# Patient Record
Sex: FEMALE | Race: WHITE | Employment: OTHER | URBAN - NONMETROPOLITAN AREA
[De-identification: names, ages, dates, MRNs, and addresses within clinical notes are randomized per-mention and may not be internally consistent; named-entity substitution may affect disease eponyms.]

---

## 2014-01-19 LAB
CHOLESTEROL, TOTAL: 244 MG/DL
CHOLESTEROL/HDL RATIO: 4.4
HDLC SERPL-MCNC: 55 MG/DL (ref 35–70)
LDL CHOLESTEROL CALCULATED: 202 MG/DL (ref 0–160)
TRIGL SERPL-MCNC: 134 MG/DL
VLDLC SERPL CALC-MCNC: 26 MG/DL

## 2015-01-04 LAB
CHOLESTEROL, TOTAL: 211 MG/DL
CHOLESTEROL/HDL RATIO: NORMAL
HDLC SERPL-MCNC: 66 MG/DL (ref 35–70)
LDL CHOLESTEROL CALCULATED: 146 MG/DL (ref 0–160)
TRIGL SERPL-MCNC: 176 MG/DL
VLDLC SERPL CALC-MCNC: 35 MG/DL

## 2018-03-21 ENCOUNTER — HOSPITAL ENCOUNTER (OUTPATIENT)
Dept: GENERAL RADIOLOGY | Age: 63
Discharge: HOME | DRG: 287 | End: 2018-03-21
Payer: MEDICARE

## 2018-03-21 ENCOUNTER — HOSPITAL ENCOUNTER (INPATIENT)
Age: 63
LOS: 3 days | Discharge: HOME | DRG: 287 | End: 2018-03-24
Attending: FAMILY MEDICINE | Admitting: FAMILY MEDICINE
Payer: MEDICARE

## 2018-03-21 ENCOUNTER — HOSPITAL ENCOUNTER (OUTPATIENT)
Age: 63
Discharge: HOME | DRG: 287 | End: 2018-03-21
Payer: MEDICARE

## 2018-03-21 ENCOUNTER — APPOINTMENT (OUTPATIENT)
Dept: CT IMAGING | Age: 63
DRG: 287 | End: 2018-03-21
Payer: MEDICARE

## 2018-03-21 DIAGNOSIS — R07.89 ATYPICAL CHEST PAIN: Primary | ICD-10-CM

## 2018-03-21 DIAGNOSIS — R06.02 SOB (SHORTNESS OF BREATH): ICD-10-CM

## 2018-03-21 PROBLEM — E78.5 HYPERLIPEMIA: Status: ACTIVE | Noted: 2018-03-21

## 2018-03-21 PROBLEM — I10 ESSENTIAL HYPERTENSION: Status: ACTIVE | Noted: 2018-03-21

## 2018-03-21 PROBLEM — R00.1 BRADYCARDIA: Status: ACTIVE | Noted: 2018-03-21

## 2018-03-21 PROBLEM — D64.9 ANEMIA, NORMOCYTIC NORMOCHROMIC: Status: ACTIVE | Noted: 2018-03-21

## 2018-03-21 PROBLEM — D72.829 LEUKOCYTOSIS: Status: ACTIVE | Noted: 2018-03-21

## 2018-03-21 PROBLEM — R07.9 CHEST PAIN: Status: ACTIVE | Noted: 2018-03-21

## 2018-03-21 PROBLEM — R53.83 FATIGUE: Status: ACTIVE | Noted: 2018-03-21

## 2018-03-21 LAB
ALBUMIN SERPL-MCNC: 3.5 G/DL (ref 3.5–5.1)
ALP BLD-CCNC: 35 U/L (ref 38–126)
ALT SERPL-CCNC: 14 U/L (ref 11–66)
ALT SERPL-CCNC: 21 U/L (ref 11–66)
ANION GAP SERPL CALCULATED.3IONS-SCNC: 12 MEQ/L (ref 8–16)
ANION GAP SERPL CALCULATED.3IONS-SCNC: 13 MEQ/L (ref 8–16)
APTT: 29 SECONDS (ref 22–38)
AST SERPL-CCNC: 16 U/L (ref 5–40)
AST SERPL-CCNC: 20 U/L (ref 5–40)
BACTERIA: ABNORMAL
BASOPHILS # BLD: 0.2 %
BASOPHILS # BLD: 0.3 %
BASOPHILS ABSOLUTE: 0 THOU/MM3 (ref 0–0.1)
BASOPHILS ABSOLUTE: 0 THOU/MM3 (ref 0–0.1)
BILIRUB SERPL-MCNC: 0.4 MG/DL (ref 0.3–1.2)
BILIRUBIN DIRECT: < 0.2 MG/DL (ref 0–0.3)
BILIRUBIN URINE: NEGATIVE
BLOOD, URINE: NEGATIVE
BUN BLDV-MCNC: 15 MG/DL (ref 7–22)
BUN BLDV-MCNC: 17 MG/DL (ref 7–22)
CALCIUM SERPL-MCNC: 9.1 MG/DL (ref 8.5–10.5)
CALCIUM SERPL-MCNC: 9.9 MG/DL (ref 8.5–10.5)
CASTS: ABNORMAL /LPF
CASTS: ABNORMAL /LPF
CHARACTER, URINE: CLEAR
CHLORIDE BLD-SCNC: 103 MEQ/L (ref 98–111)
CHLORIDE BLD-SCNC: 104 MEQ/L (ref 98–111)
CHOLESTEROL, TOTAL: 160 MG/DL (ref 100–199)
CO2: 24 MEQ/L (ref 23–33)
CO2: 25 MEQ/L (ref 23–33)
COLOR: YELLOW
CREAT SERPL-MCNC: 0.7 MG/DL (ref 0.4–1.2)
CREAT SERPL-MCNC: 0.7 MG/DL (ref 0.4–1.2)
CRYSTALS: ABNORMAL
EKG ATRIAL RATE: 43 BPM
EKG ATRIAL RATE: 46 BPM
EKG P AXIS: 58 DEGREES
EKG P AXIS: 69 DEGREES
EKG P-R INTERVAL: 182 MS
EKG P-R INTERVAL: 182 MS
EKG Q-T INTERVAL: 536 MS
EKG Q-T INTERVAL: 546 MS
EKG QRS DURATION: 78 MS
EKG QRS DURATION: 84 MS
EKG QTC CALCULATION (BAZETT): 452 MS
EKG QTC CALCULATION (BAZETT): 477 MS
EKG R AXIS: 34 DEGREES
EKG R AXIS: 52 DEGREES
EKG T AXIS: 88 DEGREES
EKG T AXIS: 91 DEGREES
EKG VENTRICULAR RATE: 43 BPM
EKG VENTRICULAR RATE: 46 BPM
EOSINOPHIL # BLD: 0 %
EOSINOPHIL # BLD: 0.1 %
EOSINOPHILS ABSOLUTE: 0 THOU/MM3 (ref 0–0.4)
EOSINOPHILS ABSOLUTE: 0 THOU/MM3 (ref 0–0.4)
EPITHELIAL CELLS, UA: ABNORMAL /HPF
GFR SERPL CREATININE-BSD FRML MDRD: 84 ML/MIN/1.73M2
GFR SERPL CREATININE-BSD FRML MDRD: 84 ML/MIN/1.73M2
GLUCOSE BLD-MCNC: 105 MG/DL (ref 70–108)
GLUCOSE BLD-MCNC: 110 MG/DL (ref 70–108)
GLUCOSE, URINE: NEGATIVE MG/DL
HCT VFR BLD CALC: 31.3 % (ref 37–47)
HCT VFR BLD CALC: 34.5 % (ref 37–47)
HDLC SERPL-MCNC: 50 MG/DL
HEMOGLOBIN: 10.4 GM/DL (ref 12–16)
HEMOGLOBIN: 11.6 GM/DL (ref 12–16)
KETONES, URINE: NEGATIVE
LACTIC ACID: 1.7 MMOL/L (ref 0.5–2.2)
LDL CHOLESTEROL CALCULATED: 71 MG/DL
LEUKOCYTE EST, POC: ABNORMAL
LYMPHOCYTES # BLD: 15.3 %
LYMPHOCYTES # BLD: 18.5 %
LYMPHOCYTES ABSOLUTE: 2.3 THOU/MM3 (ref 1–4.8)
LYMPHOCYTES ABSOLUTE: 3.2 THOU/MM3 (ref 1–4.8)
MCH RBC QN AUTO: 28.4 PG (ref 27–31)
MCH RBC QN AUTO: 28.7 PG (ref 27–31)
MCHC RBC AUTO-ENTMCNC: 33.3 GM/DL (ref 33–37)
MCHC RBC AUTO-ENTMCNC: 33.6 GM/DL (ref 33–37)
MCV RBC AUTO: 85.4 FL (ref 81–99)
MCV RBC AUTO: 85.5 FL (ref 81–99)
MISCELLANEOUS LAB TEST RESULT: ABNORMAL
MONOCYTES # BLD: 5.7 %
MONOCYTES # BLD: 5.8 %
MONOCYTES ABSOLUTE: 0.9 THOU/MM3 (ref 0.4–1.3)
MONOCYTES ABSOLUTE: 1 THOU/MM3 (ref 0.4–1.3)
NITRITE, URINE: NEGATIVE
NUCLEATED RED BLOOD CELLS: 0 /100 WBC
NUCLEATED RED BLOOD CELLS: 0 /100 WBC
OSMOLALITY CALCULATION: 281.6 MOSMOL/KG (ref 275–300)
PDW BLD-RTO: 14.2 % (ref 11.5–14.5)
PDW BLD-RTO: 14.3 % (ref 11.5–14.5)
PH UA: 6
PLATELET # BLD: 207 THOU/MM3 (ref 130–400)
PLATELET # BLD: 244 THOU/MM3 (ref 130–400)
PMV BLD AUTO: 10.3 FL (ref 7.4–10.4)
PMV BLD AUTO: 10.5 FL (ref 7.4–10.4)
POTASSIUM REFLEX MAGNESIUM: 4 MEQ/L (ref 3.5–5.2)
POTASSIUM SERPL-SCNC: 4.1 MEQ/L (ref 3.5–5.2)
PRO-BNP: 2126 PG/ML (ref 0–900)
PROTEIN UA: NEGATIVE MG/DL
RBC # BLD: 3.67 MILL/MM3 (ref 4.2–5.4)
RBC # BLD: 4.04 MILL/MM3 (ref 4.2–5.4)
RBC URINE: ABNORMAL /HPF
RENAL EPITHELIAL, UA: ABNORMAL
SEG NEUTROPHILS: 75.4 %
SEG NEUTROPHILS: 78.7 %
SEGMENTED NEUTROPHILS ABSOLUTE COUNT: 11.8 THOU/MM3 (ref 1.8–7.7)
SEGMENTED NEUTROPHILS ABSOLUTE COUNT: 12.9 THOU/MM3 (ref 1.8–7.7)
SODIUM BLD-SCNC: 140 MEQ/L (ref 135–145)
SODIUM BLD-SCNC: 141 MEQ/L (ref 135–145)
SPECIFIC GRAVITY UA: 1.01 (ref 1–1.03)
TOTAL PROTEIN: 5.9 G/DL (ref 6.1–8)
TRIGL SERPL-MCNC: 194 MG/DL (ref 0–199)
TROPONIN T: < 0.01 NG/ML
TROPONIN T: < 0.01 NG/ML
TSH SERPL DL<=0.05 MIU/L-ACNC: 1.39 UIU/ML (ref 0.4–4.2)
UROBILINOGEN, URINE: 0.2 EU/DL
WBC # BLD: 15 THOU/MM3 (ref 4.8–10.8)
WBC # BLD: 17.1 THOU/MM3 (ref 4.8–10.8)
WBC UA: ABNORMAL /HPF
YEAST: ABNORMAL

## 2018-03-21 PROCEDURE — 2140000000 HC CCU INTERMEDIATE R&B

## 2018-03-21 PROCEDURE — 82164 ANGIOTENSIN I ENZYME TEST: CPT

## 2018-03-21 PROCEDURE — 80061 LIPID PANEL: CPT

## 2018-03-21 PROCEDURE — 74176 CT ABD & PELVIS W/O CONTRAST: CPT

## 2018-03-21 PROCEDURE — B32T1ZZ COMPUTERIZED TOMOGRAPHY (CT SCAN) OF LEFT PULMONARY ARTERY USING LOW OSMOLAR CONTRAST: ICD-10-PCS | Performed by: RADIOLOGY

## 2018-03-21 PROCEDURE — 83880 ASSAY OF NATRIURETIC PEPTIDE: CPT

## 2018-03-21 PROCEDURE — B32S1ZZ COMPUTERIZED TOMOGRAPHY (CT SCAN) OF RIGHT PULMONARY ARTERY USING LOW OSMOLAR CONTRAST: ICD-10-PCS | Performed by: RADIOLOGY

## 2018-03-21 PROCEDURE — 2580000003 HC RX 258: Performed by: FAMILY MEDICINE

## 2018-03-21 PROCEDURE — 84443 ASSAY THYROID STIM HORMONE: CPT

## 2018-03-21 PROCEDURE — 84145 PROCALCITONIN (PCT): CPT

## 2018-03-21 PROCEDURE — 94640 AIRWAY INHALATION TREATMENT: CPT

## 2018-03-21 PROCEDURE — 85025 COMPLETE CBC W/AUTO DIFF WBC: CPT

## 2018-03-21 PROCEDURE — 96374 THER/PROPH/DIAG INJ IV PUSH: CPT

## 2018-03-21 PROCEDURE — 6360000004 HC RX CONTRAST MEDICATION: Performed by: FAMILY MEDICINE

## 2018-03-21 PROCEDURE — 6360000002 HC RX W HCPCS: Performed by: FAMILY MEDICINE

## 2018-03-21 PROCEDURE — 93010 ELECTROCARDIOGRAM REPORT: CPT | Performed by: NUCLEAR MEDICINE

## 2018-03-21 PROCEDURE — 80076 HEPATIC FUNCTION PANEL: CPT

## 2018-03-21 PROCEDURE — 87040 BLOOD CULTURE FOR BACTERIA: CPT

## 2018-03-21 PROCEDURE — 81001 URINALYSIS AUTO W/SCOPE: CPT

## 2018-03-21 PROCEDURE — 82232 ASSAY OF BETA-2 PROTEIN: CPT

## 2018-03-21 PROCEDURE — 80048 BASIC METABOLIC PNL TOTAL CA: CPT

## 2018-03-21 PROCEDURE — 99223 1ST HOSP IP/OBS HIGH 75: CPT | Performed by: FAMILY MEDICINE

## 2018-03-21 PROCEDURE — 71046 X-RAY EXAM CHEST 2 VIEWS: CPT

## 2018-03-21 PROCEDURE — 83605 ASSAY OF LACTIC ACID: CPT

## 2018-03-21 PROCEDURE — 82550 ASSAY OF CK (CPK): CPT

## 2018-03-21 PROCEDURE — 99285 EMERGENCY DEPT VISIT HI MDM: CPT

## 2018-03-21 PROCEDURE — 85730 THROMBOPLASTIN TIME PARTIAL: CPT

## 2018-03-21 PROCEDURE — 84484 ASSAY OF TROPONIN QUANT: CPT

## 2018-03-21 PROCEDURE — 84450 TRANSFERASE (AST) (SGOT): CPT

## 2018-03-21 PROCEDURE — 93005 ELECTROCARDIOGRAM TRACING: CPT | Performed by: FAMILY MEDICINE

## 2018-03-21 PROCEDURE — 36415 COLL VENOUS BLD VENIPUNCTURE: CPT

## 2018-03-21 PROCEDURE — B3201ZZ COMPUTERIZED TOMOGRAPHY (CT SCAN) OF THORACIC AORTA USING LOW OSMOLAR CONTRAST: ICD-10-PCS | Performed by: RADIOLOGY

## 2018-03-21 PROCEDURE — 84460 ALANINE AMINO (ALT) (SGPT): CPT

## 2018-03-21 PROCEDURE — 6370000000 HC RX 637 (ALT 250 FOR IP): Performed by: FAMILY MEDICINE

## 2018-03-21 PROCEDURE — 71275 CT ANGIOGRAPHY CHEST: CPT

## 2018-03-21 PROCEDURE — 83615 LACTATE (LD) (LDH) ENZYME: CPT

## 2018-03-21 RX ORDER — 0.9 % SODIUM CHLORIDE 0.9 %
1000 INTRAVENOUS SOLUTION INTRAVENOUS ONCE
Status: COMPLETED | OUTPATIENT
Start: 2018-03-21 | End: 2018-03-21

## 2018-03-21 RX ORDER — NITROGLYCERIN 0.4 MG/1
0.4 TABLET SUBLINGUAL EVERY 5 MIN PRN
Status: DISCONTINUED | OUTPATIENT
Start: 2018-03-21 | End: 2018-03-24 | Stop reason: HOSPADM

## 2018-03-21 RX ORDER — M-VIT,TX,IRON,MINS/CALC/FOLIC 27MG-0.4MG
1 TABLET ORAL DAILY
Status: DISCONTINUED | OUTPATIENT
Start: 2018-03-22 | End: 2018-03-24 | Stop reason: HOSPADM

## 2018-03-21 RX ORDER — ATORVASTATIN CALCIUM 10 MG/1
10 TABLET, FILM COATED ORAL NIGHTLY
Status: DISCONTINUED | OUTPATIENT
Start: 2018-03-21 | End: 2018-03-24 | Stop reason: HOSPADM

## 2018-03-21 RX ORDER — FAMOTIDINE 20 MG/1
20 TABLET, FILM COATED ORAL 2 TIMES DAILY
Status: DISCONTINUED | OUTPATIENT
Start: 2018-03-21 | End: 2018-03-24 | Stop reason: HOSPADM

## 2018-03-21 RX ORDER — ALBUTEROL SULFATE 2.5 MG/3ML
2.5 SOLUTION RESPIRATORY (INHALATION) ONCE
Status: COMPLETED | OUTPATIENT
Start: 2018-03-21 | End: 2018-03-21

## 2018-03-21 RX ORDER — CITALOPRAM 20 MG/1
10 TABLET ORAL DAILY
Status: DISCONTINUED | OUTPATIENT
Start: 2018-03-22 | End: 2018-03-21

## 2018-03-21 RX ORDER — FUROSEMIDE 20 MG/1
20 TABLET ORAL DAILY
Status: DISCONTINUED | OUTPATIENT
Start: 2018-03-22 | End: 2018-03-24 | Stop reason: HOSPADM

## 2018-03-21 RX ORDER — SODIUM CHLORIDE 0.9 % (FLUSH) 0.9 %
10 SYRINGE (ML) INJECTION PRN
Status: DISCONTINUED | OUTPATIENT
Start: 2018-03-21 | End: 2018-03-24 | Stop reason: SDUPTHER

## 2018-03-21 RX ORDER — POTASSIUM CHLORIDE 20 MEQ/1
20 TABLET, EXTENDED RELEASE ORAL DAILY
Status: DISCONTINUED | OUTPATIENT
Start: 2018-03-22 | End: 2018-03-24 | Stop reason: HOSPADM

## 2018-03-21 RX ORDER — HYDROCODONE BITARTRATE AND ACETAMINOPHEN 5; 325 MG/1; MG/1
1 TABLET ORAL EVERY 6 HOURS PRN
Status: DISCONTINUED | OUTPATIENT
Start: 2018-03-21 | End: 2018-03-24 | Stop reason: HOSPADM

## 2018-03-21 RX ORDER — ACETAMINOPHEN 325 MG/1
650 TABLET ORAL EVERY 4 HOURS PRN
Status: DISCONTINUED | OUTPATIENT
Start: 2018-03-21 | End: 2018-03-24 | Stop reason: SDUPTHER

## 2018-03-21 RX ORDER — LISINOPRIL 20 MG/1
20 TABLET ORAL DAILY
Status: DISCONTINUED | OUTPATIENT
Start: 2018-03-22 | End: 2018-03-24 | Stop reason: HOSPADM

## 2018-03-21 RX ORDER — KETOROLAC TROMETHAMINE 30 MG/ML
30 INJECTION, SOLUTION INTRAMUSCULAR; INTRAVENOUS ONCE
Status: COMPLETED | OUTPATIENT
Start: 2018-03-21 | End: 2018-03-21

## 2018-03-21 RX ORDER — VENLAFAXINE HYDROCHLORIDE 150 MG/1
150 CAPSULE, EXTENDED RELEASE ORAL DAILY
Status: DISCONTINUED | OUTPATIENT
Start: 2018-03-22 | End: 2018-03-24 | Stop reason: HOSPADM

## 2018-03-21 RX ORDER — HYDRALAZINE HYDROCHLORIDE 20 MG/ML
10 INJECTION INTRAMUSCULAR; INTRAVENOUS EVERY 6 HOURS PRN
Status: DISCONTINUED | OUTPATIENT
Start: 2018-03-21 | End: 2018-03-24 | Stop reason: HOSPADM

## 2018-03-21 RX ORDER — METOPROLOL SUCCINATE 25 MG/1
25 TABLET, EXTENDED RELEASE ORAL DAILY
Status: DISCONTINUED | OUTPATIENT
Start: 2018-03-22 | End: 2018-03-24 | Stop reason: HOSPADM

## 2018-03-21 RX ORDER — CITALOPRAM 10 MG/1
10 TABLET ORAL NIGHTLY
Status: ON HOLD | COMMUNITY
End: 2018-07-03 | Stop reason: DRUGHIGH

## 2018-03-21 RX ORDER — ONDANSETRON 2 MG/ML
4 INJECTION INTRAMUSCULAR; INTRAVENOUS EVERY 6 HOURS PRN
Status: DISCONTINUED | OUTPATIENT
Start: 2018-03-21 | End: 2018-03-21 | Stop reason: RX

## 2018-03-21 RX ORDER — ONDANSETRON 4 MG/1
4 TABLET, ORALLY DISINTEGRATING ORAL EVERY 6 HOURS PRN
Status: DISCONTINUED | OUTPATIENT
Start: 2018-03-21 | End: 2018-03-24 | Stop reason: HOSPADM

## 2018-03-21 RX ORDER — LISINOPRIL 20 MG/1
20 TABLET ORAL DAILY
Status: ON HOLD | COMMUNITY
End: 2018-07-06 | Stop reason: HOSPADM

## 2018-03-21 RX ORDER — CITALOPRAM 20 MG/1
10 TABLET ORAL NIGHTLY
Status: DISCONTINUED | OUTPATIENT
Start: 2018-03-22 | End: 2018-03-24 | Stop reason: HOSPADM

## 2018-03-21 RX ORDER — FERROUS SULFATE 325(65) MG
325 TABLET ORAL
Status: DISCONTINUED | OUTPATIENT
Start: 2018-03-22 | End: 2018-03-24 | Stop reason: HOSPADM

## 2018-03-21 RX ORDER — SODIUM CHLORIDE 0.9 % (FLUSH) 0.9 %
10 SYRINGE (ML) INJECTION EVERY 12 HOURS SCHEDULED
Status: DISCONTINUED | OUTPATIENT
Start: 2018-03-21 | End: 2018-03-24 | Stop reason: ALTCHOICE

## 2018-03-21 RX ORDER — ASPIRIN 81 MG/1
81 TABLET, CHEWABLE ORAL DAILY
Status: DISCONTINUED | OUTPATIENT
Start: 2018-03-22 | End: 2018-03-24 | Stop reason: HOSPADM

## 2018-03-21 RX ADMIN — KETOROLAC TROMETHAMINE 30 MG: 30 INJECTION, SOLUTION INTRAMUSCULAR at 17:54

## 2018-03-21 RX ADMIN — Medication 10 ML: at 23:28

## 2018-03-21 RX ADMIN — SODIUM CHLORIDE 1000 ML: 9 INJECTION, SOLUTION INTRAVENOUS at 17:53

## 2018-03-21 RX ADMIN — IOPAMIDOL 80 ML: 755 INJECTION, SOLUTION INTRAVENOUS at 19:21

## 2018-03-21 RX ADMIN — ATORVASTATIN CALCIUM 10 MG: 10 TABLET, FILM COATED ORAL at 23:27

## 2018-03-21 RX ADMIN — HYDRALAZINE HYDROCHLORIDE 10 MG: 20 INJECTION INTRAMUSCULAR; INTRAVENOUS at 23:29

## 2018-03-21 RX ADMIN — FAMOTIDINE 20 MG: 20 TABLET, FILM COATED ORAL at 23:27

## 2018-03-21 RX ADMIN — ALBUTEROL SULFATE 2.5 MG: 2.5 SOLUTION RESPIRATORY (INHALATION) at 20:54

## 2018-03-21 ASSESSMENT — ENCOUNTER SYMPTOMS
ABDOMINAL PAIN: 0
BACK PAIN: 0
DIARRHEA: 0
SHORTNESS OF BREATH: 1
COUGH: 0
WHEEZING: 0
SORE THROAT: 0
VOMITING: 0
EYE PAIN: 0
NAUSEA: 0
EYE DISCHARGE: 0
RHINORRHEA: 0

## 2018-03-21 ASSESSMENT — PAIN DESCRIPTION - PAIN TYPE: TYPE: ACUTE PAIN

## 2018-03-21 ASSESSMENT — PAIN SCALES - GENERAL
PAINLEVEL_OUTOF10: 0
PAINLEVEL_OUTOF10: 0
PAINLEVEL_OUTOF10: 2

## 2018-03-21 ASSESSMENT — PAIN DESCRIPTION - LOCATION: LOCATION: CHEST

## 2018-03-21 ASSESSMENT — PAIN DESCRIPTION - PROGRESSION: CLINICAL_PROGRESSION: NOT CHANGED

## 2018-03-21 ASSESSMENT — PAIN DESCRIPTION - ONSET: ONSET: ON-GOING

## 2018-03-21 ASSESSMENT — PAIN DESCRIPTION - FREQUENCY: FREQUENCY: CONTINUOUS

## 2018-03-21 NOTE — ED NOTES
Patient resting in bed. Educated patient and family as to why patient got pain medications due to comments made to the physician. Patient still denies any pain at this time except when questioned directly. Respirations easy and unlabored.       Raffaele Shay RN  03/21/18 [id number]

## 2018-03-21 NOTE — ED PROVIDER NOTES
[facility]  eMERGENCY dEPARTMENT eNCOUnter          CHIEF COMPLAINT       Chief Complaint   Patient presents with    Other     Abnormal labs       Nurses Notes reviewed and I agree except as noted in the HPI. HISTORY OF PRESENT ILLNESS    Jeane Melvin is a 60 y.o. female who presents to the Emergency Department for the evaluation of abnormal labs. The patient states she has been having shortness of breath,  Atypical chest pain she describes as a \"ball in her chest,\" that is mild, mild bilateral shoulder pain, and mild neck pain for the past 3 weeks. She states this morning she went to her PCP for her symptoms where she received laboratory work, a chest x-ray, and EKG. Her PCP called her later today who recommended her to come to the ED today due to her labs stating 17 WBC, 11.6 hemoglobin, and an abnormal EKG and chest xray. She states her PCP stated she was potentially having moderate enlarging of her heart based on the laboratory work she received in the morning. Patient states she takes metoprolol daily . She has history of hyperlipidemia and cerebral infarction. She states she also has fatigue. She denies nasuea, emesis, diarrhea, blood in stool, leg swelling, fever, chills, headache, cough, or chest congestion. She has no /GI/TRISTAN/CNS  Or other symptomatology . She has no fevers or other constitutional symptoms. Clinically she looks well in no acute symptoms. The HPI was provided by the patient. REVIEW OF SYSTEMS     Review of Systems   Constitutional: Positive for fatigue. Negative for appetite change, chills and fever. HENT: Negative for congestion, ear pain, rhinorrhea and sore throat. Eyes: Negative for pain, discharge and visual disturbance. Respiratory: Positive for shortness of breath. Negative for cough and wheezing. Cardiovascular: Negative for chest pain, palpitations and leg swelling.    Gastrointestinal: Negative for abdominal pain, diarrhea, nausea and history includes Diabetes in her father and mother; Heart Attack in her mother; Heart Disease in her mother; Heart Surgery in her mother; High Blood Pressure in her mother; Hypertension in her mother. SOCIAL HISTORY      reports that she quit smoking about 2 years ago. She has a 22.00 pack-year smoking history. She does not have any smokeless tobacco history on file. She reports that she does not drink alcohol or use drugs. PHYSICAL EXAM     INITIAL VITALS:  blood pressure is 142/68 (abnormal) and her pulse is 48 (abnormal). Her respiration is 20 and oxygen saturation is 98%. Physical Exam   Constitutional: She is oriented to person, place, and time. She appears well-developed and well-nourished. HENT:   Head: Normocephalic and atraumatic. Right Ear: External ear normal. Tympanic membrane is not erythematous and not bulging. Left Ear: External ear normal. Tympanic membrane is not erythematous and not bulging. Mouth/Throat: Oropharynx is clear and moist. Mucous membranes are not pale and not dry. No oropharyngeal exudate, posterior oropharyngeal edema, posterior oropharyngeal erythema or tonsillar abscesses. Eyes: Conjunctivae are normal. Right eye exhibits no discharge. Left eye exhibits no discharge. No scleral icterus. Neck: Normal range of motion. Neck supple. No JVD present. Cardiovascular: Normal rate, regular rhythm and normal heart sounds. Exam reveals no gallop and no friction rub. No murmur heard. Pulmonary/Chest: Effort normal and breath sounds normal. No respiratory distress. She has no decreased breath sounds. She has no wheezes. She has no rhonchi. She has no rales. She exhibits no tenderness, no laceration, no crepitus, no edema and no swelling. Abdominal: Soft. She exhibits no distension. There is no tenderness. There is no rebound and no guarding. Musculoskeletal: Normal range of motion. She exhibits no edema. Right shoulder: Normal. She exhibits no tenderness. actions.     Katerine Jimenes MD 3/21/18 8:00 PM                          Katerine Jimenes MD  03/21/18 2000

## 2018-03-21 NOTE — ED NOTES
Patient to CT scan via cart with tech. Vitals stable. Educated patient and family on need for CT. Patient and family understood the teachings. Patient still denies signs or symptoms. Denies pain. Respirations easy. Skin pink, warm and dry.       Mihir Wiley RN  03/21/18 [facility] 18 DIYA Saucedo  03/21/18 2000

## 2018-03-22 PROBLEM — R07.89 ATYPICAL CHEST PAIN: Status: ACTIVE | Noted: 2018-03-21

## 2018-03-22 PROBLEM — I71.9 AORTIC ANEURYSM (HCC): Status: ACTIVE | Noted: 2018-03-22

## 2018-03-22 PROBLEM — E87.6 HYPOKALEMIA: Status: ACTIVE | Noted: 2018-03-22

## 2018-03-22 LAB
ANION GAP SERPL CALCULATED.3IONS-SCNC: 12 MEQ/L (ref 8–16)
BASOPHILS # BLD: 0.2 %
BASOPHILS ABSOLUTE: 0 THOU/MM3 (ref 0–0.1)
BILIRUBIN URINE: NEGATIVE
BLOOD, URINE: NEGATIVE
BUN BLDV-MCNC: 17 MG/DL (ref 7–22)
CALCIUM SERPL-MCNC: 8.5 MG/DL (ref 8.5–10.5)
CHARACTER, URINE: CLEAR
CHLORIDE BLD-SCNC: 107 MEQ/L (ref 98–111)
CHOLESTEROL, TOTAL: 128 MG/DL (ref 100–199)
CO2: 21 MEQ/L (ref 23–33)
COLOR: YELLOW
CREAT SERPL-MCNC: 0.7 MG/DL (ref 0.4–1.2)
EKG ATRIAL RATE: 54 BPM
EKG P AXIS: 61 DEGREES
EKG P-R INTERVAL: 172 MS
EKG Q-T INTERVAL: 456 MS
EKG QRS DURATION: 88 MS
EKG QTC CALCULATION (BAZETT): 432 MS
EKG R AXIS: 22 DEGREES
EKG T AXIS: 122 DEGREES
EKG VENTRICULAR RATE: 54 BPM
EOSINOPHIL # BLD: 0.5 %
EOSINOPHILS ABSOLUTE: 0.1 THOU/MM3 (ref 0–0.4)
GFR SERPL CREATININE-BSD FRML MDRD: 84 ML/MIN/1.73M2
GLUCOSE BLD-MCNC: 105 MG/DL (ref 70–108)
GLUCOSE BLD-MCNC: 122 MG/DL (ref 70–108)
GLUCOSE, URINE: NEGATIVE MG/DL
HCT VFR BLD CALC: 31.2 % (ref 37–47)
HDLC SERPL-MCNC: 39 MG/DL
HEMOCCULT STL QL: POSITIVE
HEMOGLOBIN: 10.4 GM/DL (ref 12–16)
INR BLD: 1 (ref 0.85–1.13)
KETONES, URINE: NEGATIVE
LD: 225 U/L (ref 100–190)
LDL CHOLESTEROL CALCULATED: 46 MG/DL
LEUKOCYTE EST, POC: NEGATIVE
LV EF: 58 %
LVEF MODALITY: NORMAL
LYMPHOCYTES # BLD: 31.2 %
LYMPHOCYTES ABSOLUTE: 4.6 THOU/MM3 (ref 1–4.8)
MAGNESIUM: 1.8 MG/DL (ref 1.6–2.4)
MCH RBC QN AUTO: 28.4 PG (ref 27–31)
MCHC RBC AUTO-ENTMCNC: 33.2 GM/DL (ref 33–37)
MCV RBC AUTO: 85.5 FL (ref 81–99)
MONOCYTES # BLD: 8.6 %
MONOCYTES ABSOLUTE: 1.3 THOU/MM3 (ref 0.4–1.3)
NITRITE, URINE: NEGATIVE
NUCLEATED RED BLOOD CELLS: 0 /100 WBC
PDW BLD-RTO: 14.1 % (ref 11.5–14.5)
PH UA: 6
PLATELET # BLD: 200 THOU/MM3 (ref 130–400)
PMV BLD AUTO: 10.1 FL (ref 7.4–10.4)
POTASSIUM REFLEX MAGNESIUM: 3.3 MEQ/L (ref 3.5–5.2)
PROCALCITONIN: 0.04 NG/ML (ref 0.01–0.09)
PROTEIN UA: NEGATIVE MG/DL
RBC # BLD: 3.65 MILL/MM3 (ref 4.2–5.4)
SEG NEUTROPHILS: 59.5 %
SEGMENTED NEUTROPHILS ABSOLUTE COUNT: 8.9 THOU/MM3 (ref 1.8–7.7)
SODIUM BLD-SCNC: 140 MEQ/L (ref 135–145)
SPECIFIC GRAVITY UA: > 1.03 (ref 1–1.03)
TOTAL CK: 56 U/L (ref 30–135)
TRIGL SERPL-MCNC: 214 MG/DL (ref 0–199)
TROPONIN T: < 0.01 NG/ML
TROPONIN T: < 0.01 NG/ML
UROBILINOGEN, URINE: 0.2 EU/DL
WBC # BLD: 14.9 THOU/MM3 (ref 4.8–10.8)

## 2018-03-22 PROCEDURE — 99223 1ST HOSP IP/OBS HIGH 75: CPT | Performed by: INTERNAL MEDICINE

## 2018-03-22 PROCEDURE — 85610 PROTHROMBIN TIME: CPT

## 2018-03-22 PROCEDURE — 93010 ELECTROCARDIOGRAM REPORT: CPT | Performed by: NUCLEAR MEDICINE

## 2018-03-22 PROCEDURE — 93306 TTE W/DOPPLER COMPLETE: CPT

## 2018-03-22 PROCEDURE — 6370000000 HC RX 637 (ALT 250 FOR IP): Performed by: INTERNAL MEDICINE

## 2018-03-22 PROCEDURE — 2580000003 HC RX 258: Performed by: FAMILY MEDICINE

## 2018-03-22 PROCEDURE — 83735 ASSAY OF MAGNESIUM: CPT

## 2018-03-22 PROCEDURE — 6370000000 HC RX 637 (ALT 250 FOR IP): Performed by: FAMILY MEDICINE

## 2018-03-22 PROCEDURE — 82272 OCCULT BLD FECES 1-3 TESTS: CPT

## 2018-03-22 PROCEDURE — 80061 LIPID PANEL: CPT

## 2018-03-22 PROCEDURE — 6360000002 HC RX W HCPCS: Performed by: FAMILY MEDICINE

## 2018-03-22 PROCEDURE — 84484 ASSAY OF TROPONIN QUANT: CPT

## 2018-03-22 PROCEDURE — 93005 ELECTROCARDIOGRAM TRACING: CPT

## 2018-03-22 PROCEDURE — 99232 SBSQ HOSP IP/OBS MODERATE 35: CPT | Performed by: INTERNAL MEDICINE

## 2018-03-22 PROCEDURE — 2700000000 HC OXYGEN THERAPY PER DAY

## 2018-03-22 PROCEDURE — 85025 COMPLETE CBC W/AUTO DIFF WBC: CPT

## 2018-03-22 PROCEDURE — 80048 BASIC METABOLIC PNL TOTAL CA: CPT

## 2018-03-22 PROCEDURE — 36415 COLL VENOUS BLD VENIPUNCTURE: CPT

## 2018-03-22 PROCEDURE — 81003 URINALYSIS AUTO W/O SCOPE: CPT

## 2018-03-22 PROCEDURE — 82948 REAGENT STRIP/BLOOD GLUCOSE: CPT

## 2018-03-22 PROCEDURE — 2140000000 HC CCU INTERMEDIATE R&B

## 2018-03-22 RX ORDER — POTASSIUM CHLORIDE 750 MG/1
40 TABLET, FILM COATED, EXTENDED RELEASE ORAL ONCE
Status: COMPLETED | OUTPATIENT
Start: 2018-03-22 | End: 2018-03-22

## 2018-03-22 RX ORDER — HYDROCHLOROTHIAZIDE 25 MG/1
25 TABLET ORAL DAILY
Status: DISCONTINUED | OUTPATIENT
Start: 2018-03-22 | End: 2018-03-24 | Stop reason: HOSPADM

## 2018-03-22 RX ORDER — MUSCLE RUB CREAM 100; 150 MG/G; MG/G
CREAM TOPICAL PRN
Status: DISCONTINUED | OUTPATIENT
Start: 2018-03-22 | End: 2018-03-24 | Stop reason: HOSPADM

## 2018-03-22 RX ADMIN — MENTHOL, METHYL SALICYLATE: 10; 15 CREAM TOPICAL at 10:54

## 2018-03-22 RX ADMIN — MULTIPLE VITAMINS W/ MINERALS TAB 1 TABLET: TAB at 09:22

## 2018-03-22 RX ADMIN — FERROUS SULFATE TAB 325 MG (65 MG ELEMENTAL FE) 325 MG: 325 (65 FE) TAB at 20:35

## 2018-03-22 RX ADMIN — LISINOPRIL 20 MG: 20 TABLET ORAL at 09:20

## 2018-03-22 RX ADMIN — FERROUS SULFATE TAB 325 MG (65 MG ELEMENTAL FE) 325 MG: 325 (65 FE) TAB at 09:22

## 2018-03-22 RX ADMIN — VENLAFAXINE HYDROCHLORIDE 150 MG: 150 CAPSULE, EXTENDED RELEASE ORAL at 09:21

## 2018-03-22 RX ADMIN — MENTHOL, METHYL SALICYLATE: 10; 15 CREAM TOPICAL at 19:58

## 2018-03-22 RX ADMIN — POTASSIUM CHLORIDE 40 MEQ: 750 TABLET, FILM COATED, EXTENDED RELEASE ORAL at 09:37

## 2018-03-22 RX ADMIN — METOPROLOL SUCCINATE 25 MG: 25 TABLET, FILM COATED, EXTENDED RELEASE ORAL at 09:21

## 2018-03-22 RX ADMIN — CITALOPRAM 10 MG: 20 TABLET, FILM COATED ORAL at 01:26

## 2018-03-22 RX ADMIN — Medication 10 ML: at 05:42

## 2018-03-22 RX ADMIN — Medication 10 ML: at 09:56

## 2018-03-22 RX ADMIN — HYDRALAZINE HYDROCHLORIDE 10 MG: 20 INJECTION INTRAMUSCULAR; INTRAVENOUS at 06:05

## 2018-03-22 RX ADMIN — ONDANSETRON 4 MG: 4 TABLET, ORALLY DISINTEGRATING ORAL at 07:04

## 2018-03-22 RX ADMIN — CITALOPRAM 10 MG: 20 TABLET, FILM COATED ORAL at 19:58

## 2018-03-22 RX ADMIN — ASPIRIN 81 MG: 81 TABLET, CHEWABLE ORAL at 10:53

## 2018-03-22 RX ADMIN — FAMOTIDINE 20 MG: 20 TABLET, FILM COATED ORAL at 19:59

## 2018-03-22 RX ADMIN — ACETAMINOPHEN 650 MG: 325 TABLET ORAL at 05:01

## 2018-03-22 RX ADMIN — POTASSIUM CHLORIDE 20 MEQ: 1500 TABLET, EXTENDED RELEASE ORAL at 09:37

## 2018-03-22 RX ADMIN — FERROUS SULFATE TAB 325 MG (65 MG ELEMENTAL FE) 325 MG: 325 (65 FE) TAB at 13:04

## 2018-03-22 RX ADMIN — HYDRALAZINE HYDROCHLORIDE 10 MG: 20 INJECTION INTRAMUSCULAR; INTRAVENOUS at 23:51

## 2018-03-22 RX ADMIN — Medication 10 ML: at 19:58

## 2018-03-22 RX ADMIN — HYDROCHLOROTHIAZIDE 25 MG: 25 TABLET ORAL at 19:58

## 2018-03-22 RX ADMIN — ACETAMINOPHEN 650 MG: 325 TABLET ORAL at 23:51

## 2018-03-22 RX ADMIN — HYDRALAZINE HYDROCHLORIDE 10 MG: 20 INJECTION INTRAMUSCULAR; INTRAVENOUS at 17:30

## 2018-03-22 RX ADMIN — FAMOTIDINE 20 MG: 20 TABLET, FILM COATED ORAL at 09:22

## 2018-03-22 RX ADMIN — FUROSEMIDE 20 MG: 20 TABLET ORAL at 09:20

## 2018-03-22 RX ADMIN — ATORVASTATIN CALCIUM 10 MG: 10 TABLET, FILM COATED ORAL at 19:58

## 2018-03-22 ASSESSMENT — PAIN SCALES - GENERAL
PAINLEVEL_OUTOF10: 0
PAINLEVEL_OUTOF10: 5
PAINLEVEL_OUTOF10: 0
PAINLEVEL_OUTOF10: 5
PAINLEVEL_OUTOF10: 3

## 2018-03-22 ASSESSMENT — PAIN DESCRIPTION - LOCATION: LOCATION: HEAD

## 2018-03-22 ASSESSMENT — PAIN DESCRIPTION - DESCRIPTORS
DESCRIPTORS: HEADACHE
DESCRIPTORS: HEADACHE

## 2018-03-22 ASSESSMENT — PAIN DESCRIPTION - PAIN TYPE: TYPE: ACUTE PAIN

## 2018-03-22 NOTE — ED NOTES
Patient stated that she was starting to get short of breath. Lungs listened to. Wheezes noted. O2 sats still 95-96%. Skin is pink, warm and dry. Respirations do not appear to be that labored at this moment. Patient physician contacted and order for Albuterol treatment as noted.       Adia Duran RN  03/21/18 [id number]

## 2018-03-22 NOTE — PROGRESS NOTES
Pt is alert and oriented x3. Speech is appropriate and clear. PERRL. Upper extremities are pink, warm, and dry. Full sensation present,  with no complaints of numbness/tingling, or pain. Cap refill is less than 3 seconds. Hand grasp is strong bilaterally. Apical pulse is 61 with regular rhythm. Lung sounds are clear throughout. Abdomen is rounded, and non distended. Bowel sounds heard in all 4 quads. No complaints of tenderness or pain when palpated. Lower extremities are pink, warm, and dry. Full sensation present with out any pain, numbness, or tingling. Pedal push and pull is strong bilaterally and pedal pulses are present. Bony prominences are intact. Pt is now back in bed. Complaint of not sleeping at all through the night. Pt has jamel light in reach and no needs at this time.    Electronically signed by Ramón Tate on 3/22/2018 at 8:18 AM

## 2018-03-22 NOTE — PROGRESS NOTES
S1/S2 ++ murmurs. Abdomen: Soft, non-tender, non-distended with normal bowel sounds. Musculoskeletal: passive and active ROM x 4 extremities. Skin: Skin color, texture, turgor normal.  No rashes or lesions. Neurologic:  Grossly non-focal.  Psychiatric: Alert and oriented, thought content appropriate, normal insight  Capillary Refill: Brisk,< 3 seconds   Peripheral Pulses: +2 palpable, equal bilaterally       Labs:   Recent Labs      03/21/18   1025  03/21/18   1817  03/22/18   0434   WBC  17.1*  15.0*  14.9*   HGB  11.6*  10.4*  10.4*   HCT  34.5*  31.3*  31.2*   PLT  244  207  200     Recent Labs      03/21/18   1027  03/21/18   1817  03/22/18   0434   NA  141  140  140   K  4.1  4.0  3.3*   CL  103  104  107   CO2  25  24  21*   BUN  15  17  17   CREATININE  0.7  0.7  0.7   CALCIUM  9.9  9.1  8.5     Recent Labs      03/21/18   1027  03/21/18   2306   AST  16  20   ALT  14  21   BILIDIR   --   <0.2   BILITOT   --   0.4   ALKPHOS   --   35*     Recent Labs      03/22/18   0434   INR  1.00     Recent Labs      03/21/18   2306   CKTOTAL  56       Urinalysis:    Lab Results   Component Value Date    NITRU NEGATIVE 03/22/2018    WBCUA 5-10 03/21/2018    WBCUA 2-4 05/04/2012    BACTERIA NONE 03/21/2018    RBCUA 0-2 03/21/2018    BLOODU NEGATIVE 03/22/2018    SPECGRAV >1.030 03/22/2018    GLUCOSEU NEGATIVE 05/04/2012       Radiology:  CT ABDOMEN PELVIS WO CONTRAST Additional Contrast? None   Final Result   Moderate stool scattered in the colon. No acute findings. **This report has been created using voice recognition software. It may contain minor errors which are inherent in voice recognition technology. **      Final report electronically signed by Dr. Vickie Crooks on 3/21/2018 8:27 PM      CTA Chest W WO Contrast   Final Result      1. No acute pulmonary embolism. 2. Moderate cardiomegaly. 3. Thoracic ascending aorta measures 4 cm. Follow-up advised.    4. There is minimal prominence of the interstitium

## 2018-03-22 NOTE — H&P
History & Physical    Patient:  Nely Kraus  YOB: 1955  Date of Service: 3/21/2018  MRN: [id number]   Acct:  [de-identified]   Primary Care Physician: Germán Colon MD    Chief Complaint: Chest pain, Shortness of breath    History of Present Illness:   History obtained from chart review and the patient. The patient is a 60 y.o. female with HLD, CVA, s/p AVR (6/2015) who presents to the Emergency Department for the evaluation of chest pain and shortness of breath. The patient describes her chest pain as a \"ball in her chest,\" and throat, making it difficult to catch her breath. She relates that this has been recurring daily for the past 2 weeks, lasting 1 hour at a time. She denies cough, diaphoresis, night sweats, heart palpitations, fever, chills, nausea, vomiting, diarrhea, abdominal pain or dysuria. Patient states that she has also had a mild bilateral shoulder pain, mild neck pain since the onset of these episodes. Patient was seen by FP 3 days ago and she was started on medrol dose benoit. Lab testing today showed elevated wbc and CXR showed enlarged heart. Patient had CTA chest in the ED showing no PE, no pneumonia, moderate cardiomegaly and prominent interstitium. Patient did have an episode of shortness of breath and wheezing in the ED that was relieved with nebulized albuterol treatment. Patient denies smoking, alcohol or illicit drug use. She denies MI or recent cardiac testing. Patient is being admitted for further care.       Past Medical History:        Diagnosis Date    Arthritis     Hyperlipidemia     Unspecified cerebral artery occlusion with cerebral infarction ([facility] [address] 3/23/15    \"bleeding on brain\"       Past Surgical History:        Procedure Laterality Date    CARDIAC CAT[facility]TERIZATION  2004 or 2005    [facility]    CORONARY ARTERY BYPASS GRAFT  6-2-15    DIAGNOSTIC CARDIAC CATH LAB PROCEDURE      HYSTERECTOMY         Home Medications:   No current facility-administered pain, swelling , stiffness  Endocrine: no polyuria, polydypsia, no cold or heat intolerence  Hematology: no anemia, no easy brusing or bleeding, no hx of clotting disorder  Dermatology: no skin rash, no eczema, no prurities,  Psychiatry: no depression, no anxiety,no panic attacks, no suicide ideation  Neurology: no syncope, no seizures, no numbness or tingling of hands, no numbness or tingling of feet, no paresis    10 point review of systems completed, all other than noted above are negative. Vitals:   Vitals:    03/21/18 2000   BP: (!) 162/44   Pulse: (!) 46   Resp: 16   SpO2: 97%      BMI: There is no height or weight on file to calculate BMI.                 Exam:  Physical Examination: General appearance - alert, well appearing, and in no distress  Mental status - alert, oriented to person, place, and time  Neck - supple, no significant adenopathy, no JVD, or carotid bruits  Chest - clear to auscultation, no wheezes, rales or rhonchi, symmetric air entry  Heart - normal rate, regular rhythm, normal S1, S2, ++ murmurs, rubs, clicks or gallops  Abdomen - soft, nontender, nondistended, no masses or organomegaly  Neurological - alert, oriented, normal speech, no focal findings or movement disorder noted  Musculoskeletal - no joint tenderness, deformity or swelling  Extremities - peripheral pulses normal, no pedal edema, no clubbing or cyanosis  Skin - normal coloration and turgor, no rashes, no suspicious skin lesions noted      Review of Labs and Diagnostic Testing:    Recent Results (from the past 24 hour(s))   Microscopic Urinalysis    Collection Time: 03/21/18 10:00 AM   Result Value Ref Range    Glucose, Urine NEGATIVE NEGATIVE mg/dl    Bilirubin Urine NEGATIVE NEGATIVE    Ketones, Urine NEGATIVE NEGATIVE    Specific Gravity, UA 1.013 1.002 - 1.03    Blood, Urine NEGATIVE NEGATIVE    pH, UA 6.0 5.0 - 9.0    Protein, UA NEGATIVE NEGATIVE mg/dl    Urobilinogen, Urine 0.2 0.0 - 1.0 eu/dl    Nitrite, Urine

## 2018-03-23 ENCOUNTER — APPOINTMENT (OUTPATIENT)
Dept: NON INVASIVE DIAGNOSTICS | Age: 63
DRG: 287 | End: 2018-03-23
Payer: MEDICARE

## 2018-03-23 LAB
ANGIOTENSIN CONVERTING ENZYME: < 5 U/L (ref 9–67)
BETA-2 MICROGLOBULIN: 2.2 MG/L (ref 1.1–2.4)
INR BLD: 1.05 (ref 0.85–1.13)
POTASSIUM SERPL-SCNC: 4.3 MEQ/L (ref 3.5–5.2)

## 2018-03-23 PROCEDURE — 6370000000 HC RX 637 (ALT 250 FOR IP): Performed by: FAMILY MEDICINE

## 2018-03-23 PROCEDURE — 2580000003 HC RX 258: Performed by: FAMILY MEDICINE

## 2018-03-23 PROCEDURE — 93017 CV STRESS TEST TRACING ONLY: CPT

## 2018-03-23 PROCEDURE — A9500 TC99M SESTAMIBI: HCPCS | Performed by: INTERNAL MEDICINE

## 2018-03-23 PROCEDURE — 99232 SBSQ HOSP IP/OBS MODERATE 35: CPT | Performed by: INTERNAL MEDICINE

## 2018-03-23 PROCEDURE — 6360000002 HC RX W HCPCS

## 2018-03-23 PROCEDURE — 6370000000 HC RX 637 (ALT 250 FOR IP): Performed by: INTERNAL MEDICINE

## 2018-03-23 PROCEDURE — 36415 COLL VENOUS BLD VENIPUNCTURE: CPT

## 2018-03-23 PROCEDURE — 84132 ASSAY OF SERUM POTASSIUM: CPT

## 2018-03-23 PROCEDURE — 2140000000 HC CCU INTERMEDIATE R&B

## 2018-03-23 PROCEDURE — 3430000000 HC RX DIAGNOSTIC RADIOPHARMACEUTICAL: Performed by: INTERNAL MEDICINE

## 2018-03-23 PROCEDURE — 85610 PROTHROMBIN TIME: CPT

## 2018-03-23 PROCEDURE — 78452 HT MUSCLE IMAGE SPECT MULT: CPT

## 2018-03-23 RX ORDER — AMLODIPINE BESYLATE 5 MG/1
5 TABLET ORAL ONCE
Status: COMPLETED | OUTPATIENT
Start: 2018-03-23 | End: 2018-03-23

## 2018-03-23 RX ADMIN — FERROUS SULFATE TAB 325 MG (65 MG ELEMENTAL FE) 325 MG: 325 (65 FE) TAB at 12:52

## 2018-03-23 RX ADMIN — Medication 35 MILLICURIE: at 08:09

## 2018-03-23 RX ADMIN — MULTIPLE VITAMINS W/ MINERALS TAB 1 TABLET: TAB at 10:29

## 2018-03-23 RX ADMIN — FERROUS SULFATE TAB 325 MG (65 MG ELEMENTAL FE) 325 MG: 325 (65 FE) TAB at 10:36

## 2018-03-23 RX ADMIN — VENLAFAXINE HYDROCHLORIDE 150 MG: 150 CAPSULE, EXTENDED RELEASE ORAL at 10:29

## 2018-03-23 RX ADMIN — ASPIRIN 81 MG: 81 TABLET, CHEWABLE ORAL at 12:52

## 2018-03-23 RX ADMIN — FERROUS SULFATE TAB 325 MG (65 MG ELEMENTAL FE) 325 MG: 325 (65 FE) TAB at 16:48

## 2018-03-23 RX ADMIN — LISINOPRIL 20 MG: 20 TABLET ORAL at 07:06

## 2018-03-23 RX ADMIN — Medication 10 ML: at 10:30

## 2018-03-23 RX ADMIN — ATORVASTATIN CALCIUM 10 MG: 10 TABLET, FILM COATED ORAL at 21:39

## 2018-03-23 RX ADMIN — HYDROCHLOROTHIAZIDE 25 MG: 25 TABLET ORAL at 10:29

## 2018-03-23 RX ADMIN — ACETAMINOPHEN 650 MG: 325 TABLET ORAL at 04:49

## 2018-03-23 RX ADMIN — Medication 9.6 MILLICURIE: at 07:25

## 2018-03-23 RX ADMIN — AMLODIPINE BESYLATE 5 MG: 5 TABLET ORAL at 04:49

## 2018-03-23 RX ADMIN — CITALOPRAM 10 MG: 20 TABLET, FILM COATED ORAL at 21:39

## 2018-03-23 RX ADMIN — POTASSIUM CHLORIDE 20 MEQ: 1500 TABLET, EXTENDED RELEASE ORAL at 10:29

## 2018-03-23 RX ADMIN — Medication 10 ML: at 21:00

## 2018-03-23 RX ADMIN — FAMOTIDINE 20 MG: 20 TABLET, FILM COATED ORAL at 21:39

## 2018-03-23 RX ADMIN — FAMOTIDINE 20 MG: 20 TABLET, FILM COATED ORAL at 10:29

## 2018-03-23 RX ADMIN — FUROSEMIDE 20 MG: 20 TABLET ORAL at 10:28

## 2018-03-23 RX ADMIN — METOPROLOL SUCCINATE 25 MG: 25 TABLET, FILM COATED, EXTENDED RELEASE ORAL at 10:29

## 2018-03-23 ASSESSMENT — PAIN SCALES - GENERAL
PAINLEVEL_OUTOF10: 0
PAINLEVEL_OUTOF10: 3
PAINLEVEL_OUTOF10: 0

## 2018-03-23 NOTE — CONSULTS
Examination:   General appearance - alert, well appearing, and in no distress  Mental status - alert, oriented to person, place, and time  Neck - supple, no significant adenopathy, no JVD, or carotid bruits  Chest - clear to auscultation, no wheezes, rales or rhonchi, symmetric air entry  Heart - normal rate, regular rhythm, normal S1, S2, no murmurs, rubs, clicks or gallops  Abdomen - soft, nontender, nondistended, no masses or organomegaly  Neurological - alert, oriented, normal speech, no focal findings or movement disorder noted  Musculoskeletal - no joint tenderness, deformity or swelling  Extremities - peripheral pulses normal, no pedal edema, no clubbing or cyanosis  Skin - normal coloration and turgor, no rashes, no suspicious skin lesions noted      LABS:    Recent Labs      03/21/18   2306  03/22/18   0434  03/22/18   0945   CKTOTAL  56   --    --    TROPONINT  < 0.010  < 0.010  < 0.010     CBC:   Lab Results   Component Value Date    WBC 14.9 03/22/2018    RBC 3.65 03/22/2018    RBC 3.87 05/04/2012    HGB 10.4 03/22/2018    HCT 31.2 03/22/2018    MCV 85.5 03/22/2018    MCH 28.4 03/22/2018    MCHC 33.2 03/22/2018    RDW 14.1 03/22/2018     03/22/2018    MPV 10.1 03/22/2018     BMP:    Lab Results   Component Value Date     03/22/2018    K 3.3 03/22/2018     03/22/2018    CO2 21 03/22/2018    BUN 17 03/22/2018    LABALBU 3.5 03/21/2018    LABALBU 4.2 05/04/2012    CREATININE 0.7 03/22/2018    CALCIUM 8.5 03/22/2018    LABGLOM 84 03/22/2018    GLUCOSE 122 03/22/2018     Hepatic Function Panel:    Lab Results   Component Value Date    ALKPHOS 35 03/21/2018    ALT 21 03/21/2018    AST 20 03/21/2018    PROT 5.9 03/21/2018    BILITOT 0.4 03/21/2018    BILIDIR <0.2 03/21/2018    LABALBU 3.5 03/21/2018    LABALBU 4.2 05/04/2012     Magnesium:    Lab Results   Component Value Date    MG 1.8 03/22/2018     Warfarin PT/INR:  No components found for: PTPATWAR, PTINRWAR  HgBA1c:  No results found for: LABA1C  FLP:    Lab Results   Component Value Date    TRIG 214 03/22/2018    HDL 39 03/22/2018    LDLCALC 46 03/22/2018     TSH:    Lab Results   Component Value Date    TSH 1.390 03/21/2018     BNP: No results found for: BNP    EKG:SB, TWI laterally    Echo:  3/22/18:   Left ventricle size is normal.   Ejection fraction is visually estimated at 55-60%.   Moderately dilated left atrium.   Normal right ventricular size and function.   The aortic valve leaflets were not well visualized.   Bioprosthetic valve in aortic position seen.   Mild aortic regurgitation is noted.   Mild to Moderate mitral regurgitation is present.   Mild-to-moderate tricuspid regurgitation.   Pulmonary artery systolic pressure calculated from tricuspid regurgitation   jet is 45-60 mmHg.   Consider WILBERTO to evaluate aortic valve further if clinically indicated. Stress test:2015:    IMPRESSIONS:   -  The study was negative for ischemia. -  Myocardial perfusion imaging was normal at rest and with stress. -  Left ventricular systolic function was normal.        Assessment/Plan:  Chest pain, MI ruled out  Hx of AVR  HTN  HLD  Smoker    EKG and prior cardiac work up reviewed  Telemetry showed no arrhythmias  Serial cardiac enzymes are negative  Please get stress test to evaluate for ischemia  Continue Aspirin/Statin/Lisinopril/metoprolol  If there is no ischemia, please follow up as outpatient with primary cardiologist within 2 to 4 weeks. Please do note hesitate to contact me for any further questions. Thank you for the opportunity to be involved in this patient's care.     Code Status: Full Code    Electronically signed by Rc Mace MD on 3/22/2018 at 11:13 PM

## 2018-03-23 NOTE — PROGRESS NOTES
Hospitalist Progress Note    Patient:  Nathalie Kennedy      Unit/Bed:3B-36/036-A    YOB: 1955    MRN: [id number]       Acct: [de-identified]     PCP: Faisal Jefferson MD    Date of Admission: 3/21/2018    Chief Complaint: chest pain    Hospital Course: Pt is a 61 y/o admitted with chest pain. CTA showed ascending AA. Pain is intermittent. Does not have exertional chest pain. Trop is negative. Cardiology following. Stress test in am.    3/23/18: Pt doing ok, had stress test today. Report pending. Pain resolved. D/c once cleared by cardiology    Subjective: Pt doing ok, pain resolved. Medications:  Reviewed    Infusion Medications   Scheduled Medications    potassium replacement protocol   Other RX Placeholder    hydrochlorothiazide  25 mg Oral Daily    aspirin  81 mg Oral Daily    atorvastatin  10 mg Oral Nightly    ferrous sulfate  325 mg Oral TID WC    furosemide  20 mg Oral Daily    lisinopril  20 mg Oral Daily    metoprolol succinate  25 mg Oral Daily    therapeutic multivitamin-minerals  1 tablet Oral Daily    potassium chloride  20 mEq Oral Daily    venlafaxine  150 mg Oral Daily    sodium chloride flush  10 mL Intravenous 2 times per day    famotidine  20 mg Oral BID    citalopram  10 mg Oral Nightly     PRN Meds: muscle rub, HYDROcodone-acetaminophen, nitroGLYCERIN, sodium chloride flush, acetaminophen, magnesium hydroxide, ondansetron, hydrALAZINE      Intake/Output Summary (Last 24 hours) at 03/23/18 1740  Last data filed at 03/23/18 1506   Gross per 24 hour   Intake             1330 ml   Output             2700 ml   Net            -1370 ml       Diet:  DIET GENERAL;    Exam:  BP (!) 152/66   Pulse 60   Temp 98.3 °F (36.8 °C) (Oral)   Resp 16   Ht 5' 2.5\" (1.588 m)   Wt 189 lb 12.8 oz (86.1 kg)   SpO2 97%   BMI 34.16 kg/m²     General appearance: No apparent distress, appears stated age and cooperative. HEENT: NC/AT. Conjunctivae/corneas clear.   Neck: Supple, with full range of motion. Respiratory:  Normal respiratory effort. Clear to auscultation  Cardiovascular: Regular rate and rhythm with normal S1/S2 ++ murmurs. Abdomen: Soft, non-tender, non-distended with normal bowel sounds. Musculoskeletal: passive and active ROM x 4 extremities. Skin: Skin color, texture, turgor normal.  No rashes or lesions. Neurologic:  Grossly non-focal.  Psychiatric: Alert and oriented, thought content appropriate, normal insight  Capillary Refill: Brisk,< 3 seconds   Peripheral Pulses: +2 palpable, equal bilaterally       Labs:   Recent Labs      03/21/18   1025  03/21/18   1817  03/22/18   0434   WBC  17.1*  15.0*  14.9*   HGB  11.6*  10.4*  10.4*   HCT  34.5*  31.3*  31.2*   PLT  244  207  200     Recent Labs      03/21/18   1027  03/21/18   1817  03/22/18   0434  03/23/18   0925   NA  141  140  140   --    K  4.1  4.0  3.3*  4.3   CL  103  104  107   --    CO2  25  24  21*   --    BUN  15  17  17   --    CREATININE  0.7  0.7  0.7   --    CALCIUM  9.9  9.1  8.5   --      Recent Labs      03/21/18   1027  03/21/18   2306   AST  16  20   ALT  14  21   BILIDIR   --   <0.2   BILITOT   --   0.4   ALKPHOS   --   35*     Recent Labs      03/22/18   0434  03/23/18   0432   INR  1.00  1.05     Recent Labs      03/21/18   2306   CKTOTAL  56       Urinalysis:      Lab Results   Component Value Date    NITRU NEGATIVE 03/22/2018    WBCUA 5-10 03/21/2018    WBCUA 2-4 05/04/2012    BACTERIA NONE 03/21/2018    RBCUA 0-2 03/21/2018    BLOODU NEGATIVE 03/22/2018    SPECGRAV >1.030 03/22/2018    GLUCOSEU NEGATIVE 05/04/2012       Radiology:  CT ABDOMEN PELVIS WO CONTRAST Additional Contrast? None   Final Result   Moderate stool scattered in the colon. No acute findings. **This report has been created using voice recognition software. It may contain minor errors which are inherent in voice recognition technology. **      Final report electronically signed by Dr. Nidhi Lewis on 3/21/2018 8:27 PM

## 2018-03-24 ENCOUNTER — APPOINTMENT (OUTPATIENT)
Dept: CARDIAC CATH/INVASIVE PROCEDURES | Age: 63
DRG: 287 | End: 2018-03-24
Payer: MEDICARE

## 2018-03-24 VITALS
RESPIRATION RATE: 18 BRPM | HEART RATE: 58 BPM | DIASTOLIC BLOOD PRESSURE: 44 MMHG | TEMPERATURE: 97.7 F | HEIGHT: 63 IN | BODY MASS INDEX: 32.67 KG/M2 | OXYGEN SATURATION: 96 % | SYSTOLIC BLOOD PRESSURE: 124 MMHG | WEIGHT: 184.4 LBS

## 2018-03-24 LAB
ABO: NORMAL
ANION GAP SERPL CALCULATED.3IONS-SCNC: 15 MEQ/L (ref 8–16)
ANTIBODY SCREEN: NORMAL
APTT: 31.7 SECONDS (ref 22–38)
BUN BLDV-MCNC: 16 MG/DL (ref 7–22)
CALCIUM SERPL-MCNC: 9.7 MG/DL (ref 8.5–10.5)
CHLORIDE BLD-SCNC: 97 MEQ/L (ref 98–111)
CO2: 25 MEQ/L (ref 23–33)
CREAT SERPL-MCNC: 0.9 MG/DL (ref 0.4–1.2)
GFR SERPL CREATININE-BSD FRML MDRD: 63 ML/MIN/1.73M2
GLUCOSE BLD-MCNC: 114 MG/DL (ref 70–108)
HCT VFR BLD CALC: 35.8 % (ref 37–47)
HEMOGLOBIN: 12.2 GM/DL (ref 12–16)
INR BLD: 1.04 (ref 0.85–1.13)
MCH RBC QN AUTO: 29 PG (ref 27–31)
MCHC RBC AUTO-ENTMCNC: 34.2 GM/DL (ref 33–37)
MCV RBC AUTO: 84.8 FL (ref 81–99)
PDW BLD-RTO: 14.2 % (ref 11.5–14.5)
PLATELET # BLD: 209 THOU/MM3 (ref 130–400)
PMV BLD AUTO: 9.9 FL (ref 7.4–10.4)
POTASSIUM SERPL-SCNC: 4.3 MEQ/L (ref 3.5–5.2)
RBC # BLD: 4.22 MILL/MM3 (ref 4.2–5.4)
RH FACTOR: NORMAL
SODIUM BLD-SCNC: 137 MEQ/L (ref 135–145)
WBC # BLD: 10.6 THOU/MM3 (ref 4.8–10.8)

## 2018-03-24 PROCEDURE — 2500000003 HC RX 250 WO HCPCS

## 2018-03-24 PROCEDURE — 85730 THROMBOPLASTIN TIME PARTIAL: CPT

## 2018-03-24 PROCEDURE — 6370000000 HC RX 637 (ALT 250 FOR IP): Performed by: INTERNAL MEDICINE

## 2018-03-24 PROCEDURE — B2111ZZ FLUOROSCOPY OF MULTIPLE CORONARY ARTERIES USING LOW OSMOLAR CONTRAST: ICD-10-PCS | Performed by: INTERNAL MEDICINE

## 2018-03-24 PROCEDURE — 85610 PROTHROMBIN TIME: CPT

## 2018-03-24 PROCEDURE — C1769 GUIDE WIRE: HCPCS

## 2018-03-24 PROCEDURE — 86901 BLOOD TYPING SEROLOGIC RH(D): CPT

## 2018-03-24 PROCEDURE — 86850 RBC ANTIBODY SCREEN: CPT

## 2018-03-24 PROCEDURE — 2580000003 HC RX 258: Performed by: FAMILY MEDICINE

## 2018-03-24 PROCEDURE — 86900 BLOOD TYPING SEROLOGIC ABO: CPT

## 2018-03-24 PROCEDURE — 80048 BASIC METABOLIC PNL TOTAL CA: CPT

## 2018-03-24 PROCEDURE — C1725 CATH, TRANSLUMIN NON-LASER: HCPCS

## 2018-03-24 PROCEDURE — 2780000010 HC IMPLANT OTHER

## 2018-03-24 PROCEDURE — 85027 COMPLETE CBC AUTOMATED: CPT

## 2018-03-24 PROCEDURE — 6360000002 HC RX W HCPCS

## 2018-03-24 PROCEDURE — 6370000000 HC RX 637 (ALT 250 FOR IP): Performed by: FAMILY MEDICINE

## 2018-03-24 PROCEDURE — 99239 HOSP IP/OBS DSCHRG MGMT >30: CPT | Performed by: INTERNAL MEDICINE

## 2018-03-24 PROCEDURE — 2580000003 HC RX 258: Performed by: INTERNAL MEDICINE

## 2018-03-24 PROCEDURE — 93454 CORONARY ARTERY ANGIO S&I: CPT | Performed by: INTERNAL MEDICINE

## 2018-03-24 PROCEDURE — C1894 INTRO/SHEATH, NON-LASER: HCPCS

## 2018-03-24 PROCEDURE — 36415 COLL VENOUS BLD VENIPUNCTURE: CPT

## 2018-03-24 RX ORDER — ONDANSETRON 2 MG/ML
4 INJECTION INTRAMUSCULAR; INTRAVENOUS EVERY 6 HOURS PRN
Status: DISCONTINUED | OUTPATIENT
Start: 2018-03-24 | End: 2018-03-24 | Stop reason: CLARIF

## 2018-03-24 RX ORDER — POTASSIUM CHLORIDE 20 MEQ/1
20 TABLET, EXTENDED RELEASE ORAL DAILY
Qty: 60 TABLET | Refills: 3 | Status: ON HOLD | OUTPATIENT
Start: 2018-03-25 | End: 2018-07-03

## 2018-03-24 RX ORDER — SODIUM CHLORIDE 0.9 % (FLUSH) 0.9 %
10 SYRINGE (ML) INJECTION EVERY 12 HOURS SCHEDULED
Status: DISCONTINUED | OUTPATIENT
Start: 2018-03-24 | End: 2018-03-24 | Stop reason: HOSPADM

## 2018-03-24 RX ORDER — FERROUS SULFATE 325(65) MG
325 TABLET ORAL
Qty: 30 TABLET | Refills: 3 | Status: SHIPPED | OUTPATIENT
Start: 2018-03-24 | End: 2019-01-16

## 2018-03-24 RX ORDER — SODIUM CHLORIDE 0.9 % (FLUSH) 0.9 %
10 SYRINGE (ML) INJECTION PRN
Status: DISCONTINUED | OUTPATIENT
Start: 2018-03-24 | End: 2018-03-24 | Stop reason: HOSPADM

## 2018-03-24 RX ORDER — ACETAMINOPHEN 325 MG/1
650 TABLET ORAL EVERY 4 HOURS PRN
Status: DISCONTINUED | OUTPATIENT
Start: 2018-03-24 | End: 2018-03-24 | Stop reason: HOSPADM

## 2018-03-24 RX ORDER — FUROSEMIDE 20 MG/1
20 TABLET ORAL DAILY
Qty: 60 TABLET | Refills: 3 | Status: ON HOLD | OUTPATIENT
Start: 2018-03-24 | End: 2018-07-06

## 2018-03-24 RX ORDER — VENLAFAXINE HYDROCHLORIDE 150 MG/1
150 CAPSULE, EXTENDED RELEASE ORAL DAILY
Qty: 30 CAPSULE | Refills: 3 | Status: SHIPPED | OUTPATIENT
Start: 2018-03-25 | End: 2018-04-26

## 2018-03-24 RX ORDER — HYDROCHLOROTHIAZIDE 25 MG/1
25 TABLET ORAL DAILY
Qty: 30 TABLET | Refills: 3 | Status: ON HOLD | OUTPATIENT
Start: 2018-03-24 | End: 2018-07-06 | Stop reason: HOSPADM

## 2018-03-24 RX ADMIN — FERROUS SULFATE TAB 325 MG (65 MG ELEMENTAL FE) 325 MG: 325 (65 FE) TAB at 11:58

## 2018-03-24 RX ADMIN — ASPIRIN 81 MG: 81 TABLET, CHEWABLE ORAL at 08:23

## 2018-03-24 RX ADMIN — FERROUS SULFATE TAB 325 MG (65 MG ELEMENTAL FE) 325 MG: 325 (65 FE) TAB at 08:23

## 2018-03-24 RX ADMIN — LISINOPRIL 20 MG: 20 TABLET ORAL at 08:23

## 2018-03-24 RX ADMIN — Medication 10 ML: at 08:24

## 2018-03-24 RX ADMIN — VENLAFAXINE HYDROCHLORIDE 150 MG: 150 CAPSULE, EXTENDED RELEASE ORAL at 08:24

## 2018-03-24 RX ADMIN — POTASSIUM CHLORIDE 20 MEQ: 1500 TABLET, EXTENDED RELEASE ORAL at 08:23

## 2018-03-24 RX ADMIN — MULTIPLE VITAMINS W/ MINERALS TAB 1 TABLET: TAB at 08:23

## 2018-03-24 RX ADMIN — FAMOTIDINE 20 MG: 20 TABLET, FILM COATED ORAL at 08:23

## 2018-03-24 RX ADMIN — HYDROCHLOROTHIAZIDE 25 MG: 25 TABLET ORAL at 11:58

## 2018-03-24 RX ADMIN — METOPROLOL SUCCINATE 25 MG: 25 TABLET, FILM COATED, EXTENDED RELEASE ORAL at 08:23

## 2018-03-24 RX ADMIN — Medication 10 ML: at 11:58

## 2018-03-24 RX ADMIN — FUROSEMIDE 20 MG: 20 TABLET ORAL at 11:58

## 2018-03-24 ASSESSMENT — PAIN SCALES - GENERAL: PAINLEVEL_OUTOF10: 0

## 2018-03-24 NOTE — PROGRESS NOTES
Patient given discharge instructions via teach back method. Patient verbalizes understanding of new medications and side effects. Patient verbalizes care of her cath site and follow-up appointments. Verbalizes understanding of following with her family  In regards to her aneurysm and monitoring it. Patient taken to the discharge lobby in stable condition.

## 2018-03-24 NOTE — PROGRESS NOTES
Ambulated patient several times around unit. Patient denies chest pain, shortness of breath, dizziness or lightheadedness.

## 2018-03-24 NOTE — PROCEDURES
[address] [place] [address]                              CARDIAC CATHETERIZATION    PATIENT NAME: Cat Baxter                      :        1955  MED REC NO:   [id number]                           ROOM:       [id number]  ACCOUNT NO:   [de-identified]                           ADMIT DATE: 2018  PROVIDER:     Silvio Reardon MD    DATE OF PROCEDURE:  2018    CARDIAC CATHETERIZATION REPORT    PROCEDURE PERFORMED:  Selective left and right coronary angiography. No LV gram was done because the patient has normal echo and a prosthetic  aortic valve. FINDINGS:  Normal left and right coronary arteries. COMPLICATIONS:  None. EQUIPMENTS USED:  A 6-Moldovan Glidesheath, 5-Moldovan JR4, and 5-Moldovan AL1  diagnostic catheters. PROCEDURE DETAILS:  Informed consent was obtained from the patient and her  right radial site was prepped and draped in the usual fashion. She received lidocaine for local anesthesia and Versed/fentanyl for mild  systemic sedation. Right radial arterial access was readily obtained and a  6-Moldovan Glidesheath placed. Selective left and right coronary  angiographies were performed using 5-Moldovan JR4 and AL1 diagnostic  catheters. The patient has a prosthetic valve and a normal echo, so no LV gram was  done. The patient tolerated the procedure well. At the end of the procedure, the  radial sheath was removed and a radial band placed. The patient was transferred back to the floor for further monitoring and  management.         Jacqui Dickens MD    D: 2018 13:44:41       T: 2018 14:10:56     [email or web address]  Job#: [id number]     Doc#: [id number]    CC:

## 2018-03-24 NOTE — DISCHARGE SUMMARY
Hospital Medicine Discharge Summary      Patient Identification:   Eyal Peterson   : 1955  MRN: [id number]   Account: [de-identified]      Patient's PCP: Dayne Szymanski MD    Admit Date: 3/21/2018     Discharge Date: 3/24/2018      Admitting Physician: Christiana Muhammad DO     Discharge Physician: Benjamin Calabrese MD     Discharge Diagnoses: Active Hospital Problems    Diagnosis Date Noted    Aortic aneurysm ([address] [I71.9] 2018    Hypokalemia [E87.6] 2018    Atypical chest pain [R07.89] 2018    Bradycardia [R00.1] 2018    Shortness of breath [R06.02] 2018    Fatigue [R53.83] 2018    Essential hypertension [I10] 2018    Hyperlipemia [E78.5] 2018    Anemia, normocytic normochromic [D64.9] 2018    Leukocytosis [D72.829] 2018    Aortic stenosis [I35.0]        The patient was seen and examined on day of discharge and this discharge summary is in conjunction with any daily progress note from day of discharge. Hospital Course:   Eyal Peterson is a 60 y.o. female admitted to [address] [place] [address] on 3/21/2018 for chest pain. Pt was stabilized and pain control. Stress test was done yesterday followed by cath today. No PCI. Pt's chest pain is gone May need GI w/u if it recurs. Will need f/u for Aortic aneurysm per recommendations    Exam:     Vitals:  Vitals:    18 1200 18 1300 18 1400 18 1500   BP: (!) 146/65 (!) 112/58 (!) 118/48 (!) 124/44   Pulse: 55 62 58 58   Resp:       Temp:       TempSrc:       SpO2: 95% 94% 94% 96%   Weight:       Height:         Weight: Weight: 184 lb 6.4 oz (83.6 kg)     24 hour intake/output:  Intake/Output Summary (Last 24 hours) at 18 1816  Last data filed at 18 1400   Gross per 24 hour   Intake          1240.26 ml   Output             3600 ml   Net         -2359.74 ml             Labs:  For convenience and continuity at follow-up the following most recent labs are

## 2018-03-24 NOTE — PROGRESS NOTES
Called [phone] to schedule appointments. They stated they would call the patient on Monday with appointments.

## 2018-03-24 NOTE — PROGRESS NOTES
Conjunctivae/corneas clear. Neck: Supple, with full range of motion. Respiratory:  Normal respiratory effort. Clear to auscultation  Cardiovascular: Regular rate and rhythm with normal S1/S2 ++ murmurs. Abdomen: Soft, non-tender, non-distended with normal bowel sounds. Musculoskeletal: passive and active ROM x 4 extremities. Skin: Skin color, texture, turgor normal.  No rashes or lesions. Neurologic:  Grossly non-focal.  Psychiatric: Alert and oriented, thought content appropriate, normal insight  Capillary Refill: Brisk,< 3 seconds   Peripheral Pulses: +2 palpable, equal bilaterally       Labs:   Recent Labs      03/21/18   1817  03/22/18   0434  03/24/18   0237   WBC  15.0*  14.9*  10.6   HGB  10.4*  10.4*  12.2   HCT  31.3*  31.2*  35.8*   PLT  207  200  209     Recent Labs      03/21/18   1817  03/22/18   0434  03/23/18   0925  03/24/18   0237   NA  140  140   --   137   K  4.0  3.3*  4.3  4.3   CL  104  107   --   97*   CO2  24  21*   --   25   BUN  17  17   --   16   CREATININE  0.7  0.7   --   0.9   CALCIUM  9.1  8.5   --   9.7     Recent Labs      03/21/18   2306   AST  20   ALT  21   BILIDIR  <0.2   BILITOT  0.4   ALKPHOS  35*     Recent Labs      03/22/18   0434  03/23/18   0432  03/24/18   0237   INR  1.00  1.05  1.04     Recent Labs      03/21/18   2306   CKTOTAL  56       Urinalysis:      Lab Results   Component Value Date    NITRU NEGATIVE 03/22/2018    WBCUA 5-10 03/21/2018    WBCUA 2-4 05/04/2012    BACTERIA NONE 03/21/2018    RBCUA 0-2 03/21/2018    BLOODU NEGATIVE 03/22/2018    SPECGRAV >1.030 03/22/2018    GLUCOSEU NEGATIVE 05/04/2012       Radiology:  CT ABDOMEN PELVIS WO CONTRAST Additional Contrast? None   Final Result   Moderate stool scattered in the colon. No acute findings. **This report has been created using voice recognition software. It may contain minor errors which are inherent in voice recognition technology. **      Final report electronically signed by Dr. Sohan Lowe

## 2018-03-27 LAB
BLOOD CULTURE, ROUTINE: NORMAL
BLOOD CULTURE, ROUTINE: NORMAL

## 2018-04-26 ENCOUNTER — OFFICE VISIT (OUTPATIENT)
Dept: CARDIOLOGY CLINIC | Age: 63
End: 2018-04-26
Payer: MEDICARE

## 2018-04-26 VITALS
HEIGHT: 63 IN | DIASTOLIC BLOOD PRESSURE: 68 MMHG | HEART RATE: 60 BPM | BODY MASS INDEX: 33.13 KG/M2 | WEIGHT: 187 LBS | SYSTOLIC BLOOD PRESSURE: 126 MMHG

## 2018-04-26 DIAGNOSIS — Z95.2 S/P AVR: Primary | ICD-10-CM

## 2018-04-26 DIAGNOSIS — I10 ESSENTIAL HYPERTENSION: ICD-10-CM

## 2018-04-26 DIAGNOSIS — E78.00 PURE HYPERCHOLESTEROLEMIA: ICD-10-CM

## 2018-04-26 PROCEDURE — G8427 DOCREV CUR MEDS BY ELIG CLIN: HCPCS | Performed by: NUCLEAR MEDICINE

## 2018-04-26 PROCEDURE — G8417 CALC BMI ABV UP PARAM F/U: HCPCS | Performed by: NUCLEAR MEDICINE

## 2018-04-26 PROCEDURE — 4004F PT TOBACCO SCREEN RCVD TLK: CPT | Performed by: NUCLEAR MEDICINE

## 2018-04-26 PROCEDURE — 99213 OFFICE O/P EST LOW 20 MIN: CPT | Performed by: NUCLEAR MEDICINE

## 2018-04-26 PROCEDURE — 3017F COLORECTAL CA SCREEN DOC REV: CPT | Performed by: NUCLEAR MEDICINE

## 2018-04-26 RX ORDER — OMEPRAZOLE 20 MG/1
20 CAPSULE, DELAYED RELEASE ORAL DAILY
COMMUNITY
End: 2019-01-16

## 2018-06-24 ENCOUNTER — APPOINTMENT (OUTPATIENT)
Dept: CT IMAGING | Age: 63
End: 2018-06-24
Payer: MEDICARE

## 2018-06-24 ENCOUNTER — HOSPITAL ENCOUNTER (EMERGENCY)
Age: 63
Discharge: HOME | End: 2018-06-25
Attending: EMERGENCY MEDICINE
Payer: MEDICARE

## 2018-06-24 DIAGNOSIS — J18.9 PNEUMONIA DUE TO ORGANISM: Primary | ICD-10-CM

## 2018-06-24 LAB
ALBUMIN SERPL-MCNC: 4.1 G/DL (ref 3.5–5.1)
ALP BLD-CCNC: 50 U/L (ref 38–126)
ALT SERPL-CCNC: 13 U/L (ref 11–66)
AMPHETAMINE+METHAMPHETAMINE URINE SCREEN: NEGATIVE
ANION GAP SERPL CALCULATED.3IONS-SCNC: 17 MEQ/L (ref 8–16)
ANISOCYTOSIS: ABNORMAL
AST SERPL-CCNC: 19 U/L (ref 5–40)
BACTERIA: ABNORMAL /HPF
BARBITURATE QUANTITATIVE URINE: NEGATIVE
BASOPHILS # BLD: 0.4 %
BASOPHILS ABSOLUTE: 0 THOU/MM3 (ref 0–0.1)
BENZODIAZEPINE QUANTITATIVE URINE: NEGATIVE
BILIRUB SERPL-MCNC: 0.3 MG/DL (ref 0.3–1.2)
BILIRUBIN DIRECT: < 0.2 MG/DL (ref 0–0.3)
BILIRUBIN URINE: NEGATIVE
BLOOD, URINE: ABNORMAL
BUN BLDV-MCNC: 27 MG/DL (ref 7–22)
CALCIUM SERPL-MCNC: 10 MG/DL (ref 8.5–10.5)
CANNABINOID QUANTITATIVE URINE: NEGATIVE
CASTS 2: ABNORMAL /LPF
CASTS UA: ABNORMAL /LPF
CHARACTER, URINE: CLEAR
CHLORIDE BLD-SCNC: 106 MEQ/L (ref 98–111)
CO2: 19 MEQ/L (ref 23–33)
COCAINE METABOLITE QUANTITATIVE URINE: NEGATIVE
COLOR: YELLOW
CREAT SERPL-MCNC: 1 MG/DL (ref 0.4–1.2)
CRYSTALS, UA: ABNORMAL
EKG ATRIAL RATE: 76 BPM
EKG P AXIS: 65 DEGREES
EKG P-R INTERVAL: 170 MS
EKG Q-T INTERVAL: 420 MS
EKG QRS DURATION: 86 MS
EKG QTC CALCULATION (BAZETT): 472 MS
EKG R AXIS: 4 DEGREES
EKG T AXIS: 87 DEGREES
EKG VENTRICULAR RATE: 76 BPM
EOSINOPHIL # BLD: 2.4 %
EOSINOPHILS ABSOLUTE: 0.2 THOU/MM3 (ref 0–0.4)
EPITHELIAL CELLS, UA: ABNORMAL /HPF
ETHYL ALCOHOL, SERUM: < 0.01 %
GFR SERPL CREATININE-BSD FRML MDRD: 56 ML/MIN/1.73M2
GLUCOSE BLD-MCNC: 118 MG/DL (ref 70–108)
GLUCOSE URINE: NEGATIVE MG/DL
HCT VFR BLD CALC: 33.4 % (ref 37–47)
HEMOGLOBIN: 11.2 GM/DL (ref 12–16)
KETONES, URINE: NEGATIVE
LEUKOCYTE ESTERASE, URINE: ABNORMAL
LIPASE: 62.9 U/L (ref 5.6–51.3)
LYMPHOCYTES # BLD: 44.8 %
LYMPHOCYTES ABSOLUTE: 4.4 THOU/MM3 (ref 1–4.8)
MAGNESIUM: 1.8 MG/DL (ref 1.6–2.4)
MCH RBC QN AUTO: 28.6 PG (ref 27–31)
MCHC RBC AUTO-ENTMCNC: 33.5 GM/DL (ref 33–37)
MCV RBC AUTO: 85.3 FL (ref 81–99)
MISCELLANEOUS 2: ABNORMAL
MONOCYTES # BLD: 7.3 %
MONOCYTES ABSOLUTE: 0.7 THOU/MM3 (ref 0.4–1.3)
NITRITE, URINE: NEGATIVE
NUCLEATED RED BLOOD CELLS: 0 /100 WBC
OPIATES, URINE: NEGATIVE
OSMOLALITY CALCULATION: 289.3 MOSMOL/KG (ref 275–300)
OXYCODONE: NEGATIVE
PDW BLD-RTO: 14.9 % (ref 11.5–14.5)
PH UA: 5.5
PHENCYCLIDINE QUANTITATIVE URINE: NEGATIVE
PLATELET # BLD: 209 THOU/MM3 (ref 130–400)
PMV BLD AUTO: 10.5 FL (ref 7.4–10.4)
POTASSIUM SERPL-SCNC: 4 MEQ/L (ref 3.5–5.2)
PRO-BNP: 2970 PG/ML (ref 0–900)
PROTEIN UA: NEGATIVE
RBC # BLD: 3.92 MILL/MM3 (ref 4.2–5.4)
RBC URINE: ABNORMAL /HPF
RENAL EPITHELIAL, UA: ABNORMAL
SEG NEUTROPHILS: 45.1 %
SEGMENTED NEUTROPHILS ABSOLUTE COUNT: 4.4 THOU/MM3 (ref 1.8–7.7)
SODIUM BLD-SCNC: 142 MEQ/L (ref 135–145)
SPECIFIC GRAVITY, URINE: 1.02 (ref 1–1.03)
TOTAL PROTEIN: 7.2 G/DL (ref 6.1–8)
TROPONIN T: < 0.01 NG/ML
UROBILINOGEN, URINE: 0.2 EU/DL
WBC # BLD: 9.8 THOU/MM3 (ref 4.8–10.8)
WBC UA: ABNORMAL /HPF
YEAST: ABNORMAL

## 2018-06-24 PROCEDURE — 99285 EMERGENCY DEPT VISIT HI MDM: CPT

## 2018-06-24 PROCEDURE — 81001 URINALYSIS AUTO W/SCOPE: CPT

## 2018-06-24 PROCEDURE — 87086 URINE CULTURE/COLONY COUNT: CPT

## 2018-06-24 PROCEDURE — 6360000002 HC RX W HCPCS: Performed by: EMERGENCY MEDICINE

## 2018-06-24 PROCEDURE — 93005 ELECTROCARDIOGRAM TRACING: CPT | Performed by: EMERGENCY MEDICINE

## 2018-06-24 PROCEDURE — 6360000004 HC RX CONTRAST MEDICATION: Performed by: EMERGENCY MEDICINE

## 2018-06-24 PROCEDURE — 83690 ASSAY OF LIPASE: CPT

## 2018-06-24 PROCEDURE — 84484 ASSAY OF TROPONIN QUANT: CPT

## 2018-06-24 PROCEDURE — 82248 BILIRUBIN DIRECT: CPT

## 2018-06-24 PROCEDURE — 80307 DRUG TEST PRSMV CHEM ANLYZR: CPT

## 2018-06-24 PROCEDURE — G0480 DRUG TEST DEF 1-7 CLASSES: HCPCS

## 2018-06-24 PROCEDURE — 85025 COMPLETE CBC W/AUTO DIFF WBC: CPT

## 2018-06-24 PROCEDURE — 80053 COMPREHEN METABOLIC PANEL: CPT

## 2018-06-24 PROCEDURE — 83735 ASSAY OF MAGNESIUM: CPT

## 2018-06-24 PROCEDURE — 83880 ASSAY OF NATRIURETIC PEPTIDE: CPT

## 2018-06-24 PROCEDURE — 36415 COLL VENOUS BLD VENIPUNCTURE: CPT

## 2018-06-24 PROCEDURE — 71275 CT ANGIOGRAPHY CHEST: CPT

## 2018-06-24 PROCEDURE — 6370000000 HC RX 637 (ALT 250 FOR IP): Performed by: EMERGENCY MEDICINE

## 2018-06-24 PROCEDURE — 74174 CTA ABD&PLVS W/CONTRAST: CPT

## 2018-06-24 RX ORDER — PANTOPRAZOLE SODIUM 40 MG/1
40 TABLET, DELAYED RELEASE ORAL ONCE
Status: COMPLETED | OUTPATIENT
Start: 2018-06-24 | End: 2018-06-24

## 2018-06-24 RX ORDER — SUCRALFATE 1 G/1
1 TABLET ORAL ONCE
Status: COMPLETED | OUTPATIENT
Start: 2018-06-24 | End: 2018-06-24

## 2018-06-24 RX ORDER — ONDANSETRON 4 MG/1
4 TABLET, ORALLY DISINTEGRATING ORAL ONCE
Status: COMPLETED | OUTPATIENT
Start: 2018-06-24 | End: 2018-06-24

## 2018-06-24 RX ADMIN — PANTOPRAZOLE SODIUM 40 MG: 40 TABLET, DELAYED RELEASE ORAL at 22:42

## 2018-06-24 RX ADMIN — IOPAMIDOL 80 ML: 755 INJECTION, SOLUTION INTRAVENOUS at 23:53

## 2018-06-24 RX ADMIN — SUCRALFATE 1 G: 1 TABLET ORAL at 23:07

## 2018-06-24 RX ADMIN — ONDANSETRON 4 MG: 4 TABLET, ORALLY DISINTEGRATING ORAL at 22:42

## 2018-06-24 RX ADMIN — LIDOCAINE HYDROCHLORIDE: 20 SOLUTION ORAL; TOPICAL at 22:42

## 2018-06-24 ASSESSMENT — ENCOUNTER SYMPTOMS
SORE THROAT: 0
SHORTNESS OF BREATH: 1
TROUBLE SWALLOWING: 0
NAUSEA: 0
CHOKING: 0
EYE REDNESS: 0
DIARRHEA: 0
ABDOMINAL PAIN: 0
BACK PAIN: 0
CONSTIPATION: 0
COUGH: 0
VOICE CHANGE: 0
EYE DISCHARGE: 0
EYE ITCHING: 0
SINUS PRESSURE: 0
WHEEZING: 0
RHINORRHEA: 0
VOMITING: 0
CHEST TIGHTNESS: 0
PHOTOPHOBIA: 0
BLOOD IN STOOL: 0
ABDOMINAL DISTENTION: 0
EYE PAIN: 0

## 2018-06-24 ASSESSMENT — PAIN DESCRIPTION - PAIN TYPE: TYPE: ACUTE PAIN

## 2018-06-24 ASSESSMENT — PAIN DESCRIPTION - FREQUENCY: FREQUENCY: CONTINUOUS

## 2018-06-24 ASSESSMENT — PAIN SCALES - GENERAL
PAINLEVEL_OUTOF10: 5
PAINLEVEL_OUTOF10: 9

## 2018-06-24 ASSESSMENT — PAIN DESCRIPTION - ORIENTATION: ORIENTATION: MID

## 2018-06-24 ASSESSMENT — PAIN DESCRIPTION - LOCATION: LOCATION: CHEST

## 2018-06-24 ASSESSMENT — PAIN DESCRIPTION - DESCRIPTORS: DESCRIPTORS: PRESSURE

## 2018-06-25 VITALS
RESPIRATION RATE: 18 BRPM | OXYGEN SATURATION: 98 % | BODY MASS INDEX: 34.23 KG/M2 | SYSTOLIC BLOOD PRESSURE: 137 MMHG | TEMPERATURE: 98.2 F | WEIGHT: 186 LBS | HEIGHT: 62 IN | HEART RATE: 69 BPM | DIASTOLIC BLOOD PRESSURE: 39 MMHG

## 2018-06-25 PROCEDURE — 94640 AIRWAY INHALATION TREATMENT: CPT

## 2018-06-25 PROCEDURE — 6370000000 HC RX 637 (ALT 250 FOR IP): Performed by: EMERGENCY MEDICINE

## 2018-06-25 PROCEDURE — 93010 ELECTROCARDIOGRAM REPORT: CPT | Performed by: NUCLEAR MEDICINE

## 2018-06-25 RX ORDER — LEVOFLOXACIN 500 MG/1
500 TABLET, FILM COATED ORAL DAILY
Qty: 10 TABLET | Refills: 0 | Status: SHIPPED | OUTPATIENT
Start: 2018-06-25 | End: 2018-07-02

## 2018-06-25 RX ORDER — ALBUTEROL SULFATE 90 UG/1
2 AEROSOL, METERED RESPIRATORY (INHALATION) EVERY 6 HOURS PRN
Status: DISCONTINUED | OUTPATIENT
Start: 2018-06-25 | End: 2018-06-25 | Stop reason: HOSPADM

## 2018-06-25 RX ORDER — IPRATROPIUM BROMIDE AND ALBUTEROL SULFATE 2.5; .5 MG/3ML; MG/3ML
1 SOLUTION RESPIRATORY (INHALATION) ONCE
Status: COMPLETED | OUTPATIENT
Start: 2018-06-25 | End: 2018-06-25

## 2018-06-25 RX ADMIN — IPRATROPIUM BROMIDE AND ALBUTEROL SULFATE 1 AMPULE: .5; 3 SOLUTION RESPIRATORY (INHALATION) at 01:07

## 2018-06-25 RX ADMIN — IPRATROPIUM BROMIDE AND ALBUTEROL SULFATE 1 AMPULE: .5; 3 SOLUTION RESPIRATORY (INHALATION) at 01:05

## 2018-06-25 RX ADMIN — ALBUTEROL SULFATE 2 PUFF: 90 AEROSOL, METERED RESPIRATORY (INHALATION) at 01:00

## 2018-06-25 ASSESSMENT — PAIN SCALES - GENERAL
PAINLEVEL_OUTOF10: 3
PAINLEVEL_OUTOF10: 3

## 2018-06-26 LAB
ORGANISM: ABNORMAL
URINE CULTURE REFLEX: ABNORMAL

## 2018-07-02 ENCOUNTER — APPOINTMENT (OUTPATIENT)
Dept: GENERAL RADIOLOGY | Age: 63
DRG: 293 | End: 2018-07-02
Payer: MEDICARE

## 2018-07-02 ENCOUNTER — HOSPITAL ENCOUNTER (INPATIENT)
Age: 63
LOS: 4 days | Discharge: HOME | DRG: 293 | End: 2018-07-06
Attending: INTERNAL MEDICINE | Admitting: INTERNAL MEDICINE
Payer: MEDICARE

## 2018-07-02 DIAGNOSIS — I50.9 CONGESTIVE HEART FAILURE, UNSPECIFIED CONGESTIVE HEART FAILURE CHRONICITY, UNSPECIFIED CONGESTIVE HEART FAILURE TYPE: Primary | ICD-10-CM

## 2018-07-02 LAB
ALBUMIN SERPL-MCNC: 3.2 G/DL (ref 3.5–5.1)
ALP BLD-CCNC: 32 U/L (ref 38–126)
ALT SERPL-CCNC: 16 U/L (ref 11–66)
ANION GAP SERPL CALCULATED.3IONS-SCNC: 14 MEQ/L (ref 8–16)
AST SERPL-CCNC: 19 U/L (ref 5–40)
BASOPHILS # BLD: 0.3 %
BASOPHILS ABSOLUTE: 0 THOU/MM3 (ref 0–0.1)
BILIRUB SERPL-MCNC: 0.6 MG/DL (ref 0.3–1.2)
BILIRUBIN DIRECT: < 0.2 MG/DL (ref 0–0.3)
BUN BLDV-MCNC: 29 MG/DL (ref 7–22)
CALCIUM SERPL-MCNC: 9.2 MG/DL (ref 8.5–10.5)
CHLORIDE BLD-SCNC: 107 MEQ/L (ref 98–111)
CO2: 20 MEQ/L (ref 23–33)
CREAT SERPL-MCNC: 1.2 MG/DL (ref 0.4–1.2)
EKG ATRIAL RATE: 68 BPM
EKG P AXIS: 68 DEGREES
EKG P-R INTERVAL: 188 MS
EKG Q-T INTERVAL: 468 MS
EKG QRS DURATION: 86 MS
EKG QTC CALCULATION (BAZETT): 497 MS
EKG R AXIS: 22 DEGREES
EKG T AXIS: 106 DEGREES
EKG VENTRICULAR RATE: 68 BPM
EOSINOPHIL # BLD: 2.1 %
EOSINOPHILS ABSOLUTE: 0.2 THOU/MM3 (ref 0–0.4)
ERYTHROCYTE [DISTWIDTH] IN BLOOD BY AUTOMATED COUNT: 14.5 % (ref 11.5–14.5)
ERYTHROCYTE [DISTWIDTH] IN BLOOD BY AUTOMATED COUNT: 46.5 FL (ref 35–45)
GFR SERPL CREATININE-BSD FRML MDRD: 45 ML/MIN/1.73M2
GLUCOSE BLD-MCNC: 134 MG/DL (ref 70–108)
HCT VFR BLD CALC: 27.3 % (ref 37–47)
HEMOGLOBIN: 8.9 GM/DL (ref 12–16)
IMMATURE GRANS (ABS): 0.05 THOU/MM3 (ref 0–0.07)
IMMATURE GRANULOCYTES: 0.5 %
LACTIC ACID, SEPSIS: 1.9 MMOL/L (ref 0.5–1.9)
LYMPHOCYTES # BLD: 25.8 %
LYMPHOCYTES ABSOLUTE: 2.5 THOU/MM3 (ref 1–4.8)
MCH RBC QN AUTO: 28.9 PG (ref 26–33)
MCHC RBC AUTO-ENTMCNC: 32.6 GM/DL (ref 32.2–35.5)
MCV RBC AUTO: 88.6 FL (ref 81–99)
MONOCYTES # BLD: 8.1 %
MONOCYTES ABSOLUTE: 0.8 THOU/MM3 (ref 0.4–1.3)
NUCLEATED RED BLOOD CELLS: 0 /100 WBC
OSMOLALITY CALCULATION: 289.1 MOSMOL/KG (ref 275–300)
PLATELET # BLD: 213 THOU/MM3 (ref 130–400)
PMV BLD AUTO: 11.4 FL (ref 9.4–12.4)
POTASSIUM SERPL-SCNC: 4.8 MEQ/L (ref 3.5–5.2)
PRO-BNP: 9521 PG/ML (ref 0–900)
PROCALCITONIN: 0.07 NG/ML (ref 0.01–0.09)
RBC # BLD: 3.08 MILL/MM3 (ref 4.2–5.4)
SEG NEUTROPHILS: 63.2 %
SEGMENTED NEUTROPHILS ABSOLUTE COUNT: 6.2 THOU/MM3 (ref 1.8–7.7)
SODIUM BLD-SCNC: 141 MEQ/L (ref 135–145)
T4 FREE: 1.19 NG/DL (ref 0.93–1.76)
TOTAL PROTEIN: 6.3 G/DL (ref 6.1–8)
TROPONIN T: 0.17 NG/ML
TROPONIN T: 0.17 NG/ML
WBC # BLD: 9.8 THOU/MM3 (ref 4.8–10.8)

## 2018-07-02 PROCEDURE — 82248 BILIRUBIN DIRECT: CPT

## 2018-07-02 PROCEDURE — 84439 ASSAY OF FREE THYROXINE: CPT

## 2018-07-02 PROCEDURE — 6370000000 HC RX 637 (ALT 250 FOR IP): Performed by: INTERNAL MEDICINE

## 2018-07-02 PROCEDURE — 93005 ELECTROCARDIOGRAM TRACING: CPT | Performed by: FAMILY MEDICINE

## 2018-07-02 PROCEDURE — 83605 ASSAY OF LACTIC ACID: CPT

## 2018-07-02 PROCEDURE — 80053 COMPREHEN METABOLIC PANEL: CPT

## 2018-07-02 PROCEDURE — 83880 ASSAY OF NATRIURETIC PEPTIDE: CPT

## 2018-07-02 PROCEDURE — 36415 COLL VENOUS BLD VENIPUNCTURE: CPT

## 2018-07-02 PROCEDURE — 6360000002 HC RX W HCPCS: Performed by: PHYSICIAN ASSISTANT

## 2018-07-02 PROCEDURE — 6360000002 HC RX W HCPCS: Performed by: INTERNAL MEDICINE

## 2018-07-02 PROCEDURE — 71046 X-RAY EXAM CHEST 2 VIEWS: CPT

## 2018-07-02 PROCEDURE — 85025 COMPLETE CBC W/AUTO DIFF WBC: CPT

## 2018-07-02 PROCEDURE — 1200000003 HC TELEMETRY R&B

## 2018-07-02 PROCEDURE — 84484 ASSAY OF TROPONIN QUANT: CPT

## 2018-07-02 PROCEDURE — 2580000003 HC RX 258: Performed by: INTERNAL MEDICINE

## 2018-07-02 PROCEDURE — 94640 AIRWAY INHALATION TREATMENT: CPT

## 2018-07-02 PROCEDURE — 87040 BLOOD CULTURE FOR BACTERIA: CPT

## 2018-07-02 PROCEDURE — 2580000003 HC RX 258: Performed by: PHYSICIAN ASSISTANT

## 2018-07-02 PROCEDURE — 84145 PROCALCITONIN (PCT): CPT

## 2018-07-02 PROCEDURE — 6370000000 HC RX 637 (ALT 250 FOR IP): Performed by: PHYSICIAN ASSISTANT

## 2018-07-02 PROCEDURE — 99284 EMERGENCY DEPT VISIT MOD MDM: CPT

## 2018-07-02 PROCEDURE — 96374 THER/PROPH/DIAG INJ IV PUSH: CPT

## 2018-07-02 RX ORDER — IPRATROPIUM BROMIDE AND ALBUTEROL SULFATE 2.5; .5 MG/3ML; MG/3ML
1 SOLUTION RESPIRATORY (INHALATION) ONCE
Status: COMPLETED | OUTPATIENT
Start: 2018-07-02 | End: 2018-07-02

## 2018-07-02 RX ORDER — SODIUM CHLORIDE 9 MG/ML
INJECTION, SOLUTION INTRAVENOUS CONTINUOUS
Status: DISCONTINUED | OUTPATIENT
Start: 2018-07-02 | End: 2018-07-02

## 2018-07-02 RX ORDER — FUROSEMIDE 10 MG/ML
60 INJECTION INTRAMUSCULAR; INTRAVENOUS ONCE
Status: COMPLETED | OUTPATIENT
Start: 2018-07-02 | End: 2018-07-02

## 2018-07-02 RX ORDER — SODIUM CHLORIDE 0.9 % (FLUSH) 0.9 %
10 SYRINGE (ML) INJECTION PRN
Status: DISCONTINUED | OUTPATIENT
Start: 2018-07-02 | End: 2018-07-06 | Stop reason: HOSPADM

## 2018-07-02 RX ORDER — SODIUM CHLORIDE 0.9 % (FLUSH) 0.9 %
10 SYRINGE (ML) INJECTION EVERY 12 HOURS SCHEDULED
Status: DISCONTINUED | OUTPATIENT
Start: 2018-07-02 | End: 2018-07-06 | Stop reason: HOSPADM

## 2018-07-02 RX ORDER — DOCUSATE SODIUM 100 MG/1
100 CAPSULE, LIQUID FILLED ORAL 2 TIMES DAILY
Status: DISCONTINUED | OUTPATIENT
Start: 2018-07-02 | End: 2018-07-06 | Stop reason: HOSPADM

## 2018-07-02 RX ORDER — POTASSIUM CHLORIDE 20 MEQ/1
20 TABLET, EXTENDED RELEASE ORAL DAILY
Status: DISCONTINUED | OUTPATIENT
Start: 2018-07-03 | End: 2018-07-06 | Stop reason: HOSPADM

## 2018-07-02 RX ORDER — ONDANSETRON 2 MG/ML
4 INJECTION INTRAMUSCULAR; INTRAVENOUS EVERY 6 HOURS PRN
Status: DISCONTINUED | OUTPATIENT
Start: 2018-07-02 | End: 2018-07-06 | Stop reason: HOSPADM

## 2018-07-02 RX ORDER — CITALOPRAM 20 MG/1
10 TABLET ORAL NIGHTLY
Status: DISCONTINUED | OUTPATIENT
Start: 2018-07-02 | End: 2018-07-03

## 2018-07-02 RX ORDER — ATORVASTATIN CALCIUM 10 MG/1
10 TABLET, FILM COATED ORAL NIGHTLY
Status: DISCONTINUED | OUTPATIENT
Start: 2018-07-02 | End: 2018-07-06 | Stop reason: HOSPADM

## 2018-07-02 RX ORDER — 0.9 % SODIUM CHLORIDE 0.9 %
1000 INTRAVENOUS SOLUTION INTRAVENOUS ONCE
Status: DISCONTINUED | OUTPATIENT
Start: 2018-07-02 | End: 2018-07-02

## 2018-07-02 RX ORDER — METOPROLOL SUCCINATE 25 MG/1
12.5 TABLET, EXTENDED RELEASE ORAL 2 TIMES DAILY
Status: DISCONTINUED | OUTPATIENT
Start: 2018-07-02 | End: 2018-07-03

## 2018-07-02 RX ORDER — FERROUS SULFATE 325(65) MG
325 TABLET ORAL
Status: DISCONTINUED | OUTPATIENT
Start: 2018-07-02 | End: 2018-07-06 | Stop reason: HOSPADM

## 2018-07-02 RX ORDER — ASPIRIN 81 MG/1
81 TABLET, CHEWABLE ORAL DAILY
Status: DISCONTINUED | OUTPATIENT
Start: 2018-07-03 | End: 2018-07-06 | Stop reason: HOSPADM

## 2018-07-02 RX ORDER — FUROSEMIDE 10 MG/ML
20 INJECTION INTRAMUSCULAR; INTRAVENOUS ONCE
Status: COMPLETED | OUTPATIENT
Start: 2018-07-02 | End: 2018-07-02

## 2018-07-02 RX ADMIN — FUROSEMIDE 20 MG: 10 INJECTION, SOLUTION INTRAMUSCULAR; INTRAVENOUS at 15:11

## 2018-07-02 RX ADMIN — SODIUM CHLORIDE: 9 INJECTION, SOLUTION INTRAVENOUS at 15:10

## 2018-07-02 RX ADMIN — IPRATROPIUM BROMIDE AND ALBUTEROL SULFATE 1 AMPULE: .5; 3 SOLUTION RESPIRATORY (INHALATION) at 14:21

## 2018-07-02 RX ADMIN — FERROUS SULFATE TAB 325 MG (65 MG ELEMENTAL FE) 325 MG: 325 (65 FE) TAB at 18:35

## 2018-07-02 RX ADMIN — FUROSEMIDE 60 MG: 10 INJECTION, SOLUTION INTRAMUSCULAR; INTRAVENOUS at 18:35

## 2018-07-02 RX ADMIN — FUROSEMIDE 5 MG/HR: 10 INJECTION, SOLUTION INTRAMUSCULAR; INTRAVENOUS at 18:37

## 2018-07-02 RX ADMIN — METOPROLOL SUCCINATE 12.5 MG: 25 TABLET, FILM COATED, EXTENDED RELEASE ORAL at 20:33

## 2018-07-02 RX ADMIN — CITALOPRAM 10 MG: 20 TABLET, FILM COATED ORAL at 20:32

## 2018-07-02 RX ADMIN — ATORVASTATIN CALCIUM 10 MG: 10 TABLET, FILM COATED ORAL at 20:32

## 2018-07-02 ASSESSMENT — ENCOUNTER SYMPTOMS
COUGH: 1
RHINORRHEA: 0
COLOR CHANGE: 0
ABDOMINAL PAIN: 0
BACK PAIN: 0
VOMITING: 0
WHEEZING: 0
CONSTIPATION: 0
SHORTNESS OF BREATH: 1
DIARRHEA: 0
EYE DISCHARGE: 0
SORE THROAT: 0
EYE REDNESS: 0
NAUSEA: 0

## 2018-07-02 ASSESSMENT — PAIN SCALES - GENERAL: PAINLEVEL_OUTOF10: 0

## 2018-07-02 NOTE — ED PROVIDER NOTES
has no cervical adenopathy. Neurological: She is alert and oriented to person, place, and time. She exhibits normal muscle tone. Coordination normal. GCS eye subscore is 4. GCS verbal subscore is 5. GCS motor subscore is 6. Skin: Skin is warm and dry. No rash noted. She is not diaphoretic. No erythema. No pallor. Psychiatric: She has a normal mood and affect. Her behavior is normal. Thought content normal.   Nursing note and vitals reviewed. DIFFERENTIAL DIAGNOSIS:   Includes but not limited to: ACS, bronchitis, pneumonia, pleuritis, COPD, CHF, pulmonary edema    DIAGNOSTIC RESULTS     EKG: All EKG's are interpreted by the Emergency Department Physician who either signs or Co-signs this chart in the absence of a cardiologist.  Rica Nails. Rate: 68 bpm  MT interval: 188 ms  QRS duration: 86 ms  QTc: 468/497 ms  P-R-T axes: 68, 22, 106  Normal sinus rhythm. ST and T wave abnormality, consider lateral ischemia, noted on previous EKG. Abnormal EKG. No STEMI. RADIOLOGY: non-plain film images(s) such as CT, Ultrasound and MRI are read by the radiologist.  Plain radiographic images are visualized and preliminarily interpreted by the emergency physician unless otherwise stated below. XR CHEST STANDARD (2 VW)   Final Result   Mild fullness prominence of the of the interstitial markings suggesting mild interstitial edema in the correct clinical setting. Infection cannot be entirely excluded. . Correlation with symptoms advised. **This report has been created using voice recognition software. It may contain minor errors which are inherent in voice recognition technology. **      Final report electronically signed by Dr. Christina Severe on 7/2/2018 1:47 PM          LABS:   Labs Reviewed   CBC WITH AUTO DIFFERENTIAL - Abnormal; Notable for the following:        Result Value    RBC 3.08 (*)     Hemoglobin 8.9 (*)     Hematocrit 27.3 (*)     RDW-SD 46.5 (*)     All other components within normal limits   BRA[facility] ([facility]          DISPOSITION/PLAN     1.  Congestive heart failure, unspecified congestive heart failure chronicity, unspecified congestive heart failure type [facility])       Admit under Hospitalist services    PATIENT REFERRED TO:  DIYA Barreto 97  [phone]            DISCHARGE MEDICATIONS:  Current Discharge Medication List          (Please note that portions of this note were completed with a voice recognition program.  Efforts were made to edit the dictations but occasionally words are mis-transcribed.)    LALO Soriano PA-C  07/03/18 4201

## 2018-07-02 NOTE — ED NOTES
Report from [facility]. Pt to radiology at this time.      Allegra Robledo RN  07/02/18 1841

## 2018-07-02 NOTE — ED NOTES
Patient transported to [facility] via cart in stable condition. Patient monitored on cardiac telemetry.      Contact made with floor prior to transport        OLIVA Schroeder-P  07/02/18 [id number]

## 2018-07-02 NOTE — ED NOTES
Pt updated on room assignment upstairs. Vitals assessed. No concerns voiced at this time.      Emil Pavon RN  07/02/18 [id number]

## 2018-07-02 NOTE — ED NOTES
Pt presents to ED with c/o excessive weight gain over the past week. Pt states she was diagnosed with pneumonia and prescribed Levaquin, which upset her stomach. Pt was then switched to a different ATB for this. Pt was seen in PCP office today for follow up when PCP noted the increased weight gain, 1+ pitting edema to BLE, and more prominent heart murmur. Pt denies pain. Pt states she is not sleeping due to SOB. EKG completed.       [facility]  07/02/18 1310

## 2018-07-02 NOTE — ED TRIAGE NOTES
Patient presents to ER with complaints of shortness of breath and weight gain that patient states began on June 23, 2018.

## 2018-07-03 PROBLEM — I08.0 MITRAL AND AORTIC VALVE REGURGITATION: Status: ACTIVE | Noted: 2018-07-03

## 2018-07-03 PROBLEM — T82.897A AORTIC PROSTHETIC VALVE REGURGITATION: Status: ACTIVE | Noted: 2018-07-03

## 2018-07-03 PROBLEM — Z95.2 H/O PROSTHETIC AORTIC VALVE REPLACEMENT: Status: ACTIVE | Noted: 2018-07-03

## 2018-07-03 LAB
ANION GAP SERPL CALCULATED.3IONS-SCNC: 16 MEQ/L (ref 8–16)
BASOPHILS # BLD: 0.5 %
BASOPHILS ABSOLUTE: 0.1 THOU/MM3 (ref 0–0.1)
BUN BLDV-MCNC: 29 MG/DL (ref 7–22)
CALCIUM SERPL-MCNC: 9.6 MG/DL (ref 8.5–10.5)
CHLORIDE BLD-SCNC: 102 MEQ/L (ref 98–111)
CO2: 23 MEQ/L (ref 23–33)
CREAT SERPL-MCNC: 1.3 MG/DL (ref 0.4–1.2)
EOSINOPHIL # BLD: 2.5 %
EOSINOPHILS ABSOLUTE: 0.3 THOU/MM3 (ref 0–0.4)
ERYTHROCYTE [DISTWIDTH] IN BLOOD BY AUTOMATED COUNT: 14.6 % (ref 11.5–14.5)
ERYTHROCYTE [DISTWIDTH] IN BLOOD BY AUTOMATED COUNT: 46.9 FL (ref 35–45)
GFR SERPL CREATININE-BSD FRML MDRD: 41 ML/MIN/1.73M2
GLUCOSE BLD-MCNC: 121 MG/DL (ref 70–108)
HCT VFR BLD CALC: 30.4 % (ref 37–47)
HEMOGLOBIN: 9.9 GM/DL (ref 12–16)
IMMATURE GRANS (ABS): 0.05 THOU/MM3 (ref 0–0.07)
IMMATURE GRANULOCYTES: 0.5 %
LV EF: 50 %
LVEF MODALITY: NORMAL
LYMPHOCYTES # BLD: 24.3 %
LYMPHOCYTES ABSOLUTE: 2.7 THOU/MM3 (ref 1–4.8)
MAGNESIUM: 1.7 MG/DL (ref 1.6–2.4)
MCH RBC QN AUTO: 29.2 PG (ref 26–33)
MCHC RBC AUTO-ENTMCNC: 32.6 GM/DL (ref 32.2–35.5)
MCV RBC AUTO: 89.7 FL (ref 81–99)
MONOCYTES # BLD: 8 %
MONOCYTES ABSOLUTE: 0.9 THOU/MM3 (ref 0.4–1.3)
NUCLEATED RED BLOOD CELLS: 0 /100 WBC
PLATELET # BLD: 233 THOU/MM3 (ref 130–400)
PMV BLD AUTO: 11.5 FL (ref 9.4–12.4)
POTASSIUM SERPL-SCNC: 4.6 MEQ/L (ref 3.5–5.2)
PRO-BNP: ABNORMAL PG/ML (ref 0–900)
RBC # BLD: 3.39 MILL/MM3 (ref 4.2–5.4)
SEG NEUTROPHILS: 64.2 %
SEGMENTED NEUTROPHILS ABSOLUTE COUNT: 7.1 THOU/MM3 (ref 1.8–7.7)
SODIUM BLD-SCNC: 141 MEQ/L (ref 135–145)
WBC # BLD: 11 THOU/MM3 (ref 4.8–10.8)

## 2018-07-03 PROCEDURE — 83735 ASSAY OF MAGNESIUM: CPT

## 2018-07-03 PROCEDURE — 93010 ELECTROCARDIOGRAM REPORT: CPT | Performed by: INTERNAL MEDICINE

## 2018-07-03 PROCEDURE — 80048 BASIC METABOLIC PNL TOTAL CA: CPT

## 2018-07-03 PROCEDURE — 93312 ECHO TRANSESOPHAGEAL: CPT

## 2018-07-03 PROCEDURE — 99223 1ST HOSP IP/OBS HIGH 75: CPT | Performed by: NUCLEAR MEDICINE

## 2018-07-03 PROCEDURE — 36415 COLL VENOUS BLD VENIPUNCTURE: CPT

## 2018-07-03 PROCEDURE — 1200000003 HC TELEMETRY R&B

## 2018-07-03 PROCEDURE — 6370000000 HC RX 637 (ALT 250 FOR IP): Performed by: INTERNAL MEDICINE

## 2018-07-03 PROCEDURE — 93320 DOPPLER ECHO COMPLETE: CPT

## 2018-07-03 PROCEDURE — 6360000002 HC RX W HCPCS: Performed by: INTERNAL MEDICINE

## 2018-07-03 PROCEDURE — 83880 ASSAY OF NATRIURETIC PEPTIDE: CPT

## 2018-07-03 PROCEDURE — 6360000002 HC RX W HCPCS: Performed by: NUCLEAR MEDICINE

## 2018-07-03 PROCEDURE — 93325 DOPPLER ECHO COLOR FLOW MAPG: CPT

## 2018-07-03 PROCEDURE — 85025 COMPLETE CBC W/AUTO DIFF WBC: CPT

## 2018-07-03 PROCEDURE — 99222 1ST HOSP IP/OBS MODERATE 55: CPT | Performed by: THORACIC SURGERY (CARDIOTHORACIC VASCULAR SURGERY)

## 2018-07-03 PROCEDURE — 99152 MOD SED SAME PHYS/QHP 5/>YRS: CPT | Performed by: NUCLEAR MEDICINE

## 2018-07-03 RX ORDER — FENTANYL CITRATE 50 UG/ML
INJECTION, SOLUTION INTRAMUSCULAR; INTRAVENOUS PRN
Status: DISCONTINUED | OUTPATIENT
Start: 2018-07-03 | End: 2018-07-03 | Stop reason: HOSPADM

## 2018-07-03 RX ORDER — MIDAZOLAM HYDROCHLORIDE 1 MG/ML
INJECTION INTRAMUSCULAR; INTRAVENOUS PRN
Status: DISCONTINUED | OUTPATIENT
Start: 2018-07-03 | End: 2018-07-03 | Stop reason: HOSPADM

## 2018-07-03 RX ORDER — POTASSIUM CHLORIDE 750 MG/1
20 TABLET, FILM COATED, EXTENDED RELEASE ORAL DAILY
COMMUNITY
Start: 2018-06-15 | End: 2019-04-01

## 2018-07-03 RX ORDER — CITALOPRAM 20 MG/1
20 TABLET ORAL NIGHTLY
Status: DISCONTINUED | OUTPATIENT
Start: 2018-07-03 | End: 2018-07-06 | Stop reason: HOSPADM

## 2018-07-03 RX ORDER — CITALOPRAM 20 MG/1
20 TABLET ORAL DAILY
COMMUNITY
End: 2019-02-13 | Stop reason: SDUPTHER

## 2018-07-03 RX ORDER — TRAZODONE HYDROCHLORIDE 50 MG/1
50 TABLET ORAL NIGHTLY PRN
COMMUNITY
Start: 2018-06-12 | End: 2019-01-16

## 2018-07-03 RX ADMIN — ENOXAPARIN SODIUM 40 MG: 40 INJECTION, SOLUTION INTRAVENOUS; SUBCUTANEOUS at 09:10

## 2018-07-03 RX ADMIN — ATORVASTATIN CALCIUM 10 MG: 10 TABLET, FILM COATED ORAL at 20:44

## 2018-07-03 RX ADMIN — ASPIRIN 81 MG 81 MG: 81 TABLET ORAL at 09:11

## 2018-07-03 RX ADMIN — POTASSIUM CHLORIDE 20 MEQ: 20 TABLET, EXTENDED RELEASE ORAL at 09:11

## 2018-07-03 RX ADMIN — METOPROLOL TARTRATE 12.5 MG: 25 TABLET ORAL at 20:44

## 2018-07-03 RX ADMIN — FERROUS SULFATE TAB 325 MG (65 MG ELEMENTAL FE) 325 MG: 325 (65 FE) TAB at 09:10

## 2018-07-03 RX ADMIN — METOPROLOL SUCCINATE 12.5 MG: 25 TABLET, FILM COATED, EXTENDED RELEASE ORAL at 09:10

## 2018-07-03 RX ADMIN — FERROUS SULFATE TAB 325 MG (65 MG ELEMENTAL FE) 325 MG: 325 (65 FE) TAB at 18:18

## 2018-07-03 RX ADMIN — CITALOPRAM 20 MG: 20 TABLET, FILM COATED ORAL at 20:44

## 2018-07-03 ASSESSMENT — PAIN SCALES - GENERAL
PAINLEVEL_OUTOF10: 0

## 2018-07-03 ASSESSMENT — PAIN - FUNCTIONAL ASSESSMENT: PAIN_FUNCTIONAL_ASSESSMENT: 0-10

## 2018-07-03 NOTE — H&P
INTERNAL MEDICINE SPECIALTIES    History & Physical    Patient:  Mary Bonilla  YOB: 1955  Date of Service: 7/3/2018  MRN: [id number]   Acct:  [de-identified]   Primary Care Physician: Felicia Romero RN    Chief Complaint: SOB    History of Present Illness:   History obtained from the patient. The patient is a 60 y.o. female who presents from home with complaints of progressive shortness of breath and leg swelling with weight gain. Context: Pt was apparently in her usual state of health until about 10 days ago when she developed dyspnea with chest pain and present ed to the ED. After evaluation, she was diagnosed with pneumonia and given oral antibiotics. She reports intolerance to the first medication and it was changed to Moxifloxacin, which she took without any improvement. Her dyspnea worsened, and she also noted bilateral ankle swelling with a 7 pound weight gain in the past week. She denies chest pain, nausea, palpitations, diaphoresis, fever or cough, but admits to decreased exercise tolerance and orthopnea. Significant findings on admission include: BP 98/45, Hb 8.9, BUN 29, Tn 0.169, BNP 9521. CXR showed interstitial edema     Past Medical History:        Diagnosis Date    Acute decompensated heart failure ([address] 7/2/2018    Aortic valve replaced 2015    Arthritis     Hyperlipidemia     Hypertension     THAYER (nonalcoholic steatohepatitis)     Smoker     Unspecified cerebral artery occlusion with cerebral infarction 3/23/15    \"bleeding on brain\"       Past Surgical History:        Procedure Laterality Date    AORTIC VALVE REPLACEMENT      cow valve    BRAIN SURGERY      to drain blood    CARDIAC CATHETERIZATION  2004 or 2005    [address] [place] [address]    COLONOSCOPY      DIAGNOSTIC CARDIAC CATH LAB PROCEDURE      HYSTERECTOMY      VASCULAR SURGERY         Home Medications:   No current facility-administered medications on file prior to encounter.       Current Outpatient Prescriptions on File Prior to Encounter   Medication Sig Dispense Refill    omeprazole (PRILOSEC) 20 MG delayed release capsule Take 20 mg by mouth daily      ferrous sulfate 325 (65 Fe) MG tablet Take 1 tablet by mouth 3 times daily (with meals) 30 tablet 3    furosemide (LASIX) 20 MG tablet Take 1 tablet by mouth daily 60 tablet 3    hydrochlorothiazide (HYDRODIURIL) 25 MG tablet Take 1 tablet by mouth daily 30 tablet 3    potassium chloride (KLOR-CON M) 20 MEQ extended release tablet Take 1 tablet by mouth daily 60 tablet 3    lisinopril (PRINIVIL;ZESTRIL) 20 MG tablet Take 10 mg by mouth daily       citalopram (CELEXA) 10 MG tablet Take 10 mg by mouth nightly       aspirin 81 MG tablet Take 81 mg by mouth daily      metoprolol (TOPROL XL) 25 MG XL tablet Take 1 tablet by mouth daily (Patient taking differently: Take 12.5 mg by mouth 2 times daily ) 30 tablet 3    atorvastatin (LIPITOR) 10 MG tablet Take 10 mg by mouth daily. Allergies:  Latex and Demerol hcl [meperidine]    Social History:    reports that she has been smoking Cigarettes. She has a 22.00 pack-year smoking history. She has quit using smokeless tobacco. She reports that she does not drink alcohol or use drugs. Family History:   Family History   Problem Relation Age of Onset    Heart Attack Mother     Heart Disease Mother     Heart Surgery Mother     Hypertension Mother     High Blood Pressure Mother     Diabetes Mother     Diabetes Father        Review of systems:  Pertinent positives and negatives as contained in HPI. All other systems reviewed with no clinically significant findings. 10 point review of systems completed, all other than noted above are negative. Vitals:   Vitals:    07/03/18 0304   BP: (!) 131/55   Pulse: 85   Resp: 20   Temp: 98.2 °F (36.8 °C)   SpO2: 92%      BMI: Body mass index is 34.64 kg/m².                 Exam:  Physical Examination:   General appearance - Age appropriate, acutely-ill looking Grans (Abs) 0.05 0.00 - 0.07 thou/mm3    nRBC 0 /100 wbc   Troponin    Collection Time: 07/02/18  3:24 PM   Result Value Ref Range    Troponin T 0.168 (A) ng/ml   T4, free    Collection Time: 07/02/18  3:24 PM   Result Value Ref Range    T4 Free 1.19 0.93 - 1.76 ng/dL   Basic Metabolic Panel    Collection Time: 07/03/18  6:03 AM   Result Value Ref Range    Sodium 141 135 - 145 meq/L    Potassium 4.6 3.5 - 5.2 meq/L    Chloride 102 98 - 111 meq/L    CO2 23 23 - 33 meq/L    Glucose 121 (H) 70 - 108 mg/dL    BUN 29 (H) 7 - 22 mg/dL    CREATININE 1.3 (H) 0.4 - 1.2 mg/dL    Calcium 9.6 8.5 - 10.5 mg/dL   Magnesium    Collection Time: 07/03/18  6:03 AM   Result Value Ref Range    Magnesium 1.7 1.6 - 2.4 mg/dL   Brain Natriuretic Peptide    Collection Time: 07/03/18  6:03 AM   Result Value Ref Range    Pro-BNP 38177.0 (H) 0.0 - 900.0 pg/mL   CBC auto differential    Collection Time: 07/03/18  6:03 AM   Result Value Ref Range    WBC 11.0 (H) 4.8 - 10.8 thou/mm3    RBC 3.39 (L) 4.20 - 5.40 mill/mm3    Hemoglobin 9.9 (L) 12.0 - 16.0 gm/dl    Hematocrit 30.4 (L) 37.0 - 47.0 %    MCV 89.7 81.0 - 99.0 fL    MCH 29.2 26.0 - 33.0 pg    MCHC 32.6 32.2 - 35.5 gm/dl    RDW-CV 14.6 (H) 11.5 - 14.5 %    RDW-SD 46.9 (H) 35.0 - 45.0 fL    Platelets 423 050 - 190 thou/mm3    MPV 11.5 9.4 - 12.4 fL    Seg Neutrophils 64.2 %    Lymphocytes 24.3 %    Monocytes 8.0 %    Eosinophils 2.5 %    Basophils 0.5 %    Immature Granulocytes 0.5 %    Segs Absolute 7.1 1.8 - 7.7 thou/mm3    Lymphocytes # 2.7 1.0 - 4.8 thou/mm3    Monocytes # 0.9 0.4 - 1.3 thou/mm3    Eosinophils # 0.3 0.0 - 0.4 thou/mm3    Basophils # 0.1 0.0 - 0.1 thou/mm3    Immature Grans (Abs) 0.05 0.00 - 0.07 thou/mm3    nRBC 0 /100 wbc   Anion Gap    Collection Time: 07/03/18  6:03 AM   Result Value Ref Range    Anion Gap 16.0 8.0 - 16.0 meq/L   Glomerular Filtration Rate, Estimated    Collection Time: 07/03/18  6:03 AM   Result Value Ref Range    Est, Glom Filt Rate 41 (A) ml/min/1.73m2       Radiology:     Xr Chest Standard (2 Vw)    Result Date: 7/2/2018  PROCEDURE: XR CHEST (2 VW) CLINICAL INFORMATION: shortness of breath, recent pneumonia, . COMPARISON: March 21, 2018 TECHNIQUE: PA and lateral views the chest. FINDINGS: Median sternotomy wires. Mild fullness of the interstitial markings suggesting mild interstitial edema in the correct clinical setting. Cardiac mediastinal silhouette is within normal limits. Possible small right pleural effusion. No acute osseous findings. Mild fullness prominence of the of the interstitial markings suggesting mild interstitial edema in the correct clinical setting. Infection cannot be entirely excluded. . Correlation with symptoms advised. **This report has been created using voice recognition software. It may contain minor errors which are inherent in voice recognition technology. ** Final report electronically signed by Dr. Madai Bolanos on 7/2/2018 1:47 PM        EKG: normal sinus rhythm, no blocks or conduction defects, no ischemic changes      Principal Problem:    Acute decompensated heart failure (HCC)  Active Problems:    Anemia, normocytic normochromic  Resolved Problems:    * No resolved hospital problems. *      Impression:  ADHF in setting of loud murmur from Aortic stenosis    Assessment & Plan:    1. Admit for further evaluation and management  2. Gentle diuresis as tolerated; monitor closely  3. Cardiology consult  4. Resume home medications as clinically indicated  5.  Further treatment will be guided by evolution of clinical course        DVT prophylaxis: [x] Lovenox                                 [] SCDs                                 [] SQ Heparin                                 [] Encourage ambulation, low risk for DVT, no chemical or mechanical prophylaxis necessary              [] Already on Anticoagulation                Anticipated Disposition upon discharge: [x] Home

## 2018-07-03 NOTE — CARE COORDINATION
7/3/18, 7:26 AM      [facility]       Admitted from: ER 7/2/2018/ [address] day: 1   Location: [facility]02/002-A Reason for admit: Acute decompensated heart failure ([facility] [I50.9] Status: IP   Admit order signed?: yes  PMH:  has a past medical history of Acute decompensated heart failure ([facility] Aortic valve replaced; Arthritis; Hyperlipidemia; Hypertension; THAYER (nonalcoholic steatohepatitis); Smoker; and Unspecified cerebral artery occlusion with cerebral infarction. Medications:  Scheduled Meds:   atorvastatin  10 mg Oral Nightly    metoprolol succinate  12.5 mg Oral BID    aspirin  81 mg Oral Daily    citalopram  10 mg Oral Nightly    ferrous sulfate  325 mg Oral TID WC    potassium chloride  20 mEq Oral Daily    sodium chloride flush  10 mL Intravenous 2 times per day    docusate sodium  100 mg Oral BID    enoxaparin  40 mg Subcutaneous Daily     Continuous Infusions:   furosemide (LASIX) 5mg/ml infusion 5 mg/hr (07/02/18 1837)      Pertinent Info/Orders/Treatment Plan:  Creat 1.3, BNP 68265, trop elev, WBC 11.0, Hgb 9.9. Lasix gtt. Telemetry monitoring. Cardio consult pending. Diet: DIET CARDIAC;   DVT Prophylaxis: Lovenox  Smoking status:  reports that she has been smoking Cigarettes. She has a 22.00 pack-year smoking history.  She has quit using smokeless tobacco.   Influenza Vaccination Screening Completed: yes  Pneumonia Vaccination Screening Completed: yes  PCP: Kamlesh Paniagua RN  Readmission: no  Readmission Risk Score: 17%    Discharge Planning  Current Residence:  Private Residence  Living Arrangements:  Family Members, Children, Spouse/Significant Other   Support Systems:  Children, Family Members, Spouse/Significant Other  Current Services PTA:     Potential Assistance Needed:  N/A  Potential Assistance Purchasing Medications:  No  Does patient want to participate in local refill/ meds to beds program?  No  Type of Home Care Services:  None  Patient expects to be discharged to:

## 2018-07-03 NOTE — PLAN OF CARE
Problem: Discharge Planning:  Goal: Participates in care planning  Participates in care planning   Outcome: Ongoing  Pt and family active in care planning     Problem: Cardiac Output - Decreased:  Goal: Hemodynamic stability will improve  Hemodynamic stability will improve   Outcome: Ongoing     07/03/18 0304 07/03/18 0900 07/03/18 1124   Vital Signs   Pulse 85 95 57   Heart Rate Source Monitor Monitor Monitor   Resp 20 18 16   BP (!) 131/55 (!) 116/54 (!) 112/49       07/03/18 1238   Vital Signs   Pulse 58   Heart Rate Source Monitor   Resp 16   BP (!) 120/53       Problem: Fluid Volume - Imbalance:  Goal: Absence of imbalanced fluid volume signs and symptoms  Absence of imbalanced fluid volume signs and symptoms   Outcome: Ongoing  Pt receiving IV lasix    Problem: Pain:  Goal: Pain level will decrease  Pain level will decrease   Outcome: Ongoing  Pt denies any pain at this time    Comments: Care plan reviewed with patient and family. Patient and family verbalize understanding of the plan of care and contribute to goal setting.

## 2018-07-03 NOTE — CONSULTS
drain blood    CARDIAC CATHETERIZATION  2004 or 2005    [facility]    COLONOSCOPY      DIAGNOSTIC CARDIAC CATH LAB PROCEDURE      HYSTERECTOMY      VASCULAR SURGERY       Current Medications:   Current Facility-Administered Medications: metoprolol tartrate (LOPRESSOR) tablet 12.5 mg, 12.5 mg, Oral, BID  citalopram (CELEXA) tablet 20 mg, 20 mg, Oral, Nightly  atorvastatin (LIPITOR) tablet 10 mg, 10 mg, Oral, Nightly  aspirin chewable tablet 81 mg, 81 mg, Oral, Daily  ferrous sulfate tablet 325 mg, 325 mg, Oral, TID WC  potassium chloride (KLOR-CON M) extended release tablet 20 mEq, 20 mEq, Oral, Daily  sodium chloride flush 0.9 % injection 10 mL, 10 mL, Intravenous, 2 times per day  sodium chloride flush 0.9 % injection 10 mL, 10 mL, Intravenous, PRN  docusate sodium (COLACE) capsule 100 mg, 100 mg, Oral, BID  ondansetron (ZOFRAN) injection 4 mg, 4 mg, Intravenous, Q6H PRN  enoxaparin (LOVENOX) injection 40 mg, 40 mg, Subcutaneous, Daily  furosemide (LASIX) 500 mg in dextrose 5 % 100 mL infusion, 5 mg/hr, Intravenous, Continuous  Allergies:  Latex and Demerol hcl [meperidine]    Social History:   TOBACCO:  Never used tobacco  Family History:    Family History   Problem Relation Age of Onset    Heart Attack Mother     Heart Disease Mother     Heart Surgery Mother     Hypertension Mother     High Blood Pressure Mother     Diabetes Mother     Diabetes Father      REVIEW OF SYSTEMS:  CONSTITUTIONAL:  negative  EYES:  negative  HEENT:  negative  RESPIRATORY:  negative  CARDIOVASCULAR:  positive for  dyspnea, orthopnea, SOB  GASTROINTESTINAL:  negative  GENITOURINARY:  negative  INTEGUMENT/BREAST:  negative  HEMATOLOGIC/LYMPHATIC:  negative  ALLERGIC/IMMUNOLOGIC:  negative  ENDOCRINE:  negative    PHYSICAL EXAM:  VITALS:  BP (!) 119/50   Pulse 73   Temp 98.4 °F (36.9 °C) (Oral)   Resp 16   Ht 5' 2\" (1.575 m)   Wt 189 lb 6.4 oz (85.9 kg)   SpO2 92%   BMI 34.64 kg/m²   CONSTITUTIONAL:  awake, alert, cooperative, 07/03/2018    RBC 3.87 05/04/2012    HGB 9.9 07/03/2018    HCT 30.4 07/03/2018     07/03/2018    MCV 89.7 07/03/2018    MCH 29.2 07/03/2018    MCHC 32.6 07/03/2018    RDW 14.9 06/24/2018    NRBC 0 07/03/2018    NRBC 0 05/04/2012    SEGSPCT 64.2 07/03/2018    MONOPCT 8.0 07/03/2018    MONOSABS 0.9 07/03/2018    LYMPHSABS 2.7 07/03/2018    EOSABS 0.3 07/03/2018    BASOSABS 0.1 07/03/2018     CMP:    Lab Results   Component Value Date     07/03/2018    K 4.6 07/03/2018    K 3.3 03/22/2018     07/03/2018    CO2 23 07/03/2018    BUN 29 07/03/2018    CREATININE 1.3 07/03/2018    LABGLOM 41 07/03/2018    GLUCOSE 121 07/03/2018    PROT 6.3 07/02/2018    LABALBU 3.2 07/02/2018    LABALBU 4.2 05/04/2012    CALCIUM 9.6 07/03/2018    BILITOT 0.6 07/02/2018    ALKPHOS 32 07/02/2018    AST 19 07/02/2018    ALT 16 07/02/2018     PT/INR:    Lab Results   Component Value Date    INR 1.04 03/24/2018     PTT:    Lab Results   Component Value Date    APTT 31.7 03/24/2018   [APTT}  U/A:    Lab Results   Component Value Date    COLORU YELLOW 06/24/2018    PROTEINU NEGATIVE 06/24/2018    PHUR 5.5 06/24/2018    LABCAST NONE SEEN 03/21/2018    LABCAST NONE SEEN 03/21/2018    WBCUA 0-2 06/24/2018    WBCUA 2-4 05/04/2012    RBCUA 0-2 06/24/2018    YEAST NONE SEEN 06/24/2018    BACTERIA NONE 06/24/2018    SPECGRAV >1.030 03/22/2018    LEUKOCYTESUR SMALL 06/24/2018    UROBILINOGEN 0.2 06/24/2018    BILIRUBINUR NEGATIVE 06/24/2018    BILIRUBINUR NEGATIVE 05/04/2012    BLOODU TRACE 06/24/2018    GLUCOSEU NEGATIVE 06/24/2018     ABG:  No results found for: PH, PCO2, PO2, HCO3, BE, THGB, TCO2, O2SAT  HgBA1c:  No results found for: LABA1C  TSH:    Lab Results   Component Value Date    TSH 1.390 03/21/2018       IMPRESSION/RECOMMENDATIONS:    Severe symptomatic prosthetic aortic valve regurgitation, moderate to severe mitral valve regurgitation, moderate tricuspid valve regurgitation with flash pulmonary edema episode.       Plan:  Redo sternotomy, redo aortic valve replacement, mitral valve repair/replacement, possible tricuspid valve repair next Monday

## 2018-07-04 LAB
ANION GAP SERPL CALCULATED.3IONS-SCNC: 17 MEQ/L (ref 8–16)
BASOPHILS # BLD: 0.6 %
BASOPHILS ABSOLUTE: 0.1 THOU/MM3 (ref 0–0.1)
BUN BLDV-MCNC: 32 MG/DL (ref 7–22)
CALCIUM SERPL-MCNC: 9.5 MG/DL (ref 8.5–10.5)
CHLORIDE BLD-SCNC: 99 MEQ/L (ref 98–111)
CO2: 22 MEQ/L (ref 23–33)
CREAT SERPL-MCNC: 1.2 MG/DL (ref 0.4–1.2)
EOSINOPHIL # BLD: 3.4 %
EOSINOPHILS ABSOLUTE: 0.3 THOU/MM3 (ref 0–0.4)
ERYTHROCYTE [DISTWIDTH] IN BLOOD BY AUTOMATED COUNT: 14.5 % (ref 11.5–14.5)
ERYTHROCYTE [DISTWIDTH] IN BLOOD BY AUTOMATED COUNT: 45.2 FL (ref 35–45)
GFR SERPL CREATININE-BSD FRML MDRD: 45 ML/MIN/1.73M2
GLUCOSE BLD-MCNC: 119 MG/DL (ref 70–108)
HCT VFR BLD CALC: 30.6 % (ref 37–47)
HEMOGLOBIN: 10 GM/DL (ref 12–16)
IMMATURE GRANS (ABS): 0.06 THOU/MM3 (ref 0–0.07)
IMMATURE GRANULOCYTES: 0.6 %
LYMPHOCYTES # BLD: 27.7 %
LYMPHOCYTES ABSOLUTE: 2.7 THOU/MM3 (ref 1–4.8)
MAGNESIUM: 1.9 MG/DL (ref 1.6–2.4)
MCH RBC QN AUTO: 28.9 PG (ref 26–33)
MCHC RBC AUTO-ENTMCNC: 32.7 GM/DL (ref 32.2–35.5)
MCV RBC AUTO: 88.4 FL (ref 81–99)
MONOCYTES # BLD: 9.1 %
MONOCYTES ABSOLUTE: 0.9 THOU/MM3 (ref 0.4–1.3)
NUCLEATED RED BLOOD CELLS: 0 /100 WBC
PLATELET # BLD: 242 THOU/MM3 (ref 130–400)
PMV BLD AUTO: 11.6 FL (ref 9.4–12.4)
POTASSIUM SERPL-SCNC: 4.3 MEQ/L (ref 3.5–5.2)
RBC # BLD: 3.46 MILL/MM3 (ref 4.2–5.4)
SEG NEUTROPHILS: 58.6 %
SEGMENTED NEUTROPHILS ABSOLUTE COUNT: 5.7 THOU/MM3 (ref 1.8–7.7)
SODIUM BLD-SCNC: 138 MEQ/L (ref 135–145)
WBC # BLD: 9.8 THOU/MM3 (ref 4.8–10.8)

## 2018-07-04 PROCEDURE — 6360000002 HC RX W HCPCS: Performed by: INTERNAL MEDICINE

## 2018-07-04 PROCEDURE — 83735 ASSAY OF MAGNESIUM: CPT

## 2018-07-04 PROCEDURE — 80048 BASIC METABOLIC PNL TOTAL CA: CPT

## 2018-07-04 PROCEDURE — 1200000003 HC TELEMETRY R&B

## 2018-07-04 PROCEDURE — 6370000000 HC RX 637 (ALT 250 FOR IP): Performed by: INTERNAL MEDICINE

## 2018-07-04 PROCEDURE — 99231 SBSQ HOSP IP/OBS SF/LOW 25: CPT | Performed by: NUCLEAR MEDICINE

## 2018-07-04 PROCEDURE — 85025 COMPLETE CBC W/AUTO DIFF WBC: CPT

## 2018-07-04 PROCEDURE — 36415 COLL VENOUS BLD VENIPUNCTURE: CPT

## 2018-07-04 RX ORDER — FUROSEMIDE 10 MG/ML
40 INJECTION INTRAMUSCULAR; INTRAVENOUS 2 TIMES DAILY
Status: DISCONTINUED | OUTPATIENT
Start: 2018-07-05 | End: 2018-07-06 | Stop reason: HOSPADM

## 2018-07-04 RX ORDER — ACETAMINOPHEN 325 MG/1
650 TABLET ORAL EVERY 6 HOURS PRN
Status: DISCONTINUED | OUTPATIENT
Start: 2018-07-04 | End: 2018-07-06 | Stop reason: HOSPADM

## 2018-07-04 RX ORDER — LISINOPRIL 5 MG/1
5 TABLET ORAL 2 TIMES DAILY
Status: DISCONTINUED | OUTPATIENT
Start: 2018-07-04 | End: 2018-07-06 | Stop reason: HOSPADM

## 2018-07-04 RX ADMIN — METOPROLOL TARTRATE 12.5 MG: 25 TABLET ORAL at 08:05

## 2018-07-04 RX ADMIN — ENOXAPARIN SODIUM 40 MG: 40 INJECTION, SOLUTION INTRAVENOUS; SUBCUTANEOUS at 08:09

## 2018-07-04 RX ADMIN — ACETAMINOPHEN 650 MG: 325 TABLET ORAL at 06:02

## 2018-07-04 RX ADMIN — FERROUS SULFATE TAB 325 MG (65 MG ELEMENTAL FE) 325 MG: 325 (65 FE) TAB at 08:05

## 2018-07-04 RX ADMIN — FERROUS SULFATE TAB 325 MG (65 MG ELEMENTAL FE) 325 MG: 325 (65 FE) TAB at 12:05

## 2018-07-04 RX ADMIN — CITALOPRAM 20 MG: 20 TABLET, FILM COATED ORAL at 20:42

## 2018-07-04 RX ADMIN — LISINOPRIL 5 MG: 5 TABLET ORAL at 12:05

## 2018-07-04 RX ADMIN — ASPIRIN 81 MG 81 MG: 81 TABLET ORAL at 08:09

## 2018-07-04 RX ADMIN — METOPROLOL TARTRATE 12.5 MG: 25 TABLET ORAL at 20:42

## 2018-07-04 RX ADMIN — FERROUS SULFATE TAB 325 MG (65 MG ELEMENTAL FE) 325 MG: 325 (65 FE) TAB at 17:10

## 2018-07-04 RX ADMIN — ATORVASTATIN CALCIUM 10 MG: 10 TABLET, FILM COATED ORAL at 20:42

## 2018-07-04 RX ADMIN — LISINOPRIL 5 MG: 5 TABLET ORAL at 20:42

## 2018-07-04 RX ADMIN — POTASSIUM CHLORIDE 20 MEQ: 20 TABLET, EXTENDED RELEASE ORAL at 08:09

## 2018-07-04 ASSESSMENT — PAIN SCALES - GENERAL
PAINLEVEL_OUTOF10: 0
PAINLEVEL_OUTOF10: 0
PAINLEVEL_OUTOF10: 8
PAINLEVEL_OUTOF10: 8
PAINLEVEL_OUTOF10: 0
PAINLEVEL_OUTOF10: 8

## 2018-07-04 NOTE — PROGRESS NOTES
RN to room, complaints of shortness of breath. Lungs diminished, oxygen 95% RA, respirations 19. Patient given incentive spirometry with instructions on use. Able to get to 1000 line with a goal of 1500.

## 2018-07-04 NOTE — PLAN OF CARE
Problem: Discharge Planning:  Goal: Participates in care planning  Participates in care planning   Outcome: Ongoing  Plans home with . Able to voice discharge concerns. Continue to assess needs as they arise. Goal: Discharged to appropriate level of care  Discharged to appropriate level of care   Outcome: Ongoing  See above    Problem: Cardiac Output - Decreased:  Goal: Hemodynamic stability will improve  Hemodynamic stability will improve   Outcome: Ongoing  VS every 4 hours prn with telemetry. Problem: Fluid Volume - Imbalance:  Goal: Absence of imbalanced fluid volume signs and symptoms  Absence of imbalanced fluid volume signs and symptoms   Outcome: Ongoing  Lasix drip continues. decreased swelling in legs and shortness of breath. Problem: Pain:  Goal: Pain level will decrease  Pain level will decrease   Outcome: Ongoing  0-10 pain scale explained and utilized. Denies pain this shift. Able to voice pain concerns. Goal: Recognizes and communicates pain  Recognizes and communicates pain   Outcome: Ongoing      Problem: HEMODYNAMIC STATUS  Goal: Patient has stable vital signs and fluid balance  Outcome: Ongoing  Vital signs every 4 hours prn. Positive peripheral pulses and capillary refill. Problem: Falls - Risk of:  Goal: Will remain free from falls  Will remain free from falls  Outcome: Ongoing  Discussed safety handout including use of alarms and hourly rounding. Educated on need for alarm ambulates well. Comments: Care plan reviewed with patient and . Patient and  verbalize understanding of the plan of care and contribute to goal setting.

## 2018-07-04 NOTE — PROGRESS NOTES
MCHC 32.7 07/04/2018    RDW 14.9 06/24/2018     07/04/2018    MPV 11.6 07/04/2018     CMP:  Lab Results   Component Value Date     07/04/2018    K 4.3 07/04/2018    K 3.3 03/22/2018    CL 99 07/04/2018    CO2 22 07/04/2018    BUN 32 07/04/2018    CREATININE 1.2 07/04/2018    LABGLOM 45 07/04/2018    GLUCOSE 119 07/04/2018    PROT 6.3 07/02/2018    LABALBU 3.2 07/02/2018    LABALBU 4.2 05/04/2012    CALCIUM 9.5 07/04/2018    BILITOT 0.6 07/02/2018    ALKPHOS 32 07/02/2018    AST 19 07/02/2018    ALT 16 07/02/2018     Hepatic Function Panel:  Lab Results   Component Value Date    ALKPHOS 32 07/02/2018    ALT 16 07/02/2018    AST 19 07/02/2018    PROT 6.3 07/02/2018    BILITOT 0.6 07/02/2018    BILIDIR <0.2 07/02/2018    LABALBU 3.2 07/02/2018    LABALBU 4.2 05/04/2012     Magnesium:    Lab Results   Component Value Date    MG 1.9 07/04/2018     PT/INR:    Lab Results   Component Value Date    INR 1.04 03/24/2018     HgBA1c:  No results found for: LABA1C  FLP:  Lab Results   Component Value Date    TRIG 214 03/22/2018    HDL 39 03/22/2018    LDLCALC 46 03/22/2018     TSH:    Lab Results   Component Value Date    TSH 1.390 03/21/2018       No results for input(s): CKTOTAL, CKMB, CKMBINDEX, TROPONINI in the last 72 hours.           Assessment:  Patient Active Problem List    Diagnosis Date Noted    S/P AVR      Priority: High    H/O prosthetic aortic valve replacement 07/03/2018     Priority: Low    Mitral and aortic valve regurgitation 07/03/2018     Priority: Low    Aortic prosthetic valve regurgitation 07/03/2018     Priority: Low    Congestive heart failure ([address] 07/02/2018     Priority: Low    Aortic aneurysm ([address] 03/22/2018     Priority: Low    Hypokalemia 03/22/2018     Priority: Low    Atypical chest pain 03/21/2018     Priority: Low    Bradycardia 03/21/2018     Priority: Low    Shortness of breath 03/21/2018     Priority: Low    Fatigue 03/21/2018     Priority: Low    Essential

## 2018-07-05 LAB
ANION GAP SERPL CALCULATED.3IONS-SCNC: 16 MEQ/L (ref 8–16)
BASOPHILS # BLD: 0.3 %
BASOPHILS ABSOLUTE: 0 THOU/MM3 (ref 0–0.1)
BUN BLDV-MCNC: 38 MG/DL (ref 7–22)
CALCIUM SERPL-MCNC: 9.7 MG/DL (ref 8.5–10.5)
CHLORIDE BLD-SCNC: 98 MEQ/L (ref 98–111)
CO2: 25 MEQ/L (ref 23–33)
CREAT SERPL-MCNC: 1.3 MG/DL (ref 0.4–1.2)
EOSINOPHIL # BLD: 2.6 %
EOSINOPHILS ABSOLUTE: 0.2 THOU/MM3 (ref 0–0.4)
ERYTHROCYTE [DISTWIDTH] IN BLOOD BY AUTOMATED COUNT: 14.4 % (ref 11.5–14.5)
ERYTHROCYTE [DISTWIDTH] IN BLOOD BY AUTOMATED COUNT: 44.6 FL (ref 35–45)
GFR SERPL CREATININE-BSD FRML MDRD: 41 ML/MIN/1.73M2
GLUCOSE BLD-MCNC: 111 MG/DL (ref 70–108)
HCT VFR BLD CALC: 29.3 % (ref 37–47)
HEMOGLOBIN: 9.9 GM/DL (ref 12–16)
IMMATURE GRANS (ABS): 0.08 THOU/MM3 (ref 0–0.07)
IMMATURE GRANULOCYTES: 0.8 %
LYMPHOCYTES # BLD: 27.2 %
LYMPHOCYTES ABSOLUTE: 2.6 THOU/MM3 (ref 1–4.8)
MCH RBC QN AUTO: 29.4 PG (ref 26–33)
MCHC RBC AUTO-ENTMCNC: 33.8 GM/DL (ref 32.2–35.5)
MCV RBC AUTO: 86.9 FL (ref 81–99)
MONOCYTES # BLD: 10.2 %
MONOCYTES ABSOLUTE: 1 THOU/MM3 (ref 0.4–1.3)
NUCLEATED RED BLOOD CELLS: 0 /100 WBC
PLATELET # BLD: 253 THOU/MM3 (ref 130–400)
PMV BLD AUTO: 11.7 FL (ref 9.4–12.4)
POTASSIUM SERPL-SCNC: 4.2 MEQ/L (ref 3.5–5.2)
PRO-BNP: 5511 PG/ML (ref 0–900)
RBC # BLD: 3.37 MILL/MM3 (ref 4.2–5.4)
SEG NEUTROPHILS: 58.9 %
SEGMENTED NEUTROPHILS ABSOLUTE COUNT: 5.7 THOU/MM3 (ref 1.8–7.7)
SODIUM BLD-SCNC: 139 MEQ/L (ref 135–145)
WBC # BLD: 9.6 THOU/MM3 (ref 4.8–10.8)

## 2018-07-05 PROCEDURE — 6360000002 HC RX W HCPCS: Performed by: INTERNAL MEDICINE

## 2018-07-05 PROCEDURE — 36415 COLL VENOUS BLD VENIPUNCTURE: CPT

## 2018-07-05 PROCEDURE — 6370000000 HC RX 637 (ALT 250 FOR IP): Performed by: INTERNAL MEDICINE

## 2018-07-05 PROCEDURE — 2580000003 HC RX 258: Performed by: INTERNAL MEDICINE

## 2018-07-05 PROCEDURE — 83880 ASSAY OF NATRIURETIC PEPTIDE: CPT

## 2018-07-05 PROCEDURE — 1200000003 HC TELEMETRY R&B

## 2018-07-05 PROCEDURE — 85025 COMPLETE CBC W/AUTO DIFF WBC: CPT

## 2018-07-05 PROCEDURE — 80048 BASIC METABOLIC PNL TOTAL CA: CPT

## 2018-07-05 RX ORDER — TRAZODONE HYDROCHLORIDE 50 MG/1
50 TABLET ORAL NIGHTLY
Status: DISCONTINUED | OUTPATIENT
Start: 2018-07-05 | End: 2018-07-06 | Stop reason: HOSPADM

## 2018-07-05 RX ADMIN — FUROSEMIDE 40 MG: 10 INJECTION, SOLUTION INTRAMUSCULAR; INTRAVENOUS at 16:46

## 2018-07-05 RX ADMIN — ATORVASTATIN CALCIUM 10 MG: 10 TABLET, FILM COATED ORAL at 20:09

## 2018-07-05 RX ADMIN — LISINOPRIL 5 MG: 5 TABLET ORAL at 20:09

## 2018-07-05 RX ADMIN — CITALOPRAM 20 MG: 20 TABLET, FILM COATED ORAL at 20:09

## 2018-07-05 RX ADMIN — FERROUS SULFATE TAB 325 MG (65 MG ELEMENTAL FE) 325 MG: 325 (65 FE) TAB at 12:31

## 2018-07-05 RX ADMIN — ENOXAPARIN SODIUM 40 MG: 40 INJECTION, SOLUTION INTRAVENOUS; SUBCUTANEOUS at 08:19

## 2018-07-05 RX ADMIN — POTASSIUM CHLORIDE 20 MEQ: 20 TABLET, EXTENDED RELEASE ORAL at 08:19

## 2018-07-05 RX ADMIN — FERROUS SULFATE TAB 325 MG (65 MG ELEMENTAL FE) 325 MG: 325 (65 FE) TAB at 08:19

## 2018-07-05 RX ADMIN — FUROSEMIDE 40 MG: 10 INJECTION, SOLUTION INTRAMUSCULAR; INTRAVENOUS at 10:28

## 2018-07-05 RX ADMIN — DOCUSATE SODIUM 100 MG: 100 CAPSULE, LIQUID FILLED ORAL at 08:19

## 2018-07-05 RX ADMIN — METOPROLOL TARTRATE 12.5 MG: 25 TABLET ORAL at 08:19

## 2018-07-05 RX ADMIN — Medication 10 ML: at 08:21

## 2018-07-05 RX ADMIN — FERROUS SULFATE TAB 325 MG (65 MG ELEMENTAL FE) 325 MG: 325 (65 FE) TAB at 16:45

## 2018-07-05 RX ADMIN — METOPROLOL TARTRATE 12.5 MG: 25 TABLET ORAL at 20:08

## 2018-07-05 RX ADMIN — LISINOPRIL 5 MG: 5 TABLET ORAL at 08:19

## 2018-07-05 RX ADMIN — ASPIRIN 81 MG 81 MG: 81 TABLET ORAL at 08:19

## 2018-07-05 RX ADMIN — TRAZODONE HYDROCHLORIDE 50 MG: 50 TABLET ORAL at 22:48

## 2018-07-05 RX ADMIN — Medication 10 ML: at 20:10

## 2018-07-05 ASSESSMENT — PAIN SCALES - GENERAL
PAINLEVEL_OUTOF10: 0

## 2018-07-05 NOTE — PLAN OF CARE
Problem: Discharge Planning:  Goal: Participates in care planning  Participates in care planning   Outcome: Ongoing  Pt scheduled to have surgery on Monday, July 9th to have heart valves redone    Problem: Cardiac Output - Decreased:  Goal: Hemodynamic stability will improve  Hemodynamic stability will improve   Outcome: Met This Shift  Pts vital signs within normal limits    Problem: Fluid Volume - Imbalance:  Goal: Absence of imbalanced fluid volume signs and symptoms  Absence of imbalanced fluid volume signs and symptoms   Outcome: Met This Shift  Pts daily weight has not changed from 7/04/18    Problem: Pain:  Goal: Pain level will decrease  Pain level will decrease    Outcome: Met This Shift  Pt denies any pain during this shift      Problem: HEMODYNAMIC STATUS  Goal: Patient has stable vital signs and fluid balance  Outcome: Met This Shift  Pt has had 11 pound decrease in weight since admission    Problem: Falls - Risk of:  Goal: Will remain free from falls  Will remain free from falls   Outcome: Met This Shift  Pt free from falls this shift    Problem: Pain:  Goal: Pain level will decrease  Pain level will decrease    Outcome: Met This Shift  Pt denies any pain during this shift

## 2018-07-05 NOTE — FLOWSHEET NOTE
S: Pt said that she has heart issues going on for years now. She stated that she do retained fluid in her body but that has been drained. She continued to assert that she will undergo valve replacement surgery this coming Monday. O: Pt was lying down with her  and nieces with her in the room. She was looking happy and confident. A: Pt was in good spirits and said that she was not scared of her surgery. For her she's been there three years ago during her first surgery. She wanted prayer to heal and I prayed asking god to heal her. P: Further visit is recommended for a prayerful support. 07/05/18 1740   Encounter Summary   Services provided to: Patient   Referral/Consult From: Rounding   Support System Spouse; Family members   Continue Visiting Yes  (7/5 )   Complexity of Encounter Moderate   Length of Encounter 30 minutes   Spiritual/Pentecostal   Type Spiritual support   Assessment Approachable;Calm; Hopeful   Intervention Active listening;Prayer;Sustaining presence/ Ministry of presence   Outcome Connection/belonging;Expressed gratitude;Engaged in conversation;Expressed feelings/needs/concerns

## 2018-07-05 NOTE — PLAN OF CARE
Problem: Discharge Planning:  Goal: Participates in care planning  Participates in care planning   Outcome: Ongoing  Pt able to participate in care plan. Goal: Discharged to appropriate level of care  Discharged to appropriate level of care   Outcome: Ongoing  Pt's discharge plan is pending at  This time due to clinical course. Will continue to monitor. Problem: Cardiac Output - Decreased:  Goal: Hemodynamic stability will improve  Hemodynamic stability will improve   Outcome: Ongoing  Pt hemodynamically stable at this time. Denies SOB or CP. VSS. Problem: Fluid Volume - Imbalance:  Goal: Absence of imbalanced fluid volume signs and symptoms  Absence of imbalanced fluid volume signs and symptoms   Pt remains free from s/s of infection at this time. Problem: Pain:  Goal: Pain level will decrease  Pain level will decrease    Outcome: Ongoing  Pt denies pain at this time. Problem: HEMODYNAMIC STATUS  Goal: Patient has stable vital signs and fluid balance  Outcome: Ongoing  Pt's vitals signs are stable at this time. Problem: Falls - Risk of:  Goal: Will remain free from falls  Will remain free from falls   Pt using call light appropriately to call for assistance with ambulation to the bathroom and to chair. Pt is also compliant with use of non-skid slippers. Pt reports understanding of fall prevention when discussed. Problem: Pain:  Goal: Pain level will decrease  Pain level will decrease    Outcome: Ongoing  Pt denies pain at this time. Comments: Care plan reviewed with patient. Patient  verbalizes understanding of the plan of care and contribute to goal setting.

## 2018-07-05 NOTE — PROGRESS NOTES
CHF core measure documentation:  1. ASA(best practice): 81 mg daily  2. Best Practice BB: metoprolol tartrate 12.5 mg twice daily  3. ACEI/ARB: lisinopril 5 mg twice daily  4. EF: 50%  5.   Best Practice aldosterone receptor antagonist: none    Jolanta Nava PharmD, BCPS  7/5/2018  6:47 AM

## 2018-07-06 VITALS
DIASTOLIC BLOOD PRESSURE: 57 MMHG | RESPIRATION RATE: 16 BRPM | HEIGHT: 62 IN | TEMPERATURE: 97.7 F | SYSTOLIC BLOOD PRESSURE: 136 MMHG | BODY MASS INDEX: 34.12 KG/M2 | WEIGHT: 185.4 LBS | HEART RATE: 59 BPM | OXYGEN SATURATION: 94 %

## 2018-07-06 LAB
ANION GAP SERPL CALCULATED.3IONS-SCNC: 15 MEQ/L (ref 8–16)
BUN BLDV-MCNC: 43 MG/DL (ref 7–22)
CALCIUM SERPL-MCNC: 9.1 MG/DL (ref 8.5–10.5)
CHLORIDE BLD-SCNC: 101 MEQ/L (ref 98–111)
CO2: 24 MEQ/L (ref 23–33)
CREAT SERPL-MCNC: 1.4 MG/DL (ref 0.4–1.2)
GFR SERPL CREATININE-BSD FRML MDRD: 38 ML/MIN/1.73M2
GLUCOSE BLD-MCNC: 114 MG/DL (ref 70–108)
POTASSIUM SERPL-SCNC: 4.5 MEQ/L (ref 3.5–5.2)
SODIUM BLD-SCNC: 140 MEQ/L (ref 135–145)

## 2018-07-06 PROCEDURE — 2580000003 HC RX 258: Performed by: INTERNAL MEDICINE

## 2018-07-06 PROCEDURE — 6370000000 HC RX 637 (ALT 250 FOR IP): Performed by: INTERNAL MEDICINE

## 2018-07-06 PROCEDURE — APPSS30 APP SPLIT SHARED TIME 16-30 MINUTES: Performed by: PHYSICIAN ASSISTANT

## 2018-07-06 PROCEDURE — 6360000002 HC RX W HCPCS: Performed by: INTERNAL MEDICINE

## 2018-07-06 PROCEDURE — 80048 BASIC METABOLIC PNL TOTAL CA: CPT

## 2018-07-06 PROCEDURE — 36415 COLL VENOUS BLD VENIPUNCTURE: CPT

## 2018-07-06 RX ORDER — FUROSEMIDE 20 MG/1
40 TABLET ORAL DAILY
Qty: 60 TABLET | Refills: 3 | Status: SHIPPED | OUTPATIENT
Start: 2018-07-06 | End: 2019-01-16 | Stop reason: SDUPTHER

## 2018-07-06 RX ORDER — LISINOPRIL 5 MG/1
5 TABLET ORAL 2 TIMES DAILY
Qty: 30 TABLET | Refills: 3 | Status: SHIPPED | OUTPATIENT
Start: 2018-07-06 | End: 2019-01-14 | Stop reason: SDUPTHER

## 2018-07-06 RX ADMIN — FERROUS SULFATE TAB 325 MG (65 MG ELEMENTAL FE) 325 MG: 325 (65 FE) TAB at 09:06

## 2018-07-06 RX ADMIN — Medication 10 ML: at 09:06

## 2018-07-06 RX ADMIN — FUROSEMIDE 40 MG: 10 INJECTION, SOLUTION INTRAMUSCULAR; INTRAVENOUS at 09:05

## 2018-07-06 RX ADMIN — METOPROLOL TARTRATE 12.5 MG: 25 TABLET ORAL at 09:06

## 2018-07-06 RX ADMIN — ASPIRIN 81 MG 81 MG: 81 TABLET ORAL at 09:05

## 2018-07-06 RX ADMIN — POTASSIUM CHLORIDE 20 MEQ: 20 TABLET, EXTENDED RELEASE ORAL at 09:05

## 2018-07-06 RX ADMIN — ENOXAPARIN SODIUM 40 MG: 40 INJECTION, SOLUTION INTRAVENOUS; SUBCUTANEOUS at 09:06

## 2018-07-06 ASSESSMENT — ENCOUNTER SYMPTOMS
CONSTIPATION: 0
SORE THROAT: 0
COUGH: 0
SHORTNESS OF BREATH: 0
VOMITING: 0
BLOOD IN STOOL: 0
ABDOMINAL PAIN: 0
NAUSEA: 0
EYE PAIN: 0
SPUTUM PRODUCTION: 0
DOUBLE VISION: 0
HEARTBURN: 0
STRIDOR: 0
SINUS PAIN: 0
PHOTOPHOBIA: 0
BACK PAIN: 0
EYE REDNESS: 0
DIARRHEA: 0
HEMOPTYSIS: 0
ORTHOPNEA: 0
BLURRED VISION: 0
EYE DISCHARGE: 0
WHEEZING: 0

## 2018-07-06 NOTE — DISCHARGE SUMMARY
[facility]     Discharge Summary    Patient:  Minh Be  YOB: 1955    MRN: [id number]   Acct: [de-identified]    Primary Care Physician: JUAN Fuentes CNP    Admit date:  7/2/2018    Discharge date:   7/6/18      Discharge Diagnoses: Aortic prosthetic valve regurgitation  Principal Problem: Aortic prosthetic valve regurgitation  Active Problems:    Anemia, normocytic normochromic    Congestive heart failure (HCC)    S/P AVR    H/O prosthetic aortic valve replacement    Mitral and aortic valve regurgitation  Resolved Problems:    * No resolved hospital problems. *        Admitted for: (HPI) History obtained from the patient.     The patient is a 60 y.o. female who presents from home with complaints of progressive shortness of breath and leg swelling with weight gain.     Context: Pt was apparently in her usual state of health until about 10 days ago when she developed dyspnea with chest pain and present ed to the ED. After evaluation, she was diagnosed with pneumonia and given oral antibiotics. She reports intolerance to the first medication and it was changed to Moxifloxacin, which she took without any improvement.     Her dyspnea worsened, and she also noted bilateral ankle swelling with a 7 pound weight gain in the past week. She denies chest pain, nausea, palpitations, diaphoresis, fever or cough, but admits to decreased exercise tolerance and orthopnea.     Significant findings on admission include: BP 98/45, Hb 8.9, BUN 29, Tn 0.169, BNP 9521. CXR showed interstitial edema. Hospital Course: Patient was admitted with a primary diagnosis of acute decompensated heart failure due to Aortic prosthetic valve regurgitation and was initially treated with Lasix drip with good response. Her home garrett will be increased to 40 mg daily at discharge. She was then evaluated by cardiothoracic Surgery for recommendations.  She was initially scheduled for operative repair but this was called of in favor of further evaluation due to the technical difficulties of the planned surgery.     Consultants:  Cardiology and cardiothoracic Surgery    Discharge Medications:     Medication List      CHANGE how you take these medications    furosemide 20 MG tablet  Commonly known as:  LASIX  Take 2 tablets by mouth daily  What changed:  how much to take     lisinopril 5 MG tablet  Commonly known as:  PRINIVIL;ZESTRIL  Take 1 tablet by mouth 2 times daily  What changed:  · medication strength  · how much to take  · when to take this        CONTINUE taking these medications    aspirin 81 MG tablet     citalopram 20 MG tablet  Commonly known as:  CELEXA     ferrous sulfate 325 (65 Fe) MG tablet  Take 1 tablet by mouth 3 times daily (with meals)     LIPITOR 10 MG tablet  Generic drug:  atorvastatin     metoprolol tartrate 25 MG tablet  Commonly known as:  LOPRESSOR     omeprazole 20 MG delayed release capsule  Commonly known as:  PRILOSEC     potassium chloride 10 MEQ extended release tablet  Commonly known as:  KLOR-CON     traZODone 50 MG tablet  Commonly known as:  DESYREL        STOP taking these medications    hydrochlorothiazide 25 MG tablet  Commonly known as:  HYDRODIURIL     levofloxacin 500 MG tablet  Commonly known as:  LEVAQUIN     metoprolol succinate 25 MG extended release tablet  Commonly known as:  TOPROL XL     potassium chloride 20 MEQ extended release tablet  Commonly known as:  KLOR-CON M           Where to Get Your Medications      These medications were sent to [address] [place] [address], [facility] [employer] [address] [place] [address]    Phone:  [phone]   · furosemide 20 MG tablet  · lisinopril 5 MG tablet           Physical Exam:    Vitals:  Vitals:    07/05/18 2000 07/06/18 0431 07/06/18 0900 07/06/18 1204   BP: (!) 125/52 (!) 143/58 (!) 110/47 (!) 136/57   Pulse: 63 66 62 59   Resp: 16 17 16 16   Temp: 98.1 °F (36.7 °C) 97.9 °F (36.6 °C) 98 °F (36.7 °C) 97.7 °F (36.5 °C)   TempSrc: Oral Oral Oral Oral   SpO2: 94% 94% 95% 94%   Weight:  185 lb 6.4 oz (84.1 kg)     Height:         Weight: Weight: 185 lb 6.4 oz (84.1 kg)     24 hour intake/output:  Intake/Output Summary (Last 24 hours) at 07/06/18 1217  Last data filed at 07/06/18 0431   Gross per 24 hour   Intake              760 ml   Output              700 ml   Net               60 ml       General appearance - alert, well appearing, and in no distress  Chest - clear to auscultation, no wheezes, rales or rhonchi, symmetric air entry  Heart - normal rate, regular rhythm, normal S1, S2, no murmurs, rubs, clicks or gallops  Abdomen - soft, nontender, nondistended, no masses or organomegaly  Neurological - alert, oriented, normal speech, no focal findings or movement disorder noted, neck supple without rigidity, cranial nerves II through XII intact, motor and sensory grossly normal bilaterally, normal muscle tone, no tremors, strength 5/5  Extremities - peripheral pulses normal, no pedal edema, no clubbing or cyanosis  Skin - normal coloration and turgor, no rashes, no suspicious skin lesions noted    Procedures:  None    Diagnostic Test:  Routine labs    Radiology reports as per the Radiologist  Radiology: Xr Chest Standard (2 Vw)    Result Date: 7/2/2018  PROCEDURE: XR CHEST (2 VW) CLINICAL INFORMATION: shortness of breath, recent pneumonia, . COMPARISON: March 21, 2018 TECHNIQUE: PA and lateral views the chest. FINDINGS: Median sternotomy wires. Mild fullness of the interstitial markings suggesting mild interstitial edema in the correct clinical setting. Cardiac mediastinal silhouette is within normal limits. Possible small right pleural effusion. No acute osseous findings. Mild fullness prominence of the of the interstitial markings suggesting mild interstitial edema in the correct clinical setting. Infection cannot be entirely excluded. . Correlation with symptoms advised. **This report has been created using voice recognition software. It may contain minor errors which are inherent in voice recognition technology. ** Final report electronically signed by Dr. Andrew Gray on 7/2/2018 1:47 PM       Results for orders placed or performed during the hospital encounter of 07/02/18   Culture blood #1   Result Value Ref Range    Blood Culture, Routine No growth-preliminary    Culture blood #2   Result Value Ref Range    Blood Culture, Routine No growth-preliminary    CBC auto differential   Result Value Ref Range    WBC 9.8 4.8 - 10.8 thou/mm3    RBC 3.08 (L) 4.20 - 5.40 mill/mm3    Hemoglobin 8.9 (L) 12.0 - 16.0 gm/dl    Hematocrit 27.3 (L) 37.0 - 47.0 %    MCV 88.6 81.0 - 99.0 fL    MCH 28.9 26.0 - 33.0 pg    MCHC 32.6 32.2 - 35.5 gm/dl    RDW-CV 14.5 11.5 - 14.5 %    RDW-SD 46.5 (H) 35.0 - 45.0 fL    Platelets 040 623 - 847 thou/mm3    MPV 11.4 9.4 - 12.4 fL    Seg Neutrophils 63.2 %    Lymphocytes 25.8 %    Monocytes 8.1 %    Eosinophils 2.1 %    Basophils 0.3 %    Immature Granulocytes 0.5 %    Segs Absolute 6.2 1.8 - 7.7 thou/mm3    Lymphocytes # 2.5 1.0 - 4.8 thou/mm3    Monocytes # 0.8 0.4 - 1.3 thou/mm3    Eosinophils # 0.2 0.0 - 0.4 thou/mm3    Basophils # 0.0 0.0 - 0.1 thou/mm3    Immature Grans (Abs) 0.05 0.00 - 0.07 thou/mm3    nRBC 0 /100 wbc   Brain Natriuretic Peptide   Result Value Ref Range    Pro-BNP 9521.0 (H) 0.0 - 900.0 pg/mL   Basic Metabolic Panel   Result Value Ref Range    Sodium 141 135 - 145 meq/L    Potassium 4.8 3.5 - 5.2 meq/L    Chloride 107 98 - 111 meq/L    CO2 20 (L) 23 - 33 meq/L    Glucose 134 (H) 70 - 108 mg/dL    BUN 29 (H) 7 - 22 mg/dL    CREATININE 1.2 0.4 - 1.2 mg/dL    Calcium 9.2 8.5 - 10.5 mg/dL   Hepatic function panel   Result Value Ref Range    Alb 3.2 (L) 3.5 - 5.1 g/dL    Total Bilirubin 0.6 0.3 - 1.2 mg/dL    Bilirubin, Direct <0.2 0.0 - 0.3 mg/dL    Alkaline Phosphatase 32 (L) 38 - 126 U/L    AST 19 5 - 40 U/L    ALT 16 11 - 66 U/L    Total Protein 6.3 6.1 - 8.0 g/dL   Troponin   Result Value Ref Range    Troponin T 0.169 (A) ng/ml   Lactate, Sepsis   Result Value Ref Range    Lactic Acid, Sepsis 1.9 0.5 - 1.9 mmol/L   Procalcitonin   Result Value Ref Range    Procalcitonin 0.07 0.01 - 0.09 ng/mL   Anion Gap   Result Value Ref Range    Anion Gap 14.0 8.0 - 16.0 meq/L   Glomerular Filtration Rate, Estimated   Result Value Ref Range    Est, Glom Filt Rate 45 (A) ml/min/1.73m2   Osmolality   Result Value Ref Range    Osmolality Calc 289.1 275.0 - 300 mOsmol/kg   Troponin   Result Value Ref Range    Troponin T 0.168 (A) ng/ml   T4, free   Result Value Ref Range    T4 Free 1.19 0.93 - 1.76 ng/dL   Basic Metabolic Panel   Result Value Ref Range    Sodium 141 135 - 145 meq/L    Potassium 4.6 3.5 - 5.2 meq/L    Chloride 102 98 - 111 meq/L    CO2 23 23 - 33 meq/L    Glucose 121 (H) 70 - 108 mg/dL    BUN 29 (H) 7 - 22 mg/dL    CREATININE 1.3 (H) 0.4 - 1.2 mg/dL    Calcium 9.6 8.5 - 10.5 mg/dL   Magnesium   Result Value Ref Range    Magnesium 1.7 1.6 - 2.4 mg/dL   Brain Natriuretic Peptide   Result Value Ref Range    Pro-BNP 85118.0 (H) 0.0 - 900.0 pg/mL   CBC auto differential   Result Value Ref Range    WBC 11.0 (H) 4.8 - 10.8 thou/mm3    RBC 3.39 (L) 4.20 - 5.40 mill/mm3    Hemoglobin 9.9 (L) 12.0 - 16.0 gm/dl    Hematocrit 30.4 (L) 37.0 - 47.0 %    MCV 89.7 81.0 - 99.0 fL    MCH 29.2 26.0 - 33.0 pg    MCHC 32.6 32.2 - 35.5 gm/dl    RDW-CV 14.6 (H) 11.5 - 14.5 %    RDW-SD 46.9 (H) 35.0 - 45.0 fL    Platelets 161 925 - 802 thou/mm3    MPV 11.5 9.4 - 12.4 fL    Seg Neutrophils 64.2 %    Lymphocytes 24.3 %    Monocytes 8.0 %    Eosinophils 2.5 %    Basophils 0.5 %    Immature Granulocytes 0.5 %    Segs Absolute 7.1 1.8 - 7.7 thou/mm3    Lymphocytes # 2.7 1.0 - 4.8 thou/mm3    Monocytes # 0.9 0.4 - 1.3 thou/mm3    Eosinophils # 0.3 0.0 - 0.4 thou/mm3    Basophils # 0.1 0.0 - 0.1 thou/mm3    Immature Grans (Abs) 0.05 0.00 - 0.07 thou/mm3    nRBC 0 /100 wbc   Anion Gap   Result Value Ref Range    Anion Gap 16.0 8.0 - 16.0 meq/L   Glomerular Filtration Rate, Estimated   Result Value Ref Range    Est, Glom Filt Rate 41 (A) MB/NESTOR/5.71R5   Basic Metabolic Panel   Result Value Ref Range    Sodium 138 135 - 145 meq/L    Potassium 4.3 3.5 - 5.2 meq/L    Chloride 99 98 - 111 meq/L    CO2 22 (L) 23 - 33 meq/L    Glucose 119 (H) 70 - 108 mg/dL    BUN 32 (H) 7 - 22 mg/dL    CREATININE 1.2 0.4 - 1.2 mg/dL    Calcium 9.5 8.5 - 10.5 mg/dL   CBC auto differential   Result Value Ref Range    WBC 9.8 4.8 - 10.8 thou/mm3    RBC 3.46 (L) 4.20 - 5.40 mill/mm3    Hemoglobin 10.0 (L) 12.0 - 16.0 gm/dl    Hematocrit 30.6 (L) 37.0 - 47.0 %    MCV 88.4 81.0 - 99.0 fL    MCH 28.9 26.0 - 33.0 pg    MCHC 32.7 32.2 - 35.5 gm/dl    RDW-CV 14.5 11.5 - 14.5 %    RDW-SD 45.2 (H) 35.0 - 45.0 fL    Platelets 405 763 - 178 thou/mm3    MPV 11.6 9.4 - 12.4 fL    Seg Neutrophils 58.6 %    Lymphocytes 27.7 %    Monocytes 9.1 %    Eosinophils 3.4 %    Basophils 0.6 %    Immature Granulocytes 0.6 %    Segs Absolute 5.7 1.8 - 7.7 thou/mm3    Lymphocytes # 2.7 1.0 - 4.8 thou/mm3    Monocytes # 0.9 0.4 - 1.3 thou/mm3    Eosinophils # 0.3 0.0 - 0.4 thou/mm3    Basophils # 0.1 0.0 - 0.1 thou/mm3    Immature Grans (Abs) 0.06 0.00 - 0.07 thou/mm3    nRBC 0 /100 wbc   Glomerular Filtration Rate, Estimated   Result Value Ref Range    Est, Glom Filt Rate 45 (A) ml/min/1.73m2   Anion Gap   Result Value Ref Range    Anion Gap 17.0 (H) 8.0 - 16.0 meq/L   Magnesium   Result Value Ref Range    Magnesium 1.9 1.6 - 2.4 mg/dL   Basic Metabolic Panel   Result Value Ref Range    Sodium 139 135 - 145 meq/L    Potassium 4.2 3.5 - 5.2 meq/L    Chloride 98 98 - 111 meq/L    CO2 25 23 - 33 meq/L    Glucose 111 (H) 70 - 108 mg/dL    BUN 38 (H) 7 - 22 mg/dL    CREATININE 1.3 (H) 0.4 - 1.2 mg/dL    Calcium 9.7 8.5 - 10.5 mg/dL   Brain Natriuretic Peptide   Result Value Ref Range    Pro-BNP 5511.0 (H) 0.0 - 900.0 pg/mL   CBC auto differential Surgery    Disposition: home    Condition: Stable      Time Spent: 40 minutes    Electronically signed by Toy Lezama MD on 7/6/2018 at 12:17 PM    Attending Physician

## 2018-07-06 NOTE — PLAN OF CARE
Problem: Discharge Planning:  Goal: Participates in care planning  Participates in care planning   Outcome: Ongoing    Goal: Discharged to appropriate level of care  Discharged to appropriate level of care   Outcome: Ongoing  Patient will return to home with family    Problem: Cardiac Output - Decreased:  Goal: Hemodynamic stability will improve  Hemodynamic stability will improve   Outcome: Met This Shift  Denies SOB. Vitals WDL. Lung sounds clear    Problem: Fluid Volume - Imbalance:  Goal: Absence of imbalanced fluid volume signs and symptoms  Absence of imbalanced fluid volume signs and symptoms   Outcome: Met This Shift  Lung sounds clear    Problem: Pain:  Goal: Pain level will decrease  Pain level will decrease    Outcome: Met This Shift  Denies pain    Problem: HEMODYNAMIC STATUS  Goal: Patient has stable vital signs and fluid balance  Outcome: Met This Shift      Problem: Falls - Risk of:  Goal: Will remain free from falls  Will remain free from falls   Outcome: Met This Shift  Patient utilizes call light. Bed alarm and nonskid socks on. Personal belongings in reach. Hourly rounding.       Problem: Pain:  Goal: Control of acute pain  Control of acute pain   Outcome: Completed Date Met: 07/05/18    Goal: Pain level will decrease  Pain level will decrease    Outcome: Met This Shift  Denies pain

## 2018-07-06 NOTE — PROGRESS NOTES
sore throat and tinnitus. Eyes: Negative for blurred vision, double vision, photophobia, pain, discharge and redness. Respiratory: Negative for cough, hemoptysis, sputum production, shortness of breath, wheezing and stridor. Cardiovascular: Negative for chest pain, palpitations, orthopnea, claudication, leg swelling and PND. Gastrointestinal: Negative for abdominal pain, blood in stool, constipation, diarrhea, heartburn, melena, nausea and vomiting. Genitourinary: Negative for dysuria, flank pain, frequency, hematuria and urgency. Musculoskeletal: Negative for back pain, joint pain, myalgias and neck pain. Skin: Negative for itching and rash. Neurological: Negative for dizziness, tingling, tremors, sensory change, speech change, focal weakness, seizures, loss of consciousness, weakness and headaches. Endo/Heme/Allergies: Negative for environmental allergies and polydipsia. Does not bruise/bleed easily. Psychiatric/Behavioral: Negative for depression, hallucinations, memory loss and substance abuse. The patient is not nervous/anxious and does not have insomnia. Exam:   General Appearance: alert ,conversing, in no acute distress  Cardiovascular: normal rate, regular rhythm, normal S1 and S2, no murmurs, rubs, clicks, or gallops  Pulmonary/Chest: clear to auscultation bilaterally- no wheezes, rales or rhonchi, normal air movement, no respiratory distress  Abdomen: soft, non-tender, non-distended, normal bowel sounds, no bruits. Extremities: no cyanosis, clubbing or edema. Pulses: Radial, DP, and PT pulses intact.   Skin: warm and dry, no rash or erythema  Head: normocephalic and atraumatic  Eyes: pupils equal, round, and reactive to light  Neck: supple and non-tender without mass, no thyromegaly, no JVD   Musculoskeletal: normal range of motion, no joint swelling, deformity or tenderness  Neurological: alert, oriented, normal speech, no focal findings or movement disorder noted    Assessment:     Patient Active Problem List   Diagnosis    Aortic stenosis    Osteoarthritis    Atypical chest pain    Bradycardia    Shortness of breath    Fatigue    Essential hypertension    Hyperlipemia    Anemia, normocytic normochromic    Leukocytosis    Aortic aneurysm (HCC)    Hypokalemia    Congestive heart failure (HCC)    S/P AVR    H/O prosthetic aortic valve replacement    Mitral and aortic valve regurgitation    Aortic prosthetic valve regurgitation         Healthcare Directive: No, patient does not have an advance directive for healthcare treatment       Plan: 7/6/18  1. Clinically stable  2. Spoke with Cardiology pt ok to be discharged. We will schedule a appointment with Dr. Sil Ramirez on Wednesday to re-evaluate due to the complexion of the case. 3. Pt fully agreeable with discharge and future plan of care. She understands the risks. All questions thoroughly answered. The plan of care was discussed in detail with Dr. Patsy Estrada.      Jordan Nielson PA-C

## 2018-07-08 ENCOUNTER — APPOINTMENT (OUTPATIENT)
Dept: GENERAL RADIOLOGY | Age: 63
DRG: 219 | End: 2018-07-08
Payer: MEDICARE

## 2018-07-08 ENCOUNTER — HOSPITAL ENCOUNTER (INPATIENT)
Age: 63
LOS: 11 days | Discharge: HOME | DRG: 219 | End: 2018-07-19
Attending: FAMILY MEDICINE | Admitting: THORACIC SURGERY (CARDIOTHORACIC VASCULAR SURGERY)
Payer: MEDICARE

## 2018-07-08 DIAGNOSIS — Z95.2 S/P AVR: ICD-10-CM

## 2018-07-08 DIAGNOSIS — R06.89 DYSPNEA AND RESPIRATORY ABNORMALITIES: ICD-10-CM

## 2018-07-08 DIAGNOSIS — I50.9 ACUTE ON CHRONIC CONGESTIVE HEART FAILURE, UNSPECIFIED CONGESTIVE HEART FAILURE TYPE: Primary | ICD-10-CM

## 2018-07-08 DIAGNOSIS — R06.00 DYSPNEA AND RESPIRATORY ABNORMALITIES: ICD-10-CM

## 2018-07-08 LAB
ALBUMIN SERPL-MCNC: 3.6 G/DL (ref 3.5–5.1)
ALP BLD-CCNC: 40 U/L (ref 38–126)
ALT SERPL-CCNC: 15 U/L (ref 11–66)
ANION GAP SERPL CALCULATED.3IONS-SCNC: 14 MEQ/L (ref 8–16)
AST SERPL-CCNC: 17 U/L (ref 5–40)
BASOPHILS # BLD: 0.4 %
BASOPHILS ABSOLUTE: 0 THOU/MM3 (ref 0–0.1)
BILIRUB SERPL-MCNC: 0.5 MG/DL (ref 0.3–1.2)
BLOOD CULTURE, ROUTINE: NORMAL
BLOOD CULTURE, ROUTINE: NORMAL
BUN BLDV-MCNC: 32 MG/DL (ref 7–22)
CALCIUM SERPL-MCNC: 9.3 MG/DL (ref 8.5–10.5)
CHLORIDE BLD-SCNC: 102 MEQ/L (ref 98–111)
CO2: 23 MEQ/L (ref 23–33)
CREAT SERPL-MCNC: 1.2 MG/DL (ref 0.4–1.2)
EOSINOPHIL # BLD: 1.5 %
EOSINOPHILS ABSOLUTE: 0.2 THOU/MM3 (ref 0–0.4)
ERYTHROCYTE [DISTWIDTH] IN BLOOD BY AUTOMATED COUNT: 14.9 % (ref 11.5–14.5)
ERYTHROCYTE [DISTWIDTH] IN BLOOD BY AUTOMATED COUNT: 49.4 FL (ref 35–45)
GFR SERPL CREATININE-BSD FRML MDRD: 45 ML/MIN/1.73M2
GLUCOSE BLD-MCNC: 112 MG/DL (ref 70–108)
HCT VFR BLD CALC: 27.8 % (ref 37–47)
HEMOGLOBIN: 9 GM/DL (ref 12–16)
IMMATURE GRANS (ABS): 0.06 THOU/MM3 (ref 0–0.07)
IMMATURE GRANULOCYTES: 0.5 %
LYMPHOCYTES # BLD: 31 %
LYMPHOCYTES ABSOLUTE: 3.5 THOU/MM3 (ref 1–4.8)
MCH RBC QN AUTO: 29.7 PG (ref 26–33)
MCHC RBC AUTO-ENTMCNC: 32.4 GM/DL (ref 32.2–35.5)
MCV RBC AUTO: 91.7 FL (ref 81–99)
MONOCYTES # BLD: 7.7 %
MONOCYTES ABSOLUTE: 0.9 THOU/MM3 (ref 0.4–1.3)
NUCLEATED RED BLOOD CELLS: 0 /100 WBC
OSMOLALITY CALCULATION: 285.2 MOSMOL/KG (ref 275–300)
PLATELET # BLD: 233 THOU/MM3 (ref 130–400)
PMV BLD AUTO: 11.5 FL (ref 9.4–12.4)
POTASSIUM SERPL-SCNC: 4.3 MEQ/L (ref 3.5–5.2)
PRO-BNP: 7251 PG/ML (ref 0–900)
RBC # BLD: 3.03 MILL/MM3 (ref 4.2–5.4)
SEG NEUTROPHILS: 58.9 %
SEGMENTED NEUTROPHILS ABSOLUTE COUNT: 6.7 THOU/MM3 (ref 1.8–7.7)
SODIUM BLD-SCNC: 139 MEQ/L (ref 135–145)
TOTAL PROTEIN: 6.9 G/DL (ref 6.1–8)
TROPONIN T: 0.09 NG/ML
WBC # BLD: 11.3 THOU/MM3 (ref 4.8–10.8)

## 2018-07-08 PROCEDURE — 99223 1ST HOSP IP/OBS HIGH 75: CPT | Performed by: INTERNAL MEDICINE

## 2018-07-08 PROCEDURE — 71046 X-RAY EXAM CHEST 2 VIEWS: CPT

## 2018-07-08 PROCEDURE — 85025 COMPLETE CBC W/AUTO DIFF WBC: CPT

## 2018-07-08 PROCEDURE — 80053 COMPREHEN METABOLIC PANEL: CPT

## 2018-07-08 PROCEDURE — 2140000000 HC CCU INTERMEDIATE R&B

## 2018-07-08 PROCEDURE — 96374 THER/PROPH/DIAG INJ IV PUSH: CPT

## 2018-07-08 PROCEDURE — 51702 INSERT TEMP BLADDER CATH: CPT

## 2018-07-08 PROCEDURE — 6360000002 HC RX W HCPCS: Performed by: FAMILY MEDICINE

## 2018-07-08 PROCEDURE — 84484 ASSAY OF TROPONIN QUANT: CPT

## 2018-07-08 PROCEDURE — 36415 COLL VENOUS BLD VENIPUNCTURE: CPT

## 2018-07-08 PROCEDURE — 93005 ELECTROCARDIOGRAM TRACING: CPT | Performed by: FAMILY MEDICINE

## 2018-07-08 PROCEDURE — 83880 ASSAY OF NATRIURETIC PEPTIDE: CPT

## 2018-07-08 PROCEDURE — 99285 EMERGENCY DEPT VISIT HI MDM: CPT

## 2018-07-08 RX ORDER — TRAZODONE HYDROCHLORIDE 50 MG/1
50 TABLET ORAL NIGHTLY
Status: DISCONTINUED | OUTPATIENT
Start: 2018-07-09 | End: 2018-07-09

## 2018-07-08 RX ORDER — CITALOPRAM 20 MG/1
20 TABLET ORAL DAILY
Status: DISCONTINUED | OUTPATIENT
Start: 2018-07-09 | End: 2018-07-13

## 2018-07-08 RX ORDER — ASPIRIN 81 MG/1
81 TABLET ORAL DAILY
Status: DISCONTINUED | OUTPATIENT
Start: 2018-07-09 | End: 2018-07-12

## 2018-07-08 RX ORDER — LISINOPRIL 5 MG/1
5 TABLET ORAL 2 TIMES DAILY
Status: DISCONTINUED | OUTPATIENT
Start: 2018-07-09 | End: 2018-07-12

## 2018-07-08 RX ORDER — PANTOPRAZOLE SODIUM 40 MG/1
40 TABLET, DELAYED RELEASE ORAL
Status: DISCONTINUED | OUTPATIENT
Start: 2018-07-09 | End: 2018-07-13

## 2018-07-08 RX ORDER — FUROSEMIDE 40 MG/1
40 TABLET ORAL DAILY
Status: CANCELLED | OUTPATIENT
Start: 2018-07-09

## 2018-07-08 RX ORDER — POTASSIUM CHLORIDE 750 MG/1
20 TABLET, FILM COATED, EXTENDED RELEASE ORAL DAILY
Status: DISCONTINUED | OUTPATIENT
Start: 2018-07-09 | End: 2018-07-13

## 2018-07-08 RX ORDER — FERROUS SULFATE 325(65) MG
325 TABLET ORAL
Status: DISCONTINUED | OUTPATIENT
Start: 2018-07-09 | End: 2018-07-09

## 2018-07-08 RX ORDER — ATORVASTATIN CALCIUM 10 MG/1
10 TABLET, FILM COATED ORAL NIGHTLY
Status: DISCONTINUED | OUTPATIENT
Start: 2018-07-09 | End: 2018-07-13

## 2018-07-08 RX ORDER — FUROSEMIDE 10 MG/ML
40 INJECTION INTRAMUSCULAR; INTRAVENOUS ONCE
Status: COMPLETED | OUTPATIENT
Start: 2018-07-08 | End: 2018-07-08

## 2018-07-08 RX ORDER — FUROSEMIDE 10 MG/ML
40 INJECTION INTRAMUSCULAR; INTRAVENOUS 2 TIMES DAILY
Status: DISCONTINUED | OUTPATIENT
Start: 2018-07-09 | End: 2018-07-10

## 2018-07-08 RX ADMIN — FUROSEMIDE 40 MG: 10 INJECTION, SOLUTION INTRAMUSCULAR; INTRAVENOUS at 22:03

## 2018-07-08 ASSESSMENT — ENCOUNTER SYMPTOMS
RHINORRHEA: 0
ABDOMINAL PAIN: 0
BACK PAIN: 0
VOMITING: 0
DIARRHEA: 0
SHORTNESS OF BREATH: 1
EYE DISCHARGE: 0
NAUSEA: 0
EYE PAIN: 0
WHEEZING: 0
COUGH: 0
SORE THROAT: 0

## 2018-07-09 PROBLEM — R06.89 DYSPNEA AND RESPIRATORY ABNORMALITIES: Status: ACTIVE | Noted: 2018-03-21

## 2018-07-09 PROBLEM — R06.00 DYSPNEA AND RESPIRATORY ABNORMALITIES: Status: ACTIVE | Noted: 2018-03-21

## 2018-07-09 LAB
ALBUMIN SERPL-MCNC: 3.7 G/DL (ref 3.5–5.1)
ANION GAP SERPL CALCULATED.3IONS-SCNC: 12 MEQ/L (ref 8–16)
BUN BLDV-MCNC: 30 MG/DL (ref 7–22)
CALCIUM SERPL-MCNC: 9.2 MG/DL (ref 8.5–10.5)
CHLORIDE BLD-SCNC: 104 MEQ/L (ref 98–111)
CO2: 24 MEQ/L (ref 23–33)
CREAT SERPL-MCNC: 1.2 MG/DL (ref 0.4–1.2)
EKG ATRIAL RATE: 61 BPM
EKG P AXIS: -2 DEGREES
EKG P-R INTERVAL: 186 MS
EKG Q-T INTERVAL: 474 MS
EKG QRS DURATION: 88 MS
EKG QTC CALCULATION (BAZETT): 477 MS
EKG R AXIS: 6 DEGREES
EKG T AXIS: 82 DEGREES
EKG VENTRICULAR RATE: 61 BPM
ERYTHROCYTE [DISTWIDTH] IN BLOOD BY AUTOMATED COUNT: 14.6 % (ref 11.5–14.5)
ERYTHROCYTE [DISTWIDTH] IN BLOOD BY AUTOMATED COUNT: 14.7 % (ref 11.5–14.5)
ERYTHROCYTE [DISTWIDTH] IN BLOOD BY AUTOMATED COUNT: 47.7 FL (ref 35–45)
ERYTHROCYTE [DISTWIDTH] IN BLOOD BY AUTOMATED COUNT: 47.9 FL (ref 35–45)
FOLATE: 8.3 NG/ML (ref 4.8–24.2)
GFR SERPL CREATININE-BSD FRML MDRD: 45 ML/MIN/1.73M2
GLUCOSE BLD-MCNC: 102 MG/DL (ref 70–108)
HCT VFR BLD CALC: 27.2 % (ref 37–47)
HCT VFR BLD CALC: 28.7 % (ref 37–47)
HEMOGLOBIN: 8.7 GM/DL (ref 12–16)
HEMOGLOBIN: 9.3 GM/DL (ref 12–16)
IRON: 42 UG/DL (ref 50–170)
MCH RBC QN AUTO: 29.2 PG (ref 26–33)
MCH RBC QN AUTO: 29.3 PG (ref 26–33)
MCHC RBC AUTO-ENTMCNC: 32 GM/DL (ref 32.2–35.5)
MCHC RBC AUTO-ENTMCNC: 32.4 GM/DL (ref 32.2–35.5)
MCV RBC AUTO: 90.3 FL (ref 81–99)
MCV RBC AUTO: 91.6 FL (ref 81–99)
PHOSPHORUS: 4.6 MG/DL (ref 2.4–4.7)
PLATELET # BLD: 218 THOU/MM3 (ref 130–400)
PLATELET # BLD: 249 THOU/MM3 (ref 130–400)
PMV BLD AUTO: 11.2 FL (ref 9.4–12.4)
PMV BLD AUTO: 11.8 FL (ref 9.4–12.4)
POTASSIUM SERPL-SCNC: 4.5 MEQ/L (ref 3.5–5.2)
RBC # BLD: 2.97 MILL/MM3 (ref 4.2–5.4)
RBC # BLD: 3.18 MILL/MM3 (ref 4.2–5.4)
SODIUM BLD-SCNC: 140 MEQ/L (ref 135–145)
TROPONIN T: 0.09 NG/ML
VITAMIN B-12: 256 PG/ML (ref 211–911)
WBC # BLD: 11.8 THOU/MM3 (ref 4.8–10.8)
WBC # BLD: 13 THOU/MM3 (ref 4.8–10.8)

## 2018-07-09 PROCEDURE — 6370000000 HC RX 637 (ALT 250 FOR IP): Performed by: INTERNAL MEDICINE

## 2018-07-09 PROCEDURE — 82746 ASSAY OF FOLIC ACID SERUM: CPT

## 2018-07-09 PROCEDURE — 80069 RENAL FUNCTION PANEL: CPT

## 2018-07-09 PROCEDURE — 99233 SBSQ HOSP IP/OBS HIGH 50: CPT | Performed by: THORACIC SURGERY (CARDIOTHORACIC VASCULAR SURGERY)

## 2018-07-09 PROCEDURE — 85027 COMPLETE CBC AUTOMATED: CPT

## 2018-07-09 PROCEDURE — G8980 MOBILITY D/C STATUS: HCPCS

## 2018-07-09 PROCEDURE — 84484 ASSAY OF TROPONIN QUANT: CPT

## 2018-07-09 PROCEDURE — 97110 THERAPEUTIC EXERCISES: CPT

## 2018-07-09 PROCEDURE — 36415 COLL VENOUS BLD VENIPUNCTURE: CPT

## 2018-07-09 PROCEDURE — 82607 VITAMIN B-12: CPT

## 2018-07-09 PROCEDURE — 93010 ELECTROCARDIOGRAM REPORT: CPT | Performed by: NUCLEAR MEDICINE

## 2018-07-09 PROCEDURE — 2140000000 HC CCU INTERMEDIATE R&B

## 2018-07-09 PROCEDURE — 97161 PT EVAL LOW COMPLEX 20 MIN: CPT

## 2018-07-09 PROCEDURE — G8979 MOBILITY GOAL STATUS: HCPCS

## 2018-07-09 PROCEDURE — 93005 ELECTROCARDIOGRAM TRACING: CPT | Performed by: INTERNAL MEDICINE

## 2018-07-09 PROCEDURE — 99233 SBSQ HOSP IP/OBS HIGH 50: CPT | Performed by: HOSPITALIST

## 2018-07-09 PROCEDURE — G8978 MOBILITY CURRENT STATUS: HCPCS

## 2018-07-09 PROCEDURE — 6360000002 HC RX W HCPCS: Performed by: INTERNAL MEDICINE

## 2018-07-09 PROCEDURE — 83540 ASSAY OF IRON: CPT

## 2018-07-09 RX ORDER — FERROUS SULFATE 325(65) MG
325 TABLET ORAL 2 TIMES DAILY WITH MEALS
Status: DISCONTINUED | OUTPATIENT
Start: 2018-07-09 | End: 2018-07-13

## 2018-07-09 RX ORDER — ACETAMINOPHEN 325 MG/1
650 TABLET ORAL EVERY 6 HOURS PRN
Status: DISCONTINUED | OUTPATIENT
Start: 2018-07-09 | End: 2018-07-10

## 2018-07-09 RX ORDER — TRAZODONE HYDROCHLORIDE 50 MG/1
50 TABLET ORAL NIGHTLY PRN
Status: DISCONTINUED | OUTPATIENT
Start: 2018-07-09 | End: 2018-07-13

## 2018-07-09 RX ADMIN — FERROUS SULFATE TAB 325 MG (65 MG ELEMENTAL FE) 325 MG: 325 (65 FE) TAB at 17:53

## 2018-07-09 RX ADMIN — FUROSEMIDE 40 MG: 10 INJECTION, SOLUTION INTRAMUSCULAR; INTRAVENOUS at 10:49

## 2018-07-09 RX ADMIN — ATORVASTATIN CALCIUM 10 MG: 10 TABLET, FILM COATED ORAL at 21:01

## 2018-07-09 RX ADMIN — LISINOPRIL 5 MG: 5 TABLET ORAL at 21:01

## 2018-07-09 RX ADMIN — ENOXAPARIN SODIUM 40 MG: 40 INJECTION SUBCUTANEOUS at 10:50

## 2018-07-09 RX ADMIN — METOPROLOL TARTRATE 12.5 MG: 25 TABLET ORAL at 21:01

## 2018-07-09 RX ADMIN — PANTOPRAZOLE SODIUM 40 MG: 40 TABLET, DELAYED RELEASE ORAL at 10:47

## 2018-07-09 RX ADMIN — ASPIRIN 81 MG: 81 TABLET ORAL at 10:49

## 2018-07-09 RX ADMIN — TRAZODONE HYDROCHLORIDE 50 MG: 50 TABLET ORAL at 21:01

## 2018-07-09 RX ADMIN — METOPROLOL TARTRATE 12.5 MG: 25 TABLET ORAL at 10:48

## 2018-07-09 RX ADMIN — FERROUS SULFATE TAB 325 MG (65 MG ELEMENTAL FE) 325 MG: 325 (65 FE) TAB at 10:48

## 2018-07-09 RX ADMIN — POTASSIUM CHLORIDE 20 MEQ: 750 TABLET, FILM COATED, EXTENDED RELEASE ORAL at 10:47

## 2018-07-09 RX ADMIN — FUROSEMIDE 40 MG: 10 INJECTION, SOLUTION INTRAMUSCULAR; INTRAVENOUS at 17:56

## 2018-07-09 RX ADMIN — LISINOPRIL 5 MG: 5 TABLET ORAL at 10:48

## 2018-07-09 RX ADMIN — CITALOPRAM 20 MG: 20 TABLET, FILM COATED ORAL at 21:01

## 2018-07-09 ASSESSMENT — PAIN SCALES - GENERAL
PAINLEVEL_OUTOF10: 0
PAINLEVEL_OUTOF10: 0

## 2018-07-09 NOTE — PROGRESS NOTES
Hospitalist Progress Note    Patient:  [facility]      Unit/Bed:3B-32/032-A    YOB: 1955    MRN: [id number]       Acct: [de-identified]     PCP: JUAN Iverson CNP    Date of Admission: 7/8/2018    Chief Complaint:  Dyspnea and 9Ib weigh gain x 2 days    Hospital Course:     60year old woman with history of SAH s/p craniotomy(3/2015), and bioprosthetic AVR (6/2015). Since 6/24/18, she has had progressive exertional dyspnea, orthopnea and chest pain. 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 She was hospitalized 7/2-7/4 and was adequately diuresed. She was seen by CT surgery who recommended surgery at a latter date. She was discharged home on 7/6, but returned to the ER with dyspnea, weight gain and leg swelling. Subjective: She feels better      Medications:  Reviewed    Infusion Medications   Scheduled Medications    enoxaparin  40 mg Subcutaneous Daily    ferrous sulfate  325 mg Oral BID WC    aspirin  81 mg Oral Daily    atorvastatin  10 mg Oral Nightly    citalopram  20 mg Oral Daily    lisinopril  5 mg Oral BID    metoprolol tartrate  12.5 mg Oral BID    pantoprazole  40 mg Oral QAM AC    potassium chloride  20 mEq Oral Daily    furosemide  40 mg Intravenous BID     PRN Meds: acetaminophen, traZODone      Intake/Output Summary (Last 24 hours) at 07/09/18 1318  Last data filed at 07/09/18 0420   Gross per 24 hour   Intake                0 ml   Output              975 ml   Net             -975 ml       Diet:  DIET CARDIAC; No Added Salt (3-4 GM)    Exam:  BP (!) 109/46   Pulse 58   Temp 97.6 °F (36.4 °C) (Axillary)   Resp 16   Ht 5' 2\" (1.575 m)   Wt 190 lb 14.4 oz (86.6 kg)   SpO2 94%   BMI 34.92 kg/m²     General appearance: No apparent distress, appears stated age and cooperative. HEENT: Pupils equal, round, and reactive to light. Conjunctivae/corneas clear.   Neck: Supple, with

## 2018-07-09 NOTE — PROGRESS NOTES
Kae Gloria 59  OCCUPATIONAL THERAPY MISSED TREATMENT NOTE  [facility] CCU-STEPDOWN 3B  3B-32/032-A      Date: 2018  Patient Name: Narda Londono        CSN: [id number]   : 1955  (60 y.o.)  Gender: female                REASON FOR MISSED TREATMENT:  Missed Treat. Per physical therapy pt is getting up ad yessi in room and is indep with ADLs and mobility at this time, no acute OT indicated, will discontinue OT orders at this time, please reorder if new needs arise.

## 2018-07-09 NOTE — ED PROVIDER NOTES
that her father is . family history includes Diabetes in her father and mother; Heart Attack in her mother; Heart Disease in her mother; Heart Surgery in her mother; High Blood Pressure in her mother; Hypertension in her mother. SOCIAL HISTORY      reports that she quit smoking about 4 weeks ago. Her smoking use included Cigarettes. She has a 22.00 pack-year smoking history. She has quit using smokeless tobacco. She reports that she does not drink alcohol or use drugs. PHYSICAL EXAM     INITIAL VITALS:  height is 5' 2\" (1.575 m) and weight is 190 lb 11.2 oz (86.5 kg). Her oral temperature is 98.1 °F (36.7 °C). Her blood pressure is 138/56 (abnormal) and her pulse is 78. Her respiration is 18 and oxygen saturation is 96%. Physical Exam   Constitutional: She is oriented to person, place, and time. She appears well-developed and well-nourished. Non-toxic appearance. HENT:   Head: Normocephalic and atraumatic. Right Ear: Tympanic membrane and external ear normal.   Left Ear: Tympanic membrane and external ear normal.   Nose: Nose normal.   Mouth/Throat: Oropharynx is clear and moist and mucous membranes are normal. No oropharyngeal exudate, posterior oropharyngeal edema or posterior oropharyngeal erythema. Eyes: Conjunctivae and EOM are normal.   Neck: Normal range of motion. Neck supple. No JVD present. Cardiovascular: Normal rate, regular rhythm, normal heart sounds, intact distal pulses and normal pulses. Exam reveals no gallop and no friction rub. No murmur heard. Pulmonary/Chest: Effort normal. No respiratory distress. She has no decreased breath sounds. She has no wheezes. She has rhonchi. She has no rales. Abdominal: Soft. Bowel sounds are normal. She exhibits no distension. There is no tenderness. There is no rebound, no guarding and no CVA tenderness. Musculoskeletal: Normal range of motion. She exhibits no edema.    Neurological: She is alert and oriented to person, place, Final Result   1. Medical devices as described above. Interval placement of a prosthetic heart valve. 2. Interval development of pulmonary vascular congestion. **This report has been created using voice recognition software. It may contain minor errors which are inherent in voice recognition technology. **      Final report electronically signed by Dr Anh Sanchez on 7/13/2018 4:17 PM      CTA CHEST W WO CONTRAST   Final Result      No acute pulmonary embolism. Hazy groundglass opacities in both lungs likely representing pulmonary edema. No consolidation. Tiny right pleural effusion. Moderate cardiomegaly. Status post aortic valve replacement. **This report has been created using voice recognition software. It may contain minor errors which are inherent in voice recognition technology. **      Final report electronically signed by Dr. Ac Kamara on 7/11/2018 1:19 AM      XR CHEST STANDARD (2 VW)   Final Result   Mild fullness prominence of the of the interstitial markings suggesting mild interstitial edema in the correct clinical setting. Infection cannot be entirely excluded. . Correlation with symptoms advised. **This report has been created using voice recognition software. It may contain minor errors which are inherent in voice recognition technology. **      Final report electronically signed by Dr. Lashay Gerard on 7/8/2018 10:14 PM         No change from XR CHEST (2 VW) with report dated 7/2/2018 1:47 PM.      **This report has been created using voice recognition software. It may contain minor errors which are inherent in voice recognition technology. **      XR CHEST STANDARD (2 VW)    (Results Pending)       LABS:   Labs Reviewed   CBC WITH AUTO DIFFERENTIAL - Abnormal; Notable for the following:        Result Value    WBC 11.3 (*)     RBC 3.03 (*)     Hemoglobin 9.0 (*)     Hematocrit 27.8 (*)     RDW-CV 14.9 (*)     RDW-SD 49.4 (*)     All other components within normal limits   COMPREHENSIVE METABOLIC PANEL - Abnormal; Notable for the following:     Glucose 112 (*)     BUN 32 (*)     All other components within normal limits   BRAIN NATRIURETIC PEPTIDE - Abnormal; Notable for the following:     Pro-BNP 7251.0 (*)     All other components within normal limits   TROPONIN - Abnormal; Notable for the following:     Troponin T 0.090 (*)     All other components within normal limits   GLOMERULAR FILTRATION RATE, ESTIMATED - Abnormal; Notable for the following:     Est, Glom Filt Rate 45 (*)     All other components within normal limits   IRON - Abnormal; Notable for the following:     Iron 42 (*)     All other components within normal limits   CBC - Abnormal; Notable for the following:     WBC 11.8 (*)     RBC 2.97 (*)     Hemoglobin 8.7 (*)     Hematocrit 27.2 (*)     MCHC 32.0 (*)     RDW-CV 14.7 (*)     RDW-SD 47.9 (*)     All other components within normal limits   RENAL FUNCTION PANEL - Abnormal; Notable for the following:     BUN 30 (*)     All other components within normal limits   TROPONIN - Abnormal; Notable for the following:     Troponin T 0.092 (*)     All other components within normal limits   GLOMERULAR FILTRATION RATE, ESTIMATED - Abnormal; Notable for the following:     Est, Glom Filt Rate 45 (*)     All other components within normal limits   CBC - Abnormal; Notable for the following:     WBC 13.0 (*)     RBC 3.18 (*)     Hemoglobin 9.3 (*)     Hematocrit 28.7 (*)     RDW-CV 14.6 (*)     RDW-SD 47.7 (*)     All other components within normal limits   BASIC METABOLIC PANEL - Abnormal; Notable for the following:     Glucose 113 (*)     BUN 34 (*)     CREATININE 1.3 (*)     All other components within normal limits   GLOMERULAR FILTRATION RATE, ESTIMATED - Abnormal; Notable for the following:     Est, Glom Filt Rate 41 (*)     All other components within normal limits   BASIC METABOLIC PANEL - Abnormal; Notable for the following:     CO2 22 (*) Calcium 8.0 (*)     All other components within normal limits   CBC - Abnormal; Notable for the following:     WBC 18.8 (*)     RBC 3.50 (*)     Hemoglobin 10.6 (*)     Hematocrit 30.8 (*)     RDW-CV 15.5 (*)     RDW-SD 48.5 (*)     Platelets 96 (*)     All other components within normal limits   MAGNESIUM - Abnormal; Notable for the following:     Magnesium 3.0 (*)     All other components within normal limits   BLOOD GAS, ARTERIAL - Abnormal; Notable for the following:     pH, Blood Gas 7.29 (*)     PO2 174 (*)     HCO3 22 (*)     Base Excess (Calculated) -5.0 (*)     All other components within normal limits   GLUCOSE, WHOLE BLOOD - Abnormal; Notable for the following:     Glucose, Whole Blood 173 (*)     All other components within normal limits   GLUCOSE, WHOLE BLOOD - Abnormal; Notable for the following:     Glucose, Whole Blood 242 (*)     All other components within normal limits   BLOOD GAS, ARTERIAL - Abnormal; Notable for the following:     PCO2 47 (*)     All other components within normal limits   GLUCOSE, WHOLE BLOOD - Abnormal; Notable for the following:     Glucose, Whole Blood 255 (*)     All other components within normal limits   GLUCOSE, WHOLE BLOOD - Abnormal; Notable for the following:     Glucose, Whole Blood 241 (*)     All other components within normal limits   GLUCOSE, WHOLE BLOOD - Abnormal; Notable for the following:     Glucose, Whole Blood 228 (*)     All other components within normal limits   GLUCOSE, WHOLE BLOOD - Abnormal; Notable for the following:     Glucose, Whole Blood 220 (*)     All other components within normal limits   GLUCOSE, WHOLE BLOOD - Abnormal; Notable for the following:     Glucose, Whole Blood 201 (*)     All other components within normal limits   GLUCOSE, WHOLE BLOOD - Abnormal; Notable for the following:     Glucose, Whole Blood 203 (*)     All other components within normal limits   GLUCOSE, WHOLE BLOOD - Abnormal; Notable for the following:     Glucose, Whole Blood 159 (*)     All other components within normal limits   BLOOD GAS, ARTERIAL - Abnormal; Notable for the following:     PO2 70 (*)     All other components within normal limits   GLUCOSE, WHOLE BLOOD - Abnormal; Notable for the following:     Glucose, Whole Blood 124 (*)     All other components within normal limits   GLOMERULAR FILTRATION RATE, ESTIMATED - Abnormal; Notable for the following:     Est, Glom Filt Rate 35 (*)     All other components within normal limits   GLUCOSE, WHOLE BLOOD - Abnormal; Notable for the following:     Glucose, Whole Blood 126 (*)     All other components within normal limits   BASIC METABOLIC PANEL - Abnormal; Notable for the following:     Glucose 168 (*)     BUN 37 (*)     CREATININE 1.8 (*)     Calcium 7.8 (*)     All other components within normal limits   CBC - Abnormal; Notable for the following:     WBC 28.7 (*)     RBC 3.17 (*)     Hemoglobin 9.5 (*)     Hematocrit 29.8 (*)     MCHC 31.9 (*)     RDW-CV 16.0 (*)     RDW-SD 55.2 (*)     Platelets 76 (*)     All other components within normal limits   MAGNESIUM - Abnormal; Notable for the following:     Magnesium 2.9 (*)     All other components within normal limits   GLOMERULAR FILTRATION RATE, ESTIMATED - Abnormal; Notable for the following:     Est, Glom Filt Rate 28 (*)     All other components within normal limits   MICROSCOPIC URINALYSIS - Abnormal; Notable for the following:     Bilirubin Urine MODERATE (*)     Ketones, Urine TRACE (*)     Blood, Urine LARGE (*)     Protein,  (*)     Nitrite, Urine POSITIVE (*)     Leukocytes, UA SMALL (*)     Color, UA RED (*)     Character, Urine TURBID (*)     All other components within normal limits   MAGNESIUM - Abnormal; Notable for the following:     Magnesium 3.0 (*)     All other components within normal limits   CBC - Abnormal; Notable for the following:     WBC 22.6 (*)     RBC 3.17 (*)     Hemoglobin 9.5 (*)     Hematocrit 30.4 (*)     MCHC 31.3 (*) components within normal limits   GLOMERULAR FILTRATION RATE, ESTIMATED - Abnormal; Notable for the following:     Est, Glom Filt Rate 72 (*)     All other components within normal limits   POCT GLUCOSE - Abnormal; Notable for the following:     POC Glucose 147 (*)     All other components within normal limits   POCT GLUCOSE - Abnormal; Notable for the following:     POC Glucose 127 (*)     All other components within normal limits   POCT GLUCOSE - Abnormal; Notable for the following:     POC Glucose 140 (*)     All other components within normal limits   POCT GLUCOSE - Abnormal; Notable for the following:     POC Glucose 132 (*)     All other components within normal limits   POCT GLUCOSE - Abnormal; Notable for the following:     POC Glucose 130 (*)     All other components within normal limits   POCT GLUCOSE - Abnormal; Notable for the following:     POC Glucose 159 (*)     All other components within normal limits   POCT GLUCOSE - Abnormal; Notable for the following:     POC Glucose 158 (*)     All other components within normal limits   POCT GLUCOSE - Abnormal; Notable for the following:     POC Glucose 174 (*)     All other components within normal limits   POCT GLUCOSE - Abnormal; Notable for the following:     POC Glucose 173 (*)     All other components within normal limits   POCT HEMOGLOBIN - Abnormal; Notable for the following:     Hemoglobin 5.4 (*)     All other components within normal limits   POCT HEMOGLOBIN - Abnormal; Notable for the following:     Hemoglobin 6.1 (*)     All other components within normal limits   POCT HEMOGLOBIN - Abnormal; Notable for the following:     Hemoglobin 6.1 (*)     All other components within normal limits   POCT HEMOGLOBIN - Abnormal; Notable for the following:     Hemoglobin 6.1 (*)     All other components within normal limits   POCT HEMOGLOBIN - Abnormal; Notable for the following:     Hemoglobin 6.3 (*)     All other components within normal limits   POCT GLUCOSE Disp-120 tablet, R-3Normal      aspirin 325 MG EC tablet Take 1 tablet by mouth daily, Disp-30 tablet, R-3Normal             (Please note that portions of this note were completed with a voice recognition program.  Efforts were made to edit the dictations but occasionally words are mis-transcribed.)    Scribe:  Nyasia Garrido 7/8/18 9:38 PM Scribing for and in the presence of Philip Malhotra MD.    Signed by: Gabbi Pisano, 07/25/18 8:45 PM    Provider:  I personally performed the services described in the documentation, reviewed and edited the documentation which was dictated to the scribe in my presence, and it accurately records my words and actions.     Philip Malhotra MD 7/8/18 8:45 PM                            Philip Malhotra MD  07/09/18 5987       Philip Malhotra MD  07/25/18 3422

## 2018-07-09 NOTE — PLAN OF CARE
Problem: Nutrition  Goal: Optimal nutrition therapy  Outcome: Ongoing  Nutrition Problem: Increased nutrient needs  Intervention: Food and/or Nutrient Delivery: Continue current diet  Nutritional Goals: Patient will consume 75% or more of meals during LOS.

## 2018-07-09 NOTE — PROGRESS NOTES
[address] [place] [address]  INPATIENT PHYSICAL THERAPY  EVALUATION  [facility]/032-A    Time In: 6039  Time Out: 1694  Timed Code Treatment Minutes: 8 Minutes  Minutes: 24          Date: 2018  Patient Name: Renee Schmid,  Gender:  female        MRN: [id number]  : 1955  (60 y.o.)      Referring Practitioner: Shereen Silva DO  Diagnosis: SOB  Additional Pertinent Hx: 60 y.o. female who presented to [address] [place] [address] with complaints of acute shortness of breath increasing throughout the day. Patient has recent history of decompensated congestive heart failure. Patient was scheduled to have CABG in morning. She reports her procedure was cancelled because her heart is \"in oo bad of shape\". She notes that she has had a 9 pound weight gain in the past 48 hours, since being discharged. She complains of swelling and distension to abdomen. She denies CP. She denies N/V/D/C. PMH CAD, hypertension, hyperlipidemia. Past surgical history cardiac cath, brain surgery, colonoscopy, AH, WLIBERTO and aortic valve replacement. She is a smoker, quit 2-4 weeks ago. She denies alcohol or illicit drug use. While in ED patient hemodynamically stable. Patient was noted to have elevated troponin 0.090 and ProBNP. CBC noted mild leukocytosis 11.3, likely reactive. Hgb 9.0. BMP essentially negative with noted GFR of 45. Patient symptomatic with complaints of shortness of breath. Patient to be admitted for decompensated congestive heart failure.      Past Medical History:   Diagnosis Date    Acute decompensated heart failure ([address] 2018    Aortic valve replaced     Arthritis     Hyperlipidemia     Hypertension     THAYER (nonalcoholic steatohepatitis)     Smoker     Unspecified cerebral artery occlusion with cerebral infarction 3/23/15    \"bleeding on brain\"     Past Surgical History:   Procedure Laterality Date    AORTIC VALVE REPLACEMENT      cow valve    BRAIN SURGERY      to drain blood limited by endurance    Treatment Initiated: See above exercises. Assessment: Body structures, Functions, Activity limitations: Decreased endurance  Assessment: Pt tolerates session well, amb in hallway without AD without difficulty. Pt education to amb several times/day in hallway with staff assistance. No further skilled PT services warranted at this time. Prognosis: Excellent    Clinical Presentation: Low - Stable and Uncomplicated: Pt tolerates session well, amb in hallway without AD without difficulty. Pt education to amb several times/day in hallway with staff assistance. No further skilled PT services warranted at this time. Decision Making: High Complexitybased on patient assessment and decision making process of determining plan of care and establishing reasonable expectations for measurable functional outcomes    REQUIRES PT FOLLOW UP: No  Discharge Recommendations: Home with assist PRN    Patient Education:  Patient Education: POC, education to amb in hallway several times/day with staff assistance    Equipment Recommendations:  Equipment Needed: No    Safety:  Type of devices: Call light within reach, Left in bed (Left sitting EOB)  Restraints  Initially in place: No    Plan:  Times per week: NA    Goals:  Patient goals : To have heart surgery and feel better. Evaluation Complexity: Based on the findings of patient history, examination, clinical presentation, and decision making during this evaluation, the evaluation of Andrea Cowart  is of low complexity. PT G-Codes  Functional Limitation: Mobility: Walking and moving around  Mobility: Walking and Moving Around Current Status (): At least 1 percent but less than 20 percent impaired, limited or restricted  Mobility: Walking and Moving Around Goal Status (): At least 1 percent but less than 20 percent impaired, limited or restricted  Mobility: Walking and Moving Around Discharge Status ():  At least 1 percent but less than 20 percent impaired, limited or restricted       AM-PAC Inpatient Mobility without Stair Climbing Raw Score : 19  AM-PAC Inpatient without Stair Climbing T-Scale Score : 56.04  Mobility Inpatient CMS 0-100% Score: 12.25  Mobility Inpatient without Stair CMS G-Code Modifier : CI

## 2018-07-09 NOTE — PROGRESS NOTES
Nutrition Assessment    Type and Reason for Visit: Initial, Consult, Patient Education (educate on CHF diet)    Nutrition Recommendations: Consider adding 2 gram sodium to diet order. Malnutrition Assessment:  · Malnutrition Status: No malnutrition    Nutrition Diagnosis:   · Problem: Increased nutrient needs  · Etiology: related to Increased demand for energy/nutrients due to     Signs and symptoms:  as evidenced by  (upcoming open heart surgery)    Nutrition Assessment:  · Subjective Assessment: Patient admit with shortness of breath, was scheduled for OHS for today which was cancelled and note plan now for AVR for friday 7/13. Patient last seen by dieitian 7-3-18 for heart failure diet education (1500mg Na and 2 liter fluid restriction) and patient mentioned she doesn't use salt except on watermelon. Patient seen- reports fairly good appetite, recalls diet education from last week, denies having questions, states Adela Figueroa has also been reinforcing low sodium diet. On lasix.       · Wound Type: None  · Current Nutrition Therapies:  · Oral Diet Orders: Cardiac, No Added Salt (3-4gm)   · Oral Diet intake: Unable to assess (not recorded, reports good appetite)  · Anthropometric Measures:  · Ht: 5' 2\" (157.5 cm)   · Current Body Wt: 190 lb 14.4 oz (86.6 kg) (no edema, on diuretic)  · Admission Body Wt: 194 lb 3.2 oz (88.1 kg) (7/8/18, no edema)  · Usual Body Wt:  (per EHR: 3/21/18/ 184# 6.4oz)  · Ideal Body Wt: 110 lb (49.9 kg),  · Adjusted Body Wt: 130 lb (59 kg), body weight adjusted for Obesity  · BMI Classification: BMI 35.0 - 39.9 Obese Class II  · Comparative Standards (Estimated Nutrition Needs):  · Estimated Daily Total Kcal: 5096-9791 kcals (30-35 kcal/kgm adjusted weight 50 kgm)  · Estimated Daily Protein (g): 60-75 grams protein (1.2-1.5 grams protein/kgm adjusted weight 50 kgm)    Estimated Intake vs Estimated Needs: Insufficient Data    Nutrition Risk Level: High    Nutrition Interventions:

## 2018-07-09 NOTE — PLAN OF CARE
Heart failure Core measures:  EF:50%  BB-lopressor   ACE/ARB-prinivil  Referral to clinics? Zone management tool for CHF Diagnosis given to the patient as an education reference. Patient verbalizes understanding that the care team will be referencing this tool throughout their hospital stay and again on discharge. Nurse Lucille Patrick notified of the reference tool being received by the patient.

## 2018-07-09 NOTE — ED TRIAGE NOTES
Pt reports to ED with CC of SOB. Pt states she was recently admitted and was supposed to have open heart surgery tomorrow but the cancelled it. Pt reports she has had SOB and bloating all day today.  VS, EKG, and assessment completed on arrival.

## 2018-07-09 NOTE — PROGRESS NOTES
7383:  Received a call from Dr. Ryan Edwards regarding the consult for cardiology and he stated we needed the consult for Dr. Kenzie Clement. 0840:  Per Dr. Francisco Branham I have consulted Dr. Lauren Styles as the patient was scheduled for open heart with him this a.m.  The consult went to his office and I spoke with the office staff who stated to go ahead and put this on his list.

## 2018-07-09 NOTE — H&P
History & Physical        Patient:  Laura Valencia  YOB: 1955    MRN: [id number]     Acct: [de-identified]    PCP: JUAN Sosa CNP    Date of Admission: 7/8/2018    Date of Service: Pt seen/examined on 7/9/2018   and Admitted to Inpatient with expected LOS greater than two midnights due to medical therapy. Chief Complaint:   Chief Complaint   Patient presents with    Shortness of Breath       History Of Present Illness:     60 y.o. female who presented to [facility] with complaints of acute shortness of breath increasing throughout the day. Patient has recent history of decompensated congestive heart failure. Patient was scheduled to have CABG in morning. She reports her procedure was cancelled because her heart is \"in oo bad of shape\". She notes that she has had a 9 pound weight gain in the past 48 hours, since being discharged. She complains of swelling and distension to abdomen. She denies CP. She denies N/V/D/C. PMH CAD, hypertension, hyperlipidemia. Past surgical history cardiac cath, brain surgery, colonoscopy, AH, WILBRETO and aortic valve replacement. She is a smoker, quit 2-4 weeks ago. She denies alcohol or illicit drug use. While in ED patient hemodynamically stable. Patient was noted to have elevated troponin 0.090 and ProBNP. CBC noted mild leukocytosis 11.3, likely reactive. Hgb 9.0. BMP essentially negative with noted GFR of 45. Patient symptomatic with complaints of shortness of breath. Patient to be admitted for decompensated congestive heart failure.     Past Medical History:          Diagnosis Date    Acute decompensated heart failure ([address] 7/2/2018    Aortic valve replaced 2015    Arthritis     Hyperlipidemia     Hypertension     THAYER (nonalcoholic steatohepatitis)     Smoker     Unspecified cerebral artery occlusion with cerebral infarction 3/23/15    \"bleeding on brain\"       Past Surgical History:          Procedure Laterality Date  AORTIC VALVE REPLACEMENT      cow valve    BRAIN SURGERY      to drain blood    [facility]  2004 or [facility]    COLONOSCOPY      DIAGNOSTIC CARDIAC CATH LAB PROCEDURE      HYSTERECTOMY      [place] ECHO TRANSESOPHAG R-T 2D IMG ACQUISJ I&R ONLY Left 7/3/2018    TRANSESOPHAGEAL ECHOCARDIOGRAM performed [facility] Reyes [facility]    VASCULAR SURGERY         Medications Prior to Admission:      Prior to Admission medications    Medication Sig Start Date End Date Taking? Authorizing Provider   lisinopril (PRINIVIL;ZESTRIL) 5 MG tablet Take 1 tablet by mouth 2 times daily 7/6/18  Yes Richie Bellamy MD   furosemide (LASIX) 20 MG tablet Take 2 tablets by mouth daily 7/6/18  Yes Richie Bellamy MD   citalopram (CELEXA) 20 MG tablet Take 20 mg by mouth daily   Yes Historical Provider, MD   metoprolol tartrate (LOPRESSOR) 25 MG tablet Take 12.5 mg by mouth 2 times daily   Yes Historical Provider, MD   potassium chloride (KLOR-CON) 10 MEQ extended release tablet Take 20 mEq by mouth daily 6/15/18  Yes Historical Provider, MD   traZODone (DESYREL) 50 MG tablet Take 50 mg by mouth nightly as needed 6/12/18  Yes Historical Provider, MD   omeprazole (PRILOSEC) 20 MG delayed release capsule Take 20 mg by mouth daily   Yes Historical Provider, MD   ferrous sulfate 325 (65 Fe) MG tablet Take 1 tablet by mouth 3 times daily (with meals) 3/24/18  Yes Sharon Moreland MD   aspirin 81 MG tablet Take 81 mg by mouth daily   Yes Historical Provider, MD   atorvastatin (LIPITOR) 10 MG tablet Take 10 mg by mouth nightly    Yes Historical Provider, MD       Allergies:  Latex and Demerol hcl [meperidine]    Social History:      The patient currently lives at home    TOBACCO:   reports that she quit smoking about 2-4 weeks ago. Her smoking use included Cigarettes. She has a 22.00 pack-year smoking history.  She has quit using smokeless tobacco.  ETOH:   reports that she does not drink

## 2018-07-10 LAB
ALLEN TEST: POSITIVE
ANION GAP SERPL CALCULATED.3IONS-SCNC: 14 MEQ/L (ref 8–16)
BASE EXCESS (CALCULATED): -0.3 MMOL/L (ref -2.5–2.5)
BUN BLDV-MCNC: 34 MG/DL (ref 7–22)
CALCIUM SERPL-MCNC: 9.4 MG/DL (ref 8.5–10.5)
CHLORIDE BLD-SCNC: 100 MEQ/L (ref 98–111)
CO2: 23 MEQ/L (ref 23–33)
COLLECTED BY:: ABNORMAL
CREAT SERPL-MCNC: 1.3 MG/DL (ref 0.4–1.2)
D-DIMER QUANTITATIVE: 4059 NG/ML FEU (ref 0–500)
DEVICE: ABNORMAL
EKG ATRIAL RATE: 63 BPM
EKG P AXIS: 61 DEGREES
EKG P-R INTERVAL: 198 MS
EKG Q-T INTERVAL: 502 MS
EKG QRS DURATION: 84 MS
EKG QTC CALCULATION (BAZETT): 513 MS
EKG R AXIS: 17 DEGREES
EKG T AXIS: 89 DEGREES
EKG VENTRICULAR RATE: 63 BPM
GFR SERPL CREATININE-BSD FRML MDRD: 41 ML/MIN/1.73M2
GLUCOSE BLD-MCNC: 113 MG/DL (ref 70–108)
HCO3: 23 MMOL/L (ref 23–28)
MAGNESIUM: 2.2 MG/DL (ref 1.6–2.4)
O2 SATURATION: 97 %
OSMOLALITY: 301 MOSMOL/KG (ref 275–295)
PCO2: 33 MMHG (ref 35–45)
PH BLOOD GAS: 7.46 (ref 7.35–7.45)
PO2: 83 MMHG (ref 71–104)
POTASSIUM SERPL-SCNC: 4.3 MEQ/L (ref 3.5–5.2)
SODIUM BLD-SCNC: 137 MEQ/L (ref 135–145)
SOURCE, BLOOD GAS: ABNORMAL
TROPONIN T: 0.06 NG/ML

## 2018-07-10 PROCEDURE — 84484 ASSAY OF TROPONIN QUANT: CPT

## 2018-07-10 PROCEDURE — 6360000002 HC RX W HCPCS: Performed by: INTERNAL MEDICINE

## 2018-07-10 PROCEDURE — 93010 ELECTROCARDIOGRAM REPORT: CPT | Performed by: INTERNAL MEDICINE

## 2018-07-10 PROCEDURE — 6370000000 HC RX 637 (ALT 250 FOR IP): Performed by: INTERNAL MEDICINE

## 2018-07-10 PROCEDURE — 80048 BASIC METABOLIC PNL TOTAL CA: CPT

## 2018-07-10 PROCEDURE — 36415 COLL VENOUS BLD VENIPUNCTURE: CPT

## 2018-07-10 PROCEDURE — 85379 FIBRIN DEGRADATION QUANT: CPT

## 2018-07-10 PROCEDURE — 36600 WITHDRAWAL OF ARTERIAL BLOOD: CPT

## 2018-07-10 PROCEDURE — 99233 SBSQ HOSP IP/OBS HIGH 50: CPT | Performed by: HOSPITALIST

## 2018-07-10 PROCEDURE — 83735 ASSAY OF MAGNESIUM: CPT

## 2018-07-10 PROCEDURE — 82803 BLOOD GASES ANY COMBINATION: CPT

## 2018-07-10 PROCEDURE — 6360000002 HC RX W HCPCS: Performed by: FAMILY MEDICINE

## 2018-07-10 PROCEDURE — 83930 ASSAY OF BLOOD OSMOLALITY: CPT

## 2018-07-10 PROCEDURE — 2140000000 HC CCU INTERMEDIATE R&B

## 2018-07-10 PROCEDURE — 6370000000 HC RX 637 (ALT 250 FOR IP): Performed by: HOSPITALIST

## 2018-07-10 RX ORDER — FUROSEMIDE 40 MG/1
40 TABLET ORAL 2 TIMES DAILY
Status: DISCONTINUED | OUTPATIENT
Start: 2018-07-10 | End: 2018-07-10

## 2018-07-10 RX ORDER — ACETAMINOPHEN 500 MG
1000 TABLET ORAL EVERY 8 HOURS PRN
Status: DISCONTINUED | OUTPATIENT
Start: 2018-07-10 | End: 2018-07-13

## 2018-07-10 RX ORDER — FUROSEMIDE 40 MG/1
40 TABLET ORAL DAILY
Status: DISCONTINUED | OUTPATIENT
Start: 2018-07-11 | End: 2018-07-11

## 2018-07-10 RX ORDER — FUROSEMIDE 10 MG/ML
20 INJECTION INTRAMUSCULAR; INTRAVENOUS ONCE
Status: COMPLETED | OUTPATIENT
Start: 2018-07-10 | End: 2018-07-10

## 2018-07-10 RX ORDER — METHOCARBAMOL 500 MG/1
500 TABLET, FILM COATED ORAL EVERY 6 HOURS PRN
Status: DISCONTINUED | OUTPATIENT
Start: 2018-07-10 | End: 2018-07-13

## 2018-07-10 RX ADMIN — FUROSEMIDE 20 MG: 10 INJECTION, SOLUTION INTRAMUSCULAR; INTRAVENOUS at 22:57

## 2018-07-10 RX ADMIN — LISINOPRIL 5 MG: 5 TABLET ORAL at 10:28

## 2018-07-10 RX ADMIN — CITALOPRAM 20 MG: 20 TABLET, FILM COATED ORAL at 20:48

## 2018-07-10 RX ADMIN — ENOXAPARIN SODIUM 40 MG: 40 INJECTION SUBCUTANEOUS at 08:36

## 2018-07-10 RX ADMIN — PANTOPRAZOLE SODIUM 40 MG: 40 TABLET, DELAYED RELEASE ORAL at 06:38

## 2018-07-10 RX ADMIN — METOPROLOL TARTRATE 12.5 MG: 25 TABLET ORAL at 20:48

## 2018-07-10 RX ADMIN — METHOCARBAMOL 500 MG: 500 TABLET ORAL at 20:48

## 2018-07-10 RX ADMIN — FUROSEMIDE 40 MG: 40 TABLET ORAL at 09:36

## 2018-07-10 RX ADMIN — POTASSIUM CHLORIDE 20 MEQ: 750 TABLET, FILM COATED, EXTENDED RELEASE ORAL at 08:36

## 2018-07-10 RX ADMIN — TRAZODONE HYDROCHLORIDE 50 MG: 50 TABLET ORAL at 20:50

## 2018-07-10 RX ADMIN — ATORVASTATIN CALCIUM 10 MG: 10 TABLET, FILM COATED ORAL at 20:48

## 2018-07-10 RX ADMIN — FERROUS SULFATE TAB 325 MG (65 MG ELEMENTAL FE) 325 MG: 325 (65 FE) TAB at 16:27

## 2018-07-10 RX ADMIN — ACETAMINOPHEN 650 MG: 325 TABLET ORAL at 04:22

## 2018-07-10 RX ADMIN — ASPIRIN 81 MG: 81 TABLET ORAL at 08:36

## 2018-07-10 RX ADMIN — FERROUS SULFATE TAB 325 MG (65 MG ELEMENTAL FE) 325 MG: 325 (65 FE) TAB at 08:36

## 2018-07-10 RX ADMIN — METOPROLOL TARTRATE 12.5 MG: 25 TABLET ORAL at 10:28

## 2018-07-10 RX ADMIN — LISINOPRIL 5 MG: 5 TABLET ORAL at 20:48

## 2018-07-10 ASSESSMENT — PAIN SCALES - GENERAL
PAINLEVEL_OUTOF10: 5
PAINLEVEL_OUTOF10: 5
PAINLEVEL_OUTOF10: 0

## 2018-07-10 NOTE — CARE COORDINATION
7/10/18, 12:53 PM  [address] day: 2  Location: [address] [place] [address] Reason for admit: Shortness of breath [R06.02]  Shortness of breath [R06.02] Pt admitted with SOB and CP. Hx: SAH s/p craniotomy March 2015, and bioprosthetic AVR June 2015. Clinical update: Pt found to have acute CHF d/t prosthetic valve regurgitation. She was hospitalized 7/2-7/4 and was adequately diuresed. She was seen by CT surgery who recommended surgery at a latter date. She was discharged home on 7/6, but returned to the ER with dyspnea, weight gain and leg swelling. Plan was for OHS on 7/9, but patient came to ER on 7/8 with above symptoms. Plan is for OHS (AVR/MVR/TVV and re-sternotomy on CPB) Friday am.  Dietitian ordered for post-op ileus protocol. Discharge plan: Pt is from home with her .  following for post op OHS needs.

## 2018-07-10 NOTE — PLAN OF CARE
Problem: Falls - Risk of:  Goal: Will remain free from falls  Will remain free from falls   Outcome: Met This Shift  Patient clear and concise, uses call light appropriately. Free from falls this shift. Problem: Pain:  Goal: Pain level will decrease  Pain level will decrease   Outcome: Met This Shift  Pain level controlled with appropriate medication. Problem: Discharge Planning:  Goal: Participates in care planning  Participates in care planning   Outcome: Ongoing  Actively participates in care plan. Problem: Cardiac Output - Decreased:  Goal: Hemodynamic stability will improve  Hemodynamic stability will improve   Outcome: Met This Shift  Stable this shift    Problem: Fluid Volume - Imbalance:  Goal: Absence of imbalanced fluid volume signs and symptoms  Absence of imbalanced fluid volume signs and symptoms   Outcome: Ongoing  Fluid managed without IV medication. Problem: Gas Exchange - Impaired:  Goal: Levels of oxygenation will improve  Levels of oxygenation will improve   Outcome: Met This Shift  Oxygenation appropriate this shift. Problem: Nutrition  Goal: Optimal nutrition therapy  Outcome: Ongoing  National teaching provided. No salt diet. Patient receptive to teaching. Comments: Care plan reviewed with patient. Patient verbalizes understanding of the plan of care and contribute to goal setting.

## 2018-07-10 NOTE — PLAN OF CARE
Problem: DISCHARGE BARRIERS  Goal: Patient's continuum of care needs are met  Outcome: Ongoing  Return home at discharge with spouse. Agreeable to new HH if needed.

## 2018-07-11 ENCOUNTER — ANESTHESIA EVENT (OUTPATIENT)
Dept: OPERATING ROOM | Age: 63
DRG: 219 | End: 2018-07-11
Payer: MEDICARE

## 2018-07-11 ENCOUNTER — APPOINTMENT (OUTPATIENT)
Dept: CT IMAGING | Age: 63
DRG: 219 | End: 2018-07-11
Payer: MEDICARE

## 2018-07-11 LAB
ANION GAP SERPL CALCULATED.3IONS-SCNC: 14 MEQ/L (ref 8–16)
BUN BLDV-MCNC: 35 MG/DL (ref 7–22)
CALCIUM SERPL-MCNC: 9.6 MG/DL (ref 8.5–10.5)
CHLORIDE BLD-SCNC: 102 MEQ/L (ref 98–111)
CO2: 22 MEQ/L (ref 23–33)
CREAT SERPL-MCNC: 1.2 MG/DL (ref 0.4–1.2)
ERYTHROCYTE [DISTWIDTH] IN BLOOD BY AUTOMATED COUNT: 14.7 % (ref 11.5–14.5)
ERYTHROCYTE [DISTWIDTH] IN BLOOD BY AUTOMATED COUNT: 47.1 FL (ref 35–45)
GFR SERPL CREATININE-BSD FRML MDRD: 45 ML/MIN/1.73M2
GLUCOSE BLD-MCNC: 108 MG/DL (ref 70–108)
HCT VFR BLD CALC: 28.2 % (ref 37–47)
HEMOGLOBIN: 9.2 GM/DL (ref 12–16)
MAGNESIUM: 2.1 MG/DL (ref 1.6–2.4)
MCH RBC QN AUTO: 29.2 PG (ref 26–33)
MCHC RBC AUTO-ENTMCNC: 32.6 GM/DL (ref 32.2–35.5)
MCV RBC AUTO: 89.5 FL (ref 81–99)
PLATELET # BLD: 240 THOU/MM3 (ref 130–400)
PMV BLD AUTO: 11.4 FL (ref 9.4–12.4)
POTASSIUM SERPL-SCNC: 4.4 MEQ/L (ref 3.5–5.2)
RBC # BLD: 3.15 MILL/MM3 (ref 4.2–5.4)
SODIUM BLD-SCNC: 138 MEQ/L (ref 135–145)
TROPONIN T: 0.06 NG/ML
WBC # BLD: 9.6 THOU/MM3 (ref 4.8–10.8)

## 2018-07-11 PROCEDURE — 84484 ASSAY OF TROPONIN QUANT: CPT

## 2018-07-11 PROCEDURE — 6370000000 HC RX 637 (ALT 250 FOR IP): Performed by: HOSPITALIST

## 2018-07-11 PROCEDURE — 2140000000 HC CCU INTERMEDIATE R&B

## 2018-07-11 PROCEDURE — 80048 BASIC METABOLIC PNL TOTAL CA: CPT

## 2018-07-11 PROCEDURE — 6360000002 HC RX W HCPCS: Performed by: INTERNAL MEDICINE

## 2018-07-11 PROCEDURE — 6360000002 HC RX W HCPCS: Performed by: HOSPITALIST

## 2018-07-11 PROCEDURE — 99233 SBSQ HOSP IP/OBS HIGH 50: CPT | Performed by: HOSPITALIST

## 2018-07-11 PROCEDURE — 6360000004 HC RX CONTRAST MEDICATION: Performed by: FAMILY MEDICINE

## 2018-07-11 PROCEDURE — 6370000000 HC RX 637 (ALT 250 FOR IP): Performed by: INTERNAL MEDICINE

## 2018-07-11 PROCEDURE — 71275 CT ANGIOGRAPHY CHEST: CPT

## 2018-07-11 PROCEDURE — 36415 COLL VENOUS BLD VENIPUNCTURE: CPT

## 2018-07-11 PROCEDURE — 85027 COMPLETE CBC AUTOMATED: CPT

## 2018-07-11 PROCEDURE — 83735 ASSAY OF MAGNESIUM: CPT

## 2018-07-11 RX ORDER — FUROSEMIDE 40 MG/1
40 TABLET ORAL 2 TIMES DAILY
Status: DISCONTINUED | OUTPATIENT
Start: 2018-07-11 | End: 2018-07-11

## 2018-07-11 RX ORDER — FUROSEMIDE 10 MG/ML
20 INJECTION INTRAMUSCULAR; INTRAVENOUS 3 TIMES DAILY
Status: DISCONTINUED | OUTPATIENT
Start: 2018-07-11 | End: 2018-07-12

## 2018-07-11 RX ORDER — FUROSEMIDE 10 MG/ML
20 INJECTION INTRAMUSCULAR; INTRAVENOUS ONCE
Status: DISCONTINUED | OUTPATIENT
Start: 2018-07-11 | End: 2018-07-11

## 2018-07-11 RX ADMIN — FUROSEMIDE 20 MG: 10 INJECTION, SOLUTION INTRAMUSCULAR; INTRAVENOUS at 14:18

## 2018-07-11 RX ADMIN — FUROSEMIDE 20 MG: 10 INJECTION, SOLUTION INTRAMUSCULAR; INTRAVENOUS at 19:58

## 2018-07-11 RX ADMIN — FERROUS SULFATE TAB 325 MG (65 MG ELEMENTAL FE) 325 MG: 325 (65 FE) TAB at 16:46

## 2018-07-11 RX ADMIN — ATORVASTATIN CALCIUM 10 MG: 10 TABLET, FILM COATED ORAL at 19:58

## 2018-07-11 RX ADMIN — METHOCARBAMOL 500 MG: 500 TABLET ORAL at 22:57

## 2018-07-11 RX ADMIN — POTASSIUM CHLORIDE 20 MEQ: 750 TABLET, FILM COATED, EXTENDED RELEASE ORAL at 09:54

## 2018-07-11 RX ADMIN — ASPIRIN 81 MG: 81 TABLET ORAL at 09:54

## 2018-07-11 RX ADMIN — LISINOPRIL 5 MG: 5 TABLET ORAL at 09:54

## 2018-07-11 RX ADMIN — IOPAMIDOL 80 ML: 755 INJECTION, SOLUTION INTRAVENOUS at 00:42

## 2018-07-11 RX ADMIN — LISINOPRIL 5 MG: 5 TABLET ORAL at 19:58

## 2018-07-11 RX ADMIN — METOPROLOL TARTRATE 12.5 MG: 25 TABLET ORAL at 09:54

## 2018-07-11 RX ADMIN — ENOXAPARIN SODIUM 40 MG: 40 INJECTION SUBCUTANEOUS at 09:55

## 2018-07-11 RX ADMIN — FUROSEMIDE 40 MG: 40 TABLET ORAL at 09:54

## 2018-07-11 RX ADMIN — TRAZODONE HYDROCHLORIDE 50 MG: 50 TABLET ORAL at 22:57

## 2018-07-11 RX ADMIN — CITALOPRAM 20 MG: 20 TABLET, FILM COATED ORAL at 19:59

## 2018-07-11 RX ADMIN — PANTOPRAZOLE SODIUM 40 MG: 40 TABLET, DELAYED RELEASE ORAL at 06:32

## 2018-07-11 RX ADMIN — METOPROLOL TARTRATE 12.5 MG: 25 TABLET ORAL at 19:59

## 2018-07-11 RX ADMIN — FERROUS SULFATE TAB 325 MG (65 MG ELEMENTAL FE) 325 MG: 325 (65 FE) TAB at 09:54

## 2018-07-11 ASSESSMENT — PAIN SCALES - GENERAL
PAINLEVEL_OUTOF10: 8
PAINLEVEL_OUTOF10: 0

## 2018-07-11 ASSESSMENT — PAIN DESCRIPTION - ORIENTATION: ORIENTATION: RIGHT;LEFT

## 2018-07-11 ASSESSMENT — PAIN DESCRIPTION - LOCATION: LOCATION: LEG

## 2018-07-11 ASSESSMENT — PAIN DESCRIPTION - PAIN TYPE: TYPE: ACUTE PAIN

## 2018-07-11 ASSESSMENT — PAIN DESCRIPTION - DESCRIPTORS: DESCRIPTORS: ACHING

## 2018-07-11 NOTE — FLOWSHEET NOTE
S: This staff visited patient during rounding for a follow up spiritual support. At the time of entry, patient was sitting in her chair awake and welcomed me in the room. There were no family members present. During my conversation, patient shared with me, \"I am waiting for my second open heart surgery which is coming up on Friday. I had my first open heart in 2015 after I had a massive stroke at work and was flown to [place]. \" patient also told me she is not nervous not afraid about the surgery which is scheduled on Friday. Patient said she is a member of the [employer] in [facility]. O:  Patient was calm during my entry and engaged in conversation with me. I offered encouragement and nurtured hope and prayed for protection and strength for patient for the coming surgery. I also shared with patient that one of the  from [facility] will be there to pray with her before her surgery on Friday. A: Patient was calm and approachable and calm. Patient also appeared to be coping and was hopeful for the coming surgery. P: SC will follow up with patient on Friday before surgery to provide prayer and additional emotional and spiritual support. SC support remain available to patient during this time of care as needed.

## 2018-07-11 NOTE — PROGRESS NOTES
acute pulmonary embolism. Hazy groundglass opacities in both lungs likely representing pulmonary edema. No consolidation. Tiny right pleural effusion. Moderate cardiomegaly. Status post aortic valve replacement. **This report has been created using voice recognition software. It may contain minor errors which are inherent in voice recognition technology. **      Final report electronically signed by Dr. Josh Dee on 7/11/2018 1:19 AM      XR CHEST STANDARD (2 VW)   Final Result   Mild fullness prominence of the of the interstitial markings suggesting mild interstitial edema in the correct clinical setting. Infection cannot be entirely excluded. . Correlation with symptoms advised. **This report has been created using voice recognition software. It may contain minor errors which are inherent in voice recognition technology. **      Final report electronically signed by Dr. Sonali Sheikh on 7/8/2018 10:14 PM         No change from XR CHEST (2 VW) with report dated 7/2/2018 1:47 PM.      **This report has been created using voice recognition software. It may contain minor errors which are inherent in voice recognition technology. **          Diet: DIET CARDIAC; No Added Salt (3-4 GM)  Dietary Nutrition Supplements: Snack (see comment)    DVT prophylaxis: [x] Lovenox                                 [] SCDs                                 [] SQ Heparin                                 [] Encourage ambulation           [] Already on Anticoagulation     Disposition:    [] Home       [] TCU       [x] Rehab       [] Psych       [] SNF       [] [place]       [] Other-    Code Status: Full Code    PT/OT Eval Status: pending    Anticipated Discharge in : several days    Assessment/Plan:    1. Acute diastolic CHF (EF 66%) - 2/2 severe valvular heart disease  2. Severe bioprosthetic valve regurgitation  3. Severe mitral valve regurgitation  4.  Normal cardiac cath

## 2018-07-11 NOTE — PROGRESS NOTES
Nutrition Assessment    Type and Reason for Visit: Reassess, Patient Education (Follow up for educ and diet pretty)    Nutrition Recommendations:   Education re CHF (7/3), Cardiac Diet (7/11)   Continue current diet  Ileus protocol will need reinitiated after diet re-advancement (post op)    Malnutrition Assessment:  · Malnutrition Status: No malnutrition  · Findings of the 6 clinical characteristics of malnutrition (Minimum of 2 out of 6 clinical characteristics is required to make the diagnosis of moderate or severe Protein Calorie Malnutrition based on AND/ASPEN Guidelines):  1. Energy Intake-Greater than 75%,      2. Weight Loss-No significant weight loss,    3. Fat Loss-No significant subcutaneous fat loss,    4. Muscle Loss-No significant muscle mass loss,    5. Fluid Accumulation-No significant fluid accumulation,      Nutrition Diagnosis:   · Problem: Increased nutrient needs  · Etiology: related to Increased demand for energy/nutrients due to     Signs and symptoms:  as evidenced by  (upcoming open heart surgery)    Nutrition Assessment:  · Subjective Assessment: Pt and  visited. Already on ileus protocol per consult request.  Plans for CT Surg in coming days. Provided verbal and written diet education to pt and pt  today regarding Cardiac diet and emphasis on Low Na. Both voiced understanding. Pt reports descent appetite while she waits for surgery.     · Wound Type: None  · Current Nutrition Therapies:  · Oral Diet Orders: Cardiac, No Added Salt (3-4gm)   · Oral Diet intake: %  · Oral Nutrition Supplement (ONS) Orders: Snack (Activia TID & Hot Decaf Beverage TID)  · ONS intake: 26-50%  · Anthropometric Measures:  · Ht: 5' 2\" (157.5 cm)   · Current Body Wt: 187 lb 1.6 oz (84.9 kg) (Trace edema 7/11/18)  · Admission Body Wt: 194 lb 3.2 oz (88.1 kg) (7/8/18, no edema)  · Usual Body Wt: 184 lb (83.5 kg) (3/21/18)  · % Weight Change: Stable, fluctuations related to underlying CHF and fluids,     · Ideal Body Wt: 110 lb (49.9 kg), % Ideal Body 170%  · Adjusted Body Wt: 130 lb (59 kg), body weight adjusted for Obesity  · BMI Classification: BMI 30.0 - 34.9 Obese Class I (34.3)  · Comparative Standards (Estimated Nutrition Needs):  · Estimated Daily Total Kcal: 5993-7323 kcals (30-35 kcal/kgm adjusted weight 50 kgm)  · Estimated Daily Protein (g): 60-75 grams protein (1.2-1.5 grams protein/kgm adjusted weight 50 kgm)    Estimated Intake vs Estimated Needs: Intake Meets Needs    Nutrition Risk Level: Moderate    Nutrition Interventions:   Continue current diet  Education Completed (7/3 CHF Educ.  7/11 Cardiac Educ. )    Nutrition Evaluation:   · Evaluation: Progressing toward goals   · Goals: Patient will consume 75% or more of meals during LOS. · Monitoring: Meal Intake, Weight, Patient/Family Education    See Adult Nutrition Doc Flowsheet for more detail.      Electronically signed by Mayra Mahmood RD LD [address] on 7/11/18 at 2:39 PM    Contact Number: [phone]

## 2018-07-11 NOTE — PLAN OF CARE
Problem: Falls - Risk of:  Goal: Will remain free from falls  Will remain free from falls   Outcome: Ongoing  No falls this shift. Bed in locked and low position with side rails up x 2 and call light within reach. Wears gripper socks when up in room    Problem: Pain:  Goal: Pain level will decrease  Pain level will decrease   Outcome: Ongoing  Denies pain or discomfort. Does still feel SOB at times today. Problem: Discharge Planning:  Goal: Participates in care planning  Participates in care planning   Outcome: Ongoing  Patient plans on going home at discharge and does not think she will need home health or any help. Problem: Cardiac Output - Decreased:  Goal: Hemodynamic stability will improve  Hemodynamic stability will improve   Outcome: Ongoing  Remains in NSR and vital signs are stable but BP runs on the low side    Problem: Fluid Volume - Imbalance:  Goal: Absence of imbalanced fluid volume signs and symptoms  Absence of imbalanced fluid volume signs and symptoms   Outcome: Ongoing  Patient getting IV Lasix as ordered with excellent results. Edema is decreasing    Problem: Gas Exchange - Impaired:  Goal: Levels of oxygenation will improve  Levels of oxygenation will improve   Outcome: Ongoing  Pulse ox has remained above 90% all day without the use of supplemental O2    Problem: Nutrition  Goal: Optimal nutrition therapy  Outcome: Ongoing  Appetite is fair.

## 2018-07-12 LAB
ABO: NORMAL
ANION GAP SERPL CALCULATED.3IONS-SCNC: 16 MEQ/L (ref 8–16)
ANTIBODY SCREEN: NORMAL
APTT: 36.7 SECONDS (ref 22–38)
AVERAGE GLUCOSE: 111 MG/DL (ref 70–126)
BASOPHILS # BLD: 0.6 %
BASOPHILS ABSOLUTE: 0 THOU/MM3 (ref 0–0.1)
BUN BLDV-MCNC: 43 MG/DL (ref 7–22)
CALCIUM SERPL-MCNC: 9.8 MG/DL (ref 8.5–10.5)
CHLORIDE BLD-SCNC: 102 MEQ/L (ref 98–111)
CO2: 23 MEQ/L (ref 23–33)
CREAT SERPL-MCNC: 1.3 MG/DL (ref 0.4–1.2)
EOSINOPHIL # BLD: 3.6 %
EOSINOPHILS ABSOLUTE: 0.3 THOU/MM3 (ref 0–0.4)
ERYTHROCYTE [DISTWIDTH] IN BLOOD BY AUTOMATED COUNT: 14.6 % (ref 11.5–14.5)
ERYTHROCYTE [DISTWIDTH] IN BLOOD BY AUTOMATED COUNT: 48.2 FL (ref 35–45)
GFR SERPL CREATININE-BSD FRML MDRD: 41 ML/MIN/1.73M2
GLUCOSE BLD-MCNC: 102 MG/DL (ref 70–108)
HBA1C MFR BLD: 5.7 % (ref 4.4–6.4)
HCT VFR BLD CALC: 29.3 % (ref 37–47)
HEMOGLOBIN: 9.5 GM/DL (ref 12–16)
IMMATURE GRANS (ABS): 0.03 THOU/MM3 (ref 0–0.07)
IMMATURE GRANULOCYTES: 0.4 %
INR BLD: 1.08 (ref 0.85–1.13)
LYMPHOCYTES # BLD: 22.8 %
LYMPHOCYTES ABSOLUTE: 1.9 THOU/MM3 (ref 1–4.8)
MAGNESIUM: 2.1 MG/DL (ref 1.6–2.4)
MCH RBC QN AUTO: 29.5 PG (ref 26–33)
MCHC RBC AUTO-ENTMCNC: 32.4 GM/DL (ref 32.2–35.5)
MCV RBC AUTO: 91 FL (ref 81–99)
MONOCYTES # BLD: 7.4 %
MONOCYTES ABSOLUTE: 0.6 THOU/MM3 (ref 0.4–1.3)
MRSA SCREEN RT-PCR: NEGATIVE
NUCLEATED RED BLOOD CELLS: 0 /100 WBC
PLATELET # BLD: 239 THOU/MM3 (ref 130–400)
PMV BLD AUTO: 11.5 FL (ref 9.4–12.4)
POTASSIUM SERPL-SCNC: 4.8 MEQ/L (ref 3.5–5.2)
PRO-BNP: 4813 PG/ML (ref 0–900)
RBC # BLD: 3.22 MILL/MM3 (ref 4.2–5.4)
RH FACTOR: NORMAL
SEG NEUTROPHILS: 65.2 %
SEGMENTED NEUTROPHILS ABSOLUTE COUNT: 5.4 THOU/MM3 (ref 1.8–7.7)
SODIUM BLD-SCNC: 141 MEQ/L (ref 135–145)
WBC # BLD: 8.3 THOU/MM3 (ref 4.8–10.8)

## 2018-07-12 PROCEDURE — 86900 BLOOD TYPING SEROLOGIC ABO: CPT

## 2018-07-12 PROCEDURE — 6370000000 HC RX 637 (ALT 250 FOR IP): Performed by: INTERNAL MEDICINE

## 2018-07-12 PROCEDURE — 6360000002 HC RX W HCPCS: Performed by: INTERNAL MEDICINE

## 2018-07-12 PROCEDURE — 6360000002 HC RX W HCPCS: Performed by: HOSPITALIST

## 2018-07-12 PROCEDURE — 83880 ASSAY OF NATRIURETIC PEPTIDE: CPT

## 2018-07-12 PROCEDURE — 87641 MR-STAPH DNA AMP PROBE: CPT

## 2018-07-12 PROCEDURE — 86901 BLOOD TYPING SEROLOGIC RH(D): CPT

## 2018-07-12 PROCEDURE — 99233 SBSQ HOSP IP/OBS HIGH 50: CPT | Performed by: HOSPITALIST

## 2018-07-12 PROCEDURE — 36415 COLL VENOUS BLD VENIPUNCTURE: CPT

## 2018-07-12 PROCEDURE — 2140000000 HC CCU INTERMEDIATE R&B

## 2018-07-12 PROCEDURE — 85610 PROTHROMBIN TIME: CPT

## 2018-07-12 PROCEDURE — 85730 THROMBOPLASTIN TIME PARTIAL: CPT

## 2018-07-12 PROCEDURE — 83735 ASSAY OF MAGNESIUM: CPT

## 2018-07-12 PROCEDURE — 6370000000 HC RX 637 (ALT 250 FOR IP): Performed by: NURSE PRACTITIONER

## 2018-07-12 PROCEDURE — 80048 BASIC METABOLIC PNL TOTAL CA: CPT

## 2018-07-12 PROCEDURE — 2580000003 HC RX 258: Performed by: NURSE PRACTITIONER

## 2018-07-12 PROCEDURE — 86923 COMPATIBILITY TEST ELECTRIC: CPT

## 2018-07-12 PROCEDURE — P9016 RBC LEUKOCYTES REDUCED: HCPCS

## 2018-07-12 PROCEDURE — 85025 COMPLETE CBC W/AUTO DIFF WBC: CPT

## 2018-07-12 PROCEDURE — 83036 HEMOGLOBIN GLYCOSYLATED A1C: CPT

## 2018-07-12 PROCEDURE — 86850 RBC ANTIBODY SCREEN: CPT

## 2018-07-12 RX ORDER — ASPIRIN 81 MG/1
81 TABLET ORAL DAILY
Status: DISCONTINUED | OUTPATIENT
Start: 2018-07-12 | End: 2018-07-13

## 2018-07-12 RX ORDER — FUROSEMIDE 10 MG/ML
20 INJECTION INTRAMUSCULAR; INTRAVENOUS 2 TIMES DAILY
Status: DISCONTINUED | OUTPATIENT
Start: 2018-07-12 | End: 2018-07-13

## 2018-07-12 RX ORDER — SODIUM CHLORIDE 0.9 % (FLUSH) 0.9 %
10 SYRINGE (ML) INJECTION PRN
Status: DISCONTINUED | OUTPATIENT
Start: 2018-07-12 | End: 2018-07-13

## 2018-07-12 RX ORDER — AMIODARONE HYDROCHLORIDE 200 MG/1
200 TABLET ORAL 3 TIMES DAILY
Status: DISCONTINUED | OUTPATIENT
Start: 2018-07-12 | End: 2018-07-13

## 2018-07-12 RX ORDER — SODIUM CHLORIDE 0.9 % (FLUSH) 0.9 %
10 SYRINGE (ML) INJECTION EVERY 12 HOURS SCHEDULED
Status: DISCONTINUED | OUTPATIENT
Start: 2018-07-12 | End: 2018-07-13

## 2018-07-12 RX ORDER — SODIUM CHLORIDE 450 MG/100ML
INJECTION, SOLUTION INTRAVENOUS CONTINUOUS
Status: DISCONTINUED | OUTPATIENT
Start: 2018-07-12 | End: 2018-07-12

## 2018-07-12 RX ADMIN — CITALOPRAM 20 MG: 20 TABLET, FILM COATED ORAL at 21:57

## 2018-07-12 RX ADMIN — ENOXAPARIN SODIUM 40 MG: 40 INJECTION SUBCUTANEOUS at 10:43

## 2018-07-12 RX ADMIN — FERROUS SULFATE TAB 325 MG (65 MG ELEMENTAL FE) 325 MG: 325 (65 FE) TAB at 10:42

## 2018-07-12 RX ADMIN — PANTOPRAZOLE SODIUM 40 MG: 40 TABLET, DELAYED RELEASE ORAL at 06:16

## 2018-07-12 RX ADMIN — METOPROLOL TARTRATE 12.5 MG: 25 TABLET ORAL at 10:43

## 2018-07-12 RX ADMIN — METOPROLOL TARTRATE 12.5 MG: 25 TABLET ORAL at 21:56

## 2018-07-12 RX ADMIN — Medication 10 ML: at 22:02

## 2018-07-12 RX ADMIN — Medication 10 ML: at 10:44

## 2018-07-12 RX ADMIN — ATORVASTATIN CALCIUM 10 MG: 10 TABLET, FILM COATED ORAL at 21:57

## 2018-07-12 RX ADMIN — FUROSEMIDE 20 MG: 10 INJECTION, SOLUTION INTRAMUSCULAR; INTRAVENOUS at 10:43

## 2018-07-12 RX ADMIN — AMIODARONE HYDROCHLORIDE 200 MG: 200 TABLET ORAL at 10:42

## 2018-07-12 RX ADMIN — ASPIRIN 81 MG: 81 TABLET, COATED ORAL at 17:10

## 2018-07-12 RX ADMIN — FUROSEMIDE 20 MG: 10 INJECTION, SOLUTION INTRAMUSCULAR; INTRAVENOUS at 17:11

## 2018-07-12 RX ADMIN — FERROUS SULFATE TAB 325 MG (65 MG ELEMENTAL FE) 325 MG: 325 (65 FE) TAB at 17:10

## 2018-07-12 RX ADMIN — POTASSIUM CHLORIDE 20 MEQ: 750 TABLET, FILM COATED, EXTENDED RELEASE ORAL at 10:43

## 2018-07-12 RX ADMIN — AMIODARONE HYDROCHLORIDE 200 MG: 200 TABLET ORAL at 21:57

## 2018-07-12 ASSESSMENT — PAIN SCALES - GENERAL
PAINLEVEL_OUTOF10: 0

## 2018-07-12 NOTE — PROGRESS NOTES
Sips with Meds    Exam:  BP (!) 127/52   Pulse 63   Temp 98.3 °F (36.8 °C) (Oral)   Resp 16   Ht 5' 2\" (1.575 m)   Wt 184 lb 14.4 oz (83.9 kg)   SpO2 94%   BMI 33.82 kg/m²     General appearance: No apparent distress, appears stated age and cooperative. HEENT: Pupils equal, round, and reactive to light. Conjunctivae/corneas clear. Neck: Supple, with full range of motion. No jugular venous distention. Trachea midline. Respiratory:  Normal respiratory effort. Clear to auscultation, bilaterally without Rales/Wheezes/Rhonchi. Cardiovascular: Regular rate and rhythm with normal S1/S2 without murmurs, rubs or gallops. Abdomen: Soft, non-tender, non-distended with normal bowel sounds. Musculoskeletal: passive and active ROM x 4 extremities. Skin: Skin color, texture, turgor normal.  No rashes or lesions. Neurologic:  Neurovascularly intact without any focal sensory/motor deficits. Cranial nerves: II-XII intact, grossly non-focal.  Psychiatric: Alert and oriented, thought content appropriate, normal insight  Capillary Refill: Brisk,< 3 seconds   Peripheral Pulses: +2 palpable, equal bilaterally       Labs:   Recent Labs      07/09/18   1427  07/11/18   1458  07/12/18   0947   WBC  13.0*  9.6  8.3   HGB  9.3*  9.2*  9.5*   HCT  28.7*  28.2*  29.3*   PLT  249  240  239     Recent Labs      07/10/18   0421  07/11/18   0442  07/12/18   0538   NA  137  138  141   K  4.3  4.4  4.8   CL  100  102  102   CO2  23  22*  23   BUN  34*  35*  43*   CREATININE  1.3*  1.2  1.3*   CALCIUM  9.4  9.6  9.8     No results for input(s): AST, ALT, BILIDIR, BILITOT, ALKPHOS in the last 72 hours. Recent Labs      07/12/18   0947   INR  1.08     No results for input(s): Emma Beau in the last 72 hours.     Urinalysis:      Lab Results   Component Value Date    NITRU NEGATIVE 06/24/2018    WBCUA 0-2 06/24/2018    WBCUA 2-4 05/04/2012    BACTERIA NONE 06/24/2018    RBCUA 0-2 06/24/2018    BLOODU TRACE 06/24/2018    SPECGRAV (EF 50%) - 2/2 severe valvular heart disease  2. Severe bioprosthetic aortic valve regurgitation  3. Severe mitral valve regurgitation  4. Acute renal insufficiency - likely from diuresis in presence of lisinopril  5. Normal cardiac cath 3/23/18  6. Non-ischemic cardiomyopathy with mild global hypokinesis  7. Elevated troponin - likely 2/2 CHF/cardimyopathy  8. Leukocytosis  9. Obesity      Plan  1. Reduce IV lasix to bid  2. Hold lisiniopril  3. Monitor and replace electrolytes  4. Open valve repair planned for 7/13  5. Continue metoprolol  6. Strict I/O  7. Daily weigh  8.  Discussed with RN and multidisciplinary care team      Electronically signed by Pascale Foster MD on 7/12/2018 at 12:46 PM

## 2018-07-13 ENCOUNTER — ANESTHESIA (OUTPATIENT)
Dept: OPERATING ROOM | Age: 63
DRG: 219 | End: 2018-07-13
Payer: MEDICARE

## 2018-07-13 ENCOUNTER — APPOINTMENT (OUTPATIENT)
Dept: GENERAL RADIOLOGY | Age: 63
DRG: 219 | End: 2018-07-13
Payer: MEDICARE

## 2018-07-13 VITALS
RESPIRATION RATE: 1 BRPM | SYSTOLIC BLOOD PRESSURE: 147 MMHG | OXYGEN SATURATION: 100 % | TEMPERATURE: 97.2 F | DIASTOLIC BLOOD PRESSURE: 55 MMHG

## 2018-07-13 LAB
ACTIVATED CLOTTING TIME: 111 SECONDS (ref 99–130)
ACTIVATED CLOTTING TIME: 157 SECONDS (ref 99–130)
ACTIVATED CLOTTING TIME: 456 SECONDS (ref 99–130)
ACTIVATED CLOTTING TIME: 465 SECONDS (ref 99–130)
ACTIVATED CLOTTING TIME: 508 SECONDS (ref 99–130)
ACTIVATED CLOTTING TIME: 509 SECONDS (ref 99–130)
ACTIVATED CLOTTING TIME: 549 SECONDS (ref 99–130)
ACTIVATED CLOTTING TIME: 558 SECONDS (ref 99–130)
ACTIVATED CLOTTING TIME: 563 SECONDS (ref 99–130)
ACTIVATED CLOTTING TIME: 568 SECONDS (ref 99–130)
ALLEN TEST: ABNORMAL
ALLEN TEST: ABNORMAL
ALLEN TEST: NORMAL
ANION GAP SERPL CALCULATED.3IONS-SCNC: 16 MEQ/L (ref 8–16)
ANION GAP SERPL CALCULATED.3IONS-SCNC: 18 MEQ/L (ref 8–16)
BACTERIA: ABNORMAL
BASE EXCESS (CALCULATED): -0.7 MMOL/L (ref -2.5–2.5)
BASE EXCESS (CALCULATED): -1.2 MMOL/L (ref -2.5–2.5)
BASE EXCESS (CALCULATED): -2 MMOL/L (ref -2.5–2.5)
BASE EXCESS (CALCULATED): -3.5 MMOL/L (ref -2.5–2.5)
BASE EXCESS (CALCULATED): -3.6 MMOL/L (ref -2.5–2.5)
BASE EXCESS (CALCULATED): -4.1 MMOL/L (ref -2.5–2.5)
BASE EXCESS (CALCULATED): -6.5 MMOL/L (ref -2.5–2.5)
BASE EXCESS (CALCULATED): -6.8 MMOL/L (ref -2.5–2.5)
BASE EXCESS (CALCULATED): -7.4 MMOL/L (ref -2.5–2.5)
BASE EXCESS (CALCULATED): 0 MMOL/L (ref -2.5–2.5)
BASE EXCESS (CALCULATED): 1.1 MMOL/L (ref -2.5–2.5)
BASE EXCESS (CALCULATED): 2 MMOL/L (ref -2.5–2.5)
BASE EXCESS MIXED: -2.7 MMOL/L (ref -2–3)
BILIRUBIN URINE: NEGATIVE
BLOOD, URINE: ABNORMAL
BUN BLDV-MCNC: 36 MG/DL (ref 7–22)
BUN BLDV-MCNC: 44 MG/DL (ref 7–22)
CALCIUM IONIZED SERUM: 0.9 MMOL/L (ref 1.12–1.32)
CALCIUM IONIZED SERUM: 0.93 MMOL/L (ref 1.12–1.32)
CALCIUM IONIZED SERUM: 0.97 MMOL/L (ref 1.12–1.32)
CALCIUM IONIZED SERUM: 1.02 MMOL/L (ref 1.12–1.32)
CALCIUM IONIZED SERUM: 1.03 MMOL/L (ref 1.12–1.32)
CALCIUM IONIZED SERUM: 1.04 MMOL/L (ref 1.12–1.32)
CALCIUM IONIZED SERUM: 1.13 MMOL/L (ref 1.12–1.32)
CALCIUM IONIZED SERUM: 1.15 MMOL/L (ref 1.12–1.32)
CALCIUM IONIZED SERUM: 1.19 MMOL/L (ref 1.12–1.32)
CALCIUM IONIZED SERUM: 1.23 MMOL/L (ref 1.12–1.32)
CALCIUM IONIZED: 1.06 MMOL/L (ref 1.12–1.32)
CALCIUM SERPL-MCNC: 7.8 MG/DL (ref 8.5–10.5)
CALCIUM SERPL-MCNC: 9.9 MG/DL (ref 8.5–10.5)
CARTRIDGE COLOR: NORMAL
CASTS: ABNORMAL /LPF
CASTS: ABNORMAL /LPF
CHARACTER, URINE: ABNORMAL
CHLORIDE BLD-SCNC: 100 MEQ/L (ref 98–111)
CHLORIDE BLD-SCNC: 110 MEQ/L (ref 98–111)
CO2: 19 MEQ/L (ref 23–33)
CO2: 22 MEQ/L (ref 23–33)
COLLECTED BY:: ABNORMAL
COLLECTED BY:: NORMAL
COLOR: YELLOW
COMMENT: ABNORMAL
CREAT SERPL-MCNC: 1.5 MG/DL (ref 0.4–1.2)
CREAT SERPL-MCNC: 1.5 MG/DL (ref 0.4–1.2)
CRYSTALS: ABNORMAL
DEVICE: ABNORMAL
DEVICE: NORMAL
EPITHELIAL CELLS, UA: ABNORMAL /HPF
ERYTHROCYTE [DISTWIDTH] IN BLOOD BY AUTOMATED COUNT: 14.8 % (ref 11.5–14.5)
ERYTHROCYTE [DISTWIDTH] IN BLOOD BY AUTOMATED COUNT: 47.8 FL (ref 35–45)
GFR SERPL CREATININE-BSD FRML MDRD: 35 ML/MIN/1.73M2
GFR SERPL CREATININE-BSD FRML MDRD: 35 ML/MIN/1.73M2
GLUCOSE BLD-MCNC: 114 MG/DL (ref 70–108)
GLUCOSE BLD-MCNC: 122 MG/DL (ref 70–108)
GLUCOSE, URINE: NEGATIVE MG/DL
GLUCOSE, WHOLE BLOOD: 117 MG/DL (ref 70–108)
GLUCOSE, WHOLE BLOOD: 129 MG/DL (ref 70–108)
GLUCOSE, WHOLE BLOOD: 142 MG/DL (ref 70–108)
GLUCOSE, WHOLE BLOOD: 153 MG/DL (ref 70–108)
GLUCOSE, WHOLE BLOOD: 161 MG/DL (ref 70–108)
GLUCOSE, WHOLE BLOOD: 168 MG/DL (ref 70–108)
GLUCOSE, WHOLE BLOOD: 173 MG/DL (ref 70–108)
GLUCOSE, WHOLE BLOOD: 183 MG/DL (ref 70–108)
GLUCOSE, WHOLE BLOOD: 194 MG/DL (ref 70–108)
GLUCOSE, WHOLE BLOOD: 200 MG/DL (ref 70–108)
GLUCOSE, WHOLE BLOOD: 216 MG/DL (ref 70–108)
GLUCOSE, WHOLE BLOOD: 220 MG/DL (ref 70–108)
GLUCOSE, WHOLE BLOOD: 228 MG/DL (ref 70–108)
GLUCOSE, WHOLE BLOOD: 241 MG/DL (ref 70–108)
GLUCOSE, WHOLE BLOOD: 242 MG/DL (ref 70–108)
GLUCOSE, WHOLE BLOOD: 255 MG/DL (ref 70–108)
GLUCOSE, WHOLE BLOOD: 95 MG/DL (ref 70–108)
HCO3, MIXED: 23 MMOL/L (ref 23–28)
HCO3: 20 MMOL/L (ref 23–28)
HCO3: 21 MMOL/L (ref 23–28)
HCO3: 21 MMOL/L (ref 23–28)
HCO3: 22 MMOL/L (ref 23–28)
HCO3: 22 MMOL/L (ref 23–28)
HCO3: 23 MMOL/L (ref 23–28)
HCO3: 24 MMOL/L (ref 23–28)
HCO3: 24 MMOL/L (ref 23–28)
HCO3: 25 MMOL/L (ref 23–28)
HCO3: 26 MMOL/L (ref 23–28)
HCO3: 27 MMOL/L (ref 23–28)
HCO3: 28 MMOL/L (ref 23–28)
HCT VFR BLD CALC: 23.6 % (ref 37–47)
HCT VFR BLD CALC: 25.8 % (ref 37–47)
HCT VFR BLD CALC: 34.3 % (ref 37–47)
HEMOGLOBIN FINGERSTICK, POC: 5.4 G/DL (ref 12–16)
HEMOGLOBIN FINGERSTICK, POC: 6.1 G/DL (ref 12–16)
HEMOGLOBIN FINGERSTICK, POC: 6.3 G/DL (ref 12–16)
HEMOGLOBIN: 11.3 GM/DL (ref 12–16)
HEMOGLOBIN: 8 GM/DL (ref 12–16)
HEMOGLOBIN: 9 GM/DL (ref 12–16)
IFIO2: 70
IFIO2: 75
IFIO2: 80
KETONES, URINE: NEGATIVE
LEUKOCYTE EST, POC: ABNORMAL
MAGNESIUM: 2.2 MG/DL (ref 1.6–2.4)
MAGNESIUM: 4.1 MG/DL (ref 1.6–2.4)
MCH RBC QN AUTO: 29.5 PG (ref 26–33)
MCHC RBC AUTO-ENTMCNC: 32.9 GM/DL (ref 32.2–35.5)
MCV RBC AUTO: 89.6 FL (ref 81–99)
MISCELLANEOUS LAB TEST RESULT: ABNORMAL
MODE: ABNORMAL
MODE: ABNORMAL
MODE: NORMAL
NITRITE, URINE: NEGATIVE
O2 SAT, MIXED: 66 %
O2 SATURATION: 100 %
O2 SATURATION: 94 %
O2 SATURATION: 94 %
O2 SATURATION: 96 %
O2 SATURATION: 99 %
PATIENT HEPARIN CONCENTRATION: 0
PATIENT HEPARIN CONCENTRATION: 0
PATIENT HEPARIN CONCENTRATION: 3
PATIENT HEPARIN CONCENTRATION: 4
PCO2, MIXED VENOUS: 46 MMHG (ref 41–51)
PCO2: 41 MMHG (ref 35–45)
PCO2: 42 MMHG (ref 35–45)
PCO2: 43 MMHG (ref 35–45)
PCO2: 43 MMHG (ref 35–45)
PCO2: 44 MMHG (ref 35–45)
PCO2: 44 MMHG (ref 35–45)
PCO2: 46 MMHG (ref 35–45)
PCO2: 49 MMHG (ref 35–45)
PCO2: 52 MMHG (ref 35–45)
PCO2: 53 MMHG (ref 35–45)
PCO2: 54 MMHG (ref 35–45)
PCO2: 56 MMHG (ref 35–45)
PH BLOOD GAS: 7.18 (ref 7.35–7.45)
PH BLOOD GAS: 7.22 (ref 7.35–7.45)
PH BLOOD GAS: 7.28 (ref 7.35–7.45)
PH BLOOD GAS: 7.28 (ref 7.35–7.45)
PH BLOOD GAS: 7.31 (ref 7.35–7.45)
PH BLOOD GAS: 7.31 (ref 7.35–7.45)
PH BLOOD GAS: 7.32 (ref 7.35–7.45)
PH BLOOD GAS: 7.34 (ref 7.35–7.45)
PH BLOOD GAS: 7.36 (ref 7.35–7.45)
PH BLOOD GAS: 7.4 (ref 7.35–7.45)
PH UA: 5.5
PH, MIXED: 7.31 (ref 7.31–7.41)
PLATELET # BLD: 142 THOU/MM3 (ref 130–400)
PLATELET # BLD: 148 THOU/MM3 (ref 130–400)
PLATELET # BLD: 218 THOU/MM3 (ref 130–400)
PMV BLD AUTO: 11.4 FL (ref 9.4–12.4)
PO2 MIXED: 37 MMHG (ref 25–40)
PO2: 134 MMHG (ref 71–104)
PO2: 224 MMHG (ref 71–104)
PO2: 274 MMHG (ref 71–104)
PO2: 289 MMHG (ref 71–104)
PO2: 332 MMHG (ref 71–104)
PO2: 344 MMHG (ref 71–104)
PO2: 349 MMHG (ref 71–104)
PO2: 357 MMHG (ref 71–104)
PO2: 362 MMHG (ref 71–104)
PO2: 78 MMHG (ref 71–104)
PO2: 82 MMHG (ref 71–104)
PO2: 91 MMHG (ref 71–104)
POTASSIUM SERPL-SCNC: 3.2 MEQ/L (ref 3.5–5.2)
POTASSIUM SERPL-SCNC: 3.2 MEQ/L (ref 3.5–5.2)
POTASSIUM SERPL-SCNC: 3.9 MEQ/L (ref 3.5–5.2)
POTASSIUM SERPL-SCNC: 4.7 MEQ/L (ref 3.5–5.2)
POTASSIUM, WHOLE BLOOD: 3.2 MEQ/L (ref 3.5–4.9)
POTASSIUM, WHOLE BLOOD: 3.3 MEQ/L (ref 3.5–4.9)
POTASSIUM, WHOLE BLOOD: 3.4 MEQ/L (ref 3.5–4.9)
POTASSIUM, WHOLE BLOOD: 4.7 MEQ/L (ref 3.5–4.9)
POTASSIUM, WHOLE BLOOD: 4.8 MEQ/L (ref 3.5–4.9)
POTASSIUM, WHOLE BLOOD: 5 MEQ/L (ref 3.5–4.9)
POTASSIUM, WHOLE BLOOD: 5.2 MEQ/L (ref 3.5–4.9)
POTASSIUM, WHOLE BLOOD: 6 MEQ/L (ref 3.5–4.9)
PROTEIN UA: NEGATIVE MG/DL
RANGE: NORMAL
RBC # BLD: 3.83 MILL/MM3 (ref 4.2–5.4)
RBC URINE: ABNORMAL /HPF
RENAL EPITHELIAL, UA: ABNORMAL
SET PEEP: 10 MMHG
SET PEEP: 10 MMHG
SET PEEP: 5 MMHG
SET PRESS SUPP: 10 CMH2O
SET RESPIRATORY RATE: 16 BPM
SET RESPIRATORY RATE: 20 BPM
SET RESPIRATORY RATE: 20 BPM
SET TIDAL VOLUME: 400 ML
SET TIDAL VOLUME: 400 ML
SET TIDAL VOLUME: 500 ML
SODIUM BLD-SCNC: 138 MEQ/L (ref 135–145)
SODIUM BLD-SCNC: 147 MEQ/L (ref 135–145)
SODIUM, WHOLE BLOOD: 138 MEQ/L (ref 138–146)
SODIUM, WHOLE BLOOD: 139 MEQ/L (ref 138–146)
SODIUM, WHOLE BLOOD: 140 MEQ/L (ref 138–146)
SODIUM, WHOLE BLOOD: 142 MEQ/L (ref 138–146)
SODIUM, WHOLE BLOOD: 144 MEQ/L (ref 138–146)
SODIUM, WHOLE BLOOD: 145 MEQ/L (ref 138–146)
SODIUM, WHOLE BLOOD: 146 MEQ/L (ref 138–146)
SOURCE, BLOOD GAS: ABNORMAL
SOURCE, BLOOD GAS: NORMAL
SPECIFIC GRAVITY UA: 1.02 (ref 1–1.03)
UROBILINOGEN, URINE: 0.2 EU/DL
WBC # BLD: 31.3 THOU/MM3 (ref 4.8–10.8)
WBC UA: ABNORMAL /HPF
YEAST: ABNORMAL

## 2018-07-13 PROCEDURE — 3600000008 HC SURGERY OHS BASE: Performed by: THORACIC SURGERY (CARDIOTHORACIC VASCULAR SURGERY)

## 2018-07-13 PROCEDURE — 84132 ASSAY OF SERUM POTASSIUM: CPT

## 2018-07-13 PROCEDURE — 85018 HEMOGLOBIN: CPT

## 2018-07-13 PROCEDURE — 6360000002 HC RX W HCPCS: Performed by: NURSE PRACTITIONER

## 2018-07-13 PROCEDURE — 3600000018 HC SURGERY OHS ADDTL 15MIN: Performed by: THORACIC SURGERY (CARDIOTHORACIC VASCULAR SURGERY)

## 2018-07-13 PROCEDURE — 82330 ASSAY OF CALCIUM: CPT

## 2018-07-13 PROCEDURE — 36600 WITHDRAWAL OF ARTERIAL BLOOD: CPT

## 2018-07-13 PROCEDURE — 2580000003 HC RX 258: Performed by: NURSE ANESTHETIST, CERTIFIED REGISTERED

## 2018-07-13 PROCEDURE — 36592 COLLECT BLOOD FROM PICC: CPT

## 2018-07-13 PROCEDURE — P9016 RBC LEUKOCYTES REDUCED: HCPCS

## 2018-07-13 PROCEDURE — 33530 CORONARY ARTERY BYPASS/REOP: CPT | Performed by: THORACIC SURGERY (CARDIOTHORACIC VASCULAR SURGERY)

## 2018-07-13 PROCEDURE — 2500000003 HC RX 250 WO HCPCS: Performed by: NURSE ANESTHETIST, CERTIFIED REGISTERED

## 2018-07-13 PROCEDURE — 2780000006 HC MISC HEART VALVE: Performed by: THORACIC SURGERY (CARDIOTHORACIC VASCULAR SURGERY)

## 2018-07-13 PROCEDURE — 02UG0JZ SUPPLEMENT MITRAL VALVE WITH SYNTHETIC SUBSTITUTE, OPEN APPROACH: ICD-10-PCS | Performed by: THORACIC SURGERY (CARDIOTHORACIC VASCULAR SURGERY)

## 2018-07-13 PROCEDURE — C9248 INJ, CLEVIDIPINE BUTYRATE: HCPCS | Performed by: THORACIC SURGERY (CARDIOTHORACIC VASCULAR SURGERY)

## 2018-07-13 PROCEDURE — 2500000003 HC RX 250 WO HCPCS: Performed by: THORACIC SURGERY (CARDIOTHORACIC VASCULAR SURGERY)

## 2018-07-13 PROCEDURE — 6360000002 HC RX W HCPCS: Performed by: NURSE ANESTHETIST, CERTIFIED REGISTERED

## 2018-07-13 PROCEDURE — 2580000003 HC RX 258: Performed by: THORACIC SURGERY (CARDIOTHORACIC VASCULAR SURGERY)

## 2018-07-13 PROCEDURE — 6370000000 HC RX 637 (ALT 250 FOR IP): Performed by: NURSE PRACTITIONER

## 2018-07-13 PROCEDURE — 6360000002 HC RX W HCPCS: Performed by: THORACIC SURGERY (CARDIOTHORACIC VASCULAR SURGERY)

## 2018-07-13 PROCEDURE — 33426 REPAIR OF MITRAL VALVE: CPT | Performed by: THORACIC SURGERY (CARDIOTHORACIC VASCULAR SURGERY)

## 2018-07-13 PROCEDURE — 82947 ASSAY GLUCOSE BLOOD QUANT: CPT

## 2018-07-13 PROCEDURE — 2700000000 HC OXYGEN THERAPY PER DAY

## 2018-07-13 PROCEDURE — 6360000002 HC RX W HCPCS

## 2018-07-13 PROCEDURE — B245ZZ4 ULTRASONOGRAPHY OF LEFT HEART, TRANSESOPHAGEAL: ICD-10-PCS | Performed by: THORACIC SURGERY (CARDIOTHORACIC VASCULAR SURGERY)

## 2018-07-13 PROCEDURE — 2709999900 HC NON-CHARGEABLE SUPPLY: Performed by: THORACIC SURGERY (CARDIOTHORACIC VASCULAR SURGERY)

## 2018-07-13 PROCEDURE — 88305 TISSUE EXAM BY PATHOLOGIST: CPT

## 2018-07-13 PROCEDURE — 2000000000 HC ICU R&B

## 2018-07-13 PROCEDURE — 82803 BLOOD GASES ANY COMBINATION: CPT

## 2018-07-13 PROCEDURE — 83735 ASSAY OF MAGNESIUM: CPT

## 2018-07-13 PROCEDURE — 02QJ0ZZ REPAIR TRICUSPID VALVE, OPEN APPROACH: ICD-10-PCS | Performed by: THORACIC SURGERY (CARDIOTHORACIC VASCULAR SURGERY)

## 2018-07-13 PROCEDURE — 2580000003 HC RX 258: Performed by: NURSE PRACTITIONER

## 2018-07-13 PROCEDURE — P9045 ALBUMIN (HUMAN), 5%, 250 ML: HCPCS

## 2018-07-13 PROCEDURE — 6370000000 HC RX 637 (ALT 250 FOR IP): Performed by: THORACIC SURGERY (CARDIOTHORACIC VASCULAR SURGERY)

## 2018-07-13 PROCEDURE — 33464 VALVULOPLASTY TRICUSPID: CPT | Performed by: THORACIC SURGERY (CARDIOTHORACIC VASCULAR SURGERY)

## 2018-07-13 PROCEDURE — 36415 COLL VENOUS BLD VENIPUNCTURE: CPT

## 2018-07-13 PROCEDURE — 33405 REPLACEMENT AORTIC VALVE OPN: CPT | Performed by: THORACIC SURGERY (CARDIOTHORACIC VASCULAR SURGERY)

## 2018-07-13 PROCEDURE — 37799 UNLISTED PX VASCULAR SURGERY: CPT

## 2018-07-13 PROCEDURE — 02RF08Z REPLACEMENT OF AORTIC VALVE WITH ZOOPLASTIC TISSUE, OPEN APPROACH: ICD-10-PCS | Performed by: THORACIC SURGERY (CARDIOTHORACIC VASCULAR SURGERY)

## 2018-07-13 PROCEDURE — 87081 CULTURE SCREEN ONLY: CPT

## 2018-07-13 PROCEDURE — 81001 URINALYSIS AUTO W/SCOPE: CPT

## 2018-07-13 PROCEDURE — C1894 INTRO/SHEATH, NON-LASER: HCPCS | Performed by: THORACIC SURGERY (CARDIOTHORACIC VASCULAR SURGERY)

## 2018-07-13 PROCEDURE — 5A1221Z PERFORMANCE OF CARDIAC OUTPUT, CONTINUOUS: ICD-10-PCS | Performed by: THORACIC SURGERY (CARDIOTHORACIC VASCULAR SURGERY)

## 2018-07-13 PROCEDURE — 85520 HEPARIN ASSAY: CPT

## 2018-07-13 PROCEDURE — 85027 COMPLETE CBC AUTOMATED: CPT

## 2018-07-13 PROCEDURE — 71045 X-RAY EXAM CHEST 1 VIEW: CPT

## 2018-07-13 PROCEDURE — 3700000001 HC ADD 15 MINUTES (ANESTHESIA): Performed by: THORACIC SURGERY (CARDIOTHORACIC VASCULAR SURGERY)

## 2018-07-13 PROCEDURE — 3700000000 HC ANESTHESIA ATTENDED CARE: Performed by: THORACIC SURGERY (CARDIOTHORACIC VASCULAR SURGERY)

## 2018-07-13 PROCEDURE — 3E080GC INTRODUCTION OF OTHER THERAPEUTIC SUBSTANCE INTO HEART, OPEN APPROACH: ICD-10-PCS | Performed by: THORACIC SURGERY (CARDIOTHORACIC VASCULAR SURGERY)

## 2018-07-13 PROCEDURE — 94002 VENT MGMT INPAT INIT DAY: CPT

## 2018-07-13 PROCEDURE — 84295 ASSAY OF SERUM SODIUM: CPT

## 2018-07-13 PROCEDURE — 80048 BASIC METABOLIC PNL TOTAL CA: CPT

## 2018-07-13 DEVICE — IMPLANTABLE DEVICE: Type: IMPLANTABLE DEVICE | Site: CHEST | Status: FUNCTIONAL

## 2018-07-13 DEVICE — RING ANNULO HEART MITRAL PHYSIO 28MM: Type: IMPLANTABLE DEVICE | Site: CHEST | Status: FUNCTIONAL

## 2018-07-13 DEVICE — RING ANNULO HEART VLV TRICUSPID 28MM: Type: IMPLANTABLE DEVICE | Site: HEART | Status: FUNCTIONAL

## 2018-07-13 RX ORDER — DEXTROSE MONOHYDRATE 50 MG/ML
100 INJECTION, SOLUTION INTRAVENOUS PRN
Status: DISCONTINUED | OUTPATIENT
Start: 2018-07-13 | End: 2018-07-15

## 2018-07-13 RX ORDER — DEXTROSE MONOHYDRATE 25 G/50ML
12.5 INJECTION, SOLUTION INTRAVENOUS PRN
Status: DISCONTINUED | OUTPATIENT
Start: 2018-07-13 | End: 2018-07-15

## 2018-07-13 RX ORDER — POTASSIUM CHLORIDE 29.8 MG/ML
INJECTION INTRAVENOUS
Status: COMPLETED
Start: 2018-07-13 | End: 2018-07-13

## 2018-07-13 RX ORDER — SENNOSIDES 8.8 MG/5ML
10 LIQUID ORAL DAILY PRN
Status: DISCONTINUED | OUTPATIENT
Start: 2018-07-13 | End: 2018-07-19 | Stop reason: HOSPADM

## 2018-07-13 RX ORDER — SODIUM CHLORIDE 9 MG/ML
INJECTION, SOLUTION INTRAVENOUS CONTINUOUS
Status: DISCONTINUED | OUTPATIENT
Start: 2018-07-13 | End: 2018-07-15

## 2018-07-13 RX ORDER — SODIUM CHLORIDE 9 MG/ML
INJECTION, SOLUTION INTRAVENOUS CONTINUOUS PRN
Status: DISCONTINUED | OUTPATIENT
Start: 2018-07-13 | End: 2018-07-13 | Stop reason: SDUPTHER

## 2018-07-13 RX ORDER — FENTANYL CITRATE 50 UG/ML
INJECTION, SOLUTION INTRAMUSCULAR; INTRAVENOUS PRN
Status: DISCONTINUED | OUTPATIENT
Start: 2018-07-13 | End: 2018-07-13 | Stop reason: SDUPTHER

## 2018-07-13 RX ORDER — ALBUMIN, HUMAN INJ 5% 5 %
SOLUTION INTRAVENOUS
Status: COMPLETED
Start: 2018-07-13 | End: 2018-07-13

## 2018-07-13 RX ORDER — BISACODYL 10 MG
10 SUPPOSITORY, RECTAL RECTAL DAILY PRN
Status: DISCONTINUED | OUTPATIENT
Start: 2018-07-13 | End: 2018-07-19 | Stop reason: HOSPADM

## 2018-07-13 RX ORDER — MORPHINE SULFATE 2 MG/ML
2 INJECTION, SOLUTION INTRAMUSCULAR; INTRAVENOUS
Status: DISCONTINUED | OUTPATIENT
Start: 2018-07-13 | End: 2018-07-15

## 2018-07-13 RX ORDER — EPHEDRINE SULFATE 50 MG/ML
INJECTION, SOLUTION INTRAVENOUS PRN
Status: DISCONTINUED | OUTPATIENT
Start: 2018-07-13 | End: 2018-07-13 | Stop reason: SDUPTHER

## 2018-07-13 RX ORDER — OXYCODONE HYDROCHLORIDE 5 MG/1
5 TABLET ORAL EVERY 4 HOURS PRN
Status: DISCONTINUED | OUTPATIENT
Start: 2018-07-13 | End: 2018-07-19 | Stop reason: HOSPADM

## 2018-07-13 RX ORDER — CHLORHEXIDINE GLUCONATE 4 G/100ML
SOLUTION TOPICAL SEE ADMIN INSTRUCTIONS
Status: DISCONTINUED | OUTPATIENT
Start: 2018-07-13 | End: 2018-07-13 | Stop reason: HOSPADM

## 2018-07-13 RX ORDER — CHLORHEXIDINE GLUCONATE 0.12 MG/ML
15 RINSE ORAL ONCE
Status: COMPLETED | OUTPATIENT
Start: 2018-07-13 | End: 2018-07-13

## 2018-07-13 RX ORDER — ROCURONIUM BROMIDE 10 MG/ML
INJECTION, SOLUTION INTRAVENOUS PRN
Status: DISCONTINUED | OUTPATIENT
Start: 2018-07-13 | End: 2018-07-13 | Stop reason: SDUPTHER

## 2018-07-13 RX ORDER — ACETAMINOPHEN 325 MG/1
650 TABLET ORAL EVERY 4 HOURS PRN
Status: DISCONTINUED | OUTPATIENT
Start: 2018-07-13 | End: 2018-07-19 | Stop reason: HOSPADM

## 2018-07-13 RX ORDER — FAMOTIDINE 20 MG/1
20 TABLET, FILM COATED ORAL 2 TIMES DAILY
Status: DISCONTINUED | OUTPATIENT
Start: 2018-07-15 | End: 2018-07-13

## 2018-07-13 RX ORDER — NICOTINE POLACRILEX 4 MG
15 LOZENGE BUCCAL PRN
Status: DISCONTINUED | OUTPATIENT
Start: 2018-07-13 | End: 2018-07-15

## 2018-07-13 RX ORDER — M-VIT,TX,IRON,MINS/CALC/FOLIC 27MG-0.4MG
1 TABLET ORAL
Status: DISCONTINUED | OUTPATIENT
Start: 2018-07-14 | End: 2018-07-19 | Stop reason: HOSPADM

## 2018-07-13 RX ORDER — VASOPRESSIN 20 U/ML
INJECTION PARENTERAL CONTINUOUS PRN
Status: DISCONTINUED | OUTPATIENT
Start: 2018-07-13 | End: 2018-07-13 | Stop reason: SDUPTHER

## 2018-07-13 RX ORDER — PROTAMINE SULFATE 10 MG/ML
50 INJECTION, SOLUTION INTRAVENOUS
Status: ACTIVE | OUTPATIENT
Start: 2018-07-13 | End: 2018-07-13

## 2018-07-13 RX ORDER — ALBUTEROL SULFATE 90 UG/1
2 AEROSOL, METERED RESPIRATORY (INHALATION) EVERY 6 HOURS PRN
Status: DISCONTINUED | OUTPATIENT
Start: 2018-07-13 | End: 2018-07-19 | Stop reason: HOSPADM

## 2018-07-13 RX ORDER — PROPOFOL 10 MG/ML
INJECTION, EMULSION INTRAVENOUS PRN
Status: DISCONTINUED | OUTPATIENT
Start: 2018-07-13 | End: 2018-07-13 | Stop reason: SDUPTHER

## 2018-07-13 RX ORDER — ACETAMINOPHEN 650 MG/1
650 SUPPOSITORY RECTAL EVERY 4 HOURS PRN
Status: DISCONTINUED | OUTPATIENT
Start: 2018-07-13 | End: 2018-07-15

## 2018-07-13 RX ORDER — HEPARIN SODIUM 1000 [USP'U]/ML
INJECTION, SOLUTION INTRAVENOUS; SUBCUTANEOUS PRN
Status: DISCONTINUED | OUTPATIENT
Start: 2018-07-13 | End: 2018-07-13 | Stop reason: SDUPTHER

## 2018-07-13 RX ORDER — CEFAZOLIN SODIUM 1 G/3ML
INJECTION, POWDER, FOR SOLUTION INTRAMUSCULAR; INTRAVENOUS PRN
Status: DISCONTINUED | OUTPATIENT
Start: 2018-07-13 | End: 2018-07-13 | Stop reason: SDUPTHER

## 2018-07-13 RX ORDER — POTASSIUM CHLORIDE 29.8 MG/ML
20 INJECTION INTRAVENOUS PRN
Status: DISCONTINUED | OUTPATIENT
Start: 2018-07-14 | End: 2018-07-15

## 2018-07-13 RX ORDER — ATORVASTATIN CALCIUM 20 MG/1
20 TABLET, FILM COATED ORAL NIGHTLY
Status: DISCONTINUED | OUTPATIENT
Start: 2018-07-14 | End: 2018-07-19 | Stop reason: HOSPADM

## 2018-07-13 RX ORDER — ENALAPRILAT 2.5 MG/2ML
0.62 INJECTION INTRAVENOUS
Status: DISCONTINUED | OUTPATIENT
Start: 2018-07-13 | End: 2018-07-15

## 2018-07-13 RX ORDER — PROTAMINE SULFATE 10 MG/ML
INJECTION, SOLUTION INTRAVENOUS PRN
Status: DISCONTINUED | OUTPATIENT
Start: 2018-07-13 | End: 2018-07-13 | Stop reason: SDUPTHER

## 2018-07-13 RX ORDER — POTASSIUM CHLORIDE 29.8 MG/ML
20 INJECTION INTRAVENOUS ONCE
Status: COMPLETED | OUTPATIENT
Start: 2018-07-13 | End: 2018-07-13

## 2018-07-13 RX ORDER — SODIUM CHLORIDE 0.9 % (FLUSH) 0.9 %
10 SYRINGE (ML) INJECTION EVERY 12 HOURS SCHEDULED
Status: DISCONTINUED | OUTPATIENT
Start: 2018-07-13 | End: 2018-07-19 | Stop reason: HOSPADM

## 2018-07-13 RX ORDER — METOPROLOL TARTRATE 5 MG/5ML
2.5 INJECTION INTRAVENOUS EVERY 10 MIN PRN
Status: DISCONTINUED | OUTPATIENT
Start: 2018-07-13 | End: 2018-07-15

## 2018-07-13 RX ORDER — ONDANSETRON 2 MG/ML
4 INJECTION INTRAMUSCULAR; INTRAVENOUS EVERY 6 HOURS PRN
Status: DISCONTINUED | OUTPATIENT
Start: 2018-07-13 | End: 2018-07-19 | Stop reason: HOSPADM

## 2018-07-13 RX ORDER — OXYCODONE HYDROCHLORIDE 5 MG/1
10 TABLET ORAL EVERY 4 HOURS PRN
Status: DISCONTINUED | OUTPATIENT
Start: 2018-07-13 | End: 2018-07-19 | Stop reason: HOSPADM

## 2018-07-13 RX ORDER — SODIUM CHLORIDE 0.9 % (FLUSH) 0.9 %
10 SYRINGE (ML) INJECTION PRN
Status: DISCONTINUED | OUTPATIENT
Start: 2018-07-13 | End: 2018-07-19 | Stop reason: HOSPADM

## 2018-07-13 RX ORDER — DOCUSATE SODIUM 100 MG/1
100 CAPSULE, LIQUID FILLED ORAL 2 TIMES DAILY
Status: DISCONTINUED | OUTPATIENT
Start: 2018-07-14 | End: 2018-07-19 | Stop reason: HOSPADM

## 2018-07-13 RX ORDER — MIDAZOLAM HYDROCHLORIDE 1 MG/ML
INJECTION INTRAMUSCULAR; INTRAVENOUS PRN
Status: DISCONTINUED | OUTPATIENT
Start: 2018-07-13 | End: 2018-07-13 | Stop reason: SDUPTHER

## 2018-07-13 RX ORDER — FAMOTIDINE 20 MG/1
20 TABLET, FILM COATED ORAL EVERY 24 HOURS
Status: DISCONTINUED | OUTPATIENT
Start: 2018-07-15 | End: 2018-07-19 | Stop reason: HOSPADM

## 2018-07-13 RX ORDER — ALBUMIN, HUMAN INJ 5% 5 %
25 SOLUTION INTRAVENOUS PRN
Status: DISCONTINUED | OUTPATIENT
Start: 2018-07-13 | End: 2018-07-15

## 2018-07-13 RX ORDER — AMIODARONE HYDROCHLORIDE 200 MG/1
200 TABLET ORAL 2 TIMES DAILY
Status: DISCONTINUED | OUTPATIENT
Start: 2018-07-13 | End: 2018-07-15

## 2018-07-13 RX ADMIN — ROCURONIUM BROMIDE 50 MG: 10 INJECTION INTRAVENOUS at 08:44

## 2018-07-13 RX ADMIN — Medication 1.1 UNITS/HR: at 16:24

## 2018-07-13 RX ADMIN — PROTAMINE SULFATE 250 MG: 10 INJECTION, SOLUTION INTRAVENOUS at 14:17

## 2018-07-13 RX ADMIN — MIDAZOLAM HYDROCHLORIDE 3 MG: 1 INJECTION, SOLUTION INTRAMUSCULAR; INTRAVENOUS at 07:43

## 2018-07-13 RX ADMIN — Medication 10 ML: at 20:37

## 2018-07-13 RX ADMIN — ALBUMIN (HUMAN) 25 G: 12.5 INJECTION, SOLUTION INTRAVENOUS at 16:32

## 2018-07-13 RX ADMIN — ROCURONIUM BROMIDE 50 MG: 10 INJECTION INTRAVENOUS at 07:49

## 2018-07-13 RX ADMIN — SODIUM CHLORIDE: 9 INJECTION, SOLUTION INTRAVENOUS at 16:40

## 2018-07-13 RX ADMIN — MORPHINE SULFATE 2 MG: 2 INJECTION, SOLUTION INTRAMUSCULAR; INTRAVENOUS at 20:18

## 2018-07-13 RX ADMIN — SODIUM BICARBONATE 150 MEQ: 84 INJECTION, SOLUTION INTRAVENOUS at 16:41

## 2018-07-13 RX ADMIN — ROCURONIUM BROMIDE 40 MG: 10 INJECTION INTRAVENOUS at 09:57

## 2018-07-13 RX ADMIN — FENTANYL CITRATE 150 MCG: 50 INJECTION INTRAMUSCULAR; INTRAVENOUS at 07:49

## 2018-07-13 RX ADMIN — PROTAMINE SULFATE 50 MG: 10 INJECTION, SOLUTION INTRAVENOUS at 14:33

## 2018-07-13 RX ADMIN — POTASSIUM CHLORIDE 20 MEQ: 400 INJECTION, SOLUTION INTRAVENOUS at 16:53

## 2018-07-13 RX ADMIN — HEPARIN SODIUM 30000 UNITS: 1000 INJECTION, SOLUTION INTRAVENOUS; SUBCUTANEOUS at 08:49

## 2018-07-13 RX ADMIN — MIDAZOLAM HYDROCHLORIDE 2 MG: 1 INJECTION, SOLUTION INTRAMUSCULAR; INTRAVENOUS at 13:09

## 2018-07-13 RX ADMIN — Medication 15 ML: at 05:33

## 2018-07-13 RX ADMIN — EPHEDRINE SULFATE 20 MG: 50 INJECTION INTRAMUSCULAR; INTRAVENOUS; SUBCUTANEOUS at 07:55

## 2018-07-13 RX ADMIN — POTASSIUM CHLORIDE 20 MEQ: 400 INJECTION, SOLUTION INTRAVENOUS at 17:38

## 2018-07-13 RX ADMIN — SODIUM BICARBONATE 50 MEQ: 84 INJECTION, SOLUTION INTRAVENOUS at 16:31

## 2018-07-13 RX ADMIN — CEFAZOLIN 2000 MG: 1 INJECTION, POWDER, FOR SOLUTION INTRAMUSCULAR; INTRAVENOUS; PARENTERAL at 08:33

## 2018-07-13 RX ADMIN — VANCOMYCIN HYDROCHLORIDE 1000 MG: 1 INJECTION, POWDER, LYOPHILIZED, FOR SOLUTION INTRAVENOUS at 08:41

## 2018-07-13 RX ADMIN — FENTANYL CITRATE 100 MCG: 50 INJECTION INTRAMUSCULAR; INTRAVENOUS at 15:19

## 2018-07-13 RX ADMIN — PROPOFOL 150 MG: 10 INJECTION, EMULSION INTRAVENOUS at 07:49

## 2018-07-13 RX ADMIN — POTASSIUM CHLORIDE 20 MEQ: 400 INJECTION, SOLUTION INTRAVENOUS at 20:42

## 2018-07-13 RX ADMIN — AMIODARONE HYDROCHLORIDE 200 MG: 200 TABLET ORAL at 05:32

## 2018-07-13 RX ADMIN — SODIUM CHLORIDE: 9 INJECTION, SOLUTION INTRAVENOUS at 14:00

## 2018-07-13 RX ADMIN — Medication 7.3 UNITS/HR: at 20:05

## 2018-07-13 RX ADMIN — VASOPRESSIN 0.04 UNITS/HR: 20 INJECTION INTRAVENOUS at 14:14

## 2018-07-13 RX ADMIN — MORPHINE SULFATE 2 MG: 2 INJECTION, SOLUTION INTRAMUSCULAR; INTRAVENOUS at 16:19

## 2018-07-13 RX ADMIN — HEPARIN SODIUM 10000 UNITS: 1000 INJECTION, SOLUTION INTRAVENOUS; SUBCUTANEOUS at 09:27

## 2018-07-13 RX ADMIN — MORPHINE SULFATE 2 MG: 2 INJECTION, SOLUTION INTRAMUSCULAR; INTRAVENOUS at 18:55

## 2018-07-13 RX ADMIN — CLEVIPIDINE 1 MG/HR: 0.5 EMULSION INTRAVENOUS at 16:50

## 2018-07-13 RX ADMIN — EPINEPHRINE 10 MCG/MIN: 1 INJECTION, SOLUTION INTRAMUSCULAR; INTRAVENOUS; SUBCUTANEOUS at 13:32

## 2018-07-13 RX ADMIN — SODIUM CHLORIDE: 9 INJECTION, SOLUTION INTRAVENOUS at 07:43

## 2018-07-13 RX ADMIN — METOPROLOL TARTRATE 12.5 MG: 25 TABLET ORAL at 05:33

## 2018-07-13 RX ADMIN — FENTANYL CITRATE 100 MCG: 50 INJECTION INTRAMUSCULAR; INTRAVENOUS at 08:31

## 2018-07-13 RX ADMIN — Medication 2 G: at 16:40

## 2018-07-13 RX ADMIN — MORPHINE SULFATE 2 MG: 2 INJECTION, SOLUTION INTRAMUSCULAR; INTRAVENOUS at 23:04

## 2018-07-13 RX ADMIN — SODIUM CHLORIDE 4 UNITS/HR: 9 INJECTION, SOLUTION INTRAVENOUS at 09:42

## 2018-07-13 RX ADMIN — FENTANYL CITRATE 150 MCG: 50 INJECTION INTRAMUSCULAR; INTRAVENOUS at 09:00

## 2018-07-13 RX ADMIN — VANCOMYCIN HYDROCHLORIDE 1500 MG: 5 INJECTION, POWDER, LYOPHILIZED, FOR SOLUTION INTRAVENOUS at 18:27

## 2018-07-13 RX ADMIN — ALBUMIN, HUMAN INJ 5% 25 G: 5 SOLUTION at 16:32

## 2018-07-13 RX ADMIN — MAGNESIUM SULFATE HEPTAHYDRATE 1.5 G: 500 INJECTION, SOLUTION INTRAMUSCULAR; INTRAVENOUS at 06:11

## 2018-07-13 RX ADMIN — SODIUM CHLORIDE: 9 INJECTION, SOLUTION INTRAVENOUS at 08:15

## 2018-07-13 RX ADMIN — DESMOPRESSIN ACETATE 24 MCG: 4 INJECTION, SOLUTION INTRAVENOUS; SUBCUTANEOUS at 15:15

## 2018-07-13 ASSESSMENT — PULMONARY FUNCTION TESTS
PIF_VALUE: 0
PIF_VALUE: 1
PIF_VALUE: 23
PIF_VALUE: 1
PIF_VALUE: 24
PIF_VALUE: 25
PIF_VALUE: 1
PIF_VALUE: 34
PIF_VALUE: 34
PIF_VALUE: 0
PIF_VALUE: 1
PIF_VALUE: 0
PIF_VALUE: 0
PIF_VALUE: 1
PIF_VALUE: 34
PIF_VALUE: 29
PIF_VALUE: 1
PIF_VALUE: 0
PIF_VALUE: 33
PIF_VALUE: 24
PIF_VALUE: 1
PIF_VALUE: 25
PIF_VALUE: 1
PIF_VALUE: 23
PIF_VALUE: 0
PIF_VALUE: 1
PIF_VALUE: 0
PIF_VALUE: 24
PIF_VALUE: 28
PIF_VALUE: 33
PIF_VALUE: 0
PIF_VALUE: 1
PIF_VALUE: 0
PIF_VALUE: 0
PIF_VALUE: 1
PIF_VALUE: 1
PIF_VALUE: 0
PIF_VALUE: 0
PIF_VALUE: 3
PIF_VALUE: 25
PIF_VALUE: 2
PIF_VALUE: 1
PIF_VALUE: 1
PIF_VALUE: 27
PIF_VALUE: 23
PIF_VALUE: 1
PIF_VALUE: 0
PIF_VALUE: 1
PIF_VALUE: 23
PIF_VALUE: 25
PIF_VALUE: 34
PIF_VALUE: 24
PIF_VALUE: 3
PIF_VALUE: 0
PIF_VALUE: 24
PIF_VALUE: 24
PIF_VALUE: 25
PIF_VALUE: 23
PIF_VALUE: 4
PIF_VALUE: 26
PIF_VALUE: 0
PIF_VALUE: 2
PIF_VALUE: 25
PIF_VALUE: 0
PIF_VALUE: 1
PIF_VALUE: 1
PIF_VALUE: 3
PIF_VALUE: 24
PIF_VALUE: 24
PIF_VALUE: 26
PIF_VALUE: 1
PIF_VALUE: 0
PIF_VALUE: 23
PIF_VALUE: 1
PIF_VALUE: 0
PIF_VALUE: 0
PIF_VALUE: 24
PIF_VALUE: 1
PIF_VALUE: 2
PIF_VALUE: 1
PIF_VALUE: 23
PIF_VALUE: 35
PIF_VALUE: 0
PIF_VALUE: 23
PIF_VALUE: 23
PIF_VALUE: 26
PIF_VALUE: 3
PIF_VALUE: 1
PIF_VALUE: 0
PIF_VALUE: 0
PIF_VALUE: 24
PIF_VALUE: 0
PIF_VALUE: 33
PIF_VALUE: 32
PIF_VALUE: 1
PIF_VALUE: 26
PIF_VALUE: 25
PIF_VALUE: 1
PIF_VALUE: 24
PIF_VALUE: 23
PIF_VALUE: 27
PIF_VALUE: 0
PIF_VALUE: 24
PIF_VALUE: 0
PIF_VALUE: 0
PIF_VALUE: 33
PIF_VALUE: 0
PIF_VALUE: 1
PIF_VALUE: 2
PIF_VALUE: 0
PIF_VALUE: 25
PIF_VALUE: 1
PIF_VALUE: 26
PIF_VALUE: 1
PIF_VALUE: 24
PIF_VALUE: 0
PIF_VALUE: 1
PIF_VALUE: 0
PIF_VALUE: 35
PIF_VALUE: 23
PIF_VALUE: 0
PIF_VALUE: 0
PIF_VALUE: 3
PIF_VALUE: 0
PIF_VALUE: 1
PIF_VALUE: 24
PIF_VALUE: 0
PIF_VALUE: 24
PIF_VALUE: 0
PIF_VALUE: 23
PIF_VALUE: 1
PIF_VALUE: 26
PIF_VALUE: 0
PIF_VALUE: 22
PIF_VALUE: 1
PIF_VALUE: 0
PIF_VALUE: 26
PIF_VALUE: 2
PIF_VALUE: 1
PIF_VALUE: 1
PIF_VALUE: 0
PIF_VALUE: 1
PIF_VALUE: 25
PIF_VALUE: 26
PIF_VALUE: 0
PIF_VALUE: 33
PIF_VALUE: 1
PIF_VALUE: 22
PIF_VALUE: 0
PIF_VALUE: 26
PIF_VALUE: 34
PIF_VALUE: 0
PIF_VALUE: 25
PIF_VALUE: 1
PIF_VALUE: 24
PIF_VALUE: 1
PIF_VALUE: 0
PIF_VALUE: 23
PIF_VALUE: 0
PIF_VALUE: 1
PIF_VALUE: 2
PIF_VALUE: 1
PIF_VALUE: 6
PIF_VALUE: 1
PIF_VALUE: 28
PIF_VALUE: 0
PIF_VALUE: 0
PIF_VALUE: 1
PIF_VALUE: 23
PIF_VALUE: 0
PIF_VALUE: 1
PIF_VALUE: 1
PIF_VALUE: 23
PIF_VALUE: 25
PIF_VALUE: 0
PIF_VALUE: 25
PIF_VALUE: 23
PIF_VALUE: 32
PIF_VALUE: 0
PIF_VALUE: 46
PIF_VALUE: 23
PIF_VALUE: 26
PIF_VALUE: 0
PIF_VALUE: 0
PIF_VALUE: 23
PIF_VALUE: 22
PIF_VALUE: 0
PIF_VALUE: 22
PIF_VALUE: 1
PIF_VALUE: 24
PIF_VALUE: 22
PIF_VALUE: 25
PIF_VALUE: 2
PIF_VALUE: 0
PIF_VALUE: 1
PIF_VALUE: 20
PIF_VALUE: 2
PIF_VALUE: 0
PIF_VALUE: 3
PIF_VALUE: 24
PIF_VALUE: 2
PIF_VALUE: 24
PIF_VALUE: 32
PIF_VALUE: 32
PIF_VALUE: 26
PIF_VALUE: 2
PIF_VALUE: 0
PIF_VALUE: 0
PIF_VALUE: 25
PIF_VALUE: 1
PIF_VALUE: 0
PIF_VALUE: 26
PIF_VALUE: 0
PIF_VALUE: 24
PIF_VALUE: 33
PIF_VALUE: 0
PIF_VALUE: 28
PIF_VALUE: 32
PIF_VALUE: 3
PIF_VALUE: 2
PIF_VALUE: 1
PIF_VALUE: 35
PIF_VALUE: 2
PIF_VALUE: 2
PIF_VALUE: 0
PIF_VALUE: 26
PIF_VALUE: 0
PIF_VALUE: 0
PIF_VALUE: 1
PIF_VALUE: 0
PIF_VALUE: 25
PIF_VALUE: 28
PIF_VALUE: 3
PIF_VALUE: 0
PIF_VALUE: 0
PIF_VALUE: 1
PIF_VALUE: 26
PIF_VALUE: 33
PIF_VALUE: 2
PIF_VALUE: 23
PIF_VALUE: 0
PIF_VALUE: 0
PIF_VALUE: 1
PIF_VALUE: 0
PIF_VALUE: 33
PIF_VALUE: 25
PIF_VALUE: 0
PIF_VALUE: 33
PIF_VALUE: 26
PIF_VALUE: 33
PIF_VALUE: 22
PIF_VALUE: 0
PIF_VALUE: 22
PIF_VALUE: 23
PIF_VALUE: 26
PIF_VALUE: 0
PIF_VALUE: 22
PIF_VALUE: 23
PIF_VALUE: 24
PIF_VALUE: 2
PIF_VALUE: 26
PIF_VALUE: 16
PIF_VALUE: 2
PIF_VALUE: 24
PIF_VALUE: 26
PIF_VALUE: 1
PIF_VALUE: 35
PIF_VALUE: 23
PIF_VALUE: 24
PIF_VALUE: 2
PIF_VALUE: 1
PIF_VALUE: 1
PIF_VALUE: 27
PIF_VALUE: 34
PIF_VALUE: 35
PIF_VALUE: 27
PIF_VALUE: 27
PIF_VALUE: 3
PIF_VALUE: 22
PIF_VALUE: 0
PIF_VALUE: 0
PIF_VALUE: 24
PIF_VALUE: 1
PIF_VALUE: 34
PIF_VALUE: 0
PIF_VALUE: 24
PIF_VALUE: 0
PIF_VALUE: 23
PIF_VALUE: 1
PIF_VALUE: 24
PIF_VALUE: 34
PIF_VALUE: 2
PIF_VALUE: 23
PIF_VALUE: 22
PIF_VALUE: 1
PIF_VALUE: 24
PIF_VALUE: 0
PIF_VALUE: 1
PIF_VALUE: 33
PIF_VALUE: 23
PIF_VALUE: 0
PIF_VALUE: 1
PIF_VALUE: 35
PIF_VALUE: 1
PIF_VALUE: 23
PIF_VALUE: 0
PIF_VALUE: 0
PIF_VALUE: 26
PIF_VALUE: 1
PIF_VALUE: 23
PIF_VALUE: 1
PIF_VALUE: 26
PIF_VALUE: 0
PIF_VALUE: 1
PIF_VALUE: 1
PIF_VALUE: 2
PIF_VALUE: 35
PIF_VALUE: 25
PIF_VALUE: 25
PIF_VALUE: 2
PIF_VALUE: 1
PIF_VALUE: 0
PIF_VALUE: 24
PIF_VALUE: 22
PIF_VALUE: 23
PIF_VALUE: 1
PIF_VALUE: 23
PIF_VALUE: 0
PIF_VALUE: 12
PIF_VALUE: 0
PIF_VALUE: 2
PIF_VALUE: 3
PIF_VALUE: 1
PIF_VALUE: 1
PIF_VALUE: 34
PIF_VALUE: 0
PIF_VALUE: 1
PIF_VALUE: 33
PIF_VALUE: 23
PIF_VALUE: 32
PIF_VALUE: 1
PIF_VALUE: 0
PIF_VALUE: 24
PIF_VALUE: 25
PIF_VALUE: 24
PIF_VALUE: 0
PIF_VALUE: 23
PIF_VALUE: 27
PIF_VALUE: 25
PIF_VALUE: 34
PIF_VALUE: 2
PIF_VALUE: 23
PIF_VALUE: 1
PIF_VALUE: 0
PIF_VALUE: 23
PIF_VALUE: 24
PIF_VALUE: 1
PIF_VALUE: 31
PIF_VALUE: 1
PIF_VALUE: 33
PIF_VALUE: 35
PIF_VALUE: 26
PIF_VALUE: 29
PIF_VALUE: 0
PIF_VALUE: 23
PIF_VALUE: 2
PIF_VALUE: 3
PIF_VALUE: 24
PIF_VALUE: 23
PIF_VALUE: 24
PIF_VALUE: 24
PIF_VALUE: 0
PIF_VALUE: 1
PIF_VALUE: 2
PIF_VALUE: 1
PIF_VALUE: 0
PIF_VALUE: 50
PIF_VALUE: 24
PIF_VALUE: 2
PIF_VALUE: 1
PIF_VALUE: 0
PIF_VALUE: 23
PIF_VALUE: 36
PIF_VALUE: 25
PIF_VALUE: 0
PIF_VALUE: 2
PIF_VALUE: 25
PIF_VALUE: 25
PIF_VALUE: 26
PIF_VALUE: 25
PIF_VALUE: 0
PIF_VALUE: 0
PIF_VALUE: 1
PIF_VALUE: 27
PIF_VALUE: 0
PIF_VALUE: 1
PIF_VALUE: 34
PIF_VALUE: 28
PIF_VALUE: 24
PIF_VALUE: 26
PIF_VALUE: 0
PIF_VALUE: 27
PIF_VALUE: 25
PIF_VALUE: 26
PIF_VALUE: 1
PIF_VALUE: 27
PIF_VALUE: 0
PIF_VALUE: 34
PIF_VALUE: 0
PIF_VALUE: 23
PIF_VALUE: 3
PIF_VALUE: 26
PIF_VALUE: 0
PIF_VALUE: 23
PIF_VALUE: 27
PIF_VALUE: 0
PIF_VALUE: 33
PIF_VALUE: 25
PIF_VALUE: 26
PIF_VALUE: 25
PIF_VALUE: 0
PIF_VALUE: 0
PIF_VALUE: 24
PIF_VALUE: 2
PIF_VALUE: 25
PIF_VALUE: 1
PIF_VALUE: 35
PIF_VALUE: 2
PIF_VALUE: 24
PIF_VALUE: 0
PIF_VALUE: 23
PIF_VALUE: 2
PIF_VALUE: 0
PIF_VALUE: 1
PIF_VALUE: 0
PIF_VALUE: 33
PIF_VALUE: 27
PIF_VALUE: 0
PIF_VALUE: 26
PIF_VALUE: 33
PIF_VALUE: 26
PIF_VALUE: 0
PIF_VALUE: 25
PIF_VALUE: 0
PIF_VALUE: 23
PIF_VALUE: 0
PIF_VALUE: 1
PIF_VALUE: 0
PIF_VALUE: 2
PIF_VALUE: 0
PIF_VALUE: 34
PIF_VALUE: 34
PIF_VALUE: 0
PIF_VALUE: 26
PIF_VALUE: 2
PIF_VALUE: 22
PIF_VALUE: 0
PIF_VALUE: 18
PIF_VALUE: 1
PIF_VALUE: 35
PIF_VALUE: 22
PIF_VALUE: 0
PIF_VALUE: 35
PIF_VALUE: 3
PIF_VALUE: 26
PIF_VALUE: 26
PIF_VALUE: 46
PIF_VALUE: 0
PIF_VALUE: 22
PIF_VALUE: 28
PIF_VALUE: 0
PIF_VALUE: 0
PIF_VALUE: 34
PIF_VALUE: 34
PIF_VALUE: 26
PIF_VALUE: 25

## 2018-07-13 ASSESSMENT — PAIN SCALES - GENERAL
PAINLEVEL_OUTOF10: 9
PAINLEVEL_OUTOF10: 5

## 2018-07-13 NOTE — ANESTHESIA PRE PROCEDURE
 potassium chloride (KLOR-CON) extended release tablet 20 mEq  20 mEq Oral Daily Silvia Martines DO   20 mEq at 07/12/18 1043       Allergies:     Allergies   Allergen Reactions    Latex Rash    Demerol Hcl [Meperidine] Hives       Problem List:    Patient Active Problem List   Diagnosis Code    Aortic stenosis I35.0    Osteoarthritis M19.90    Atypical chest pain R07.89    Bradycardia R00.1    Dyspnea and respiratory abnormalities R06.00, R06.89    Fatigue R53.83    Essential hypertension I10    Hyperlipemia E78.5    Anemia, normocytic normochromic D64.9    Leukocytosis D72.829    Aortic aneurysm (HCC) I71.9    Hypokalemia E87.6    Congestive heart failure (HCC) I50.9    S/P AVR Z95.2    H/O prosthetic aortic valve replacement Z95.2    Mitral and aortic valve regurgitation I08.0    Aortic prosthetic valve regurgitation T82.897A    Acute decompensated heart failure (HCC) I50.9       Past Medical History:        Diagnosis Date    Acute decompensated heart failure (HCC) 7/2/2018    Aortic valve replaced 2015    Arthritis     Hyperlipidemia     Hypertension     THAYER (nonalcoholic steatohepatitis)     Smoker     Unspecified cerebral artery occlusion with cerebral infarction 3/23/15    \"bleeding on brain\"       Past Surgical History:        Procedure Laterality Date    AORTIC VALVE REPLACEMENT      cow valve    BRAIN SURGERY      to drain blood    [facility]    COLONOSCOPY      DIAGNOSTIC CARDIAC CATH LAB PROCEDURE      HYSTERECTOMY      [place] ECHO TRANSESOPHAG R-T 2D IMG ACQUISJ I&R ONLY Left 7/3/2018    TRANSESOPHAGEAL ECHOCARDIOGRAM performed by Lucita Umanzor MD at [facility]    VASCULAR SURGERY         Social History:    Social History   Substance Use Topics    Smoking status: Former Smoker     Packs/day: 0.50     Years: 44.00     Types: Cigarettes     Quit date: 6/23/2018    Smokeless tobacco: Former User      Comment: 2 or 3 cigs Evaluation   no history of anesthetic complications:   Airway: Mallampati: II  TM distance: >3 FB   Neck ROM: full  Mouth opening: > = 3 FB Dental:          Pulmonary:Negative Pulmonary ROS and normal exam              Patient did not smoke on day of surgery. Cardiovascular:  Exercise tolerance: good (>4 METS),   (+) hypertension:, valvular problems/murmurs: AI and MR, CHF:, murmur,         Rhythm: regular  Rate: normal  Echocardiogram reviewed    Cleared by cardiology     Beta Blocker:  Dose within 24 Hrs         Neuro/Psych:   (+) CVA:,             GI/Hepatic/Renal: Neg GI/Hepatic/Renal ROS            Endo/Other:              Pt had no PAT visit       Abdominal:           Vascular: negative vascular ROS. Anesthesia Plan      general     ASA 3       Induction: intravenous. arterial line, BIS, central line, PA catheter, WILBERTO and CVP  MIPS: Postoperative ventilation. Anesthetic plan and risks discussed with patient. Use of blood products discussed with patient whom consented to blood products. Plan discussed with CRNA.                   Terri Cobb MD   7/13/2018

## 2018-07-13 NOTE — FLOWSHEET NOTE
1600-Received patient via bed to 4D06, patient attached to bedside monitor. Assessment completed by and scribed for Josué Mccarty RN. Vasopressin infusing at 0.04 units/min  Epinephrine infusing at 15 ml/hr on arrival  1608- Vasopressin shut off per Dr. Tr Draper, at bedside  1610- Epinephrine decreased to 10 ml/hr  1615- Epinephrine decreased to 5 ml/hr  1620- Epinephrine stopped.

## 2018-07-13 NOTE — ANESTHESIA PROCEDURE NOTES
normal.  Leaflet motions normal.    Pulmonic Valve: Annulus normal.  Stenosis not present. Regurgitation none. Aorta    Ascending Aorta:  Size normal.  Dissection not present. Aortic Arch:  Size normal.  Dissection not present. Descending Aorta:  Size normal.  Dissection not present. Atria    Right Atrium:  Size normal.  Spontaneous echo contrast not present. Left Atrium:  Size normal.  Spontaneous echo contrast not present. Left atrial appendage normal.      Septa    Atrial Septum:  Intra-atrial septal morphology normal.      Ventricular Septum:  Intra-ventricular septum morphology normal.          Other Findings  Pericardium:  normal      Anesthesia Information  Performed Personally  Anesthesiologist:  ADALGISA MACDONALD      Echocardiogram Comments:       POST CPB REPORT  LV: Preserved EF with inotropic support. EF 50-55%. No RWMA. AV: 21 mm bioprosthetic valve in place. Mild central AI. Mild to moderate stenosis. Mean gradient 15 mmHg. No paravalvular leak. MV: Annuloplasty ring present. Mild MR persists. No stenosis. TV: Annuloplasty ring present. Trace TR. Difficult to visualize due to shadowing. PV: Unchanged.   No effusion  No dissection  No PFO

## 2018-07-13 NOTE — PLAN OF CARE
Problem: Discharge Planning:  Goal: Participates in care planning  Participates in care planning   Outcome: Ongoing   aware  Goal: Discharged to appropriate level of care  Discharged to appropriate level of care   Outcome: Ongoing      Problem: Cardiac Output - Decreased:  Goal: Hemodynamic stability will improve  Hemodynamic stability will improve   Outcome: Ongoing    Goal: Cardiac output within specified parameters  Cardiac output within specified parameters  Outcome: Ongoing      Problem: Fluid Volume - Imbalance:  Goal: Absence of imbalanced fluid volume signs and symptoms  Absence of imbalanced fluid volume signs and symptoms   Outcome: Not Met This Shift    Goal: Ability to achieve a balanced intake and output will improve  Ability to achieve a balanced intake and output will improve  Outcome: Ongoing    Goal: Chest tube drainage is within specified parameters  Chest tube drainage is within specified parameters  Outcome: Ongoing      Problem: Gas Exchange - Impaired:  Goal: Ability to maintain adequate ventilation will improve  Ability to maintain adequate ventilation will improve  Outcome: Ongoing      Problem:  Activity Intolerance:  Goal: Able to perform prescribed physical activity  Able to perform prescribed physical activity  Outcome: Ongoing    Goal: Ability to tolerate increased activity will improve  Ability to tolerate increased activity will improve  Outcome: Not Met This Shift      Problem: Anxiety:  Goal: Level of anxiety will decrease  Level of anxiety will decrease  Outcome: Ongoing      Problem: Pain:  Goal: Control of acute pain  Control of acute pain  Outcome: Ongoing    Goal: Control of chronic pain  Control of chronic pain  Outcome: Ongoing      Problem: Tissue Perfusion - Cardiopulmonary, Altered:  Goal: Absence of angina  Absence of angina  Outcome: Ongoing    Goal: Will show no evidence of cardiac arrhythmias  Will show no evidence of cardiac arrhythmias  Outcome: Ongoing      Comments: Care plan reviewed with patient and .  verbalize understanding of the plan of care and contribute to goal setting.

## 2018-07-14 ENCOUNTER — APPOINTMENT (OUTPATIENT)
Dept: GENERAL RADIOLOGY | Age: 63
DRG: 219 | End: 2018-07-14
Payer: MEDICARE

## 2018-07-14 LAB
ALLEN TEST: ABNORMAL
ANION GAP SERPL CALCULATED.3IONS-SCNC: 11 MEQ/L (ref 8–16)
BASE EXCESS (CALCULATED): -5 MMOL/L (ref -2.5–2.5)
BASE EXCESS (CALCULATED): 0.6 MMOL/L (ref -2.5–2.5)
BASE EXCESS (CALCULATED): 2.4 MMOL/L (ref -2.5–2.5)
BUN BLDV-MCNC: 33 MG/DL (ref 7–22)
CALCIUM SERPL-MCNC: 8 MG/DL (ref 8.5–10.5)
CHLORIDE BLD-SCNC: 112 MEQ/L (ref 98–111)
CO2: 26 MEQ/L (ref 23–33)
COLLECTED BY:: ABNORMAL
CREAT SERPL-MCNC: 1.5 MG/DL (ref 0.4–1.2)
DEVICE: ABNORMAL
EKG ATRIAL RATE: 77 BPM
EKG P AXIS: 66 DEGREES
EKG P-R INTERVAL: 214 MS
EKG Q-T INTERVAL: 478 MS
EKG QRS DURATION: 82 MS
EKG QTC CALCULATION (BAZETT): 540 MS
EKG R AXIS: 31 DEGREES
EKG T AXIS: 104 DEGREES
EKG VENTRICULAR RATE: 77 BPM
ERYTHROCYTE [DISTWIDTH] IN BLOOD BY AUTOMATED COUNT: 15.4 % (ref 11.5–14.5)
ERYTHROCYTE [DISTWIDTH] IN BLOOD BY AUTOMATED COUNT: 15.5 % (ref 11.5–14.5)
ERYTHROCYTE [DISTWIDTH] IN BLOOD BY AUTOMATED COUNT: 48.5 FL (ref 35–45)
ERYTHROCYTE [DISTWIDTH] IN BLOOD BY AUTOMATED COUNT: 49.2 FL (ref 35–45)
GFR SERPL CREATININE-BSD FRML MDRD: 35 ML/MIN/1.73M2
GLUCOSE BLD-MCNC: 104 MG/DL (ref 70–108)
GLUCOSE BLD-MCNC: 106 MG/DL (ref 70–108)
GLUCOSE BLD-MCNC: 127 MG/DL (ref 70–108)
GLUCOSE BLD-MCNC: 132 MG/DL (ref 70–108)
GLUCOSE BLD-MCNC: 140 MG/DL (ref 70–108)
GLUCOSE BLD-MCNC: 147 MG/DL (ref 70–108)
GLUCOSE BLD-MCNC: 81 MG/DL (ref 70–108)
GLUCOSE BLD-MCNC: 92 MG/DL (ref 70–108)
GLUCOSE BLD-MCNC: 97 MG/DL (ref 70–108)
GLUCOSE BLD-MCNC: 98 MG/DL (ref 70–108)
GLUCOSE, WHOLE BLOOD: 124 MG/DL (ref 70–108)
GLUCOSE, WHOLE BLOOD: 126 MG/DL (ref 70–108)
GLUCOSE, WHOLE BLOOD: 159 MG/DL (ref 70–108)
GLUCOSE, WHOLE BLOOD: 201 MG/DL (ref 70–108)
GLUCOSE, WHOLE BLOOD: 203 MG/DL (ref 70–108)
GLUCOSE, WHOLE BLOOD: 220 MG/DL (ref 70–108)
GLUCOSE, WHOLE BLOOD: 81 MG/DL (ref 70–108)
GLUCOSE, WHOLE BLOOD: 87 MG/DL (ref 70–108)
HCO3: 22 MMOL/L (ref 23–28)
HCO3: 26 MMOL/L (ref 23–28)
HCO3: 27 MMOL/L (ref 23–28)
HCT VFR BLD CALC: 30.8 % (ref 37–47)
HCT VFR BLD CALC: 34.5 % (ref 37–47)
HEMOGLOBIN: 10.6 GM/DL (ref 12–16)
HEMOGLOBIN: 11.5 GM/DL (ref 12–16)
IFIO2: 40
IFIO2: 40
IFIO2: 70
MAGNESIUM: 3 MG/DL (ref 1.6–2.4)
MCH RBC QN AUTO: 30 PG (ref 26–33)
MCH RBC QN AUTO: 30.3 PG (ref 26–33)
MCHC RBC AUTO-ENTMCNC: 33.3 GM/DL (ref 32.2–35.5)
MCHC RBC AUTO-ENTMCNC: 34.4 GM/DL (ref 32.2–35.5)
MCV RBC AUTO: 88 FL (ref 81–99)
MCV RBC AUTO: 90.1 FL (ref 81–99)
MODE: ABNORMAL
O2 SATURATION: 94 %
O2 SATURATION: 94 %
O2 SATURATION: 99 %
PATHOLOGIST REVIEW: ABNORMAL
PCO2: 43 MMHG (ref 35–45)
PCO2: 45 MMHG (ref 35–45)
PCO2: 47 MMHG (ref 35–45)
PH BLOOD GAS: 7.29 (ref 7.35–7.45)
PH BLOOD GAS: 7.36 (ref 7.35–7.45)
PH BLOOD GAS: 7.41 (ref 7.35–7.45)
PLATELET # BLD: 144 THOU/MM3 (ref 130–400)
PLATELET # BLD: 96 THOU/MM3 (ref 130–400)
PMV BLD AUTO: 11.6 FL (ref 9.4–12.4)
PMV BLD AUTO: 12 FL (ref 9.4–12.4)
PO2: 174 MMHG (ref 71–104)
PO2: 70 MMHG (ref 71–104)
PO2: 76 MMHG (ref 71–104)
POTASSIUM SERPL-SCNC: 4.5 MEQ/L (ref 3.5–5.2)
RBC # BLD: 3.5 MILL/MM3 (ref 4.2–5.4)
RBC # BLD: 3.83 MILL/MM3 (ref 4.2–5.4)
SCAN OF BLOOD SMEAR: NORMAL
SCAN OF BLOOD SMEAR: NORMAL
SET PEEP: 10 MMHG
SET PEEP: 10 MMHG
SET PEEP: 5 MMHG
SET PRESS SUPP: 10 CMH2O
SET RESPIRATORY RATE: 20 BPM
SET TIDAL VOLUME: 500 ML
SODIUM BLD-SCNC: 149 MEQ/L (ref 135–145)
SOURCE, BLOOD GAS: ABNORMAL
WBC # BLD: 18.8 THOU/MM3 (ref 4.8–10.8)
WBC # BLD: 31.1 THOU/MM3 (ref 4.8–10.8)

## 2018-07-14 PROCEDURE — 94003 VENT MGMT INPAT SUBQ DAY: CPT

## 2018-07-14 PROCEDURE — 2500000003 HC RX 250 WO HCPCS: Performed by: PHARMACIST

## 2018-07-14 PROCEDURE — 71045 X-RAY EXAM CHEST 1 VIEW: CPT

## 2018-07-14 PROCEDURE — 36415 COLL VENOUS BLD VENIPUNCTURE: CPT

## 2018-07-14 PROCEDURE — 80048 BASIC METABOLIC PNL TOTAL CA: CPT

## 2018-07-14 PROCEDURE — S0028 INJECTION, FAMOTIDINE, 20 MG: HCPCS | Performed by: PHARMACIST

## 2018-07-14 PROCEDURE — 6370000000 HC RX 637 (ALT 250 FOR IP): Performed by: THORACIC SURGERY (CARDIOTHORACIC VASCULAR SURGERY)

## 2018-07-14 PROCEDURE — 97110 THERAPEUTIC EXERCISES: CPT

## 2018-07-14 PROCEDURE — 82947 ASSAY GLUCOSE BLOOD QUANT: CPT

## 2018-07-14 PROCEDURE — 82803 BLOOD GASES ANY COMBINATION: CPT

## 2018-07-14 PROCEDURE — 2000000000 HC ICU R&B

## 2018-07-14 PROCEDURE — 93010 ELECTROCARDIOGRAM REPORT: CPT | Performed by: INTERNAL MEDICINE

## 2018-07-14 PROCEDURE — G8979 MOBILITY GOAL STATUS: HCPCS

## 2018-07-14 PROCEDURE — 93005 ELECTROCARDIOGRAM TRACING: CPT | Performed by: THORACIC SURGERY (CARDIOTHORACIC VASCULAR SURGERY)

## 2018-07-14 PROCEDURE — 2580000003 HC RX 258: Performed by: THORACIC SURGERY (CARDIOTHORACIC VASCULAR SURGERY)

## 2018-07-14 PROCEDURE — 97164 PT RE-EVAL EST PLAN CARE: CPT

## 2018-07-14 PROCEDURE — 2700000000 HC OXYGEN THERAPY PER DAY

## 2018-07-14 PROCEDURE — 37799 UNLISTED PX VASCULAR SURGERY: CPT

## 2018-07-14 PROCEDURE — 82948 REAGENT STRIP/BLOOD GLUCOSE: CPT

## 2018-07-14 PROCEDURE — 2500000003 HC RX 250 WO HCPCS: Performed by: THORACIC SURGERY (CARDIOTHORACIC VASCULAR SURGERY)

## 2018-07-14 PROCEDURE — 85027 COMPLETE CBC AUTOMATED: CPT

## 2018-07-14 PROCEDURE — 6360000002 HC RX W HCPCS: Performed by: THORACIC SURGERY (CARDIOTHORACIC VASCULAR SURGERY)

## 2018-07-14 PROCEDURE — 83735 ASSAY OF MAGNESIUM: CPT

## 2018-07-14 PROCEDURE — G8978 MOBILITY CURRENT STATUS: HCPCS

## 2018-07-14 RX ORDER — FUROSEMIDE 10 MG/ML
40 INJECTION INTRAMUSCULAR; INTRAVENOUS 2 TIMES DAILY
Status: DISCONTINUED | OUTPATIENT
Start: 2018-07-14 | End: 2018-07-15

## 2018-07-14 RX ORDER — POTASSIUM CHLORIDE 29.8 MG/ML
20 INJECTION INTRAVENOUS
Status: COMPLETED | OUTPATIENT
Start: 2018-07-14 | End: 2018-07-14

## 2018-07-14 RX ORDER — FUROSEMIDE 20 MG/1
10 TABLET ORAL DAILY
Status: DISCONTINUED | OUTPATIENT
Start: 2018-07-14 | End: 2018-07-15

## 2018-07-14 RX ADMIN — MORPHINE SULFATE 2 MG: 2 INJECTION, SOLUTION INTRAMUSCULAR; INTRAVENOUS at 04:01

## 2018-07-14 RX ADMIN — ATORVASTATIN CALCIUM 20 MG: 20 TABLET, FILM COATED ORAL at 20:03

## 2018-07-14 RX ADMIN — SODIUM BICARBONATE 50 MEQ: 84 INJECTION, SOLUTION INTRAVENOUS at 00:27

## 2018-07-14 RX ADMIN — Medication 10 ML: at 20:03

## 2018-07-14 RX ADMIN — POTASSIUM CHLORIDE 20 MEQ: 400 INJECTION, SOLUTION INTRAVENOUS at 02:01

## 2018-07-14 RX ADMIN — Medication 2 G: at 01:11

## 2018-07-14 RX ADMIN — AMIODARONE HYDROCHLORIDE 200 MG: 200 TABLET ORAL at 00:09

## 2018-07-14 RX ADMIN — OXYCODONE HYDROCHLORIDE 10 MG: 5 TABLET ORAL at 20:02

## 2018-07-14 RX ADMIN — AMIODARONE HYDROCHLORIDE 200 MG: 200 TABLET ORAL at 20:03

## 2018-07-14 RX ADMIN — VANCOMYCIN HYDROCHLORIDE 1500 MG: 5 INJECTION, POWDER, LYOPHILIZED, FOR SOLUTION INTRAVENOUS at 06:20

## 2018-07-14 RX ADMIN — Medication 2 G: at 17:35

## 2018-07-14 RX ADMIN — OXYCODONE HYDROCHLORIDE 10 MG: 5 TABLET ORAL at 09:40

## 2018-07-14 RX ADMIN — FUROSEMIDE 40 MG: 10 INJECTION, SOLUTION INTRAMUSCULAR; INTRAVENOUS at 17:40

## 2018-07-14 RX ADMIN — OXYCODONE HYDROCHLORIDE 5 MG: 5 TABLET ORAL at 00:09

## 2018-07-14 RX ADMIN — POTASSIUM CHLORIDE 20 MEQ: 400 INJECTION, SOLUTION INTRAVENOUS at 01:12

## 2018-07-14 RX ADMIN — Medication 2 G: at 09:32

## 2018-07-14 RX ADMIN — FUROSEMIDE 10 MG: 20 TABLET ORAL at 12:22

## 2018-07-14 RX ADMIN — FUROSEMIDE 40 MG: 10 INJECTION, SOLUTION INTRAMUSCULAR; INTRAVENOUS at 12:26

## 2018-07-14 RX ADMIN — FAMOTIDINE 20 MG: 10 INJECTION, SOLUTION INTRAVENOUS at 15:50

## 2018-07-14 RX ADMIN — MULTIPLE VITAMINS W/ MINERALS TAB 1 TABLET: TAB at 09:32

## 2018-07-14 RX ADMIN — ENOXAPARIN SODIUM 40 MG: 40 INJECTION SUBCUTANEOUS at 09:35

## 2018-07-14 RX ADMIN — ASPIRIN 325 MG: 325 TABLET, DELAYED RELEASE ORAL at 09:32

## 2018-07-14 RX ADMIN — DOCUSATE SODIUM 100 MG: 100 CAPSULE, LIQUID FILLED ORAL at 20:03

## 2018-07-14 RX ADMIN — Medication 6.4 UNITS/HR: at 04:30

## 2018-07-14 RX ADMIN — MORPHINE SULFATE 2 MG: 2 INJECTION, SOLUTION INTRAMUSCULAR; INTRAVENOUS at 01:34

## 2018-07-14 RX ADMIN — OXYCODONE HYDROCHLORIDE 10 MG: 5 TABLET ORAL at 15:49

## 2018-07-14 RX ADMIN — AMIODARONE HYDROCHLORIDE 200 MG: 200 TABLET ORAL at 09:32

## 2018-07-14 RX ADMIN — Medication 10 ML: at 09:33

## 2018-07-14 ASSESSMENT — PAIN SCALES - GENERAL
PAINLEVEL_OUTOF10: 0
PAINLEVEL_OUTOF10: 4
PAINLEVEL_OUTOF10: 8
PAINLEVEL_OUTOF10: 5
PAINLEVEL_OUTOF10: 6
PAINLEVEL_OUTOF10: 7
PAINLEVEL_OUTOF10: 0
PAINLEVEL_OUTOF10: 6
PAINLEVEL_OUTOF10: 6
PAINLEVEL_OUTOF10: 7

## 2018-07-14 ASSESSMENT — PAIN DESCRIPTION - ORIENTATION: ORIENTATION: MID

## 2018-07-14 ASSESSMENT — PULMONARY FUNCTION TESTS
PIF_VALUE: 16
PIF_VALUE: 29
PIF_VALUE: 31
PIF_VALUE: 29

## 2018-07-14 ASSESSMENT — PAIN DESCRIPTION - LOCATION: LOCATION: CHEST

## 2018-07-14 ASSESSMENT — PAIN DESCRIPTION - PAIN TYPE: TYPE: SURGICAL PAIN

## 2018-07-14 NOTE — PROGRESS NOTES
Bronchial Hygiene   []Sputum production > 25 ml/day       [] Improved chest x-ray  []Patients subjective response (easier clearance of secretions)      [] Improved breath sounds  []Improvement in PaO2 or oxygen saturation       [] Return of patient to home regime   []Improvement in ventilator variables    []Other: [place] Volume Expansion  []Decrease respiratory rate   []Resolution of fever    []Normal pulse rate    []Improved breath sounds   []Normal chest x-ray     [] Improved arterial oxygen tension (PaO2) and p(A-a)O2  [x]Return of IC to 75% of preop/predicted goal or an increase to 15 ml/kg of ideal body weight   []Other:  . Inhaled Medication  []Improved assessment score   []Return of patient to home regime  []Other: . Oxygenation  []SpO2 greater than or equal to 92%   []Reversed signs of hypoxemia and improvement in WOB  []Correction of Hgb disorder    [x]Return of patient to home regime  []Other:  . Action Plan Based on Assessment:   []Continue plan as ordered   []Change therapy based on algorithm   []Consult with physician  []Discontinue therapy and RAP (indications no longer present)  []Not enough information available, reassess in 24 hours  []Other: .     Comments: Continue to encourage patient to use IS often as well as encourage splinting techniques with cough and deep breathing

## 2018-07-14 NOTE — FLOWSHEET NOTE
1- Report from [facility]. Pt denies pain at this time. Epi gtt infusing at 2.5 mcg/min at this time. Will request order from Dr. Daniele Webber. 1956- Cardiac outputs done. CO-6.5  CI-3.5 CVP 17. Bp's stable. Pt turned and repositioned. Hair washed and central line redressed at this time. Pt tolerated well. Assessment completed. See flowsheet. 2000- Dr. Daniele Webber called RE: ABG's and request for Epi gtt order. No vent changes at this time. Orders: Once po2 is greater than 100 on ABG to start weaning Fio2 then we can start weaning peep. Blood gas in 4 hours. Not necessary to call. Wean per protocol from there. [facility] updated. [id number] Sylvia Alamo for Epi gtt at this time. Will place order. Given recent CO/CI. No further orders at this time. 2015- Family back in room, given update. Verbalized understanding. 2206- Family in, emergency contact numbers placed in chart. Leaving for the night will call with any updates throughout night. Questions answered. 2336- CO-6.6 CI-3.5 CVP-13    0015- Assessment completed and remains unchanged. Pt repositioned, tolerated well. ABG's drawn per order. Weaned Fio2 to 60% per Dr. Daniele Webber. Po2 greater than 100 on gas. 3204- 1 AMP bicarb given per PRN order. Base excess -5. Monitoring.     0351- CO-5.7 CI-3.1. Epi gtt off at this time. Assessment remains unchanged. Weaning vent as tolerated. 0400- Linens changed pt tolerated turn well. 1061- Dr. Daniele Webber given update on chest tube outputs and morning CBC. Updated on ventilator status. No further orders. Dr. Daniele Webber wants us to take time to extubate for this pt. Monitoring.      8247- Report to Chente Comer

## 2018-07-14 NOTE — PROGRESS NOTES
Patient has been successfully weaned from Mechanical Ventilation. SpO2 of 96 on 40% FiO2. Patient extubated and placed on 4lnc O2. Post extubation SpO2 is 94% with HR  76 bpm and RR 12 breaths/min. Patient had moderate cough that was productive of clear  sputum. Extubation Well tolerated by patient. Jatinder Doherty

## 2018-07-14 NOTE — PLAN OF CARE
Problem: Falls - Risk of:  Goal: Will remain free from falls  Will remain free from falls   Outcome: Ongoing  No falls this shift  Goal: Absence of physical injury  Absence of physical injury   Outcome: Ongoing  No sign of injury     Problem: Pain:  Goal: Pain level will decrease  Pain level will decrease   Outcome: Ongoing  No complaints of pain at this time, oxy to control surgical pain     Problem: Discharge Planning:  Goal: Participates in care planning  Participates in care planning   Outcome: Ongoing  Pt participates in plan to dc to home   Goal: Discharged to appropriate level of care  Discharged to appropriate level of care   Outcome: Ongoing  In icu, okay to move to stepdown     Problem: Cardiac Output - Decreased:  Goal: Hemodynamic stability will improve  Hemodynamic stability will improve   Outcome: Ongoing  VSS, off pressors  Goal: Cardiac output within specified parameters  Cardiac output within specified parameters   Outcome: Ongoing      Problem: Fluid Volume - Imbalance:  Goal: Absence of imbalanced fluid volume signs and symptoms  Absence of imbalanced fluid volume signs and symptoms   Outcome: Ongoing  Pt being diuresed with lasix   Goal: Ability to achieve a balanced intake and output will improve  Ability to achieve a balanced intake and output will improve   Outcome: Ongoing      Problem: Gas Exchange - Impaired:  Goal: Levels of oxygenation will improve  Levels of oxygenation will improve   Outcome: Ongoing  Oxygenation WDL on 3 LNC     Problem: Musculor/Skeletal Functional Status  Goal: Highest potential functional level  Outcome: Ongoing      Comments: Dalton Banks Care plan reviewed with patient and family. Patient and family verbalize understanding of the plan of care and contribute to goal setting.

## 2018-07-14 NOTE — PROGRESS NOTES
[address] THERAPY  Re-EVALUATION  [facility] ICU 4D - 4D-06/006-A    Time In: 1415  Time Out: 1440  Timed Code Treatment Minutes: 10 Minutes  Minutes: 25        Date: 2018  Patient Name: Ania Blunt,  Gender:  female        MRN: [id number]  : 1955  (60 y.o.)  Referral Date : 18   Referring Practitioner: Dr. Jonny Nguyễn MD  Diagnosis: SOB  Additional Pertinent Hx: 60 y.o. female who presented to [address] [place] [address] with complaints of acute shortness of breath increasing throughout the day. Patient has recent history of decompensated congestive heart failure. Patient was scheduled to have CABG in morning. She reports her procedure was cancelled because her heart is \"in too bad of shape\". She notes that she has had a 9 pound weight gain in the past 48 hours, since being discharged. She complains of swelling and distension to abdomen. She denies CP. She denies N/V/D/C. PMH CAD, hypertension, hyperlipidemia. Past surgical history cardiac cath, brain surgery, colonoscopy, AH, WILBERTO and aortic valve replacement. She is a smoker, quit 2-4 weeks ago. She denies alcohol or illicit drug use. While in ED patient hemodynamically stable. Patient was noted to have elevated troponin 0.090 and ProBNP. CBC noted mild leukocytosis 11.3, likely reactive. Hgb 9.0. BMP essentially negative with noted GFR of 45. Patient symptomatic with complaints of shortness of breath. Patient to be admitted for decompensated congestive heart failure.   Pt underwent Redo aortic valve replacement, mitral valve and tricuspid valve replacement on 2018 by Dr. Javier Rick MD     Past Medical History:   Diagnosis Date    Acute decompensated heart failure ([facility] [address] 2018    Aortic valve replaced     Arthritis     Hyperlipidemia     Hypertension     THAYER (nonalcoholic steatohepatitis)     Smoker     Unspecified cerebral artery occlusion with cerebral infarction 3/23/15    \"bleeding on brain\" Past Surgical History:   Procedure Laterality Date    AORTIC VALVE REPLACEMENT      cow valve    BRAIN SURGERY      to drain blood    CARDIAC [facility]HETER[facility]  2004 or [facility]    COLONOSCOPY      DIAGNOSTIC CARDIAC CATH LAB PROCEDURE      HYSTERECTOMY      AL ECHO TRANSESOPHAG R-T 2D IMG ACQUISJ I&R ONLY Left 7/3/2018    TRANSESOPHAGEAL ECHOCARDIOGRAM performed by Luz Fu MD at [address] [facility]    AL OFFICE/OUTPT VISIT,PROCEDURE ONLY N/A 7/13/2018    REDO AORTIC VALVE REPLACEMENT, MITRAL VALVE REPAIR, TRICUSPID VALVE REPAIR, WILBERTO performed by Rebeca Do MD at [address] [place] [address]         Restrictions/Precautions:  General Precautions, Surgical Protocols, Fall Risk     Sternal Precautions: No Pushing, No Pulling, 10# Lifting Restrictions       Subjective:  Chart Reviewed: Yes  Patient assessed for rehabilitation services?: Yes  Family / Caregiver Present: No  Subjective: RN approved session, stating pt was just extubated this AM. RN assisted in managing lines while getting patient up and to the chair. Pt very drowsy during session, stating she could fall asleep sitting up. General:  Overall Orientation Status: Within Functional Limits (very drowsy)  Follows Commands: Within Functional Limits  Vision: Within Functional Limits  Hearing: Within functional limits    Pain:  Denies. Pain Assessment  Pain Assessment: 0-10  Pain Level: 0 (Pt reported fatigue)       Social/Functional History:    Lives With: Spouse  Type of Home: House  Home Layout: One level  Home Access: Stairs to enter without rails  Entrance Stairs - Number of Steps: 2  Home Equipment: Rolling walker             ADL Assistance: Independent  Homemaking Assistance: Independent  Ambulation Assistance: Independent  Transfer Assistance: Independent    Active : Yes     Additional Comments: Pt amb without AD, no home O2, I with ADL's. States her  can assist, he does not work either.        Objective:  ROM RLE:

## 2018-07-14 NOTE — PROGRESS NOTES
[facility] THERAPY MISSED TREATMENT NOTE  [facility] ICU 4D  4D-06/006-A      Date: 2018  Patient Name: Curt Flores        CSN: [id number]   : 1955  (60 y.o.)  Gender: female                REASON FOR MISSED TREATMENT:  Missed Treat. Per PT, Pt recently extubated and RN requesting to try later today. Will re attempt as able.

## 2018-07-14 NOTE — PLAN OF CARE
tube drainage is within specified parameters   Outcome: Ongoing  Chest tube drainage within normal limits. Monitoring hourly. Problem: Gas Exchange - Impaired:  Goal: Levels of oxygenation will improve  Levels of oxygenation will improve   Outcome: Ongoing  Remains on vent support. Once o2 on abg above 100 will start weaning as tolerated per Dr. Dayne Rudd. Goal: Ability to maintain adequate ventilation will improve  Ability to maintain adequate ventilation will improve   Outcome: Ongoing  Tolerating ventilator support. Encouraged to relax. Problem: Nutrition  Goal: Optimal nutrition therapy  Outcome: Ongoing  OG to Sx. NPO at this time. Problem: DISCHARGE BARRIERS  Goal: Patient's continuum of care needs are met  Outcome: Ongoing  Care needs met. Problem: Activity Intolerance:  Goal: Able to perform prescribed physical activity  Able to perform prescribed physical activity   Outcome: Ongoing  Uses sternal bear as instructed. Able to move extremities freely. Goal: Ability to tolerate increased activity will improve  Ability to tolerate increased activity will improve   Outcome: Ongoing  Increasing movement as tolerated, remains on vent support at this time. Problem: Anxiety:  Goal: Level of anxiety will decrease  Level of anxiety will decrease   Outcome: Ongoing  Calm and cooperative. Problem: Pain:  Goal: Control of acute pain  Control of acute pain   Outcome: Ongoing  PRN morphine. Goal: Control of chronic pain  Control of chronic pain   Outcome: Ongoing  Denies chronic pain. Problem: Tissue Perfusion - Cardiopulmonary, Altered:  Goal: Absence of angina  Absence of angina   Outcome: Ongoing  Denies chest pain. Surgical pain reported only. Goal: Will show no evidence of cardiac arrhythmias  Will show no evidence of cardiac arrhythmias   Outcome: Ongoing  NSR. No ectopy noted. Comments: Care planning discussed with pt and family, demonstrates understanding.

## 2018-07-15 ENCOUNTER — APPOINTMENT (OUTPATIENT)
Dept: GENERAL RADIOLOGY | Age: 63
DRG: 219 | End: 2018-07-15
Payer: MEDICARE

## 2018-07-15 LAB
ANION GAP SERPL CALCULATED.3IONS-SCNC: 13 MEQ/L (ref 8–16)
BACTERIA: ABNORMAL
BILIRUBIN URINE: ABNORMAL
BLOOD, URINE: ABNORMAL
BUN BLDV-MCNC: 37 MG/DL (ref 7–22)
CALCIUM SERPL-MCNC: 7.8 MG/DL (ref 8.5–10.5)
CASTS: ABNORMAL /LPF
CASTS: ABNORMAL /LPF
CHARACTER, URINE: ABNORMAL
CHLORIDE BLD-SCNC: 104 MEQ/L (ref 98–111)
CO2: 26 MEQ/L (ref 23–33)
COLOR: ABNORMAL
CREAT SERPL-MCNC: 1.8 MG/DL (ref 0.4–1.2)
CRYSTALS: ABNORMAL
EPITHELIAL CELLS, UA: ABNORMAL /HPF
ERYTHROCYTE [DISTWIDTH] IN BLOOD BY AUTOMATED COUNT: 16 % (ref 11.5–14.5)
ERYTHROCYTE [DISTWIDTH] IN BLOOD BY AUTOMATED COUNT: 55.2 FL (ref 35–45)
GFR SERPL CREATININE-BSD FRML MDRD: 28 ML/MIN/1.73M2
GLUCOSE BLD-MCNC: 130 MG/DL (ref 70–108)
GLUCOSE BLD-MCNC: 159 MG/DL (ref 70–108)
GLUCOSE BLD-MCNC: 168 MG/DL (ref 70–108)
GLUCOSE, URINE: NEGATIVE MG/DL
HCT VFR BLD CALC: 29.8 % (ref 37–47)
HEMOGLOBIN: 9.5 GM/DL (ref 12–16)
ICTOTEST: NEGATIVE
KETONES, URINE: ABNORMAL
LEUKOCYTE EST, POC: ABNORMAL
MAGNESIUM: 2.9 MG/DL (ref 1.6–2.4)
MCH RBC QN AUTO: 30 PG (ref 26–33)
MCHC RBC AUTO-ENTMCNC: 31.9 GM/DL (ref 32.2–35.5)
MCV RBC AUTO: 94 FL (ref 81–99)
MISCELLANEOUS LAB TEST RESULT: ABNORMAL
NITRITE, URINE: POSITIVE
PH UA: 5
PLATELET # BLD: 76 THOU/MM3 (ref 130–400)
PMV BLD AUTO: 12.3 FL (ref 9.4–12.4)
POTASSIUM SERPL-SCNC: 5.1 MEQ/L (ref 3.5–5.2)
PROTEIN UA: 100 MG/DL
RBC # BLD: 3.17 MILL/MM3 (ref 4.2–5.4)
RBC URINE: > 100 /HPF
RENAL EPITHELIAL, UA: ABNORMAL
SODIUM BLD-SCNC: 143 MEQ/L (ref 135–145)
SPECIFIC GRAVITY UA: 1.02 (ref 1–1.03)
UROBILINOGEN, URINE: 1 EU/DL
VRE CULTURE: NORMAL
WBC # BLD: 28.7 THOU/MM3 (ref 4.8–10.8)
WBC UA: ABNORMAL /HPF
YEAST: ABNORMAL

## 2018-07-15 PROCEDURE — 2060000000 HC ICU INTERMEDIATE R&B

## 2018-07-15 PROCEDURE — 93005 ELECTROCARDIOGRAM TRACING: CPT | Performed by: THORACIC SURGERY (CARDIOTHORACIC VASCULAR SURGERY)

## 2018-07-15 PROCEDURE — 80048 BASIC METABOLIC PNL TOTAL CA: CPT

## 2018-07-15 PROCEDURE — G8987 SELF CARE CURRENT STATUS: HCPCS

## 2018-07-15 PROCEDURE — 97166 OT EVAL MOD COMPLEX 45 MIN: CPT

## 2018-07-15 PROCEDURE — G8988 SELF CARE GOAL STATUS: HCPCS

## 2018-07-15 PROCEDURE — 6360000002 HC RX W HCPCS: Performed by: THORACIC SURGERY (CARDIOTHORACIC VASCULAR SURGERY)

## 2018-07-15 PROCEDURE — 6370000000 HC RX 637 (ALT 250 FOR IP): Performed by: PHARMACIST

## 2018-07-15 PROCEDURE — 71045 X-RAY EXAM CHEST 1 VIEW: CPT

## 2018-07-15 PROCEDURE — 97530 THERAPEUTIC ACTIVITIES: CPT

## 2018-07-15 PROCEDURE — 81001 URINALYSIS AUTO W/SCOPE: CPT

## 2018-07-15 PROCEDURE — 82948 REAGENT STRIP/BLOOD GLUCOSE: CPT

## 2018-07-15 PROCEDURE — 36415 COLL VENOUS BLD VENIPUNCTURE: CPT

## 2018-07-15 PROCEDURE — 6370000000 HC RX 637 (ALT 250 FOR IP): Performed by: THORACIC SURGERY (CARDIOTHORACIC VASCULAR SURGERY)

## 2018-07-15 PROCEDURE — 87086 URINE CULTURE/COLONY COUNT: CPT

## 2018-07-15 PROCEDURE — 83735 ASSAY OF MAGNESIUM: CPT

## 2018-07-15 PROCEDURE — 85027 COMPLETE CBC AUTOMATED: CPT

## 2018-07-15 PROCEDURE — 2580000003 HC RX 258: Performed by: THORACIC SURGERY (CARDIOTHORACIC VASCULAR SURGERY)

## 2018-07-15 PROCEDURE — 2700000000 HC OXYGEN THERAPY PER DAY

## 2018-07-15 RX ORDER — AMIODARONE HYDROCHLORIDE 200 MG/1
200 TABLET ORAL DAILY
Status: DISCONTINUED | OUTPATIENT
Start: 2018-07-16 | End: 2018-07-19 | Stop reason: HOSPADM

## 2018-07-15 RX ADMIN — FUROSEMIDE 10 MG: 20 TABLET ORAL at 09:10

## 2018-07-15 RX ADMIN — ONDANSETRON 4 MG: 2 INJECTION INTRAMUSCULAR; INTRAVENOUS at 04:39

## 2018-07-15 RX ADMIN — ACETAMINOPHEN 650 MG: 325 TABLET ORAL at 21:22

## 2018-07-15 RX ADMIN — ATORVASTATIN CALCIUM 20 MG: 20 TABLET, FILM COATED ORAL at 21:22

## 2018-07-15 RX ADMIN — Medication 2 G: at 01:00

## 2018-07-15 RX ADMIN — MULTIPLE VITAMINS W/ MINERALS TAB 1 TABLET: TAB at 09:10

## 2018-07-15 RX ADMIN — DOCUSATE SODIUM 100 MG: 100 CAPSULE, LIQUID FILLED ORAL at 09:10

## 2018-07-15 RX ADMIN — OXYCODONE HYDROCHLORIDE 10 MG: 5 TABLET ORAL at 15:51

## 2018-07-15 RX ADMIN — Medication 10 ML: at 21:23

## 2018-07-15 RX ADMIN — ENOXAPARIN SODIUM 40 MG: 40 INJECTION SUBCUTANEOUS at 09:10

## 2018-07-15 RX ADMIN — ASPIRIN 325 MG: 325 TABLET, DELAYED RELEASE ORAL at 09:10

## 2018-07-15 RX ADMIN — INSULIN LISPRO 3 UNITS: 100 INJECTION, SOLUTION INTRAVENOUS; SUBCUTANEOUS at 09:11

## 2018-07-15 RX ADMIN — FAMOTIDINE 20 MG: 20 TABLET, FILM COATED ORAL at 15:51

## 2018-07-15 RX ADMIN — OXYCODONE HYDROCHLORIDE 10 MG: 5 TABLET ORAL at 04:39

## 2018-07-15 RX ADMIN — Medication 2 UNITS: at 21:34

## 2018-07-15 RX ADMIN — AMIODARONE HYDROCHLORIDE 200 MG: 200 TABLET ORAL at 09:10

## 2018-07-15 RX ADMIN — FUROSEMIDE 40 MG: 10 INJECTION, SOLUTION INTRAMUSCULAR; INTRAVENOUS at 09:10

## 2018-07-15 RX ADMIN — METOPROLOL TARTRATE 25 MG: 25 TABLET ORAL at 09:10

## 2018-07-15 RX ADMIN — DOCUSATE SODIUM 100 MG: 100 CAPSULE, LIQUID FILLED ORAL at 21:22

## 2018-07-15 RX ADMIN — OXYCODONE HYDROCHLORIDE 10 MG: 5 TABLET ORAL at 00:33

## 2018-07-15 RX ADMIN — ONDANSETRON 4 MG: 2 INJECTION INTRAMUSCULAR; INTRAVENOUS at 21:31

## 2018-07-15 RX ADMIN — OXYCODONE HYDROCHLORIDE 10 MG: 5 TABLET ORAL at 09:10

## 2018-07-15 ASSESSMENT — PAIN SCALES - GENERAL
PAINLEVEL_OUTOF10: 8
PAINLEVEL_OUTOF10: 1
PAINLEVEL_OUTOF10: 8
PAINLEVEL_OUTOF10: 5
PAINLEVEL_OUTOF10: 8
PAINLEVEL_OUTOF10: 4
PAINLEVEL_OUTOF10: 7

## 2018-07-15 ASSESSMENT — PAIN DESCRIPTION - FREQUENCY: FREQUENCY: CONTINUOUS

## 2018-07-15 ASSESSMENT — PAIN DESCRIPTION - LOCATION: LOCATION: CHEST

## 2018-07-15 ASSESSMENT — PAIN DESCRIPTION - PAIN TYPE: TYPE: SURGICAL PAIN

## 2018-07-15 ASSESSMENT — PAIN DESCRIPTION - DESCRIPTORS: DESCRIPTORS: ACHING

## 2018-07-15 NOTE — PROGRESS NOTES
[address] THERAPY  [facility] ICU 4D  EVALUATION    Time:  Time In:   Time Out: 0953  Timed Code Treatment Minutes: 16 Minutes  Minutes: 26          Date: 7/15/2018  Patient Name: Laura Valencia,   Gender: female      MRN: [id number]  : 1955  (60 y.o.)  Referring Practitioner: Eric Ruiz MD  Diagnosis: SOB  Additional Pertinent Hx: 60 y.o. female who presented to [address] [place] [address] on 18 with complaints of acute shortness of breath increasing throughout the day. Patient has recent history of decompensated congestive heart failure. Patient was scheduled to have CABG on . She reports her procedure was cancelled because her heart is \"in too bad of shape\". Past surgical history cardiac cath, brain surgery, colonoscopy, AH, WILBERTO and aortic valve replacement. Patient was noted to have elevated troponin 0.090 and ProBNP. CBC noted mild leukocytosis 11.3, likely reactive. Hgb 9.0. Patient to be admitted for decompensated congestive heart failure.   Pt underwent Redo aortic valve replacement, mitral valve and tricuspid valve replacement on 2018 by Dr. Gillian Harris MD     Restrictions/Precautions:  General Precautions, Surgical Protocols, Fall Risk                    Sternal Precautions: No Pushing, No Pulling, 10# Lifting Restrictions       Past Medical History:   Diagnosis Date    Acute decompensated heart failure ([address] 2018    Aortic valve replaced     Arthritis     Hyperlipidemia     Hypertension     THAYER (nonalcoholic steatohepatitis)     Smoker     Unspecified cerebral artery occlusion with cerebral infarction 3/23/15    \"bleeding on brain\"     Past Surgical History:   Procedure Laterality Date    AORTIC VALVE REPLACEMENT      cow valve    BRAIN SURGERY      to drain blood    CARDIAC CATHETERIZATION   or     [facility]    COLONOSCOPY      DIAGNOSTIC CARDIAC CATH LAB PROCEDURE      HYSTERECTOMY      PA ECHO TRANSESOPHAG R-T 2D IMG during mobility), quinteros, 3L O2, tele    Observation/Palpation  Observation: CT X2 to suction (RN ok'd removing from suction during mobility), quinteros, 3L O2, tele                 LUE AROM (degrees)  LUE AROM : WFL  LUE General AROM: pt required cues to avoid lifting shoulders above 90 degrees          RUE AROM (degrees)  RUE AROM : WFL  RUE General AROM: pt required cues to avoid lifting shoulders above 90 degrees       LUE Strength  Gross LUE Strength: Exceptions to [facility]  LUE Strength Comment: NT due to sternal prec                RUE Strength  Gross RUE Strength: Exceptions to [facility] [place]  RUE Strength Comment: NT due to sternal prec                     RUE Tone: Normotonic  LUE Tone: Normotonic    Movements Are Fluid And Coordinated: Yes               ADL  Feeding: Setup (drinking)  LE Dressing: Maximum assistance (doffing socks)  Additional Comments: Pt declined all other ADLs due to fatigue. Bed mobility  Sit to Supine: Moderate assistance (at BLE; minimal cues for technique/sternal prec)  Scooting: Stand by assistance (scooting forward in chair)    Transfers  Sit to stand: Contact guard assistance (from bedside chair, 1 cue for sternal prec/technique)  Stand to sit: Contact guard assistance (to EOB; minimal cues for technique)    Balance  Sitting Balance: Stand by assistance (at EOB)  Standing Balance: Contact guard assistance     Time: X1 minute  Activity: prep for mobility  Comment: RW for support. Functional Mobility  Functional - Mobility Device: Rolling Walker  Activity: Other (to bedside chair)  Assist Level: Contact guard assistance  Functional Mobility Comments: Slow pace, steady throughout. No LOB. Apparatus Needs: O2 (3L)                                                                    Activity Tolerance:  Activity Tolerance: Patient Tolerated treatment well  Activity Tolerance: Initial vitals: HR 78 bpm; S092 97%; RR 21; /49. All vitals remained stable throughout.  Reviewed sternal goal 2: Pt will complete CABG step II exercises X10-15 reps to increase overall endurance needed for ADL/IADL tasks. Short term goal 3: Pt will complete all functional transfers with supervision and 0 cues for sternal precautions to increase independence with toilet transfers. Short term goal 4: Pt will complete LB ADLs with minimal assistance using LHAE prn to increase independence with self care tasks. Short term goal 5: Pt will tolerate dynamic standing X5 minutes with 1-2 hand release and SBA in prep for toileting/sinkside grooming tasks. Long term goals  Time Frame for Long term goals : not set due to ELOS    Evaluation Complexity: Based on the findings of patient history, examination, clinical presentation, and decision making during this evaluation, this patient is of medium complexity. OT G-codes  Functional Assessment Tool Used: [facility]  Functional Limitation: Self care  Self Care Current Status (): At least 40 percent but less than 60 percent impaired, limited or restricted  Self Care Goal Status ():  At least 20 percent but less than 40 percent impaired, limited or restricted  AM-[facility] Inpatient Daily Activity Raw Score: 15  AM-PAC Inpatient ADL T-Scale Score : 34.69  ADL Inpatient CMS 0-100% Score: 56.46  ADL Inpatient CMS G-Code Modifier : CK

## 2018-07-15 NOTE — PROGRESS NOTES
[facility] RESPIRATORY ASSESSMENT PROGRAM (RAP)  30 kg and over      Patient Name: Sophia Sepulveda Room#: 4D-06/006-A : 1955     Admitting diagnosis: Shortness of breath [R06.02]  Shortness of breath [R06.02]      ASSESSMENT     Vitals  Pulse: 63   Resp: 20  BP: (!) 96/37  SpO2: 94 %  Temp: 98.2 °F (36.8 °C)  Breath Sounds:clear throughout with diminished bilateral bases  Comment: Encouraged patient to do IS Q1-2WA    PEF   Predicted: na  Personal Best: na     Inspiratory Capacity:   Preoperative/predictive value: 1500 ml   33% of value: 495 ml      75% of value: 1125 ml   10 ml/kg of IBW: 501 ml   Patient's Actual Inspiratory Capacity: 750+ ml    CARE PLAN  All Care Plan selections must be based on the approved algorithms located on line in the Policy and Procedures under Respiratory Assessment Adult Protocol Handbook    INDICATIONS FOR THERAPY BASED ON HISTORY AND ASSESSMENT  Bronchial Hygiene Goal: Improvement in sputum mobilization in patients with ineffective airway clearance. Reverse the atelectasis. [] Atelectasis caused by mucus plugging     [] Chronic mucucillary clearance disorder  [] Improve cough effectiveness. Copious secretions unable to clear with cough or suctioning.    [x] No indications  Volume Expansion Goal: Prevent atelectasis, Absence or improvement in signs of atelectasis, improve respiratory muscle performance  [x] Post Thoracic or upper abdominal surgery    [] Surgery in COPD patients  [] Atelectasis, reduced lung volume on X-ray    [] CPAP indications are seen  [] Restrictive pulmonary or neuromuscular disorder   [] No indications  Inhaled Medications Goal: Improve respiratory functions in patients with airway disease and decrease WOB  [] Wheezing, diminished, or absent breath sounds associated with a pulmonary disorder  [] Known COPD  [] Peak flow < 80% predicted or personal best for known asthma patients  [] Documented obstructive defect on pulmonary function testing which is After treatment     ASSESSMENT OUTCOMES   Bronchial Hygiene   []Sputum production > 25 ml/day       [] Improved chest x-ray  []Patients subjective response (easier clearance of secretions)      [] Improved breath sounds  []Improvement in PaO2 or oxygen saturation       [] Return of patient to home regime   []Improvement in ventilator variables    []Other: na.    Volume Expansion  []Decrease respiratory rate   []Resolution of fever    []Normal pulse rate    []Improved breath sounds   []Normal chest x-ray     [] Improved arterial oxygen tension (PaO2) and p(A-a)O2  []Return of IC to 75% of preop/predicted goal or an increase to 15 ml/kg of ideal body weight   []Other: na .    Inhaled Medication  []Improved assessment score   []Return of patient to home regime  []Other: na.    Oxygenation  []SpO2 greater than or equal to 92%   []Reversed signs of hypoxemia and improvement in WOB  []Correction of Hgb disorder    []Return of patient to home regime  []Other: na .      Action Plan Based on Assessment:   [x]Continue plan as ordered   []Change therapy based on algorithm   []Consult with physician  []Discontinue therapy and RAP (indications no longer present)  []Not enough information available, reassess in 24 hours  []Other: na.    Comments:  Will assess daily

## 2018-07-15 NOTE — PROGRESS NOTES
Cardiovascular Surgery Progress Note      Doing well, comfortable on NC  Sinus rhythm, hemodynamics stable  WBC down to 28  Cr 1.8  Marginally high serous chest tube output  CXR no spaces, still generally wet  -U/A, urine culture  -Hold diuresis to allow recovery of renal function  -Keep chest tubes in  -Transfer floor

## 2018-07-16 ENCOUNTER — APPOINTMENT (OUTPATIENT)
Dept: GENERAL RADIOLOGY | Age: 63
DRG: 219 | End: 2018-07-16
Payer: MEDICARE

## 2018-07-16 LAB
ANION GAP SERPL CALCULATED.3IONS-SCNC: 16 MEQ/L (ref 8–16)
BUN BLDV-MCNC: 50 MG/DL (ref 7–22)
CALCIUM SERPL-MCNC: 8.1 MG/DL (ref 8.5–10.5)
CHLORIDE BLD-SCNC: 100 MEQ/L (ref 98–111)
CO2: 23 MEQ/L (ref 23–33)
CREAT SERPL-MCNC: 2.4 MG/DL (ref 0.4–1.2)
EKG ATRIAL RATE: 81 BPM
EKG P AXIS: 48 DEGREES
EKG P-R INTERVAL: 232 MS
EKG Q-T INTERVAL: 444 MS
EKG QRS DURATION: 70 MS
EKG QTC CALCULATION (BAZETT): 515 MS
EKG R AXIS: 9 DEGREES
EKG T AXIS: 109 DEGREES
EKG VENTRICULAR RATE: 81 BPM
ERYTHROCYTE [DISTWIDTH] IN BLOOD BY AUTOMATED COUNT: 15.1 % (ref 11.5–14.5)
ERYTHROCYTE [DISTWIDTH] IN BLOOD BY AUTOMATED COUNT: 53.1 FL (ref 35–45)
GFR SERPL CREATININE-BSD FRML MDRD: 20 ML/MIN/1.73M2
GLUCOSE BLD-MCNC: 104 MG/DL (ref 70–108)
GLUCOSE BLD-MCNC: 158 MG/DL (ref 70–108)
GLUCOSE BLD-MCNC: 173 MG/DL (ref 70–108)
GLUCOSE BLD-MCNC: 174 MG/DL (ref 70–108)
GLUCOSE BLD-MCNC: 95 MG/DL (ref 70–108)
HCT VFR BLD CALC: 30.4 % (ref 37–47)
HEMOGLOBIN: 9.5 GM/DL (ref 12–16)
MAGNESIUM: 3 MG/DL (ref 1.6–2.4)
MCH RBC QN AUTO: 30 PG (ref 26–33)
MCHC RBC AUTO-ENTMCNC: 31.3 GM/DL (ref 32.2–35.5)
MCV RBC AUTO: 95.9 FL (ref 81–99)
PLATELET # BLD: 66 THOU/MM3 (ref 130–400)
PMV BLD AUTO: 11.9 FL (ref 9.4–12.4)
POTASSIUM SERPL-SCNC: 4.6 MEQ/L (ref 3.5–5.2)
RBC # BLD: 3.17 MILL/MM3 (ref 4.2–5.4)
SODIUM BLD-SCNC: 139 MEQ/L (ref 135–145)
WBC # BLD: 22.6 THOU/MM3 (ref 4.8–10.8)

## 2018-07-16 PROCEDURE — 83735 ASSAY OF MAGNESIUM: CPT

## 2018-07-16 PROCEDURE — 36415 COLL VENOUS BLD VENIPUNCTURE: CPT

## 2018-07-16 PROCEDURE — 97535 SELF CARE MNGMENT TRAINING: CPT

## 2018-07-16 PROCEDURE — 85027 COMPLETE CBC AUTOMATED: CPT

## 2018-07-16 PROCEDURE — 97110 THERAPEUTIC EXERCISES: CPT

## 2018-07-16 PROCEDURE — 6360000002 HC RX W HCPCS: Performed by: THORACIC SURGERY (CARDIOTHORACIC VASCULAR SURGERY)

## 2018-07-16 PROCEDURE — 2060000000 HC ICU INTERMEDIATE R&B

## 2018-07-16 PROCEDURE — APPSS30 APP SPLIT SHARED TIME 16-30 MINUTES: Performed by: PHYSICIAN ASSISTANT

## 2018-07-16 PROCEDURE — 2709999900 HC NON-CHARGEABLE SUPPLY

## 2018-07-16 PROCEDURE — 6370000000 HC RX 637 (ALT 250 FOR IP): Performed by: PHARMACIST

## 2018-07-16 PROCEDURE — 71045 X-RAY EXAM CHEST 1 VIEW: CPT

## 2018-07-16 PROCEDURE — 82948 REAGENT STRIP/BLOOD GLUCOSE: CPT

## 2018-07-16 PROCEDURE — 94640 AIRWAY INHALATION TREATMENT: CPT

## 2018-07-16 PROCEDURE — 6370000000 HC RX 637 (ALT 250 FOR IP): Performed by: THORACIC SURGERY (CARDIOTHORACIC VASCULAR SURGERY)

## 2018-07-16 PROCEDURE — 2580000003 HC RX 258: Performed by: THORACIC SURGERY (CARDIOTHORACIC VASCULAR SURGERY)

## 2018-07-16 PROCEDURE — 80048 BASIC METABOLIC PNL TOTAL CA: CPT

## 2018-07-16 PROCEDURE — 93010 ELECTROCARDIOGRAM REPORT: CPT | Performed by: INTERNAL MEDICINE

## 2018-07-16 RX ORDER — OXYCODONE HYDROCHLORIDE 15 MG/1
15 TABLET ORAL 3 TIMES DAILY
Status: DISCONTINUED | OUTPATIENT
Start: 2018-07-16 | End: 2018-07-16

## 2018-07-16 RX ADMIN — INSULIN LISPRO 3 UNITS: 100 INJECTION, SOLUTION INTRAVENOUS; SUBCUTANEOUS at 12:40

## 2018-07-16 RX ADMIN — ONDANSETRON 4 MG: 2 INJECTION INTRAMUSCULAR; INTRAVENOUS at 06:25

## 2018-07-16 RX ADMIN — DOCUSATE SODIUM 100 MG: 100 CAPSULE, LIQUID FILLED ORAL at 20:22

## 2018-07-16 RX ADMIN — MULTIPLE VITAMINS W/ MINERALS TAB 1 TABLET: TAB at 09:25

## 2018-07-16 RX ADMIN — Medication 10 ML: at 09:25

## 2018-07-16 RX ADMIN — Medication 10 ML: at 20:23

## 2018-07-16 RX ADMIN — FAMOTIDINE 20 MG: 20 TABLET, FILM COATED ORAL at 17:37

## 2018-07-16 RX ADMIN — ACETAMINOPHEN 650 MG: 325 TABLET ORAL at 22:55

## 2018-07-16 RX ADMIN — ACETAMINOPHEN 650 MG: 325 TABLET ORAL at 17:38

## 2018-07-16 RX ADMIN — ACETAMINOPHEN 650 MG: 325 TABLET ORAL at 05:01

## 2018-07-16 RX ADMIN — ACETAMINOPHEN 650 MG: 325 TABLET ORAL at 09:24

## 2018-07-16 RX ADMIN — Medication 2 UNITS: at 20:24

## 2018-07-16 RX ADMIN — INSULIN LISPRO 3 UNITS: 100 INJECTION, SOLUTION INTRAVENOUS; SUBCUTANEOUS at 09:31

## 2018-07-16 RX ADMIN — Medication 2 PUFF: at 22:39

## 2018-07-16 RX ADMIN — DOCUSATE SODIUM 100 MG: 100 CAPSULE, LIQUID FILLED ORAL at 09:25

## 2018-07-16 RX ADMIN — METOPROLOL TARTRATE 25 MG: 25 TABLET ORAL at 20:22

## 2018-07-16 RX ADMIN — ATORVASTATIN CALCIUM 20 MG: 20 TABLET, FILM COATED ORAL at 20:22

## 2018-07-16 RX ADMIN — AMIODARONE HYDROCHLORIDE 200 MG: 200 TABLET ORAL at 09:25

## 2018-07-16 RX ADMIN — ACETAMINOPHEN 650 MG: 325 TABLET ORAL at 13:33

## 2018-07-16 ASSESSMENT — PAIN DESCRIPTION - ONSET
ONSET: ON-GOING

## 2018-07-16 ASSESSMENT — PAIN SCALES - GENERAL
PAINLEVEL_OUTOF10: 7
PAINLEVEL_OUTOF10: 4
PAINLEVEL_OUTOF10: 5
PAINLEVEL_OUTOF10: 8
PAINLEVEL_OUTOF10: 2
PAINLEVEL_OUTOF10: 0
PAINLEVEL_OUTOF10: 5
PAINLEVEL_OUTOF10: 3
PAINLEVEL_OUTOF10: 5
PAINLEVEL_OUTOF10: 2

## 2018-07-16 ASSESSMENT — PAIN DESCRIPTION - LOCATION
LOCATION: STERNUM;INCISION
LOCATION: INCISION;STERNUM
LOCATION: STERNUM;INCISION
LOCATION: STERNUM;INCISION

## 2018-07-16 ASSESSMENT — PAIN DESCRIPTION - PAIN TYPE
TYPE: SURGICAL PAIN

## 2018-07-16 ASSESSMENT — PAIN DESCRIPTION - DESCRIPTORS
DESCRIPTORS: ACHING

## 2018-07-16 ASSESSMENT — PAIN DESCRIPTION - ORIENTATION
ORIENTATION: MID

## 2018-07-16 ASSESSMENT — PAIN DESCRIPTION - PROGRESSION
CLINICAL_PROGRESSION: NOT CHANGED
CLINICAL_PROGRESSION: NOT CHANGED

## 2018-07-16 ASSESSMENT — PAIN DESCRIPTION - FREQUENCY
FREQUENCY: CONTINUOUS

## 2018-07-16 NOTE — PROGRESS NOTES
PHASE 1 CARDIAC REHABILITATION   CABG, AVR, MVR, TVR, AMI, PCI's  Exercise Physiologists      Treatment Length (L: Long, N: Normal, S: Short): N  Treatment Length Note:   Vitals Stable?: Yes. If No:     Any ECG-Rhythm Issues?: No.  If Yes:   Transfers (bc: Bed to Chair, cb: Chair to Bed): n/a  Strengthening/ROM Treatment Exercises: Step 2    FIDM:     Frequency: 2 x per day   Intensity: <15 RPE, <120bpm   Time: >30-60 seconds longer or >20% increase in distance each walk   Type: Bed/Chair Exercises/stationary marching or ambulation    Aerobic treatment: Sit to stand independently. Ambulation with both chest tubes, quinteros down the ruiz to the exit doors and return to the chair. Gait (i: Independent, s: Steady, u: Unsteady): S  Assists: 1  Patient tolerated treatment (w: well, f: fair, p: poor): W  Goals:    Short term:  Progression on each aerobic treatment. Long term: Independent ambulation and discharge. Marian Oneal is to follow sternal precautions. Will learn techniques for getting in and out of bed/chairs/car without using the arms. Using stairs will be addressed if applicable. Home activity instructions (Frequency, Intensity, Time, Type), and progression will be addressed prior to discharge (unless Marian Oneal goes to TCU or an ECF where they will follow PT protocols). Notes: Call light left within reach.     Education given:   Phase II Information (Given upon Discharge):

## 2018-07-16 NOTE — PROGRESS NOTES
Physical Therapy  [facility]  PHYSICAL THERAPY MISSED TREATMENT NOTE  ACUTE CARE    Date: 2018  Patient Name: Bernice Mina        MRN: [id number]   : 1955  (60 y.o.)  Gender: female   Referring Practitioner: Felix Matthews MD  Referring Practitioner: Dr. Fartun Barreto MD  Diagnosis: SOB  Diagnosis: SOB         REASON FOR MISSED TREATMENT:  RN approved session, upon arrival pt up in bedside chair. Pt declined session. When encouragement given pt stated \"I hurt and I'm not doing anything else. \" Offered to assist pt back to bed and get RN for pain meds and pt still refusing session. Will attempt back tomorrow.           Mariza Travis PTA [id number]

## 2018-07-16 NOTE — PLAN OF CARE
integrity - skin and mucous membranes  Structural intactness and normal physiological function of skin and  mucous membranes. Outcome: Met This Shift  Patient has been up in chair most of the shift. Patient repositioning and turned. Problem: Musculor/Skeletal Functional Status  Goal: Highest potential functional level  Outcome: Ongoing  Patient participated in some therapy this morning but refused therapy after lunch. Comments: Care plan reviewed with patient. Patient verbalize understanding of the plan of care and contribute to goal setting.

## 2018-07-16 NOTE — PROGRESS NOTES
Adis Antoine [address] [facility]  INPATIENT OCCUPATIONAL THERAPY  STRZ CVICU 4B  DAILY NOTE    Time:  Time In: 805  Time Out: 0003  Timed Code Treatment Minutes: 24 Minutes  Minutes: 24          Date: 2018  Patient Name: Song Tolliver,   Gender: female      Room: -06006-A  MRN: [id number]  : 1955  (60 y.o.)  Referring Practitioner: Francisco Vila MD  Diagnosis: SOB  Additional Pertinent Hx: 60 y.o. female who presented to [facility] on 18 with complaints of acute shortness of breath increasing throughout the day. Patient has recent history of decompensated congestive heart failure. Patient was scheduled to have CABG on . She reports her procedure was cancelled because her heart is \"in too bad of shape\". Past surgical history cardiac cath, brain surgery, colonoscopy, AH, WILBERTO and aortic valve replacement. Patient was noted to have elevated troponin 0.090 and ProBNP. CBC noted mild leukocytosis 11.3, likely reactive. Hgb 9.0. Patient to be admitted for decompensated congestive heart failure.   Pt underwent Redo aortic valve replacement, mitral valve and tricuspid valve replacement on 2018 by Dr. Kasia Churchill MD     Past Medical History:   Diagnosis Date    Acute decompensated heart failure ([address] 2018    Aortic valve replaced     Arthritis     Hyperlipidemia     Hypertension     THAYER (nonalcoholic steatohepatitis)     Smoker     Unspecified cerebral artery occlusion with cerebral infarction 3/23/15    \"bleeding on brain\"     Past Surgical History:   Procedure Laterality Date    AORTIC VALVE REPLACEMENT      cow valve    BRAIN SURGERY      to drain blood    CARDIAC CATHETERIZATION   or     [facility]    COLONOSCOPY      DIAGNOSTIC CARDIAC CATH LAB PROCEDURE      HYSTERECTOMY      [place] ECHO TRANSESOPHAG R-T 2D IMG ACQUISJ I&R ONLY Left 7/3/2018    TRANSESOPHAGEAL ECHOCARDIOGRAM performed by Latoya Quiroz MD at [facility]    [place] OFFICE/OUTPT VISIT,PROCEDURE ONLY N/A 7/13/2018    REDO AORTIC VALVE REPLACEMENT, MITRAL VALVE REPAIR, TRICUSPID VALVE REPAIR, WILBERTO performed by Rebeca Do MD at [address]         Restrictions/Precautions:  General Precautions, Surgical Protocols, Fall Risk                    Sternal Precautions: No Pushing, No Pulling, 10# Lifting Restrictions       Prior Level of Function:  ADL Assistance: Independent  Homemaking Assistance: Independent  Ambulation Assistance: Independent  Transfer Assistance: Independent  Additional Comments: Pt amb without AD, no home O2, I with ADL's/IADLs. States her  can assist, he does not work either. Subjective       Subjective: pt reclined in chair upon arrival. Pt agreeable to OT   Comments: RN ok'd session. Pain:  Pain Assessment  Patient Currently in Pain: Yes  Pain Level: 5  Pain Type: Surgical pain  Pain Location: Sternum; Incision       Objective         ADL  LE Dressing: Maximum assistance (adjsuting socks prior to mobility )  Additional Comments: Pt declined all other ADLs due to fatigue. Transfers  Sit to stand: Contact guard assistance (from bedside chair )  Stand to sit: Contact guard assistance (to bedside chair )       Balance  Sitting Balance: Stand by assistance (at edge of chair )  Standing Balance: Contact guard assistance     Time: x1 min   Activity: prep for mobility     Functional Mobility  Functional - Mobility Device: Rolling Walker  Activity: Other  Assist Level: Contact guard assistance  Functional Mobility Comments: pt ambulated out of room to RN station and back to chair. pt used RW with slow pace and steady throughout. No LOB noted. Pt completed mobility to increase endurance needed for ADLS and transfers to BR   Apparatus Needs: O2 (3 L )     Comment: CABG step II exercises compelted sitting in recliner x10 reps x1 set. RB needed after each exercise.   Pt completed to increase strength and endurance needed for ADLS and transfers Activity Tolerance:  Activity Tolerance: Patient Tolerated treatment well  Activity Tolerance: O2 stats stayed at 98% with 3L. Pt able to indentify 2/4 sternal precautions without cues. reviewed  all precautions with pt. Assessment:     Performance deficits / Impairments: Decreased functional mobility , Decreased ADL status, Decreased endurance, Decreased balance, Decreased high-level IADLs  Prognosis: Good  Discharge Recommendations: Continue to assess pending progress, Patient would benefit from continued therapy after discharge    Patient Education:  Patient Education: sternal precautions, safety with mobility and transfers     Equipment Recommendations: Other: TBD pending progress, discharge environment; will continue to assess    Safety:  Safety Devices in place: Yes  Type of devices: All fall risk precautions in place, Call light within reach, Gait belt, Left in chair, Nurse notified    Plan:  Times per week: 6x  Current Treatment Recommendations: Balance Training, Functional Mobility Training, Safety Education & Training, Patient/Caregiver Education & Training, Equipment Evaluation, Education, & procurement, Self-Care / ADL    Goals:  Patient goals : \"go home\"    Short term goals  Time Frame for Short term goals: 2 weeks  Short term goal 1: Pt will complete functional mobility to/from bathroom, progressing to household distances with SBA to increase activity tolerance needed for ADL/IADL completion. Short term goal 2: Pt will complete CABG step II exercises X10-15 reps to increase overall endurance needed for ADL/IADL tasks. Short term goal 3: Pt will complete all functional transfers with supervision and 0 cues for sternal precautions to increase independence with toilet transfers. Short term goal 4: Pt will complete LB ADLs with minimal assistance using LHAE prn to increase independence with self care tasks.    Short term goal 5: Pt will tolerate dynamic standing X5 minutes with 1-2 hand release and SBA in prep for toileting/sinkside grooming tasks.    Long term goals  Time Frame for Long term goals : not set due to ELOS

## 2018-07-17 ENCOUNTER — APPOINTMENT (OUTPATIENT)
Dept: GENERAL RADIOLOGY | Age: 63
DRG: 219 | End: 2018-07-17
Payer: MEDICARE

## 2018-07-17 LAB
ANION GAP SERPL CALCULATED.3IONS-SCNC: 9 MEQ/L (ref 8–16)
BUN BLDV-MCNC: 33 MG/DL (ref 7–22)
CALCIUM SERPL-MCNC: 8.4 MG/DL (ref 8.5–10.5)
CHLORIDE BLD-SCNC: 102 MEQ/L (ref 98–111)
CO2: 28 MEQ/L (ref 23–33)
CREAT SERPL-MCNC: 1.3 MG/DL (ref 0.4–1.2)
ERYTHROCYTE [DISTWIDTH] IN BLOOD BY AUTOMATED COUNT: 14.6 % (ref 11.5–14.5)
ERYTHROCYTE [DISTWIDTH] IN BLOOD BY AUTOMATED COUNT: 49.4 FL (ref 35–45)
GFR SERPL CREATININE-BSD FRML MDRD: 41 ML/MIN/1.73M2
GLUCOSE BLD-MCNC: 102 MG/DL (ref 70–108)
GLUCOSE BLD-MCNC: 103 MG/DL (ref 70–108)
GLUCOSE BLD-MCNC: 105 MG/DL (ref 70–108)
GLUCOSE BLD-MCNC: 129 MG/DL (ref 70–108)
GLUCOSE BLD-MCNC: 185 MG/DL (ref 70–108)
HCT VFR BLD CALC: 28.2 % (ref 37–47)
HEMOGLOBIN: 9 GM/DL (ref 12–16)
MAGNESIUM: 2.8 MG/DL (ref 1.6–2.4)
MCH RBC QN AUTO: 29.6 PG (ref 26–33)
MCHC RBC AUTO-ENTMCNC: 31.9 GM/DL (ref 32.2–35.5)
MCV RBC AUTO: 92.8 FL (ref 81–99)
PLATELET # BLD: 86 THOU/MM3 (ref 130–400)
PMV BLD AUTO: 12.2 FL (ref 9.4–12.4)
POTASSIUM SERPL-SCNC: 5 MEQ/L (ref 3.5–5.2)
RBC # BLD: 3.04 MILL/MM3 (ref 4.2–5.4)
SODIUM BLD-SCNC: 139 MEQ/L (ref 135–145)
URINE CULTURE, ROUTINE: NORMAL
WBC # BLD: 15.8 THOU/MM3 (ref 4.8–10.8)

## 2018-07-17 PROCEDURE — 83735 ASSAY OF MAGNESIUM: CPT

## 2018-07-17 PROCEDURE — 6370000000 HC RX 637 (ALT 250 FOR IP): Performed by: THORACIC SURGERY (CARDIOTHORACIC VASCULAR SURGERY)

## 2018-07-17 PROCEDURE — 2580000003 HC RX 258: Performed by: THORACIC SURGERY (CARDIOTHORACIC VASCULAR SURGERY)

## 2018-07-17 PROCEDURE — 97535 SELF CARE MNGMENT TRAINING: CPT

## 2018-07-17 PROCEDURE — 71045 X-RAY EXAM CHEST 1 VIEW: CPT

## 2018-07-17 PROCEDURE — 97110 THERAPEUTIC EXERCISES: CPT

## 2018-07-17 PROCEDURE — 97530 THERAPEUTIC ACTIVITIES: CPT

## 2018-07-17 PROCEDURE — 36415 COLL VENOUS BLD VENIPUNCTURE: CPT

## 2018-07-17 PROCEDURE — 85027 COMPLETE CBC AUTOMATED: CPT

## 2018-07-17 PROCEDURE — 6370000000 HC RX 637 (ALT 250 FOR IP): Performed by: PHARMACIST

## 2018-07-17 PROCEDURE — 80048 BASIC METABOLIC PNL TOTAL CA: CPT

## 2018-07-17 PROCEDURE — 2060000000 HC ICU INTERMEDIATE R&B

## 2018-07-17 PROCEDURE — 82948 REAGENT STRIP/BLOOD GLUCOSE: CPT

## 2018-07-17 PROCEDURE — 2709999900 HC NON-CHARGEABLE SUPPLY

## 2018-07-17 PROCEDURE — 97116 GAIT TRAINING THERAPY: CPT

## 2018-07-17 PROCEDURE — 6360000002 HC RX W HCPCS: Performed by: THORACIC SURGERY (CARDIOTHORACIC VASCULAR SURGERY)

## 2018-07-17 PROCEDURE — APPSS30 APP SPLIT SHARED TIME 16-30 MINUTES: Performed by: PHYSICIAN ASSISTANT

## 2018-07-17 RX ORDER — FUROSEMIDE 10 MG/ML
20 INJECTION INTRAMUSCULAR; INTRAVENOUS 2 TIMES DAILY
Status: DISCONTINUED | OUTPATIENT
Start: 2018-07-17 | End: 2018-07-19 | Stop reason: HOSPADM

## 2018-07-17 RX ADMIN — ACETAMINOPHEN 650 MG: 325 TABLET ORAL at 07:18

## 2018-07-17 RX ADMIN — Medication 10 ML: at 18:38

## 2018-07-17 RX ADMIN — AMIODARONE HYDROCHLORIDE 200 MG: 200 TABLET ORAL at 07:41

## 2018-07-17 RX ADMIN — ONDANSETRON 4 MG: 2 INJECTION INTRAMUSCULAR; INTRAVENOUS at 21:54

## 2018-07-17 RX ADMIN — OXYCODONE HYDROCHLORIDE 10 MG: 5 TABLET ORAL at 22:30

## 2018-07-17 RX ADMIN — ASPIRIN 325 MG: 325 TABLET, DELAYED RELEASE ORAL at 07:41

## 2018-07-17 RX ADMIN — ACETAMINOPHEN 650 MG: 325 TABLET ORAL at 15:45

## 2018-07-17 RX ADMIN — OXYCODONE HYDROCHLORIDE 5 MG: 5 TABLET ORAL at 15:45

## 2018-07-17 RX ADMIN — DOCUSATE SODIUM 100 MG: 100 CAPSULE, LIQUID FILLED ORAL at 07:41

## 2018-07-17 RX ADMIN — OXYCODONE HYDROCHLORIDE 10 MG: 5 TABLET ORAL at 01:15

## 2018-07-17 RX ADMIN — Medication 2 UNITS: at 21:32

## 2018-07-17 RX ADMIN — ATORVASTATIN CALCIUM 20 MG: 20 TABLET, FILM COATED ORAL at 20:02

## 2018-07-17 RX ADMIN — Medication 10 ML: at 20:03

## 2018-07-17 RX ADMIN — Medication 10 ML: at 07:42

## 2018-07-17 RX ADMIN — OXYCODONE HYDROCHLORIDE 5 MG: 5 TABLET ORAL at 07:26

## 2018-07-17 RX ADMIN — FUROSEMIDE 20 MG: 10 INJECTION, SOLUTION INTRAMUSCULAR; INTRAVENOUS at 12:14

## 2018-07-17 RX ADMIN — FAMOTIDINE 20 MG: 20 TABLET, FILM COATED ORAL at 15:45

## 2018-07-17 RX ADMIN — ACETAMINOPHEN 650 MG: 325 TABLET ORAL at 20:02

## 2018-07-17 RX ADMIN — DOCUSATE SODIUM 100 MG: 100 CAPSULE, LIQUID FILLED ORAL at 20:02

## 2018-07-17 RX ADMIN — FUROSEMIDE 20 MG: 10 INJECTION, SOLUTION INTRAMUSCULAR; INTRAVENOUS at 18:37

## 2018-07-17 RX ADMIN — METOPROLOL TARTRATE 25 MG: 25 TABLET ORAL at 07:41

## 2018-07-17 RX ADMIN — MULTIPLE VITAMINS W/ MINERALS TAB 1 TABLET: TAB at 07:41

## 2018-07-17 RX ADMIN — METOPROLOL TARTRATE 25 MG: 25 TABLET ORAL at 20:02

## 2018-07-17 RX ADMIN — Medication 10 ML: at 12:15

## 2018-07-17 ASSESSMENT — PAIN DESCRIPTION - PAIN TYPE
TYPE: SURGICAL PAIN

## 2018-07-17 ASSESSMENT — PAIN SCALES - GENERAL
PAINLEVEL_OUTOF10: 0
PAINLEVEL_OUTOF10: 7
PAINLEVEL_OUTOF10: 9
PAINLEVEL_OUTOF10: 10
PAINLEVEL_OUTOF10: 4
PAINLEVEL_OUTOF10: 3
PAINLEVEL_OUTOF10: 4
PAINLEVEL_OUTOF10: 7
PAINLEVEL_OUTOF10: 9

## 2018-07-17 ASSESSMENT — PAIN DESCRIPTION - ORIENTATION
ORIENTATION: MID

## 2018-07-17 ASSESSMENT — PAIN DESCRIPTION - FREQUENCY
FREQUENCY: CONTINUOUS

## 2018-07-17 ASSESSMENT — PAIN DESCRIPTION - LOCATION
LOCATION: INCISION;STERNUM
LOCATION: INCISION;STERNUM
LOCATION: STERNUM
LOCATION: INCISION;STERNUM

## 2018-07-17 ASSESSMENT — PAIN DESCRIPTION - DESCRIPTORS
DESCRIPTORS: ACHING

## 2018-07-17 NOTE — CARE COORDINATION
7/17/18, 1:20 PM      [address] [facility] [address] day: 9  Location: -06/006-A Reason for admit: Shortness of breath [R06.02]  Shortness of breath [R06.02]   Procedure:   7/11 CTA Chest: Neg for PE; Hazy groundglass opacities in both lungs likely representing pulmonary edema. No consolidation; tiny right pleural effusion; moderate cardiomegaly  7/13 Redo aortic valve replacement with a 21 mm [employer]ifect[employer] bioprosthesis; Mitral valve repair with a 28 mm with an [employer] annuloplasty band; Tricuspid valve repair with a 28 mm an [place] MC3 annuloplasty band  7/13 Intubated - 7/14 Extubated  7/17 CXR:   1. Postsurgical changes with visualized cardiac valve prosthesis and cardiac enlargement. 2. Persistent mediastinal drain with no pneumothorax     Treatment Plan of Care: POD #4. Sats 96% on 1L O2. Afebrile. [place] out today. Creat down from 2.4 to 1.3 today. Ambulated 140' w/CR today. CT x3 to -20sx. Cardiac Rehab/PT/OT. Dietitian consulted. Dietary ileus prevention protocol. Telemetry, I&O, IS, sternal precautions, CT care, wound care, SCDs, ambulate. Amio, asa, lipitor, pepcid, IV lasix 20 mg bid, SSI ACHS, lopressor, prn IV zofran, prn roxicodone, Electrolyte replacement protocols. BUN 33, Creat 1.3, Mg 2.8, wbc 15.8, Hgb 9, plt 86. Urine sent for culture. PCP: JUAN Matthews CNP  Readmission Risk Score: 28%  Discharge Plan: Home with spouse and [facility].

## 2018-07-17 NOTE — PLAN OF CARE
Problem: Falls - Risk of:  Goal: Will remain free from falls  Will remain free from falls   Outcome: Ongoing      Problem: Pain:  Goal: Pain level will decrease  Pain level will decrease   Outcome: Ongoing  Patient complains of pain in shoulder. Patients current pain level is a 7/10. Patients pain goal is a 0/10. Patient is receiving PO meds for pain at this time. Problem: Discharge Planning:  Goal: Discharged to appropriate level of care  Discharged to appropriate level of care   Outcome: Ongoing  Case management and social work on the case. Discharge planning in process. Comments: Care plan reviewed with patient. Patient and DrSteve verbalize understanding of the plan of care and contribute to goal setting.

## 2018-07-17 NOTE — PROGRESS NOTES
CT/CV Surgery Progress Note    2018 7:32 AM  Surgeon:  Dr. Susan Niño:  Ms. Ann Olivarez  Is resting in bedside chair on 1 L/min, alert, and in no acute distress. Pt complaining of incisional chest pain. Pt denies chest pressure, SOB, fever, chills, N/V/D. Vital Signs: BP (!) 134/57   Pulse 70   Temp 98.3 °F (36.8 °C) (Oral)   Resp 20   Ht 5' 2\" (1.575 m)   Wt 196 lb 3.2 oz (89 kg)   SpO2 93%   BMI 35.89 kg/m²    Temp (24hrs), Av.4 °F (36.9 °C), Min:98.2 °F (36.8 °C), Max:98.7 °F (37.1 °C)      Weight: 196 lb 3.2 oz (89 kg)       PULSE OXIMETRY RANGE: SpO2  Av.7 %  Min: 92 %  Max: 98 %  SUPPLEMENTAL O2: O2 Flow Rate (L/min): 1 L/min     Labs:   CBC:     Recent Labs      07/15/18   0528  18   0610  18   0448   WBC  28.7*  22.6*  15.8*   HGB  9.5*  9.5*  9.0*   HCT  29.8*  30.4*  28.2*   MCV  94.0  95.9  92.8   PLT  76*  66*  86*     BMP: Recent Labs      07/15/18   0528  18   0610  18   0448   NA  143  139  139   K  5.1  4.6  5.0   CL  104  100  102   CO2  26  23  28   BUN  37*  50*  33*   CREATININE  1.8*  2.4*  1.3*   MG  2.9*  3.0*  2.8*       Imaging:  CXR: I have reviewed the CXR image. 1. Postsurgical changes with visualized cardiac valve prosthesis and cardiac enlargement. 2. Persistent mediastinal drain with no pneumothorax.           3. Mild improvement of pulmonary venous congestion.        Intake/Output Summary (Last 24 hours) at 18 0732  Last data filed at 18 0448   Gross per 24 hour   Intake             1300 ml   Output             2210 ml   Net             -910 ml       Scheduled Meds:    amiodarone  200 mg Oral Daily    potassium (CARDIAC) replacement protocol   Other RX Placeholder    insulin lispro  0-18 Units Subcutaneous TID WC    insulin lispro  0-9 Units Subcutaneous Nightly    sodium chloride flush  10 mL Intravenous 2 times per day    docusate sodium  100 mg Oral BID    metoprolol tartrate  25 mg Oral BID    therapeutic

## 2018-07-17 NOTE — PROGRESS NOTES
\"bleeding on brain\"     Past Surgical History:   Procedure Laterality Date    AORTIC VALVE REPLACEMENT      cow valve    BRAIN SURGERY      to drain blood    CARDIAC CATHETERIZATION  2004 or [facility]    [facility]    COLONOSCOPY      DIAGNOSTIC CARDIAC CATH LAB PROCEDURE      HYSTERECTOMY      [place] ECHO TRANSESOPHAG R-T 2D IMG ACQUISJ I&R ONLY Left 7/3/2018    TRANSESOPHAGEAL ECHOCARDIOGRAM performed by Crescencio Love MD at [address] Endoscopy    [place] OFFICE/OUTPT VISIT,PROCEDURE ONLY N/A 7/13/2018    REDO AORTIC VALVE REPLACEMENT, MITRAL VALVE REPAIR, TRICUSPID VALVE REPAIR, WILBERTO performed by Ursula Farah MD at [address]         Restrictions/Precautions:  General Precautions, Surgical Protocols, Fall Risk                    Sternal Precautions: No Pushing, No Pulling, 10# Lifting Restrictions       Prior Level of Function:  ADL Assistance: Independent  Homemaking Assistance: Independent  Ambulation Assistance: Independent  Transfer Assistance: Independent  Additional Comments: Pt amb without AD, no home O2, I with ADL's/IADLs. States her  can assist, he does not work either. Subjective:  Chart Reviewed: Yes  Family / Caregiver Present: No  Subjective: RN approved session. Pt resting in bedside chair with  present. Pleasant and agreeable to therapy. Pain:  Yes. Pain Assessment  Pain Level: 4  Pain Type: Surgical pain  Pain Location: Sternum       Social/Functional:  Lives With: Spouse  Type of Home: House  Home Layout: One level  Home Access: Stairs to enter without rails  Entrance Stairs - Number of Steps: 2  Home Equipment: Rolling walker     Objective:       Transfers  Sit to Stand: Contact guard assistance (From bedside chair.  Compliant with sternal precautions. )  Stand to sit: Contact guard assistance       Ambulation 1  Surface: level tile  Device: Rolling Walker  Other Apparatus: O2 (2L)  Assistance: Contact guard assistance  Quality of Gait: Slow melia and decreased B

## 2018-07-17 NOTE — PLAN OF CARE
Problem: Falls - Risk of:  Goal: Will remain free from falls  Will remain free from falls   Outcome: Met This Shift  No falls this shift. Patient uses call light for help. Gait belt and alarms used for patient. Problem: Pain:  Goal: Pain level will decrease  Pain level will decrease   Outcome: Ongoing  Patient receiving Tylenol for pain. Pain being managed by medication and rest this shift. Problem: Discharge Planning:  Goal: Participates in care planning  Participates in care planning   Outcome: Ongoing  Patient from home with . Discharge planning in process. Problem: Cardiac Output - Decreased:  Goal: Hemodynamic stability will improve  Hemodynamic stability will improve   Outcome: Ongoing  Patient showing 1st AV. Patient receiving amiodarone for heart rhythm. Problem: Fluid Volume - Imbalance:  Goal: Absence of imbalanced fluid volume signs and symptoms  Absence of imbalanced fluid volume signs and symptoms   Outcome: Ongoing  Patient output is greater than input. Patient receiving Lasix to draw off fluids. Problem: Gas Exchange - Impaired:  Goal: Levels of oxygenation will improve  Levels of oxygenation will improve   Outcome: Ongoing  Patient oxygen saturation greater than 92% this shift on 1 L NC. Will continue to monitor for oxygen need. Oxygenation increased as shift went on. Problem: Nutrition  Goal: Optimal nutrition therapy  Outcome: Ongoing  Patient has decreased appetite. Not eating much this shift. Problem: Activity Intolerance:  Goal: Able to perform prescribed physical activity  Able to perform prescribed physical activity   Outcome: Met This Shift  Patient up with help and able to ambulate well to bathroom and through halls.     Problem: Anxiety:  Goal: Level of anxiety will decrease  Level of anxiety will decrease   Outcome: Met This Shift      Problem: Risk for Impaired Skin Integrity  Goal: Tissue integrity - skin and mucous membranes  Structural intactness and normal

## 2018-07-17 NOTE — PROGRESS NOTES
Nutrition Assessment    Type and Reason for Visit: Reassess (po FU and diet educ FU)    Nutrition Recommendations: Continue diet as ordered and MVI. Recommend bowel regimen - no BM in 5d. Malnutrition Assessment:  · Malnutrition Status: No malnutrition    Nutrition Diagnosis:   · Problem: Increased nutrient needs  · Etiology: related to Increased demand for energy/nutrients due to     Signs and symptoms:  as evidenced by  (OHS)    Nutrition Assessment:  · Subjective Assessment: Pt. and  seen; s/p OHS on 7/13; was seen prior to surgery re: diet education; pt. last BM was 7/12 - encouraged her to try the [place] for ileus prevention - pt. had it before OHS but didn't try it; meds include colace,MVI; chemstick 105; discussed importance of protein for healing process and limiting salt - pt. sodium intake pta was high -  has Mrs. Amado available and is supportive of her cutting back on the salt - reinforced that sea salt is not ok !; pt. declines ONS; pt. states no questions on cardiac diet - just that she wants to have salt.    · Wound Type: OHS 7/13  · Current Nutrition Therapies:  · Oral Diet Orders: Carb Control 3 Carbs/Meal, 2gm Sodium, Cardiac   · Oral Diet intake: 26-50%  · Oral Nutrition Supplement (ONS) Orders: Snack (Activia TID & Hot Decaf Beverage TID)  · ONS intake:  (pt. plans to try today)  · Anthropometric Measures:  · Ht: 5' 2\" (157.5 cm)   · Current Body Wt: 196 lb (88.9 kg) (7/17 with trace edema)  · Admission Body Wt: 194 lb 3.2 oz (88.1 kg) (7/8/18, no edema)  · Usual Body Wt: 184 lb (83.5 kg) (3/21/18)  · % Weight Change: Stable, fluctuations related to underlying CHF and fluids,     · Ideal Body Wt: 110 lb (49.9 kg),   · Adjusted Body Wt: 130 lb (59 kg), body weight adjusted for Obesity  · BMI Classification: BMI 35.0 - 39.9 Obese Class II (36)  · Comparative Standards (Estimated Nutrition Needs):  · Estimated Daily Total Kcal: 7856-5794 kcals (30-35 kcal/kgm adjusted weight 50 kgm)  · Estimated Daily Protein (g): 60-75 grams protein (1.2-1.5 grams protein/kgm adjusted weight 50 kgm)    Estimated Intake vs Estimated Needs: Intake Less Than Needs    Nutrition Risk Level: Moderate    Nutrition Interventions:   Continue current diet  Continued Inpatient Monitoring, Education Completed (7/3 CHF educ;7/11 and 7/17 Cardiac educ)    Nutrition Evaluation:   · Evaluation: Progressing toward goals   · Goals: Patient will consume 75% or more of meals during LOS. · Monitoring: Meal Intake, Diet Tolerance, Skin Integrity, Wound Healing, Weight, Pertinent Labs, Constipation    See Adult Nutrition Doc Flowsheet for more detail.      Electronically signed by Shun Ray RD, LD on 7/17/18 at 12:12 PM    Contact Number: [phone]

## 2018-07-18 ENCOUNTER — APPOINTMENT (OUTPATIENT)
Dept: GENERAL RADIOLOGY | Age: 63
DRG: 219 | End: 2018-07-18
Payer: MEDICARE

## 2018-07-18 LAB
ANION GAP SERPL CALCULATED.3IONS-SCNC: 12 MEQ/L (ref 8–16)
BUN BLDV-MCNC: 21 MG/DL (ref 7–22)
CALCIUM SERPL-MCNC: 8.6 MG/DL (ref 8.5–10.5)
CHLORIDE BLD-SCNC: 102 MEQ/L (ref 98–111)
CO2: 26 MEQ/L (ref 23–33)
CREAT SERPL-MCNC: 0.9 MG/DL (ref 0.4–1.2)
ERYTHROCYTE [DISTWIDTH] IN BLOOD BY AUTOMATED COUNT: 14.4 % (ref 11.5–14.5)
ERYTHROCYTE [DISTWIDTH] IN BLOOD BY AUTOMATED COUNT: 47.2 FL (ref 35–45)
GFR SERPL CREATININE-BSD FRML MDRD: 63 ML/MIN/1.73M2
GLUCOSE BLD-MCNC: 128 MG/DL (ref 70–108)
GLUCOSE BLD-MCNC: 162 MG/DL (ref 70–108)
HCT VFR BLD CALC: 29.8 % (ref 37–47)
HEMOGLOBIN: 9.6 GM/DL (ref 12–16)
MAGNESIUM: 2.3 MG/DL (ref 1.6–2.4)
MCH RBC QN AUTO: 29.4 PG (ref 26–33)
MCHC RBC AUTO-ENTMCNC: 32.2 GM/DL (ref 32.2–35.5)
MCV RBC AUTO: 91.1 FL (ref 81–99)
PLATELET # BLD: 90 THOU/MM3 (ref 130–400)
PMV BLD AUTO: 11.1 FL (ref 9.4–12.4)
POTASSIUM SERPL-SCNC: 4.1 MEQ/L (ref 3.5–5.2)
RBC # BLD: 3.27 MILL/MM3 (ref 4.2–5.4)
SODIUM BLD-SCNC: 140 MEQ/L (ref 135–145)
WBC # BLD: 11.5 THOU/MM3 (ref 4.8–10.8)

## 2018-07-18 PROCEDURE — APPSS30 APP SPLIT SHARED TIME 16-30 MINUTES: Performed by: PHYSICIAN ASSISTANT

## 2018-07-18 PROCEDURE — 6370000000 HC RX 637 (ALT 250 FOR IP): Performed by: THORACIC SURGERY (CARDIOTHORACIC VASCULAR SURGERY)

## 2018-07-18 PROCEDURE — 2060000000 HC ICU INTERMEDIATE R&B

## 2018-07-18 PROCEDURE — 82948 REAGENT STRIP/BLOOD GLUCOSE: CPT

## 2018-07-18 PROCEDURE — 97110 THERAPEUTIC EXERCISES: CPT

## 2018-07-18 PROCEDURE — 36415 COLL VENOUS BLD VENIPUNCTURE: CPT

## 2018-07-18 PROCEDURE — 85027 COMPLETE CBC AUTOMATED: CPT

## 2018-07-18 PROCEDURE — 97535 SELF CARE MNGMENT TRAINING: CPT

## 2018-07-18 PROCEDURE — 2580000003 HC RX 258: Performed by: THORACIC SURGERY (CARDIOTHORACIC VASCULAR SURGERY)

## 2018-07-18 PROCEDURE — 80048 BASIC METABOLIC PNL TOTAL CA: CPT

## 2018-07-18 PROCEDURE — 83735 ASSAY OF MAGNESIUM: CPT

## 2018-07-18 PROCEDURE — 6370000000 HC RX 637 (ALT 250 FOR IP): Performed by: PHARMACIST

## 2018-07-18 PROCEDURE — 71045 X-RAY EXAM CHEST 1 VIEW: CPT

## 2018-07-18 PROCEDURE — 6360000002 HC RX W HCPCS: Performed by: THORACIC SURGERY (CARDIOTHORACIC VASCULAR SURGERY)

## 2018-07-18 RX ADMIN — ASPIRIN 325 MG: 325 TABLET, DELAYED RELEASE ORAL at 09:41

## 2018-07-18 RX ADMIN — Medication 10 ML: at 09:46

## 2018-07-18 RX ADMIN — OXYCODONE HYDROCHLORIDE 5 MG: 5 TABLET ORAL at 21:41

## 2018-07-18 RX ADMIN — METOPROLOL TARTRATE 25 MG: 25 TABLET ORAL at 20:44

## 2018-07-18 RX ADMIN — Medication 10 ML: at 16:36

## 2018-07-18 RX ADMIN — FAMOTIDINE 20 MG: 20 TABLET, FILM COATED ORAL at 16:40

## 2018-07-18 RX ADMIN — Medication 10 ML: at 20:49

## 2018-07-18 RX ADMIN — ONDANSETRON 4 MG: 2 INJECTION INTRAMUSCULAR; INTRAVENOUS at 09:29

## 2018-07-18 RX ADMIN — ACETAMINOPHEN 650 MG: 325 TABLET ORAL at 04:55

## 2018-07-18 RX ADMIN — ACETAMINOPHEN 650 MG: 325 TABLET ORAL at 11:05

## 2018-07-18 RX ADMIN — FUROSEMIDE 20 MG: 10 INJECTION, SOLUTION INTRAMUSCULAR; INTRAVENOUS at 09:42

## 2018-07-18 RX ADMIN — MULTIPLE VITAMINS W/ MINERALS TAB 1 TABLET: TAB at 09:42

## 2018-07-18 RX ADMIN — ONDANSETRON 4 MG: 2 INJECTION INTRAMUSCULAR; INTRAVENOUS at 21:41

## 2018-07-18 RX ADMIN — FUROSEMIDE 20 MG: 10 INJECTION, SOLUTION INTRAMUSCULAR; INTRAVENOUS at 16:42

## 2018-07-18 RX ADMIN — AMIODARONE HYDROCHLORIDE 200 MG: 200 TABLET ORAL at 09:41

## 2018-07-18 RX ADMIN — OXYCODONE HYDROCHLORIDE 5 MG: 5 TABLET ORAL at 11:05

## 2018-07-18 RX ADMIN — DOCUSATE SODIUM 100 MG: 100 CAPSULE, LIQUID FILLED ORAL at 20:45

## 2018-07-18 RX ADMIN — DOCUSATE SODIUM 100 MG: 100 CAPSULE, LIQUID FILLED ORAL at 09:42

## 2018-07-18 RX ADMIN — ONDANSETRON 4 MG: 2 INJECTION INTRAMUSCULAR; INTRAVENOUS at 16:36

## 2018-07-18 RX ADMIN — METOPROLOL TARTRATE 25 MG: 25 TABLET ORAL at 09:42

## 2018-07-18 RX ADMIN — OXYCODONE HYDROCHLORIDE 5 MG: 5 TABLET ORAL at 04:55

## 2018-07-18 RX ADMIN — ACETAMINOPHEN 650 MG: 325 TABLET ORAL at 20:44

## 2018-07-18 RX ADMIN — ATORVASTATIN CALCIUM 20 MG: 20 TABLET, FILM COATED ORAL at 20:45

## 2018-07-18 ASSESSMENT — PAIN SCALES - GENERAL
PAINLEVEL_OUTOF10: 4
PAINLEVEL_OUTOF10: 5
PAINLEVEL_OUTOF10: 2
PAINLEVEL_OUTOF10: 4
PAINLEVEL_OUTOF10: 2
PAINLEVEL_OUTOF10: 5
PAINLEVEL_OUTOF10: 2
PAINLEVEL_OUTOF10: 2
PAINLEVEL_OUTOF10: 4
PAINLEVEL_OUTOF10: 3

## 2018-07-18 ASSESSMENT — PAIN DESCRIPTION - PAIN TYPE
TYPE: SURGICAL PAIN
TYPE: SURGICAL PAIN

## 2018-07-18 ASSESSMENT — PAIN DESCRIPTION - FREQUENCY: FREQUENCY: CONTINUOUS

## 2018-07-18 ASSESSMENT — PAIN DESCRIPTION - PROGRESSION: CLINICAL_PROGRESSION: NOT CHANGED

## 2018-07-18 ASSESSMENT — PAIN DESCRIPTION - DESCRIPTORS
DESCRIPTORS: ACHING
DESCRIPTORS: ACHING

## 2018-07-18 ASSESSMENT — PAIN DESCRIPTION - LOCATION
LOCATION: STERNUM
LOCATION: STERNUM

## 2018-07-18 ASSESSMENT — PAIN DESCRIPTION - ONSET: ONSET: ON-GOING

## 2018-07-18 ASSESSMENT — PAIN DESCRIPTION - ORIENTATION
ORIENTATION: MID
ORIENTATION: MID

## 2018-07-18 NOTE — PROGRESS NOTES
7/13/2018    REDO AORTIC VALVE REPLACEMENT, MITRAL VALVE REPAIR, TRICUSPID VALVE REPAIR, WILBERTO performed by Margarito Prabhakar MD at [address]         Restrictions/Precautions:  General Precautions, Surgical Protocols, Fall Risk  Sternal Precautions: No Pushing, No Pulling, 10# Lifting Restrictions    Prior Level of Function:  ADL Assistance: Independent  Homemaking Assistance: Independent  Ambulation Assistance: Independent  Transfer Assistance: Independent  Additional Comments: Pt amb without AD, no home O2, I with ADL's/IADLs. States her  can assist, he does not work either. Subjective  Subjective: Patient supine upon arrival, agreeable to OT. Pleasent, cooperative, motivated. Comments: HR at 73 , RR at 16 throughout and at EOS . Pain:  Pain Assessment  Patient Currently in Pain: Denies    Objective  Overall Cognitive Status: WFL    ADL  Grooming: Stand by assistance (standing x 7 min sinkside for hand hygiene, oral care, hair care)  Toileting: Stand by assistance     Transfers  Sit to stand: Stand by assistance (EOB, STS, recliner)  Stand to sit: Stand by assistance  Transfer Comments: Patient able to abide by sternal precautions without VCs  Toilet Transfers  Equipment Used: Standard toilet  Toilet Transfer: Stand by assistance    Balance  Sitting Balance: Supervision  Standing Balance: Stand by assistance   Time: x7 min during sinkside grooming and in prep to ambulate     Functional Mobility  Functional - Mobility Device: Rolling Walker  Assist Level: Stand by assistance  Functional Mobility Comments: To/from bathroom and within room, no LOB. 1L 02. Type of ROM/Therapeutic Exercise: AROM  Comment: CABG step II exercises compelted sitting in recliner x10 reps x1 set. Rest break after each exercise with education on the importance of rest breaks after each task for cardiac restrictions.   Pt completed to increase strength and endurance needed for ADLs such as bathing/dressing

## 2018-07-18 NOTE — PROGRESS NOTES
Patient was taking physical therapy at the time  was rounding through the unit (14:00). Chaplains will remain available for further emotional and spiritual support as needed.

## 2018-07-18 NOTE — PROGRESS NOTES
CT/CV Surgery Progress Note    2018 8:17 AM  Surgeon:  Dr. Katheryn Chamberlain:  Ms. Alec Little  Is resting in bedside chair on 1 L/min, alert, and in no acute distress. Pt denies chest pressure, SOB, fever, chills, N/V/D. Vital Signs: BP (!) 140/52   Pulse 69   Temp 97.7 °F (36.5 °C) (Oral)   Resp 17   Ht 5' 2\" (1.575 m)   Wt 191 lb 9.6 oz (86.9 kg)   SpO2 97%   BMI 35.04 kg/m²    Temp (24hrs), Av.2 °F (36.8 °C), Min:97.7 °F (36.5 °C), Max:98.8 °F (37.1 °C)      Weight: 191 lb 9.6 oz (86.9 kg)       PULSE OXIMETRY RANGE: SpO2  Av.8 %  Min: 92 %  Max: 99 %  SUPPLEMENTAL O2: O2 Flow Rate (L/min): 1 L/min     Labs:   CBC:  Recent Labs      18   0610  18   0448  18   0601   WBC  22.6*  15.8*  11.5*   HGB  9.5*  9.0*  9.6*   HCT  30.4*  28.2*  29.8*   MCV  95.9  92.8  91.1   PLT  66*  86*  90*     BMP:   Recent Labs      18   0610  18   0448  18   0601   NA  139  139  140   K  4.6  5.0  4.1   CL  100  102  102   CO2  23  28  26   BUN  50*  33*  21   CREATININE  2.4*  1.3*  0.9   MG  3.0*  2.8*  2.3       Imaging:  CXR: I have reviewed the CXR image. 1. Postsurgical changes with visualized cardiac valve prosthesis and cardiac enlargement. 2. Persistent mediastinal drain with no pneumothorax.        Intake/Output Summary (Last 24 hours) at 18 0817  Last data filed at 18 0568   Gross per 24 hour   Intake              320 ml   Output             3970 ml   Net            -3650 ml       Scheduled Meds:    furosemide  20 mg Intravenous BID    amiodarone  200 mg Oral Daily    potassium (CARDIAC) replacement protocol   Other RX Placeholder    insulin lispro  0-18 Units Subcutaneous TID WC    insulin lispro  0-9 Units Subcutaneous Nightly    sodium chloride flush  10 mL Intravenous 2 times per day    docusate sodium  100 mg Oral BID    metoprolol tartrate  25 mg Oral BID    therapeutic multivitamin-minerals  1 tablet Oral Daily with breakfast    atorvastatin  20 mg Oral Nightly    aspirin  325 mg Oral Daily    famotidine  20 mg Oral Q24H       ROS: All neg unless specifically mentioned in subjective section. Assessment:   Patient Active Problem List   Diagnosis    Aortic stenosis    Osteoarthritis    Atypical chest pain    Bradycardia    Dyspnea and respiratory abnormalities    Fatigue    Essential hypertension    Hyperlipemia    Anemia, normocytic normochromic    Leukocytosis    Aortic aneurysm (HCC)    Hypokalemia    Congestive heart failure ([address]    S/P AVR    H/O prosthetic aortic valve replacement    Mitral and aortic valve regurgitation    Aortic prosthetic valve regurgitation    Acute decompensated heart failure ([address]         Healthcare Directive: No, patient does not have an advance directive for healthcare treatment       Plan: 7/17/18  1. Clinically stable-NSR/VSS  2. CT's put out 370 cc's in the last 24 hours and 150 cc's in the last 8 hours  3.  Cr down to 0.9     The plan of care was discussed in detail with Dr. Cassie Deal PA-C

## 2018-07-18 NOTE — PLAN OF CARE
medications given as needed. Problem: Risk for Impaired Skin Integrity  Goal: Tissue integrity - skin and mucous membranes  Structural intactness and normal physiological function of skin and  mucous membranes. Outcome: Ongoing  Patient turns self independently. Patient taught to change position frequently to prevent breakdown. No redness noted on pressure points such as elbows, back of head, heels, shoulder blades, or coccyx at this time. Will continue to monitor. Problem: Musculor/Skeletal Functional Status  Goal: Highest potential functional level  Outcome: Ongoing  Patient able to help with ADLs . Patient working well with PT and OT. Comments: Care plan reviewed with patient. Patient verbalizes understanding of the plan of care and contribute to goal setting.

## 2018-07-18 NOTE — PROGRESS NOTES
PHASE 1 CARDIAC REHABILITATION   CABG, AVR, MVR, TVR, AMI, PCI's  Exercise Physiologists      Treatment Length (L: Long, N: Normal, S: Short): n  Treatment Length Note:   Vitals Stable?: Yes. If No:     Any ECG-Rhythm Issues?: No.  If Yes:   Transfers (bc: Bed to Chair, cb: Chair to Bed):   Strengthening/ROM Treatment Exercises: Step 2    FIDM:     Frequency: 2 x per day   Intensity: <15 RPE, <120bpm   Time: >30-60 seconds longer or >20% increase in distance each walk   Type: Bed/Chair Exercises/stationary marching or ambulation    Aerobic treatment: Ambulated pt 180'        Gait (i: Independent, s: Steady, u: Unsteady): s  Assists: 1  Patient tolerated treatment (w: well, f: fair, p: poor): w  Goals:    Short term:  Progression on each aerobic treatment. Long term: Independent ambulation and discharge. Kory Arthur is to follow sternal precautions. Will learn techniques for getting in and out of bed/chairs/car without using the arms. Using stairs will be addressed if applicable. Home activity instructions (Frequency, Intensity, Time, Type), and progression will be addressed prior to discharge (unless Kory Arthur goes to TCU or an ECF where they will follow PT protocols). Notes: Call light left within reach.     Education given:   Phase II Information (Given upon Discharge):

## 2018-07-18 NOTE — CARE COORDINATION
7/18/18, 3:20 PM      [address] [facility] [address] day: 10  Location: -06/006-A Reason for admit: Shortness of breath [R06.02]  Shortness of breath [R06.02]   Procedure:   7/11 CTA Chest: Neg for PE; Hazy groundglass opacities in both lungs likely representing pulmonary edema. No consolidation; tiny right pleural effusion; moderate cardiomegaly  7/13 Redo aortic valve replacement with a 21 mm [employer] Trifecta bioprosthesis; Mitral valve repair with a 28 mm with an [employer] Physio annuloplasty band; Tricuspid valve repair with a 28 mm an Durham MC3 annuloplasty band  7/13 Intubated - 7/14 Extubated  7/18 CXR:   1. Postsurgical changes with visualized cardiac valve prosthesis and cardiac enlargement. 2. Persistent mediastinal drain with no pneumothorax     Treatment Plan of Care: POD #6. Walked 210' with RW and PT today. Sats 98% on 1L O2. Afebrile. CT x3 to -20sx - 370 ml/24 hours. Creat down to 0.9 today. Cardiac Rehab/PT/OT. Dietitian consulted. Dietary ileus prevention protocol. Telemetry, I&O, IS, sternal precautions, CT care, wound care, SCDs, ambulate. Amio, asa, lipitor, pepcid, IV lasix 20 mg bid, lopressor, prn IV zofran, prn roxicodone, Electrolyte replacement protocols. WBC 11.5, Hgb up to 9.6, Plt up to 90. PCP: JUAN Matthews CNP  Readmission Risk Score: 21%  Discharge Plan: Home w/ and [facility]. SW on case.

## 2018-07-18 NOTE — PROGRESS NOTES
Past Surgical History:   Procedure Laterality Date    AORTIC VALVE REPLACEMENT      cow valve    BRAIN SURGERY      to drain blood    CARDIAC [facility]TER[facility]  2004 or [facility]    [facility]    COLONOSCOPY      DIAGNOSTIC CARDIAC CATH LAB PROCEDURE      HYSTERECTOMY      HI ECHO TRANSESOPHAG R-T 2D IMG ACQUISJ I&R ONLY Left 7/3/2018    TRANSESOPHAGEAL ECHOCARDIOGRAM performed by Abiodun Greene MD at [address] [facility]    HI OFFICE/OUTPT VISIT,PROCEDURE ONLY N/A 7/13/2018    REDO AORTIC VALVE REPLACEMENT, MITRAL VALVE REPAIR, TRICUSPID VALVE REPAIR, WILBERTO performed by Lucendia Kanner, MD at [address]         Restrictions/Precautions:  General Precautions, Surgical Protocols, Fall Risk         Sternal Precautions: No Pushing, No Pulling, 10# Lifting Restrictions       Prior Level of Function:  ADL Assistance: Independent  Homemaking Assistance: Independent  Ambulation Assistance: Independent  Transfer Assistance: Independent  Additional Comments: Pt amb without AD, no home O2, I with ADL's/IADLs. States her  can assist, he does not work either.      Subjective:     Subjective: pt up in chair on arrival she didn't recall getting a booklet before sx and reported that she had gotten a lot of handouts did pull our copies from book today to review with pt    Pain:   .  Pain Assessment  Pain Level:  (no number given pt c/o of surgical pain )       Social/Functional:  Lives With: Spouse  Type of Home: House  Home Layout: One level  Home Access: Stairs to enter without rails  Entrance Stairs - Number of Steps: 2  Home Equipment: Rolling walker     Objective:       Transfers  Sit to Stand: Stand by assistance (from chair and toilet )  Stand to sit: Stand by assistance (cues for sternal precautions, once sitting pt was able to scoot back in chair with SBA)       Ambulation 1  Device: Rolling Walker  Assistance: Stand by assistance  Quality of Gait: fair melia pt reported that she will have a walker to fatou at home she declined any further distance as this was as far as she had walked on 1L and sats 100% following walk nursing was ok to remove O2 discussed home walking program and progression  Distance: 210x1  Comments: gait speed 1.2 feet/sec and normal for her age is 4.46 feet/sec         Balance  Comments: standing balance in bathroom for toileting pt reaching out of base of support no UE at support with SBA       Exercises:  Exercises  Comments: pt completed upper trunk rotation, shoulder rolls with cues, shoulder horz abd/add, horz abd/add, long arc quads, hip flexion and ankle pumps x 10-12 reps each         Activity Tolerance:  Activity Tolerance: Patient limited by endurance    Assessment:   Body structures, Functions, Activity limitations: Decreased endurance, Decreased strength, Decreased balance, Decreased functional mobility   Assessment: pt tolerated session fair, she reaquired 1 cue for sternal precaution she cont to walk at a slower melia and limited distance, however she was able to tolerate increased activity this date   Prognosis: Good  REQUIRES PT FOLLOW UP: Yes  Discharge Recommendations: Continue to assess pending progress, Patient would benefit from continued therapy after discharge    Patient Education:  Patient Education: POC, HEP, walkign program and progression    Equipment Recommendations:  Equipment Needed: No (has RW)    Safety:  Type of devices: Call light within reach, Left in chair, Gait belt, Nurse notified, All fall risk precautions in place  Restraints  Initially in place: No    Plan:  Times per week: 6xOH  Times per day: Daily  Specific instructions for Next Treatment: ther ex, mobility, ambulation, endurance    Goals:  Patient goals : Pt goal to get stronger    Short term goals  Time Frame for Short term goals: 2 weeks   Short term goal 1: supine to/from sit at [facility] to get in/out of bed  Short term goal 2: sit to/from stand at [facility] of 1 in order to get up to walk  Short term

## 2018-07-19 ENCOUNTER — APPOINTMENT (OUTPATIENT)
Dept: GENERAL RADIOLOGY | Age: 63
DRG: 219 | End: 2018-07-19
Payer: MEDICARE

## 2018-07-19 VITALS
OXYGEN SATURATION: 96 % | TEMPERATURE: 98.1 F | SYSTOLIC BLOOD PRESSURE: 138 MMHG | HEART RATE: 78 BPM | DIASTOLIC BLOOD PRESSURE: 56 MMHG | BODY MASS INDEX: 35.09 KG/M2 | WEIGHT: 190.7 LBS | RESPIRATION RATE: 18 BRPM | HEIGHT: 62 IN

## 2018-07-19 LAB
ANION GAP SERPL CALCULATED.3IONS-SCNC: 15 MEQ/L (ref 8–16)
BUN BLDV-MCNC: 17 MG/DL (ref 7–22)
CALCIUM SERPL-MCNC: 8.9 MG/DL (ref 8.5–10.5)
CHLORIDE BLD-SCNC: 99 MEQ/L (ref 98–111)
CO2: 23 MEQ/L (ref 23–33)
CREAT SERPL-MCNC: 0.8 MG/DL (ref 0.4–1.2)
ERYTHROCYTE [DISTWIDTH] IN BLOOD BY AUTOMATED COUNT: 14.2 % (ref 11.5–14.5)
ERYTHROCYTE [DISTWIDTH] IN BLOOD BY AUTOMATED COUNT: 47.5 FL (ref 35–45)
GFR SERPL CREATININE-BSD FRML MDRD: 72 ML/MIN/1.73M2
GLUCOSE BLD-MCNC: 140 MG/DL (ref 70–108)
HCT VFR BLD CALC: 31.8 % (ref 37–47)
HEMOGLOBIN: 10 GM/DL (ref 12–16)
MAGNESIUM: 2 MG/DL (ref 1.6–2.4)
MCH RBC QN AUTO: 29.1 PG (ref 26–33)
MCHC RBC AUTO-ENTMCNC: 31.4 GM/DL (ref 32.2–35.5)
MCV RBC AUTO: 92.4 FL (ref 81–99)
PLATELET # BLD: 124 THOU/MM3 (ref 130–400)
PMV BLD AUTO: 11.3 FL (ref 9.4–12.4)
POTASSIUM SERPL-SCNC: 3.9 MEQ/L (ref 3.5–5.2)
RBC # BLD: 3.44 MILL/MM3 (ref 4.2–5.4)
SODIUM BLD-SCNC: 137 MEQ/L (ref 135–145)
WBC # BLD: 12.3 THOU/MM3 (ref 4.8–10.8)

## 2018-07-19 PROCEDURE — 85027 COMPLETE CBC AUTOMATED: CPT

## 2018-07-19 PROCEDURE — 6360000002 HC RX W HCPCS: Performed by: THORACIC SURGERY (CARDIOTHORACIC VASCULAR SURGERY)

## 2018-07-19 PROCEDURE — 80048 BASIC METABOLIC PNL TOTAL CA: CPT

## 2018-07-19 PROCEDURE — 83735 ASSAY OF MAGNESIUM: CPT

## 2018-07-19 PROCEDURE — 6370000000 HC RX 637 (ALT 250 FOR IP): Performed by: THORACIC SURGERY (CARDIOTHORACIC VASCULAR SURGERY)

## 2018-07-19 PROCEDURE — 2580000003 HC RX 258: Performed by: THORACIC SURGERY (CARDIOTHORACIC VASCULAR SURGERY)

## 2018-07-19 PROCEDURE — APPSS60 APP SPLIT SHARED TIME 46-60 MINUTES: Performed by: PHYSICIAN ASSISTANT

## 2018-07-19 PROCEDURE — 97530 THERAPEUTIC ACTIVITIES: CPT

## 2018-07-19 PROCEDURE — 97110 THERAPEUTIC EXERCISES: CPT

## 2018-07-19 PROCEDURE — 2709999900 HC NON-CHARGEABLE SUPPLY

## 2018-07-19 PROCEDURE — 71045 X-RAY EXAM CHEST 1 VIEW: CPT

## 2018-07-19 PROCEDURE — 36415 COLL VENOUS BLD VENIPUNCTURE: CPT

## 2018-07-19 RX ORDER — ATORVASTATIN CALCIUM 20 MG/1
20 TABLET, FILM COATED ORAL NIGHTLY
Qty: 30 TABLET | Refills: 3 | Status: SHIPPED | OUTPATIENT
Start: 2018-07-19 | End: 2019-01-16

## 2018-07-19 RX ORDER — ACETAMINOPHEN 325 MG/1
650 TABLET ORAL EVERY 4 HOURS PRN
Qty: 120 TABLET | Refills: 3 | Status: SHIPPED | OUTPATIENT
Start: 2018-07-19

## 2018-07-19 RX ORDER — FAMOTIDINE 20 MG/1
20 TABLET, FILM COATED ORAL EVERY 24 HOURS
Qty: 60 TABLET | Refills: 3 | Status: SHIPPED | OUTPATIENT
Start: 2018-07-19 | End: 2018-07-19 | Stop reason: HOSPADM

## 2018-07-19 RX ADMIN — AMIODARONE HYDROCHLORIDE 200 MG: 200 TABLET ORAL at 08:09

## 2018-07-19 RX ADMIN — Medication 17.6 MG: at 05:40

## 2018-07-19 RX ADMIN — MAGNESIUM HYDROXIDE 30 ML: 400 SUSPENSION ORAL at 05:40

## 2018-07-19 RX ADMIN — ASPIRIN 325 MG: 325 TABLET, DELAYED RELEASE ORAL at 08:09

## 2018-07-19 RX ADMIN — ACETAMINOPHEN 650 MG: 325 TABLET ORAL at 05:41

## 2018-07-19 RX ADMIN — FUROSEMIDE 20 MG: 10 INJECTION, SOLUTION INTRAMUSCULAR; INTRAVENOUS at 08:12

## 2018-07-19 RX ADMIN — METOPROLOL TARTRATE 25 MG: 25 TABLET ORAL at 08:09

## 2018-07-19 RX ADMIN — DOCUSATE SODIUM 100 MG: 100 CAPSULE, LIQUID FILLED ORAL at 08:09

## 2018-07-19 RX ADMIN — Medication 10 ML: at 08:11

## 2018-07-19 RX ADMIN — ONDANSETRON 4 MG: 2 INJECTION INTRAMUSCULAR; INTRAVENOUS at 05:40

## 2018-07-19 RX ADMIN — MULTIPLE VITAMINS W/ MINERALS TAB 1 TABLET: TAB at 08:09

## 2018-07-19 ASSESSMENT — PAIN SCALES - GENERAL: PAINLEVEL_OUTOF10: 2

## 2018-07-19 ASSESSMENT — PAIN DESCRIPTION - PAIN TYPE: TYPE: SURGICAL PAIN

## 2018-07-19 ASSESSMENT — PAIN DESCRIPTION - LOCATION: LOCATION: STERNUM

## 2018-07-19 NOTE — CARE COORDINATION
7/19/18, 9:51 AM    DISCHARGE BARRIERS    SW spoke with Radha Umana with [facility] for update.

## 2018-07-19 NOTE — PLAN OF CARE
Problem: Falls - Risk of:  Goal: Will remain free from falls  Will remain free from falls   Outcome: Ongoing  Patient alert and oriented x4. Bed alarmed armed. Bed wheels locked. Bedside table in reach. Patient up with assistance when ambulating. Patient verbalizes and demonstrates the use of the call light. Hourly rounding being completed. Will continue to monitor. Problem: Pain:  Goal: Pain level will decrease  Pain level will decrease   Outcome: Ongoing  Patient complains of pain in surgical incision. Patients current pain level is a 2/10. Patients pain goal is a 3/10. Patient is receiving PRN tylenol and roxicodone per [facility] for pain at this time. Will continue to monitor with hourly rounding. Problem: Discharge Planning:  Goal: Participates in care planning  Participates in care planning   Outcome: Ongoing  Patient plans to go home to private residence with spouse and [facility] at discharge. Case management following. Will continue to assess for further needs. Problem: Cardiac Output - Decreased:  Goal: Hemodynamic stability will improve  Hemodynamic stability will improve   Outcome: Ongoing  Blood pressure within normal limits this shift. Patient displays no bleeding abnormalities. Monitoring labs frequently for any abnormalities. Capillary refill less than 3 seconds. Skin turgor less than 3 seconds. Pulses palpable. Problem: Gas Exchange - Impaired:  Goal: Levels of oxygenation will improve  Levels of oxygenation will improve   Outcome: Ongoing  Patient is on room air at this time. Patients oxygen saturation maintains above 92% at this time. Weaned patient from 1 L nasal cannula. Pt has displayed no signs or symptoms of hypoxia throughout shift. Will continue to monitor. Problem: Nutrition  Goal: Optimal nutrition therapy  Outcome: Ongoing  Patient eating three meals a day. Appetite intact. Patient is having intermittent nausea. PRN medications administered.  Patient tolerating PO

## 2018-07-19 NOTE — DISCHARGE SUMMARY
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 Trifecta bioprosthesis, Mitral valve repair with a 28 mm with an [employer] Physio annuloplasty band, and Tricuspid valve repair with a 28 mm an Durham MC3 annuloplasty band on 07/13/18    Problem List:   Patient Active Problem List   Diagnosis Code    Aortic stenosis I35.0    Osteoarthritis M19.90    Atypical chest pain R07.89    Bradycardia R00.1    Dyspnea and respiratory abnormalities R06.00, R06.89    Fatigue R53.83    Essential hypertension I10    Hyperlipemia E78.5    Anemia, normocytic normochromic D64.9    Leukocytosis D72.829    Aortic aneurysm (HCC) I71.9    Hypokalemia E87.6    Congestive heart failure (HCC) I50.9    S/P AVR Z95.2    H/O prosthetic aortic valve replacement Z95.2    Mitral and aortic valve regurgitation I08.0    Aortic prosthetic valve regurgitation T82.897A    Acute decompensated heart failure (HCC) I50.9         Procedures:    -Redo aortic valve replacement with a 21 mm [employer] Medical Trifecta bioprosthesis  -Mitral valve repair with a 28 mm with an [employer] Physio annuloplasty band  -Tricuspid valve repair with a 28 mm an Durham MC3 annuloplasty band    Reason for Admission:   Ms. Jean Nicole is a 59 yo female with a PMH including HTN, hyperlipidemia, arthritis, CHF, and s/p AVR in 2015. The pt was originally seen in the hospital by Dr. Jaime Briones on 07/03/18.  She was

## 2018-07-19 NOTE — PROGRESS NOTES
ONLY N/A 7/13/2018    REDO AORTIC VALVE REPLACEMENT, MITRAL VALVE REPAIR, TRICUSPID VALVE REPAIR, WILBERTO performed by Bhavin Ross MD at [address]         Restrictions/Precautions:  General Precautions, Surgical Protocols, Fall Risk                    Sternal Precautions: No Pushing, No Pulling, 10# Lifting Restrictions       Prior Level of Function:  ADL Assistance: Independent  Homemaking Assistance: Independent  Ambulation Assistance: Independent  Transfer Assistance: Independent  Additional Comments: Pt amb without AD, no home O2, I with ADL's/IADLs. States her  can assist, he does not work either. Subjective       Subjective: pt sitting up in chair when arrived. Pt agreeable to OT   Comments: Rn okayed therapy session      Pain:  Pain Assessment  Patient Currently in Pain: Yes  Pain Level: 2  Pain Type: Surgical pain  Pain Location: Sternum       Objective        ADL  Grooming: Stand by assistance (standing at sink for hand and face washing. )  Additional Comments: pt declined all other ADLS till later                Transfers  Sit to stand: Stand by assistance (recliner )  Stand to sit: Stand by assistance  Transfer Comments: Patient able to abide by sternal precautions without VCs       Balance  Sitting Balance: Supervision  Standing Balance: Stand by assistance     Time: x 2 min   Activity: standing at sink for ADLS   Comment: RW for support. Functional Mobility  Functional - Mobility Device: Rolling Walker  Activity: Other  Assist Level: Stand by assistance  Functional Mobility Comments: pt ambulated into hallway and down ruiz with RW and no LOB. Pt demo good posture and good breathing techniques throughout therapy session         Type of ROM/Therapeutic Exercise: AROM  Comment: CABG step II exercises compelted sitting in recliner x10 reps x1 set. Rest break after each exercise with education on the importance of rest breaks after each task for cardiac restrictions.   Pt completed to increase strength and endurance needed for ADLs such as bathing/dressing routine and toilet / shower transfers              Activity Tolerance:  Activity Tolerance: Patient Tolerated treatment well  Activity Tolerance: pt able to identify 2/4 sternal precautions without cues. Pt reviewed all precautions with pt     Assessment:     Performance deficits / Impairments: Decreased functional mobility , Decreased ADL status, Decreased endurance, Decreased balance, Decreased high-level IADLs  Prognosis: Good  Discharge Recommendations: Patient would benefit from continued therapy after discharge, Home with Home health OT    Patient Education:  Patient Education: tranfer safety, CABG exercises     Equipment Recommendations: Other: TBD pending progress, discharge environment; will continue to assess    Safety:  Safety Devices in place: Yes  Type of devices: All fall risk precautions in place, Call light within reach, Left in chair, Gait belt, Nurse notified    Plan:  Times per week: 6x  Current Treatment Recommendations: Balance Training, Functional Mobility Training, Safety Education & Training, Patient/Caregiver Education & Training, Equipment Evaluation, Education, & procurement, Self-Care / ADL    Goals:  Patient goals : \"go home\"    Short term goals  Time Frame for Short term goals: 2 weeks  Short term goal 1: Pt will complete functional mobility to/from bathroom, progressing to household distances with SBA to increase activity tolerance needed for ADL/IADL completion. Short term goal 2: Pt will complete CABG step II exercises X10-15 reps to increase overall endurance needed for ADL/IADL tasks. Short term goal 3: Pt will complete all functional transfers with supervision and 0 cues for sternal precautions to increase independence with toilet transfers. Short term goal 4: Pt will complete LB ADLs with minimal assistance using LHAE prn to increase independence with self care tasks.    Short term goal 5: Pt will tolerate dynamic standing X5 minutes with 1-2 hand release and SBA in prep for toileting/sinkside grooming tasks.    Long term goals  Time Frame for Long term goals : not set due to ELOS

## 2018-07-19 NOTE — CARE COORDINATION
7/19/18, 11:33 AM    Home with [facility] and [facility]. Discharge plan discussed by  and . Discharge plan reviewed with patient/ family. Patient/ family verbalize understanding of discharge plan and are in agreement with plan. Understanding was demonstrated using the teach back method.        IMM Letter  IMM Letter given to Patient/Family/Significant other/Guardian/POA/by[de-identified]   IMM Letter date given[de-identified] 07/19/18  IMM Letter time given[de-identified] 7874

## 2018-07-19 NOTE — PROGRESS NOTES
1668 Discharge order in. Per Dr. Osmin Farrell must have a BM before discharge. [id number] Pharmacist called to go over medications with patient. Patietn stating that Dr. Logan Hence told her she can get up by herself and she went to the bathroom and had a BM. BM not witnessed by this RN or other workers on floor but patient insisting and refusing to drink prune juice or mag citrate. 1230 Discharge instructions discussed with patient. Follow up's, getting chest xray before appointment with Dr. Logan Hence, signs of infection all discussed with patient and . Patient denies any questions and verbalizes understanding.

## 2018-07-24 NOTE — PLAN OF CARE
Hospital Facility-Based Program  [facility]/Traditional Cardiac Rehab  PHYSICIAN ORDER  Class I Level B based on research  Medical Director:  Dr. Mariana Cornejo MD     Patient Name: Ania Blunt : 1955  Referring Physician: Dr. Javier Rick  Date: 2018  Allergies: Allergies as of 2018 - Review Complete 2018   Allergen Reaction Noted    Latex Rash 2015    Demerol hcl [meperidine] Hives 2015        Diagnosis:  MVR, AVR on 18    [x] [facility] Cardiac Rehab with telemetry monitoring, resting and exercise        BPs & HRs with each session. Hospital setting for patient safety. [x] 72 sessions: 36 exercise sessions, 36 education sessions   [] 36 sessions: 18 exercise sessions, 18 education sessions  [] Traditional Cardiac Rehab with telemetry monitoring, resting and exercise BPs &       HRs with each session. Hospital setting for patient safety. [] 36 sessions:  32 exercise sessions, 4 education sessions     Per Patient symptoms, proceed with:   [x]Nitroglycerine 0.4mg SL every 5 minutes prn, maximum of 3, for chest pain   [x]12-lead EKG for symptoms of chest pain or noted change in heart rhythm   [x]Administer O2 per nasal cannula for symptoms of chest pain or acute dyspnea    Physician Prescribed Exercise:  Plan of Care:  Patient to attend exercise sessions with aerobic endurance and strength training for a total of 31-60 min/day, 3 days/week with supplemented 30+ minutes of aerobic exercise at home on days not participating in Cardiac Rehab. Aerobic Endurance Training  Aerobic Endurance mode (TM, AD, NS) starting at 5-8 minutes progressing by 2-3 minutes each week to a total of 15-30 minutes 2-3x/week. Arms only 5 min  Stair step increasing to 2 min  Resistance/strength training:  Hand weights starting at 1-5 lbs increasing in weight by 1-2 lbs and/or per patient tolerance weekly.   Start with 8 repetitions and increase the repetitions each exercise session per patient tolerance for a total of 15 repetitions. Measurable Endurance Goal:  Aerobic endurance goal to be measured in minutes. Start endurance training per patient tolerance at 1-8 minutes per exercise type, progressing to a total of 31-60 minutes using various modes of training (see Exercise Prescription). Progression:    [x] Weekly 5-10% intensity progression, as tolerated, during cardiac rehab sessions. [x] 13-17 Rate of Perceived Exertion on the Dale Scale (6-20). Measurable Muscular Strength Goal:  Starting at 1-5 lbs x 8 reps, progressing to 5-25 lbs x 15 reps per patient tolerance.       Electronically signed by Melinda Figueroa on 7/24/2018 at 9:03 AM    Exercise Physiologist/Date/Time

## 2018-07-26 ENCOUNTER — TELEPHONE (OUTPATIENT)
Dept: CARDIOTHORACIC SURGERY | Age: 63
End: 2018-07-26

## 2018-08-07 ENCOUNTER — HOSPITAL ENCOUNTER (OUTPATIENT)
Age: 63
Discharge: HOME | End: 2018-08-07
Payer: MEDICARE

## 2018-08-07 ENCOUNTER — HOSPITAL ENCOUNTER (OUTPATIENT)
Dept: GENERAL RADIOLOGY | Age: 63
Discharge: HOME | End: 2018-08-07
Payer: MEDICARE

## 2018-08-07 DIAGNOSIS — Z95.2 S/P AVR: ICD-10-CM

## 2018-08-07 PROCEDURE — 71046 X-RAY EXAM CHEST 2 VIEWS: CPT

## 2018-08-08 ENCOUNTER — OFFICE VISIT (OUTPATIENT)
Dept: CARDIOTHORACIC SURGERY | Age: 63
End: 2018-08-08

## 2018-08-08 VITALS
BODY MASS INDEX: 32.14 KG/M2 | WEIGHT: 181.4 LBS | HEIGHT: 63 IN | HEART RATE: 74 BPM | DIASTOLIC BLOOD PRESSURE: 66 MMHG | SYSTOLIC BLOOD PRESSURE: 118 MMHG

## 2018-08-08 DIAGNOSIS — Z98.890 S/P TRICUSPID VALVE REPAIR: ICD-10-CM

## 2018-08-08 DIAGNOSIS — Z98.890 S/P MITRAL VALVE REPAIR: ICD-10-CM

## 2018-08-08 DIAGNOSIS — Z95.2 S/P AVR: Primary | ICD-10-CM

## 2018-08-08 PROCEDURE — 99024 POSTOP FOLLOW-UP VISIT: CPT | Performed by: THORACIC SURGERY (CARDIOTHORACIC VASCULAR SURGERY)

## 2018-08-14 ENCOUNTER — HOSPITAL ENCOUNTER (OUTPATIENT)
Dept: CARDIAC REHAB | Age: 63
Discharge: HOME | End: 2018-08-14

## 2018-08-16 ENCOUNTER — OFFICE VISIT (OUTPATIENT)
Dept: CARDIOLOGY CLINIC | Age: 63
End: 2018-08-16
Payer: MEDICARE

## 2018-08-16 VITALS
HEIGHT: 63 IN | WEIGHT: 183 LBS | DIASTOLIC BLOOD PRESSURE: 62 MMHG | BODY MASS INDEX: 32.43 KG/M2 | SYSTOLIC BLOOD PRESSURE: 120 MMHG | HEART RATE: 60 BPM

## 2018-08-16 DIAGNOSIS — I10 ESSENTIAL HYPERTENSION: ICD-10-CM

## 2018-08-16 DIAGNOSIS — Z95.2 S/P AVR: Primary | ICD-10-CM

## 2018-08-16 DIAGNOSIS — Z98.890 S/P TRICUSPID VALVE REPAIR: ICD-10-CM

## 2018-08-16 DIAGNOSIS — Z98.890 S/P MITRAL VALVE REPAIR: ICD-10-CM

## 2018-08-16 DIAGNOSIS — E78.5 HYPERLIPIDEMIA, UNSPECIFIED HYPERLIPIDEMIA TYPE: ICD-10-CM

## 2018-08-16 PROCEDURE — 1111F DSCHRG MED/CURRENT MED MERGE: CPT | Performed by: NURSE PRACTITIONER

## 2018-08-16 PROCEDURE — 3017F COLORECTAL CA SCREEN DOC REV: CPT | Performed by: NURSE PRACTITIONER

## 2018-08-16 PROCEDURE — 99213 OFFICE O/P EST LOW 20 MIN: CPT | Performed by: NURSE PRACTITIONER

## 2018-08-16 PROCEDURE — G8417 CALC BMI ABV UP PARAM F/U: HCPCS | Performed by: NURSE PRACTITIONER

## 2018-08-16 PROCEDURE — 1036F TOBACCO NON-USER: CPT | Performed by: NURSE PRACTITIONER

## 2018-08-16 PROCEDURE — G8427 DOCREV CUR MEDS BY ELIG CLIN: HCPCS | Performed by: NURSE PRACTITIONER

## 2018-12-20 ENCOUNTER — OFFICE VISIT (OUTPATIENT)
Dept: CARDIOLOGY CLINIC | Age: 63
End: 2018-12-20
Payer: MEDICARE

## 2018-12-20 VITALS
HEIGHT: 62 IN | SYSTOLIC BLOOD PRESSURE: 128 MMHG | WEIGHT: 192.5 LBS | HEART RATE: 64 BPM | BODY MASS INDEX: 35.43 KG/M2 | DIASTOLIC BLOOD PRESSURE: 62 MMHG

## 2018-12-20 DIAGNOSIS — I10 ESSENTIAL HYPERTENSION: Primary | ICD-10-CM

## 2018-12-20 DIAGNOSIS — E78.01 FAMILIAL HYPERCHOLESTEROLEMIA: ICD-10-CM

## 2018-12-20 DIAGNOSIS — Z95.2 H/O MITRAL VALVE REPLACEMENT: ICD-10-CM

## 2018-12-20 DIAGNOSIS — Z95.2 S/P AVR: ICD-10-CM

## 2018-12-20 PROCEDURE — 1036F TOBACCO NON-USER: CPT | Performed by: NUCLEAR MEDICINE

## 2018-12-20 PROCEDURE — 3017F COLORECTAL CA SCREEN DOC REV: CPT | Performed by: NUCLEAR MEDICINE

## 2018-12-20 PROCEDURE — G8428 CUR MEDS NOT DOCUMENT: HCPCS | Performed by: NUCLEAR MEDICINE

## 2018-12-20 PROCEDURE — 99213 OFFICE O/P EST LOW 20 MIN: CPT | Performed by: NUCLEAR MEDICINE

## 2018-12-20 PROCEDURE — G8417 CALC BMI ABV UP PARAM F/U: HCPCS | Performed by: NUCLEAR MEDICINE

## 2018-12-20 PROCEDURE — G8484 FLU IMMUNIZE NO ADMIN: HCPCS | Performed by: NUCLEAR MEDICINE

## 2018-12-20 RX ORDER — PROMETHAZINE HYDROCHLORIDE 12.5 MG/1
12.5 TABLET ORAL EVERY 6 HOURS PRN
COMMUNITY
End: 2019-01-16

## 2018-12-20 RX ORDER — PREDNISONE 10 MG/1
10 TABLET ORAL DAILY
COMMUNITY
End: 2019-01-16

## 2018-12-20 RX ORDER — MECLIZINE HYDROCHLORIDE 25 MG/1
25 TABLET ORAL 3 TIMES DAILY PRN
COMMUNITY
End: 2020-02-11

## 2019-01-07 ENCOUNTER — TELEPHONE (OUTPATIENT)
Dept: FAMILY MEDICINE CLINIC | Age: 64
End: 2019-01-07

## 2019-01-14 RX ORDER — LISINOPRIL 5 MG/1
5 TABLET ORAL 2 TIMES DAILY
Qty: 60 TABLET | Refills: 8 | Status: SHIPPED | OUTPATIENT
Start: 2019-01-14 | End: 2019-01-16 | Stop reason: SDUPTHER

## 2019-01-15 PROBLEM — I71.9 AORTIC ANEURYSM (HCC): Status: RESOLVED | Noted: 2018-03-22 | Resolved: 2019-01-15

## 2019-01-15 PROBLEM — D72.829 LEUKOCYTOSIS: Status: RESOLVED | Noted: 2018-03-21 | Resolved: 2019-01-15

## 2019-01-16 ENCOUNTER — OFFICE VISIT (OUTPATIENT)
Dept: FAMILY MEDICINE CLINIC | Age: 64
End: 2019-01-16
Payer: MEDICARE

## 2019-01-16 ENCOUNTER — TELEPHONE (OUTPATIENT)
Dept: FAMILY MEDICINE CLINIC | Age: 64
End: 2019-01-16

## 2019-01-16 VITALS
RESPIRATION RATE: 14 BRPM | TEMPERATURE: 98.1 F | HEART RATE: 68 BPM | BODY MASS INDEX: 33.19 KG/M2 | DIASTOLIC BLOOD PRESSURE: 66 MMHG | WEIGHT: 194.4 LBS | HEIGHT: 64 IN | SYSTOLIC BLOOD PRESSURE: 108 MMHG

## 2019-01-16 DIAGNOSIS — R06.09 DOE (DYSPNEA ON EXERTION): ICD-10-CM

## 2019-01-16 DIAGNOSIS — Z11.59 NEED FOR HEPATITIS C SCREENING TEST: ICD-10-CM

## 2019-01-16 DIAGNOSIS — Z12.31 ENCOUNTER FOR SCREENING MAMMOGRAM FOR MALIGNANT NEOPLASM OF BREAST: ICD-10-CM

## 2019-01-16 DIAGNOSIS — I38 VALVULAR HEART DISEASE: ICD-10-CM

## 2019-01-16 DIAGNOSIS — Z95.2 H/O PROSTHETIC AORTIC VALVE REPLACEMENT: ICD-10-CM

## 2019-01-16 DIAGNOSIS — H81.11 BPPV (BENIGN PAROXYSMAL POSITIONAL VERTIGO), RIGHT: ICD-10-CM

## 2019-01-16 DIAGNOSIS — I10 ESSENTIAL HYPERTENSION: Primary | ICD-10-CM

## 2019-01-16 DIAGNOSIS — E61.1 IRON DEFICIENCY: ICD-10-CM

## 2019-01-16 DIAGNOSIS — Z12.4 SCREENING FOR CERVICAL CANCER: ICD-10-CM

## 2019-01-16 DIAGNOSIS — Z23 NEED FOR INFLUENZA VACCINATION: ICD-10-CM

## 2019-01-16 DIAGNOSIS — G25.81 RLS (RESTLESS LEGS SYNDROME): ICD-10-CM

## 2019-01-16 DIAGNOSIS — E78.5 DYSLIPIDEMIA: Chronic | ICD-10-CM

## 2019-01-16 DIAGNOSIS — K21.9 GASTROESOPHAGEAL REFLUX DISEASE, ESOPHAGITIS PRESENCE NOT SPECIFIED: Chronic | ICD-10-CM

## 2019-01-16 PROBLEM — I50.9 CONGESTIVE HEART FAILURE (HCC): Status: RESOLVED | Noted: 2018-07-02 | Resolved: 2019-01-16

## 2019-01-16 PROBLEM — E87.6 HYPOKALEMIA: Status: RESOLVED | Noted: 2018-03-22 | Resolved: 2019-01-16

## 2019-01-16 PROBLEM — R06.00 DYSPNEA AND RESPIRATORY ABNORMALITIES: Status: RESOLVED | Noted: 2018-03-21 | Resolved: 2019-01-16

## 2019-01-16 PROBLEM — R06.89 DYSPNEA AND RESPIRATORY ABNORMALITIES: Status: RESOLVED | Noted: 2018-03-21 | Resolved: 2019-01-16

## 2019-01-16 PROBLEM — R53.83 FATIGUE: Status: RESOLVED | Noted: 2018-03-21 | Resolved: 2019-01-16

## 2019-01-16 PROBLEM — R07.89 ATYPICAL CHEST PAIN: Status: RESOLVED | Noted: 2018-03-21 | Resolved: 2019-01-16

## 2019-01-16 PROBLEM — T82.897A AORTIC PROSTHETIC VALVE REGURGITATION: Status: RESOLVED | Noted: 2018-07-03 | Resolved: 2019-01-16

## 2019-01-16 PROBLEM — I08.0 MITRAL AND AORTIC VALVE REGURGITATION: Status: RESOLVED | Noted: 2018-07-03 | Resolved: 2019-01-16

## 2019-01-16 PROBLEM — Z98.890 S/P MITRAL VALVE REPAIR: Chronic | Status: ACTIVE | Noted: 2018-07-13

## 2019-01-16 PROBLEM — I50.9 ACUTE DECOMPENSATED HEART FAILURE (HCC): Status: RESOLVED | Noted: 2018-07-08 | Resolved: 2019-01-16

## 2019-01-16 PROBLEM — R00.1 BRADYCARDIA: Status: RESOLVED | Noted: 2018-03-21 | Resolved: 2019-01-16

## 2019-01-16 PROBLEM — D64.9 ANEMIA, NORMOCYTIC NORMOCHROMIC: Status: RESOLVED | Noted: 2018-03-21 | Resolved: 2019-01-16

## 2019-01-16 PROBLEM — Z98.890 S/P MITRAL VALVE REPAIR: Status: ACTIVE | Noted: 2018-07-13

## 2019-01-16 PROCEDURE — G8482 FLU IMMUNIZE ORDER/ADMIN: HCPCS | Performed by: FAMILY MEDICINE

## 2019-01-16 PROCEDURE — G8417 CALC BMI ABV UP PARAM F/U: HCPCS | Performed by: FAMILY MEDICINE

## 2019-01-16 PROCEDURE — 3017F COLORECTAL CA SCREEN DOC REV: CPT | Performed by: FAMILY MEDICINE

## 2019-01-16 PROCEDURE — 1036F TOBACCO NON-USER: CPT | Performed by: FAMILY MEDICINE

## 2019-01-16 PROCEDURE — G8427 DOCREV CUR MEDS BY ELIG CLIN: HCPCS | Performed by: FAMILY MEDICINE

## 2019-01-16 PROCEDURE — G0008 ADMIN INFLUENZA VIRUS VAC: HCPCS | Performed by: FAMILY MEDICINE

## 2019-01-16 PROCEDURE — 90686 IIV4 VACC NO PRSV 0.5 ML IM: CPT | Performed by: FAMILY MEDICINE

## 2019-01-16 PROCEDURE — 99204 OFFICE O/P NEW MOD 45 MIN: CPT | Performed by: FAMILY MEDICINE

## 2019-01-16 RX ORDER — LISINOPRIL 5 MG/1
5 TABLET ORAL 2 TIMES DAILY
Qty: 60 TABLET | Refills: 8 | Status: SHIPPED | OUTPATIENT
Start: 2019-01-16 | End: 2019-07-05 | Stop reason: SDUPTHER

## 2019-01-16 RX ORDER — ATORVASTATIN CALCIUM 10 MG/1
10 TABLET, FILM COATED ORAL EVERY EVENING
COMMUNITY
End: 2019-08-21 | Stop reason: SDUPTHER

## 2019-01-16 RX ORDER — ROPINIROLE 0.25 MG/1
0.25 TABLET, FILM COATED ORAL NIGHTLY
Qty: 30 TABLET | Refills: 5 | Status: SHIPPED | OUTPATIENT
Start: 2019-01-16 | End: 2019-02-13 | Stop reason: SDUPTHER

## 2019-01-16 RX ORDER — FERROUS SULFATE 325(65) MG
325 TABLET ORAL 2 TIMES DAILY WITH MEALS
COMMUNITY
End: 2019-01-24 | Stop reason: ALTCHOICE

## 2019-01-16 RX ORDER — FAMOTIDINE 20 MG/1
20 TABLET, FILM COATED ORAL NIGHTLY
COMMUNITY
End: 2020-02-11

## 2019-01-16 RX ORDER — FUROSEMIDE 20 MG/1
20 TABLET ORAL DAILY
Qty: 90 TABLET | Refills: 3 | Status: SHIPPED | OUTPATIENT
Start: 2019-01-16 | End: 2019-08-27 | Stop reason: SDUPTHER

## 2019-01-16 ASSESSMENT — PATIENT HEALTH QUESTIONNAIRE - PHQ9
SUM OF ALL RESPONSES TO PHQ9 QUESTIONS 1 & 2: 0
SUM OF ALL RESPONSES TO PHQ QUESTIONS 1-9: 0
1. LITTLE INTEREST OR PLEASURE IN DOING THINGS: 0
SUM OF ALL RESPONSES TO PHQ QUESTIONS 1-9: 0
2. FEELING DOWN, DEPRESSED OR HOPELESS: 0

## 2019-01-18 ENCOUNTER — HOSPITAL ENCOUNTER (OUTPATIENT)
Dept: WOMENS IMAGING | Age: 64
Discharge: HOME | End: 2019-01-18
Payer: MEDICARE

## 2019-01-18 DIAGNOSIS — Z12.31 ENCOUNTER FOR SCREENING MAMMOGRAM FOR MALIGNANT NEOPLASM OF BREAST: ICD-10-CM

## 2019-01-18 PROCEDURE — 77063 BREAST TOMOSYNTHESIS BI: CPT

## 2019-01-21 ENCOUNTER — TELEPHONE (OUTPATIENT)
Dept: FAMILY MEDICINE CLINIC | Age: 64
End: 2019-01-21

## 2019-01-24 ENCOUNTER — HOSPITAL ENCOUNTER (OUTPATIENT)
Age: 64
Discharge: HOME | End: 2019-01-24
Payer: MEDICARE

## 2019-01-24 ENCOUNTER — HOSPITAL ENCOUNTER (OUTPATIENT)
Dept: PULMONOLOGY | Age: 64
Discharge: HOME | End: 2019-01-24
Payer: MEDICARE

## 2019-01-24 ENCOUNTER — TELEPHONE (OUTPATIENT)
Dept: FAMILY MEDICINE CLINIC | Age: 64
End: 2019-01-24

## 2019-01-24 DIAGNOSIS — D64.9 NORMOCYTIC ANEMIA: Primary | ICD-10-CM

## 2019-01-24 DIAGNOSIS — R73.9 HYPERGLYCEMIA: ICD-10-CM

## 2019-01-24 DIAGNOSIS — Z11.59 NEED FOR HEPATITIS C SCREENING TEST: ICD-10-CM

## 2019-01-24 DIAGNOSIS — E61.1 IRON DEFICIENCY: ICD-10-CM

## 2019-01-24 DIAGNOSIS — I10 ESSENTIAL HYPERTENSION: ICD-10-CM

## 2019-01-24 DIAGNOSIS — R06.09 DOE (DYSPNEA ON EXERTION): ICD-10-CM

## 2019-01-24 LAB
ALBUMIN SERPL-MCNC: 4.2 G/DL (ref 3.5–5.1)
ALP BLD-CCNC: 61 U/L (ref 38–126)
ALT SERPL-CCNC: 14 U/L (ref 11–66)
ANION GAP SERPL CALCULATED.3IONS-SCNC: 16 MEQ/L (ref 8–16)
AST SERPL-CCNC: 20 U/L (ref 5–40)
BASOPHILS # BLD: 0.4 %
BASOPHILS ABSOLUTE: 0 THOU/MM3 (ref 0–0.1)
BILIRUB SERPL-MCNC: 0.3 MG/DL (ref 0.3–1.2)
BUN BLDV-MCNC: 12 MG/DL (ref 7–22)
CALCIUM SERPL-MCNC: 9.7 MG/DL (ref 8.5–10.5)
CHLORIDE BLD-SCNC: 104 MEQ/L (ref 98–111)
CHOLESTEROL, TOTAL: 184 MG/DL (ref 100–199)
CO2: 24 MEQ/L (ref 23–33)
CREAT SERPL-MCNC: 0.8 MG/DL (ref 0.4–1.2)
CREATININE, URINE: 271.8 MG/DL
EOSINOPHIL # BLD: 2 %
EOSINOPHILS ABSOLUTE: 0.2 THOU/MM3 (ref 0–0.4)
ERYTHROCYTE [DISTWIDTH] IN BLOOD BY AUTOMATED COUNT: 14.3 % (ref 11.5–14.5)
ERYTHROCYTE [DISTWIDTH] IN BLOOD BY AUTOMATED COUNT: 45.8 FL (ref 35–45)
FERRITIN: 125 NG/ML (ref 10–291)
GFR SERPL CREATININE-BSD FRML MDRD: 72 ML/MIN/1.73M2
GLUCOSE BLD-MCNC: 125 MG/DL (ref 70–108)
HCT VFR BLD CALC: 37.6 % (ref 37–47)
HDLC SERPL-MCNC: 53 MG/DL
HEMOGLOBIN: 11.6 GM/DL (ref 12–16)
HEPATITIS C ANTIBODY: NEGATIVE
IMMATURE GRANS (ABS): 0.07 THOU/MM3 (ref 0–0.07)
IMMATURE GRANULOCYTES: 0.7 %
IRON: 61 UG/DL (ref 50–170)
LDL CHOLESTEROL CALCULATED: 100 MG/DL
LYMPHOCYTES # BLD: 41.1 %
LYMPHOCYTES ABSOLUTE: 3.9 THOU/MM3 (ref 1–4.8)
MCH RBC QN AUTO: 27.6 PG (ref 26–33)
MCHC RBC AUTO-ENTMCNC: 30.9 GM/DL (ref 32.2–35.5)
MCV RBC AUTO: 89.5 FL (ref 81–99)
MICROALBUMIN UR-MCNC: < 1.2 MG/DL
MICROALBUMIN/CREAT UR-RTO: 4 MG/G (ref 0–30)
MONOCYTES # BLD: 7.8 %
MONOCYTES ABSOLUTE: 0.7 THOU/MM3 (ref 0.4–1.3)
NUCLEATED RED BLOOD CELLS: 0 /100 WBC
PLATELET # BLD: 311 THOU/MM3 (ref 130–400)
PMV BLD AUTO: 10.7 FL (ref 9.4–12.4)
POTASSIUM SERPL-SCNC: 4.1 MEQ/L (ref 3.5–5.2)
RBC # BLD: 4.2 MILL/MM3 (ref 4.2–5.4)
SEG NEUTROPHILS: 48 %
SEGMENTED NEUTROPHILS ABSOLUTE COUNT: 4.6 THOU/MM3 (ref 1.8–7.7)
SODIUM BLD-SCNC: 144 MEQ/L (ref 135–145)
TOTAL IRON BINDING CAPACITY: 330 UG/DL (ref 171–450)
TOTAL PROTEIN: 7.9 G/DL (ref 6.1–8)
TRIGL SERPL-MCNC: 155 MG/DL (ref 0–199)
TSH SERPL DL<=0.05 MIU/L-ACNC: 1.45 UIU/ML (ref 0.4–4.2)
WBC # BLD: 9.5 THOU/MM3 (ref 4.8–10.8)

## 2019-01-24 PROCEDURE — 82043 UR ALBUMIN QUANTITATIVE: CPT

## 2019-01-24 PROCEDURE — 86803 HEPATITIS C AB TEST: CPT

## 2019-01-24 PROCEDURE — 94060 EVALUATION OF WHEEZING: CPT

## 2019-01-24 PROCEDURE — 36415 COLL VENOUS BLD VENIPUNCTURE: CPT

## 2019-01-24 PROCEDURE — 85025 COMPLETE CBC W/AUTO DIFF WBC: CPT

## 2019-01-24 PROCEDURE — 94726 PLETHYSMOGRAPHY LUNG VOLUMES: CPT

## 2019-01-24 PROCEDURE — 80061 LIPID PANEL: CPT

## 2019-01-24 PROCEDURE — 2709999900 HC NON-CHARGEABLE SUPPLY

## 2019-01-24 PROCEDURE — 84443 ASSAY THYROID STIM HORMONE: CPT

## 2019-01-24 PROCEDURE — 82728 ASSAY OF FERRITIN: CPT

## 2019-01-24 PROCEDURE — 94729 DIFFUSING CAPACITY: CPT

## 2019-01-24 PROCEDURE — 80053 COMPREHEN METABOLIC PANEL: CPT

## 2019-01-24 PROCEDURE — 83540 ASSAY OF IRON: CPT

## 2019-01-24 PROCEDURE — 83550 IRON BINDING TEST: CPT

## 2019-01-29 ENCOUNTER — TELEPHONE (OUTPATIENT)
Dept: FAMILY MEDICINE CLINIC | Age: 64
End: 2019-01-29

## 2019-01-29 DIAGNOSIS — J44.9 CHRONIC OBSTRUCTIVE PULMONARY DISEASE, UNSPECIFIED COPD TYPE (HCC): Chronic | ICD-10-CM

## 2019-01-29 RX ORDER — ALBUTEROL SULFATE 90 UG/1
2 AEROSOL, METERED RESPIRATORY (INHALATION) EVERY 6 HOURS PRN
Qty: 2 INHALER | Refills: 3 | Status: SHIPPED | OUTPATIENT
Start: 2019-01-29 | End: 2019-02-13

## 2019-01-30 ENCOUNTER — TELEPHONE (OUTPATIENT)
Dept: FAMILY MEDICINE CLINIC | Age: 64
End: 2019-01-30

## 2019-01-30 DIAGNOSIS — H81.11 BPPV (BENIGN PAROXYSMAL POSITIONAL VERTIGO), RIGHT: Primary | ICD-10-CM

## 2019-02-04 ENCOUNTER — HOSPITAL ENCOUNTER (OUTPATIENT)
Dept: PHYSICAL THERAPY | Age: 64
Setting detail: THERAPIES SERIES
Discharge: HOME | End: 2019-02-04
Payer: MEDICARE

## 2019-02-04 PROCEDURE — 97161 PT EVAL LOW COMPLEX 20 MIN: CPT

## 2019-02-04 PROCEDURE — 95992 CANALITH REPOSITIONING PROC: CPT

## 2019-02-07 ENCOUNTER — HOSPITAL ENCOUNTER (OUTPATIENT)
Dept: PHYSICAL THERAPY | Age: 64
Setting detail: THERAPIES SERIES
Discharge: HOME | End: 2019-02-07
Payer: MEDICARE

## 2019-02-07 PROCEDURE — 95992 CANALITH REPOSITIONING PROC: CPT

## 2019-02-11 ENCOUNTER — HOSPITAL ENCOUNTER (OUTPATIENT)
Dept: PHYSICAL THERAPY | Age: 64
Setting detail: THERAPIES SERIES
Discharge: HOME | End: 2019-02-11
Payer: MEDICARE

## 2019-02-11 PROCEDURE — 95992 CANALITH REPOSITIONING PROC: CPT

## 2019-02-13 ENCOUNTER — OFFICE VISIT (OUTPATIENT)
Dept: FAMILY MEDICINE CLINIC | Age: 64
End: 2019-02-13
Payer: MEDICARE

## 2019-02-13 VITALS
DIASTOLIC BLOOD PRESSURE: 62 MMHG | WEIGHT: 196 LBS | RESPIRATION RATE: 10 BRPM | BODY MASS INDEX: 33.46 KG/M2 | TEMPERATURE: 97.7 F | HEART RATE: 62 BPM | HEIGHT: 64 IN | SYSTOLIC BLOOD PRESSURE: 122 MMHG

## 2019-02-13 DIAGNOSIS — R73.9 HYPERGLYCEMIA: ICD-10-CM

## 2019-02-13 DIAGNOSIS — J44.9 CHRONIC OBSTRUCTIVE PULMONARY DISEASE, UNSPECIFIED COPD TYPE (HCC): ICD-10-CM

## 2019-02-13 DIAGNOSIS — E61.1 IRON DEFICIENCY: Primary | ICD-10-CM

## 2019-02-13 DIAGNOSIS — H81.11 BPPV (BENIGN PAROXYSMAL POSITIONAL VERTIGO), RIGHT: ICD-10-CM

## 2019-02-13 DIAGNOSIS — G25.81 RLS (RESTLESS LEGS SYNDROME): ICD-10-CM

## 2019-02-13 DIAGNOSIS — R06.09 DOE (DYSPNEA ON EXERTION): ICD-10-CM

## 2019-02-13 PROCEDURE — G8482 FLU IMMUNIZE ORDER/ADMIN: HCPCS | Performed by: FAMILY MEDICINE

## 2019-02-13 PROCEDURE — 99214 OFFICE O/P EST MOD 30 MIN: CPT | Performed by: FAMILY MEDICINE

## 2019-02-13 PROCEDURE — 3023F SPIROM DOC REV: CPT | Performed by: FAMILY MEDICINE

## 2019-02-13 PROCEDURE — 3017F COLORECTAL CA SCREEN DOC REV: CPT | Performed by: FAMILY MEDICINE

## 2019-02-13 PROCEDURE — G8427 DOCREV CUR MEDS BY ELIG CLIN: HCPCS | Performed by: FAMILY MEDICINE

## 2019-02-13 PROCEDURE — 1036F TOBACCO NON-USER: CPT | Performed by: FAMILY MEDICINE

## 2019-02-13 PROCEDURE — G8417 CALC BMI ABV UP PARAM F/U: HCPCS | Performed by: FAMILY MEDICINE

## 2019-02-13 PROCEDURE — G8926 SPIRO NO PERF OR DOC: HCPCS | Performed by: FAMILY MEDICINE

## 2019-02-13 RX ORDER — CITALOPRAM 20 MG/1
20 TABLET ORAL DAILY
Qty: 90 TABLET | Refills: 3 | Status: SHIPPED | OUTPATIENT
Start: 2019-02-13 | End: 2020-01-03

## 2019-02-13 RX ORDER — ROPINIROLE 0.25 MG/1
0.25 TABLET, FILM COATED ORAL NIGHTLY
Qty: 90 TABLET | Refills: 3 | Status: SHIPPED | OUTPATIENT
Start: 2019-02-13 | End: 2019-08-13 | Stop reason: SDUPTHER

## 2019-02-13 RX ORDER — ROPINIROLE 0.25 MG/1
0.25 TABLET, FILM COATED ORAL NIGHTLY
Qty: 90 TABLET | Refills: 3 | Status: CANCELLED | OUTPATIENT
Start: 2019-02-13

## 2019-02-13 RX ORDER — CITALOPRAM 20 MG/1
20 TABLET ORAL DAILY
Qty: 90 TABLET | Refills: 3 | Status: CANCELLED | OUTPATIENT
Start: 2019-02-13

## 2019-02-21 ENCOUNTER — HOSPITAL ENCOUNTER (OUTPATIENT)
Dept: PHYSICAL THERAPY | Age: 64
Setting detail: THERAPIES SERIES
Discharge: HOME | End: 2019-02-21
Payer: MEDICARE

## 2019-02-21 ENCOUNTER — NURSE ONLY (OUTPATIENT)
Dept: LAB | Age: 64
End: 2019-02-21

## 2019-02-21 DIAGNOSIS — R73.9 HYPERGLYCEMIA: ICD-10-CM

## 2019-02-21 LAB
AVERAGE GLUCOSE: 132 MG/DL (ref 70–126)
GLUCOSE BLD-MCNC: 122 MG/DL (ref 70–108)
HBA1C MFR BLD: 6.4 % (ref 4.4–6.4)

## 2019-02-21 PROCEDURE — 95992 CANALITH REPOSITIONING PROC: CPT

## 2019-02-22 ENCOUNTER — TELEPHONE (OUTPATIENT)
Dept: FAMILY MEDICINE CLINIC | Age: 64
End: 2019-02-22

## 2019-02-22 DIAGNOSIS — R73.03 PREDIABETES: ICD-10-CM

## 2019-02-22 DIAGNOSIS — R73.9 HYPERGLYCEMIA: Primary | ICD-10-CM

## 2019-04-01 DIAGNOSIS — E87.6 HYPOKALEMIA: Primary | ICD-10-CM

## 2019-04-01 RX ORDER — POTASSIUM CHLORIDE 20 MEQ/1
20 TABLET, EXTENDED RELEASE ORAL DAILY
Qty: 90 TABLET | Refills: 3 | Status: SHIPPED | OUTPATIENT
Start: 2019-04-01 | End: 2020-01-03

## 2019-04-01 RX ORDER — POTASSIUM CHLORIDE 750 MG/1
20 TABLET, FILM COATED, EXTENDED RELEASE ORAL DAILY
Status: CANCELLED | OUTPATIENT
Start: 2019-04-01

## 2019-04-01 NOTE — TELEPHONE ENCOUNTER
[address] called requesting a refill on the following medications:  Requested Prescriptions     Pending Prescriptions Disp Refills    potassium chloride (KLOR-CON) 10 MEQ extended release tablet       Sig: Take 2 tablets by mouth daily     Pharmacy verified:  [facility]    Date of last visit: 02/13/19  Date of next visit (if applicable):01/15/20

## 2019-07-08 RX ORDER — LISINOPRIL 5 MG/1
TABLET ORAL
Qty: 180 TABLET | Refills: 8 | Status: SHIPPED | OUTPATIENT
Start: 2019-07-08 | End: 2020-08-17

## 2019-08-02 ENCOUNTER — TELEPHONE (OUTPATIENT)
Dept: FAMILY MEDICINE CLINIC | Age: 64
End: 2019-08-02

## 2019-08-09 ENCOUNTER — NURSE ONLY (OUTPATIENT)
Dept: LAB | Age: 64
End: 2019-08-09

## 2019-08-09 DIAGNOSIS — D64.9 NORMOCYTIC ANEMIA: ICD-10-CM

## 2019-08-09 LAB
BASOPHILS # BLD: 0.4 %
BASOPHILS ABSOLUTE: 0 THOU/MM3 (ref 0–0.1)
EOSINOPHIL # BLD: 3.3 %
EOSINOPHILS ABSOLUTE: 0.3 THOU/MM3 (ref 0–0.4)
ERYTHROCYTE [DISTWIDTH] IN BLOOD BY AUTOMATED COUNT: 13.5 % (ref 11.5–14.5)
ERYTHROCYTE [DISTWIDTH] IN BLOOD BY AUTOMATED COUNT: 45.7 FL (ref 35–45)
FERRITIN: 120 NG/ML (ref 10–291)
FOLATE: 6.7 NG/ML (ref 4.8–24.2)
HCT VFR BLD CALC: 37.1 % (ref 37–47)
HEMOGLOBIN: 11.7 GM/DL (ref 12–16)
IMMATURE GRANS (ABS): 0.03 THOU/MM3 (ref 0–0.07)
IMMATURE GRANULOCYTES: 0 %
IRON: 73 UG/DL (ref 50–170)
LYMPHOCYTES # BLD: 34 %
LYMPHOCYTES ABSOLUTE: 2.7 THOU/MM3 (ref 1–4.8)
MCH RBC QN AUTO: 28.9 PG (ref 26–33)
MCHC RBC AUTO-ENTMCNC: 31.5 GM/DL (ref 32.2–35.5)
MCV RBC AUTO: 91.6 FL (ref 81–99)
MONOCYTES # BLD: 10.7 %
MONOCYTES ABSOLUTE: 0.8 THOU/MM3 (ref 0.4–1.3)
NUCLEATED RED BLOOD CELLS: 0 /100 WBC
PLATELET # BLD: 211 THOU/MM3 (ref 130–400)
PMV BLD AUTO: 11.2 FL (ref 9.4–12.4)
RBC # BLD: 4.05 MILL/MM3 (ref 4.2–5.4)
SEG NEUTROPHILS: 51.2 %
SEGMENTED NEUTROPHILS ABSOLUTE COUNT: 4 THOU/MM3 (ref 1.8–7.7)
TOTAL IRON BINDING CAPACITY: 324 UG/DL (ref 171–450)
VITAMIN B-12: 300 PG/ML (ref 211–911)
WBC # BLD: 7.9 THOU/MM3 (ref 4.8–10.8)

## 2019-08-12 ENCOUNTER — TELEPHONE (OUTPATIENT)
Dept: FAMILY MEDICINE CLINIC | Age: 64
End: 2019-08-12

## 2019-08-12 NOTE — TELEPHONE ENCOUNTER
----- Message from Tristen Oneal DO sent at 8/11/2019  6:53 PM EDT -----  Please let pt know that labs look good  Iron/b12 and folate all WNL  Can con't off iron  Let me know if questions, thanks!

## 2019-08-13 ENCOUNTER — OFFICE VISIT (OUTPATIENT)
Dept: FAMILY MEDICINE CLINIC | Age: 64
End: 2019-08-13
Payer: MEDICARE

## 2019-08-13 VITALS
WEIGHT: 200 LBS | HEART RATE: 71 BPM | BODY MASS INDEX: 35.44 KG/M2 | TEMPERATURE: 98 F | HEIGHT: 63 IN | SYSTOLIC BLOOD PRESSURE: 119 MMHG | RESPIRATION RATE: 14 BRPM | DIASTOLIC BLOOD PRESSURE: 72 MMHG

## 2019-08-13 DIAGNOSIS — I10 ESSENTIAL HYPERTENSION: Chronic | ICD-10-CM

## 2019-08-13 DIAGNOSIS — H81.11 BPPV (BENIGN PAROXYSMAL POSITIONAL VERTIGO), RIGHT: ICD-10-CM

## 2019-08-13 DIAGNOSIS — R10.13 EPIGASTRIC ABDOMINAL PAIN: ICD-10-CM

## 2019-08-13 DIAGNOSIS — E61.1 IRON DEFICIENCY: ICD-10-CM

## 2019-08-13 DIAGNOSIS — R73.9 HYPERGLYCEMIA: ICD-10-CM

## 2019-08-13 DIAGNOSIS — G25.81 RLS (RESTLESS LEGS SYNDROME): Primary | ICD-10-CM

## 2019-08-13 PROCEDURE — G8417 CALC BMI ABV UP PARAM F/U: HCPCS | Performed by: FAMILY MEDICINE

## 2019-08-13 PROCEDURE — 1036F TOBACCO NON-USER: CPT | Performed by: FAMILY MEDICINE

## 2019-08-13 PROCEDURE — 3017F COLORECTAL CA SCREEN DOC REV: CPT | Performed by: FAMILY MEDICINE

## 2019-08-13 PROCEDURE — 99214 OFFICE O/P EST MOD 30 MIN: CPT | Performed by: FAMILY MEDICINE

## 2019-08-13 PROCEDURE — G8427 DOCREV CUR MEDS BY ELIG CLIN: HCPCS | Performed by: FAMILY MEDICINE

## 2019-08-13 RX ORDER — ROPINIROLE 0.25 MG/1
0.25 TABLET, FILM COATED ORAL NIGHTLY
Qty: 90 TABLET | Refills: 3 | Status: SHIPPED | OUTPATIENT
Start: 2019-08-13 | End: 2019-09-23 | Stop reason: SDUPTHER

## 2019-08-13 RX ORDER — OMEPRAZOLE 20 MG/1
20 CAPSULE, DELAYED RELEASE ORAL
Qty: 30 CAPSULE | Refills: 2 | Status: SHIPPED | OUTPATIENT
Start: 2019-08-13 | End: 2019-09-23 | Stop reason: SDUPTHER

## 2019-08-13 NOTE — PATIENT INSTRUCTIONS
Inc requip by 1 tablet night once per wk  Call back and update me on symptoms in 4 weeks    LAB INSTRUCTIONS:    Please complete labs within 4 week(s). Please fast for 8 hours prior to lab collection. The clinic will call you within 1 week of collection. If you have not heard from us within that amount of time, please call us at [phone]. Patient Education        Restless Legs Syndrome: Care Instructions  Your Care Instructions  Restless legs syndrome is a common nervous system problem. People with this syndrome feel a creeping, achy, or unpleasant feeling in the legs and an overpowering urge to move them. It often occurs in the evening and at night and can lead to sleep problems and tiredness. Your doctor may suggest doing a study of your sleep patterns to figure out what is happening when you try to sleep. Many people get relief from symptoms when they get regular exercise, eat well, and avoid caffeine, alcohol, and tobacco.  Follow-up care is a key part of your treatment and safety. Be sure to make and go to all appointments, and call your doctor if you are having problems. It's also a good idea to know your test results and keep a list of the medicines you take. How can you care for yourself at home? · Take your medicines exactly as prescribed. Call your doctor if you think you are having a problem with your medicine. · Try bathing in hot or cold water. Applying a heating pad or ice bag to your legs may also help symptoms. · Stretch and massage your legs before bed or when discomfort begins. · Get some exercise for at least 30 minutes a day on most days of the week. Stop exercising at least 3 hours before bedtime. · Try to plan for situations where you will need to remain seated for long stretches. For example, if you are traveling by car, plan some stops so you can get out and walk around. · Tell your doctor about any medicines you are taking.  This includes all over-the-counter,

## 2019-08-20 ENCOUNTER — NURSE ONLY (OUTPATIENT)
Dept: LAB | Age: 64
End: 2019-08-20

## 2019-08-20 ENCOUNTER — TELEPHONE (OUTPATIENT)
Dept: FAMILY MEDICINE CLINIC | Age: 64
End: 2019-08-20

## 2019-08-20 DIAGNOSIS — I10 ESSENTIAL HYPERTENSION: Chronic | ICD-10-CM

## 2019-08-20 DIAGNOSIS — E61.1 IRON DEFICIENCY: ICD-10-CM

## 2019-08-20 DIAGNOSIS — R10.13 EPIGASTRIC ABDOMINAL PAIN: ICD-10-CM

## 2019-08-20 DIAGNOSIS — R73.03 PREDIABETES: ICD-10-CM

## 2019-08-20 DIAGNOSIS — H81.11 BPPV (BENIGN PAROXYSMAL POSITIONAL VERTIGO), RIGHT: ICD-10-CM

## 2019-08-20 DIAGNOSIS — R73.9 HYPERGLYCEMIA: ICD-10-CM

## 2019-08-20 DIAGNOSIS — G25.81 RLS (RESTLESS LEGS SYNDROME): ICD-10-CM

## 2019-08-20 LAB
ALBUMIN SERPL-MCNC: 4.3 G/DL (ref 3.5–5.1)
ALP BLD-CCNC: 50 U/L (ref 38–126)
ALT SERPL-CCNC: 23 U/L (ref 11–66)
AMYLASE: 52 U/L (ref 20–104)
ANION GAP SERPL CALCULATED.3IONS-SCNC: 15 MEQ/L (ref 8–16)
AST SERPL-CCNC: 27 U/L (ref 5–40)
AVERAGE GLUCOSE: 123 MG/DL (ref 70–126)
BILIRUB SERPL-MCNC: 0.4 MG/DL (ref 0.3–1.2)
BUN BLDV-MCNC: 13 MG/DL (ref 7–22)
CALCIUM SERPL-MCNC: 9.6 MG/DL (ref 8.5–10.5)
CHLORIDE BLD-SCNC: 105 MEQ/L (ref 98–111)
CO2: 24 MEQ/L (ref 23–33)
CREAT SERPL-MCNC: 0.9 MG/DL (ref 0.4–1.2)
GFR SERPL CREATININE-BSD FRML MDRD: 63 ML/MIN/1.73M2
GLUCOSE BLD-MCNC: 119 MG/DL (ref 70–108)
HBA1C MFR BLD: 6.1 % (ref 4.4–6.4)
LIPASE: 33.2 U/L (ref 5.6–51.3)
POTASSIUM SERPL-SCNC: 4.4 MEQ/L (ref 3.5–5.2)
SODIUM BLD-SCNC: 144 MEQ/L (ref 135–145)
TOTAL PROTEIN: 7.1 G/DL (ref 6.1–8)
TSH SERPL DL<=0.05 MIU/L-ACNC: 1.8 UIU/ML (ref 0.4–4.2)

## 2019-08-21 DIAGNOSIS — E78.5 DYSLIPIDEMIA: Primary | ICD-10-CM

## 2019-08-21 RX ORDER — ATORVASTATIN CALCIUM 10 MG/1
10 TABLET, FILM COATED ORAL EVERY EVENING
Qty: 90 TABLET | Refills: 3 | Status: SHIPPED | OUTPATIENT
Start: 2019-08-21 | End: 2020-01-29 | Stop reason: SDUPTHER

## 2019-08-27 DIAGNOSIS — I38 VALVULAR HEART DISEASE: ICD-10-CM

## 2019-08-27 DIAGNOSIS — Z95.2 H/O PROSTHETIC AORTIC VALVE REPLACEMENT: ICD-10-CM

## 2019-08-27 RX ORDER — FUROSEMIDE 20 MG/1
TABLET ORAL
Qty: 90 TABLET | Refills: 3 | Status: SHIPPED | OUTPATIENT
Start: 2019-08-27 | End: 2020-08-17

## 2019-08-30 ENCOUNTER — NURSE ONLY (OUTPATIENT)
Dept: LAB | Age: 64
End: 2019-08-30

## 2019-08-30 LAB
ALBUMIN SERPL-MCNC: 4.3 G/DL (ref 3.5–5.1)
ALP BLD-CCNC: 49 U/L (ref 38–126)
ALT SERPL-CCNC: 24 U/L (ref 11–66)
AMYLASE: 55 U/L (ref 20–104)
ANION GAP SERPL CALCULATED.3IONS-SCNC: 16 MEQ/L (ref 8–16)
AST SERPL-CCNC: 31 U/L (ref 5–40)
BILIRUB SERPL-MCNC: 0.4 MG/DL (ref 0.3–1.2)
BILIRUBIN DIRECT: < 0.2 MG/DL (ref 0–0.3)
BUN BLDV-MCNC: 16 MG/DL (ref 7–22)
CALCIUM SERPL-MCNC: 9.8 MG/DL (ref 8.5–10.5)
CHLORIDE BLD-SCNC: 104 MEQ/L (ref 98–111)
CO2: 23 MEQ/L (ref 23–33)
CREAT SERPL-MCNC: 0.8 MG/DL (ref 0.4–1.2)
GFR SERPL CREATININE-BSD FRML MDRD: 72 ML/MIN/1.73M2
GLUCOSE BLD-MCNC: 121 MG/DL (ref 70–108)
LIPASE: 28 U/L (ref 5.6–51.3)
POTASSIUM SERPL-SCNC: 4 MEQ/L (ref 3.5–5.2)
SODIUM BLD-SCNC: 143 MEQ/L (ref 135–145)
TOTAL PROTEIN: 7.3 G/DL (ref 6.1–8)
TSH SERPL DL<=0.05 MIU/L-ACNC: 1.77 UIU/ML (ref 0.4–4.2)

## 2019-09-10 ENCOUNTER — TELEPHONE (OUTPATIENT)
Dept: FAMILY MEDICINE CLINIC | Age: 64
End: 2019-09-10

## 2019-09-19 ENCOUNTER — HOSPITAL ENCOUNTER (OUTPATIENT)
Dept: NUCLEAR MEDICINE | Age: 64
Discharge: HOME | End: 2019-09-19
Payer: MEDICARE

## 2019-09-19 VITALS — BODY MASS INDEX: 35.97 KG/M2 | HEIGHT: 63 IN | WEIGHT: 203 LBS

## 2019-09-19 DIAGNOSIS — R14.0 BLOATING: ICD-10-CM

## 2019-09-19 DIAGNOSIS — R10.30 LOWER ABDOMINAL PAIN: ICD-10-CM

## 2019-09-19 PROCEDURE — 2709999900 HC NON-CHARGEABLE SUPPLY

## 2019-09-19 PROCEDURE — 78227 HEPATOBIL SYST IMAGE W/DRUG: CPT

## 2019-09-19 PROCEDURE — 6360000004 HC RX CONTRAST MEDICATION: Performed by: RADIOLOGY

## 2019-09-19 PROCEDURE — 3430000000 HC RX DIAGNOSTIC RADIOPHARMACEUTICAL: Performed by: NURSE PRACTITIONER

## 2019-09-19 PROCEDURE — 2580000003 HC RX 258: Performed by: RADIOLOGY

## 2019-09-19 PROCEDURE — A9537 TC99M MEBROFENIN: HCPCS | Performed by: NURSE PRACTITIONER

## 2019-09-19 RX ADMIN — Medication 8.4 MILLICURIE: at 07:56

## 2019-09-19 RX ADMIN — SODIUM CHLORIDE 1.84 MCG: 9 INJECTION, SOLUTION INTRAVENOUS at 09:20

## 2019-09-20 ENCOUNTER — HOSPITAL ENCOUNTER (OUTPATIENT)
Dept: CT IMAGING | Age: 64
Discharge: HOME | End: 2019-09-20
Payer: MEDICARE

## 2019-09-20 DIAGNOSIS — R14.0 BLOATING: ICD-10-CM

## 2019-09-20 DIAGNOSIS — R10.11 RIGHT UPPER QUADRANT ABDOMINAL PAIN: ICD-10-CM

## 2019-09-20 PROCEDURE — 6360000004 HC RX CONTRAST MEDICATION: Performed by: NURSE PRACTITIONER

## 2019-09-20 PROCEDURE — 74177 CT ABD & PELVIS W/CONTRAST: CPT

## 2019-09-20 RX ADMIN — IOHEXOL 50 ML: 240 INJECTION, SOLUTION INTRATHECAL; INTRAVASCULAR; INTRAVENOUS; ORAL at 08:20

## 2019-09-20 RX ADMIN — IOPAMIDOL 85 ML: 755 INJECTION, SOLUTION INTRAVENOUS at 08:20

## 2019-09-23 DIAGNOSIS — G25.81 RLS (RESTLESS LEGS SYNDROME): ICD-10-CM

## 2019-09-23 DIAGNOSIS — R10.13 EPIGASTRIC ABDOMINAL PAIN: ICD-10-CM

## 2019-09-23 RX ORDER — OMEPRAZOLE 20 MG/1
20 CAPSULE, DELAYED RELEASE ORAL
Qty: 90 CAPSULE | Refills: 3 | Status: SHIPPED | OUTPATIENT
Start: 2019-09-23 | End: 2020-08-17

## 2019-09-23 RX ORDER — ROPINIROLE 0.25 MG/1
0.25 TABLET, FILM COATED ORAL NIGHTLY
Qty: 90 TABLET | Refills: 3 | Status: SHIPPED | OUTPATIENT
Start: 2019-09-23 | End: 2019-10-08 | Stop reason: SDUPTHER

## 2019-09-23 NOTE — TELEPHONE ENCOUNTER
[facility] called requesting a refill on the following medications:  Requested Prescriptions     Pending Prescriptions Disp Refills    omeprazole (PRILOSEC) 20 MG delayed release capsule 30 capsule 2     Sig: Take 1 capsule by mouth every morning (before breakfast)    rOPINIRole (REQUIP) 0.25 MG tablet 90 tablet 3     Sig: Take 1 tablet by mouth nightly     Pharmacy verified:  [facility]     Date of last visit: 08/13/19  Date of next visit (if applicable): 8/42/3876

## 2019-10-08 DIAGNOSIS — G25.81 RLS (RESTLESS LEGS SYNDROME): ICD-10-CM

## 2019-10-08 RX ORDER — ROPINIROLE 0.25 MG/1
0.25 TABLET, FILM COATED ORAL NIGHTLY
Qty: 90 TABLET | Refills: 3 | Status: SHIPPED | OUTPATIENT
Start: 2019-10-08 | End: 2019-10-14 | Stop reason: SDUPTHER

## 2019-10-14 ENCOUNTER — TELEPHONE (OUTPATIENT)
Dept: FAMILY MEDICINE CLINIC | Age: 64
End: 2019-10-14

## 2019-10-14 DIAGNOSIS — G25.81 RLS (RESTLESS LEGS SYNDROME): ICD-10-CM

## 2019-10-14 RX ORDER — ROPINIROLE 0.25 MG/1
0.75 TABLET, FILM COATED ORAL NIGHTLY
Qty: 270 TABLET | Refills: 3 | Status: SHIPPED | OUTPATIENT
Start: 2019-10-14 | End: 2020-01-16 | Stop reason: DRUGHIGH

## 2019-11-27 ENCOUNTER — OFFICE VISIT (OUTPATIENT)
Dept: CARDIOLOGY CLINIC | Age: 64
End: 2019-11-27
Payer: MEDICARE

## 2019-11-27 VITALS
BODY MASS INDEX: 35.61 KG/M2 | DIASTOLIC BLOOD PRESSURE: 74 MMHG | HEART RATE: 58 BPM | WEIGHT: 201 LBS | HEIGHT: 63 IN | SYSTOLIC BLOOD PRESSURE: 126 MMHG

## 2019-11-27 DIAGNOSIS — Z95.2 H/O MITRAL VALVE REPLACEMENT: ICD-10-CM

## 2019-11-27 DIAGNOSIS — Z95.2 S/P AVR: Primary | ICD-10-CM

## 2019-11-27 DIAGNOSIS — E78.01 FAMILIAL HYPERCHOLESTEROLEMIA: ICD-10-CM

## 2019-11-27 DIAGNOSIS — I10 ESSENTIAL HYPERTENSION: ICD-10-CM

## 2019-11-27 PROCEDURE — G8400 PT W/DXA NO RESULTS DOC: HCPCS | Performed by: NUCLEAR MEDICINE

## 2019-11-27 PROCEDURE — 99213 OFFICE O/P EST LOW 20 MIN: CPT | Performed by: NUCLEAR MEDICINE

## 2019-11-27 PROCEDURE — 1090F PRES/ABSN URINE INCON ASSESS: CPT | Performed by: NUCLEAR MEDICINE

## 2019-11-27 PROCEDURE — 1123F ACP DISCUSS/DSCN MKR DOCD: CPT | Performed by: NUCLEAR MEDICINE

## 2019-11-27 PROCEDURE — 1036F TOBACCO NON-USER: CPT | Performed by: NUCLEAR MEDICINE

## 2019-11-27 PROCEDURE — G8484 FLU IMMUNIZE NO ADMIN: HCPCS | Performed by: NUCLEAR MEDICINE

## 2019-11-27 PROCEDURE — G8417 CALC BMI ABV UP PARAM F/U: HCPCS | Performed by: NUCLEAR MEDICINE

## 2019-11-27 PROCEDURE — G8427 DOCREV CUR MEDS BY ELIG CLIN: HCPCS | Performed by: NUCLEAR MEDICINE

## 2019-11-27 PROCEDURE — 93000 ELECTROCARDIOGRAM COMPLETE: CPT | Performed by: NUCLEAR MEDICINE

## 2019-11-27 PROCEDURE — 3017F COLORECTAL CA SCREEN DOC REV: CPT | Performed by: NUCLEAR MEDICINE

## 2019-11-27 PROCEDURE — 4040F PNEUMOC VAC/ADMIN/RCVD: CPT | Performed by: NUCLEAR MEDICINE

## 2019-12-03 ENCOUNTER — HOSPITAL ENCOUNTER (OUTPATIENT)
Dept: NON INVASIVE DIAGNOSTICS | Age: 64
Discharge: HOME | End: 2019-12-03
Payer: MEDICARE

## 2019-12-03 DIAGNOSIS — E78.01 FAMILIAL HYPERCHOLESTEROLEMIA: ICD-10-CM

## 2019-12-03 DIAGNOSIS — Z95.2 S/P AVR: ICD-10-CM

## 2019-12-03 DIAGNOSIS — I10 ESSENTIAL HYPERTENSION: ICD-10-CM

## 2019-12-03 DIAGNOSIS — Z95.2 H/O MITRAL VALVE REPLACEMENT: ICD-10-CM

## 2019-12-03 LAB
LV EF: 60 %
LVEF MODALITY: NORMAL

## 2019-12-03 PROCEDURE — 93306 TTE W/DOPPLER COMPLETE: CPT

## 2019-12-20 ENCOUNTER — TELEPHONE (OUTPATIENT)
Dept: FAMILY MEDICINE CLINIC | Age: 64
End: 2019-12-20

## 2019-12-20 DIAGNOSIS — E78.5 DYSLIPIDEMIA: Primary | Chronic | ICD-10-CM

## 2019-12-20 DIAGNOSIS — I10 ESSENTIAL HYPERTENSION: Chronic | ICD-10-CM

## 2019-12-20 DIAGNOSIS — R73.9 HYPERGLYCEMIA: ICD-10-CM

## 2019-12-30 ENCOUNTER — NURSE ONLY (OUTPATIENT)
Dept: LAB | Age: 64
End: 2019-12-30

## 2019-12-30 DIAGNOSIS — E78.5 DYSLIPIDEMIA: Chronic | ICD-10-CM

## 2019-12-30 DIAGNOSIS — I10 ESSENTIAL HYPERTENSION: Chronic | ICD-10-CM

## 2019-12-30 DIAGNOSIS — R73.9 HYPERGLYCEMIA: ICD-10-CM

## 2019-12-30 LAB
AVERAGE GLUCOSE: 135 MG/DL (ref 70–126)
BASOPHILS # BLD: 0.5 %
BASOPHILS ABSOLUTE: 0 THOU/MM3 (ref 0–0.1)
CHOLESTEROL, TOTAL: 200 MG/DL (ref 100–199)
CREATININE, URINE: 202.4 MG/DL
EOSINOPHIL # BLD: 3.2 %
EOSINOPHILS ABSOLUTE: 0.3 THOU/MM3 (ref 0–0.4)
ERYTHROCYTE [DISTWIDTH] IN BLOOD BY AUTOMATED COUNT: 14.2 % (ref 11.5–14.5)
ERYTHROCYTE [DISTWIDTH] IN BLOOD BY AUTOMATED COUNT: 46.6 FL (ref 35–45)
GLUCOSE BLD-MCNC: 124 MG/DL (ref 70–108)
HBA1C MFR BLD: 6.5 % (ref 4.4–6.4)
HCT VFR BLD CALC: 38.6 % (ref 37–47)
HDLC SERPL-MCNC: 47 MG/DL
HEMOGLOBIN: 12.1 GM/DL (ref 12–16)
IMMATURE GRANS (ABS): 0.05 THOU/MM3 (ref 0–0.07)
IMMATURE GRANULOCYTES: 0.6 %
LDL CHOLESTEROL CALCULATED: 106 MG/DL
LYMPHOCYTES # BLD: 29.2 %
LYMPHOCYTES ABSOLUTE: 2.5 THOU/MM3 (ref 1–4.8)
MCH RBC QN AUTO: 28.4 PG (ref 26–33)
MCHC RBC AUTO-ENTMCNC: 31.3 GM/DL (ref 32.2–35.5)
MCV RBC AUTO: 90.6 FL (ref 81–99)
MICROALBUMIN UR-MCNC: < 1.2 MG/DL
MICROALBUMIN/CREAT UR-RTO: 6 MG/G (ref 0–30)
MONOCYTES # BLD: 6.4 %
MONOCYTES ABSOLUTE: 0.6 THOU/MM3 (ref 0.4–1.3)
NUCLEATED RED BLOOD CELLS: 0 /100 WBC
PLATELET # BLD: 219 THOU/MM3 (ref 130–400)
PMV BLD AUTO: 11.7 FL (ref 9.4–12.4)
RBC # BLD: 4.26 MILL/MM3 (ref 4.2–5.4)
SEG NEUTROPHILS: 60.1 %
SEGMENTED NEUTROPHILS ABSOLUTE COUNT: 5.2 THOU/MM3 (ref 1.8–7.7)
TRIGL SERPL-MCNC: 236 MG/DL (ref 0–199)
WBC # BLD: 8.7 THOU/MM3 (ref 4.8–10.8)

## 2019-12-31 ENCOUNTER — TELEPHONE (OUTPATIENT)
Dept: FAMILY MEDICINE CLINIC | Age: 64
End: 2019-12-31

## 2020-01-03 RX ORDER — CITALOPRAM 20 MG/1
TABLET ORAL
Qty: 90 TABLET | Refills: 3 | Status: SHIPPED | OUTPATIENT
Start: 2020-01-03 | End: 2020-12-07 | Stop reason: SDUPTHER

## 2020-01-03 RX ORDER — POTASSIUM CHLORIDE 20 MEQ/1
TABLET, EXTENDED RELEASE ORAL
Qty: 90 TABLET | Refills: 3 | Status: SHIPPED | OUTPATIENT
Start: 2020-01-03 | End: 2020-12-07 | Stop reason: SDUPTHER

## 2020-01-15 PROBLEM — H81.11 BPPV (BENIGN PAROXYSMAL POSITIONAL VERTIGO), RIGHT: Status: RESOLVED | Noted: 2019-01-16 | Resolved: 2020-01-15

## 2020-01-15 PROBLEM — G25.81 RLS (RESTLESS LEGS SYNDROME): Chronic | Status: ACTIVE | Noted: 2019-01-16

## 2020-01-15 PROBLEM — R06.09 DOE (DYSPNEA ON EXERTION): Status: RESOLVED | Noted: 2019-01-16 | Resolved: 2020-01-15

## 2020-01-15 NOTE — PROGRESS NOTES
Health Maintenance    Lipid -   Lab Results   Component Value Date    CHOL 200 (H) 12/30/2019    CHOL 184 01/24/2019    CHOL 128 03/22/2018     Lab Results   Component Value Date    TRIG 236 (H) 12/30/2019    TRIG 155 01/24/2019    TRIG 214 (H) 03/22/2018     Lab Results   Component Value Date    HDL 47 12/30/2019    HDL 53 01/24/2019    HDL 39 03/22/2018     Lab Results   Component Value Date    LDLCALC 106 12/30/2019    LDLCALC 100 01/24/2019    LDLCALC 46 03/22/2018     Lab Results   Component Value Date    VLDL 35 01/04/2015    VLDL 26 01/19/2014     Lab Results   Component Value Date    CHOLHDLRATIO 4.4 01/19/2014       Colon Cancer Screening - multiple tubular adenoma's MAY 2018, repeat 1 year per GI; pt plans to f/u soon with GI for this  Lung Cancer Screening (Age 53 to [de-identified] with 30 pack year hx, current smoker or quit within past 15 years) - <30 pack year hx    Tetanus - records pending  Influenza Vaccine - UTD JAN 2010  Pneumonia Vaccine - UTD PPV 23 in OCT 2017  Zoster - to get at pharmacy per medicare rules     Breast Cancer Screening - NEG JAN 2019; ordered [address]  Cervical Cancer Screening - consult for GYN placed; has apt soon/plans to reschedule soon (AUG 2019) and JAN 2020  Osteoporosis Screening - due, ordered    Diabetes Health Maintenance    A1C -   Lab Results   Component Value Date    LABA1C 6.5 12/30/2019    LABA1C 6.1 08/20/2019    LABA1C 6.4 02/21/2019    LABA1C 5.7 07/12/2018     ACE/ARB - yes, lisinopril 5mg  Eye - next visit  Foot -  WNL JAN 2020  ASA - yes  Microal/Cr -   Lab Results   Component Value Date    LABMICR < 1.20 12/30/2019    LABCREA 202.4 12/30/2019    MACRR 6 12/30/2019     No results found for: CREATINEUR, MICROALBUR, MALBCR      eGFR -   No results found for: GFRESTIMATE  Lab Results   Component Value Date    LABGLOM 72 08/30/2019     No results found for: CRCLEARANCE  No results found for: EGFRNONAA  No results found for: EGFRAA    Statin - yes, lipitor      Current Outpatient Medications   Medication Sig Dispense Refill    rOPINIRole (REQUIP) 1 MG tablet Take 1 tablet by mouth nightly 90 tablet 3    citalopram (CELEXA) 20 MG tablet TAKE 1 TABLET EVERY DAY 90 tablet 3    potassium chloride (KLOR-CON M) 20 MEQ extended release tablet TAKE 1 TABLET EVERY DAY 90 tablet 3    omeprazole (PRILOSEC) 20 MG delayed release capsule Take 1 capsule by mouth every morning (before breakfast) 90 capsule 3    furosemide (LASIX) 20 MG tablet TAKE 1 TABLET EVERY DAY 90 tablet 3    atorvastatin (LIPITOR) 10 MG tablet Take 1 tablet by mouth every evening 90 tablet 3    metoprolol tartrate (LOPRESSOR) 25 MG tablet TAKE 1/2 TABLET TWICE DAILY 90 tablet 8    lisinopril (PRINIVIL;ZESTRIL) 5 MG tablet TAKE 1 TABLET TWICE DAILY 180 tablet 8    famotidine (PEPCID) 20 MG tablet Take 20 mg by mouth nightly      aspirin 81 MG tablet Take 81 mg by mouth daily      meclizine (ANTIVERT) 25 MG tablet Take 25 mg by mouth 3 times daily as needed      acetaminophen (TYLENOL) 325 MG tablet Take 2 tablets by mouth every 4 hours as needed (post op pain) 120 tablet 3     No current facility-administered medications for this visit. Orders Placed This Encounter   Medications    rOPINIRole (REQUIP) 1 MG tablet     Sig: Take 1 tablet by mouth nightly     Dispense:  90 tablet     Refill:  3         All medications reviewed and reconciled, including OTC and herbal medications. Updated list given to patient.        Patient Active Problem List    Diagnosis Date Noted    Morbid obesity ([address]     DM2 (diabetes mellitus, type 2) ([address]     Epigastric abdominal pain 08/13/2019    COPD (chronic obstructive pulmonary disease) [facility]     Valvular heart disease 01/16/2019    RLS (restless legs syndrome) 01/16/2019    Dyslipidemia     Former smoker     GERD (gastroesophageal reflux disease)     Heart failure with preserved ejection fraction ([address]     Hypertension     Major depression COLONOSCOPY      DIAGNOSTIC CARDIAC CATH LAB PROCEDURE      PARTIAL HYSTERECTOMY N/A     Endometriosis    CA ECHO TRANSESOPHAG R-T 2D IMG ACQUISJ I&R ONLY Left 7/3/2018    TRANSESOPHAGEAL ECHOCARDIOGRAM performed by Tyler Palma MD at [facility]    CA OFFICE/OUTPT VISIT,PROCEDURE ONLY N/A 2018    REDO AORTIC VALVE REPLACEMENT, MITRAL VALVE REPAIR, TRICUSPID VALVE REPAIR, WILBERTO performed by Jefe Mendoza MD at [address]         Allergies   Allergen Reactions    Latex Rash    Demerol Hcl [Meperidine] Hives       Social History     Tobacco Use    Smoking status: Former Smoker     Packs/day: 0.50     Years: 44.00     Pack years: 22.00     Types: Cigarettes     Last attempt to quit: 2018     Years since quittin.5    Smokeless tobacco: Former User   Substance Use Topics    Alcohol use: No       Family History   Problem Relation Age of Onset    Heart Attack Mother     Heart Disease Mother     Heart Surgery Mother     Hypertension Mother     High Blood Pressure Mother     Diabetes Mother     Diabetes Father     Breast Cancer Maternal Grandmother         Unsure age   Equilla Robyn Colon Cancer Neg Hx          I have reviewed the patient's past medical history, past surgical history, allergies, medications, social and family history and I have made updates where appropriate.       Review of Systems  Positive responses are highlighted in bold    Constitutional:  Fever, Chills, Night Sweats, Fatigue, Unexpected changes in weight  HENT:  Ear pain, Tinnitus, Nosebleeds, Trouble swallowing, Hearing loss, Sore throat  Cardiovascular:  Chest Pain, Palpitations, Orthopnea, Paroxysmal Nocturnal Dyspnea  Respiratory:  Cough, Wheezing, Shortness of breath, Chest tightness, Apnea  Gastrointestinal:  Nausea, Vomiting, Diarrhea, Constipation, Heartburn, Blood in stool  Genitourinary:  Difficulty or painful urination, Flank pain, Change in frequency, Urgency  Skin:  Color change, Rash, obstructive pulmonary disease, unspecified COPD type ([facility]    Mild COPD  Not having sxs at present  Declines meds, monitor. 5. RLS (restless legs syndrome)    Inc requip to 1mg  Will call her in 4 wks and see how this is working    - rOPINIRole (REQUIP) 1 MG tablet; Take 1 tablet by mouth nightly  Dispense: 90 tablet; Refill: 3    6. Epigastric abdominal pain    Improved  con't PPI and GI f/u    7. Dyslipidemia    Doing well  con't statin    8. Recurrent major depressive disorder, in full remission ([facility]    Stable  con't celexa    9. Needs flu shot    - INFLUENZA, TRIV, INACTIVATED, SUBUNIT, ADJUVANTED, 65 YRS AND OLDER, IM, PREFILL SYR, 0.5ML (FLUAD TRIV)    10. Post-menopause    - DEXA Bone Density Axial Skeleton; Future    11. Encounter for screening mammogram for malignant neoplasm of breast    - ANNABELLA DIGITAL SCREEN W CAD BILATERAL; Future      DISPOSITION    Return in about 6 months (around 7/16/2020) for Follow-up Diabetes, follow-up on chronic medical conditions, sooner as needed. Alex Lou released without restrictions. Future Appointments   Date Time Provider Hola Carrion   7/16/2020  8:00 AM Carlotta [facility] - [place] [address]   11/30/2020  9:30 AM Kenneth Hutton MD SRP Heart [facility] - [address] received counseling on the following healthy behaviors: nutrition, exercise and medication adherence    Patient given educational materials on: See Attached    I have instructed Hui to complete a self tracking handout on Blood Pressures  and instructed them to bring it with them to her next appointment. Barriers to learning and self management: none    Discussed use, benefit, and side effects of prescribed medications. Barriers to medication compliance addressed. All patient questions answered. Pt voiced understanding.        Electronically signed by Nadeem Ramos DO on 1/16/2020 at 8:53 AM

## 2020-01-16 ENCOUNTER — TELEPHONE (OUTPATIENT)
Dept: FAMILY MEDICINE CLINIC | Age: 65
End: 2020-01-16

## 2020-01-16 ENCOUNTER — OFFICE VISIT (OUTPATIENT)
Dept: FAMILY MEDICINE CLINIC | Age: 65
End: 2020-01-16
Payer: MEDICARE

## 2020-01-16 VITALS
BODY MASS INDEX: 36.04 KG/M2 | HEIGHT: 63 IN | TEMPERATURE: 97.6 F | HEART RATE: 80 BPM | RESPIRATION RATE: 12 BRPM | DIASTOLIC BLOOD PRESSURE: 58 MMHG | SYSTOLIC BLOOD PRESSURE: 126 MMHG | WEIGHT: 203.4 LBS

## 2020-01-16 PROCEDURE — G8926 SPIRO NO PERF OR DOC: HCPCS | Performed by: FAMILY MEDICINE

## 2020-01-16 PROCEDURE — 99214 OFFICE O/P EST MOD 30 MIN: CPT | Performed by: FAMILY MEDICINE

## 2020-01-16 PROCEDURE — G0008 ADMIN INFLUENZA VIRUS VAC: HCPCS | Performed by: FAMILY MEDICINE

## 2020-01-16 PROCEDURE — 1090F PRES/ABSN URINE INCON ASSESS: CPT | Performed by: FAMILY MEDICINE

## 2020-01-16 PROCEDURE — G8417 CALC BMI ABV UP PARAM F/U: HCPCS | Performed by: FAMILY MEDICINE

## 2020-01-16 PROCEDURE — 90653 IIV ADJUVANT VACCINE IM: CPT | Performed by: FAMILY MEDICINE

## 2020-01-16 PROCEDURE — 4040F PNEUMOC VAC/ADMIN/RCVD: CPT | Performed by: FAMILY MEDICINE

## 2020-01-16 PROCEDURE — 2022F DILAT RTA XM EVC RTNOPTHY: CPT | Performed by: FAMILY MEDICINE

## 2020-01-16 PROCEDURE — 3017F COLORECTAL CA SCREEN DOC REV: CPT | Performed by: FAMILY MEDICINE

## 2020-01-16 PROCEDURE — G8427 DOCREV CUR MEDS BY ELIG CLIN: HCPCS | Performed by: FAMILY MEDICINE

## 2020-01-16 PROCEDURE — 1036F TOBACCO NON-USER: CPT | Performed by: FAMILY MEDICINE

## 2020-01-16 PROCEDURE — 1123F ACP DISCUSS/DSCN MKR DOCD: CPT | Performed by: FAMILY MEDICINE

## 2020-01-16 PROCEDURE — G8482 FLU IMMUNIZE ORDER/ADMIN: HCPCS | Performed by: FAMILY MEDICINE

## 2020-01-16 PROCEDURE — 3023F SPIROM DOC REV: CPT | Performed by: FAMILY MEDICINE

## 2020-01-16 PROCEDURE — G8400 PT W/DXA NO RESULTS DOC: HCPCS | Performed by: FAMILY MEDICINE

## 2020-01-16 PROCEDURE — 3046F HEMOGLOBIN A1C LEVEL >9.0%: CPT | Performed by: FAMILY MEDICINE

## 2020-01-16 RX ORDER — ROPINIROLE 1 MG/1
1 TABLET, FILM COATED ORAL NIGHTLY
Qty: 90 TABLET | Refills: 3 | Status: SHIPPED
Start: 2020-01-16 | End: 2020-02-11 | Stop reason: DRUGHIGH

## 2020-01-16 NOTE — PATIENT INSTRUCTIONS
[facility] [address], [address] [facility]  [place] [address], [address] [facility]  [phone]      Patient Education        Type 2 Diabetes: Care Instructions  Your Care Instructions    Type 2 diabetes is a disease that develops when the body's tissues cannot use insulin properly. Over time, the pancreas cannot make enough insulin. Insulin is a hormone that helps the body's cells use sugar (glucose) for energy. It also helps the body store extra sugar in muscle, fat, and liver cells. Without insulin, the sugar cannot get into the cells to do its work. It stays in the blood instead. This can cause high blood sugar levels. A person has diabetes when the blood sugar stays too high too much of the time. Over time, diabetes can lead to diseases of the heart, blood vessels, nerves, kidneys, and eyes. You may be able to control your blood sugar by losing weight, eating a healthy diet, and getting daily exercise. You may also have to take insulin or other diabetes medicine. Follow-up care is a key part of your treatment and safety. Be sure to make and go to all appointments. Call your doctor if you are having problems. It's also a good idea to know your test results and keep a list of the medicines you take. How can you care for yourself at home? · Keep your blood sugar at a target level (which you set with your doctor). ? Eat a good diet that spreads carbohydrate throughout the day. Carbohydrate--the body's main source of fuel--affects blood sugar more than any other nutrient. Carbohydrate is in fruits, vegetables, milk, and yogurt. It also is in breads, cereals, vegetables such as potatoes and corn, and sugary foods such as candy and cakes. ? Aim for 30 minutes of exercise on most, preferably all, days of the week. Walking is a good choice. You also may want to do other activities, such as running, swimming, cycling, or playing tennis or team sports.  If your doctor says it's okay, do muscle-strengthening exercises at least 2 times a week. ? Take your medicines exactly as prescribed. Call your doctor if you think you are having a problem with your medicine. You will get more details on the specific medicines your doctor prescribes. · Check your blood sugar as often as your doctor recommends. It is important to keep track of any symptoms you have, such as low blood sugar. Also tell your doctor if you have any changes in your activities, diet, or insulin use. · Talk to your doctor before you start taking aspirin every day. Aspirin can help certain people lower their risk of a heart attack or stroke. But taking aspirin isn't right for everyone, because it can cause serious bleeding. · Do not smoke. If you need help quitting, talk to your doctor about stop-smoking programs and medicines. These can increase your chances of quitting for good. · Keep your cholesterol and blood pressure at normal levels. You may need to take one or more medicines to reach your goals. Take them exactly as directed. Do not stop or change a medicine without talking to your doctor first.  When should you call for help? Call 911 anytime you think you may need emergency care. For example, call if:    · You passed out (lost consciousness), or you suddenly become very sleepy or confused. (You may have very low blood sugar.)    Call your doctor now or seek immediate medical care if:    · Your blood sugar is 300 mg/dL or is higher than the level your doctor has set for you.     · You have symptoms of low blood sugar, such as:  ? Sweating. ? Feeling nervous, shaky, and weak. ? Extreme hunger and slight nausea. ? Dizziness and headache.  ? Blurred vision. ? Confusion.    Watch closely for changes in your health, and be sure to contact your doctor if:    · You often have problems controlling your blood sugar.     · You have symptoms of long-term diabetes problems, such as:  ? New vision changes.   ? New pain, numbness, or tingling in your hands or feet. ? Skin problems. Where can you learn more? Go to [email or web address] and sign in to your [employer] account. Enter C553 in the [facility] box to learn more about \"Type 2 Diabetes: Care Instructions. \"     If you do not have an account, please click on the \"Sign Up Now\" link. Current as of: April 16, 2019  Content Version: 12.3  © 7836-9053 [employer], Incorporated. Care instructions adapted under license by [facility] ([facility]). If you have questions about a medical condition or this instruction, always ask your healthcare professional. [address] any warranty or liability for your use of this information.

## 2020-01-16 NOTE — PROGRESS NOTES
Vaccine Information Sheet, \"Influenza - Inactivated\"  given to Hipolito Angelucci, or parent/legal guardian of  Hipolito Angelucci and verbalized understanding. Patient responses:    Have you ever had a reaction to a flu vaccine? No  Do you have an allergy to eggs, neomycin or polymixin? No  Do you have an allergy to Thimerosal, contact lens solution, or Merthiolate? No  Have you ever had Guillian Irvine Syndrome? No  Do you have any current illness? No  Do you have a temperature above 100 degrees? No  Are you pregnant? No  If pregnant, permission obtained from physician? No  Do you have an active neurological disorder? No      Flu vaccine given per order. Please see immunization tab.

## 2020-01-21 ENCOUNTER — TELEPHONE (OUTPATIENT)
Dept: FAMILY MEDICINE CLINIC | Age: 65
End: 2020-01-21

## 2020-01-21 NOTE — TELEPHONE ENCOUNTER
Please f/u with [facility] office and see when they wanted her next colo. I believe she is overdue. Let me know, thank!

## 2020-01-22 NOTE — TELEPHONE ENCOUNTER
Spoke with Alex Guevara @ Dr Carlisle Tiffany office she states pt was due 5/19 for a colonoscopy she did not respond to any letters that were sent to her to schedule    Pt was in the office 8/29/19 and 11/7/19  Alex Guevara is facruz office notes

## 2020-01-29 RX ORDER — ATORVASTATIN CALCIUM 10 MG/1
10 TABLET, FILM COATED ORAL EVERY EVENING
Qty: 90 TABLET | Refills: 3 | Status: SHIPPED | OUTPATIENT
Start: 2020-01-29 | End: 2020-12-07 | Stop reason: SDUPTHER

## 2020-01-31 ENCOUNTER — HOSPITAL ENCOUNTER (OUTPATIENT)
Dept: WOMENS IMAGING | Age: 65
Discharge: HOME | End: 2020-01-31
Payer: MEDICARE

## 2020-01-31 PROCEDURE — 77063 BREAST TOMOSYNTHESIS BI: CPT

## 2020-02-03 ENCOUNTER — TELEPHONE (OUTPATIENT)
Dept: FAMILY MEDICINE CLINIC | Age: 65
End: 2020-02-03

## 2020-02-11 ENCOUNTER — OFFICE VISIT (OUTPATIENT)
Dept: FAMILY MEDICINE CLINIC | Age: 65
End: 2020-02-11
Payer: MEDICARE

## 2020-02-11 VITALS
HEART RATE: 68 BPM | BODY MASS INDEX: 35.97 KG/M2 | WEIGHT: 203 LBS | DIASTOLIC BLOOD PRESSURE: 78 MMHG | TEMPERATURE: 97.7 F | RESPIRATION RATE: 12 BRPM | HEIGHT: 63 IN | SYSTOLIC BLOOD PRESSURE: 117 MMHG

## 2020-02-11 PROCEDURE — 4040F PNEUMOC VAC/ADMIN/RCVD: CPT | Performed by: FAMILY MEDICINE

## 2020-02-11 PROCEDURE — G8417 CALC BMI ABV UP PARAM F/U: HCPCS | Performed by: FAMILY MEDICINE

## 2020-02-11 PROCEDURE — G8482 FLU IMMUNIZE ORDER/ADMIN: HCPCS | Performed by: FAMILY MEDICINE

## 2020-02-11 PROCEDURE — G8400 PT W/DXA NO RESULTS DOC: HCPCS | Performed by: FAMILY MEDICINE

## 2020-02-11 PROCEDURE — 1036F TOBACCO NON-USER: CPT | Performed by: FAMILY MEDICINE

## 2020-02-11 PROCEDURE — 1123F ACP DISCUSS/DSCN MKR DOCD: CPT | Performed by: FAMILY MEDICINE

## 2020-02-11 PROCEDURE — 99213 OFFICE O/P EST LOW 20 MIN: CPT | Performed by: FAMILY MEDICINE

## 2020-02-11 PROCEDURE — 3017F COLORECTAL CA SCREEN DOC REV: CPT | Performed by: FAMILY MEDICINE

## 2020-02-11 PROCEDURE — G8427 DOCREV CUR MEDS BY ELIG CLIN: HCPCS | Performed by: FAMILY MEDICINE

## 2020-02-11 PROCEDURE — 1090F PRES/ABSN URINE INCON ASSESS: CPT | Performed by: FAMILY MEDICINE

## 2020-02-11 RX ORDER — ROPINIROLE 1 MG/1
2 TABLET, FILM COATED ORAL 2 TIMES DAILY
COMMUNITY
End: 2020-06-05 | Stop reason: DRUGHIGH

## 2020-02-11 RX ORDER — GABAPENTIN 300 MG/1
300 CAPSULE ORAL 3 TIMES DAILY
Qty: 90 CAPSULE | Refills: 2 | Status: SHIPPED | OUTPATIENT
Start: 2020-02-11 | End: 2020-03-11 | Stop reason: SINTOL

## 2020-02-11 NOTE — PROGRESS NOTES
- multiple tubular adenoma's MAY 2018, repeat 1 year per GI, ; pt plans to f/u soon with GI for this  Lung Cancer Screening (Age 53 to [de-identified] with 30 pack year hx, current smoker or quit within past 15 years) - <30 pack year hx    Tetanus - records pending  Influenza Vaccine - UTD JAN 2010  Pneumonia Vaccine - UTD PPV 23 in OCT 2017  Zoster - to get at pharmacy per medicare rules     Breast Cancer Screening - NEG JAN 2020  Cervical Cancer Screening - consult for GYN placed; has apt soon/plans to reschedule soon (AUG 2019) and JAN 2020  Osteoporosis Screening - due, ordered    Diabetes Health Maintenance    A1C -   Lab Results   Component Value Date    LABA1C 6.5 12/30/2019    LABA1C 6.1 08/20/2019    LABA1C 6.4 02/21/2019    LABA1C 5.7 07/12/2018     ACE/ARB - yes, lisinopril 5mg  Eye - next visit  Foot -  WNL JAN 2020  ASA - yes  Microal/Cr -   Lab Results   Component Value Date    LABMICR < 1.20 12/30/2019    LABCREA 202.4 12/30/2019    MACRR 6 12/30/2019     No results found for: Lewis Tee, MALBCR      eGFR -   No results found for: GFRESTIMATE  Lab Results   Component Value Date    LABGLOM 72 08/30/2019     No results found for: CRCLEARANCE  No results found for: EGFRNONAA  No results found for: EGFRAA    Statin - yes, lipitor      Current Outpatient Medications   Medication Sig Dispense Refill    gabapentin (NEURONTIN) 300 MG capsule Take 1 capsule by mouth 3 times daily for 90 days.  90 capsule 2    rOPINIRole (REQUIP) 1 MG tablet Take 2 mg by mouth nightly      atorvastatin (LIPITOR) 10 MG tablet Take 1 tablet by mouth every evening 90 tablet 3    citalopram (CELEXA) 20 MG tablet TAKE 1 TABLET EVERY DAY 90 tablet 3    potassium chloride (KLOR-CON M) 20 MEQ extended release tablet TAKE 1 TABLET EVERY DAY 90 tablet 3    omeprazole (PRILOSEC) 20 MG delayed release capsule Take 1 capsule by mouth every morning (before breakfast) 90 capsule 3    furosemide (LASIX) 20 MG tablet TAKE 1 TABLET EVERY DAY 90 tablet 3    metoprolol tartrate (LOPRESSOR) 25 MG tablet TAKE 1/2 TABLET TWICE DAILY 90 tablet 8    lisinopril (PRINIVIL;ZESTRIL) 5 MG tablet TAKE 1 TABLET TWICE DAILY 180 tablet 8    aspirin 81 MG tablet Take 81 mg by mouth daily      acetaminophen (TYLENOL) 325 MG tablet Take 2 tablets by mouth every 4 hours as needed (post op pain) 120 tablet 3     No current facility-administered medications for this visit. Orders Placed This Encounter   Medications    gabapentin (NEURONTIN) 300 MG capsule     Sig: Take 1 capsule by mouth 3 times daily for 90 days. Dispense:  90 capsule     Refill:  2         All medications reviewed and reconciled, including OTC and herbal medications. Updated list given to patient. Patient Active Problem List    Diagnosis Date Noted    Fibromyalgia     Morbid obesity ([address]     DM2 (diabetes mellitus, type 2) ([address]     Epigastric abdominal pain 08/13/2019    COPD (chronic obstructive pulmonary disease) ([address]     Valvular heart disease 01/16/2019    RLS (restless legs syndrome) 01/16/2019    Dyslipidemia     Former smoker     GERD (gastroesophageal reflux disease)     Heart failure with preserved ejection fraction ([address]     Hypertension     Major depression     THAYER (nonalcoholic steatohepatitis)     History of aortic stenosis, s/p valve replacement x 2     History of iron deficiency anemia     S/P mitral valve repair 07/13/2018     Dr. Zhang Ply H/O prosthetic aortic valve replacement 07/03/2018     Early bioprosthetic aortic valve failure with severe symptomatic prosthetic aortic regurgitation      History of subarachnoid hemorrhage 03/23/2015     Spontaneous SAH. Admitted to [facility]. Extensive w/u for cause was negative.        Osteoarthritis     S/P AVR (aortic valve replacement) 01/01/2015     x 2, last replacement July 2018 d/t failure of the 1st         Past Medical History:   Diagnosis Date    COPD (chronic obstructive pulmonary disease) Types: Cigarettes     Last attempt to quit: 2018     Years since quittin.6    Smokeless tobacco: Former User   Substance Use Topics    Alcohol use: No       Family History   Problem Relation Age of Onset    Heart Attack Mother     Heart Disease Mother     Heart Surgery Mother     Hypertension Mother     High Blood Pressure Mother     Diabetes Mother     Diabetes Father     Breast Cancer Maternal Grandmother         Unsure age   [facility] Colon Cancer Neg Hx          I have reviewed the patient's past medical history, past surgical history, allergies, medications, social and family history and I have made updates where appropriate. Review of Systems  Positive responses are highlighted in bold    Constitutional:  Fever, Chills, Night Sweats, Fatigue, Unexpected changes in weight  HENT:  Ear pain, Tinnitus, Nosebleeds, Trouble swallowing, Hearing loss, Sore throat  Cardiovascular:  Chest Pain, Palpitations, Orthopnea, Paroxysmal Nocturnal Dyspnea  Respiratory:  Cough, Wheezing, Shortness of breath, Chest tightness, Apnea  Gastrointestinal:  Nausea, Vomiting, Diarrhea, Constipation, Heartburn, Blood in stool  Genitourinary:  Difficulty or painful urination, Flank pain, Change in frequency, Urgency  Skin:  Color change, Rash, Itching, Wound  Musculoskeletal:  Joint pain, Back pain, Gait problems, Joint swelling, Myalgias  Neurological:  Dizziness, Headaches, Presyncope, Numbness, Seizures, Tremors  Endocrine:  Heat Intolerance, Cold Intolerance, Polydipsia, Polyphagia, Polyuria      PHYSICAL EXAM:  Vitals:    20 1611   BP: 117/78   Pulse: 68   Resp: 12   Temp: 97.7 °F (36.5 °C)   TempSrc: Oral   Weight: 203 lb (92.1 kg)   Height: 5' 2.5\" (1.588 m)     Body mass index is 36.54 kg/m².   Pain Score:   4(all over)    VS Reviewed  General Appearance: A&O x 3, No acute distress,well developed and well- nourished  Eyes: pupils equal, round, and reactive to light, extraocular eye movements intact,

## 2020-03-10 NOTE — PROGRESS NOTES
Chief Complaint   Patient presents with    Follow-up     restless leg is worse; gabapentin not helping fibromyalgia just making more fatigued    Trigger finger     left pinky- 2 weeks ago, cant bend all the way, when forces makes a \"clicking\" sound and painful       History obtained from the patient. SUBJECTIVE:  Minda Lepe is a 71 y.o. female that presents today for    -Body aches LAST VISIT:  Hx of fibromyalgia  Did not tell me  Worse the last month  Used to be on deysi, not sure dose  Took for a few months, didn't help so stopped  Pain in arms, legs, hurts to touch  No rash  No fevers, chills or night sweats  No fam hx of connective tissue d/o or RA  No triggers that she can recall  Depression stable, celexa working well    UPDATE TODAY:   sxs no better  Deysi doesn't help, but makes her tired  Labs ordered last visit, didn't do them yet       -RLS PRIOR VISIT: pt c/o legs feeling restless at night for the last year, trouble sleeping d/t it. Pain in legs at night, moves legs and better. Never seen or treated before. UPDATE PRIOR VISIT: requip helping, works well, good dose. UPDATE PRIOR VISIT:   RLS sxs worse  Now during the day as well  Legs hurt, when moves, sxs improve  L leg seems to be worse than R  sxs started to get worse a wk or 2 wks ago  Nothing changed 2 wks ago that she can recall. No back or groin pain   No new rashes    UPDATE PRIOR VISIT:   A bit worse  On 0.75 of requip, would like to try higher dose    UPDATE LAST VISIT:   No better with inc of requip to 1mg qhs  Asking what else can do  Iron levels ok     UPDATE TODAY:   Night time sxs fine with reqip  Getting sxs during the day  Pain in legs, feels has to move  Gets those sxs at night, but thye go away when takes requp  Recent labs ok       -?  Trigger finger L 5th digit  Having sxs of trigger finger in L 5th digit  Gets stuck  Would like to see OIO      -Colon polyps:  Multiple MAY 2018  Was to repeat in 1 year, has not done so  She plans to call Dr. Lisa Townsend office today  Denies bowel habit changes.        Age/Gender Health Maintenance    Lipid -   Lab Results   Component Value Date    CHOL 200 (H) 12/30/2019    CHOL 184 01/24/2019    CHOL 128 03/22/2018     Lab Results   Component Value Date    TRIG 236 (H) 12/30/2019    TRIG 155 01/24/2019    TRIG 214 (H) 03/22/2018     Lab Results   Component Value Date    HDL 47 12/30/2019    HDL 53 01/24/2019    HDL 39 03/22/2018     Lab Results   Component Value Date    LDLCALC 106 12/30/2019    LDLCALC 100 01/24/2019    LDLCALC 46 03/22/2018       Colon Cancer Screening - multiple tubular adenoma's MAY 2018, repeat 1 year per GISheik; pt plans to f/u soon with GI for this  Lung Cancer Screening (Age 54 to [de-identified] with 30 pack year hx, current smoker or quit within past 15 years) - <30 pack year hx    Tetanus - records pending  Influenza Vaccine - UTD JAN 2010  Pneumonia Vaccine - UTD PPV 23 in OCT 2017  Zoster - to get at pharmacy per medicare rules     Breast Cancer Screening - NEG JAN 2020  Cervical Cancer Screening - consult for GYN placed; has apt soon/plans to reschedule soon (AUG 2019) and JAN 2020  Osteoporosis Screening - due, ordered    Diabetes Health Maintenance    A1C -   Lab Results   Component Value Date    LABA1C 6.5 12/30/2019    LABA1C 6.1 08/20/2019    LABA1C 6.4 02/21/2019    LABA1C 5.7 07/12/2018     ACE/ARB - yes, lisinopril 5mg  Eye - next visit  Foot -  WNL JAN 2020  ASA - yes  Microal/Cr -   Lab Results   Component Value Date    LABMICR < 1.20 12/30/2019    LABCREA 202.4 12/30/2019    MACRR 6 12/30/2019     No results found for: HIRAM Coles      eGFR -   No results found for: GFRESTIMATE  Lab Results   Component Value Date    LABGLOM 72 08/30/2019     No results found for: CRCLEARANCE  No results found for: EGFRNONAA  No results found for: EGFRAA    Statin - yes, lipitor      Current Outpatient Medications   Medication Sig Dispense Refill    rOPINIRole (REQUIP) 1 MG tablet Take 2 mg by mouth 2 times daily       atorvastatin (LIPITOR) 10 MG tablet Take 1 tablet by mouth every evening 90 tablet 3    citalopram (CELEXA) 20 MG tablet TAKE 1 TABLET EVERY DAY 90 tablet 3    potassium chloride (KLOR-CON M) 20 MEQ extended release tablet TAKE 1 TABLET EVERY DAY 90 tablet 3    omeprazole (PRILOSEC) 20 MG delayed release capsule Take 1 capsule by mouth every morning (before breakfast) 90 capsule 3    furosemide (LASIX) 20 MG tablet TAKE 1 TABLET EVERY DAY 90 tablet 3    metoprolol tartrate (LOPRESSOR) 25 MG tablet TAKE 1/2 TABLET TWICE DAILY 90 tablet 8    lisinopril (PRINIVIL;ZESTRIL) 5 MG tablet TAKE 1 TABLET TWICE DAILY 180 tablet 8    aspirin 81 MG tablet Take 81 mg by mouth daily      acetaminophen (TYLENOL) 325 MG tablet Take 2 tablets by mouth every 4 hours as needed (post op pain) 120 tablet 3     No current facility-administered medications for this visit. No orders of the defined types were placed in this encounter. All medications reviewed and reconciled, including OTC and herbal medications. Updated list given to patient.        Patient Active Problem List    Diagnosis Date Noted    Fibromyalgia     [facility]bid obesity [facility]     DM2 (diabetes mellitus, type 2) ([facility]     Epigastric abdominal pain 08/13/2019    COPD (chronic obstructive pulmonary disease) ([facility]     Valvular heart disease 01/16/2019    RLS (restless legs syndrome) 01/16/2019    Dyslipidemia     Former smoker     GERD (gastroesophageal reflux disease)     Heart failure with preserved ejection fraction ([facility]     Hypertension     Major depression     THAYER (nonalcoholic steatohepatitis)     History of aortic stenosis, s/p valve replacement x 2     History of iron deficiency anemia     S/P mitral valve repair 07/13/2018     Dr. Ellis Forman H/O prosthetic aortic valve replacement 07/03/2018     Early bioprosthetic aortic valve failure with severe symptomatic prosthetic aortic regurgitation      History of subarachnoid hemorrhage 03/23/2015     Spontaneous SAH. Admitted to [facility]. Extensive w/u for cause was negative.  Osteoarthritis     S/P AVR (aortic valve replacement) 01/01/2015     x 2, last replacement July 2018 d/t failure of the 1st         Past Medical History:   Diagnosis Date    COPD (chronic obstructive pulmonary disease) ([facility]     DM2 (diabetes mellitus, type 2) ([facility]     Dyslipidemia     Fibromyalgia     Former smoker     GERD (gastroesophageal reflux disease)     H/O prosthetic aortic valve replacement     Early bioprosthetic aortic valve failure with severe symptomatic prosthetic aortic regurgitation is now s/p REDO AORTIC VALVE REPLACEMENT, MITRAL VALVE REPAIR, TRICUSPID VALVE REPAIR, WILBERTO performed by Nini Steward MD at [address] [place] [address] with preserved ejection fraction [facility]     History of aortic stenosis, s/p valve replacement x 2     History of iron deficiency anemia     History of subarachnoid hemorrhage 03/23/2015    Spontaneous SAH. Admitted to [facility]. Extensive w/u for cause was negative.      Hypertension     Major depression     Morbid obesity ([facility]     THAYER (nonalcoholic steatohepatitis)     Osteoarthritis     RLS (restless legs syndrome) 1/16/2019    S/P AVR (aortic valve replacement) 2015    x 2, last replacement July 2018 d/t failure of the 1st    S/P mitral valve repair 07/13/2018    Dr. Rafael Manning Valvular heart disease 1/16/2019       Past Surgical History:   Procedure Laterality Date    AORTIC VALVE REPLACEMENT      cow valve    BRAIN SURGERY      to drain blood    CARDIAC CATHETERIZATION  2004 or 2005    [address] [facility]    COLONOSCOPY      DIAGNOSTIC CARDIAC CATH LAB PROCEDURE      PARTIAL HYSTERECTOMY N/A 2004    Endometriosis    MD ECHO TRANSESOPHAG R-T 2D IMG ACQUISJ I&R ONLY Left 7/3/2018    TRANSESOPHAGEAL ECHOCARDIOGRAM performed by Ton Landry MD at [facility]    MD OFFICE/OUTPT VISIT,PROCEDURE ONLY N/A 130/52   Pulse: 64   Resp: 16   Temp: 98 °F (36.7 °C)   TempSrc: Oral   Weight: 201 lb (91.2 kg)   Height: 5' 2.5\" (1.588 m)     Body mass index is 36.18 kg/m². Pain Score: 4(Myalgias)    VS Reviewed  General Appearance: A&O x 3, No acute distress,well developed and well- nourished  Eyes: pupils equal, round, and reactive to light, extraocular eye movements intact, conjunctivae and eye lids without erythema  ENT: external ear and ear canal clear bilaterally, TMs intact and regular, nose without deformity, nasal mucosa and turbinates normal without polyps, oropharynx normal, dentition is normal for age  Neck: supple and non-tender without mass, no thyromegaly or thyroid nodules, no cervical lymphadenopathy  Pulmonary/Chest: clear to auscultation bilaterally- no wheezes, rales or rhonchi, normal air movement, no respiratory distress or retractions  Cardiovascular: S1 and S2 auscultated w/ RRR. No murmurs appreciated today. No rubs, clicks, or gallops, distal pulses intact. Abdomen: soft, non-tender, non-distended, bowel sounds physiologic,  no rebound or guarding, no masses or hernias noted. Liver and spleen without enlargement. Extremities: no cyanosis, clubbing or edema of the lower extremities. +2 PT/DP bilaterally. Skin: warm and dry, no rash or erythema  L finger: + TTP PIP, no swelling      ASSESSMENT & PLAN  1. Fibromyalgia    D/c deysi  Get labs ordered last visit  Hold on starting other meds until labs come back  F/u 6 wks    - VASU Screen with Reflex; Future  - C-Reactive Protein; Future  - Cyclic Citrul Peptide Antibody, IgG; Future  - Rheumatoid Factor; Future  - Sedimentation Rate; Future  - Uric Acid; Future    2. RLS (restless legs syndrome)    Change requip to 2mg bid from qhs  Get EMG to r/o neuropathy  F/u 6 wks    3. Bilateral leg pain    As per # 2    - [facility] ([facility]) - Tiffanie Gonzales MD    4.  Trigger little finger of left hand    Refer to Roxana Jones MD,

## 2020-03-11 ENCOUNTER — OFFICE VISIT (OUTPATIENT)
Dept: FAMILY MEDICINE CLINIC | Age: 65
End: 2020-03-11
Payer: MEDICARE

## 2020-03-11 ENCOUNTER — NURSE ONLY (OUTPATIENT)
Dept: LAB | Age: 65
End: 2020-03-11

## 2020-03-11 VITALS
BODY MASS INDEX: 35.61 KG/M2 | SYSTOLIC BLOOD PRESSURE: 130 MMHG | RESPIRATION RATE: 16 BRPM | HEIGHT: 63 IN | HEART RATE: 64 BPM | WEIGHT: 201 LBS | DIASTOLIC BLOOD PRESSURE: 52 MMHG | TEMPERATURE: 98 F

## 2020-03-11 LAB
C-REACTIVE PROTEIN: 0.74 MG/DL (ref 0–1)
RHEUMATOID FACTOR: 10 IU/ML (ref 0–13)
SEDIMENTATION RATE, ERYTHROCYTE: 25 MM/HR (ref 0–20)
URIC ACID: 6.6 MG/DL (ref 2.4–5.7)

## 2020-03-11 PROCEDURE — 3017F COLORECTAL CA SCREEN DOC REV: CPT | Performed by: FAMILY MEDICINE

## 2020-03-11 PROCEDURE — 4040F PNEUMOC VAC/ADMIN/RCVD: CPT | Performed by: FAMILY MEDICINE

## 2020-03-11 PROCEDURE — 1036F TOBACCO NON-USER: CPT | Performed by: FAMILY MEDICINE

## 2020-03-11 PROCEDURE — 1090F PRES/ABSN URINE INCON ASSESS: CPT | Performed by: FAMILY MEDICINE

## 2020-03-11 PROCEDURE — G8400 PT W/DXA NO RESULTS DOC: HCPCS | Performed by: FAMILY MEDICINE

## 2020-03-11 PROCEDURE — 99214 OFFICE O/P EST MOD 30 MIN: CPT | Performed by: FAMILY MEDICINE

## 2020-03-11 PROCEDURE — G8427 DOCREV CUR MEDS BY ELIG CLIN: HCPCS | Performed by: FAMILY MEDICINE

## 2020-03-11 PROCEDURE — 1123F ACP DISCUSS/DSCN MKR DOCD: CPT | Performed by: FAMILY MEDICINE

## 2020-03-11 PROCEDURE — G8417 CALC BMI ABV UP PARAM F/U: HCPCS | Performed by: FAMILY MEDICINE

## 2020-03-11 PROCEDURE — G8482 FLU IMMUNIZE ORDER/ADMIN: HCPCS | Performed by: FAMILY MEDICINE

## 2020-03-11 NOTE — PATIENT INSTRUCTIONS
-Increase your requip to 2 pills twice daily. Patient Education        Fibromyalgia: Care Instructions  Your Care Instructions    Fibromyalgia is a painful condition that is not completely understood by medical experts. The cause of fibromyalgia is not known. It can make you feel tired and ache all over. It causes tender spots at specific points of the body that hurt only when you press on them. You may have trouble sleeping, as well as other symptoms. These problems can upset your work and home life. Symptoms tend to come and go, although they may never go away completely. Fibromyalgia does not harm your muscles, joints, or organs. Follow-up care is a key part of your treatment and safety. Be sure to make and go to all appointments, and call your doctor if you are having problems. It's also a good idea to know your test results and keep a list of the medicines you take. How can you care for yourself at home? · Exercise often. Walk, swim, or bike to help with pain and sleep problems and to make you feel better. · Try to get a good night's sleep. Go to bed and get up at the same time each day, whether you feel rested or not. Make sure you have a good mattress and pillow. · Reduce stress. Avoid things that cause you stress, if you can. If not, work at making them less stressful. Learn to use biofeedback, guided imagery, meditation, or other methods to relax. · Make healthy changes. Eat a balanced diet, quit smoking, and limit alcohol and caffeine. · Use a heating pad set on low or take warm baths or showers for pain. Using cold packs for up to 20 minutes at a time can also relieve pain. Put a thin cloth between the cold pack and your skin. A gentle massage might help too. · Be safe with medicines. Take your medicines exactly as prescribed. Call your doctor if you think you are having a problem with your medicine. Your doctor may talk to you about taking antidepressant medicines.  These medicines may improve sleep, relieve pain, and in some cases treat depression. · Learn about fibromyalgia. This makes coping easier. Then, take an active role in your treatment. · Think about joining a support group with others who have fibromyalgia to learn more and get support. When should you call for help? Watch closely for changes in your health, and be sure to contact your doctor if:    · You feel sad, helpless, or hopeless; lose interest in things you used to enjoy; or have other symptoms of depression.     · Your fibromyalgia symptoms get worse. Where can you learn more? Go to https://[email or web address].[email or web address] org and sign in to your [employer] account. Enter V003 in the Haiku Deck box to learn more about \"Fibromyalgia: Care Instructions. \"     If you do not have an account, please click on the \"Sign Up Now\" link. Current as of: March 28, 2019  Content Version: 12.3  © 1218-1915 [employer]. Care instructions adapted under license by [facility] ([facility]). If you have questions about a medical condition or this instruction, always ask your healthcare professional. [place] [address] any warranty or liability for your use of this information. Patient Education        Colon Polyps: Care Instructions  Your Care Instructions    Colon polyps are growths in the colon or the rectum. The cause of most colon polyps is not known, and most people who get them do not have any problems. But a certain kind can turn into cancer. For this reason, regular testing for colon polyps is important for people as they get older. It is also important for anyone who has an increased risk for colon cancer. Polyps are usually found through routine colon cancer screening tests. Although most colon polyps are not cancerous, they are usually removed and then tested for cancer. Screening for colon cancer saves lives because the cancer can usually be cured if it is caught early.   If you have a polyp that is the type that can turn into cancer, you may need more tests to examine your entire colon. The doctor will remove any other polyps that he or she finds, and you will be tested more often. Follow-up care is a key part of your treatment and safety. Be sure to make and go to all appointments, and call your doctor if you are having problems. It's also a good idea to know your test results and keep a list of the medicines you take. How can you care for yourself at home? Regular exams to look for colon polyps are the best way to prevent polyps from turning into colon cancer. These can include stool tests, sigmoidoscopy, colonoscopy, and CT colonography. Talk with your doctor about a testing schedule that is right for you. To prevent polyps  There is no home treatment that can prevent colon polyps. But these steps may help lower your risk for cancer. · Stay active. Being active can help you get to and stay at a healthy weight. Try to exercise on most days of the week. Walking is a good choice. · Eat well. Choose a variety of vegetables, fruits, legumes (such as peas and beans), fish, poultry, and whole grains. · Do not smoke. If you need help quitting, talk to your doctor about stop-smoking programs and medicines. These can increase your chances of quitting for good. · If you drink alcohol, limit how much you drink. Limit alcohol to 2 drinks a day for men and 1 drink a day for women. When should you call for help? Call your doctor now or seek immediate medical care if:    · You have severe belly pain.     · Your stools are maroon or very bloody.    Watch closely for changes in your health, and be sure to contact your doctor if:    · You have a fever.     · You have nausea or vomiting.     · You have a change in bowel habits (new constipation or diarrhea).     · Your symptoms get worse or are not improving as expected. Where can you learn more? Go to https://didi.[employer]. org and sign in to your

## 2020-03-13 LAB — CYCLIC CITRULLINATED PEPTIDE ANTIBODY IGG: 2.4 U/ML

## 2020-03-14 LAB — ANA SCREEN: NORMAL

## 2020-03-16 ENCOUNTER — TELEPHONE (OUTPATIENT)
Dept: FAMILY MEDICINE CLINIC | Age: 65
End: 2020-03-16

## 2020-03-17 RX ORDER — NORTRIPTYLINE HYDROCHLORIDE 25 MG/1
25 CAPSULE ORAL NIGHTLY
Qty: 30 CAPSULE | Refills: 5 | Status: SHIPPED | OUTPATIENT
Start: 2020-03-17 | End: 2020-06-05 | Stop reason: SDUPTHER

## 2020-03-20 ENCOUNTER — TELEPHONE (OUTPATIENT)
Dept: FAMILY MEDICINE CLINIC | Age: 65
End: 2020-03-20

## 2020-03-23 ENCOUNTER — TELEPHONE (OUTPATIENT)
Dept: FAMILY MEDICINE CLINIC | Age: 65
End: 2020-03-23

## 2020-03-23 NOTE — TELEPHONE ENCOUNTER
Received approval for the appeal of nortriptyline. Left detailed Vm for pharmacy informing them of this.

## 2020-05-27 ENCOUNTER — TELEPHONE (OUTPATIENT)
Dept: FAMILY MEDICINE CLINIC | Age: 65
End: 2020-05-27

## 2020-05-28 ENCOUNTER — PROCEDURE VISIT (OUTPATIENT)
Dept: NEUROLOGY | Age: 65
End: 2020-05-28
Payer: MEDICARE

## 2020-05-28 PROCEDURE — 95886 MUSC TEST DONE W/N TEST COMP: CPT | Performed by: PSYCHIATRY & NEUROLOGY

## 2020-05-28 PROCEDURE — 95910 NRV CNDJ TEST 7-8 STUDIES: CPT | Performed by: PSYCHIATRY & NEUROLOGY

## 2020-06-05 ENCOUNTER — OFFICE VISIT (OUTPATIENT)
Dept: FAMILY MEDICINE CLINIC | Age: 65
End: 2020-06-05
Payer: MEDICARE

## 2020-06-05 VITALS
HEIGHT: 62 IN | DIASTOLIC BLOOD PRESSURE: 60 MMHG | HEART RATE: 54 BPM | OXYGEN SATURATION: 99 % | RESPIRATION RATE: 12 BRPM | BODY MASS INDEX: 36.44 KG/M2 | SYSTOLIC BLOOD PRESSURE: 112 MMHG | WEIGHT: 198 LBS | TEMPERATURE: 98.2 F

## 2020-06-05 PROBLEM — R10.13 EPIGASTRIC ABDOMINAL PAIN: Status: RESOLVED | Noted: 2019-08-13 | Resolved: 2020-06-05

## 2020-06-05 LAB — HBA1C MFR BLD: 6.3 % (ref 4.3–5.7)

## 2020-06-05 PROCEDURE — 3017F COLORECTAL CA SCREEN DOC REV: CPT | Performed by: FAMILY MEDICINE

## 2020-06-05 PROCEDURE — G8926 SPIRO NO PERF OR DOC: HCPCS | Performed by: FAMILY MEDICINE

## 2020-06-05 PROCEDURE — 3023F SPIROM DOC REV: CPT | Performed by: FAMILY MEDICINE

## 2020-06-05 PROCEDURE — G8400 PT W/DXA NO RESULTS DOC: HCPCS | Performed by: FAMILY MEDICINE

## 2020-06-05 PROCEDURE — 4040F PNEUMOC VAC/ADMIN/RCVD: CPT | Performed by: FAMILY MEDICINE

## 2020-06-05 PROCEDURE — 2022F DILAT RTA XM EVC RTNOPTHY: CPT | Performed by: FAMILY MEDICINE

## 2020-06-05 PROCEDURE — G8427 DOCREV CUR MEDS BY ELIG CLIN: HCPCS | Performed by: FAMILY MEDICINE

## 2020-06-05 PROCEDURE — 1090F PRES/ABSN URINE INCON ASSESS: CPT | Performed by: FAMILY MEDICINE

## 2020-06-05 PROCEDURE — G8417 CALC BMI ABV UP PARAM F/U: HCPCS | Performed by: FAMILY MEDICINE

## 2020-06-05 PROCEDURE — 1123F ACP DISCUSS/DSCN MKR DOCD: CPT | Performed by: FAMILY MEDICINE

## 2020-06-05 PROCEDURE — 1036F TOBACCO NON-USER: CPT | Performed by: FAMILY MEDICINE

## 2020-06-05 PROCEDURE — 99214 OFFICE O/P EST MOD 30 MIN: CPT | Performed by: FAMILY MEDICINE

## 2020-06-05 PROCEDURE — 3044F HG A1C LEVEL LT 7.0%: CPT | Performed by: FAMILY MEDICINE

## 2020-06-05 RX ORDER — ROPINIROLE 2 MG/1
2 TABLET, FILM COATED ORAL 2 TIMES DAILY
Qty: 60 TABLET | Refills: 2 | Status: SHIPPED | OUTPATIENT
Start: 2020-06-05 | End: 2020-11-02 | Stop reason: SDUPTHER

## 2020-06-05 RX ORDER — NORTRIPTYLINE HYDROCHLORIDE 25 MG/1
25 CAPSULE ORAL NIGHTLY
Qty: 30 CAPSULE | Refills: 5 | Status: SHIPPED | OUTPATIENT
Start: 2020-06-05 | End: 2020-12-07 | Stop reason: SDUPTHER

## 2020-06-05 NOTE — PROGRESS NOTES
UTD PPV 23 in OCT 2017  Zoster - to get at pharmacy per medicare rules     Breast Cancer Screening - NEG JAN 2020  Cervical Cancer Screening - consult for GYN placed; has apt soon/plans to reschedule soon (AUG 2019) and JAN 2020  Osteoporosis Screening - due, ordered    Diabetes Health Maintenance    A1C -   Lab Results   Component Value Date    LABA1C 6.3 06/05/2020    LABA1C 6.5 12/30/2019    LABA1C 6.1 08/20/2019    LABA1C 6.4 02/21/2019    LABA1C 5.7 07/12/2018     ACE/ARB - yes, lisinopril 5mg  Eye - next visit  Foot -  WNL JAN 2020  ASA - yes  Microal/Cr -   Lab Results   Component Value Date    LABMICR < 1.20 12/30/2019    LABCREA 202.4 12/30/2019    MACRR 6 12/30/2019     No results found for: CREATINEUR, MICROALBUR, MALBCR      eGFR -   No results found for: GFRESTIMATE  Lab Results   Component Value Date    LABGLOM 72 08/30/2019     No results found for: CRCLEARANCE  No results found for: EGFRNONAA  No results found for: EGFRAA    Statin - yes, lipitor      Current Outpatient Medications   Medication Sig Dispense Refill    nortriptyline (PAMELOR) 25 MG capsule Take 1 capsule by mouth nightly 30 capsule 5    rOPINIRole (REQUIP) 2 MG tablet Take 1 tablet by mouth 2 times daily 60 tablet 2    metoprolol tartrate (LOPRESSOR) 25 MG tablet TAKE 1/2 TABLET TWICE DAILY 90 tablet 8    atorvastatin (LIPITOR) 10 MG tablet Take 1 tablet by mouth every evening 90 tablet 3    citalopram (CELEXA) 20 MG tablet TAKE 1 TABLET EVERY DAY 90 tablet 3    potassium chloride (KLOR-CON M) 20 MEQ extended release tablet TAKE 1 TABLET EVERY DAY 90 tablet 3    omeprazole (PRILOSEC) 20 MG delayed release capsule Take 1 capsule by mouth every morning (before breakfast) 90 capsule 3    furosemide (LASIX) 20 MG tablet TAKE 1 TABLET EVERY DAY 90 tablet 3    lisinopril (PRINIVIL;ZESTRIL) 5 MG tablet TAKE 1 TABLET TWICE DAILY 180 tablet 8    aspirin 81 MG tablet Take 81 mg by mouth daily      acetaminophen (TYLENOL) 325 MG tablet

## 2020-07-09 ENCOUNTER — TELEPHONE (OUTPATIENT)
Dept: FAMILY MEDICINE CLINIC | Age: 65
End: 2020-07-09

## 2020-07-09 NOTE — TELEPHONE ENCOUNTER
Please call pt and:    -See how fibro doing with starting nortriptyline  -See how RLS doing with 2mg bid of requip    Let me know, thanks!

## 2020-07-09 NOTE — TELEPHONE ENCOUNTER
Hmm, have her old the requip for 1 wk and see if notices a change there  Let me know in 1 wk  con't rest of meds  Let me know if questions, thanks!     Future Appointments   Date Time Provider Hola Carrion   11/30/2020  9:30 AM Jerome Brandon MD [facility]   12/7/2020  9:40 AM Cory Dixon DO [address] [place] [address]

## 2020-07-20 ENCOUNTER — TELEPHONE (OUTPATIENT)
Dept: FAMILY MEDICINE CLINIC | Age: 65
End: 2020-07-20

## 2020-07-20 RX ORDER — PREDNISONE 10 MG/1
TABLET ORAL
Qty: 30 TABLET | Refills: 0 | Status: SHIPPED | OUTPATIENT
Start: 2020-07-20 | End: 2020-07-21 | Stop reason: SDUPTHER

## 2020-07-20 NOTE — TELEPHONE ENCOUNTER
Pt called & left a VM stating she was suppose to call back re:a med she could not remember the name of. Pt stated the med was for legs & she had gone off the med but had to go back on it due to her legs \"aching real bad. \"  Pt then stated she's been having a lot of pain in her shoulder. (pt did not say which shoulder) Pt states her daughter in law is an [facility] therapist who looked at the pt's shoulder & told her there seemed to be quite a bit of inflammation. Pt's daughter in law told the pt to ask her PCP about a med pack. Please advise.

## 2020-07-20 NOTE — TELEPHONE ENCOUNTER
Patient has been informed and voiced understanding  Requip is exactly what the patient was referring to and states that she tried to go without it and quickly found out that she needs it.

## 2020-07-21 RX ORDER — PREDNISONE 10 MG/1
TABLET ORAL
Qty: 30 TABLET | Refills: 0 | Status: SHIPPED | OUTPATIENT
Start: 2020-07-21 | End: 2020-08-06

## 2020-08-11 ENCOUNTER — TELEPHONE (OUTPATIENT)
Dept: FAMILY MEDICINE CLINIC | Age: 65
End: 2020-08-11

## 2020-08-11 NOTE — TELEPHONE ENCOUNTER
Patient is calling in because she is having another bout of vertigo. Last year she said Dr Colt Smith referred her to therapy at the  in [place] and that helped her. She is asking if she can just a referral or does she need an appt first? Please advise.

## 2020-08-11 NOTE — TELEPHONE ENCOUNTER
Diagnosis Orders   1.  Benign paroxysmal positional vertigo, unspecified laterality  [facility] - [facility]

## 2020-08-11 NOTE — TELEPHONE ENCOUNTER
Pt states the vertigo started yesterday, she gets dizzy when she bends over or when see turns to the side, either side but mostly the right side. She gets dizzy then nauseated  She went to the [facility] last time this happened and it helped her.

## 2020-08-14 ENCOUNTER — HOSPITAL ENCOUNTER (OUTPATIENT)
Dept: PHYSICAL THERAPY | Age: 65
Setting detail: THERAPIES SERIES
Discharge: HOME | End: 2020-08-14
Payer: MEDICARE

## 2020-08-14 PROCEDURE — 97161 PT EVAL LOW COMPLEX 20 MIN: CPT

## 2020-08-14 PROCEDURE — 95992 CANALITH REPOSITIONING PROC: CPT

## 2020-08-14 NOTE — FLOWSHEET NOTE
** PLEASE SIGN, DATE AND TIME CERTIFICATION BELOW AND RETURN TO [facility] OUTPATIENT REHABILITATION (FAX #: [phone]). ATTEST/CO-SIGN IF ACCESSING VIA IN5 CUPS and some sugar. THANK YOU.**    I certify that I have examined the patient below and determined that Physical Medicine and Rehabilitation service is necessary and that I approve the established plan of care for up to 90 days or as specifically noted. Attestation, signature or co-signature of physician indicates approval of certification requirements.    ________________________ ____________ __________  Physician Signature   Date   Time  Kae Gloria 59  PHYSICAL THERAPY  [x] VESTIBULAR EVALUATION  [] DAILY NOTE [] PROGRESS NOTE [] DISCHARGE NOTE    [x] OUTPATIENT REHABILITATION [facility]   [] Kyleigh 90    [] [facility]   [] Da December    Date: 2020  Patient Name:  Anshu Johnson  : 1955  MRN: [id number]    Referring Practitioner Priscilla Calles DO   Diagnosis Benign paroxysmal vertigo, unspecified ear [H81.10]    Treatment Diagnosis BPPV + right [place]   Date of Evaluation 20   Additional Pertinent History OA, HTN, , , valvular heart disease, mitral valve repair aortic valve with valve replacement 2x 2015, 2018, fibromyalgia      Functional Outcome Measure Used Dizziness Handicap Inventory   Functional Outcome Score    72/100 (20)       Insurance: Primary: Payor: [facility] /  /  / ,   Secondary:    Authorization Information: $40 copay per visit, Precertification required after evaluation, CPT codes 59377 and 41357 require no precert. All other codes require precert. Aquatic yes, modalities yes iontophoresis not covered. telehealth not covered. Visit # 1, 1/10 for progress note   Visits Allowed:    Recertification Date:  10/9/2020   Physician Follow-Up: Follow up as needed.     Physician Orders: BPPV eval/treat   History of Present Illness: Noted vertigo began  night with spinning. 5 day history. Treated in past for BPPV 1 year ago. SUBJECTIVE: Patient reports of onset of vertigo 5 days ago when she rolled in bed she felt immediate whirling/vertigo. Vertigo does subside then. Note with bending forward, rolling in bed, sit<>stand, looking down. Patient has been treated in the past for BPPV similar symptoms about 1 year ago. Not on any Antivert medication for the dizziness. Social/Functional History and Current Status:  Medications and Allergies have been reviewed and are listed on Medical History Questionnaire. Alec Portillo lives with spouse in a single story home with stairs and no handrail to enter. Task Previous Current   ADLs  Independent Independent   Ambulation Independent Independent   Transfers Independent Independent   Recreation Dependent/Unable Dependent/Unable   [employer] Integration Not Applicable Not Applicable   Driving Active  Drives with self-imposed restrictions   Work Retired Retired   OBJECTIVE:  Precautions:  Vertigo    Pain 0/10    Neck ROM  neck rotation 65-70 degrees [address] for testing. Vertebral Artery Testing Normal testing r/l   Visual Field Normal testing   Oculomotor/VOR Normal testing   Balance n/a   Gait Normal gait pattern walking in and out of clinic. Holds neck/head in neutral with decreased scanning to right/lft and limits looking down. Special Testing BPPV Jorge L Hallpike right mild symptoms of vertigo but no nystagmus noted. Left Jorge L Hallpike negative non symptomatic. Roll Test negative non symptomatic.      BP  Seated: 145/60  Standin/65      TREATMENT   Precautions: Vertigo with position changes rolling to right, looking down   Pain:     X in shaded column indicates activity completed today   Modalities Parameters/  Location  Notes                     Manual Therapy Time/Technique  Notes                     Exercise/Intervention Sets/Sec  Notes   Canalith Repositioning:        + right Brilig- Treated with Epley Maneuver    x                         Home program reviewed: Educated on positions to avoid vertical head movements looking up/down, Lay in semireclined position at 45 degrees for bedtime. 24 hours with these restrictions. Tomorrow AM may resume activities as tolerated. x                                         Specific Interventions Next Treatment: Reassessment. Canalith repositioning as needed. Activity/Treatment Tolerance:Good tolerance of session. Mild lightheadedness but no vertigo symptoms with supine to sit end of treatment Symptoms resolved. . [x]  Patient tolerated treatment well  []  Patient limited by fatigue  []  Patient limited by pain   []  Patient limited by medical complications  []  Other:     Assessment: Positive right [employer]. Treated with Epley Maneuver. Will reassess in 1 week. Body Structures/Functions/Activity Limitations:vertigo BPPV  Prognosis: good    GOALS:  Patient Goal: To have no symptoms of vertigo with rolling in bed, looking down, bending forward. Short Term Goals:  Time Frame: 4 weeks  1. Patient to demonstrate bed mobility with rolling, supine to sit without symptoms of vertigo. 2. Patient to demonstrate negative testing for right [employer] with improved functional mobility with household tasks, gardening. 3. Dizziness Handicap [employer] 72/100 to 20/100    Long Term Goals:  Time Frame: 8 weeks  1. Dizziness Handicap Inventory: 20/100 to 0/100    Patient Education:   []  HEP/Education Completed: Plan of Care, Goals,    Medbridge Access Code:  []  No new Education completed  []  Reviewed Prior HEP      []  Patient verbalized and/or demonstrated understanding of education provided. []  Patient unable to verbalize and/or demonstrate understanding of education provided. Will continue education. [x]  Barriers to learning: none    PLAN:  Treatment Recommendations: Vestibular Rehabilitation    [x]  Plan of care initiated.   Plan to see patient 1-2 times per week for 8 weeks to address the treatment planned outlined above. []  Continue with current plan of care  []  Modify plan of care as follows:    []  Hold pending physician visit  []  Discharge    Time In 0905   Time Out 0944       Timed Code Minutes: Min Units   ADL (53932)     Aquatics (21513)     Gait (90850)     Manual Therapy (74493)     Massage (80091)     Neuro (77733) 10 1   Th.  Activities (54138)     Th. Exercise (07157)     Ultrasound (20658)     Ionto (01929)     Manual E-Stim (05175)          TOTAL SESSION TIME: 39 min       Electronically Signed by: Paulino Angel

## 2020-08-17 RX ORDER — FUROSEMIDE 20 MG/1
TABLET ORAL
Qty: 90 TABLET | Refills: 3 | Status: SHIPPED | OUTPATIENT
Start: 2020-08-17 | End: 2021-07-12

## 2020-08-17 RX ORDER — LISINOPRIL 5 MG/1
TABLET ORAL
Qty: 180 TABLET | Refills: 0 | Status: SHIPPED | OUTPATIENT
Start: 2020-08-17 | End: 2020-11-02

## 2020-08-17 RX ORDER — OMEPRAZOLE 20 MG/1
20 CAPSULE, DELAYED RELEASE ORAL
Qty: 90 CAPSULE | Refills: 3 | Status: SHIPPED | OUTPATIENT
Start: 2020-08-17 | End: 2021-07-12

## 2020-09-01 NOTE — DISCHARGE SUMMARY
[facility]    Patient Name: Yehuda Ayala        CSN: [id number]   YOB: 1955  Gender: female  Nikky Diazr, DO,    Benign paroxysmal vertigo, unspecified ear [H81.10] ,      Patient is discharged from Physical Therapy services at this time. See last note for details related to results of therapy and goal achievement. Reason for discharge: Patient did not show for follow up appointment in PT following evaluation and treatment for BPPV. Contacted patient and voice message was left. No further appointments were scheduled. Patient did not return call back. Will discharge at this time. If further therapy indicated will require new orders.     Discharge note completed on 9/1/2020      Marcela Blanco, [address]

## 2020-09-17 ENCOUNTER — OFFICE VISIT (OUTPATIENT)
Dept: CARDIOLOGY CLINIC | Age: 65
End: 2020-09-17
Payer: MEDICARE

## 2020-09-17 VITALS
WEIGHT: 194.8 LBS | SYSTOLIC BLOOD PRESSURE: 130 MMHG | HEART RATE: 50 BPM | BODY MASS INDEX: 34.52 KG/M2 | HEIGHT: 63 IN | DIASTOLIC BLOOD PRESSURE: 62 MMHG

## 2020-09-17 PROCEDURE — G8400 PT W/DXA NO RESULTS DOC: HCPCS | Performed by: NURSE PRACTITIONER

## 2020-09-17 PROCEDURE — 3023F SPIROM DOC REV: CPT | Performed by: NURSE PRACTITIONER

## 2020-09-17 PROCEDURE — 99213 OFFICE O/P EST LOW 20 MIN: CPT | Performed by: NURSE PRACTITIONER

## 2020-09-17 PROCEDURE — 1036F TOBACCO NON-USER: CPT | Performed by: NURSE PRACTITIONER

## 2020-09-17 PROCEDURE — 1123F ACP DISCUSS/DSCN MKR DOCD: CPT | Performed by: NURSE PRACTITIONER

## 2020-09-17 PROCEDURE — G8427 DOCREV CUR MEDS BY ELIG CLIN: HCPCS | Performed by: NURSE PRACTITIONER

## 2020-09-17 PROCEDURE — G8926 SPIRO NO PERF OR DOC: HCPCS | Performed by: NURSE PRACTITIONER

## 2020-09-17 PROCEDURE — 4040F PNEUMOC VAC/ADMIN/RCVD: CPT | Performed by: NURSE PRACTITIONER

## 2020-09-17 PROCEDURE — G8417 CALC BMI ABV UP PARAM F/U: HCPCS | Performed by: NURSE PRACTITIONER

## 2020-09-17 PROCEDURE — 3017F COLORECTAL CA SCREEN DOC REV: CPT | Performed by: NURSE PRACTITIONER

## 2020-09-17 PROCEDURE — 1090F PRES/ABSN URINE INCON ASSESS: CPT | Performed by: NURSE PRACTITIONER

## 2020-09-17 PROCEDURE — 93000 ELECTROCARDIOGRAM COMPLETE: CPT | Performed by: NURSE PRACTITIONER

## 2020-09-17 NOTE — PROGRESS NOTES
[facility]  [facility]   [address] [facility]   [address] [facility] [address]   Dept: [phone]   Dept Fax: [phone]   Loc: [phone]      Chief Complaint   Patient presents with    Follow-up    Shortness of Breath    Chest Pain    Fatigue     F/u office visit for intermittent sob, intermittent right sideded chest pain, some intermittent dizziness and lightheadedness, and more fatigue in 72 y/o female with history of COPD, HLD, s/p AVR with Redo AVR, MV repair, TV repair, HTN, obesity, GERD. Chest pain is sharp, 5/10, lasts up to about 25 min per episode, has been at least weekly for 3 weeks, no aggravating or alleviating factors, occurs at rest and with exertion, no radiation, some nausea. Denies palpitations, MT, lightheadedness, dizziness or syncope.      Cardiologist:  Dr. Ele Hernández:   No fever, no chills, + increasing fatigue, no weight loss  Pulmonary:    + intermittent dyspnea, no wheezing  Cardiac:    + intermittent right sided chest pain without radiation  GI:     No nausea or vomiting, no abdominal pain  Neuro:    No dizziness or light headedness  Musculoskeletal:  No recent active issues  Extremities:   No edema, good peripheral pulses      Past Medical History:   Diagnosis Date    COPD (chronic obstructive [facility] disease) ([facility]     DM2 (diabetes mellitus, type 2) ([facility]     Dyslipidemia     Fibromyalgia     Former smoker     GERD (gastroesophageal reflux disease)     H/O prosthetic aortic valve replacement     Early bioprosthetic aortic valve failure with severe symptomatic prosthetic aortic regurgitation is now s/p REDO AORTIC VALVE REPLACEMENT, MITRAL VALVE REPAIR, TRICUSPID VALVE REPAIR, WILBERTO performed by Bartolo Braden MD at [facility] with preserved ejection fraction ([facility]     History of aortic stenosis, s/p valve replacement x 2     History of iron deficiency anemia     History of subarachnoid hemorrhage 03/23/2015    Spontaneous SAH. Admitted to [facility]. Extensive w/u for cause was negative.  Hypertension     Major depression     Morbid obesity ([address]     THAYER (nonalcoholic steatohepatitis)     Osteoarthritis     RLS (restless legs syndrome) 1/16/2019    S/P AVR (aortic valve replacement) 2015    x 2, last replacement July 2018 d/t failure of the 1st    S/P mitral valve repair 07/13/2018    Dr. Kathy Enamorado Valvular heart disease 1/16/2019       Allergies   Allergen Reactions    Latex Rash    Demerol Hcl [Meperidine] Hives       Current Outpatient Medications   Medication Sig Dispense Refill    lisinopril (PRINIVIL;ZESTRIL) 5 MG tablet TAKE 1 TABLET TWICE DAILY 180 tablet 0    omeprazole (PRILOSEC) 20 MG delayed release capsule TAKE 1 CAPSULE BY MOUTH EVERY MORNING (BEFORE BREAKFAST) 90 capsule 3    furosemide (LASIX) 20 MG tablet TAKE 1 TABLET EVERY DAY 90 tablet 3    nortriptyline (PAMELOR) 25 MG capsule Take 1 capsule by mouth nightly 30 capsule 5    rOPINIRole (REQUIP) 2 MG tablet Take 1 tablet by mouth 2 times daily 60 tablet 2    metoprolol tartrate (LOPRESSOR) 25 MG tablet TAKE 1/2 TABLET TWICE DAILY 90 tablet 8    atorvastatin (LIPITOR) 10 MG tablet Take 1 tablet by mouth every evening 90 tablet 3    citalopram (CELEXA) 20 MG tablet TAKE 1 TABLET EVERY DAY 90 tablet 3    potassium chloride (KLOR-CON M) 20 MEQ extended release tablet TAKE 1 TABLET EVERY DAY 90 tablet 3    aspirin 81 MG tablet Take 81 mg by mouth daily      acetaminophen (TYLENOL) 325 MG tablet Take 2 tablets by mouth every 4 hours as needed (post op pain) 120 tablet 3     No current facility-administered medications for this visit.         Social History     Socioeconomic History    Marital status:      Spouse name: Severo Rhody Number of children: 2    Years of education: None    Highest education level: None   Occupational History    None   Social Needs    Financial resource strain: None    Food insecurity Worry: None     Inability: None    Transportation needs     Medical: None     Non-medical: None   Tobacco Use    Smoking status: Former Smoker     Packs/day: 0.50     Years: 44.00     Pack years: 22.00     Types: Cigarettes     Last attempt to quit: 2018     Years since quittin.2    Smokeless tobacco: Former User   Substance and Sexual Activity    Alcohol use: No    Drug use: No    Sexual activity: Not Currently   Lifestyle    Physical activity     Days per week: None     Minutes per session: None    Stress: None   Relationships    Social connections     Talks on phone: None     Gets together: None     Attends Islam service: None     Active member of club or organization: None     Attends meetings of clubs or organizations: None     Relationship status: None    Intimate partner violence     Fear of current or ex partner: None     Emotionally abused: None     Physically abused: None     Forced sexual activity: None   Other Topics Concern    None   Social History Narrative    None       Family History   Problem Relation Age of Onset    Heart Attack Mother     Heart Disease Mother     Heart Surgery Mother     Hypertension Mother     High Blood Pressure Mother     Diabetes Mother     Diabetes Father     Breast Cancer Maternal Grandmother         Unsure age   Isamar Cleaning Colon Cancer Neg Hx        Blood pressure 130/62, pulse 50, height 5' 2.5\" (1.588 m), weight 194 lb 12.8 oz (88.4 kg). General:   Well developed, well nourished  Lungs:   Clear to auscultation  Heart:    Normal S1 S2, No murmur, rubs, or gallops  Abdomen:   Soft, non tender, no organomegalies, positive bowel sounds  Extremities:   No edema, no cyanosis, good peripheral pulses  Neurological:   Awake, alert, oriented.  No obvious focal deficits  Musculoskeletal:  No obvious deformities    EKG: SB, no acute abnormalities, negative precordial TW as per prior EKG's    Echo: 12/3/19  Summary   Ejection fraction is visually estimated at 60%.   Overall left ventricular function is normal.   The aortic valve leaflets were not well visualized. Normally functioning bioprosthetic valve in aortic position. The mitral valve was not well visualized . possible mitral valve ring repair   no stenosis   minimal MR      Signature      ----------------------------------------------------------------   Electronically signed by Dai Loomis MD     [facility]: 3/24/18  FINDINGS:  Normal left and right coronary arteries  Tono Nix MD   Diagnosis Orders   1. Chest pain, unspecified type  EKG 12 Lead    Stress test, lexiscan   2. SOB (shortness of breath)  EKG 12 Lead    Stress test, lexiscan   3. Fatigue, unspecified type  EKG 12 Lead    Stress test, lexiscan   4. Chronic obstructive pulmonary disease, unspecified COPD type ([address]     5. Essential hypertension     6. Familial hypercholesterolemia     7. History of aortic stenosis, s/p valve replacement x 2     8. S/P mitral valve repair     9. Morbid obesity ([address]         Orders Placed This Encounter   Procedures    Stress test, lexiscan     Standing Status:   Future     Standing Expiration Date:   9/17/2021     Order Specific Question:   Reason for Exam?     Answer:   Chest pain    EKG 12 Lead     Order Specific Question:   Reason for Exam?     Answer:   Chest pain   Intermittent sob, chest pain, increased fatigue, no fluid overload  Echo 12/13/19 with EF 60, normally functioning bioprosthetic aortic valve, minimal MR  Will check stress test for further evaluation  On asa, statin, BB    The patient has been educated on symptoms of heart disease that include chest pain, passing out, dizziness, etc. And to report them if there is any change or go to the emergency room.     Discussed use, benefit, and side effects of prescribed medications. All patient questions answered. Pt voiced understanding. Instructed to continue current medications, diet and exercise.  Continue risk factor modification and medical management. Patient agreed with treatment plan. Follow up as directed.     Continue Dr Smith Hinders current treatment plan  Follow up with Dr Andra Pereira as scheduled or sooner if needed

## 2020-10-05 ENCOUNTER — HOSPITAL ENCOUNTER (OUTPATIENT)
Dept: NON INVASIVE DIAGNOSTICS | Age: 65
Discharge: HOME | End: 2020-10-05
Payer: MEDICARE

## 2020-10-05 PROCEDURE — 6360000002 HC RX W HCPCS

## 2020-10-05 PROCEDURE — 93017 CV STRESS TEST TRACING ONLY: CPT | Performed by: NUCLEAR MEDICINE

## 2020-10-05 PROCEDURE — 78452 HT MUSCLE IMAGE SPECT MULT: CPT

## 2020-10-05 PROCEDURE — 3430000000 HC RX DIAGNOSTIC RADIOPHARMACEUTICAL: Performed by: NURSE PRACTITIONER

## 2020-10-05 PROCEDURE — A9500 TC99M SESTAMIBI: HCPCS | Performed by: NURSE PRACTITIONER

## 2020-10-05 RX ADMIN — Medication 35 MILLICURIE: at 10:50

## 2020-10-05 RX ADMIN — Medication 11 MILLICURIE: at 10:00

## 2020-11-02 RX ORDER — ROPINIROLE 2 MG/1
2 TABLET, FILM COATED ORAL 2 TIMES DAILY
Qty: 180 TABLET | Refills: 3 | Status: SHIPPED | OUTPATIENT
Start: 2020-11-02 | End: 2021-08-02

## 2020-11-02 RX ORDER — LISINOPRIL 5 MG/1
TABLET ORAL
Qty: 180 TABLET | Refills: 0 | Status: SHIPPED | OUTPATIENT
Start: 2020-11-02 | End: 2021-08-02

## 2020-11-06 ENCOUNTER — TELEPHONE (OUTPATIENT)
Dept: FAMILY MEDICINE CLINIC | Age: 65
End: 2020-11-06

## 2020-11-06 NOTE — TELEPHONE ENCOUNTER
Pt due for fasting labs prior to next apt on 12/7/2020. Please call to have pt complete this. Thanks! ASSESSMENT & PLAN   Diagnosis Orders   1. Type 2 diabetes mellitus without complication, without long-term current use of insulin (HCC)  CBC Auto Differential    Comprehensive Metabolic Panel    Lipid Panel    Microalbumin / Creatinine Urine Ratio    TSH with Reflex   2.  Dyslipidemia  CBC Auto Differential    Comprehensive Metabolic Panel    Lipid Panel    Microalbumin / Creatinine Urine Ratio    TSH with Reflex     Future Appointments   Date Time Provider [facility] [place]   11/30/2020  9:30 AM Cordelia Mcgowan MD [address] [facility] [place] [address]   12/7/2020  9:40 AM Mailk Leahy, DO [address] [facility]

## 2020-12-02 ENCOUNTER — NURSE ONLY (OUTPATIENT)
Dept: LAB | Age: 65
End: 2020-12-02

## 2020-12-02 LAB
ALBUMIN SERPL-MCNC: 4 G/DL (ref 3.5–5.1)
ALP BLD-CCNC: 51 U/L (ref 38–126)
ALT SERPL-CCNC: 26 U/L (ref 11–66)
ANION GAP SERPL CALCULATED.3IONS-SCNC: 14 MEQ/L (ref 8–16)
AST SERPL-CCNC: 38 U/L (ref 5–40)
BASOPHILS # BLD: 0.5 %
BASOPHILS ABSOLUTE: 0 THOU/MM3 (ref 0–0.1)
BILIRUB SERPL-MCNC: 0.4 MG/DL (ref 0.3–1.2)
BUN BLDV-MCNC: 13 MG/DL (ref 7–22)
CALCIUM SERPL-MCNC: 9.6 MG/DL (ref 8.5–10.5)
CHLORIDE BLD-SCNC: 104 MEQ/L (ref 98–111)
CHOLESTEROL, TOTAL: 181 MG/DL (ref 100–199)
CO2: 24 MEQ/L (ref 23–33)
CREAT SERPL-MCNC: 0.9 MG/DL (ref 0.4–1.2)
CREATININE, URINE: 284.9 MG/DL
EOSINOPHIL # BLD: 2.7 %
EOSINOPHILS ABSOLUTE: 0.2 THOU/MM3 (ref 0–0.4)
ERYTHROCYTE [DISTWIDTH] IN BLOOD BY AUTOMATED COUNT: 13.2 % (ref 11.5–14.5)
ERYTHROCYTE [DISTWIDTH] IN BLOOD BY AUTOMATED COUNT: 42.8 FL (ref 35–45)
GFR SERPL CREATININE-BSD FRML MDRD: 63 ML/MIN/1.73M2
GLUCOSE BLD-MCNC: 120 MG/DL (ref 70–108)
HCT VFR BLD CALC: 37.3 % (ref 37–47)
HDLC SERPL-MCNC: 43 MG/DL
HEMOGLOBIN: 11.8 GM/DL (ref 12–16)
IMMATURE GRANS (ABS): 0.03 THOU/MM3 (ref 0–0.07)
IMMATURE GRANULOCYTES: 0.4 %
LDL CHOLESTEROL CALCULATED: 102 MG/DL
LYMPHOCYTES # BLD: 32 %
LYMPHOCYTES ABSOLUTE: 2.6 THOU/MM3 (ref 1–4.8)
MCH RBC QN AUTO: 28.3 PG (ref 26–33)
MCHC RBC AUTO-ENTMCNC: 31.6 GM/DL (ref 32.2–35.5)
MCV RBC AUTO: 89.4 FL (ref 81–99)
MICROALBUMIN UR-MCNC: 1.29 MG/DL
MICROALBUMIN/CREAT UR-RTO: 5 MG/G (ref 0–30)
MONOCYTES # BLD: 7.8 %
MONOCYTES ABSOLUTE: 0.6 THOU/MM3 (ref 0.4–1.3)
NUCLEATED RED BLOOD CELLS: 0 /100 WBC
PLATELET # BLD: 194 THOU/MM3 (ref 130–400)
PMV BLD AUTO: 11.6 FL (ref 9.4–12.4)
POTASSIUM SERPL-SCNC: 4 MEQ/L (ref 3.5–5.2)
RBC # BLD: 4.17 MILL/MM3 (ref 4.2–5.4)
SEG NEUTROPHILS: 56.6 %
SEGMENTED NEUTROPHILS ABSOLUTE COUNT: 4.5 THOU/MM3 (ref 1.8–7.7)
SODIUM BLD-SCNC: 142 MEQ/L (ref 135–145)
TOTAL PROTEIN: 7.1 G/DL (ref 6.1–8)
TRIGL SERPL-MCNC: 178 MG/DL (ref 0–199)
TSH SERPL DL<=0.05 MIU/L-ACNC: 1.1 UIU/ML (ref 0.4–4.2)
WBC # BLD: 8 THOU/MM3 (ref 4.8–10.8)

## 2020-12-03 ENCOUNTER — TELEPHONE (OUTPATIENT)
Dept: FAMILY MEDICINE CLINIC | Age: 65
End: 2020-12-03

## 2020-12-03 NOTE — TELEPHONE ENCOUNTER
----- Message from Rennie Rinne, DO sent at 12/2/2020  7:23 PM EST -----  Please let pt know that labs are stable and appropriate. Let me know if questions, thanks!

## 2020-12-06 NOTE — PROGRESS NOTES
Chief Complaint   Patient presents with    Medicare AWV    6 Month Follow-Up     Chronic issues as noted below       History obtained from the patient. SUBJECTIVE:  Andie Reyes is a 76 y.o. female that presents today for      -DM2:  New dx about a year ago  Working on life style changes now  Working on wt loss  a1c down to 5.8    Lab Results   Component Value Date    LABA1C 5.8 12/07/2020    LABA1C 6.3 06/05/2020    LABA1C 6.5 12/30/2019    LABA1C 6.1 08/20/2019    LABA1C 6.4 02/21/2019    LABA1C 5.7 07/12/2018         -Body aches PRIOR VISIT:  Hx of fibromyalgia  Did not tell me  Worse the last month  Used to be on deysi, not sure dose  Took for a few months, didn't help so stopped  Pain in arms, legs, hurts to touch  No rash  No fevers, chills or night sweats  No fam hx of connective tissue d/o or RA  No triggers that she can recall  Depression stable, celexa working well    UPDATE PRIOR VISIT:   sxs no better  Deysi doesn't help, but makes her tired  Labs ordered last visit, didn't do them yet     UPDATE LAST VISIT:   No change in above  Was written for pamelor  However, she did not pick it up d/t insurance issues  It was approved, pharmacy did not f/u with pt     UPDATE TODAY:   Fibro sxs doing better  On Pamelor, working well for her  No side effects. -RLS PRIOR VISIT: pt c/o legs feeling restless at night for the last year, trouble sleeping d/t it. Pain in legs at night, moves legs and better. Never seen or treated before. UPDATE PRIOR VISIT: requip helping, works well, good dose. UPDATE PRIOR VISIT:   RLS sxs worse  Now during the day as well  Legs hurt, when moves, sxs improve  L leg seems to be worse than R  sxs started to get worse a wk or 2 wks ago  Nothing changed 2 wks ago that she can recall.    No back or groin pain   No new rashes    UPDATE PRIOR VISIT:   A bit worse  On 0.75 of requip, would like to try higher dose    UPDATE PRIOR VISIT:   No better with inc of requip to records pending  Influenza Vaccine - UTD DEC 2020  Pneumonia Vaccine - UTD PPV 23 in OCT 2017  Zoster - to get at pharmacy per medicare rules     Breast Cancer Screening - NEG JAN 2020  Cervical Cancer Screening - consult for GYN placed; has apt soon/plans to reschedule soon (AUG 2019) and JAN 2020  Osteoporosis Screening - due, ordered    Diabetes Health Maintenance    A1C -   Lab Results   Component Value Date    LABA1C 5.8 12/07/2020    LABA1C 6.3 06/05/2020    LABA1C 6.5 12/30/2019    LABA1C 6.1 08/20/2019    LABA1C 6.4 02/21/2019    LABA1C 5.7 07/12/2018     ACE/ARB - yes, lisinopril 5mg  Eye - next visit  Foot -  UTD DEC 2020  ASA - yes  Microal/Cr -   Lab Results   Component Value Date    LABMICR 1.29 12/02/2020    LABCREA 284.9 12/02/2020    MACRR 5 12/02/2020     No results found for: CREATINEUR, MICROALBUR, MALBCR      eGFR -   No results found for: GFRESTIMATE  Lab Results   Component Value Date    LABGLOM 63 12/02/2020     No results found for: CRCLEARANCE  No results found for: EGFRNONAA  No results found for: EGFRAA    Statin - yes, lipitor      Current Outpatient Medications   Medication Sig Dispense Refill    rOPINIRole (REQUIP) 2 MG tablet Take 1 tablet by mouth 2 times daily 180 tablet 3    lisinopril (PRINIVIL;ZESTRIL) 5 MG tablet TAKE 1 TABLET TWICE DAILY 180 tablet 0    omeprazole (PRILOSEC) 20 MG delayed release capsule TAKE 1 CAPSULE BY MOUTH EVERY MORNING (BEFORE BREAKFAST) 90 capsule 3    furosemide (LASIX) 20 MG tablet TAKE 1 TABLET EVERY DAY 90 tablet 3    nortriptyline (PAMELOR) 25 MG capsule Take 1 capsule by mouth nightly 30 capsule 5    metoprolol tartrate (LOPRESSOR) 25 MG tablet TAKE 1/2 TABLET TWICE DAILY 90 tablet 8    atorvastatin (LIPITOR) 10 MG tablet Take 1 tablet by mouth every evening 90 tablet 3    citalopram (CELEXA) 20 MG tablet TAKE 1 TABLET EVERY DAY 90 tablet 3    potassium chloride (KLOR-CON M) 20 MEQ extended release tablet TAKE 1 TABLET EVERY DAY 90 tablet 3  aspirin 81 MG tablet Take 81 mg by mouth daily      acetaminophen (TYLENOL) 325 MG tablet Take 2 tablets by mouth every 4 hours as needed (post op pain) 120 tablet 3     No current facility-administered medications for this visit. No orders of the defined types were placed in this encounter. All medications reviewed and reconciled, including OTC and herbal medications. Updated list given to patient. Patient Active Problem List    Diagnosis Date Noted    Fibromyalgia     Morbid obesity ([address]     DM2 (diabetes mellitus, type 2) ([address]     COPD (chronic obstructive pulmonary disease) ([address]     Valvular heart disease 01/16/2019    RLS (restless legs syndrome) 01/16/2019    Dyslipidemia     Former smoker     GERD (gastroesophageal reflux disease)     Heart failure with preserved ejection fraction ([address]     Hypertension     Major depression     THAYER (nonalcoholic steatohepatitis)     History of aortic stenosis, s/p valve replacement x 2     History of iron deficiency anemia     S/P mitral valve repair 07/13/2018     Dr. Lakshmi Zurita H/O prosthetic aortic valve replacement 07/03/2018     Early bioprosthetic aortic valve failure with severe symptomatic prosthetic aortic regurgitation      History of subarachnoid hemorrhage 03/23/2015     Spontaneous SAH. Admitted to [facility]. Extensive w/u for cause was negative.        Osteoarthritis     S/P AVR (aortic valve replacement) 01/01/2015     x 2, last replacement July 2018 d/t failure of the 1st         Past Medical History:   Diagnosis Date    COPD (chronic obstructive pulmonary disease) ([address]     DM2 (diabetes mellitus, type 2) ([address]     Dyslipidemia     Fibromyalgia     Former smoker     GERD (gastroesophageal reflux disease)     H/O prosthetic aortic valve replacement     Early bioprosthetic aortic valve failure with severe symptomatic prosthetic aortic regurgitation is now s/p REDO AORTIC VALVE REPLACEMENT, MITRAL VALVE REPAIR, TRICUSPID VALVE REPAIR, WILBERTO performed by Karen Aggarwal MD at [address] failure with preserved ejection fraction ([place])     History of aortic stenosis, s/p valve replacement x 2     History of iron deficiency anemia     History of subarachnoid hemorrhage 2015    Spontaneous SAH. Admitted to [facility]. Extensive w/u for cause was negative.      Hypertension     Major depression     Morbid obesity ([place])     THAYER (nonalcoholic steatohepatitis)     Osteoarthritis     RLS (restless legs syndrome) 2019    S/P AVR (aortic valve replacement) 2015    x 2, last replacement 2018 d/t failure of the 1st    S/P mitral valve repair 2018    Dr. Marcello Flor Valvular heart disease 2019       Past Surgical History:   Procedure Laterality Date    AORTIC VALVE REPLACEMENT      cow valve    BRAIN SURGERY      to drain blood    CARDIAC CATHETERIZATION   or     [facility]    COLONOSCOPY      DIAGNOSTIC CARDIAC CATH LAB PROCEDURE      PARTIAL HYSTERECTOMY N/A     Endometriosis    IA ECHO TRANSESOPHAG R-T 2D IMG ACQUISJ I&R ONLY Left 7/3/2018    TRANSESOPHAGEAL ECHOCARDIOGRAM performed by Ritchie Moyer MD at [facility]    IA OFFICE/OUTPT [facility] N/A 2018    REDO AORTIC VALVE REPLACEMENT, MITRAL VALVE REPAIR, TRICUSPID VALVE REPAIR, WILBERTO performed by Karen Aggarwal MD at [address]         Allergies   Allergen Reactions    Latex Rash    Demerol Hcl [Meperidine] Hives       Social History     Tobacco Use    Smoking status: Former Smoker     Packs/day: 0.50     Years: 44.00     Pack years: 22.00     Types: Cigarettes     Last attempt to quit: 2018     Years since quittin.4    Smokeless tobacco: Former User   Substance Use Topics    Alcohol use: No       Family History   Problem Relation Age of Onset    Heart Attack Mother     Heart Disease Mother     Heart Surgery Mother     Hypertension Mother     High Blood Pressure Mother     Diabetes Mother     Diabetes Father     Breast Cancer Maternal Grandmother         Unsure age   Connye Sole Colon Cancer Neg Hx          I have reviewed the patient's past medical history, past surgical history, allergies, medications, social and family history and I have made updates where appropriate. Review of Systems  Positive responses are highlighted in bold    Constitutional:  Fever, Chills, Night Sweats, Fatigue, Unexpected changes in weight  HENT:  Ear pain, Tinnitus, Nosebleeds, Trouble swallowing, Hearing loss, Sore throat  Cardiovascular:  Chest Pain, Palpitations, Orthopnea, Paroxysmal Nocturnal Dyspnea  Respiratory:  Cough, Wheezing, Shortness of breath, Chest tightness, Apnea  Gastrointestinal:  Nausea, Vomiting, Diarrhea, Constipation, Heartburn, Blood in stool  Genitourinary:  Difficulty or painful urination, Flank pain, Change in frequency, Urgency  Skin:  Color change, Rash, Itching, Wound  Musculoskeletal:  Joint pain, Back pain, Gait problems, Joint swelling, Myalgias  Neurological:  Dizziness, Headaches, Presyncope, Numbness, Seizures, Tremors  Endocrine:  Heat Intolerance, Cold Intolerance, Polydipsia, Polyphagia, Polyuria      PHYSICAL EXAM:  Vitals:    12/07/20 0928   BP: 133/64   Pulse: 69   Resp: 16   Temp: 97.1 °F (36.2 °C)   TempSrc: Temporal   SpO2: 98%   Weight: 192 lb 9.6 oz (87.4 kg)   Height: 5' 2.5\" (1.588 m)     Body mass index is 34.67 kg/m².   Pain Score:   0 - No pain    VS Reviewed  General Appearance: A&O x 3, No acute distress,well developed and well- nourished  Eyes: pupils equal, round, and reactive to light, extraocular eye movements intact, conjunctivae and eye lids without erythema  ENT: external ear and ear canal clear bilaterally, TMs intact and regular, nose without deformity, nasal mucosa and turbinates normal without polyps, oropharynx normal, dentition is normal for age  Neck: supple and non-tender without mass, no thyromegaly or thyroid nodules, no cervical lymphadenopathy  Pulmonary/Chest: clear to auscultation bilaterally- no wheezes, rales or rhonchi, normal air movement, no respiratory distress or retractions  Cardiovascular: S1 and S2 auscultated w/ RRR. No murmurs appreciated today. No rubs, clicks, or gallops, distal pulses intact. Abdomen: soft, non-tender, non-distended, bowel sounds physiologic,  no rebound or guarding, no masses or hernias noted. Liver and spleen without enlargement. Extremities: no cyanosis, clubbing or edema of the lower extremities. +2 PT/DP bilaterally. Skin: warm and dry, no rash or erythema  Foot: Bilat 2+DP/PT bilaterally. Skin warm, dry and intact. Sensation normal throughout to touch and with monofilament. ASSESSMENT & PLAN  1. Type 2 diabetes mellitus without complication, without long-term current use of insulin ([facility]    At goal  Doing quite well  con't diet changes  If sub 6 again next visit, should be able to say DM in remission    -  DIABETES FOOT EXAM  - POCT glycosylated hemoglobin (Hb A1C)    2. Fibromyalgia    Doing well with pamelor  con't this    3. RLS (restless legs syndrome)    Improved from last visit with dose adjustment of requip  con't this    4. Essential hypertension    At goal  con't meds  Labs UTD    5. Chronic obstructive pulmonary disease, unspecified COPD type ([facility]    Mild COPD  Not having sxs at present  Declines meds, monitor. 6. Dyslipidemia    con't lipitor  Doing well    7. Recurrent major depressive disorder, in full remission ([facility]    Stable  Doing well  con't celxa    8. Gastroesophageal reflux disease, unspecified whether esophagitis present    stable  con't PPI    9. Post-menopause    - DEXA BONE DENSITY AXIAL SKELETON; Future    10. Needs flu shot    - INFLUENZA, QUADV, ADJUVANTED, 65 YRS =, IM, PF, PREFILL SYR, 0.5ML (FLUAD)      DISPOSITION    Return in about 6 months (around 6/7/2021) for Follow-up Diabetes, follow-up on chronic medical conditions, sooner as needed.     Vilma Schwartz furosemide (LASIX) 20 MG tablet TAKE 1 TABLET EVERY DAY Yes JUAN Lofton CNP   nortriptyline (PAMELOR) 25 MG capsule Take 1 capsule by mouth nightly Yes Scott Urbina DO   metoprolol tartrate (LOPRESSOR) 25 MG tablet TAKE 1/2 TABLET TWICE DAILY Yes Spenser Draper MD   atorvastatin (LIPITOR) 10 MG tablet Take 1 tablet by mouth every evening Yes Scott Urbina DO   citalopram (CELEXA) 20 MG tablet TAKE 1 TABLET EVERY DAY Yes Kiran Sharma DO   potassium chloride (KLOR-CON M) 20 MEQ extended release tablet TAKE 1 TABLET EVERY DAY Yes Kiran Sharma DO   aspirin 81 MG tablet Take 81 mg by mouth daily Yes Historical Provider, MD   acetaminophen (TYLENOL) 325 MG tablet Take 2 tablets by mouth every 4 hours as needed (post op pain) Yes Sherryle Basta, PA-C         Past Medical History:   Diagnosis Date    COPD (chronic obstructive pulmonary disease) [facility]     DM2 (diabetes mellitus, type 2) ([facility]     Dyslipidemia     Fibromyalgia     Former smoker     GERD (gastroesophageal reflux disease)     H/O prosthetic aortic valve replacement     Early bioprosthetic aortic valve failure with severe symptomatic prosthetic aortic regurgitation is now s/p REDO AORTIC VALVE REPLACEMENT, MITRAL VALVE REPAIR, TRICUSPID VALVE REPAIR, WILBERTO performed by Sarah Juarez MD at [address] [place] [address] Heart failure with preserved ejection fraction ([facility]     History of aortic stenosis, s/p valve replacement x 2     History of iron deficiency anemia     History of subarachnoid hemorrhage 03/23/2015    Spontaneous SAH. Admitted to [facility]. Extensive w/u for cause was negative.      Hypertension     Major depression     Morbid obesity ([facility]     THAYER (nonalcoholic steatohepatitis)     Osteoarthritis     RLS (restless legs syndrome) 1/16/2019    S/P AVR (aortic valve replacement) 2015    x 2, last replacement July 2018 d/t failure of the 1st    S/P mitral valve repair 07/13/2018    Dr. Lamar Wan Valvular heart disease 1/16/2019       Past Surgical History:   Procedure Laterality Date    AORTIC VALVE REPLACEMENT      cow valve    BRAIN SURGERY      to drain blood    CARDIAC CATHETERIZATION  2004 or 2005    [address]    COLONOSCOPY      DIAGNOSTIC CARDIAC CATH LAB PROCEDURE      PARTIAL HYSTERECTOMY N/A 2004    Endometriosis    NC ECHO TRANSESOPHAG R-T 2D IMG ACQUISJ I&R ONLY Left 7/3/2018    TRANSESOPHAGEAL ECHOCARDIOGRAM performed by [address], MD at [address] Endoscopy    NC OFFICE/OUTPT VISIT,PROCEDURE ONLY N/A 7/13/2018    REDO AORTIC VALVE REPLACEMENT, MITRAL VALVE REPAIR, TRICUSPID VALVE REPAIR, WILBERTO performed by Sarah Juarez MD at [address]           Family History   Problem Relation Age of Onset    Heart Attack Mother     Heart Disease Mother     Heart Surgery Mother     Hypertension Mother     High Blood Pressure Mother     Diabetes Mother     Diabetes Father     Breast Cancer Maternal Grandmother         Unsure age   Mike Self Colon Cancer Neg Hx        CareTeam (Including outside providers/suppliers regularly involved in providing care):   Patient Care Team:  Scott Urbina DO as PCP - General (Family Medicine)  Scott Urbina DO as PCP - [facility] Empaneled Provider    Wt Readings from Last 3 Encounters:   12/07/20 192 lb 9.6 oz (87.4 kg)   09/17/20 194 lb 12.8 oz (88.4 kg)   06/05/20 198 lb (89.8 kg)     Vitals:    12/07/20 0928   BP: 133/64   Pulse: 69   Resp: 16   Temp: 97.1 °F (36.2 °C)   TempSrc: Temporal   SpO2: 98%   Weight: 192 lb 9.6 oz (87.4 kg)   Height: 5' 2.5\" (1.588 m)     Body mass index is 34.67 kg/m². Based upon direct observation of the patient, evaluation of cognition reveals recent and remote memory intact. Patient's complete Health Risk Assessment and screening values have been reviewed and are found in Flowsheets. The following problems were reviewed today and where indicated follow up appointments were made and/or referrals ordered.     Positive Risk Factor Screenings with Interventions:       General Health and ACP:  General  In general, how would you say your health is?: Good  In the past 7 days, have you experienced any of the following?  New or Increased Pain, New or Increased Fatigue, Loneliness, Social Isolation, Stress or Anger?: None of These  Do you get the social and emotional support that you need?: Yes  Do you have a Living Will?: (!) No  Advance Directives     Power of [address] Will ACP-Advance Directive ACP-Power of     Not on File Not on File Filed [address] [place] Risk Interventions:  · No Living Will: Patient declines ACP discussion/assistance    Health Habits/Nutrition:  Health Habits/Nutrition  Do you exercise for at least 20 minutes 2-3 times per week?: Yes  Have you lost any weight without trying in the past 3 months?: No  Do you eat fewer than 2 meals per day?: No  Have you seen a dentist within the past year?: (!) No  Body mass index: (!) 34.66  Health Habits/Nutrition Interventions:  · Nutritional issues:  educational materials for healthy, well-balanced diet provided  · Dental exam overdue:  patient encouraged to make appointment with his/her dentist    Hearing/Vision:  No exam data present  Hearing/Vision  Do you or your family notice any trouble with your hearing?: No  Do you have difficulty driving, watching TV, or doing any of your daily activities because of your eyesight?: No  Have you had an eye exam within the past year?: (!) No  Hearing/Vision Interventions:  · Vision concerns:  patient encouraged to make appointment with his/her eye specialist    Safety:  Safety  Do you have working smoke detectors?: Yes  Have all throw rugs been removed or fastened?: Yes  Do you have non-slip mats or surfaces in all bathtubs/showers?: Yes  Do all of your stairways have a railing or banister?: (!) No  Are your doorways, halls and stairs free of clutter?: Yes  Do you always fasten your seatbelt when you are in a car?: Yes  Safety Interventions:  · Home safety tips provided    Personalized Preventive Plan   Current Health Maintenance Status  Immunization History   Administered Date(s) Administered    Influenza Virus Vaccine 10/21/2017    Influenza, Quadv, IM, PF (6 mo and older Fluzone, Flulaval, Fluarix, and 3 yrs and older Afluria) 01/16/2019    Influenza, Quadv, adjuvanted, 65 yrs +, IM, PF (Fluad) 12/07/2020    Influenza, Triv, inactivated, subunit, adjuvanted, IM (Fluad 65 yrs and older) 01/16/2020    Pneumococcal Polysaccharide ([id number]) 10/21/2017        Health Maintenance   Topic Date Due    Diabetic retinal exam  09/25/1964    DTaP/Tdap/Td vaccine (1 - Tdap) 09/25/1973    Shingles Vaccine (1 of 2) 09/25/2004    DEXA (modify frequency per FRAX score)  09/25/2009    Colon cancer screen colonoscopy  05/15/2019    Annual Wellness Visit (AWV)  05/29/2019    Diabetic foot exam  01/16/2021    Breast cancer screen  01/31/2021    A1C test (Diabetic or Prediabetic)  06/07/2021    Diabetic microalbuminuria test  12/02/2021    Lipid screen  12/02/2021    Potassium monitoring  12/02/2021    Creatinine monitoring  12/02/2021    Pneumococcal 65+ years Vaccine (1 of 1 - PPSV23) 10/21/2022    Flu vaccine  Completed    Hepatitis C screen  Completed    Hepatitis A vaccine  Aged Out    Hib vaccine  Aged Out    Meningococcal (ACWY) vaccine  Aged Out     Recommendations for [employer] Due: see orders and patient instructions/AVS.  . Recommended screening schedule for the next 5-10 years is provided to the patient in written form: see Patient Instructions/AVS.    Inez Chaudhari was seen today for medicare awv and 6 month follow-up. Diagnoses and all orders for this visit:      Routine general medical examination at a health care facility      Care plan reviewed with and given to patient.        Electronically signed by Ellis Emanuel DO on 12/7/2020 at 9:57 AM

## 2020-12-07 ENCOUNTER — OFFICE VISIT (OUTPATIENT)
Dept: FAMILY MEDICINE CLINIC | Age: 65
End: 2020-12-07
Payer: MEDICARE

## 2020-12-07 VITALS
WEIGHT: 192.6 LBS | DIASTOLIC BLOOD PRESSURE: 64 MMHG | HEART RATE: 69 BPM | TEMPERATURE: 97.1 F | OXYGEN SATURATION: 98 % | HEIGHT: 63 IN | RESPIRATION RATE: 16 BRPM | SYSTOLIC BLOOD PRESSURE: 133 MMHG | BODY MASS INDEX: 34.12 KG/M2

## 2020-12-07 LAB — HBA1C MFR BLD: 5.8 % (ref 4.3–5.7)

## 2020-12-07 PROCEDURE — 4040F PNEUMOC VAC/ADMIN/RCVD: CPT | Performed by: FAMILY MEDICINE

## 2020-12-07 PROCEDURE — 3017F COLORECTAL CA SCREEN DOC REV: CPT | Performed by: FAMILY MEDICINE

## 2020-12-07 PROCEDURE — 90694 VACC AIIV4 NO PRSRV 0.5ML IM: CPT | Performed by: FAMILY MEDICINE

## 2020-12-07 PROCEDURE — 1123F ACP DISCUSS/DSCN MKR DOCD: CPT | Performed by: FAMILY MEDICINE

## 2020-12-07 PROCEDURE — G0438 PPPS, INITIAL VISIT: HCPCS | Performed by: FAMILY MEDICINE

## 2020-12-07 PROCEDURE — 3044F HG A1C LEVEL LT 7.0%: CPT | Performed by: FAMILY MEDICINE

## 2020-12-07 PROCEDURE — G8484 FLU IMMUNIZE NO ADMIN: HCPCS | Performed by: FAMILY MEDICINE

## 2020-12-07 PROCEDURE — G0008 ADMIN INFLUENZA VIRUS VAC: HCPCS | Performed by: FAMILY MEDICINE

## 2020-12-07 RX ORDER — NORTRIPTYLINE HYDROCHLORIDE 25 MG/1
25 CAPSULE ORAL NIGHTLY
Qty: 90 CAPSULE | Refills: 3 | Status: SHIPPED | OUTPATIENT
Start: 2020-12-07 | End: 2022-04-07

## 2020-12-07 RX ORDER — CITALOPRAM 20 MG/1
TABLET ORAL
Qty: 90 TABLET | Refills: 3 | Status: SHIPPED | OUTPATIENT
Start: 2020-12-07 | End: 2021-10-04

## 2020-12-07 RX ORDER — POTASSIUM CHLORIDE 20 MEQ/1
TABLET, EXTENDED RELEASE ORAL
Qty: 90 TABLET | Refills: 3 | Status: SHIPPED | OUTPATIENT
Start: 2020-12-07 | End: 2021-11-03

## 2020-12-07 RX ORDER — ATORVASTATIN CALCIUM 10 MG/1
10 TABLET, FILM COATED ORAL EVERY EVENING
Qty: 90 TABLET | Refills: 3 | Status: SHIPPED | OUTPATIENT
Start: 2020-12-07 | End: 2021-11-03

## 2020-12-07 ASSESSMENT — PATIENT HEALTH QUESTIONNAIRE - PHQ9
SUM OF ALL RESPONSES TO PHQ QUESTIONS 1-9: 0
SUM OF ALL RESPONSES TO PHQ QUESTIONS 1-9: 0
1. LITTLE INTEREST OR PLEASURE IN DOING THINGS: 0
2. FEELING DOWN, DEPRESSED OR HOPELESS: 0
SUM OF ALL RESPONSES TO PHQ QUESTIONS 1-9: 0
SUM OF ALL RESPONSES TO PHQ9 QUESTIONS 1 & 2: 0

## 2020-12-07 ASSESSMENT — LIFESTYLE VARIABLES: HOW OFTEN DO YOU HAVE A DRINK CONTAINING ALCOHOL: 0

## 2020-12-07 NOTE — PATIENT INSTRUCTIONS
Personalized Preventive Plan for Deneen Harada - 12/7/2020  Medicare offers a range of preventive health benefits. Some of the tests and screenings are paid in full while other may be subject to a deductible, co-insurance, and/or copay. Some of these benefits include a comprehensive review of your medical history including lifestyle, illnesses that may run in your family, and various assessments and screenings as appropriate. After reviewing your medical record and screening and assessments performed today your provider may have ordered immunizations, labs, imaging, and/or referrals for you. A list of these orders (if applicable) as well as your Preventive Care list are included within your After Visit Summary for your review. Other Preventive Recommendations:    · A preventive eye exam performed by an eye specialist is recommended every 1-2 years to screen for glaucoma; cataracts, macular degeneration, and other eye disorders. · A preventive dental visit is recommended every 6 months. · Try to get at least 150 minutes of exercise per week or 10,000 steps per day on a pedometer . · Order or download the FREE \"Exercise & Physical Activity: Your Everyday Guide\" from [employer]. Call 0-690.297.2995 or search [employer] online. · You need 1305-8789 mg of calcium and 6904-0539 IU of vitamin D per day. It is possible to meet your calcium requirement with diet alone, but a vitamin D supplement is usually necessary to meet this goal.  · When exposed to the sun, use a sunscreen that protects against both UVA and UVB radiation with an SPF of 30 or greater. Reapply every 2 to 3 hours or after sweating, drying off with a towel, or swimming. · Always wear a seat belt when traveling in a car. Always wear a helmet when riding a bicycle or motorcycle.

## 2021-01-05 ENCOUNTER — TELEPHONE (OUTPATIENT)
Dept: AUDIOLOGY | Age: 66
End: 2021-01-05

## 2021-01-05 ENCOUNTER — OFFICE VISIT (OUTPATIENT)
Dept: FAMILY MEDICINE CLINIC | Age: 66
End: 2021-01-05
Payer: MEDICARE

## 2021-01-05 VITALS
HEIGHT: 62 IN | TEMPERATURE: 98.1 F | DIASTOLIC BLOOD PRESSURE: 80 MMHG | SYSTOLIC BLOOD PRESSURE: 132 MMHG | RESPIRATION RATE: 12 BRPM | BODY MASS INDEX: 35.85 KG/M2 | WEIGHT: 194.8 LBS | OXYGEN SATURATION: 100 % | HEART RATE: 64 BPM

## 2021-01-05 DIAGNOSIS — H91.21 SUDDEN IDIOPATHIC HEARING LOSS OF RIGHT EAR WITH UNRESTRICTED HEARING OF LEFT EAR: ICD-10-CM

## 2021-01-05 DIAGNOSIS — H65.193 ACUTE EFFUSION OF BOTH MIDDLE EARS: ICD-10-CM

## 2021-01-05 DIAGNOSIS — H93.13 TINNITUS OF BOTH EARS: Primary | ICD-10-CM

## 2021-01-05 PROCEDURE — 1123F ACP DISCUSS/DSCN MKR DOCD: CPT | Performed by: NURSE PRACTITIONER

## 2021-01-05 PROCEDURE — 1090F PRES/ABSN URINE INCON ASSESS: CPT | Performed by: NURSE PRACTITIONER

## 2021-01-05 PROCEDURE — G8400 PT W/DXA NO RESULTS DOC: HCPCS | Performed by: NURSE PRACTITIONER

## 2021-01-05 PROCEDURE — G8417 CALC BMI ABV UP PARAM F/U: HCPCS | Performed by: NURSE PRACTITIONER

## 2021-01-05 PROCEDURE — 99213 OFFICE O/P EST LOW 20 MIN: CPT | Performed by: NURSE PRACTITIONER

## 2021-01-05 PROCEDURE — G8427 DOCREV CUR MEDS BY ELIG CLIN: HCPCS | Performed by: NURSE PRACTITIONER

## 2021-01-05 PROCEDURE — G8484 FLU IMMUNIZE NO ADMIN: HCPCS | Performed by: NURSE PRACTITIONER

## 2021-01-05 PROCEDURE — 4040F PNEUMOC VAC/ADMIN/RCVD: CPT | Performed by: NURSE PRACTITIONER

## 2021-01-05 PROCEDURE — 1036F TOBACCO NON-USER: CPT | Performed by: NURSE PRACTITIONER

## 2021-01-05 PROCEDURE — 3017F COLORECTAL CA SCREEN DOC REV: CPT | Performed by: NURSE PRACTITIONER

## 2021-01-05 RX ORDER — PREDNISONE 20 MG/1
TABLET ORAL
Qty: 18 TABLET | Refills: 0 | Status: SHIPPED | OUTPATIENT
Start: 2021-01-05 | End: 2021-01-22

## 2021-01-05 ASSESSMENT — ENCOUNTER SYMPTOMS
SINUS PAIN: 0
SORE THROAT: 0
RHINORRHEA: 0
RESPIRATORY NEGATIVE: 1
SINUS PRESSURE: 0
TROUBLE SWALLOWING: 0

## 2021-01-05 NOTE — PROGRESS NOTES
[address] [place] [address] [address] TRI Ibrahim [id number]  Dept: [phone]  Dept Fax: [phone]  Loc: [phone]    Camilla Pleitez is a 76 y.o. Enriqueta Barley presents today for her medical conditions/complaints as noted below. Hui MING Keiths c/o of Ear Problem (buzzing sound in both ears for past 1.5 week, and unable to hear with right ear) and Health Maintenance (due for DM eye exam )      HPI:      Pt states 2 weeks ago she started having ringing in both ears and then yesterday lost hearing in her right ear. She denies any ear pain, has never had tinnitus in the past. Denies a headache or dizziness with the tinnitus. Has not started any new meds. Pt in on nortriptyline  Does not overuse tylenol. Denies any drainage from the ears, does not wear head phones or use ear plugs.           Current Outpatient Medications   Medication Sig Dispense Refill    predniSONE (DELTASONE) 20 MG tablet 1 tab po tid for 3 days 1 tab po bid for 3 days 1 tab po once day for 3 days 18 tablet 0    potassium chloride (KLOR-CON M) 20 MEQ extended release tablet TAKE 1 TABLET EVERY DAY 90 tablet 3    citalopram (CELEXA) 20 MG tablet TAKE 1 TABLET EVERY DAY 90 tablet 3    atorvastatin (LIPITOR) 10 MG tablet Take 1 tablet by mouth every evening 90 tablet 3    nortriptyline (PAMELOR) 25 MG capsule Take 1 capsule by mouth nightly 90 capsule 3    rOPINIRole (REQUIP) 2 MG tablet Take 1 tablet by mouth 2 times daily 180 tablet 3    lisinopril (PRINIVIL;ZESTRIL) 5 MG tablet TAKE 1 TABLET TWICE DAILY 180 tablet 0    omeprazole (PRILOSEC) 20 MG delayed release capsule TAKE 1 CAPSULE BY MOUTH EVERY MORNING (BEFORE BREAKFAST) 90 capsule 3    furosemide (LASIX) 20 MG tablet TAKE 1 TABLET EVERY DAY 90 tablet 3    metoprolol tartrate (LOPRESSOR) 25 MG tablet TAKE 1/2 TABLET TWICE DAILY 90 tablet 8    aspirin 81 MG tablet Take 81 mg by mouth daily      acetaminophen (TYLENOL) 325 MG tablet Take 2 tablets by mouth every 4 hours as needed (post op pain) 120 tablet 3     No current facility-administered medications for this visit. Past Medical History:   Diagnosis Date    COPD [facility]chronic obstructive pulmonary disease) [facility]     DM2 (diabetes mellitus, type 2) (HCC)     Dyslipidemia     Fibromyalgia     Former smoker     GERD (gastroesophageal reflux disease)     H/O prosthetic aortic valve replacement     Early bioprosthetic aortic valve failure with severe symptomatic prosthetic aortic regurgitation is now s/p REDO AORTIC VALVE REPLACEMENT, MITRAL VALVE REPAIR, TRICUSPID VALVE REPAIR, WILBERTO performed by Anabela Bradley MD at [address] [place] [address] failure with preserved ejection fraction ([facility]     History of aortic stenosis, s/p valve replacement x 2     History of iron deficiency anemia     History of subarachnoid hemorrhage 03/23/2015    Spontaneous SAH. Admitted to [facility]. Extensive w/u for cause was negative.      Hypertension     Major depression     Morbid obesity ([facility]     THAYER (nonalcoholic steatohepatitis)     Osteoarthritis     RLS (restless legs syndrome) 1/16/2019    S/P AVR (aortic valve replacement) 2015    x 2, last replacement July 2018 d/t failure of the 1st    S/P mitral valve repair 07/13/2018    Dr. Criselda Francois Valvular heart disease 1/16/2019      Past Surgical History:   Procedure Laterality Date    AORTIC VALVE REPLACEMENT      cow valve    BRAIN SURGERY      to drain blood    CARDIAC [facility]  2004 or [facility]    COLONOSCOPY      DIAGNOSTIC CARDIAC CATH LAB PROCEDURE      PARTIAL HYSTERECTOMY N/A 2004    Endometriosis    IN ECHO TRANSESOPHAG R-T 2D IMG ACQUISJ I&R ONLY Left 7/3/2018    TRANSESOPHAGEAL ECHOCARDIOGRAM performed by Marsha Mccallum MD at [facility] OFFICE/OUTPT VISIT,PROCEDURE ONLY N/A 7/13/2018    REDO AORTIC VALVE REPLACEMENT, MITRAL VALVE REPAIR, TRICUSPID VALVE REPAIR, WILBERTO performed by Anabela Bradley MD at [facility] VASCULAR SURGERY       Family History   Problem Relation Age of Onset    Heart Attack Mother     Heart Disease Mother     Heart Surgery Mother     Hypertension Mother     High Blood Pressure Mother     Diabetes Mother     Diabetes Father     Breast Cancer Maternal Grandmother         Unsure age   [de-identified] Colon Cancer Neg Hx      Social History     Tobacco Use    Smoking status: Former Smoker     Packs/day: 0.50     Years: 44.00     Pack years: 22.00     Types: Cigarettes     Quit date: 2018     Years since quittin.5    Smokeless tobacco: Former User   Substance Use Topics    Alcohol use: No        Allergies   Allergen Reactions    Latex Rash    Demerol Hcl [Meperidine] Hives       Health Maintenance   Topic Date Due    Diabetic retinal exam  1964    DTaP/Tdap/Td vaccine (1 - Tdap) 1973    Shingles Vaccine (1 of 2) 2004    DEXA (modify frequency per FRAX score)  2009    Breast cancer screen  2021    A1C test (Diabetic or Prediabetic)  2021    Diabetic microalbuminuria test  2021    Lipid screen  2021    Potassium monitoring  2021    Creatinine monitoring  2021    Diabetic foot exam  2021    Annual Wellness Visit (AWV)  2021    Pneumococcal 65+ years Vaccine (1 of 1 - PPSV23) 10/21/2022    Colon cancer screen colonoscopy  2023    Flu vaccine  Completed    Hepatitis C screen  Completed    Hepatitis A vaccine  Aged Out    Hib vaccine  Aged Out    Meningococcal (ACWY) vaccine  Aged Out       Subjective:      Review of Systems   Constitutional: Negative for chills, fatigue and fever. HENT: Negative for congestion, dental problem, ear discharge, ear pain, postnasal drip, rhinorrhea, sinus pressure, sinus pain, sneezing, sore throat, tinnitus and trouble swallowing. Respiratory: Negative. Cardiovascular: Negative. Skin: Negative.     Allergic/Immunologic: Negative for environmental allergies and food allergies. Neurological: Negative for dizziness, facial asymmetry, speech difficulty, light-headedness, numbness and headaches. Objective:      /80   Pulse 64   Temp 98.1 °F (36.7 °C) (Oral)   Resp 12   Ht 5' 2\" (1.575 m)   Wt 194 lb 12.8 oz (88.4 kg)   SpO2 100%   BMI 35.63 kg/m²      Physical Exam  Vitals signs and nursing note reviewed. Constitutional:       Appearance: She is not ill-appearing. HENT:      Right Ear: Ear canal and external ear normal. Decreased hearing noted. Tenderness present. No swelling. A middle ear effusion is present. There is no impacted cerumen. No foreign body. No mastoid tenderness. Tympanic membrane is not injected or erythematous. Left Ear: Ear canal and external ear normal. A middle ear effusion is present. There is no impacted cerumen. No foreign body. No mastoid tenderness. Tympanic membrane is not injected or erythematous. Ears:      Comments: Pt with bilateral ear effusion, fluid is clear no pain with manipulation of pinna and tragus bilaterally. Neck:      Vascular: Normal carotid pulses. No carotid bruit. Cardiovascular:      Rate and Rhythm: Normal rate and regular rhythm. Pulses: Normal pulses. Heart sounds: Murmur present. Pulmonary:      Effort: Pulmonary effort is normal.      Breath sounds: Normal breath sounds. Lymphadenopathy:      Cervical:      Right cervical: No superficial, deep or posterior cervical adenopathy. Left cervical: No superficial, deep or posterior cervical adenopathy. Skin:     Capillary Refill: Capillary refill takes less than 2 seconds. Neurological:      Mental Status: She is alert and oriented to person, place, and time. Cranial Nerves: No facial asymmetry. Motor: Motor function is intact. Coordination: Coordination is intact. Gait: Gait is intact. Assessment/Plan:           1.  Tinnitus of both ears  Trial prednisone for effusion  Reevaluate medications as possible cause  If no improvement after prednisone may want to consider ENT referral   - [facility] - [facility] [place] [place]    2. Sudden idiopathic hearing loss of right ear with unrestricted hearing of left ear    - [facility] - [facility]    3. Acute effusion of both middle ears    - predniSONE (DELTASONE) 20 MG tablet; 1 tab po tid for 3 days 1 tab po bid for 3 days 1 tab po once day for 3 days  Dispense: 18 tablet; Refill: 0    Pt in agreement with plan   Return in about 2 weeks (around 1/19/2021) for f/u with Dr. Lester Lugo. Reccommended tobaccocessation options including pharmacologic methods, counseled great than 3 minutesduring this visit:  Yes[]  No  []       Patient given educational materials -see patient instructions. Discussed use, benefit, and side effects of prescribedmedications. All patient questions answered. Pt voiced understanding. Reviewedhealth maintenance. Instructed to continue current medications, diet and exercise. Patient agreed with treatment plan. Follow up as directed.        Electronicallysigned by Romana Blazer, APRN - CNP on 1/5/2021 at 2:54 PM

## 2021-01-05 NOTE — TELEPHONE ENCOUNTER
I called the patient to schedule the hearing test for tomorrow and she states that she was told to wait 10 days to get the hearing test.  I have her penciled in tomorrow 1-6-21 at 3;45. I told her that I will clarify with the office in the morning that she needs to wait 10 days for the hearing test with the diagnosis of sudden hearing loss. It did not state in the office note that the hearing test should be scheduled in 10 days. I will clarify with the office.

## 2021-01-05 NOTE — TELEPHONE ENCOUNTER
I received a referral for sudden hearing loss on Hui late in the day. I left a message for the patient to call me to get a hearing test ASA.

## 2021-01-06 ENCOUNTER — HOSPITAL ENCOUNTER (OUTPATIENT)
Dept: AUDIOLOGY | Age: 66
Discharge: HOME | End: 2021-01-06
Payer: MEDICARE

## 2021-01-06 PROCEDURE — 92557 COMPREHENSIVE HEARING TEST: CPT | Performed by: AUDIOLOGIST

## 2021-01-06 PROCEDURE — 92567 TYMPANOMETRY: CPT | Performed by: AUDIOLOGIST

## 2021-01-06 NOTE — PROGRESS NOTES
AUDIOLOGICAL EVALUATION      REASON FOR TESTING:  The patient reports sudden onset hearing loss for the right ear. She noted \"hissing\" sounding tinnitus in both ears 1.5 weeks ago. She lost hearing in her right ear Monday evening (1/4/21) and followed up with her PCP on Tuesday (1/5/21) who started her on an oral steroid. The patient explains that her hearing seems to fluctuate in her right ear today. She reports two episodes of vertigo within the past year, which have been treated with the Epley maneuver. OTOSCOPY: WNL for both ears. AUDIOGRAM        Reliability: Good  Audiometer Used:  [id number]        COMMENTS: Normal hearing sensitivity for both ears, sloping to moderately-severe high frequency sensorineural hearing loss for the left ear and severe high frequency sensorineural hearing loss for the right ear. Speech discrimination ability is excellent at 92% for the left ear and fair at 76% for the right ear. Tympanometry revealed normal peak pressure and normal middle ear compliance for both ears. RECOMMENDATION(S):   1- Continue use of steroid as prescribed by primary care provider for sudden onset hearing loss of the right ear. 2- Repeat audiometry and tympanometry following steroid use. If asymmetry persists after steroid use, then MRI of IACs may be beneficial.  3- ENT consult may be beneficial due to sudden onset hearing loss. 4- Binaural amplification pending repeat testing/medical clearance. The patient insurance benefits will be verified by this office and the patient will be contacted.

## 2021-01-14 ENCOUNTER — HOSPITAL ENCOUNTER (OUTPATIENT)
Dept: AUDIOLOGY | Age: 66
Discharge: HOME | End: 2021-01-14
Payer: MEDICARE

## 2021-01-14 DIAGNOSIS — H91.21 SUDDEN IDIOPATHIC HEARING LOSS OF RIGHT EAR WITH UNRESTRICTED HEARING OF LEFT EAR: Primary | ICD-10-CM

## 2021-01-14 PROCEDURE — 92567 TYMPANOMETRY: CPT | Performed by: AUDIOLOGIST

## 2021-01-14 PROCEDURE — 92557 COMPREHENSIVE HEARING TEST: CPT | Performed by: AUDIOLOGIST

## 2021-01-14 NOTE — PROGRESS NOTES
AUDIOLOGICAL EVALUATION      REASON FOR TESTING:  Repeat audiometry and tympanometry due to sudden onset hearing loss for the right ear on 1/4/2021. The patient noted a \"hissing\" sounding tinnitus in both ears 1.5 weeks prior to the onset of the hearing loss. She followed up with her Rita Fountain, APRN on Tuesday (1/5/21) and started an oral steroid. She does not notice any improvement in hearing. She reports two episodes of vertigo within the past year, which have been treated with the Epley maneuver. OTOSCOPY: WNL for both ears. AUDIOGRAM        Reliability: Good  Audiometer Used:  [id number]    COMMENTS:  Normal hearing sensitivity for both ears, sloping to moderately-severe high frequency sensorineural hearing loss for the left ear and sloping to severe high frequency sensorineural hearing loss for the right ear. Thresholds have slightly declined for the right ear, relative to 1/6/2021 testing. Speech discrimination ability is excellent at 92% for the left ear and good at 88% for the right ear. Tympanometry revealed normal peak pressure and normal middle ear compliance for both ears. RECOMMENDATION(S):   1- Today's results were discussed with Dr. Susan Worrell at [facility]. She agreed to see the patient tomorrow for continued treatment of the sudden onset hearing loss for the right ear. 2- Repeat audiometry and tympanometry following steroid use. If asymmetry persists after steroid use, then MRI of IACs may be beneficial.  3- Binaural amplification pending repeat testing/medical clearance. The patient's insurance benefits could not be verified by this office. The patient was encouraged to call her insurance company to see if she has any benefits for hearing aids.

## 2021-01-15 ENCOUNTER — OFFICE VISIT (OUTPATIENT)
Dept: ENT CLINIC | Age: 66
End: 2021-01-15
Payer: MEDICARE

## 2021-01-15 VITALS
HEART RATE: 60 BPM | WEIGHT: 193 LBS | TEMPERATURE: 97.2 F | DIASTOLIC BLOOD PRESSURE: 60 MMHG | BODY MASS INDEX: 35.51 KG/M2 | SYSTOLIC BLOOD PRESSURE: 112 MMHG | RESPIRATION RATE: 16 BRPM | HEIGHT: 62 IN

## 2021-01-15 DIAGNOSIS — H91.21 SUDDEN RIGHT HEARING LOSS: Primary | ICD-10-CM

## 2021-01-15 PROCEDURE — 1090F PRES/ABSN URINE INCON ASSESS: CPT | Performed by: OTOLARYNGOLOGY

## 2021-01-15 PROCEDURE — 1123F ACP DISCUSS/DSCN MKR DOCD: CPT | Performed by: OTOLARYNGOLOGY

## 2021-01-15 PROCEDURE — 4040F PNEUMOC VAC/ADMIN/RCVD: CPT | Performed by: OTOLARYNGOLOGY

## 2021-01-15 PROCEDURE — 1036F TOBACCO NON-USER: CPT | Performed by: OTOLARYNGOLOGY

## 2021-01-15 PROCEDURE — G8484 FLU IMMUNIZE NO ADMIN: HCPCS | Performed by: OTOLARYNGOLOGY

## 2021-01-15 PROCEDURE — 3017F COLORECTAL CA SCREEN DOC REV: CPT | Performed by: OTOLARYNGOLOGY

## 2021-01-15 PROCEDURE — G8400 PT W/DXA NO RESULTS DOC: HCPCS | Performed by: OTOLARYNGOLOGY

## 2021-01-15 PROCEDURE — 99203 OFFICE O/P NEW LOW 30 MIN: CPT | Performed by: OTOLARYNGOLOGY

## 2021-01-15 PROCEDURE — G8427 DOCREV CUR MEDS BY ELIG CLIN: HCPCS | Performed by: OTOLARYNGOLOGY

## 2021-01-15 PROCEDURE — G8417 CALC BMI ABV UP PARAM F/U: HCPCS | Performed by: OTOLARYNGOLOGY

## 2021-01-15 RX ORDER — PREDNISONE 10 MG/1
TABLET ORAL
Qty: 53 TABLET | Refills: 0 | Status: SHIPPED | OUTPATIENT
Start: 2021-01-15 | End: 2021-01-28 | Stop reason: ALTCHOICE

## 2021-01-15 ASSESSMENT — ENCOUNTER SYMPTOMS
COLOR CHANGE: 0
NAUSEA: 0
RHINORRHEA: 0
SINUS PRESSURE: 0
CHOKING: 0
TROUBLE SWALLOWING: 0
SORE THROAT: 0
COUGH: 0
WHEEZING: 0
VOICE CHANGE: 0
STRIDOR: 0
VOMITING: 0
FACIAL SWELLING: 0
CHEST TIGHTNESS: 0
DIARRHEA: 0
SHORTNESS OF BREATH: 0
ABDOMINAL PAIN: 0
APNEA: 0

## 2021-01-15 NOTE — PROGRESS NOTES
Subjective:      Patient ID: Lucas Arnett is a 76 y.o. female. New pt here for sudden hearing loss, referred by audio. HPI: woke up one morning with hissing noise in both ears, followed a couple of days later by loss of hearing in the right ear. Used to get hearing tests at work. Last one was seven years ago. Was not required to wear ear protection. Denies recent vertigo. Did have a couple of episode last year and had the Epley maneuver done X 2. Does not smoke. Had open heart surgery X 2. No hx of diabetes. Review of Systems   Constitutional: Negative for activity change, appetite change, chills, diaphoresis, fatigue, fever and unexpected weight change. HENT: Negative for congestion, dental problem, ear discharge, ear pain, facial swelling, hearing loss, mouth sores, nosebleeds, postnasal drip, rhinorrhea, sinus pressure, sneezing, sore throat, tinnitus, trouble swallowing and voice change. Eyes: Negative for visual disturbance. Respiratory: Negative for apnea, cough, choking, chest tightness, shortness of breath, wheezing and stridor. Cardiovascular: Negative for chest pain, palpitations and leg swelling. Gastrointestinal: Negative for abdominal pain, diarrhea, nausea and vomiting. Endocrine: Negative for cold intolerance, heat intolerance, polydipsia and polyuria. Genitourinary: Negative for difficulty urinating, dysuria, enuresis, hematuria and urgency. Musculoskeletal: Negative for arthralgias, gait problem, neck pain and neck stiffness. Skin: Negative for color change and rash. Allergic/Immunologic: Negative for environmental allergies and immunocompromised state. Neurological: Negative for dizziness, syncope, facial asymmetry, speech difficulty, light-headedness and headaches. Hematological: Negative for adenopathy. Does not bruise/bleed easily. Psychiatric/Behavioral: Negative for confusion and sleep disturbance. The patient is not nervous/anxious.       Patient Active Problem List   Diagnosis    Osteoarthritis    H/O prosthetic aortic valve replacement    Dyslipidemia    Former smoker    GERD (gastroesophageal reflux disease)    Heart failure with preserved ejection fraction (HCC)    History of subarachnoid hemorrhage    Hypertension    Major depression    THAYER (nonalcoholic steatohepatitis)    S/P AVR (aortic valve replacement)    S/P mitral valve repair    History of aortic stenosis, s/p valve replacement x 2    History of iron deficiency anemia    Valvular heart disease    RLS (restless legs syndrome)    COPD (chronic obstructive pulmonary disease) ([address]    DM2 (diabetes mellitus, type 2) ([address]    Morbid obesity (HCC)    Fibromyalgia       Past Medical History:   Diagnosis Date    COPD (chronic obstructive pulmonary disease) ([address]     DM2 (diabetes mellitus, type 2) ([address]     Dyslipidemia     Fibromyalgia     Former smoker     GERD (gastroesophageal reflux disease)     H/O prosthetic aortic valve replacement     Early bioprosthetic aortic valve failure with severe symptomatic prosthetic aortic regurgitation is now s/p REDO AORTIC VALVE REPLACEMENT, MITRAL VALVE REPAIR, TRICUSPID VALVE REPAIR, WILBERTO performed by Porter Evans MD at [address] [place] [address] Heart failure with preserved ejection fraction ([address]     History of aortic stenosis, s/p valve replacement x 2     History of iron deficiency anemia     History of subarachnoid hemorrhage 03/23/2015    Spontaneous SAH. Admitted to [facility]. Extensive w/u for cause was negative.      Hypertension     Major depression     Morbid obesity ([address]     THAYER (nonalcoholic steatohepatitis)     Osteoarthritis     RLS (restless legs syndrome) 1/16/2019    S/P AVR (aortic valve replacement) 2015    x 2, last replacement July 2018 d/t failure of the 1st    S/P mitral valve repair 07/13/2018    Dr. Andreia Rocha Valvular heart disease 1/16/2019        Past Surgical History:   Procedure Laterality Date    AORTIC VALVE REPLACEMENT      cow valve    BRAIN SURGERY      to drain blood    CARDIAC [facility]HETERIZATION  2004 or 2005    [facility]    COLONOSCOPY      DIAGNOSTIC CARDIAC CATH LAB PROCEDURE      PARTIAL HYSTERECTOMY N/A 2004    Endometriosis    FL ECHO TRANSESOPHAG R-T 2D IMG ACQUISJ I&R ONLY Left 7/3/2018    TRANSESOPHAGEAL ECHOCARDIOGRAM performed by Melanie Alberts MD at [facility]    FL OFFICE/OUTPT VISIT,PROCEDURE ONLY N/A 7/13/2018    REDO AORTIC VALVE REPLACEMENT, MITRAL VALVE REPAIR, TRICUSPID VALVE REPAIR, WILBERTO performed by Dilan Suárez MD at [address]         Current Outpatient Medications   Medication Sig Dispense Refill    predniSONE (DELTASONE) 10 MG tablet Day 1-3 40mg BID,  Day 4-5 30mg BID, Day 6-7 20mg BID, Day 8-9 10mg BID, Day 10 10mg QAM.  Fill only Prednisone - not Dexpack.  53 tablet 0    predniSONE (DELTASONE) 20 MG tablet 1 tab po tid for 3 days 1 tab po bid for 3 days 1 tab po once day for 3 days 18 tablet 0    potassium chloride (KLOR-CON M) 20 MEQ extended release tablet TAKE 1 TABLET EVERY DAY 90 tablet 3    citalopram (CELEXA) 20 MG tablet TAKE 1 TABLET EVERY DAY 90 tablet 3    atorvastatin (LIPITOR) 10 MG tablet Take 1 tablet by mouth every evening 90 tablet 3    nortriptyline (PAMELOR) 25 MG capsule Take 1 capsule by mouth nightly 90 capsule 3    rOPINIRole (REQUIP) 2 MG tablet Take 1 tablet by mouth 2 times daily 180 tablet 3    lisinopril (PRINIVIL;ZESTRIL) 5 MG tablet TAKE 1 TABLET TWICE DAILY 180 tablet 0    omeprazole (PRILOSEC) 20 MG delayed release capsule TAKE 1 CAPSULE BY MOUTH EVERY MORNING (BEFORE BREAKFAST) 90 capsule 3    furosemide (LASIX) 20 MG tablet TAKE 1 TABLET EVERY DAY 90 tablet 3    metoprolol tartrate (LOPRESSOR) 25 MG tablet TAKE 1/2 TABLET TWICE DAILY 90 tablet 8    aspirin 81 MG tablet Take 81 mg by mouth daily      acetaminophen (TYLENOL) 325 MG tablet Take 2 tablets by mouth every 4 hours as needed (post op pain) 120 tablet 3 No current facility-administered medications for this visit.         Allergies   Allergen Reactions    Latex Rash    Demerol Hcl [Meperidine] Hives       Family History   Problem Relation Age of Onset    Heart Attack Mother     Heart Disease Mother     Heart Surgery Mother     Hypertension Mother     High Blood Pressure Mother     Diabetes Mother     Diabetes Father     Breast Cancer Maternal Grandmother         Unsure age   Patricia Reyes Colon Cancer Neg Hx        Social History     Socioeconomic History    Marital status:      Spouse name: Sharad Corral Number of children: 2    Years of education: Not on file    Highest education level: Not on file   Occupational History    Not on file   Social Needs    Financial resource strain: Not on file    Food insecurity     Worry: Not on file     Inability: Not on file   [employer] needs     Medical: Not on file     Non-medical: Not on file   Tobacco Use    Smoking status: Former Smoker     Packs/day: 0.50     Years: 44.00     Pack years: 22.00     Types: Cigarettes     Quit date: 2018     Years since quittin.5    Smokeless tobacco: Former User   Substance and Sexual Activity    Alcohol use: No    Drug use: No    Sexual activity: Not Currently   Lifestyle    Physical activity     Days per week: Not on file     Minutes per session: Not on file    Stress: Not on file   Relationships    Social connections     Talks on phone: Not on file     Gets together: Not on file     Attends [employer] service: Not on file     Active member of club or organization: Not on file     Attends meetings of clubs or organizations: Not on file     Relationship status: Not on file    Intimate partner violence     Fear of current or ex partner: Not on file     Emotionally abused: Not on file     Physically abused: Not on file     Forced sexual activity: Not on file   Other Topics Concern    Not on file   Social History Narrative    Not on file       Objective: Physical Exam  This is a 76 y.o. female. Patient is in no respiratory distress, alert and oriented to time and place. Not nasal, not hoarse. Not obviously hearing impaired. Vitals:    01/15/21 1047   BP: 112/60   Site: Right Upper Arm   Position: Sitting   Pulse: 60   Resp: 16   Temp: 97.2 °F (36.2 °C)   TempSrc: Infrared   Weight: 193 lb (87.5 kg)   Height: 5' 2\" (1.575 m)       Head is normocephalic. JAYLEN, EOM full,  no diplopia, no nystagmus. Conjunctivae pink, no discharge    Pinnae are WNL  R External auditory canal clear and free of any pathology  L  External auditory canal clear and free of any pathology                                                Tympanic membranes:      R intact, translucent                                                L intact, translucent    Nasal bones midline  Septum: midline  Mucosa: Dry, inflamed  Turbinates: congested  Discharge:  none    No facial redness, tenderness or swelling    No facial weakness. Salivary glands not enlarged or palpable    Lips, tongue and oral cavity show tongue is midline, mobile. Dentition: good               No malocclusion  Oral mucosa: Moist, no lesions  Oropharynx: clear  Uvula midline. Gag reflex present and symmetrical      Neck supple  Cervical adenopathy: none    Trachea midline  Thyroid not enlarged, no masses    Chest equal and symmetrical expansion, no retractions    Extremities: no clubbing, cyanosis or edema                        Gait normal    Cranial nerves grossly intact    Data:  All of the past medical history, past surgical history, family history, social history, allergies and current medications were reviewed. Assessment:      1. Sudden right hearing loss            Plan:      Reviewed and discussed: Has asymetric high frequency SNHL. Discrimination in the right ear improved on retesting.      Orders Placed This Encounter   Procedures    MRI BRAIN W WO CONTRAST     ATTENTION:  POSTERIOR FOSSA     Standing Status:

## 2021-01-19 ENCOUNTER — TELEPHONE (OUTPATIENT)
Dept: ENT CLINIC | Age: 66
End: 2021-01-19

## 2021-01-19 NOTE — TELEPHONE ENCOUNTER
Spoke w/ patient in regards to her cancelling the MRI. She did not want to reschedule it at this time.

## 2021-01-19 NOTE — TELEPHONE ENCOUNTER
Patient should understand that the reason for obtaining the MRI is to make sure there is no mass or lesion on the nerve of hearing that is causing her sound in the ear and asymmetric hearing, along with the few vertigo spells over the last year. This is standard of care. At minimum, I would recommend repeat hearing test in 3-6 months. Could also consider injection of steroid through the eardrum.

## 2021-01-21 ENCOUNTER — TELEPHONE (OUTPATIENT)
Dept: FAMILY MEDICINE CLINIC | Age: 66
End: 2021-01-21

## 2021-01-21 NOTE — TELEPHONE ENCOUNTER
Pt called stating she had cancelled the MRI ordered by Carly Bee. Pt stated she did not have a good experience with  and would like to discuss what steps to take next. Pt was put on another round of prednisone which has not helped with her hearing.     Please advise

## 2021-01-21 NOTE — TELEPHONE ENCOUNTER
Future Appointments   Date Time Provider Hola Wolfei   1/22/2021 11:40 AM Juan Pablo Enamorado DO [facility]   1/28/2021  1:45 PM Kenneth Moore MD N SRPX Heart [facility] [place]   2/4/2021  8:30 AM Anurag Glover [facility]   2/4/2021  9:30 AM CYNDY Lazo N ENT [facility]   6/8/2021  8:00 AM Kiran BRITT [address] [place] [address]

## 2021-01-22 ENCOUNTER — OFFICE VISIT (OUTPATIENT)
Dept: FAMILY MEDICINE CLINIC | Age: 66
End: 2021-01-22
Payer: MEDICARE

## 2021-01-22 VITALS
BODY MASS INDEX: 33.32 KG/M2 | HEIGHT: 64 IN | DIASTOLIC BLOOD PRESSURE: 65 MMHG | HEART RATE: 80 BPM | TEMPERATURE: 97.9 F | RESPIRATION RATE: 16 BRPM | SYSTOLIC BLOOD PRESSURE: 102 MMHG | OXYGEN SATURATION: 99 % | WEIGHT: 195.2 LBS

## 2021-01-22 DIAGNOSIS — H91.21 SUDDEN IDIOPATHIC HEARING LOSS OF RIGHT EAR WITH UNRESTRICTED HEARING OF LEFT EAR: Primary | ICD-10-CM

## 2021-01-22 DIAGNOSIS — E11.9 TYPE 2 DIABETES MELLITUS WITHOUT COMPLICATION, WITHOUT LONG-TERM CURRENT USE OF INSULIN (HCC): Chronic | ICD-10-CM

## 2021-01-22 PROCEDURE — 1090F PRES/ABSN URINE INCON ASSESS: CPT | Performed by: FAMILY MEDICINE

## 2021-01-22 PROCEDURE — 1036F TOBACCO NON-USER: CPT | Performed by: FAMILY MEDICINE

## 2021-01-22 PROCEDURE — 3017F COLORECTAL CA SCREEN DOC REV: CPT | Performed by: FAMILY MEDICINE

## 2021-01-22 PROCEDURE — 99214 OFFICE O/P EST MOD 30 MIN: CPT | Performed by: FAMILY MEDICINE

## 2021-01-22 PROCEDURE — 1123F ACP DISCUSS/DSCN MKR DOCD: CPT | Performed by: FAMILY MEDICINE

## 2021-01-22 PROCEDURE — G8427 DOCREV CUR MEDS BY ELIG CLIN: HCPCS | Performed by: FAMILY MEDICINE

## 2021-01-22 PROCEDURE — G8400 PT W/DXA NO RESULTS DOC: HCPCS | Performed by: FAMILY MEDICINE

## 2021-01-22 PROCEDURE — G8484 FLU IMMUNIZE NO ADMIN: HCPCS | Performed by: FAMILY MEDICINE

## 2021-01-22 PROCEDURE — 4040F PNEUMOC VAC/ADMIN/RCVD: CPT | Performed by: FAMILY MEDICINE

## 2021-01-22 PROCEDURE — 3046F HEMOGLOBIN A1C LEVEL >9.0%: CPT | Performed by: FAMILY MEDICINE

## 2021-01-22 PROCEDURE — G8417 CALC BMI ABV UP PARAM F/U: HCPCS | Performed by: FAMILY MEDICINE

## 2021-01-22 PROCEDURE — 2022F DILAT RTA XM EVC RTNOPTHY: CPT | Performed by: FAMILY MEDICINE

## 2021-01-22 NOTE — PATIENT INSTRUCTIONS
monitor. · Use pagers, fax machines, and email if it is hard for you to communicate by telephone. · Try to learn a listening technique called speech-reading. It is not lip-reading. You pay attention to people's gestures, expressions, posture, and tone of voice. These clues can help you understand what a person is saying. Face the person you are talking to, and have him or her face you. Make sure the lighting is good. You need to see the other person's face clearly. · Think about counseling if you need help to adjust to your hearing loss. When should you call for help? Watch closely for changes in your health, and be sure to contact your doctor if:    · You think your hearing is getting worse.     · You have new symptoms, such as dizziness or nausea. Where can you learn more? Go to [email or web address] and sign in to your [employer] account. Enter Q576 in the GENIAC box to learn more about \"Hearing Loss: Care Instructions. \"     If you do not have an account, please click on the \"Sign Up Now\" link. Current as of: April 15, 2020               Content Version: 12.6  © 0820-5880 [employer]. Care instructions adapted under license by [facility] ([facility]). If you have questions about a medical condition or this instruction, always ask your healthcare professional. [address] any warranty or liability for your use of this information.

## 2021-01-22 NOTE — PROGRESS NOTES
Chief Complaint   Patient presents with    Hearing Loss     R ear       History obtained from the patient.     SUBJECTIVE:  Daphney Jameson is a 76 y.o. female that presents today for      -Hearing loss R ear:  Started a few wks ago  Probably 1/3  Saw Franky Granados in the office on 1/5  Placed on oral steroids and sent to see audiology  Dx with SNHL in the R ear  Had f/u with audio, no better  Sent to ENT  Placed back on steroids  MRI ordered  F/u audio setup  Pt here today to f/u  No improvement in hearing  On steroids yet, tapering down  Hx of vertigo, none recent  Does have some tinnitus in both ears  No HAs    Pt does not want to f/u with ENT she saw, was not happy with her  Also does not want to do the MRI that was ordered by that ENT  She's ok if I order the MRI    Does have DM  Not checking sugars  On steroids for a wk or so again, no polyphagia, uria or dipsia      Age/Gender Health Maintenance    Lipid -   Lab Results   Component Value Date    CHOL 181 12/02/2020    CHOL 200 (H) 12/30/2019    CHOL 184 01/24/2019     Lab Results   Component Value Date    TRIG 178 12/02/2020    TRIG 236 (H) 12/30/2019    TRIG 155 01/24/2019     Lab Results   Component Value Date    HDL 43 12/02/2020    HDL 47 12/30/2019    HDL 53 01/24/2019     Lab Results   Component Value Date    LDLCALC 102 12/02/2020    [address] 106 12/30/2019    [address] 100 01/24/2019       Colon Cancer Screening - Completed NOV 2020, 1 polyp per pt, repeat 3 years, records pending  Lung Cancer Screening (Age 54 to [de-identified] with 30 pack year hx, current smoker or quit within past 15 years) - <30 pack year hx    Tetanus - records pending  Influenza Vaccine - UTD DEC 2020  Pneumonia Vaccine - UTD PPV 23 in OCT 2017  Zoster - to get at pharmacy per medicare rules     Breast Cancer Screening - NEG JAN 2020  Cervical Cancer Screening - consult for GYN placed; has apt soon/plans to reschedule soon (AUG 2019) and JAN 2020  Osteoporosis Screening - due, pain) 120 tablet 3     No current facility-administered medications for this visit. No orders of the defined types were placed in this encounter. All medications reviewed and reconciled, including OTC and herbal medications. Updated list given to patient. Patient Active Problem List    Diagnosis Date Noted    Fibromyalgia     Morbid obesity ([facility]     DM2 (diabetes mellitus, type 2) ([address]     COPD (chronic obstructive pulmonary disease) ([facility]     Valvular heart disease 01/16/2019    RLS (restless legs syndrome) 01/16/2019    Dyslipidemia     Former smoker     GERD (gastroesophageal reflux disease)     Heart failure with preserved ejection fraction ([address]     Hypertension     Major depression     THAYER (nonalcoholic steatohepatitis)     History of aortic stenosis, s/p valve replacement x 2     History of iron deficiency anemia     S/P mitral valve repair 07/13/2018     Dr. Shashi Whyte H/O prosthetic aortic valve replacement 07/03/2018     Early bioprosthetic aortic valve failure with severe symptomatic prosthetic aortic regurgitation      History of subarachnoid hemorrhage 03/23/2015     Spontaneous SAH. Admitted to [facility]. Extensive w/u for cause was negative.        Osteoarthritis     S/P AVR (aortic valve replacement) 01/01/2015     x 2, last replacement July 2018 d/t failure of the 1st         Past Medical History:   Diagnosis Date    COPD (chronic obstructive pulmonary disease) ([facility]     DM2 (diabetes mellitus, type 2) ([facility]     Dyslipidemia     Fibromyalgia     Former smoker     GERD (gastroesophageal reflux disease)     H/O prosthetic aortic valve replacement     Early bioprosthetic aortic valve failure with severe symptomatic prosthetic aortic regurgitation is now s/p REDO AORTIC VALVE REPLACEMENT, MITRAL VALVE REPAIR, TRICUSPID VALVE REPAIR, WILBERTO performed by Criselda Diaz MD at [address] [place] [address] failure with preserved ejection fraction ([facility]     History of aortic stenosis, s/p valve replacement x 2     History of iron deficiency anemia     History of subarachnoid hemorrhage 2015    Spontaneous SAH. Admitted to [facility]. Extensive w/u for cause was negative.      Hypertension     Major depression     Morbid obesity ([address]     THAYER (nonalcoholic steatohepatitis)     Osteoarthritis     RLS (restless legs syndrome) 2019    S/P AVR (aortic valve replacement) 2015    x 2, last replacement 2018 d/t failure of the 1st    S/P mitral valve repair 2018    Dr. Hari Cox Valvular heart disease 2019       Past Surgical History:   Procedure Laterality Date    AORTIC VALVE REPLACEMENT      cow valve    BRAIN SURGERY      to drain blood    CARDIAC CATHETERIZATION   or     [facility]    COLONOSCOPY      DIAGNOSTIC CARDIAC CATH LAB PROCEDURE      PARTIAL HYSTERECTOMY N/A     Endometriosis    MS ECHO TRANSESOPHAG R-T 2D IMG ACQUISJ I&R ONLY Left 7/3/2018    TRANSESOPHAGEAL ECHOCARDIOGRAM performed by Isabel Stevenson MD at [facility]    MS OFFICE/OUTPT VISIT,PROCEDURE ONLY N/A 2018    REDO AORTIC VALVE REPLACEMENT, MITRAL VALVE REPAIR, TRICUSPID VALVE REPAIR, WILBERTO performed by Lilliana Jacobs MD at [address]         Allergies   Allergen Reactions    Latex Rash    Demerol Hcl [Meperidine] Hives       Social History     Tobacco Use    Smoking status: Former Smoker     Packs/day: 0.50     Years: 44.00     Pack years: 22.00     Types: Cigarettes     Quit date: 2018     Years since quittin.5    Smokeless tobacco: Former User   Substance Use Topics    Alcohol use: No       Family History   Problem Relation Age of Onset    Heart Attack Mother     Heart Disease Mother     Heart Surgery Mother     Hypertension Mother     High Blood Pressure Mother     Diabetes Mother     Diabetes Father     Breast Cancer Maternal Grandmother         Unsure age   Marcus Clark Colon Cancer Neg Hx          I have reviewed the patient's past medical history, past surgical history, allergies, medications, social and family history and I have made updates where appropriate. Review of Systems  Positive responses are highlighted in bold    Constitutional:  Fever, Chills, Night Sweats, Fatigue, Unexpected changes in weight  HENT:  Ear pain, Tinnitus, Nosebleeds, Trouble swallowing, Hearing loss, Sore throat  Cardiovascular:  Chest Pain, Palpitations, Orthopnea, Paroxysmal Nocturnal Dyspnea  Respiratory:  Cough, Wheezing, Shortness of breath, Chest tightness, Apnea  Gastrointestinal:  Nausea, Vomiting, Diarrhea, Constipation, Heartburn, Blood in stool  Genitourinary:  Difficulty or painful urination, Flank pain, Change in frequency, Urgency  Skin:  Color change, Rash, Itching, Wound  Musculoskeletal:  Joint pain, Back pain, Gait problems, Joint swelling, Myalgias  Neurological:  Dizziness, Headaches, Presyncope, Numbness, Seizures, Tremors  Endocrine:  Heat Intolerance, Cold Intolerance, Polydipsia, Polyphagia, Polyuria      PHYSICAL EXAM:  Vitals:    01/22/21 1125   BP: 102/65   Pulse: 80   Resp: 16   Temp: 97.9 °F (36.6 °C)   TempSrc: Temporal   SpO2: 99%   Weight: 195 lb 3.2 oz (88.5 kg)   Height: 5' 3.5\" (1.613 m)     Body mass index is 34.04 kg/m².   Pain Score:   0 - No pain    VS Reviewed  General Appearance: A&O x 3, No acute distress,well developed and well- nourished  Eyes: pupils equal, round, and reactive to light, extraocular eye movements intact, conjunctivae and eye lids without erythema  ENT: external ear and ear canal clear bilaterally, TMs intact and regular, nose without deformity, nasal mucosa and turbinates normal without polyps, oropharynx normal, dentition is normal for age  Neck: supple and non-tender without mass, no thyromegaly or thyroid nodules, no cervical lymphadenopathy  Pulmonary/Chest: clear to auscultation bilaterally- no wheezes, rales or rhonchi, normal air movement, no respiratory distress or retractions  Cardiovascular: S1 and S2 auscultated w/ RRR. No murmurs appreciated today. No rubs, clicks, or gallops, distal pulses intact. Abdomen: soft, non-tender, non-distended, bowel sounds physiologic,  no rebound or guarding, no masses or hernias noted. Liver and spleen without enlargement. Extremities: no cyanosis, clubbing or edema of the lower extremities. +2 PT/DP bilaterally. Skin: warm and dry, no rash or erythema  Neuro Exam:   Cranial [id number] Intact.    Cranial nerve II: Normal Visual Acuity   Cranial nerve III: Pupils: equal, round, reactive to light   Cranial nerves III, IV, VI: Extraocular Movements: intact   Cranial nerve V: Facial sensation: intact   Cranial nerve VII:Facial strength: intact   Cranial nerve VIII: Hearing: intact   Cranial nerve IX: Palate Elevation intact bilaterally  Cranial nerve XI: Shoulder shrug intact bilaterally  Cranial nerve XII: Tongue movement: normal     Muscle Strength Testing    Deltoid Right 5/5  Left 5/5  Biceps Right 5/5  Left 5/5  Triceps Right 5/5  Left 5/5  Wrist Extensors Right  5/5  Left 5/5   Flexor Digitorum Profundus Right 5/5  Left 5/5  Hand Intrinsics Right 5/5  Left 5/5  Hip Flexors Right 5/5  Left 5/5  Quadriceps Right 5/5  Left 5/5  Anterior Tibialis Right 5/5  Left 5/5  Extensor Hallucis Longus Right 5/5  Left 5/5  Gastrocnemius/Soleus Right 5/5  Left 5/5     Sensory Testing    C5 Right 0=Normal; no sensory loss Left 0=Normal; no sensory loss  C6 Right 0=Normal; no sensory loss Left 0=Normal; no sensory loss  C7 Right 0=Normal; no sensory loss Left 0=Normal; no sensory loss  C8 Right 0=Normal; no sensory loss Left 0=Normal; no sensory loss  T1 Right 0=Normal; no sensory loss Left 0=Normal; no sensory loss    L2 Right 0=Normal; no sensory loss Left 0=Normal; no sensory loss  L3 Right 0=Normal; no sensory loss Left 0=Normal; no sensory loss  L4 Right 0=Normal; no sensory loss Left 0=Normal; no sensory loss  L5 Right 0=Normal; no sensory loss Left 0=Normal; no sensory loss  S1 Right 0=Normal; no sensory loss Left 0=Normal; no sensory loss     Cerebellar Testing    Finger to Nose:  Right  WNL Yes;  Left WNL  Yes  Heel to Sanchez WNL: Right  WNL  Yes;  Left WNL  Yes     Deep Tendon Reflexes 2/4 equal and symmetric throughout   Clonus:  No  Decreased arm swing No   Shuffling gait No  Narrow base of support No  Able to stand without using arms  Yes  Bradykinesia  No  Tremor No  Increased tone at wrist especially with opening/closing opposite hand  No      ASSESSMENT & PLAN  1. 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 We discussed, at length, the dx, treatment and natural hx of this dz  Recommend con't steroids until completed    - MRI BRAIN W WO CONTRAST; Future    2. Type 2 diabetes mellitus without complication, without long-term current use of insulin (HCC)    Will need to monitor for s/s hyperglycemia while on steroids  So far, not having any  No need to check sugars since will be off steroids soon  Typically diet controlled    Lab Results   Component Value Date    LABA1C 5.8 (H) 12/07/2020     No results found for: EAG      DISPOSITION    Return if symptoms worsen or fail to improve. Aris Neth released without restrictions. Future Appointments   Date Time Provider Hola Carrion   1/28/2021  1:45 PM Kenneth Jarvis MD [facility] [place]   2/4/2021  8:30 AM Leonie [facility]   2/4/2021  9:30 AM CYNDY Wetzel [facility]NT [facility] [place]   6/8/2021  8:00 AM Sherine Francois DO [address]    Patient given educational materials on: See Attached    Barriers to learning and self management: none    Discussed use, benefit, and side effects of prescribed medications. Barriers to medication compliance addressed. All patient questions answered. Pt voiced understanding.        Electronically signed by Ruy Martel DO on 1/22/2021 at 12:11 PM

## 2021-01-27 NOTE — TELEPHONE ENCOUNTER
The patient called the [facility] office to let us know that she will be going to her family doctor for the follow up appointment. Erica Goodell stated that she does not like the doctor here; she will keep the hearing test on 2/4/21, but wants to cancel the follow up with [facility]. Erica Goodell stated that her family doctor has ordered an MRI for her.

## 2021-01-28 ENCOUNTER — OFFICE VISIT (OUTPATIENT)
Dept: CARDIOLOGY CLINIC | Age: 66
End: 2021-01-28
Payer: MEDICARE

## 2021-01-28 VITALS
HEART RATE: 76 BPM | DIASTOLIC BLOOD PRESSURE: 64 MMHG | WEIGHT: 194.8 LBS | HEIGHT: 62 IN | BODY MASS INDEX: 35.85 KG/M2 | SYSTOLIC BLOOD PRESSURE: 136 MMHG

## 2021-01-28 DIAGNOSIS — Z95.2 S/P MVR (MITRAL VALVE REPLACEMENT): ICD-10-CM

## 2021-01-28 DIAGNOSIS — I10 ESSENTIAL HYPERTENSION: ICD-10-CM

## 2021-01-28 DIAGNOSIS — Z95.2 S/P AVR: Primary | ICD-10-CM

## 2021-01-28 PROCEDURE — 3017F COLORECTAL CA SCREEN DOC REV: CPT | Performed by: NUCLEAR MEDICINE

## 2021-01-28 PROCEDURE — G8417 CALC BMI ABV UP PARAM F/U: HCPCS | Performed by: NUCLEAR MEDICINE

## 2021-01-28 PROCEDURE — 4040F PNEUMOC VAC/ADMIN/RCVD: CPT | Performed by: NUCLEAR MEDICINE

## 2021-01-28 PROCEDURE — 1036F TOBACCO NON-USER: CPT | Performed by: NUCLEAR MEDICINE

## 2021-01-28 PROCEDURE — G8400 PT W/DXA NO RESULTS DOC: HCPCS | Performed by: NUCLEAR MEDICINE

## 2021-01-28 PROCEDURE — 1123F ACP DISCUSS/DSCN MKR DOCD: CPT | Performed by: NUCLEAR MEDICINE

## 2021-01-28 PROCEDURE — G8484 FLU IMMUNIZE NO ADMIN: HCPCS | Performed by: NUCLEAR MEDICINE

## 2021-01-28 PROCEDURE — G8427 DOCREV CUR MEDS BY ELIG CLIN: HCPCS | Performed by: NUCLEAR MEDICINE

## 2021-01-28 PROCEDURE — 99213 OFFICE O/P EST LOW 20 MIN: CPT | Performed by: NUCLEAR MEDICINE

## 2021-01-28 PROCEDURE — 1090F PRES/ABSN URINE INCON ASSESS: CPT | Performed by: NUCLEAR MEDICINE

## 2021-01-28 NOTE — PROGRESS NOTES
[address]  [facility] [address]  Dept: [phone]  Dept Fax: [phone]  Loc: [phone]    Visit Date: 1/28/2021    Rob Ba is a 76 y.o. female who presents todayfor:  Chief Complaint   Patient presents with    1 Year Follow Up    Cardiac Valve Problem    Hypertension    Hyperlipidemia   know Avr and MVr  AVr twice   No chest pain   no changes in breathing  Some baseline dyspnea  Bp is  Stable  No dizziness  No syncope  No ER visits        HPI:  HPI  Past Medical History:   Diagnosis Date    COPD (chronic obstructive pulmonary disease) ([address]     DM2 (diabetes mellitus, type 2) ([address]     Dyslipidemia     Fibromyalgia     Former smoker     GERD (gastroesophageal reflux disease)     H/O prosthetic aortic valve replacement     Early bioprosthetic aortic valve failure with severe symptomatic prosthetic aortic regurgitation is now s/p REDO AORTIC VALVE REPLACEMENT, MITRAL VALVE REPAIR, TRICUSPID VALVE REPAIR, WILBERTO performed by Porter Evans MD at [address] [place] [address] Heart failure with preserved ejection fraction ([address]     History of aortic stenosis, s/p valve replacement x 2     History of iron deficiency anemia     History of subarachnoid hemorrhage 03/23/2015    Spontaneous SAH. Admitted to [facility]. Extensive w/u for cause was negative.      Hypertension     Major depression     Morbid obesity ([address]     THAYER (nonalcoholic steatohepatitis)     Osteoarthritis     RLS (restless legs syndrome) 1/16/2019    S/P AVR (aortic valve replacement) 2015    x 2, last replacement July 2018 d/t failure of the 1st    S/P mitral valve repair 07/13/2018    Dr. Andreia Rocha Valvular heart disease 1/16/2019      Past Surgical History:   Procedure Laterality Date    AORTIC VALVE REPLACEMENT      cow valve    BRAIN SURGERY      to drain blood    CARDIAC [facility]  2004 or [facility]    [facility]    COLONOSCOPY      DIAGNOSTIC CARDIAC CATH LAB PROCEDURE      PARTIAL HYSTERECTOMY N/A     Endometriosis    DC ECHO TRANSESOPHAG R-T 2D IMG ACQUISJ I&R ONLY Left 7/3/2018    TRANSESOPHAGEAL ECHOCARDIOGRAM performed by Antonette Ang MD at [facility]    DC OFFICE/OUTPT VISIT,PROCEDURE ONLY N/A 2018    REDO AORTIC VALVE REPLACEMENT, MITRAL VALVE REPAIR, TRICUSPID VALVE REPAIR, WILBERTO performed by Talha Dietz MD at [address]       Family History   Problem Relation Age of Onset    Heart Attack Mother     Heart Disease Mother     Heart Surgery Mother     Hypertension Mother     High Blood Pressure Mother     Diabetes Mother     Diabetes Father     Breast Cancer Maternal Grandmother         Unsure age   Jose Orr Colon Cancer Neg Hx      Social History     Tobacco Use    Smoking status: Former Smoker     Packs/day: 0.50     Years: 44.00     Pack years: 22.00     Types: Cigarettes     Quit date: 2018     Years since quittin.6    Smokeless tobacco: Former User   Substance Use Topics    Alcohol use: No      Current Outpatient Medications   Medication Sig Dispense Refill    BIOTIN 5000 PO Take by mouth      potassium chloride (KLOR-CON M) 20 MEQ extended release tablet TAKE 1 TABLET EVERY DAY 90 tablet 3    citalopram (CELEXA) 20 MG tablet TAKE 1 TABLET EVERY DAY 90 tablet 3    atorvastatin (LIPITOR) 10 MG tablet Take 1 tablet by mouth every evening 90 tablet 3    nortriptyline (PAMELOR) 25 MG capsule Take 1 capsule by mouth nightly 90 capsule 3    rOPINIRole (REQUIP) 2 MG tablet Take 1 tablet by mouth 2 times daily 180 tablet 3    lisinopril (PRINIVIL;ZESTRIL) 5 MG tablet TAKE 1 TABLET TWICE DAILY 180 tablet 0    omeprazole (PRILOSEC) 20 MG delayed release capsule TAKE 1 CAPSULE BY MOUTH EVERY MORNING (BEFORE BREAKFAST) 90 capsule 3    furosemide (LASIX) 20 MG tablet TAKE 1 TABLET EVERY DAY 90 tablet 3    metoprolol tartrate (LOPRESSOR) 25 MG tablet TAKE 1/2 TABLET TWICE DAILY 90 tablet 8    aspirin 81 MG tablet Take 81 mg by mouth daily      acetaminophen (TYLENOL) 325 MG tablet Take 2 tablets by mouth every 4 hours as needed (post op pain) 120 tablet 3     No current facility-administered medications for this visit. Allergies   Allergen Reactions    Latex Rash    Demerol Hcl [Meperidine] Hives     Health Maintenance   Topic Date Due    Diabetic retinal exam  09/25/1964    DTaP/Tdap/Td vaccine (1 - Tdap) 09/25/1973    Shingles Vaccine (1 of 2) 09/25/2004    DEXA (modify frequency per FRAX score)  09/25/2009    Breast cancer screen  01/31/2021    A1C test (Diabetic or Prediabetic)  06/07/2021    Diabetic microalbuminuria test  12/02/2021    Lipid screen  12/02/2021    Potassium monitoring  12/02/2021    Creatinine monitoring  12/02/2021    Diabetic foot exam  12/07/2021    Annual Wellness Visit (AWV)  12/08/2021    Pneumococcal 65+ years Vaccine (1 of 1 - PPSV23) 10/21/2022    Colon cancer screen colonoscopy  11/25/2023    Flu vaccine  Completed    Hepatitis C screen  Completed    Hepatitis A vaccine  Aged Out    Hib vaccine  Aged Out    Meningococcal (ACWY) vaccine  Aged Out       Subjective:  Review of Systems  General:   No fever, no chills, No fatigue or weight loss  Pulmonary:    No dyspnea, no wheezing  Cardiac:    Denies recent chest pain,   GI:     No nausea or vomiting, no abdominal pain  Neuro:    No dizziness or light headedness,   Musculoskeletal:  No recent active issues  Extremities:   No edema, no obvious claudication       Objective:  Physical Exam  /64   Pulse 76   Ht 5' 2\" (1.575 m)   Wt 194 lb 12.8 oz (88.4 kg)   BMI 35.63 kg/m²   General:   Well developed, well nourished  Lungs:   Clear to auscultation  Heart:    Normal S1 S2, Slight murmur. no rubs, no gallops  Abdomen:   Soft, non tender, no organomegalies, positive bowel sounds  Extremities:   No edema, no cyanosis, good peripheral pulses  Neurological:   Awake, alert, oriented.  No obvious focal deficits  Musculoskelatal:  No obvious deformities    Assessment:      Diagnosis Orders   1. S/P AVR     2. S/P MVR (mitral valve replacement)     3. Essential hypertension     as above  Cardiac fair for now     Plan:  No follow-ups on file. As above  Continue risk factor modification and medical management  Thank you for allowing me to participate in the care of your patient. Please don't hesitate to contact me regarding any further issues related to the patient care    Orders Placed:  No orders of the defined types were placed in this encounter. Medications Prescribed:  No orders of the defined types were placed in this encounter. Discussed use, benefit, and side effects of prescribed medications. All patient questions answered. Pt voicedunderstanding. Instructed to continue current medications, diet and exercise. Continue risk factor modification and medical management. Patient agreed with treatment plan. Follow up as directed.     Electronically signedby Isabel Stevenson MD on 1/28/2021 at 1:44 PM

## 2021-02-08 ENCOUNTER — HOSPITAL ENCOUNTER (OUTPATIENT)
Dept: MRI IMAGING | Age: 66
Discharge: HOME | End: 2021-02-08
Payer: MEDICARE

## 2021-02-08 DIAGNOSIS — H91.21 SUDDEN IDIOPATHIC HEARING LOSS OF RIGHT EAR WITH UNRESTRICTED HEARING OF LEFT EAR: ICD-10-CM

## 2021-02-08 LAB — POC CREATININE WHOLE BLOOD: 0.8 MG/DL (ref 0.5–1.2)

## 2021-02-08 PROCEDURE — 70553 MRI BRAIN STEM W/O & W/DYE: CPT

## 2021-02-08 PROCEDURE — 82565 ASSAY OF CREATININE: CPT

## 2021-02-08 PROCEDURE — 6360000004 HC RX CONTRAST MEDICATION: Performed by: FAMILY MEDICINE

## 2021-02-08 PROCEDURE — A9579 GAD-BASE MR CONTRAST NOS,1ML: HCPCS | Performed by: FAMILY MEDICINE

## 2021-02-08 RX ADMIN — GADOTERIDOL 15 ML: 279.3 INJECTION, SOLUTION INTRAVENOUS at 10:05

## 2021-02-10 ENCOUNTER — TELEPHONE (OUTPATIENT)
Dept: FAMILY MEDICINE CLINIC | Age: 66
End: 2021-02-10

## 2021-02-10 NOTE — TELEPHONE ENCOUNTER
----- Message from Silvio Chaudhry DO sent at 2/10/2021  8:15 AM EST -----  Please let pt know that MRI brain showed no acute or concerning findings. No tumors on acoustic nerve. Let me know if questions, thanks!

## 2021-02-11 ENCOUNTER — HOSPITAL ENCOUNTER (OUTPATIENT)
Dept: AUDIOLOGY | Age: 66
Discharge: HOME | End: 2021-02-11
Payer: MEDICARE

## 2021-02-11 PROCEDURE — 92557 COMPREHENSIVE HEARING TEST: CPT | Performed by: AUDIOLOGIST

## 2021-02-11 PROCEDURE — 92567 TYMPANOMETRY: CPT | Performed by: AUDIOLOGIST

## 2021-02-11 NOTE — PROGRESS NOTES
AUDIOLOGICAL EVALUATION      REASON FOR TESTING:  Repeat audiometry and tympanometry due to sudden onset hearing loss for the right ear on 1/4/2021. The patient noted a \"hissing\" sounding tinnitus in both ears 1.5 weeks prior to the onset of the hearing loss. She followed up with her Debe JUAN Cruz on Tuesday (1/5/21) and started an oral steroid. She does not notice any improvement in hearing. She reports two episodes of vertigo within the past year, which have been treated with the Epley maneuver.  Normal MRI. OTOSCOPY: WNL for both ears. AUDIOGRAM        Reliability: Good  Audiometer Used:  [id number]    COMMENTS: Normal hearing sensitivity for both ears, sloping to moderately-severe high frequency sensorineural hearing loss for the left ear and severe high frequency sensorineural hearing loss for the right ear. For the right ear, the threshold at [facility] improved 20 dB and speech discrimination ability improved, relative to previous testing. Speech discrimination ability is excellent for both ears. Tympanometry revealed normal peak pressure and normal middle ear compliance for both ears. RECOMMENDATION(S):   1- Binaural amplification. The patient's insurance coverage was verified. She has benefits through [employer] program. [facility] is not a provider for the [employer] program. The patient was given [employer]'s phone number to locate a participating provider. The patient also inquired about having her brother's old hearing aids programmed for her hearing loss. She was instructed to obtain the hearing aid /model information and then it can be determined if the hearing aids can be fit for her. She was also informed of the [employer] hearing aid program if she is in need of financial assistance in obtaining amplification. 2- Repeat audiogram and tympanogram annually for monitoring purposes or sooner if any changes are noted in hearing ability.

## 2021-02-26 ENCOUNTER — IMMUNIZATION (OUTPATIENT)
Dept: PRIMARY CARE CLINIC | Age: 66
End: 2021-02-26
Payer: MEDICARE

## 2021-02-26 PROCEDURE — 91300 COVID-19, PFIZER VACCINE 30MCG/0.3ML DOSE: CPT

## 2021-02-26 PROCEDURE — 0001A COVID-19, PFIZER VACCINE 30MCG/0.3ML DOSE: CPT

## 2021-03-19 ENCOUNTER — IMMUNIZATION (OUTPATIENT)
Dept: PRIMARY CARE CLINIC | Age: 66
End: 2021-03-19
Payer: MEDICARE

## 2021-03-19 PROCEDURE — 0002A COVID-19, PFIZER VACCINE 30MCG/0.3ML DOSE: CPT | Performed by: FAMILY MEDICINE

## 2021-03-19 PROCEDURE — 91300 COVID-19, PFIZER VACCINE 30MCG/0.3ML DOSE: CPT | Performed by: FAMILY MEDICINE

## 2021-05-03 ENCOUNTER — OFFICE VISIT (OUTPATIENT)
Dept: FAMILY MEDICINE CLINIC | Age: 66
End: 2021-05-03
Payer: MEDICARE

## 2021-05-03 ENCOUNTER — APPOINTMENT (OUTPATIENT)
Dept: CT IMAGING | Age: 66
End: 2021-05-03
Payer: MEDICARE

## 2021-05-03 ENCOUNTER — APPOINTMENT (OUTPATIENT)
Dept: ULTRASOUND IMAGING | Age: 66
End: 2021-05-03
Payer: MEDICARE

## 2021-05-03 ENCOUNTER — APPOINTMENT (OUTPATIENT)
Dept: GENERAL RADIOLOGY | Age: 66
End: 2021-05-03
Payer: MEDICARE

## 2021-05-03 ENCOUNTER — NURSE TRIAGE (OUTPATIENT)
Dept: OTHER | Facility: CLINIC | Age: 66
End: 2021-05-03

## 2021-05-03 ENCOUNTER — HOSPITAL ENCOUNTER (EMERGENCY)
Age: 66
Discharge: HOME | End: 2021-05-03
Attending: EMERGENCY MEDICINE
Payer: MEDICARE

## 2021-05-03 VITALS
WEIGHT: 195 LBS | TEMPERATURE: 98.4 F | OXYGEN SATURATION: 95 % | HEIGHT: 62 IN | SYSTOLIC BLOOD PRESSURE: 101 MMHG | RESPIRATION RATE: 16 BRPM | HEART RATE: 60 BPM | BODY MASS INDEX: 35.88 KG/M2 | DIASTOLIC BLOOD PRESSURE: 42 MMHG

## 2021-05-03 VITALS
TEMPERATURE: 98.4 F | DIASTOLIC BLOOD PRESSURE: 64 MMHG | WEIGHT: 197 LBS | OXYGEN SATURATION: 96 % | RESPIRATION RATE: 14 BRPM | HEART RATE: 57 BPM | SYSTOLIC BLOOD PRESSURE: 136 MMHG | BODY MASS INDEX: 37.19 KG/M2 | HEIGHT: 61 IN

## 2021-05-03 DIAGNOSIS — K63.89 EPIPLOIC APPENDAGITIS: Primary | ICD-10-CM

## 2021-05-03 DIAGNOSIS — R10.811 RIGHT UPPER QUADRANT ABDOMINAL TENDERNESS WITHOUT REBOUND TENDERNESS: Primary | ICD-10-CM

## 2021-05-03 DIAGNOSIS — R19.8 POSITIVE MURPHY'S SIGN: ICD-10-CM

## 2021-05-03 DIAGNOSIS — R10.13 ABDOMINAL PAIN, EPIGASTRIC: ICD-10-CM

## 2021-05-03 LAB
ALBUMIN SERPL-MCNC: 4 G/DL (ref 3.5–5.1)
ALP BLD-CCNC: 52 U/L (ref 38–126)
ALT SERPL-CCNC: 21 U/L (ref 11–66)
ANION GAP SERPL CALCULATED.3IONS-SCNC: 11 MEQ/L (ref 8–16)
AST SERPL-CCNC: 27 U/L (ref 5–40)
BASOPHILS # BLD: 0.2 %
BASOPHILS ABSOLUTE: 0 THOU/MM3 (ref 0–0.1)
BILIRUB SERPL-MCNC: 0.4 MG/DL (ref 0.3–1.2)
BILIRUBIN DIRECT: < 0.2 MG/DL (ref 0–0.3)
BILIRUBIN URINE: ABNORMAL
BLOOD URINE, POC: NEGATIVE
BUN BLDV-MCNC: 11 MG/DL (ref 7–22)
CALCIUM SERPL-MCNC: 9.5 MG/DL (ref 8.5–10.5)
CHARACTER, URINE: ABNORMAL
CHLORIDE BLD-SCNC: 102 MEQ/L (ref 98–111)
CO2: 24 MEQ/L (ref 23–33)
COLOR, URINE: YELLOW
CREAT SERPL-MCNC: 0.8 MG/DL (ref 0.4–1.2)
EOSINOPHIL # BLD: 0.9 %
EOSINOPHILS ABSOLUTE: 0.1 THOU/MM3 (ref 0–0.4)
ERYTHROCYTE [DISTWIDTH] IN BLOOD BY AUTOMATED COUNT: 13.2 % (ref 11.5–14.5)
ERYTHROCYTE [DISTWIDTH] IN BLOOD BY AUTOMATED COUNT: 42.7 FL (ref 35–45)
GFR SERPL CREATININE-BSD FRML MDRD: 72 ML/MIN/1.73M2
GLUCOSE BLD-MCNC: 123 MG/DL (ref 70–108)
GLUCOSE URINE: NEGATIVE MG/DL
HCT VFR BLD CALC: 38.2 % (ref 37–47)
HEMOGLOBIN: 12 GM/DL (ref 12–16)
IMMATURE GRANS (ABS): 0.04 THOU/MM3 (ref 0–0.07)
IMMATURE GRANULOCYTES: 0.4 %
KETONES, URINE: NEGATIVE
LEUKOCYTE CLUMPS, URINE: NEGATIVE
LIPASE: 35.5 U/L (ref 5.6–51.3)
LYMPHOCYTES # BLD: 22.8 %
LYMPHOCYTES ABSOLUTE: 2.1 THOU/MM3 (ref 1–4.8)
MCH RBC QN AUTO: 27.8 PG (ref 26–33)
MCHC RBC AUTO-ENTMCNC: 31.4 GM/DL (ref 32.2–35.5)
MCV RBC AUTO: 88.4 FL (ref 81–99)
MONOCYTES # BLD: 7.9 %
MONOCYTES ABSOLUTE: 0.7 THOU/MM3 (ref 0.4–1.3)
NITRITE, URINE: NEGATIVE
NUCLEATED RED BLOOD CELLS: 0 /100 WBC
OSMOLALITY CALCULATION: 274.6 MOSMOL/KG (ref 275–300)
PH, URINE: 6 (ref 5–9)
PLATELET # BLD: 194 THOU/MM3 (ref 130–400)
PMV BLD AUTO: 11.3 FL (ref 9.4–12.4)
POTASSIUM REFLEX MAGNESIUM: 4.2 MEQ/L (ref 3.5–5.2)
PROTEIN, URINE: 30 MG/DL
RBC # BLD: 4.32 MILL/MM3 (ref 4.2–5.4)
SEG NEUTROPHILS: 67.8 %
SEGMENTED NEUTROPHILS ABSOLUTE COUNT: 6.1 THOU/MM3 (ref 1.8–7.7)
SODIUM BLD-SCNC: 137 MEQ/L (ref 135–145)
SPECIFIC GRAVITY, URINE: 1.02 (ref 1–1.03)
TOTAL PROTEIN: 7.2 G/DL (ref 6.1–8)
UROBILINOGEN, URINE: 0.2 EU/DL (ref 0–1)
WBC # BLD: 9 THOU/MM3 (ref 4.8–10.8)

## 2021-05-03 PROCEDURE — 1090F PRES/ABSN URINE INCON ASSESS: CPT | Performed by: FAMILY MEDICINE

## 2021-05-03 PROCEDURE — G8427 DOCREV CUR MEDS BY ELIG CLIN: HCPCS | Performed by: FAMILY MEDICINE

## 2021-05-03 PROCEDURE — 71045 X-RAY EXAM CHEST 1 VIEW: CPT

## 2021-05-03 PROCEDURE — 80053 COMPREHEN METABOLIC PANEL: CPT

## 2021-05-03 PROCEDURE — 74176 CT ABD & PELVIS W/O CONTRAST: CPT

## 2021-05-03 PROCEDURE — 1036F TOBACCO NON-USER: CPT | Performed by: FAMILY MEDICINE

## 2021-05-03 PROCEDURE — G8400 PT W/DXA NO RESULTS DOC: HCPCS | Performed by: FAMILY MEDICINE

## 2021-05-03 PROCEDURE — 6360000002 HC RX W HCPCS: Performed by: STUDENT IN AN ORGANIZED HEALTH CARE EDUCATION/TRAINING PROGRAM

## 2021-05-03 PROCEDURE — 83690 ASSAY OF LIPASE: CPT

## 2021-05-03 PROCEDURE — 99283 EMERGENCY DEPT VISIT LOW MDM: CPT

## 2021-05-03 PROCEDURE — G8417 CALC BMI ABV UP PARAM F/U: HCPCS | Performed by: FAMILY MEDICINE

## 2021-05-03 PROCEDURE — 85025 COMPLETE CBC W/AUTO DIFF WBC: CPT

## 2021-05-03 PROCEDURE — 99214 OFFICE O/P EST MOD 30 MIN: CPT | Performed by: FAMILY MEDICINE

## 2021-05-03 PROCEDURE — 76705 ECHO EXAM OF ABDOMEN: CPT

## 2021-05-03 PROCEDURE — 4040F PNEUMOC VAC/ADMIN/RCVD: CPT | Performed by: FAMILY MEDICINE

## 2021-05-03 PROCEDURE — 96374 THER/PROPH/DIAG INJ IV PUSH: CPT

## 2021-05-03 PROCEDURE — 1123F ACP DISCUSS/DSCN MKR DOCD: CPT | Performed by: FAMILY MEDICINE

## 2021-05-03 PROCEDURE — 3017F COLORECTAL CA SCREEN DOC REV: CPT | Performed by: FAMILY MEDICINE

## 2021-05-03 PROCEDURE — 96375 TX/PRO/DX INJ NEW DRUG ADDON: CPT

## 2021-05-03 PROCEDURE — 36415 COLL VENOUS BLD VENIPUNCTURE: CPT

## 2021-05-03 RX ORDER — 0.9 % SODIUM CHLORIDE 0.9 %
1000 INTRAVENOUS SOLUTION INTRAVENOUS ONCE
Status: DISCONTINUED | OUTPATIENT
Start: 2021-05-03 | End: 2021-05-03

## 2021-05-03 RX ORDER — KETOROLAC TROMETHAMINE 30 MG/ML
30 INJECTION, SOLUTION INTRAMUSCULAR; INTRAVENOUS ONCE
Status: COMPLETED | OUTPATIENT
Start: 2021-05-03 | End: 2021-05-03

## 2021-05-03 RX ORDER — MORPHINE SULFATE 4 MG/ML
4 INJECTION, SOLUTION INTRAMUSCULAR; INTRAVENOUS ONCE
Status: COMPLETED | OUTPATIENT
Start: 2021-05-03 | End: 2021-05-03

## 2021-05-03 RX ORDER — IBUPROFEN 800 MG/1
800 TABLET ORAL EVERY 8 HOURS PRN
Qty: 60 TABLET | Refills: 0 | Status: SHIPPED | OUTPATIENT
Start: 2021-05-03

## 2021-05-03 RX ORDER — ONDANSETRON 2 MG/ML
4 INJECTION INTRAMUSCULAR; INTRAVENOUS ONCE
Status: COMPLETED | OUTPATIENT
Start: 2021-05-03 | End: 2021-05-03

## 2021-05-03 RX ADMIN — KETOROLAC TROMETHAMINE 30 MG: 30 INJECTION, SOLUTION INTRAMUSCULAR; INTRAVENOUS at 15:24

## 2021-05-03 RX ADMIN — MORPHINE SULFATE 4 MG: 4 INJECTION, SOLUTION INTRAMUSCULAR; INTRAVENOUS at 13:24

## 2021-05-03 RX ADMIN — ONDANSETRON 4 MG: 2 INJECTION INTRAMUSCULAR; INTRAVENOUS at 13:24

## 2021-05-03 ASSESSMENT — PAIN DESCRIPTION - FREQUENCY: FREQUENCY: CONTINUOUS

## 2021-05-03 ASSESSMENT — ENCOUNTER SYMPTOMS
BLOOD IN STOOL: 0
RECTAL PAIN: 0
EYES NEGATIVE: 1
CHOKING: 0
DIARRHEA: 0
CONSTIPATION: 0
VOMITING: 1
STRIDOR: 0
WHEEZING: 0
CHEST TIGHTNESS: 0
NAUSEA: 1
ANAL BLEEDING: 0
COUGH: 0
SHORTNESS OF BREATH: 1
ALLERGIC/IMMUNOLOGIC NEGATIVE: 1
ABDOMINAL PAIN: 1
ABDOMINAL DISTENTION: 0

## 2021-05-03 ASSESSMENT — PAIN SCALES - GENERAL: PAINLEVEL_OUTOF10: 9

## 2021-05-03 ASSESSMENT — PAIN DESCRIPTION - LOCATION: LOCATION: ABDOMEN

## 2021-05-03 ASSESSMENT — PAIN DESCRIPTION - ORIENTATION: ORIENTATION: RIGHT;UPPER

## 2021-05-03 ASSESSMENT — PAIN DESCRIPTION - PAIN TYPE: TYPE: ACUTE PAIN

## 2021-05-03 NOTE — ED NOTES
Pt being sent by Dr Loco Hobson for eval of abd pain. Symptoms in R starting Saturday with worsening Sunday. Pt pain 9/10. No fevers but positive bray sign. Pt sent for cholecystitis rule out.       Olga Lidia Kahn RN  05/03/21 1146

## 2021-05-03 NOTE — PROGRESS NOTES
Chief Complaint   Patient presents with    Abdominal Pain     right sided abd pain radiating around to her back since Sat        History obtained from the patient. SUBJECTIVE:  eDb Ortiz is a 76 y.o. female that presents today for    -RUQ abd pain:  Started Saturday evening  Radiates down and into the back  Constant pain but made worse with deep breath and walking  Eating makes it worse as well  Sharp pain  Typically it's a 9/10  No change since Saturday   Started off of a sudden, no trigger  No fevers  Nauseated with some emesis. Denies previous RUQ pain  Eating makes it worse as well    Description: sharp   Provoking Factors - as above  Alleviating Factors - as above  Severity - 9/10   Radiation: Yes - as above    Change in pain with eating? Yes  Change in pain with BM? No  Nausea? no  Vomiting? no  Diarrhea? no  Constipation?  no  Blood in Stools? no  Dysuria/Change in Urinary Frequency/Hematuria? no  Back Pain? no      Age/Gender Health Maintenance    Lipid -   Lab Results   Component Value Date    CHOL 181 12/02/2020    CHOL 200 (H) 12/30/2019    CHOL 184 01/24/2019     Lab Results   Component Value Date    TRIG 178 12/02/2020    TRIG 236 (H) 12/30/2019    TRIG 155 01/24/2019     Lab Results   Component Value Date    HDL 43 12/02/2020    HDL 47 12/30/2019    HDL 53 01/24/2019     Lab Results   Component Value Date    LDLCALC 102 12/02/2020    LDLCALC 106 12/30/2019    LDLCALC 100 01/24/2019       Colon Cancer Screening - Completed NOV 2020, 1 polyp per pt, repeat 3 years, records pending  Lung Cancer Screening (Age 54 to [de-identified] with 30 pack year hx, current smoker or quit within past 15 years) - <30 pack year hx    Tetanus - records pending  Influenza Vaccine - UTD DEC 2020  Pneumonia Vaccine - UTD PPV 23 in OCT 2017  Zoster - to get at pharmacy per medicare rules     Breast Cancer Screening - NEG JAN 2020  Cervical Cancer Screening - consult for GYN placed; has apt soon/plans to reschedule soon (AUG 2019) and JAN 2020  Osteoporosis Screening - due, ordered    Diabetes Health Maintenance    A1C -   Lab Results   Component Value Date    LABA1C 5.8 12/07/2020    LABA1C 6.3 06/05/2020    LABA1C 6.5 12/30/2019    LABA1C 6.1 08/20/2019    LABA1C 6.4 02/21/2019    LABA1C 5.7 07/12/2018     ACE/ARB - yes, lisinopril 5mg  Eye - next visit  Foot -  UTD DEC 2020  ASA - yes  Microal/Cr -   Lab Results   Component Value Date    LABMICR 1.29 12/02/2020    LABCREA 284.9 12/02/2020    MACRR 5 12/02/2020     No results found for: CREATINEUR, MICROALBUR, MALBCR      eGFR -   No results found for: GFRESTIMATE  Lab Results   Component Value Date    LABGLOM 63 12/02/2020     No results found for: CRCLEARANCE  No results found for: EGFRNONAA  No results found for: EGFRAA    Statin - yes, lipitor      Current Outpatient Medications   Medication Sig Dispense Refill    metoprolol tartrate (LOPRESSOR) 25 MG tablet TAKE 1/2 TABLET TWICE DAILY 90 tablet 2    BIOTIN 5000 PO Take by mouth      potassium chloride (KLOR-CON M) 20 MEQ extended release tablet TAKE 1 TABLET EVERY DAY 90 tablet 3    citalopram (CELEXA) 20 MG tablet TAKE 1 TABLET EVERY DAY 90 tablet 3    atorvastatin (LIPITOR) 10 MG tablet Take 1 tablet by mouth every evening 90 tablet 3    nortriptyline (PAMELOR) 25 MG capsule Take 1 capsule by mouth nightly 90 capsule 3    rOPINIRole (REQUIP) 2 MG tablet Take 1 tablet by mouth 2 times daily 180 tablet 3    lisinopril (PRINIVIL;ZESTRIL) 5 MG tablet TAKE 1 TABLET TWICE DAILY 180 tablet 0    omeprazole (PRILOSEC) 20 MG delayed release capsule TAKE 1 CAPSULE BY MOUTH EVERY MORNING (BEFORE BREAKFAST) 90 capsule 3    furosemide (LASIX) 20 MG tablet TAKE 1 TABLET EVERY DAY 90 tablet 3    aspirin 81 MG tablet Take 81 mg by mouth daily      acetaminophen (TYLENOL) 325 MG tablet Take 2 tablets by mouth every 4 hours as needed (post op pain) 120 tablet 3     No current facility-administered medications for this visit.       No orders of the defined types were placed in this encounter. All medications reviewed and reconciled, including OTC and herbal medications. Updated list given to patient. Patient Active Problem List    Diagnosis Date Noted    Fibromyalgia     Morbid obesity ([address]     DM2 (diabetes mellitus, type 2) ([address]     COPD (chronic obstructive pulmonary disease) ([address]     Valvular heart disease 01/16/2019    RLS (restless legs syndrome) 01/16/2019    Dyslipidemia     Former smoker     GERD (gastroesophageal reflux disease)     Heart failure with preserved ejection fraction ([address]     Hypertension     Major depression     THAYER (nonalcoholic steatohepatitis)     History of aortic stenosis, s/p valve replacement x 2     History of iron deficiency anemia     S/P mitral valve repair 07/13/2018     Dr. Joseph Monique H/O prosthetic aortic valve replacement 07/03/2018     Early bioprosthetic aortic valve failure with severe symptomatic prosthetic aortic regurgitation      History of subarachnoid hemorrhage 03/23/2015     Spontaneous SAH. Admitted to [facility]. Extensive w/u for cause was negative.        Osteoarthritis     S/P AVR (aortic valve replacement) 01/01/2015     x 2, last replacement July 2018 d/t failure of the 1st         Past Medical History:   Diagnosis Date    COPD (chronic obstructive pulmonary disease) ([address]     DM2 (diabetes mellitus, type 2) ([address]     Dyslipidemia     Fibromyalgia     Former smoker     GERD (gastroesophageal reflux disease)     H/O prosthetic aortic valve replacement     Early bioprosthetic aortic valve failure with severe symptomatic prosthetic aortic regurgitation is now s/p REDO AORTIC VALVE REPLACEMENT, MITRAL VALVE REPAIR, TRICUSPID VALVE REPAIR, WILBERTO performed by Chelsi Vazquez MD at [address] [place] [address] failure with preserved ejection fraction ([address]     History of aortic stenosis, s/p valve replacement x 2     History of iron deficiency anemia     History of soft, +TTP RUQ with + Posey's sign, non-distended, bowel sounds physiologic,  no rebound or guarding, no masses or hernias noted. Liver and spleen without enlargement. Extremities: no cyanosis, clubbing or edema of the lower extremities. +2 PT/DP bilaterally. Skin: warm and dry, no rash or erythema      ASSESSMENT & PLAN  1. Right upper quadrant abdominal tenderness without rebound tenderness    Concerning for acute cholecystitis based on hx and exam  Needs seen in ER this AM for further w/u  If is acute cholecystitis, likely needs surgery ASAP  No s/s acute ascending cholangitis by hx or exam. But is in the ddx. 2. Positive Posey's Sign      DISPOSITION    Return if symptoms worsen or fail to improve. Dontae Johnson released without restrictions. Future Appointments   Date Time Provider Hola Carrion   6/15/2021  8:00 AM [address] [facility] [address]   2/2/2022 12:45 PM Kenneth Rodas MD [facility] [address] [facility]    Patient given educational materials on: See Attached    Barriers to learning and self management: none    Discussed use, benefit, and side effects of prescribed medications. Barriers to medication compliance addressed. All patient questions answered. Pt voiced understanding.        Electronically signed by Mami Felix DO on 5/3/2021 at 11:49 AM

## 2021-05-03 NOTE — ED TRIAGE NOTES
Pt presents to the ED via ED lobby with c/o RUQ abd pain that started 2 days ago. Pt reports she vomited once this morning before seeing her family doctor. Pt rates pain at 8/10. VSS, respirations even and unlabored.

## 2021-05-03 NOTE — ED PROVIDER NOTES
pain, nausea and vomiting. Negative for abdominal distention, anal bleeding, blood in stool, constipation, diarrhea and rectal pain. Endocrine: Negative. Genitourinary: Negative. Musculoskeletal: Negative. Skin: Negative. Allergic/Immunologic: Negative. Neurological: Negative. Hematological: Negative. Psychiatric/Behavioral: Negative. PAST MEDICAL AND SURGICAL HISTORY     Past Medical History:   Diagnosis Date    COPD (chronic obstructive pulmonary disease) [facility]     DM2 (diabetes mellitus, type 2) ([facility]     Dyslipidemia     Fibromyalgia     Former smoker     GERD (gastroesophageal reflux disease)     H/O prosthetic aortic valve replacement     Early bioprosthetic aortic valve failure with severe symptomatic prosthetic aortic regurgitation is now s/p REDO AORTIC VALVE REPLACEMENT, MITRAL VALVE REPAIR, TRICUSPID VALVE REPAIR, WILBERTO performed by Keegan Green MD at [facility] with preserved ejection fraction ([facility]     History of aortic stenosis, s/p valve replacement x 2     History of iron deficiency anemia     History of subarachnoid hemorrhage 03/23/2015    Spontaneous SAH. Admitted to [facility]. Extensive w/u for cause was negative.      Hypertension     Major depression     Morbid obesity ([facility]     THAYER (nonalcoholic steatohepatitis)     Osteoarthritis     RLS (restless legs syndrome) 1/16/2019    S/P AVR (aortic valve replacement) 2015    x 2, last replacement July 2018 d/t failure of the 1st    S/P mitral valve repair 07/13/2018    Dr. Yvon Rascon Valvular heart disease 1/16/2019     Past Surgical History:   Procedure Laterality Date    AORTIC VALVE REPLACEMENT      cow valve    BRAIN SURGERY      to drain blood    CARDIAC CATHETERIZATION  2004 or 2005    [facility]    COLONOSCOPY      DIAGNOSTIC CARDIAC CATH LAB PROCEDURE      PARTIAL HYSTERECTOMY N/A 2004    Endometriosis    [place] ECHO TRANSESOPHAG R-T 2D IMG ACQUISJ I&R ONLY Left 7/3/2018    TRANSESOPHAGEAL ECHOCARDIOGRAM performed by Geno Marr MD at [address] [facility] [address] OFFICE/OUTPT [address] [facility] N/A 7/13/2018    REDO AORTIC VALVE REPLACEMENT, MITRAL VALVE REPAIR, TRICUSPID VALVE REPAIR, WILBERTO performed by Ignacio Ray MD at [facility]   No current facility-administered medications for this encounter.      Current Outpatient Medications:     ibuprofen (ADVIL;MOTRIN) 800 MG tablet, Take 1 tablet by mouth every 8 hours as needed for Pain, Disp: 60 tablet, Rfl: 0    metoprolol tartrate (LOPRESSOR) 25 MG tablet, TAKE 1/2 TABLET TWICE DAILY, Disp: 90 tablet, Rfl: 2    BIOTIN 5000 PO, Take by mouth, Disp: , Rfl:     potassium chloride (KLOR-CON M) 20 MEQ extended release tablet, TAKE 1 TABLET EVERY DAY, Disp: 90 tablet, Rfl: 3    citalopram (CELEXA) 20 MG tablet, TAKE 1 TABLET EVERY DAY, Disp: 90 tablet, Rfl: 3    atorvastatin (LIPITOR) 10 MG tablet, Take 1 tablet by mouth every evening, Disp: 90 tablet, Rfl: 3    nortriptyline (PAMELOR) 25 MG capsule, Take 1 capsule by mouth nightly, Disp: 90 capsule, Rfl: 3    rOPINIRole (REQUIP) 2 MG tablet, Take 1 tablet by mouth 2 times daily, Disp: 180 tablet, Rfl: 3    lisinopril (PRINIVIL;ZESTRIL) 5 MG tablet, TAKE 1 TABLET TWICE DAILY, Disp: 180 tablet, Rfl: 0    omeprazole (PRILOSEC) 20 MG delayed release capsule, TAKE 1 CAPSULE BY MOUTH EVERY MORNING (BEFORE BREAKFAST), Disp: 90 capsule, Rfl: 3    furosemide (LASIX) 20 MG tablet, TAKE 1 TABLET EVERY DAY, Disp: 90 tablet, Rfl: 3    aspirin 81 MG tablet, Take 81 mg by mouth daily, Disp: , Rfl:     acetaminophen (TYLENOL) 325 MG tablet, Take 2 tablets by mouth every 4 hours as needed (post op pain), Disp: 120 tablet, Rfl: 3      SOCIAL HISTORY     Social History     Social History Narrative    Not on file     Social History     Tobacco Use    Smoking status: Former Smoker     Packs/day: 0.50     Years: 44.00     Pack years: 22.00     Types: Cigarettes     Quit injury. Palpations: Abdomen is soft. There is no mass or pulsatile mass. Tenderness: There is abdominal tenderness in the right upper quadrant. There is guarding. Positive signs include Posey's sign. Negative signs include Rovsing's sign, McBurney's sign, psoas sign and obturator sign. Skin:     General: Skin is warm and dry. Capillary Refill: Capillary refill takes less than 2 seconds. Coloration: Skin is not cyanotic, jaundiced or pale. Findings: No rash. Neurological:      General: No focal deficit present. Mental Status: She is alert and oriented to person, place, and time. Cranial Nerves: No cranial nerve deficit. Motor: No weakness. Psychiatric:         Mood and Affect: Mood normal. Mood is not anxious or depressed. Behavior: Behavior normal.           MEDICAL DECISION MAKING   Initial Assessment: Patient is a 68F w/ PMH significant for HFpEF s/p AVR x2 and no prior history of cholecystectomy who presents for right upper quadrant abdominal pain worsened by eating and deep inspiration. Differential Diagnosis Included but not limited to:   Epiploic Appendigitis  Acute Cholecystitis  Cholelithiasis  Pancreatitis  Gastroparesis  Hepatic congestion 2/2 right sided HF      MDM:   Initially had a high clinical suspicion for acute cholecystitis based on clinical exam however liver enzymes, ultrasound gallbladder and liver were negative for cholestatic process. A follow-up CT of the abdomen pelvis demonstrated epiploic appendagitis. Patient received 30 mg IV Toradol and was discharged on 800 mg ibuprofen every 8 hours. No indications for admission as diagnosis can be managed with supportive care outpatient.       ED RESULTS   Laboratory results:  Labs Reviewed   CBC WITH AUTO DIFFERENTIAL - Abnormal; Notable for the following components:       Result Value    MCHC 31.4 (*)     All other components within normal limits   COMPREHENSIVE METABOLIC PANEL W/ REFLEX TO MG FOR LOW K - Abnormal; Notable for the following components:    Glucose 123 (*)     All other components within normal limits   GLOMERULAR FILTRATION RATE, ESTIMATED - Abnormal; Notable for the following components:    Est, Glom Filt Rate 72 (*)     All other components within normal limits   OSMOLALITY - Abnormal; Notable for the following components:    Osmolality Calc 274.6 (*)     All other components within normal limits   LIPASE   HEPATIC FUNCTION PANEL   ANION GAP       Radiologic studies results:  CT ABDOMEN PELVIS WO CONTRAST Additional Contrast? None   Final Result   Cardiolite lesion in the mesenteric fat adjacent to the liver and contiguous with the hepatic flexure highly suspicious for epiploic appendigitis. **This report has been created using voice recognition software. It may contain minor errors which are inherent in voice recognition technology. **      Final report electronically signed by Dr. Ashely Davila on 5/3/2021 2:59 PM      [facility]   Final Result       1. No evidence of gallstones, gallbladder wall thickening or pericholecystic fluid. 2. Fatty replacement in the liver. .               **This report has been created using voice recognition software. It may contain minor errors which are inherent in voice recognition technology. **      Final report electronically signed by DR Sandra Dhaliwal on 5/3/2021 1:41 PM      XR CHEST PORTABLE   Final Result   1. Mild cardiomegaly. Prosthetic heart valve. Metallic sternotomy sutures from prior surgery. 2. No acute findings. No infiltrates or effusions are seen            **This report has been created using voice recognition software. It may contain minor errors which are inherent in voice recognition technology. **      Final report electronically signed by Dr. Fiona Luu on 5/3/2021 1:21 PM          ED Medications administered this visit:   Medications   morphine injection 4 mg (4 mg Intravenous Given 5/3/21 1324) ondansetron (ZOFRAN) injection 4 mg (4 mg Intravenous Given 5/3/21 1324)   ketorolac (TORADOL) injection 30 mg (30 mg Intravenous Given 5/3/21 1524)         ED COURSE       Patient presented to the ED with right upper quadrant abdominal pain. Physical exam demonstrated right upper quadrant tenderness with positive Posey sign. Initial liver enzymes WNL. Patient received 4 mg IV morphine and 4 mg Zofran for pain and nausea. A chest x-ray was ordered as patient had rales on exam and an ultrasound of the liver and gallbladder was ordered which demonstrated no acute process. A repeat CT of the abdomen and pelvis demonstrated epiploic appendagitis. Patient received 30 mg IV Toradol for pain management as diagnosis typically treated with supportive care. Discussed potential causes for returning to the ED including worsening of pain. Patient was prescribed 800 mg ibuprofen every 8 hours for  Strict return precautions and follow up instructions were discussed with the patient prior to discharge, with which the patient agrees. MEDICATION CHANGES     New Prescriptions    IBUPROFEN (ADVIL;MOTRIN) 800 MG TABLET    Take 1 tablet by mouth every 8 hours as needed for Pain         FINAL DISPOSITION     Final diagnoses:   Epiploic appendagitis   Abdominal pain, epigastric     Condition: condition: stable  Dispo: Discharge to home      This transcription was electronically signed. Parts of this transcriptions may have been dictated by use of voice recognition software and electronically transcribed, and parts may have been transcribed with the assistance of an ED scribe. The transcription may contain errors not detected in proofreading. Please refer to my supervising physician's documentation if my documentation differs.     Electronically Signed: Renato Roman, 05/03/21, 3:45 PM       Renato Roman DO  Resident  05/03/21 2165

## 2021-05-03 NOTE — TELEPHONE ENCOUNTER
menstrual period? \"        No    Protocols used: ABDOMINAL PAIN - FEMALE-ADULT-[place]    Received call from [facility] at [facility]  pre-service center [facility])  with enosiX. Brief description of triage: back/side pain x 3 days     Triage indicates for patient to be seen in the office today . Care advice provided, patient verbalizes understanding; denies any other questions or concerns; instructed to call back for any new or worsening symptoms. Writer provided warm transfer to [facility] for appointment scheduling. Attention Provider: Thank you for allowing me to participate in the care of your patient. The patient was connected to triage in response to information provided to the ECC. Please do not respond through this encounter as the response is not directed to a shared pool.

## 2021-05-18 NOTE — PROGRESS NOTES
Chief Complaint   Patient presents with    Abdominal Pain     epigastric    Urinary Tract Infection       TELEHEALTH EVALUATION -- Audio/Visual (During BPKII-14 public health emergency)    Due to COVID 19 outbreak, patient's office visit was converted to a virtual visit. Patient was contacted and agreed to proceed with a virtual visit via [email or web address]  The risks and benefits of converting to a virtual visit were discussed in light of the current infectious disease epidemic. Patient also understood that insurance coverage and co-pays are up to their individual insurance plans. Services were provided through a video synchronous discussion virtually to substitute for in-person clinic visit    Location of patient: home  Location of physician: office    Identification confirmed by: Patient : [id number]    Pursuant to the emergency declaration under the [facility] waiver authority and the [employer] Act, this Virtual  Visit was conducted, with patient's (and/or legal guardian's) consent, to reduce the patient's risk of exposure to COVID-19 and provide necessary medical care. The patient (and/or legal guardian) has also been advised to contact this office for worsening conditions or problems, and seek emergency medical treatment and/or call 911 if deemed necessary. Services were provided through a video synchronous discussion virtually to substitute for in-person clinic visit. Due to this being a TeleHealth encounter, evaluation of certain organ systems is limited. History obtained from the patient.     SUBJECTIVE:  Benitez Meyers is a 76 y.o. female that presents today for    -Epigastric abd pain:  Going on 4-6 wks  Different the pain she was seen for a few wks ago  Epigastric  When eats, gets pain, lasts 15-20 min and goes away  Does not radiate  Last 2 days having on and off nausea  Having mild epigastric pain this AM  Had RUQ US recently that was neg  No bowel habit changes. No constipation. No blood in stool or melena  No fevers  No new meds  No dysphagia  Taking omeprazole daily      -Urinary Symptoms:     HPI:  Started today  Dysuria, urg and frequency  Scant amnt of blood noticed  No flank pain  No fevaers    Dysuria? Yes  Hematuria? Tonna Hoguet  Increased urinary frequency? Yes   Abdominal discomfort? No other than above  Hx of UTIs? No  Sexually active? Yes  Recent STD exposure? No  LMP? No LMP recorded. Patient has had a hysterectomy.       Age/Gender Health Maintenance    Lipid -   Lab Results   Component Value Date    CHOL 181 12/02/2020    CHOL 200 (H) 12/30/2019    CHOL 184 01/24/2019     Lab Results   Component Value Date    TRIG 178 12/02/2020    TRIG 236 (H) 12/30/2019    TRIG 155 01/24/2019     Lab Results   Component Value Date    HDL 43 12/02/2020    HDL 47 12/30/2019    HDL 53 01/24/2019     Lab Results   Component Value Date    LDLCALC 102 12/02/2020    [address] 106 12/30/2019    LDLCALC 100 01/24/2019       Colon Cancer Screening - Completed NOV 2020, 1 polyp per pt, repeat 3 years, records pending  Lung Cancer Screening (Age 54 to [de-identified] with 30 pack year hx, current smoker or quit within past 15 years) - <30 pack year hx    Tetanus - records pending  Influenza Vaccine - UTD DEC 2020  Pneumonia Vaccine - UTD PPV 23 in OCT 2017  Zoster - to get at pharmacy per medicare rules     Breast Cancer Screening - NEG JAN 2020  Cervical Cancer Screening - consult for GYN placed; has apt soon/plans to reschedule soon (AUG 2019) and JAN 2020  Osteoporosis Screening - due, ordered    Diabetes Health Maintenance    A1C -   Lab Results   Component Value Date    LABA1C 5.8 12/07/2020    LABA1C 6.3 06/05/2020    LABA1C 6.5 12/30/2019    LABA1C 6.1 08/20/2019    LABA1C 6.4 02/21/2019    LABA1C 5.7 07/12/2018     ACE/ARB - yes, lisinopril 5mg  Eye - next visit  Foot -  UTD DEC 2020  ASA - yes  Microal/Cr -   Lab Results Component Value Date    LABMICR 1.29 12/02/2020    LABCREA 284.9 12/02/2020    MACRR 5 12/02/2020     No results found for: CREATINEUR, MICROALBUR, MALBCR      eGFR -   No results found for: GFRESTIMATE  Lab Results   Component Value Date    LABGLOM 72 05/03/2021     No results found for: CRCLEARANCE  No results found for: EGFRNONAA  No results found for: EGFRAA    Statin - yes, lipitor      Current Outpatient Medications   Medication Sig Dispense Refill    ondansetron (ZOFRAN) 4 MG tablet Take 1 tablet by mouth 3 times daily as needed for Nausea or Vomiting 60 tablet 0    nitrofurantoin, macrocrystal-monohydrate, (MACROBID) 100 MG capsule Take 1 capsule by mouth 2 times daily for 5 days 10 capsule 0    metoprolol tartrate (LOPRESSOR) 25 MG tablet TAKE 1/2 TABLET TWICE DAILY 90 tablet 2    ibuprofen (ADVIL;MOTRIN) 800 MG tablet Take 1 tablet by mouth every 8 hours as needed for Pain 60 tablet 0    BIOTIN 5000 PO Take by mouth      potassium chloride (KLOR-CON M) 20 MEQ extended release tablet TAKE 1 TABLET EVERY DAY 90 tablet 3    citalopram (CELEXA) 20 MG tablet TAKE 1 TABLET EVERY DAY 90 tablet 3    atorvastatin (LIPITOR) 10 MG tablet Take 1 tablet by mouth every evening 90 tablet 3    nortriptyline (PAMELOR) 25 MG capsule Take 1 capsule by mouth nightly 90 capsule 3    rOPINIRole (REQUIP) 2 MG tablet Take 1 tablet by mouth 2 times daily 180 tablet 3    lisinopril (PRINIVIL;ZESTRIL) 5 MG tablet TAKE 1 TABLET TWICE DAILY 180 tablet 0    omeprazole (PRILOSEC) 20 MG delayed release capsule TAKE 1 CAPSULE BY MOUTH EVERY MORNING (BEFORE BREAKFAST) 90 capsule 3    furosemide (LASIX) 20 MG tablet TAKE 1 TABLET EVERY DAY 90 tablet 3    aspirin 81 MG tablet Take 81 mg by mouth daily      acetaminophen (TYLENOL) 325 MG tablet Take 2 tablets by mouth every 4 hours as needed (post op pain) 120 tablet 3     No current facility-administered medications for this visit.      Orders Placed This Encounter REPLACEMENT, MITRAL VALVE REPAIR, TRICUSPID VALVE REPAIR, WILBERTO performed by Genie Meyer MD at [address] [facility] failure with preserved ejection fraction ([facility] [address]     History of aortic stenosis, s/p valve replacement x 2     History of iron deficiency anemia     History of subarachnoid hemorrhage 2015    Spontaneous SAH. Admitted to [facility]. Extensive w/u for cause was negative.      Hypertension     Major depression     Morbid obesity ([facility] [address]     THAYER (nonalcoholic steatohepatitis)     Osteoarthritis     RLS (restless legs syndrome) 2019    S/P AVR (aortic valve replacement) 2015    x 2, last replacement 2018 d/t failure of the 1st    S/P mitral valve repair 2018    Dr. Niharika Clay Valvular heart disease 2019       Past Surgical History:   Procedure Laterality Date    AORTIC VALVE REPLACEMENT      cow valve    BRAIN SURGERY      to drain blood    CARDIAC CATHETERIZATION   or     [facility]    COLONOSCOPY      DIAGNOSTIC CARDIAC CATH LAB PROCEDURE      PARTIAL HYSTERECTOMY N/A     Endometriosis    MN ECHO TRANSESOPHAG R-T 2D IMG ACQUISJ I&R ONLY Left 7/3/2018    TRANSESOPHAGEAL ECHOCARDIOGRAM performed by Julio César Landa MD at [facility]    MN OFFICE/OUTPT VISIT,PROCEDURE ONLY N/A 2018    REDO AORTIC VALVE REPLACEMENT, MITRAL VALVE REPAIR, TRICUSPID VALVE REPAIR, WILBERTO performed by Genie Meyer MD at [address]         Allergies   Allergen Reactions    Latex Rash    Demerol Hcl [Meperidine] Hives       Social History     Tobacco Use    Smoking status: Former Smoker     Packs/day: 0.50     Years: 44.00     Pack years: 22.00     Types: Cigarettes     Quit date: 2018     Years since quittin.9    Smokeless tobacco: Former User   Substance Use Topics    Alcohol use: No       Family History   Problem Relation Age of Onset    Heart Attack Mother     Heart Disease Mother     Heart Surgery Mother     Hypertension Mother     High Blood Pressure Mother     Diabetes Mother     Diabetes Father     Breast Cancer Maternal Grandmother         Unsure age   [employer] Colon Cancer Neg Hx          I have reviewed the patient's past medical history, past surgical history, allergies, medications, social and family history and I have made updates where appropriate. Review of Systems  Positive responses are highlighted in bold    Constitutional:  Fever, Chills, Night Sweats, Fatigue, Unexpected changes in weight  HENT:  Ear pain, Tinnitus, Nosebleeds, Trouble swallowing, Hearing loss, Sore throat  Cardiovascular:  Chest Pain, Palpitations, Orthopnea, Paroxysmal Nocturnal Dyspnea  Respiratory:  Cough, Wheezing, Shortness of breath, Chest tightness, Apnea  Gastrointestinal:  Nausea, Vomiting, Diarrhea, Constipation, Heartburn, Blood in stool  Genitourinary:  Difficulty or painful urination, Flank pain, Change in frequency, Urgency  Skin:  Color change, Rash, Itching, Wound  Musculoskeletal:  Joint pain, Back pain, Gait problems, Joint swelling, Myalgias  Neurological:  Dizziness, Headaches, Presyncope, Numbness, Seizures, Tremors  Endocrine:  Heat Intolerance, Cold Intolerance, Polydipsia, Polyphagia, Polyuria      PHYSICAL EXAM:  Not all vitals able to be obtained, video visit  Patient-Reported Vitals 5/19/2021   Patient-Reported Pulse 78   Patient-Reported Temperature 98.6      Vitals:    05/19/21 1115   Resp: 14      pain: 3, epigastric region    General Appearance: A&O x 3, No acute distress,well developed and well- nourished  Head: normocephalic and atraumatic  Eyes: pupils equal, round, and reactive to light, extraocular eye movements intact, conjunctivae and eye lids without erythema  ENT: External ears w/o redness, external nares without redness or discharge. Hearing is inact. Lips are w/o lesion. Teeth are in good repair. Pulmonary/Chest: normal respiratory effort. Normal respiratory rate. No respiratory distress .    Skin: warm and dry, no rash or recognition technology. **       Final report electronically signed by DR Franchesca Yanez on 5/3/2021 1:41 PM       Narrative   PROCEDURE: CT ABDOMEN PELVIS WO CONTRAST       CLINICAL INFORMATION: Abdominal pain .       COMPARISON: 9/20/2019       TECHNIQUE: 2-D multiplanar noncontrast images of the abdomen and pelvis   All CT scans at this facility use dose modulation, iterative reconstruction, and/or weight-based dosing when appropriate to reduce radiation dose to as low as reasonably achievable.       FINDINGS: Limitations   Solid organs and hollow viscera evaluation limited without contrast.       Lung bases   Prosthetic mitral and aortic valves. No infiltrates or effusions. Abdomen pelvis   There is an ovoid target-like lesion adjacent to the inferior aspect of the liver with adjacent mesenteric stranding and contiguous with the hepatic flexure. Liver, spleen, and pancreas are unremarkable. Epicardial pacer lead is present which extends to the right anterior abdominal subcutaneous soft tissues anterior to the liver. Spleen is normal.   Pancreas is normal.   Gallbladder is unremarkable. The adrenals are normal.   No renal calculi unremarkable appearance of the kidneys. Aorta shows mild atherosclerosis. Pelvis   Normal appendix. No bowel obstruction. No abnormal fluid collections. Urinary bladder is unremarkable.       Bones   No suspicious bone lesions.           Impression   Cardiolite lesion in the mesenteric fat adjacent to the liver and contiguous with the hepatic flexure highly suspicious for epiploic appendigitis.             **This report has been created using voice recognition software.  It may contain minor errors which are inherent in voice recognition technology. **       Final report electronically signed by Dr. Mark Espinoza on 5/3/2021 2:59 PM       ASSESSMENT & PLAN  1. Epigastric abdominal pain    Unclear etiology  Recent RUQ WNL  Pain from epiploic appendagitis resolved, these sxs are different    ddx includes acid peptic and biliary dyskinesia    Plan:  Inc omeprazole to bid  Prn zofran  [facility] to make apt for next wk  Further w/u based on results  Reviewed ER precautions, pt understands. - ondansetron (ZOFRAN) 4 MG tablet; Take 1 tablet by mouth 3 times daily as needed for Nausea or Vomiting  Dispense: 60 tablet; Refill: 0    2. Nausea    - ondansetron (ZOFRAN) 4 MG tablet; Take 1 tablet by mouth 3 times daily as needed for Nausea or Vomiting  Dispense: 60 tablet; Refill: 0    3. Hemorrhagic cystitis    Check UA/culture  Start macrobid  F/u if no better  Reviewed ER precautions, pt understands. - Urinalysis with Microscopic; Future  - Culture, Urine; Future  - nitrofurantoin, macrocrystal-monohydrate, (MACROBID) 100 MG capsule; Take 1 capsule by mouth 2 times daily for 5 days  Dispense: 10 capsule; Refill: 0      DISPOSITION    Return if symptoms worsen or fail to improve. Daniel Balk released without restrictions. Future Appointments   Date Time Provider Hola Carrion   6/15/2021  8:00 AM [id number] [facility]   2/2/2022 12:45 PM Kenneth Allen MD [facility] - [address] [place] [address]    Patient given educational materials on: See Attached    Barriers to learning and self management: none    Discussed use, benefit, and side effects of prescribed medications. Barriers to medication compliance addressed. All patient questions answered. Pt voiced understanding.        Electronically signed by Je Dorsey DO on 5/19/2021 at 11:26 AM

## 2021-05-19 ENCOUNTER — NURSE ONLY (OUTPATIENT)
Dept: LAB | Age: 66
End: 2021-05-19

## 2021-05-19 ENCOUNTER — VIRTUAL VISIT (OUTPATIENT)
Dept: FAMILY MEDICINE CLINIC | Age: 66
End: 2021-05-19
Payer: MEDICARE

## 2021-05-19 VITALS — RESPIRATION RATE: 14 BRPM

## 2021-05-19 DIAGNOSIS — R10.13 EPIGASTRIC ABDOMINAL PAIN: Primary | ICD-10-CM

## 2021-05-19 DIAGNOSIS — N30.91 HEMORRHAGIC CYSTITIS: ICD-10-CM

## 2021-05-19 DIAGNOSIS — R11.0 NAUSEA: ICD-10-CM

## 2021-05-19 LAB
BACTERIA: ABNORMAL
BILIRUBIN URINE: NEGATIVE
BLOOD, URINE: ABNORMAL
CASTS: ABNORMAL /LPF
CASTS: ABNORMAL /LPF
CHARACTER, URINE: CLEAR
COLOR: YELLOW
CRYSTALS: ABNORMAL
EPITHELIAL CELLS, UA: ABNORMAL /HPF
GLUCOSE, URINE: NEGATIVE MG/DL
KETONES, URINE: NEGATIVE
LEUKOCYTE ESTERASE, URINE: ABNORMAL
MISCELLANEOUS LAB TEST RESULT: ABNORMAL
NITRITE, URINE: NEGATIVE
PH UA: 6 (ref 5–9)
PROTEIN UA: NEGATIVE MG/DL
RBC URINE: ABNORMAL /HPF
RENAL EPITHELIAL, UA: ABNORMAL
SPECIFIC GRAVITY UA: 1.01 (ref 1–1.03)
UROBILINOGEN, URINE: 1 EU/DL (ref 0–1)
WBC UA: ABNORMAL /HPF
YEAST: ABNORMAL

## 2021-05-19 PROCEDURE — G8427 DOCREV CUR MEDS BY ELIG CLIN: HCPCS | Performed by: FAMILY MEDICINE

## 2021-05-19 PROCEDURE — 1123F ACP DISCUSS/DSCN MKR DOCD: CPT | Performed by: FAMILY MEDICINE

## 2021-05-19 PROCEDURE — 1090F PRES/ABSN URINE INCON ASSESS: CPT | Performed by: FAMILY MEDICINE

## 2021-05-19 PROCEDURE — 3017F COLORECTAL CA SCREEN DOC REV: CPT | Performed by: FAMILY MEDICINE

## 2021-05-19 PROCEDURE — 4040F PNEUMOC VAC/ADMIN/RCVD: CPT | Performed by: FAMILY MEDICINE

## 2021-05-19 PROCEDURE — G8400 PT W/DXA NO RESULTS DOC: HCPCS | Performed by: FAMILY MEDICINE

## 2021-05-19 PROCEDURE — 99214 OFFICE O/P EST MOD 30 MIN: CPT | Performed by: FAMILY MEDICINE

## 2021-05-19 RX ORDER — ONDANSETRON 4 MG/1
4 TABLET, FILM COATED ORAL 3 TIMES DAILY PRN
Qty: 60 TABLET | Refills: 0 | Status: SHIPPED | OUTPATIENT
Start: 2021-05-19 | End: 2022-01-12

## 2021-05-19 RX ORDER — NITROFURANTOIN 25; 75 MG/1; MG/1
100 CAPSULE ORAL 2 TIMES DAILY
Qty: 10 CAPSULE | Refills: 0 | Status: SHIPPED | OUTPATIENT
Start: 2021-05-19 | End: 2021-05-21 | Stop reason: ALTCHOICE

## 2021-05-21 ENCOUNTER — TELEPHONE (OUTPATIENT)
Dept: FAMILY MEDICINE CLINIC | Age: 66
End: 2021-05-21

## 2021-05-21 DIAGNOSIS — N30.91 HEMORRHAGIC CYSTITIS: Primary | ICD-10-CM

## 2021-05-21 RX ORDER — CIPROFLOXACIN 250 MG/1
250 TABLET, FILM COATED ORAL 2 TIMES DAILY
Qty: 6 TABLET | Refills: 0 | Status: SHIPPED | OUTPATIENT
Start: 2021-05-21 | End: 2021-05-24

## 2021-05-21 NOTE — TELEPHONE ENCOUNTER
----- Message from Mady Escoto DO sent at 5/21/2021 12:48 PM EDT -----  Please let pt know that urine growing bacterial that is not sensitive to them macrobid we started. I'm going to change to cipro bid x 3 days. Stop macrobid, start cipro. Let me know if questions, thanks!

## 2021-05-21 NOTE — TELEPHONE ENCOUNTER
Home and mobile number sounds like someone is picking up the phone, but no one answers X4      Called [facility] and VM not set up.

## 2021-05-23 LAB
ORGANISM: ABNORMAL
URINE CULTURE, ROUTINE: ABNORMAL

## 2021-05-24 ENCOUNTER — TELEPHONE (OUTPATIENT)
Dept: FAMILY MEDICINE CLINIC | Age: 66
End: 2021-05-24

## 2021-05-24 ENCOUNTER — PATIENT MESSAGE (OUTPATIENT)
Dept: FAMILY MEDICINE CLINIC | Age: 66
End: 2021-05-24

## 2021-05-24 NOTE — TELEPHONE ENCOUNTER
----- Message from [facility] sent at 5/24/2021  2:52 PM EDT -----  Subject: Message to Provider    QUESTIONS  Information for Provider? patient called back in regards to the vm that   was left, please advise   ---------------------------------------------------------------------------  --------------  CALL BACK INFO  What is the best way for the office to contact you? OK to leave message on   voicemail  Preferred Call Back Phone Number? [phone]  ---------------------------------------------------------------------------  --------------  SCRIPT ANSWERS  Relationship to Patient?  Self

## 2021-06-22 ENCOUNTER — TELEPHONE (OUTPATIENT)
Dept: FAMILY MEDICINE CLINIC | Age: 66
End: 2021-06-22

## 2021-06-22 ENCOUNTER — HOSPITAL ENCOUNTER (OUTPATIENT)
Age: 66
Setting detail: SPECIMEN
Discharge: HOME | End: 2021-06-22
Payer: MEDICARE

## 2021-06-22 ENCOUNTER — HOSPITAL ENCOUNTER (OUTPATIENT)
Dept: GENERAL RADIOLOGY | Age: 66
Discharge: HOME | End: 2021-06-22
Payer: MEDICARE

## 2021-06-22 ENCOUNTER — OFFICE VISIT (OUTPATIENT)
Dept: FAMILY MEDICINE CLINIC | Age: 66
End: 2021-06-22
Payer: MEDICARE

## 2021-06-22 VITALS
OXYGEN SATURATION: 99 % | HEART RATE: 55 BPM | TEMPERATURE: 97.8 F | RESPIRATION RATE: 14 BRPM | SYSTOLIC BLOOD PRESSURE: 128 MMHG | DIASTOLIC BLOOD PRESSURE: 60 MMHG

## 2021-06-22 DIAGNOSIS — J44.1 CHRONIC OBSTRUCTIVE PULMONARY DISEASE WITH ACUTE EXACERBATION (HCC): ICD-10-CM

## 2021-06-22 DIAGNOSIS — J40 BRONCHITIS: ICD-10-CM

## 2021-06-22 DIAGNOSIS — Z20.822 SUSPECTED COVID-19 VIRUS INFECTION: ICD-10-CM

## 2021-06-22 DIAGNOSIS — E11.9 TYPE 2 DIABETES MELLITUS WITHOUT COMPLICATION, WITHOUT LONG-TERM CURRENT USE OF INSULIN (HCC): Chronic | ICD-10-CM

## 2021-06-22 DIAGNOSIS — I50.33 ACUTE ON CHRONIC HEART FAILURE WITH PRESERVED EJECTION FRACTION (HCC): Primary | ICD-10-CM

## 2021-06-22 DIAGNOSIS — J40 BRONCHITIS: Primary | ICD-10-CM

## 2021-06-22 LAB — SARS-COV-2, NAA: NOT  DETECTED

## 2021-06-22 PROCEDURE — 87635 SARS-COV-2 COVID-19 AMP PRB: CPT

## 2021-06-22 PROCEDURE — 4040F PNEUMOC VAC/ADMIN/RCVD: CPT | Performed by: FAMILY MEDICINE

## 2021-06-22 PROCEDURE — G8400 PT W/DXA NO RESULTS DOC: HCPCS | Performed by: FAMILY MEDICINE

## 2021-06-22 PROCEDURE — 3017F COLORECTAL CA SCREEN DOC REV: CPT | Performed by: FAMILY MEDICINE

## 2021-06-22 PROCEDURE — 71046 X-RAY EXAM CHEST 2 VIEWS: CPT

## 2021-06-22 PROCEDURE — 99214 OFFICE O/P EST MOD 30 MIN: CPT | Performed by: FAMILY MEDICINE

## 2021-06-22 PROCEDURE — 2022F DILAT RTA XM EVC RTNOPTHY: CPT | Performed by: FAMILY MEDICINE

## 2021-06-22 PROCEDURE — 3023F SPIROM DOC REV: CPT | Performed by: FAMILY MEDICINE

## 2021-06-22 PROCEDURE — G8427 DOCREV CUR MEDS BY ELIG CLIN: HCPCS | Performed by: FAMILY MEDICINE

## 2021-06-22 PROCEDURE — 1036F TOBACCO NON-USER: CPT | Performed by: FAMILY MEDICINE

## 2021-06-22 PROCEDURE — 1123F ACP DISCUSS/DSCN MKR DOCD: CPT | Performed by: FAMILY MEDICINE

## 2021-06-22 PROCEDURE — G8926 SPIRO NO PERF OR DOC: HCPCS | Performed by: FAMILY MEDICINE

## 2021-06-22 PROCEDURE — G8417 CALC BMI ABV UP PARAM F/U: HCPCS | Performed by: FAMILY MEDICINE

## 2021-06-22 PROCEDURE — 3046F HEMOGLOBIN A1C LEVEL >9.0%: CPT | Performed by: FAMILY MEDICINE

## 2021-06-22 PROCEDURE — 1090F PRES/ABSN URINE INCON ASSESS: CPT | Performed by: FAMILY MEDICINE

## 2021-06-22 RX ORDER — PREDNISONE 20 MG/1
40 TABLET ORAL DAILY
Qty: 10 TABLET | Refills: 0 | Status: SHIPPED | OUTPATIENT
Start: 2021-06-22 | End: 2021-06-27

## 2021-06-22 RX ORDER — ALBUTEROL SULFATE 90 UG/1
2 AEROSOL, METERED RESPIRATORY (INHALATION) EVERY 4 HOURS PRN
Qty: 1 INHALER | Refills: 0 | Status: SHIPPED | OUTPATIENT
Start: 2021-06-22 | End: 2021-12-20 | Stop reason: SDUPTHER

## 2021-06-22 RX ORDER — AMOXICILLIN AND CLAVULANATE POTASSIUM 875; 125 MG/1; MG/1
1 TABLET, FILM COATED ORAL 2 TIMES DAILY
Qty: 20 TABLET | Refills: 0 | Status: SHIPPED | OUTPATIENT
Start: 2021-06-22 | End: 2021-07-02

## 2021-06-22 NOTE — TELEPHONE ENCOUNTER
----- Message from Caren Canada DO sent at 6/22/2021 12:36 PM EDT -----  Please let pt know that covid testing is negative  Will call with chest xray results once available. Let me know if questions, thanks!

## 2021-06-24 ENCOUNTER — TELEPHONE (OUTPATIENT)
Dept: FAMILY MEDICINE CLINIC | Age: 66
End: 2021-06-24

## 2021-06-24 NOTE — TELEPHONE ENCOUNTER
----- Message from Mami Felix DO sent at 6/24/2021  6:25 AM EDT -----  Please f/u with pt and see how her respiratory symptoms she was seen for the other day are doing. Let me know, thanks!

## 2021-06-24 NOTE — TELEPHONE ENCOUNTER
Pt says it has improved overall. Still having SOB but its not as bad. Will let us know if it gets worse or not improving.

## 2021-06-24 NOTE — TELEPHONE ENCOUNTER
Sounds good  Pt missed her last f/u DM apt with me a few wks ago  When able, please help her r/s that  Let me know if questions, thanks!

## 2021-06-24 NOTE — TELEPHONE ENCOUNTER
Pt notified. Pt scheduled.       Future Appointments   Date Time Provider Hola Sheyla   6/30/2021  9:40 AM [facility]   2/2/2022 12:45 PM Naif Hernandes MD N [facility] [place] [facility]

## 2021-06-29 NOTE — PROGRESS NOTES
well  celexa working  Denies sI/HI      -GERD:    HPI:    Taking meds as prescribed ?: yes  Tolerating well ?: yes  Side Effects ?: denies  Dysphagia ?: denies  Odynophagia ?: denies  Melena ?: denies  Working on [employer] ?: yes      -Epigastric abd pain LAST VISIT:  Going on 4-6 wks  Different the pain she was seen for a few wks ago  Epigastric  When eats, gets pain, lasts 15-20 min and goes away  Does not radiate  Last 2 days having on and off nausea  Having mild epigastric pain this AM  Had RUQ US recently that was neg  No bowel habit changes. No constipation. No blood in stool or melena  No fevers  No new meds  No dysphagia  Taking omeprazole daily     UPDATE TODAY:   Pt had omeprazole inc to bid  sxs better, but still has some that comes and goes  Has NOT f/u with GI yet  Plans to soon  No dysphagia      -Urinary Symptoms LAST VISIT:      HPI:  Started today  Dysuria, urg and frequency  Scant amnt of blood noticed  No flank pain  No fevaers     Dysuria? Yes  Hematuria? Pixley Bannister  Increased urinary frequency? Yes   Abdominal discomfort? No other than above  Hx of UTIs? No  Sexually active? Yes  Recent STD exposure? No  LMP? No LMP recorded. Patient has had a hysterectomy.      UPDATE TODAY:   sxs resolved  No blood  F/u UA today is negative       -Hearing loss R ear LAST VISIT:  Started a few wks ago  Probably 1/3  Saw Franky Roberta in the office on 1/5  Placed on oral steroids and sent to see audiology  Dx with SNHL in the R ear  Had f/u with audio, no better  Sent to ENT  Placed back on steroids  MRI ordered  F/u audio setup  Pt here today to f/u  No improvement in hearing  On steroids yet, tapering down  Hx of vertigo, none recent  Does have some tinnitus in both ears  No HAs    UPDATE TODAY:   Hearing loss the same  Neg f/u work-up  Audio recommends hearing aid, she is thinking about it      Age/Gender Health Maintenance    Lipid -   Lab Results   Component Value Date    CHOL 181 12/02/2020    CHOL 200 (H) 12/30/2019    CHOL 184 01/24/2019     Lab Results   Component Value Date    TRIG 178 12/02/2020    TRIG 236 (H) 12/30/2019    TRIG 155 01/24/2019     Lab Results   Component Value Date    HDL 43 12/02/2020    HDL 47 12/30/2019    HDL 53 01/24/2019     Lab Results   Component Value Date    LDLCALC 102 12/02/2020    LDLCALC 106 12/30/2019    LDLCALC 100 01/24/2019       Colon Cancer Screening - Completed NOV 2020, 1 polyp per pt, repeat 3 years, records pending  Lung Cancer Screening (Age 54 to [de-identified] with 30 pack year hx, current smoker or quit within past 15 years) - <30 pack year hx    Tetanus - records pending  Influenza Vaccine - UTD DEC 2020  Pneumonia Vaccine - UTD PPV 23 in OCT 2017  Zoster - to get at pharmacy per medicare rules     Breast Cancer Screening - [address] 2020, ordered June 2021  Cervical Cancer Screening - NEG FEB 2020  Osteoporosis Screening - due, ordered    Diabetes Health Maintenance    A1C -   Lab Results   Component Value Date    LABA1C 6.2 06/30/2021    LABA1C 5.8 12/07/2020    LABA1C 6.3 06/05/2020    LABA1C 6.5 12/30/2019    LABA1C 6.1 08/20/2019    LABA1C 6.4 02/21/2019    LABA1C 5.7 07/12/2018     ACE/ARB - yes, lisinopril 5mg  Eye - next visit  Foot -  UTD DEC 2020  ASA - yes  Microal/Cr -   Lab Results   Component Value Date    LABMICR 1.29 12/02/2020    LABCREA 284.9 12/02/2020    MACRR 5 12/02/2020     No results found for: HIRAM Bean      eGFR -   No results found for: GFRESTIMATE  Lab Results   Component Value Date    LABGLOM 72 05/03/2021     No results found for: CRCLEARANCE  No results found for: EGFRNONAA  No results found for: EGFRAA    Statin - yes, lipitor      Current Outpatient Medications   Medication Sig Dispense Refill    albuterol sulfate HFA (PROVENTIL HFA) 108 (90 Base) MCG/ACT inhaler Inhale 2 puffs into the lungs every 4 hours as needed for Wheezing or Shortness of Breath 1 Inhaler 0    amoxicillin-clavulanate (AUGMENTIN) 875-125 MG per tablet Take 1 tablet by mouth 2 times daily for 10 days 20 tablet 0    ondansetron (ZOFRAN) 4 MG tablet Take 1 tablet by mouth 3 times daily as needed for Nausea or Vomiting 60 tablet 0    metoprolol tartrate (LOPRESSOR) 25 MG tablet TAKE 1/2 TABLET TWICE DAILY 90 tablet 2    ibuprofen (ADVIL;MOTRIN) 800 MG tablet Take 1 tablet by mouth every 8 hours as needed for Pain 60 tablet 0    BIOTIN 5000 PO Take by mouth      potassium chloride (KLOR-CON M) 20 MEQ extended release tablet TAKE 1 TABLET EVERY DAY 90 tablet 3    citalopram (CELEXA) 20 MG tablet TAKE 1 TABLET EVERY DAY 90 tablet 3    atorvastatin (LIPITOR) 10 MG tablet Take 1 tablet by mouth every evening 90 tablet 3    nortriptyline (PAMELOR) 25 MG capsule Take 1 capsule by mouth nightly 90 capsule 3    rOPINIRole (REQUIP) 2 MG tablet Take 1 tablet by mouth 2 times daily 180 tablet 3    lisinopril (PRINIVIL;ZESTRIL) 5 MG tablet TAKE 1 TABLET TWICE DAILY 180 tablet 0    omeprazole (PRILOSEC) 20 MG delayed release capsule TAKE 1 CAPSULE BY MOUTH EVERY MORNING (BEFORE BREAKFAST) 90 capsule 3    furosemide (LASIX) 20 MG tablet TAKE 1 TABLET EVERY DAY 90 tablet 3    aspirin 81 MG tablet Take 81 mg by mouth daily      acetaminophen (TYLENOL) 325 MG tablet Take 2 tablets by mouth every 4 hours as needed (post op pain) 120 tablet 3     No current facility-administered medications for this visit. No orders of the defined types were placed in this encounter. All medications reviewed and reconciled, including OTC and herbal medications. Updated list given to patient.        Patient Active Problem List    Diagnosis Date Noted    Fibromyalgia     [facility]     DM2 (diabetes mellitus, type 2) ([facility]     COPD (chronic obstructive pulmonary disease) ([facility]     Valvular heart disease 01/16/2019    RLS (restless legs syndrome) 01/16/2019    Dyslipidemia     Former smoker     GERD (gastroesophageal reflux disease)     Heart failure with preserved ejection fraction (HCC)     Hypertension     Major depression     THAYER (nonalcoholic steatohepatitis)     History of aortic stenosis, s/p valve replacement x 2     History of iron deficiency anemia     S/P mitral valve repair 07/13/2018     Dr. Lisette Hoover H/O prosthetic aortic valve replacement 07/03/2018     Early bioprosthetic aortic valve failure with severe symptomatic prosthetic aortic regurgitation      History of subarachnoid hemorrhage 03/23/2015     Spontaneous SAH. Admitted to [facility]. Extensive w/u for cause was negative.  Osteoarthritis     S/P AVR (aortic valve replacement) 01/01/2015     x 2, last replacement July 2018 d/t failure of the 1st         Past Medical History:   Diagnosis Date    COPD (chronic obstructive pulmonary disease) ([address]     DM2 (diabetes mellitus, type 2) ([address]     Dyslipidemia     Fibromyalgia     Former smoker     GERD (gastroesophageal reflux disease)     H/O prosthetic aortic valve replacement     Early bioprosthetic aortic valve failure with severe symptomatic prosthetic aortic regurgitation is now s/p REDO AORTIC VALVE REPLACEMENT, MITRAL VALVE REPAIR, TRICUSPID VALVE REPAIR, WILBERTO performed by Jonny Glass MD at [address] [place] [address] with preserved ejection fraction ([address]     History of aortic stenosis, s/p valve replacement x 2     History of iron deficiency anemia     History of subarachnoid hemorrhage 03/23/2015    Spontaneous SAH. Admitted to [facility]. Extensive w/u for cause was negative.      Hypertension     Major depression     Morbid obesity ([address]     THAYER (nonalcoholic steatohepatitis)     Osteoarthritis     RLS (restless legs syndrome) 1/16/2019    S/P AVR (aortic valve replacement) 2015    x 2, last replacement July 2018 d/t failure of the 1st    S/P mitral valve repair 07/13/2018    Dr. Adarsh Jacobs Valvular heart disease 1/16/2019       Past Surgical History:   Procedure Laterality Date    AORTIC VALVE REPLACEMENT      cow valve    BRAIN SURGERY      to drain blood    [facility]ARD[facility]AC [facility]  2004 [facility]    COLONOSCOPY      DIAGNOSTIC CARDIAC CATH LAB PROCEDURE      PARTIAL HYSTERECTOMY N/A 2004    Endometriosis    OH ECHO TRANSESOPHAG R-T 2D IMG ACQUISJ I&R ONLY Left 7/3/2018    TRANSESOPHAGEAL ECHOCARDIOGRAM performed by Giles Luis MD at [address] [facility]    OH OFFICE/OUTPT VISIT,PROCEDURE ONLY N/A 7/13/2018    REDO AORTIC VALVE REPLACEMENT, MITRAL VALVE REPAIR, TRICUSPID VALVE REPAIR, WILBERTO performed by Melonie Calhoun MD at [address]         Allergies   Allergen Reactions    Latex Rash    Demerol Hcl [Meperidine] Hives       Social History     Tobacco Use    Smoking status: Former Smoker     Packs/day: 0.50     Years: 44.00     Pack years: 22.00     Types: Cigarettes     Quit date: 6/23/2018     Years since quitting: 3.0    Smokeless tobacco: Former User   Substance Use Topics    Alcohol use: No       Family History   Problem Relation Age of Onset    Heart Attack Mother     Heart Disease Mother     Heart Surgery Mother     Hypertension Mother     High Blood Pressure Mother     Diabetes Mother     Diabetes Father     Breast Cancer Maternal Grandmother         Unsure age   [place] Colon Cancer Neg Hx          I have reviewed the patient's past medical history, past surgical history, allergies, medications, social and family history and I have made updates where appropriate.       Review of Systems  Positive responses are highlighted in bold    Constitutional:  Fever, Chills, Night Sweats, Fatigue, Unexpected changes in weight  HENT:  Ear pain, Tinnitus, Nosebleeds, Trouble swallowing, Hearing loss, Sore throat  Cardiovascular:  Chest Pain, Palpitations, Orthopnea, Paroxysmal Nocturnal Dyspnea  Respiratory:  Cough, Wheezing, Shortness of breath, Chest tightness, Apnea  Gastrointestinal:  Nausea, Vomiting, Diarrhea, Constipation, Heartburn, Blood in stool  Genitourinary:  Difficulty or 1.002 - 1.030    Blood, UA POC Negative NEGATIVE    pH, Urine 6.50 5.0 - 9.0    Protein, Urine Negative NEGATIVE mg/dl    Urobilinogen, Urine 0.20 0.0 - 1.0 eu/dl    Nitrite, Urine Negative NEGATIVE    Leukocyte Clumps, Urine Negative NEGATIVE    Color, Urine Yellow YELLOW-STRAW    Character, Urine Clear [id number].[id number]   POCT glycosylated hemoglobin (Hb A1C)   Result Value Ref Range    Hemoglobin A1C 6.2 (H) 4.3 - 5.7 %       Narrative   PROCEDURE: XR CHEST (2 VW)       CLINICAL INFORMATION: 24-year-old female with shortness of breath and cough for 4 days.       COMPARISON: No prior study.       TECHNIQUE: PA and lateral views of the chest were obtained.       FINDINGS:    There is cardiomegaly and pulmonary vascular congestion. There are median sternotomy wires and 2 prosthetic heart valves.       There is blunting at the right costophrenic angle which may represent a small pleural effusion.       There is no pneumothorax.       Visualized portions of the upper abdomen are within normal limits.       The osseous structures are intact. No acute fractures or suspicious osseous lesions.           Impression   Cardiomegaly with pulmonary vascular congestion and a small right-sided pleural effusion.           **This report has been created using voice recognition software. It may contain minor errors which are inherent in voice recognition technology. **       Final report electronically signed by Dr Lala Draper on 6/22/2021 3:35 PM       ASSESSMENT & PLAN  1. Bronchitis    Improved  Bronchitis vs AE HFpEF    Plan is to con't lasix, BB and ACE  Will get f/u cxr in 4 wks  And get updated echo  F/u based on results    - XR CHEST (2 VW); Future    2. Acute on chronic heart failure with preserved ejection fraction ([address]    As per # 1    - XR CHEST (2 VW); Future  - ECHO Complete 2D W Doppler W Color; Future    3.  Type 2 diabetes mellitus without complication, without long-term current use of insulin (HCC)    Stable  con't diet control    4. Fibromyalgia    Stable  con't pamelor    5. RLS (restless legs syndrome)    Stable  con't requip    6. Essential hypertension    At goal  con't lopressor and lisinopil  Labs UTD    7. Chronic obstructive pulmonary disease, unspecified COPD type ([facility]    Mild COPD  Not having sxs at present  Declines meds, monitor. 8. Dyslipidemia    Stable  con't lipitor     9. Recurrent major depressive disorder, in full remission ([address]    Stable  con't celexa    10. Gastroesophageal reflux disease, unspecified whether esophagitis present    Improved  con't BID omeprazole for now until see's GI    11. Epigastric abdominal pain    As per # 10    12. Nausea    As per # 10    13. Hemorrhagic cystitis    Improved  F/u UA today reassuring  No f/u needed    14. Sudden idiopathic hearing loss of right ear with unrestricted hearing of left ear    Neg w/u  Pt considering hearing aid    15. Post-menopause    - DEXA BONE DENSITY AXIAL SKELETON; Future    16. Encounter for screening mammogram for malignant neoplasm of breast    - [facility] DIGITAL SCREEN BILATERAL; Future       DISPOSITION    Return in about 6 months (around 12/30/2021) for Follow-up Diabetes, follow-up on chronic medical conditions, sooner as needed. Tanisha Mills released without restrictions. Future Appointments   Date Time Provider Hola Carrion   12/30/2021 10:20 AM [address]   2/2/2022 12:45 PM Angelica Robert MD [facility] [address] received counseling on the following healthy behaviors: nutrition, exercise and medication adherence    Patient given educational materials on: See Attached    I have instructed Hui to complete a self tracking handout on Blood Pressures  and instructed them to bring it with them to her next appointment. Barriers to learning and self management: none    Discussed use, benefit, and side effects of prescribed medications.   Barriers to medication compliance addressed. All patient questions answered. Pt voiced understanding.        Electronically signed by Efe Chávez DO on 6/30/2021 at 10:05 AM

## 2021-06-30 ENCOUNTER — OFFICE VISIT (OUTPATIENT)
Dept: FAMILY MEDICINE CLINIC | Age: 66
End: 2021-06-30
Payer: MEDICARE

## 2021-06-30 ENCOUNTER — TELEPHONE (OUTPATIENT)
Dept: FAMILY MEDICINE CLINIC | Age: 66
End: 2021-06-30

## 2021-06-30 VITALS
WEIGHT: 192 LBS | BODY MASS INDEX: 34.02 KG/M2 | RESPIRATION RATE: 10 BRPM | HEIGHT: 63 IN | DIASTOLIC BLOOD PRESSURE: 62 MMHG | HEART RATE: 58 BPM | SYSTOLIC BLOOD PRESSURE: 118 MMHG | TEMPERATURE: 98.2 F | OXYGEN SATURATION: 98 %

## 2021-06-30 DIAGNOSIS — N30.91 HEMORRHAGIC CYSTITIS: ICD-10-CM

## 2021-06-30 DIAGNOSIS — H91.21 SUDDEN IDIOPATHIC HEARING LOSS OF RIGHT EAR WITH UNRESTRICTED HEARING OF LEFT EAR: ICD-10-CM

## 2021-06-30 DIAGNOSIS — M79.7 FIBROMYALGIA: ICD-10-CM

## 2021-06-30 DIAGNOSIS — I50.33 ACUTE ON CHRONIC HEART FAILURE WITH PRESERVED EJECTION FRACTION (HCC): ICD-10-CM

## 2021-06-30 DIAGNOSIS — Z78.0 POST-MENOPAUSE: ICD-10-CM

## 2021-06-30 DIAGNOSIS — J40 BRONCHITIS: Primary | ICD-10-CM

## 2021-06-30 DIAGNOSIS — E78.5 DYSLIPIDEMIA: ICD-10-CM

## 2021-06-30 DIAGNOSIS — K21.9 GASTROESOPHAGEAL REFLUX DISEASE, UNSPECIFIED WHETHER ESOPHAGITIS PRESENT: ICD-10-CM

## 2021-06-30 DIAGNOSIS — Z12.31 ENCOUNTER FOR SCREENING MAMMOGRAM FOR MALIGNANT NEOPLASM OF BREAST: ICD-10-CM

## 2021-06-30 DIAGNOSIS — R11.0 NAUSEA: ICD-10-CM

## 2021-06-30 DIAGNOSIS — I10 ESSENTIAL HYPERTENSION: Chronic | ICD-10-CM

## 2021-06-30 DIAGNOSIS — E11.9 TYPE 2 DIABETES MELLITUS WITHOUT COMPLICATION, WITHOUT LONG-TERM CURRENT USE OF INSULIN (HCC): ICD-10-CM

## 2021-06-30 DIAGNOSIS — J44.9 CHRONIC OBSTRUCTIVE PULMONARY DISEASE, UNSPECIFIED COPD TYPE (HCC): ICD-10-CM

## 2021-06-30 DIAGNOSIS — F33.42 RECURRENT MAJOR DEPRESSIVE DISORDER, IN FULL REMISSION (HCC): ICD-10-CM

## 2021-06-30 DIAGNOSIS — R10.13 EPIGASTRIC ABDOMINAL PAIN: ICD-10-CM

## 2021-06-30 DIAGNOSIS — G25.81 RLS (RESTLESS LEGS SYNDROME): ICD-10-CM

## 2021-06-30 LAB
BILIRUBIN URINE: NEGATIVE
BLOOD URINE, POC: NEGATIVE
CHARACTER, URINE: CLEAR
COLOR, URINE: YELLOW
GLUCOSE URINE: NEGATIVE MG/DL
HBA1C MFR BLD: 6.2 % (ref 4.3–5.7)
KETONES, URINE: NEGATIVE
LEUKOCYTE CLUMPS, URINE: NEGATIVE
NITRITE, URINE: NEGATIVE
PH, URINE: 6.5 (ref 5–9)
PROTEIN, URINE: NEGATIVE MG/DL
SPECIFIC GRAVITY, URINE: 1.02 (ref 1–1.03)
UROBILINOGEN, URINE: 0.2 EU/DL (ref 0–1)

## 2021-06-30 PROCEDURE — 1123F ACP DISCUSS/DSCN MKR DOCD: CPT | Performed by: FAMILY MEDICINE

## 2021-06-30 PROCEDURE — G8400 PT W/DXA NO RESULTS DOC: HCPCS | Performed by: FAMILY MEDICINE

## 2021-06-30 PROCEDURE — G8427 DOCREV CUR MEDS BY ELIG CLIN: HCPCS | Performed by: FAMILY MEDICINE

## 2021-06-30 PROCEDURE — G8926 SPIRO NO PERF OR DOC: HCPCS | Performed by: FAMILY MEDICINE

## 2021-06-30 PROCEDURE — 4040F PNEUMOC VAC/ADMIN/RCVD: CPT | Performed by: FAMILY MEDICINE

## 2021-06-30 PROCEDURE — 3023F SPIROM DOC REV: CPT | Performed by: FAMILY MEDICINE

## 2021-06-30 PROCEDURE — 2022F DILAT RTA XM EVC RTNOPTHY: CPT | Performed by: FAMILY MEDICINE

## 2021-06-30 PROCEDURE — 99214 OFFICE O/P EST MOD 30 MIN: CPT | Performed by: FAMILY MEDICINE

## 2021-06-30 PROCEDURE — G8417 CALC BMI ABV UP PARAM F/U: HCPCS | Performed by: FAMILY MEDICINE

## 2021-06-30 PROCEDURE — 1036F TOBACCO NON-USER: CPT | Performed by: FAMILY MEDICINE

## 2021-06-30 PROCEDURE — 1090F PRES/ABSN URINE INCON ASSESS: CPT | Performed by: FAMILY MEDICINE

## 2021-06-30 PROCEDURE — 3017F COLORECTAL CA SCREEN DOC REV: CPT | Performed by: FAMILY MEDICINE

## 2021-06-30 PROCEDURE — 3044F HG A1C LEVEL LT 7.0%: CPT | Performed by: FAMILY MEDICINE

## 2021-07-07 ENCOUNTER — HOSPITAL ENCOUNTER (OUTPATIENT)
Dept: NON INVASIVE DIAGNOSTICS | Age: 66
Discharge: HOME | End: 2021-07-07
Payer: MEDICARE

## 2021-07-07 DIAGNOSIS — I50.33 ACUTE ON CHRONIC HEART FAILURE WITH PRESERVED EJECTION FRACTION (HCC): ICD-10-CM

## 2021-07-07 LAB
LV EF: 55 %
LVEF MODALITY: NORMAL

## 2021-07-07 PROCEDURE — 93306 TTE W/DOPPLER COMPLETE: CPT

## 2021-07-08 ENCOUNTER — TELEPHONE (OUTPATIENT)
Dept: FAMILY MEDICINE CLINIC | Age: 66
End: 2021-07-08

## 2021-07-08 NOTE — TELEPHONE ENCOUNTER
----- Message from Benson Olivas DO sent at 7/7/2021  4:44 PM EDT -----  Please let pt know that echo looks good  Heart function WNL  No valve issues  Let me know if questions, thanks!

## 2021-07-10 DIAGNOSIS — I38 VALVULAR HEART DISEASE: ICD-10-CM

## 2021-07-10 DIAGNOSIS — R10.13 EPIGASTRIC ABDOMINAL PAIN: ICD-10-CM

## 2021-07-10 DIAGNOSIS — Z95.2 H/O PROSTHETIC AORTIC VALVE REPLACEMENT: ICD-10-CM

## 2021-07-12 RX ORDER — OMEPRAZOLE 20 MG/1
CAPSULE, DELAYED RELEASE ORAL
Qty: 90 CAPSULE | Refills: 3 | Status: SHIPPED | OUTPATIENT
Start: 2021-07-12 | End: 2022-05-09

## 2021-07-12 RX ORDER — FUROSEMIDE 20 MG/1
TABLET ORAL
Qty: 90 TABLET | Refills: 3 | Status: SHIPPED | OUTPATIENT
Start: 2021-07-12 | End: 2022-05-25

## 2021-07-30 DIAGNOSIS — G25.81 RLS (RESTLESS LEGS SYNDROME): Chronic | ICD-10-CM

## 2021-08-02 RX ORDER — LISINOPRIL 5 MG/1
TABLET ORAL
Qty: 180 TABLET | Refills: 3 | Status: SHIPPED | OUTPATIENT
Start: 2021-08-02 | End: 2022-07-05

## 2021-08-02 RX ORDER — ROPINIROLE 2 MG/1
TABLET, FILM COATED ORAL
Qty: 180 TABLET | Refills: 3 | Status: SHIPPED | OUTPATIENT
Start: 2021-08-02 | End: 2022-09-29 | Stop reason: SDUPTHER

## 2021-08-02 NOTE — TELEPHONE ENCOUNTER
Recent Visits  Date Type Provider Dept   06/30/21 Office Visit Melly Kay, DO [facility]   06/22/21 Office Visit Melly Kay, DO [facility]   05/03/21 Office Visit Melly Kay, DO [facility]   01/22/21 Office Visit Melly Kay, DO [facility]   01/05/21 Office Visit Harpreetcinthia Kenia, APRN - CNP [facility]   12/07/20 Office Visit Melly Kay, DO [facility]   06/05/20 Office Visit Melly Kay, DO [facility]   03/11/20 Office Visit Melly Kay, DO [facility]   02/11/20 Office Visit Melly Kay, DO [facility]   Showing recent visits within past 540 days with a meds authorizing provider and meeting all other requirements  Future Appointments  Date Type Provider Dept   12/30/21 Appointment Lisettebenita Kay, DO [facility]   Showing future appointments within next 150 days with a meds authorizing provider and meeting all other requirements      Future Appointments   Date Time Provider Hola Carrion   8/26/2021  8:20 AM STR [facility] DEXA RM [facility] WOMEN STR Radiolog   8/26/2021  9:00 AM STR MAMMOGRAPHY [address] STR Radiolog   12/30/2021 10:20 AM Melly Kay, DO [facility] [address]   2/2/2022 12:45 PM Kaleigh Jay MD [facility] [address]

## 2021-08-26 ENCOUNTER — HOSPITAL ENCOUNTER (OUTPATIENT)
Dept: WOMENS IMAGING | Age: 66
Discharge: HOME | End: 2021-08-26
Payer: MEDICARE

## 2021-08-26 ENCOUNTER — TELEPHONE (OUTPATIENT)
Dept: FAMILY MEDICINE CLINIC | Age: 66
End: 2021-08-26

## 2021-08-26 DIAGNOSIS — J01.90 ACUTE RHINOSINUSITIS: Primary | ICD-10-CM

## 2021-08-26 DIAGNOSIS — Z78.0 POST-MENOPAUSE: ICD-10-CM

## 2021-08-26 DIAGNOSIS — Z12.31 ENCOUNTER FOR SCREENING MAMMOGRAM FOR MALIGNANT NEOPLASM OF BREAST: ICD-10-CM

## 2021-08-26 PROCEDURE — 77080 DXA BONE DENSITY AXIAL: CPT

## 2021-08-26 PROCEDURE — 77063 BREAST TOMOSYNTHESIS BI: CPT

## 2021-08-26 NOTE — TELEPHONE ENCOUNTER
----- Message from Bronson Carter, DO sent at 8/26/2021  2:46 PM EDT -----  Please let pt know that mammogram is normal.   Also, please let pt know that bone density scan (DEXA) was normal as well. Let me know if questions, thanks!

## 2021-08-27 RX ORDER — BENZONATATE 100 MG/1
CAPSULE ORAL
Qty: 60 CAPSULE | Refills: 0 | Status: SHIPPED | OUTPATIENT
Start: 2021-08-27 | End: 2021-09-06

## 2021-08-27 RX ORDER — AMOXICILLIN AND CLAVULANATE POTASSIUM 875; 125 MG/1; MG/1
1 TABLET, FILM COATED ORAL 2 TIMES DAILY
Qty: 20 TABLET | Refills: 0 | Status: SHIPPED | OUTPATIENT
Start: 2021-08-27 | End: 2021-09-06

## 2021-08-27 RX ORDER — FLUTICASONE PROPIONATE 50 MCG
1 SPRAY, SUSPENSION (ML) NASAL DAILY
Qty: 1 BOTTLE | Refills: 0 | Status: SHIPPED | OUTPATIENT
Start: 2021-08-27 | End: 2021-12-20

## 2021-08-27 NOTE — TELEPHONE ENCOUNTER
Patient has been informed and voiced understanding   Patient reports that she has developed a cough x 3 days and is asking if Dr Pulliam See send something in to her pharmacy for this

## 2021-08-27 NOTE — TELEPHONE ENCOUNTER
Got it  Thanks    F/u if no better     Diagnosis Orders   1.  Acute rhinosinusitis  amoxicillin-clavulanate (AUGMENTIN) 875-125 MG per tablet    benzonatate (TESSALON PERLES) 100 MG capsule    fluticasone (FLONASE) 50 MCG/ACT nasal spray

## 2021-08-27 NOTE — TELEPHONE ENCOUNTER
Pt informed medication sent to [facility] on [address] and told to follow up if not better on medications.

## 2021-08-27 NOTE — TELEPHONE ENCOUNTER
[facility] Clinical Support  Subject: Message to Provider     QUESTIONS   Information for Provider? Patient was returning a call to the office AYANNA Mak gave her a call regarding her mammograms' please call back to   discuss i tried calling the office with no answer. ---------------------------------------------------------------------------   --------------   Elgin Hinders INFO   What is the best way for the office to contact you? OK to leave message on   voicemail   Preferred Call Back Phone Number? [phone]   ---------------------------------------------------------------------------   --------------   SCRIPT ANSWERS   Relationship to Patient?  Self

## 2021-10-01 NOTE — OP NOTE
trans-septal approach to the left atrium. Caval snares were tightened. A right atriotomy was made parallel to the A-V groove. The fossa ovalis was incised, and the two incisions were connected over the dome of the left atrium. The septum was retracted. The previously pathology was encountered. The annulus was true-sized to a 28 mm Durham Physio II annuloplasty band. The annuloplasty sutures of horizontally mattressed 2-0 Ticron were placed circumferentially around the annulus, and subsequently passed through the sewing ring of the annuloplasty band, which seated without difficulty. The sutures were tied. We proceeded with aortic valve portion of the operation. 250 ml of cold blood cardioplegia was given down each of the coronary ostia. The annulus was sized to a 21 mm Trifecta bioprosthesis. The valve sutures of horizontally mattressed 2-0 Ticron with subannular pledgets were placed circumferentially around the annulus. The outflow tract was irrigated copiously. The stitches were passed through the sewing ring of the valve, which seated easily. The stitches were tied down. The trans-septal left atriotomy was closed with running 4-0 Prolene. The standard left atriotomy was closed with running 4-0 Prolene. The aortotomy was closed with running 4-0 Prolene. Valsalva maneuvers were performed as the final stitch was tied, so as to de-air the left heart. The patient was placed in steep Trendelenburg position with the aortic root vent turned on full. Retrograde cardioplegia was infused and Valsalva maneuvers were administered to assist in de-airing the left heart. The crossclamp was removed. We proceeded with the tricuspid valve portion of the operation. The right atrial free wall was retracted. The tricuspid annulus was true-sized to a 28 mm Durham MC3 annuloplasty ring.   The annuloplasty sutures of horizontally mattressed 2-0 Ticron were placed circumferentially around the annulus, Libtayo Pregnancy And Lactation Text: This medication is contraindicated in pregnancy and when breast feeding.

## 2021-10-04 RX ORDER — CITALOPRAM 20 MG/1
TABLET ORAL
Qty: 90 TABLET | Refills: 3 | Status: SHIPPED | OUTPATIENT
Start: 2021-10-04 | End: 2022-09-29

## 2021-11-03 DIAGNOSIS — E87.6 HYPOKALEMIA: ICD-10-CM

## 2021-11-03 DIAGNOSIS — E78.5 DYSLIPIDEMIA: ICD-10-CM

## 2021-11-03 RX ORDER — POTASSIUM CHLORIDE 20 MEQ/1
TABLET, EXTENDED RELEASE ORAL
Qty: 90 TABLET | Refills: 3 | Status: SHIPPED | OUTPATIENT
Start: 2021-11-03

## 2021-11-03 RX ORDER — ATORVASTATIN CALCIUM 10 MG/1
TABLET, FILM COATED ORAL
Qty: 90 TABLET | Refills: 3 | Status: SHIPPED | OUTPATIENT
Start: 2021-11-03

## 2021-11-19 ENCOUNTER — TELEPHONE (OUTPATIENT)
Dept: FAMILY MEDICINE CLINIC | Age: 66
End: 2021-11-19

## 2021-11-19 DIAGNOSIS — E11.9 TYPE 2 DIABETES MELLITUS WITHOUT COMPLICATION, WITHOUT LONG-TERM CURRENT USE OF INSULIN (HCC): Primary | Chronic | ICD-10-CM

## 2021-11-19 NOTE — TELEPHONE ENCOUNTER
Pt due for fasting labs prior to next apt on 12/30/2021. Please call to have pt complete this. Thanks! ASSESSMENT & PLAN   Diagnosis Orders   1.  Type 2 diabetes mellitus without complication, without long-term current use of insulin (HCC)  CBC Auto Differential    Comprehensive Metabolic Panel    Lipid Panel    Microalbumin / Creatinine Urine Ratio    TSH with Reflex     Future Appointments   Date Time Provider Hola Carrion   12/30/2021 10:20 AM Tom Sunshine DO [facility] [address]   2/2/2022 12:45 PM Rosa Olson MD [facility] [address]

## 2021-12-20 ENCOUNTER — VIRTUAL VISIT (OUTPATIENT)
Dept: FAMILY MEDICINE CLINIC | Age: 66
End: 2021-12-20
Payer: MEDICARE

## 2021-12-20 ENCOUNTER — TELEPHONE (OUTPATIENT)
Dept: FAMILY MEDICINE CLINIC | Age: 66
End: 2021-12-20

## 2021-12-20 ENCOUNTER — HOSPITAL ENCOUNTER (OUTPATIENT)
Age: 66
Discharge: HOME | End: 2021-12-20
Payer: MEDICARE

## 2021-12-20 VITALS — RESPIRATION RATE: 16 BRPM

## 2021-12-20 DIAGNOSIS — J40 BRONCHITIS: ICD-10-CM

## 2021-12-20 DIAGNOSIS — R68.89 FLU-LIKE SYMPTOMS: ICD-10-CM

## 2021-12-20 DIAGNOSIS — J40 BRONCHITIS: Primary | ICD-10-CM

## 2021-12-20 DIAGNOSIS — J44.1 CHRONIC OBSTRUCTIVE PULMONARY DISEASE WITH ACUTE EXACERBATION (HCC): ICD-10-CM

## 2021-12-20 PROCEDURE — 4040F PNEUMOC VAC/ADMIN/RCVD: CPT | Performed by: FAMILY MEDICINE

## 2021-12-20 PROCEDURE — 87636 SARSCOV2 & INF A&B AMP PRB: CPT

## 2021-12-20 PROCEDURE — 1090F PRES/ABSN URINE INCON ASSESS: CPT | Performed by: FAMILY MEDICINE

## 2021-12-20 PROCEDURE — 99214 OFFICE O/P EST MOD 30 MIN: CPT | Performed by: FAMILY MEDICINE

## 2021-12-20 PROCEDURE — 1123F ACP DISCUSS/DSCN MKR DOCD: CPT | Performed by: FAMILY MEDICINE

## 2021-12-20 PROCEDURE — G8399 PT W/DXA RESULTS DOCUMENT: HCPCS | Performed by: FAMILY MEDICINE

## 2021-12-20 PROCEDURE — G8427 DOCREV CUR MEDS BY ELIG CLIN: HCPCS | Performed by: FAMILY MEDICINE

## 2021-12-20 PROCEDURE — 3017F COLORECTAL CA SCREEN DOC REV: CPT | Performed by: FAMILY MEDICINE

## 2021-12-20 RX ORDER — BENZONATATE 100 MG/1
CAPSULE ORAL
Qty: 60 CAPSULE | Refills: 0 | Status: SHIPPED | OUTPATIENT
Start: 2021-12-20 | End: 2021-12-30

## 2021-12-20 RX ORDER — ALBUTEROL SULFATE 90 UG/1
2 AEROSOL, METERED RESPIRATORY (INHALATION) EVERY 4 HOURS PRN
Qty: 1 EACH | Refills: 0 | Status: SHIPPED | OUTPATIENT
Start: 2021-12-20 | End: 2022-06-29

## 2021-12-20 RX ORDER — DOXYCYCLINE HYCLATE 100 MG/1
100 CAPSULE ORAL 2 TIMES DAILY
Qty: 20 CAPSULE | Refills: 0 | Status: SHIPPED | OUTPATIENT
Start: 2021-12-20 | End: 2021-12-30

## 2021-12-20 RX ORDER — PREDNISONE 20 MG/1
40 TABLET ORAL DAILY
Qty: 10 TABLET | Refills: 0 | Status: SHIPPED | OUTPATIENT
Start: 2021-12-20 | End: 2021-12-25

## 2021-12-20 NOTE — TELEPHONE ENCOUNTER
Pt c/o cough productive (light yellow)since Saturday, no fever,body aches  No openings today please advise

## 2021-12-20 NOTE — PROGRESS NOTES
Chief Complaint   Patient presents with    Cough       TELEHEALTH EVALUATION -- Audio/Visual (During JMWZQ-67 public health emergency)    Due to COVID 19 outbreak, patient's office visit was converted to a virtual visit. Patient was contacted and agreed to proceed with a virtual visit via [email or web address]  The risks and benefits of converting to a virtual visit were discussed in light of the current infectious disease epidemic. Patient also understood that insurance coverage and co-pays are up to their individual insurance plans. Services were provided through a video synchronous discussion virtually to substitute for in-person clinic visit    Location of patient: Home  Location of physician: office    Identification confirmed by: Patient : [id number]    Pursuant to the emergency declaration under the [facility], [facility] waiver authority and the [employer] Act, this Virtual  Visit was conducted, with patient's (and/or legal guardian's) consent, to reduce the patient's risk of exposure to COVID-19 and provide necessary medical care. The patient (and/or legal guardian) has also been advised to contact this office for worsening conditions or problems, and seek emergency medical treatment and/or call 911 if deemed necessary. Services were provided through a video synchronous discussion virtually to substitute for in-person clinic visit. Due to this being a TeleHealth encounter, evaluation of certain organ systems is limited. History obtained from the patient.     SUBJECTIVE:  Casey Holm is a 78 y.o. female that presents today for    -URI type sxs:   sxs started 2 days ago  Cough, productive  Chest is tight with some wheezing  No fevers  No SOB  + chills and body aches  No loss of taste/smell  No know sick contacts  Using mucinex, helping some    Fever - Yes  Runny nose or congestion -  Yes   Cough -  Yes  Sore throat -  Yes  Headache, fatigue, joint pains, muscle aches -  As above  Double Sickening - No  Shortness of breath/Wheezing? -  As above  Nausea/Vomiting/Diarrhea?   No  Sick contacts - Yes -   Maxillary Tooth Pain -  No  Treatment tried and response - otc meds, no better      Age/Gender Health Maintenance    Lipid -   Lab Results   Component Value Date    CHOL 181 12/02/2020    CHOL 200 (H) 12/30/2019    CHOL 184 01/24/2019     Lab Results   Component Value Date    TRIG 178 12/02/2020    TRIG 236 (H) 12/30/2019    TRIG 155 01/24/2019     Lab Results   Component Value Date    HDL 43 12/02/2020    HDL 47 12/30/2019    HDL 53 01/24/2019     Lab Results   Component Value Date    LDLCALC 102 12/02/2020    LDLCALC 106 12/30/2019    LDLCALC 100 01/24/2019       Colon Cancer Screening - Completed NOV 2020, 1 polyp per pt, repeat 3 years, records pending  Lung Cancer Screening (Age 54 to [de-identified] with 30 pack year hx, current smoker or quit within past 15 years) - <30 pack year hx    Tetanus - records pending  Influenza Vaccine - UTD DEC 2020  Pneumonia Vaccine - UTD PPV 23 in OCT 2017  Zoster - to get at pharmacy per medicare rules     Breast Cancer Screening - NEG AUG 2021  Cervical Cancer Screening - NEG FEB 2020  Osteoporosis Screening - Normal AUG 2021    Diabetes Health Maintenance    A1C -   Lab Results   Component Value Date    LABA1C 6.2 06/30/2021    LABA1C 5.8 12/07/2020    LABA1C 6.3 06/05/2020    LABA1C 6.5 12/30/2019    LABA1C 6.1 08/20/2019    LABA1C 6.4 02/21/2019    LABA1C 5.7 07/12/2018     ACE/ARB - yes, lisinopril 5mg  Eye - next visit  Foot -  UTD DEC 2020  ASA - yes  Microal/Cr -   Lab Results   Component Value Date    LABMICR 1.29 12/02/2020    LABCREA 284.9 12/02/2020    MACRR 5 12/02/2020     No results found for: HIRAM Ruiz      eGFR -   No results found for: GFRESTIMATE  Lab Results   Component Value Date    LABGLOM 72 05/03/2021     No results found for: CRCLEARANCE  No results found for: EGFRNONAA  No results found for: EGFRAA    Statin - yes, lipitor      Current Outpatient Medications   Medication Sig Dispense Refill    albuterol sulfate HFA (PROVENTIL HFA) 108 (90 Base) MCG/ACT inhaler Inhale 2 puffs into the lungs every 4 hours as needed for Wheezing or Shortness of Breath 1 each 0    doxycycline hyclate (VIBRAMYCIN) 100 MG capsule Take 1 capsule by mouth 2 times daily for 10 days 20 capsule 0    benzonatate (TESSALON PERLES) 100 MG capsule Take 1 to 2 tablets by mouth every 8 hours as needed for cough. 60 capsule 0    predniSONE (DELTASONE) 20 MG tablet Take 2 tablets by mouth daily for 5 days 10 tablet 0    potassium chloride (KLOR-CON M) 20 MEQ extended release tablet TAKE 1 TABLET EVERY DAY 90 tablet 3    atorvastatin (LIPITOR) 10 MG tablet TAKE 1 TABLET EVERY EVENING 90 tablet 3    metoprolol tartrate (LOPRESSOR) 25 MG tablet TAKE 1/2 TABLET TWICE DAILY 90 tablet 0    citalopram (CELEXA) 20 MG tablet TAKE 1 TABLET EVERY DAY 90 tablet 3    lisinopril (PRINIVIL;ZESTRIL) 5 MG tablet TAKE 1 TABLET TWICE DAILY 180 tablet 3    rOPINIRole (REQUIP) 2 MG tablet TAKE 1 TABLET TWICE DAILY 180 tablet 3    furosemide (LASIX) 20 MG tablet TAKE 1 TABLET EVERY DAY 90 tablet 3    omeprazole (PRILOSEC) 20 MG delayed release capsule TAKE 1 CAPSULE EVERY MORNING BEFORE BREAKFAST 90 capsule 3    ondansetron (ZOFRAN) 4 MG tablet Take 1 tablet by mouth 3 times daily as needed for Nausea or Vomiting 60 tablet 0    ibuprofen (ADVIL;MOTRIN) 800 MG tablet Take 1 tablet by mouth every 8 hours as needed for Pain 60 tablet 0    BIOTIN 5000 PO Take by mouth      nortriptyline (PAMELOR) 25 MG capsule Take 1 capsule by mouth nightly 90 capsule 3    aspirin 81 MG tablet Take 81 mg by mouth daily      acetaminophen (TYLENOL) 325 MG tablet Take 2 tablets by mouth every 4 hours as needed (post op pain) 120 tablet 3     No current facility-administered medications for this visit.      Orders Placed This Encounter   Medications    albuterol sulfate HFA (PROVENTIL HFA) 108 (90 Base) MCG/ACT inhaler     Sig: Inhale 2 puffs into the lungs every 4 hours as needed for Wheezing or Shortness of Breath     Dispense:  1 each     Refill:  0    doxycycline hyclate (VIBRAMYCIN) 100 MG capsule     Sig: Take 1 capsule by mouth 2 times daily for 10 days     Dispense:  20 capsule     Refill:  0    benzonatate (TESSALON PERLES) 100 MG capsule     Sig: Take 1 to 2 tablets by mouth every 8 hours as needed for cough. Dispense:  60 capsule     Refill:  0    predniSONE (DELTASONE) 20 MG tablet     Sig: Take 2 tablets by mouth daily for 5 days     Dispense:  10 tablet     Refill:  0         All medications reviewed and reconciled, including OTC and herbal medications. Updated list given to patient. Patient Active Problem List    Diagnosis Date Noted    Fibromyalgia     Morbid obesity ([facility]     DM2 (diabetes mellitus, type 2) ([address]     COPD (chronic obstructive pulmonary disease) ([facility]     Valvular heart disease 01/16/2019    RLS (restless legs syndrome) 01/16/2019    Dyslipidemia     Former smoker     GERD (gastroesophageal reflux disease)     Heart failure with preserved ejection fraction ([address]     Hypertension     Major depression     THAYER (nonalcoholic steatohepatitis)     History of aortic stenosis, s/p valve replacement x 2     History of iron deficiency anemia     S/P mitral valve repair 07/13/2018     Dr. Hesham Gray H/O prosthetic aortic valve replacement 07/03/2018     Early bioprosthetic aortic valve failure with severe symptomatic prosthetic aortic regurgitation      History of subarachnoid hemorrhage 03/23/2015     Spontaneous SAH. Admitted to [facility]. Extensive w/u for cause was negative.        Osteoarthritis     S/P AVR (aortic valve replacement) 01/01/2015     x 2, last replacement July 2018 d/t failure of the 1st         Past Medical History:   Diagnosis Date    [facility]) [facility]     DM2 (diabetes mellitus, type 2) ([facility]     Dyslipidemia     Fibromyalgia     Former smoker     GERD (gastroesophageal reflux disease)     H/O prosthetic aortic valve replacement     Early bioprosthetic aortic valve failure with severe symptomatic prosthetic aortic regurgitation is now s/p REDO AORTIC VALVE REPLACEMENT, MITRAL VALVE REPAIR, TRICUSPID VALVE REPAIR, WILBERTO performed by Kena Lang MD at [address] [place] [address] failure with preserved ejection fraction ([facility]     History of aortic stenosis, s/p valve replacement x 2     History of iron deficiency anemia     History of subarachnoid hemorrhage 03/23/2015    Spontaneous SAH. Admitted to [facility]. Extensive w/u for cause was negative.      Hypertension     Major depression     Morbid obesity ([facility]     THAYER (nonalcoholic steatohepatitis)     Osteoarthritis     RLS (restless legs syndrome) 1/16/2019    S/P AVR (aortic valve replacement) 2015    x 2, last replacement July 2018 d/t failure of the 1st    S/P mitral valve repair 07/13/2018    Dr. Negrito Lin Valvular heart disease 1/16/2019       Past Surgical History:   Procedure Laterality Date    AORTIC VALVE REPLACEMENT      cow valve    BRAIN SURGERY      to drain blood    CARDIAC [facility]HETER[facility]  2004 or [facility]    [facility]    COLONOSCOPY      DIAGNOSTIC CARDIAC CATH LAB PROCEDURE      HYSTERECTOMY      age 49   [employer] OVARY REMOVAL Bilateral     age 54    PARTIAL HYSTERECTOMY N/A 2004    Endometriosis    [place] ECHO TRANSESOPHAG R-T 2D IMG ACQUISJ I&R ONLY Left 7/3/2018    TRANSESOPHAGEAL ECHOCARDIOGRAM performed by Mathieu Gonzalez MD at [address] [place] [address] OFFICE/OUTPT VISIT,PROCEDURE ONLY N/A 7/13/2018    REDO AORTIC VALVE REPLACEMENT, MITRAL VALVE REPAIR, TRICUSPID VALVE REPAIR, WILBERTO performed by Kena Lang MD at [facility]   Allergen Reactions    Latex Rash    Demerol Hcl [Meperidine] Hives       Social History     Tobacco Use    Smoking status: Current Every Day Smoker     Packs/day: 0.50     Years: 44.00     Pack years: 22.00     Types: Cigarettes     Last attempt to quit: 6/23/2018     Years since quitting: 3.4    Smokeless tobacco: Former User   Substance Use Topics    Alcohol use: No       Family History   Problem Relation Age of Onset    Heart Attack Mother     Heart Disease Mother     Heart Surgery Mother     Hypertension Mother     High Blood Pressure Mother     Diabetes Mother     Diabetes Father     Breast Cancer Maternal Grandmother         Unsure age   [facility] Colon Cancer Neg Hx          I have reviewed the patient's past medical history, past surgical history, allergies, medications, social and family history and I have made updates where appropriate.       Review of Systems  Positive responses are highlighted in bold    Constitutional:  Fever, Chills, Night Sweats, Fatigue, Unexpected changes in weight  HENT:  Ear pain, Tinnitus, Nosebleeds, Trouble swallowing, Hearing loss, Sore throat  Cardiovascular:  Chest Pain, Palpitations, Orthopnea, Paroxysmal Nocturnal Dyspnea  Respiratory:  Cough, Wheezing, Shortness of breath, Chest tightness, Apnea  Gastrointestinal:  Nausea, Vomiting, Diarrhea, Constipation, Heartburn, Blood in stool  Genitourinary:  Difficulty or painful urination, Flank pain, Change in frequency, Urgency  Skin:  Color change, Rash, Itching, Wound  Musculoskeletal:  Joint pain, Back pain, Gait problems, Joint swelling, Myalgias  Neurological:  Dizziness, Headaches, Presyncope, Numbness, Seizures, Tremors  Endocrine:  Heat Intolerance, Cold Intolerance, Polydipsia, Polyphagia, Polyuria      PHYSICAL EXAM:  Not all vitals able to be obtained, video visit  Patient-Reported Vitals 12/20/2021   Patient-Reported Pulse 70   Patient-Reported Temperature 98.4      Vitals:    12/20/21 1030   Resp: 16          General Appearance: A&O x 3, No acute distress,well developed and well- nourished  Head: normocephalic and atraumatic  Eyes: pupils equal, round, and reactive to light, extraocular eye movements intact, conjunctivae and eye lids without erythema  ENT: External ears w/o redness, external nares without redness or discharge. Hearing is intact. Lips are w/o lesion. Teeth are in good repair. Pulmonary/Chest: normal respiratory effort. Normal respiratory rate. No respiratory distress . Skin: warm and dry, no rash or erythema to visible areas. Psych: Affect approprpiate. Mood euthymic. Thought process is normal without evidence of depression or psychosis. Good insight and appropriate interaction. Cognition and memory appear to be intact. ASSESSMENT & PLAN  1. Bronchitis    Bronchitis with AE COPD  Need to r/o flu and covid  Hx and limited exam reassuring for OP management    Prn alb, doxy, tessalon and pred burst  Covid/flu swab, isolate pending results  F/u if no better  Reviewed ER precautions, pt understands. - albuterol sulfate HFA (PROVENTIL HFA) 108 (90 Base) MCG/ACT inhaler; Inhale 2 puffs into the lungs every 4 hours as needed for Wheezing or Shortness of Breath  Dispense: 1 each; Refill: 0  - doxycycline hyclate (VIBRAMYCIN) 100 MG capsule; Take 1 capsule by mouth 2 times daily for 10 days  Dispense: 20 capsule; Refill: 0  - benzonatate (TESSALON PERLES) 100 MG capsule; Take 1 to 2 tablets by mouth every 8 hours as needed for cough. Dispense: 60 capsule; Refill: 0  - predniSONE (DELTASONE) 20 MG tablet; Take 2 tablets by mouth daily for 5 days  Dispense: 10 tablet; Refill: 0  - COVID-19; Future  - Influenza Antigen; Future    2. Chronic obstructive pulmonary disease with acute exacerbation (HCC)    - albuterol sulfate HFA (PROVENTIL HFA) 108 (90 Base) MCG/ACT inhaler; Inhale 2 puffs into the lungs every 4 hours as needed for Wheezing or Shortness of Breath  Dispense: 1 each; Refill: 0  - doxycycline hyclate (VIBRAMYCIN) 100 MG capsule;  Take 1 capsule by mouth 2 times daily for 10 days  Dispense: 20 capsule; Refill: 0  - benzonatate (TESSALON PERLES) 100 MG capsule; Take 1 to 2 tablets by mouth every 8 hours as needed for cough. Dispense: 60 capsule; Refill: 0  - predniSONE (DELTASONE) 20 MG tablet; Take 2 tablets by mouth daily for 5 days  Dispense: 10 tablet; Refill: 0  - COVID-19; Future  - Influenza Antigen; Future    3. Flu-like symptoms    - albuterol sulfate HFA (PROVENTIL HFA) 108 (90 Base) MCG/ACT inhaler; Inhale 2 puffs into the lungs every 4 hours as needed for Wheezing or Shortness of Breath  Dispense: 1 each; Refill: 0  - doxycycline hyclate (VIBRAMYCIN) 100 MG capsule; Take 1 capsule by mouth 2 times daily for 10 days  Dispense: 20 capsule; Refill: 0  - benzonatate (TESSALON PERLES) 100 MG capsule; Take 1 to 2 tablets by mouth every 8 hours as needed for cough. Dispense: 60 capsule; Refill: 0  - predniSONE (DELTASONE) 20 MG tablet; Take 2 tablets by mouth daily for 5 days  Dispense: 10 tablet; Refill: 0  - COVID-19; Future  - Influenza Antigen; Future      DISPOSITION    Return if symptoms worsen or fail to improve. Ameya Boo released without restrictions. Future Appointments   Date Time Provider Hola Carrion   12/30/2021 10:20 AM [address]   2/2/2022 12:45 PM Sharad Lubin MD [facility] received counseling on the following healthy behaviors: nutrition, exercise and medication adherence    Barriers to learning and self management: none    Discussed use, benefit, and side effects of prescribed medications. Barriers to medication compliance addressed. All patient questions answered. Pt voiced understanding.        Electronically signed by Elida Jeronimo DO on 12/20/2021 at 10:33 AM

## 2021-12-20 NOTE — TELEPHONE ENCOUNTER
Future Appointments   Date Time Provider Hola Carrion   12/20/2021 10:00 AM Amadou Gomez DO [facility] [address] [place] [address]   12/30/2021 10:20 AM Amadou Gomez DO [facility] [address] [place] [address]   2/2/2022 12:45 PM Sydni Graves MD [facility] [address]

## 2021-12-20 NOTE — PROGRESS NOTES
Patient ambulated to negative pressure room, nasal swab for covid obtained. Labeled, taken to lab, nurse wearing PPE.

## 2021-12-21 ENCOUNTER — TELEPHONE (OUTPATIENT)
Dept: FAMILY MEDICINE CLINIC | Age: 66
End: 2021-12-21

## 2021-12-21 LAB
INFLUENZA A: NOT DETECTED
INFLUENZA B: NOT DETECTED
SARS-COV-2 RNA, RT PCR: NOT DETECTED

## 2021-12-21 NOTE — TELEPHONE ENCOUNTER
----- Message from Chayito Metz DO sent at 12/21/2021  5:03 AM EST -----  Please let pt know that COVI/flu testing is negative. Let me know if questions, thanks!

## 2021-12-27 ENCOUNTER — NURSE ONLY (OUTPATIENT)
Dept: LAB | Age: 66
End: 2021-12-27

## 2021-12-27 ENCOUNTER — TELEPHONE (OUTPATIENT)
Dept: FAMILY MEDICINE CLINIC | Age: 66
End: 2021-12-27

## 2021-12-27 DIAGNOSIS — E11.9 TYPE 2 DIABETES MELLITUS WITHOUT COMPLICATION, WITHOUT LONG-TERM CURRENT USE OF INSULIN (HCC): Chronic | ICD-10-CM

## 2021-12-27 LAB
ALBUMIN SERPL-MCNC: 4.4 G/DL (ref 3.5–5.1)
ALP BLD-CCNC: 55 U/L (ref 38–126)
ALT SERPL-CCNC: 27 U/L (ref 11–66)
ANION GAP SERPL CALCULATED.3IONS-SCNC: 12 MEQ/L (ref 8–16)
AST SERPL-CCNC: 30 U/L (ref 5–40)
BASOPHILS # BLD: 0.2 %
BASOPHILS ABSOLUTE: 0 THOU/MM3 (ref 0–0.1)
BILIRUB SERPL-MCNC: 0.5 MG/DL (ref 0.3–1.2)
BUN BLDV-MCNC: 22 MG/DL (ref 7–22)
CALCIUM SERPL-MCNC: 9.7 MG/DL (ref 8.5–10.5)
CHLORIDE BLD-SCNC: 103 MEQ/L (ref 98–111)
CHOLESTEROL, TOTAL: 182 MG/DL (ref 100–199)
CO2: 26 MEQ/L (ref 23–33)
CREAT SERPL-MCNC: 0.9 MG/DL (ref 0.4–1.2)
CREATININE, URINE: 201.1 MG/DL
EOSINOPHIL # BLD: 1.4 %
EOSINOPHILS ABSOLUTE: 0.2 THOU/MM3 (ref 0–0.4)
ERYTHROCYTE [DISTWIDTH] IN BLOOD BY AUTOMATED COUNT: 14.8 % (ref 11.5–14.5)
ERYTHROCYTE [DISTWIDTH] IN BLOOD BY AUTOMATED COUNT: 48.2 FL (ref 35–45)
GFR SERPL CREATININE-BSD FRML MDRD: 62 ML/MIN/1.73M2
GLUCOSE BLD-MCNC: 106 MG/DL (ref 70–108)
HCT VFR BLD CALC: 41.6 % (ref 37–47)
HDLC SERPL-MCNC: 62 MG/DL
HEMOGLOBIN: 12.8 GM/DL (ref 12–16)
IMMATURE GRANS (ABS): 0.09 THOU/MM3 (ref 0–0.07)
IMMATURE GRANULOCYTES: 0.7 %
LDL CHOLESTEROL CALCULATED: 76 MG/DL
LYMPHOCYTES # BLD: 33.9 %
LYMPHOCYTES ABSOLUTE: 4.1 THOU/MM3 (ref 1–4.8)
MCH RBC QN AUTO: 27.7 PG (ref 26–33)
MCHC RBC AUTO-ENTMCNC: 30.8 GM/DL (ref 32.2–35.5)
MCV RBC AUTO: 90 FL (ref 81–99)
MICROALBUMIN UR-MCNC: < 1.2 MG/DL
MICROALBUMIN/CREAT UR-RTO: 6 MG/G (ref 0–30)
MONOCYTES # BLD: 7 %
MONOCYTES ABSOLUTE: 0.9 THOU/MM3 (ref 0.4–1.3)
NUCLEATED RED BLOOD CELLS: 0 /100 WBC
PLATELET # BLD: 221 THOU/MM3 (ref 130–400)
PMV BLD AUTO: 11.6 FL (ref 9.4–12.4)
POTASSIUM SERPL-SCNC: 4.2 MEQ/L (ref 3.5–5.2)
RBC # BLD: 4.62 MILL/MM3 (ref 4.2–5.4)
SEG NEUTROPHILS: 56.8 %
SEGMENTED NEUTROPHILS ABSOLUTE COUNT: 6.9 THOU/MM3 (ref 1.8–7.7)
SODIUM BLD-SCNC: 141 MEQ/L (ref 135–145)
TOTAL PROTEIN: 7.4 G/DL (ref 6.1–8)
TRIGL SERPL-MCNC: 222 MG/DL (ref 0–199)
TSH SERPL DL<=0.05 MIU/L-ACNC: 1.33 UIU/ML (ref 0.4–4.2)
WBC # BLD: 12.2 THOU/MM3 (ref 4.8–10.8)

## 2021-12-27 NOTE — TELEPHONE ENCOUNTER
----- Message from JUAN Fay CNP sent at 12/27/2021  1:28 PM EST -----  Let Yomaira Gilbert know her labs are stable and within appropriate ranges. F/u as scheduled.     Future Appointments   Date Time Provider Hola Sheyla   12/30/2021 10:20 AM [id number] [facility]   2/2/2022 12:45 PM Elaine Whitaker MD [email or web address]

## 2021-12-29 NOTE — PROGRESS NOTES
Chief Complaint   Patient presents with    Medicare AWV    Follow-up     DM2 and chronic issues as noted below    Insomnia     New       History obtained from the patient. SUBJECTIVE:  Khushboo Amaro is a 78 y.o. female that presents today for    -DM2:  New dx about a year ago+  Working on life style changes now  Working on wt loss  a1c down to 6.0    Lab Results   Component Value Date    LABA1C 6.0 12/30/2021    LABA1C 6.2 06/30/2021    LABA1C 5.8 12/07/2020    LABA1C 6.3 06/05/2020    LABA1C 6.5 12/30/2019    LABA1C 6.1 08/20/2019    LABA1C 6.4 02/21/2019    LABA1C 5.7 07/12/2018       -Body aches PRIOR VISIT:  Hx of fibromyalgia  Did not tell me  Worse the last month  Used to be on deysi, not sure dose  Took for a few months, didn't help so stopped  Pain in arms, legs, hurts to touch  No rash  No fevers, chills or night sweats  No fam hx of connective tissue d/o or RA  No triggers that she can recall  Depression stable, celexa working well    UPDATE PRIOR VISIT:   sxs no better  Deysi doesn't help, but makes her tired  Labs ordered last visit, didn't do them yet     UPDATE PRIOR VISIT:   No change in above  Was written for pamelor  However, she did not pick it up d/t insurance issues  It was approved, pharmacy did not f/u with pt     UPDATE TODAY:   Fibro sxs doing better  On Pamelor, working well for her  No side effects. -RLS PRIOR VISIT: pt c/o legs feeling restless at night for the last year, trouble sleeping d/t it. Pain in legs at night, moves legs and better. Never seen or treated before. UPDATE PRIOR VISIT: requip helping, works well, good dose. UPDATE PRIOR VISIT:   RLS sxs worse  Now during the day as well  Legs hurt, when moves, sxs improve  L leg seems to be worse than R  sxs started to get worse a wk or 2 wks ago  Nothing changed 2 wks ago that she can recall.    No back or groin pain   No new rashes    UPDATE PRIOR VISIT:   A bit worse  On 0.75 of requip, would like to try higher dose    UPDATE PRIOR VISIT:   No better with inc of requip to 1mg qhs  Asking what else can do  Iron levels ok     UPDATE PRIOR VISIT:   Night time sxs fine with reqip  Getting sxs during the day  Pain in legs, feels has to move  Gets those sxs at night, but thye go away when takes requp  Recent labs ok     UPDATE PRIOR VISIT:   No change in above  EMG r/o neuropathy  Was to be on 2mg requip bid  Only taking 1mg qhs     UPDATE TODAY:   sxs improved  On requip 2mg bid  Working well enough  No pain       -HTN:    HPI:    Taking meds as prescribed ?: yes  Tolerating well ?: yes  Side Effects ?: denies  BP at home ?: <140/90  Working on TLCS ?: yes  Chest Pain/SOB/Palpitations? denies    BP Readings from Last 3 Encounters:   12/30/21 112/60   06/30/21 118/62   06/22/21 128/60       -COPD:  PFT c/w COPD  Former smoker  Pt declined inhalers  No cough, wheezing an dSOB      -HFpEF:  No CP, SOB, orthopnea or PND      -HLD:    HPI:    Taking meds as prescribed ?: yes  Tolerating well ?: yes  Side Effects ?: denies  Muscle Pain?: denies  Working on TLCS ?: eys      -Depression:  Depression stable and doing well per pt  celexa working  Denies sI/HI  However, has been having inc insomnia and trouble falling and staying asleep      -GERD:    HPI:    Taking meds as prescribed ?: yes  Tolerating well ?: yes  Side Effects ?: denies  Dysphagia ?: denies  Odynophagia ?: denies  Melena ?: denies  Working on TLCS ?: yes      Age/Gender Health Maintenance    Lipid -   Lab Results   Component Value Date    CHOL 182 12/27/2021    CHOL 181 12/02/2020    CHOL 200 (H) 12/30/2019     Lab Results   Component Value Date    TRIG 222 (H) 12/27/2021    TRIG 178 12/02/2020    TRIG 236 (H) 12/30/2019     Lab Results   Component Value Date    HDL 62 12/27/2021    HDL 43 12/02/2020    HDL 47 12/30/2019     Lab Results   Component Value Date    LDLCALC 76 12/27/2021    [address] 102 12/02/2020    [address] 106 12/30/2019       Colon Cancer Screening - Completed NOV 2020, 1 polyp per pt, repeat 3 years per GI  Lung Cancer Screening (Age 48 to [de-identified] with 20 pack year hx, current smoker or quit within past 15 years) - due, ordered    Tetanus - records pending  Influenza Vaccine - UTD DEC 2021  Pneumonia Vaccine - UTD PPV 23 in OCT 2017  Zoster - to get at pharmacy per medicare rules     Breast Cancer Screening - NEG AUG 2021  Cervical Cancer Screening - NEG FEB 2020  Osteoporosis Screening - Normal AUG 2021    Diabetes Health Maintenance    A1C -   Lab Results   Component Value Date    LABA1C 6.0 12/30/2021    LABA1C 6.2 06/30/2021    LABA1C 5.8 12/07/2020    LABA1C 6.3 06/05/2020    LABA1C 6.5 12/30/2019    LABA1C 6.1 08/20/2019    LABA1C 6.4 02/21/2019    LABA1C 5.7 07/12/2018     ACE/ARB - yes, lisinopril 5mg  Eye - next visit  Foot -  UTD DEC 2021  ASA - yes  Microal/Cr -   Lab Results   Component Value Date    LABMICR < 1.20 12/27/2021    LABCREA 201.1 12/27/2021    MACRR 6 12/27/2021     No results found for: CREATINEUR, MICROALBUR, MALBCR      eGFR -   No results found for: GFRESTIMATE  Lab Results   Component Value Date    LABGLOM 62 12/27/2021     No results found for: CRCLEARANCE  No results found for: EGFRNONAA  No results found for: EGFRAA    Statin - yes, lipitor      Current Outpatient Medications   Medication Sig Dispense Refill    traZODone (DESYREL) 50 MG tablet Take 1 tablet by mouth nightly 90 tablet 3    albuterol sulfate HFA (PROVENTIL HFA) 108 (90 Base) MCG/ACT inhaler Inhale 2 puffs into the lungs every 4 hours as needed for Wheezing or Shortness of Breath 1 each 0    doxycycline hyclate (VIBRAMYCIN) 100 MG capsule Take 1 capsule by mouth 2 times daily for 10 days 20 capsule 0    benzonatate (TESSALON PERLES) 100 MG capsule Take 1 to 2 tablets by mouth every 8 hours as needed for cough.  60 capsule 0    potassium chloride (KLOR-CON M) 20 MEQ extended release tablet TAKE 1 TABLET EVERY DAY 90 tablet 3    atorvastatin (LIPITOR) 10 MG tablet TAKE 1 TABLET EVERY EVENING 90 tablet 3    metoprolol tartrate (LOPRESSOR) 25 MG tablet TAKE 1/2 TABLET TWICE DAILY 90 tablet 0    citalopram (CELEXA) 20 MG tablet TAKE 1 TABLET EVERY DAY 90 tablet 3    lisinopril (PRINIVIL;ZESTRIL) 5 MG tablet TAKE 1 TABLET TWICE DAILY 180 tablet 3    rOPINIRole (REQUIP) 2 MG tablet TAKE 1 TABLET TWICE DAILY 180 tablet 3    furosemide (LASIX) 20 MG tablet TAKE 1 TABLET EVERY DAY 90 tablet 3    omeprazole (PRILOSEC) 20 MG delayed release capsule TAKE 1 CAPSULE EVERY MORNING BEFORE BREAKFAST 90 capsule 3    ondansetron (ZOFRAN) 4 MG tablet Take 1 tablet by mouth 3 times daily as needed for Nausea or Vomiting 60 tablet 0    ibuprofen (ADVIL;MOTRIN) 800 MG tablet Take 1 tablet by mouth every 8 hours as needed for Pain 60 tablet 0    BIOTIN 5000 PO Take by mouth      nortriptyline (PAMELOR) 25 MG capsule Take 1 capsule by mouth nightly 90 capsule 3    aspirin 81 MG tablet Take 81 mg by mouth daily      acetaminophen (TYLENOL) 325 MG tablet Take 2 tablets by mouth every 4 hours as needed (post op pain) 120 tablet 3     No current facility-administered medications for this visit. Orders Placed This Encounter   Medications    traZODone (DESYREL) 50 MG tablet     Sig: Take 1 tablet by mouth nightly     Dispense:  90 tablet     Refill:  3         All medications reviewed and reconciled, including OTC and herbal medications. Updated list given to patient.        Patient Active Problem List    Diagnosis Date Noted    Fibromyalgia     [facility]2 [facility]di[facility] mellitus[facility] type 2) [facility]     [facility]     Valvular heart disease 01/16/2019    RLS (restless legs syndrome) 01/16/2019    Dyslipidemia     Former smoker     GERD (gastroesophageal reflux disease)     Heart failure with preserved ejection fraction ([facility]     Hypertension, essential     Major depression     THAYER (nonalcoholic steatohepatitis)     History of aortic stenosis, s/p valve PROCEDURE      HYSTERECTOMY      age 49    OVARY REMOVAL Bilateral     age 49   [address] N/A 2004    Endometriosis    CT ECHO TRANSESOPHAG R-T 2D IMG ACQUISJ I&R ONLY Left 7/3/2018    TRANSESOPHAGEAL ECHOCARDIOGRAM performed by Hector Jacobo MD at [facility]    CT OFFICE/OUTPT VISIT,PROCEDURE ONLY N/A 7/13/2018    REDO AORTIC VALVE REPLACEMENT, MITRAL VALVE REPAIR, TRICUSPID VALVE REPAIR, WILBERTO performed by Cem Viera MD at [address]         Allergies   Allergen Reactions    Latex Rash    Demerol Hcl [Meperidine] Hives       Social History     Tobacco Use    Smoking status: Former Smoker     Packs/day: 0.50     Years: 44.00     Pack years: 22.00     Types: Cigarettes     Quit date: 6/23/2018     Years since quitting: 3.5    Smokeless tobacco: Former User   Substance Use Topics    Alcohol use: No       Family History   Problem Relation Age of Onset    Heart Attack Mother     Heart Disease Mother     Heart Surgery Mother     Hypertension Mother     High Blood Pressure Mother     Diabetes Mother     Diabetes Father     Breast Cancer Maternal Grandmother         Unsure age   Stew Alexandra Colon Cancer Neg Hx          I have reviewed the patient's past medical history, past surgical history, allergies, medications, social and family history and I have made updates where appropriate.       Review of Systems  Positive responses are highlighted in bold    Constitutional:  Fever, Chills, Night Sweats, Fatigue, Unexpected changes in weight  HENT:  Ear pain, Tinnitus, Nosebleeds, Trouble swallowing, Hearing loss, Sore throat  Cardiovascular:  Chest Pain, Palpitations, Orthopnea, Paroxysmal Nocturnal Dyspnea  Respiratory:  Cough, Wheezing, Shortness of breath, Chest tightness, Apnea  Gastrointestinal:  Nausea, Vomiting, Diarrhea, Constipation, Heartburn, Blood in stool  Genitourinary:  Difficulty or painful urination, Flank pain, Change in frequency, Urgency  Skin:  Color change, Rash, Itching, Wound  Musculoskeletal:  Joint pain, Back pain, Gait problems, Joint swelling, Myalgias  Neurological:  Dizziness, Headaches, Presyncope, Numbness, Seizures, Tremors  Endocrine:  Heat Intolerance, Cold Intolerance, Polydipsia, Polyphagia, Polyuria      PHYSICAL EXAM:  Vitals:    12/30/21 1023   BP: 112/60   Pulse: 58   Resp: 12   Temp: 98.4 °F (36.9 °C)   TempSrc: Oral   SpO2: 97%   Weight: 185 lb (83.9 kg)   Height: 5' 2\" (1.575 m)     Body mass index is 33.84 kg/m². Pain Score:   0 - No pain    VS Reviewed  General Appearance: A&O x 3, No acute distress,well developed and well- nourished  Eyes: pupils equal, round, and reactive to light, extraocular eye movements intact, conjunctivae and eye lids without erythema  ENT: external ear and ear canal clear bilaterally, TMs intact and regular, nose without deformity, nasal mucosa and turbinates normal without polyps, oropharynx normal, dentition is normal for age  Neck: supple and non-tender without mass, no thyromegaly or thyroid nodules, no cervical lymphadenopathy  Pulmonary/Chest: clear to auscultation bilaterally- no wheezes, rales or rhonchi, normal air movement, no respiratory distress or retractions  Cardiovascular: S1 and S2 auscultated w/ RRR. No murmurs appreciated today. No rubs, clicks, or gallops, distal pulses intact. Abdomen: soft, non-tender, non-distended, bowel sounds physiologic,  no rebound or guarding, no masses or hernias noted. Liver and spleen without enlargement. Extremities: no cyanosis, clubbing or edema of the lower extremities. +2 PT/DP bilaterally. Skin: warm and dry, no rash or erythema  Foot: Bilat 2+DP/PT bilaterally. Skin warm, dry and intact. Sensation normal throughout to touch and with monofilament. Results for POC orders placed in visit on 12/30/21   POCT glycosylated hemoglobin (Hb A1C)   Result Value Ref Range    Hemoglobin A1C 6.0 (H) 4.3 - 5.7 %       ASSESSMENT & PLAN  1.  Type 2 diabetes mellitus without complication, without long-term current use of insulin (HCC)    Stable  con't diet control    - POCT glycosylated hemoglobin (Hb A1C)  -  DIABETES FOOT EXAM    2. Fibromyalgia    Stable  con't low dose pamelor    3. RLS (restless legs syndrome)    Stable  con't requip    4. Hypertension, essential    At goal  con't lopressor and lisinopil  Labs UTD    5. Chronic obstructive pulmonary disease, unspecified COPD type ([facility]    Mild COPD  Not having sxs at present  Declines meds, monitor. 6. Chronic heart failure with preserved ejection fraction (HCC)    Stable  con't BP control and cardio f/u    7. Dyslipidemia    Stable  con't lipitor     8. Recurrent major depressive disorder, in full remission ([facility]    Stable  con't celexa    9. Insomnia, unspecified type    New dx  Will add trazodone  Discussed sleep hygiene  F/u 4 wks if no better    - traZODone (DESYREL) 50 MG tablet; Take 1 tablet by mouth nightly  Dispense: 90 tablet; Refill: 3    10. Gastroesophageal reflux disease, unspecified whether esophagitis present    Stable  con't omeprazole    11. Personal history of tobacco use    LDCT ordered after shared decision making    - MD VISIT TO DISCUSS LUNG CA SCREEN W LDCT  - CT Lung Screen (Annual); Future    12. Needs flu shot    - INFLUENZA, QUADV, ADJUVANTED, 65 YRS =, IM, PF, PREFILL SYR, 0.5ML (FLUAD)      DISPOSITION    Return in about 6 months (around 6/30/2022) for Follow-up Diabetes, follow-up on chronic medical conditions, sooner as needed. Vern Kirkland released without restrictions.     Future Appointments   Date Time Provider Department Center   2/2/2022 12:45 PM [address], MD N [facility] Toby Gagnon   6/30/2022 10:20 AM Deny Jenkins DO [facility] received counseling on the following healthy behaviors: nutrition, exercise and medication adherence    Patient given educational materials on: See Attached    I have instructed Hui to complete a self tracking handout on Blood Pressures  and instructed them to bring it with them to her next appointment. Barriers to learning and self management: none    Discussed use, benefit, and side effects of prescribed medications. Barriers to medication compliance addressed. All patient questions answered. Pt voiced understanding. Low Dose CT (LDCT) Lung Screening criteria met:     Age 55-77(Medicare) or 50-80 ([facility])   Pack year smoking >30 (Medicare) or >20 ([facility])   Still smoking or less than 15 year since quit   No sign or symptoms of lung cancer   > 11 months since last LDCT     Risks and benefits of lung cancer screening with LDCT scans discussed:    Significance of positive screen - False-positive LDCT results often occur. 95% of all positive results do not lead to a diagnosis of cancer. Usually further imaging can resolve most false-positive results; however, some patients may require invasive procedures. Over diagnosis risk - 10% to 12% of screen-detected lung cancer cases are over diagnosed--that is, the cancer would not have been detected in the patient's lifetime without the screening. Need for follow up screens annually to continue lung cancer screening effectiveness     Risks associated with radiation from annual LDCT- Radiation exposure is about the same as for a mammogram, which is about 1/3 of the annual background radiation exposure from everyday life. Starting screening at age 54 is not likely to increase cancer risk from radiation exposure. Patients with comorbidities resulting in life expectancy of < 10 years, or that would preclude treatment of an abnormality identified on CT, should not be screened due to lack of benefit.     To obtain maximal benefit from this screening, smoking cessation and long-term abstinence from smoking is critical      Electronically signed by Aki Vila DO on 12/30/2021 at 10:47 AM        Medicare Annual Wellness Visit  Name: Fabiola Matthews SUHRQD Date: 2021   MRN: [id number] Sex: Female   Age: 78 y.o. Ethnicity: Non- / Non    : 1955 Race: White (non-)      Deborah Herr is here for Medicare AWV, Follow-up (DM2 and chronic issues as noted below), and Insomnia (New)    Screenings for behavioral, psychosocial and functional/safety risks, and cognitive dysfunction are all negative except as indicated below. These results, as well as other patient data from the [address] form, are documented in Flowsheets linked to this Encounter. Allergies   Allergen Reactions    Latex Rash    Demerol Hcl [Meperidine] Hives         Prior to Visit Medications    Medication Sig Taking? Authorizing Provider   traZODone (DESYREL) 50 MG tablet Take 1 tablet by mouth nightly Yes Kiran Sharma DO   albuterol sulfate HFA (PROVENTIL HFA) 108 (90 Base) MCG/ACT inhaler Inhale 2 puffs into the lungs every 4 hours as needed for Wheezing or Shortness of Breath Yes Camelia Lynch DO   doxycycline hyclate (VIBRAMYCIN) 100 MG capsule Take 1 capsule by mouth 2 times daily for 10 days Yes Camelia Lynch DO   benzonatate (TESSALON PERLES) 100 MG capsule Take 1 to 2 tablets by mouth every 8 hours as needed for cough.  Yes Camelia Lynch DO   potassium chloride (KLOR-CON M) 20 MEQ extended release tablet TAKE 1 TABLET EVERY DAY Yes Kiran Sharma DO   atorvastatin (LIPITOR) 10 MG tablet TAKE 1 TABLET EVERY EVENING Yes Kiran Sharma DO   metoprolol tartrate (LOPRESSOR) 25 MG tablet TAKE 1/2 TABLET TWICE DAILY Yes Vidhi Walters MD   citalopram (CELEXA) 20 MG tablet TAKE 1 TABLET EVERY DAY Yes Kiran Sharma DO   lisinopril (PRINIVIL;ZESTRIL) 5 MG tablet TAKE 1 TABLET TWICE DAILY Yes Vidhi Walters MD   rOPINIRole (REQUIP) 2 MG tablet TAKE 1 TABLET TWICE DAILY Yes Kiran Sharma DO   furosemide (LASIX) 20 MG tablet TAKE 1 TABLET EVERY DAY Yes Kiran Sharma DO   omeprazole (PRILOSEC) 20 MG delayed release capsule TAKE 1 CAPSULE EVERY MORNING BEFORE BREAKFAST Yes Kiran Sharma, DO   ondansetron (ZOFRAN) 4 MG tablet Take 1 tablet by mouth 3 times daily as needed for Nausea or Vomiting Yes Angelita Conley, DO   ibuprofen (ADVIL;MOTRIN) 800 MG tablet Take 1 tablet by mouth every 8 hours as needed for Pain Yes Darren Molina, DO   BIOTIN 5000 PO Take by mouth Yes Historical Provider, MD   nortriptyline (PAMELOR) 25 MG capsule Take 1 capsule by mouth nightly Yes Angelita Conley, DO   aspirin 81 MG tablet Take 81 mg by mouth daily Yes Historical Provider, MD   acetaminophen (TYLENOL) 325 MG tablet Take 2 tablets by mouth every 4 hours as needed (post op pain) Yes Candace Garcia PA-C         Past Medical History:   Diagnosis Date    COPD (chronic obstructive pulmonary disease) ([facility]     DM2 (diabetes mellitus, type 2) ([address]     Dyslipidemia     Fibromyalgia     Former smoker     GERD (gastroesophageal reflux disease)     H/O prosthetic aortic valve replacement     Early bioprosthetic aortic valve failure with severe symptomatic prosthetic aortic regurgitation is now s/p REDO AORTIC VALVE REPLACEMENT, MITRAL VALVE REPAIR, TRICUSPID VALVE REPAIR, WILBERTO performed by Joel Ascencio MD at [facility] Heart failure with preserved ejection fraction ([facility]     History of aortic stenosis, s/p valve replacement x 2     History of iron deficiency anemia     History of subarachnoid hemorrhage 03/23/2015    Spontaneous SAH. Admitted to [facility]. Extensive w/u for cause was negative.      Hypertension, essential     Major depression     Morbid obesity ([address]     THAYER (nonalcoholic steatohepatitis)     Osteoarthritis     RLS (restless legs syndrome) 01/16/2019    S/P AVR (aortic valve replacement) 2015    x 2, last replacement July 2018 d/t failure of the 1st    S/P mitral valve repair 07/13/2018    Dr. Gladys Chappell Valvular heart disease 01/16/2019       Past Surgical History:   Procedure Laterality Date    AORTIC VALVE REPLACEMENT      cow valve    BRAIN SURGERY      to drain blood    [facility]    COLONOSCOPY      DIAGNOSTIC CARDIAC CATH LAB PROCEDURE      HYSTERECTOMY      age 49    OVARY REMOVAL Bilateral     age 49   [address] N/A 2004    Endometriosis    CT ECHO TRANSESOPHAG R-T 2D IMG ACQUISJ I&R ONLY Left 7/3/2018    TRANSESOPHAGEAL ECHOCARDIOGRAM performed by Vidhi Walters MD at [facility]    CT OFFICE/OUTPT VISIT,PROCEDURE ONLY N/A 7/13/2018    REDO AORTIC VALVE REPLACEMENT, MITRAL VALVE REPAIR, TRICUSPID VALVE REPAIR, WILBERTO performed by Selma Tarango MD at [address]           Family History   Problem Relation Age of Onset    Heart Attack Mother     Heart Disease Mother     Heart Surgery Mother     Hypertension Mother     High Blood Pressure Mother     Diabetes Mother     Diabetes Father     Breast Cancer Maternal Grandmother         Unsure age   [facility] Colon Cancer Neg Hx        CareTeam (Including outside providers/suppliers regularly involved in providing care):   Patient Care Team:  Camelia Heads, DO as PCP - General (Family Medicine)  Camelia Heads, DO as PCP - [facility] Empaneled Provider    Wt Readings from Last 3 Encounters:   12/30/21 185 lb (83.9 kg)   06/30/21 192 lb (87.1 kg)   05/03/21 195 lb (88.5 kg)     Vitals:    12/30/21 1023   BP: 112/60   Pulse: 58   Resp: 12   Temp: 98.4 °F (36.9 °C)   TempSrc: Oral   SpO2: 97%   Weight: 185 lb (83.9 kg)   Height: 5' 2\" (1.575 m)     Body mass index is 33.84 kg/m². Based upon direct observation of the patient, evaluation of cognition reveals recent and remote memory intact. Patient's complete Health Risk Assessment and screening values have been reviewed and are found in Flowsheets. The following problems were reviewed today and where indicated follow up appointments were made and/or referrals ordered.       Positive Risk Factor Screenings with Interventions:       General Health and ACP:  General  In general, how would you say your health is?: Good  In the past 7 days, have you experienced any of the following?  New or Increased Pain, New or Increased Fatigue, Loneliness, Social Isolation, Stress or Anger?: None of These  Do you get the social and emotional support that you need?: Yes  Do you have a Living Will?: (!) No  Advance Directives     Power of [facility] Will ACP-Advance Directive ACP-Power of     Not on File Not on File Not on File Not on File      General Health Risk Interventions:  · No Living Will: Patient declines ACP discussion/assistance    Health Habits/Nutrition:  Health Habits/Nutrition  Do you exercise for at least 20 minutes 2-3 times per week?: Yes  Have you lost any weight without trying in the past 3 months?: No  Do you eat only one meal per day?: No  Have you seen the dentist within the past year?: (!) No  Body mass index: (!) 33.83  Health Habits/Nutrition Interventions:  · Nutritional issues:  educational materials for healthy, well-balanced diet provided  · Dental exam overdue:  patient encouraged to make appointment with his/her dentist    Hearing/Vision:  No exam data present  Hearing/Vision  Do you or your family notice any trouble with your hearing that hasn't been managed with hearing aids?: (!) Yes  Do you have difficulty driving, watching TV, or doing any of your daily activities because of your eyesight?: No  Have you had an eye exam within the past year?: (!) No  Hearing/Vision Interventions:  · Hearing concerns:  patient declines any further evaluation/treatment for hearing issues  · Vision concerns:  patient encouraged to make appointment with his/her eye specialist    Safety:  Safety  Do you have working smoke detectors?: Yes  Have all throw rugs been removed or fastened?: Yes  Do you have non-slip mats or surfaces in all bathtubs/showers?: (!) No  Do all of your stairways have a railing or banister?: Yes  Are your doorways, halls and stairs free of clutter?: Yes  Do you always fasten your seatbelt when you are in a car?: Yes  Safety Interventions:  · Home safety tips provided       Personalized Preventive Plan   Current Health Maintenance Status  Immunization History   Administered Date(s) Administered    COVID-19, Joey Currie, PF, 30mcg/0.3mL 02/26/2021, 03/19/2021    Influenza Virus Vaccine 10/21/2017    Influenza, Quadv, IM, PF (6 mo and older Fluzone, Flulaval, Fluarix, and 3 yrs and older Afluria) 01/16/2019    Influenza, Quadv, adjuvanted, 65 yrs +, IM, PF (Fluad) 12/07/2020, 12/30/2021    Influenza, Triv, inactivated, subunit, adjuvanted, IM (Fluad 65 yrs and older) 01/16/2020    Pneumococcal Polysaccharide ([id number]) 10/21/2017        Health Maintenance   Topic Date Due    Diabetic retinal exam  Never done    DTaP/Tdap/Td vaccine (1 - Tdap) Never done    Shingles Vaccine (1 of 2) Never done    Low dose CT lung screening  07/11/2019    COVID-19 Vaccine (3 - Booster for Joey Currie series) 09/19/2021    Annual Wellness Visit (AWV)  12/08/2021    A1C test (Diabetic or Prediabetic)  06/30/2022    Breast cancer screen  08/26/2022    Pneumococcal 65+ years Vaccine (1 of 1 - PPSV23) 10/21/2022    Diabetic microalbuminuria test  12/27/2022    Lipid screen  12/27/2022    Potassium monitoring  12/27/2022    Creatinine monitoring  12/27/2022    Diabetic foot exam  12/30/2022    Colon cancer screen colonoscopy  11/25/2023    DEXA (modify frequency per FRAX score)  Completed    Flu vaccine  Completed    Hepatitis C screen  Completed    Hepatitis A vaccine  Aged Out    Hib vaccine  Aged Out    Meningococcal (ACWY) vaccine  Aged Out     Recommendations for [employer] Due: see orders and patient instructions/AVS.  .   Recommended screening schedule for the next 5-10 years is provided to the patient in written form: see Patient Rodolfo Mayo was seen today for medicare awv and follow-up. Diagnoses and all orders for this visit:    Routine general medical examination at a health care facility      Care plan reviewed with and given to patient.        Electronically signed by Tiffany Mclaughlin DO on 12/30/2021 at 10:47 AM

## 2021-12-30 ENCOUNTER — OFFICE VISIT (OUTPATIENT)
Dept: FAMILY MEDICINE CLINIC | Age: 66
End: 2021-12-30
Payer: MEDICARE

## 2021-12-30 VITALS
RESPIRATION RATE: 12 BRPM | OXYGEN SATURATION: 97 % | SYSTOLIC BLOOD PRESSURE: 112 MMHG | HEART RATE: 58 BPM | BODY MASS INDEX: 34.04 KG/M2 | DIASTOLIC BLOOD PRESSURE: 60 MMHG | TEMPERATURE: 98.4 F | WEIGHT: 185 LBS | HEIGHT: 62 IN

## 2021-12-30 DIAGNOSIS — F33.42 RECURRENT MAJOR DEPRESSIVE DISORDER, IN FULL REMISSION (HCC): ICD-10-CM

## 2021-12-30 DIAGNOSIS — K21.9 GASTROESOPHAGEAL REFLUX DISEASE, UNSPECIFIED WHETHER ESOPHAGITIS PRESENT: ICD-10-CM

## 2021-12-30 DIAGNOSIS — G25.81 RLS (RESTLESS LEGS SYNDROME): ICD-10-CM

## 2021-12-30 DIAGNOSIS — E11.9 TYPE 2 DIABETES MELLITUS WITHOUT COMPLICATION, WITHOUT LONG-TERM CURRENT USE OF INSULIN (HCC): Chronic | ICD-10-CM

## 2021-12-30 DIAGNOSIS — I50.32 CHRONIC HEART FAILURE WITH PRESERVED EJECTION FRACTION (HCC): Chronic | ICD-10-CM

## 2021-12-30 DIAGNOSIS — J44.9 CHRONIC OBSTRUCTIVE PULMONARY DISEASE, UNSPECIFIED COPD TYPE (HCC): ICD-10-CM

## 2021-12-30 DIAGNOSIS — E78.5 DYSLIPIDEMIA: ICD-10-CM

## 2021-12-30 DIAGNOSIS — Z87.891 PERSONAL HISTORY OF TOBACCO USE: ICD-10-CM

## 2021-12-30 DIAGNOSIS — Z23 NEEDS FLU SHOT: ICD-10-CM

## 2021-12-30 DIAGNOSIS — M79.7 FIBROMYALGIA: ICD-10-CM

## 2021-12-30 DIAGNOSIS — G47.00 INSOMNIA, UNSPECIFIED TYPE: ICD-10-CM

## 2021-12-30 DIAGNOSIS — I10 HYPERTENSION, ESSENTIAL: Chronic | ICD-10-CM

## 2021-12-30 DIAGNOSIS — Z00.00 ROUTINE GENERAL MEDICAL EXAMINATION AT A HEALTH CARE FACILITY: Primary | ICD-10-CM

## 2021-12-30 LAB — HBA1C MFR BLD: 6 % (ref 4.3–5.7)

## 2021-12-30 PROCEDURE — 2022F DILAT RTA XM EVC RTNOPTHY: CPT | Performed by: FAMILY MEDICINE

## 2021-12-30 PROCEDURE — 90694 VACC AIIV4 NO PRSRV 0.5ML IM: CPT | Performed by: FAMILY MEDICINE

## 2021-12-30 PROCEDURE — G8926 SPIRO NO PERF OR DOC: HCPCS | Performed by: FAMILY MEDICINE

## 2021-12-30 PROCEDURE — 99214 OFFICE O/P EST MOD 30 MIN: CPT | Performed by: FAMILY MEDICINE

## 2021-12-30 PROCEDURE — 1090F PRES/ABSN URINE INCON ASSESS: CPT | Performed by: FAMILY MEDICINE

## 2021-12-30 PROCEDURE — G8399 PT W/DXA RESULTS DOCUMENT: HCPCS | Performed by: FAMILY MEDICINE

## 2021-12-30 PROCEDURE — 1036F TOBACCO NON-USER: CPT | Performed by: FAMILY MEDICINE

## 2021-12-30 PROCEDURE — G8417 CALC BMI ABV UP PARAM F/U: HCPCS | Performed by: FAMILY MEDICINE

## 2021-12-30 PROCEDURE — 3023F SPIROM DOC REV: CPT | Performed by: FAMILY MEDICINE

## 2021-12-30 PROCEDURE — G0008 ADMIN INFLUENZA VIRUS VAC: HCPCS | Performed by: FAMILY MEDICINE

## 2021-12-30 PROCEDURE — G0439 PPPS, SUBSEQ VISIT: HCPCS | Performed by: FAMILY MEDICINE

## 2021-12-30 PROCEDURE — 4040F PNEUMOC VAC/ADMIN/RCVD: CPT | Performed by: FAMILY MEDICINE

## 2021-12-30 PROCEDURE — G8484 FLU IMMUNIZE NO ADMIN: HCPCS | Performed by: FAMILY MEDICINE

## 2021-12-30 PROCEDURE — G0296 VISIT TO DETERM LDCT ELIG: HCPCS | Performed by: FAMILY MEDICINE

## 2021-12-30 PROCEDURE — G8427 DOCREV CUR MEDS BY ELIG CLIN: HCPCS | Performed by: FAMILY MEDICINE

## 2021-12-30 PROCEDURE — 3017F COLORECTAL CA SCREEN DOC REV: CPT | Performed by: FAMILY MEDICINE

## 2021-12-30 PROCEDURE — 1123F ACP DISCUSS/DSCN MKR DOCD: CPT | Performed by: FAMILY MEDICINE

## 2021-12-30 RX ORDER — TRAZODONE HYDROCHLORIDE 50 MG/1
50 TABLET ORAL NIGHTLY
Qty: 90 TABLET | Refills: 3 | Status: SHIPPED | OUTPATIENT
Start: 2021-12-30

## 2021-12-30 ASSESSMENT — PATIENT HEALTH QUESTIONNAIRE - PHQ9
2. FEELING DOWN, DEPRESSED OR HOPELESS: 0
SUM OF ALL RESPONSES TO PHQ9 QUESTIONS 1 & 2: 0
SUM OF ALL RESPONSES TO PHQ QUESTIONS 1-9: 0
SUM OF ALL RESPONSES TO PHQ QUESTIONS 1-9: 0
1. LITTLE INTEREST OR PLEASURE IN DOING THINGS: 0
SUM OF ALL RESPONSES TO PHQ QUESTIONS 1-9: 0

## 2021-12-30 ASSESSMENT — LIFESTYLE VARIABLES: HOW OFTEN DO YOU HAVE A DRINK CONTAINING ALCOHOL: 0

## 2021-12-30 NOTE — PROGRESS NOTES
Immunization(s) given during visit:    Immunizations Administered     Name Date Dose Route    Influenza, Quadv, adjuvanted, 65 yrs +, IM, PF (Fluad) 12/30/2021 0.5 mL Intramuscular    Site: Deltoid- Right    Lot: [id number]    NDC: 12558-353-05          Most recent Vaccine Information Sheet dated 08/06/21 given to pt

## 2021-12-30 NOTE — PROGRESS NOTES
Vaccine Information Sheet, \"Influenza - Inactivated\"  given to Ketty Gordon, or parent/legal guardian of  Ketty Gordon and verbalized understanding. Patient responses:    Have you ever had a reaction to a flu vaccine? No  Do you have an allergy to eggs, neomycin or polymixin? No  Do you have an allergy to Thimerosal, contact lens solution, or Merthiolate? No  Have you ever had Guillian [place] Syndrome? No  Do you have any current illness? No  Do you have a temperature above 100 degrees? No  Are you pregnant? No  If pregnant, permission obtained from physician? No  Do you have an active neurological disorder? No      Flu vaccine given per order. Please see immunization tab.

## 2022-01-05 ENCOUNTER — TELEPHONE (OUTPATIENT)
Dept: FAMILY MEDICINE CLINIC | Age: 67
End: 2022-01-05

## 2022-01-05 NOTE — TELEPHONE ENCOUNTER
CT LUNG SCREEN OUT [facility] 01/20/22 @ 4PM ARRIVE 3:30. NO PREP    Called and left detailed msg. Checked hipaa.

## 2022-01-12 ENCOUNTER — VIRTUAL VISIT (OUTPATIENT)
Dept: FAMILY MEDICINE CLINIC | Age: 67
End: 2022-01-12
Payer: MEDICARE

## 2022-01-12 VITALS — RESPIRATION RATE: 14 BRPM

## 2022-01-12 DIAGNOSIS — J44.1 CHRONIC OBSTRUCTIVE PULMONARY DISEASE WITH ACUTE EXACERBATION (HCC): ICD-10-CM

## 2022-01-12 DIAGNOSIS — J40 BRONCHITIS: Primary | ICD-10-CM

## 2022-01-12 PROCEDURE — 99214 OFFICE O/P EST MOD 30 MIN: CPT | Performed by: FAMILY MEDICINE

## 2022-01-12 PROCEDURE — G8399 PT W/DXA RESULTS DOCUMENT: HCPCS | Performed by: FAMILY MEDICINE

## 2022-01-12 PROCEDURE — 3017F COLORECTAL CA SCREEN DOC REV: CPT | Performed by: FAMILY MEDICINE

## 2022-01-12 PROCEDURE — 1090F PRES/ABSN URINE INCON ASSESS: CPT | Performed by: FAMILY MEDICINE

## 2022-01-12 PROCEDURE — G8427 DOCREV CUR MEDS BY ELIG CLIN: HCPCS | Performed by: FAMILY MEDICINE

## 2022-01-12 PROCEDURE — 1123F ACP DISCUSS/DSCN MKR DOCD: CPT | Performed by: FAMILY MEDICINE

## 2022-01-12 PROCEDURE — 4040F PNEUMOC VAC/ADMIN/RCVD: CPT | Performed by: FAMILY MEDICINE

## 2022-01-12 RX ORDER — PREDNISONE 10 MG/1
TABLET ORAL
Qty: 30 TABLET | Refills: 0 | Status: SHIPPED | OUTPATIENT
Start: 2022-01-12 | End: 2022-01-28

## 2022-01-12 RX ORDER — LEVOFLOXACIN 500 MG/1
500 TABLET, FILM COATED ORAL DAILY
Qty: 10 TABLET | Refills: 0 | Status: SHIPPED | OUTPATIENT
Start: 2022-01-12 | End: 2022-01-22

## 2022-01-12 NOTE — PROGRESS NOTES
Chief Complaint   Patient presents with    Cough       TELEHEALTH EVALUATION -- Audio/Visual (During SKYBG-60 public health emergency)    Due to COVID 19 outbreak, patient's office visit was converted to a virtual visit. Patient was contacted and agreed to proceed with a virtual visit via Doxy. me  The risks and benefits of converting to a virtual visit were discussed in light of the current infectious disease epidemic. Patient also understood that insurance coverage and co-pays are up to their individual insurance plans. Services were provided through a video synchronous discussion virtually to substitute for in-person clinic visit    Location of patient: home  Location of physician: office    Identification confirmed by: Patient : [id number]    Pursuant to the emergency declaration under the [facility], [facility] waiver authority and the [employer] Act, this Virtual  Visit was conducted, with patient's (and/or legal guardian's) consent, to reduce the patient's risk of exposure to COVID-19 and provide necessary medical care. The patient (and/or legal guardian) has also been advised to contact this office for worsening conditions or problems, and seek emergency medical treatment and/or call 911 if deemed necessary. Services were provided through a video synchronous discussion virtually to substitute for in-person clinic visit. Due to this being a TeleHealth encounter, evaluation of certain organ systems is limited. History obtained from the patient.     SUBJECTIVE:  Guru White is a 78 y.o. female that presents today for    -URI type sxs:   Started several wks ago  Treated with doxy and pred  Got mostly better, but not all the way  sxs return over the weekend  Inc cough  Some wheezing  Mild SOB  No fever, body aches, or loss of taste/smell  Declines covid testing    Fever - No  Runny nose or congestion - Yes   Cough -  Yes  Sore throat -  Yes  Headache, fatigue, joint pains, muscle aches -  No  Double Sickening - Yes  Shortness of breath/Wheezing? -  As above  Nausea/Vomiting/Diarrhea?   No  Sick contacts - No  Maxillary Tooth Pain -  Yes  Treatment tried and response - as above      Age/Gender Health Maintenance    Lipid -   Lab Results   Component Value Date    CHOL 182 12/27/2021    CHOL 181 12/02/2020    CHOL 200 (H) 12/30/2019     Lab Results   Component Value Date    TRIG 222 (H) 12/27/2021    TRIG 178 12/02/2020    TRIG 236 (H) 12/30/2019     Lab Results   Component Value Date    HDL 62 12/27/2021    HDL 43 12/02/2020    HDL 47 12/30/2019     Lab Results   Component Value Date    LDLCALC 76 12/27/2021    LDLCALC 102 12/02/2020    [address] 106 12/30/2019       Colon Cancer Screening - Completed NOV 2020, 1 polyp per pt, repeat 3 years per GI  Lung Cancer Screening (Age 48 to [de-identified] with 20 pack year hx, current smoker or quit within past 15 years) - due, ordered    Tetanus - records pending  Influenza Vaccine - UTD DEC 2021  Pneumonia Vaccine - UTD PPV 23 in OCT 2017  Zoster - to get at pharmacy per medicare rules     Breast Cancer Screening - NEG AUG 2021  Cervical Cancer Screening - NEG FEB 2020  Osteoporosis Screening - Normal AUG 2021    Diabetes Health Maintenance    A1C -   Lab Results   Component Value Date    LABA1C 6.0 12/30/2021    LABA1C 6.2 06/30/2021    LABA1C 5.8 12/07/2020    LABA1C 6.3 06/05/2020    LABA1C 6.5 12/30/2019    LABA1C 6.1 08/20/2019    LABA1C 6.4 02/21/2019    LABA1C 5.7 07/12/2018     ACE/ARB - yes, lisinopril 5mg  Eye - next visit  Foot -  UTD DEC 2021  ASA - yes  Microal/Cr -   Lab Results   Component Value Date    LABMICR < 1.20 12/27/2021    LABCREA 201.1 12/27/2021    MACRR 6 12/27/2021     No results found for: CREATINEUR, MICROALBUR, MALBCR      eGFR -   No results found for: GFRESTIMATE  Lab Results   Component Value Date    LABGLOM 62 12/27/2021     No results found for: mouth daily for 10 days     Dispense:  10 tablet     Refill:  0    predniSONE (DELTASONE) 10 MG tablet     Si tabs PO ONCE Daily x 4 days, then 2 tabs PO ONCE daily x 4 days, then 1 TAB PO ONCE daily x 4 days, then 1/2 TAB PO ONCE daily x 4 days     Dispense:  30 tablet     Refill:  0         All medications reviewed and reconciled, including OTC and herbal medications. Updated list given to patient. Patient Active Problem List    Diagnosis Date Noted    Fibromyalgia     DM2 (diabetes mellitus, type 2) ([facility]     COPD (chronic obstructive pulmonary disease) ([facility]     Valvular heart disease 2019    RLS (restless legs syndrome) 2019    Dyslipidemia     Former smoker     GERD (gastroesophageal reflux disease)     Heart failure with preserved ejection fraction ([address]     Hypertension, essential     Major depression     THAYER (nonalcoholic steatohepatitis)     History of aortic stenosis, s/p valve replacement x 2     History of iron deficiency anemia     S/P mitral valve repair 2018     Dr. Lee Larsen H/O prosthetic aortic valve replacement 2018     Early bioprosthetic aortic valve failure with severe symptomatic prosthetic aortic regurgitation      History of subarachnoid hemorrhage 2015     Spontaneous SAH. [facility]. Extensive w/u for cause was negative.        Osteoarthritis     S/P AVR (aortic valve replacement) 01/01/2015     x 2, last replacement 2018 d/t failure of the 1st         Past Medical History:   Diagnosis Date    COPD (chronic obstructive pulmonary disease) ([facility]     DM2 (diabetes mellitus, type 2) ([facility]     Dyslipidemia     Fibromyalgia     Former smoker     GERD (gastroesophageal reflux disease)     H/O prosthetic aortic valve replacement     Early bioprosthetic aortic valve failure with severe symptomatic prosthetic aortic regurgitation is now s/p REDO AORTIC VALVE REPLACEMENT, MITRAL VALVE REPAIR, TRICUSPID VALVE REPAIR, WILBERTO performed by Shay Stallworth MD at [facility] failure with preserved ejection fraction ([address]     History of aortic stenosis, s/p valve replacement x 2     History of iron deficiency anemia     History of subarachnoid hemorrhage 03/23/2015    Spontaneous SAH. Admitted to [facility]. Extensive w/u for cause was negative.      Hypertension, essential     Major depression     Morbid obesity ([address]     THAYER (nonalcoholic steatohepatitis)     Osteoarthritis     RLS (restless legs syndrome) 01/16/2019    S/P AVR (aortic valve replacement) 2015    x 2, last replacement July 2018 d/t failure of the 1st    S/P mitral valve repair 07/13/2018    Dr. Ysabel Cruz Valvular heart disease 01/16/2019       Past Surgical History:   Procedure Laterality Date    AORTIC VALVE REPLACEMENT      cow valve    BRAIN SURGERY      to drain blood    [facility]AC [facility]  2004 or [facility]    [facility]    COLONOSCOPY      DIAGNOSTIC CARDIAC CATH LAB PROCEDURE      HYSTERECTOMY      age 49   Kusum Smiles OVARY REMOVAL Bilateral     age 49   Kusum Smiles PARTIAL HYSTERECTOMY N/A 2004    Endometriosis    [place] ECHO TRANSESOPHAG R-T 2D IMG ACQUISJ I&R ONLY Left 7/3/2018    TRANSESOPHAGEAL ECHOCARDIOGRAM performed by Oumou Howard MD at [facility]    [place] OFFICE/OUTPT VISIT,PROCEDURE ONLY N/A 7/13/2018    REDO AORTIC VALVE REPLACEMENT, MITRAL VALVE REPAIR, TRICUSPID VALVE REPAIR, WILBERTO performed by Shay Stallworth MD at [address]         Allergies   Allergen Reactions    Latex Rash    Demerol Hcl [Meperidine] Hives       Social History     Tobacco Use    Smoking status: Former Smoker     Packs/day: 0.50     Years: 44.00     Pack years: 22.00     Types: Cigarettes     Quit date: 6/23/2018     Years since quitting: 3.5    Smokeless tobacco: Former User   Substance Use Topics    Alcohol use: No       Family History   Problem Relation Age of Onset    Heart Attack Mother     Heart Disease Mother     Heart Surgery Mother     Hypertension Mother  High Blood Pressure Mother     Diabetes Mother     Diabetes Father     Breast Cancer Maternal Grandmother         Unsure age   Chiqui Barahona Colon Cancer Neg Hx          I have reviewed the patient's past medical history, past surgical history, allergies, medications, social and family history and I have made updates where appropriate. Review of Systems  Positive responses are highlighted in bold    Constitutional:  Fever, Chills, Night Sweats, Fatigue, Unexpected changes in weight  HENT:  Ear pain, Tinnitus, Nosebleeds, Trouble swallowing, Hearing loss, Sore throat  Cardiovascular:  Chest Pain, Palpitations, Orthopnea, Paroxysmal Nocturnal Dyspnea  Respiratory:  Cough, Wheezing, Shortness of breath, Chest tightness, Apnea  Gastrointestinal:  Nausea, Vomiting, Diarrhea, Constipation, Heartburn, Blood in stool  Genitourinary:  Difficulty or painful urination, Flank pain, Change in frequency, Urgency  Skin:  Color change, Rash, Itching, Wound  Musculoskeletal:  Joint pain, Back pain, Gait problems, Joint swelling, Myalgias  Neurological:  Dizziness, Headaches, Presyncope, Numbness, Seizures, Tremors  Endocrine:  Heat Intolerance, Cold Intolerance, Polydipsia, Polyphagia, Polyuria      PHYSICAL EXAM:  Not all vitals able to be obtained, video visit  Patient-Reported Vitals 1/12/2022   Patient-Reported Pulse 83   Patient-Reported Temperature 98.2      Vitals:    01/12/22 1100   Resp: 14          General Appearance: A&O x 3, No acute distress,well developed and well- nourished  Head: normocephalic and atraumatic  Eyes: pupils equal, round, and reactive to light, extraocular eye movements intact, conjunctivae and eye lids without erythema  ENT: External ears w/o redness, external nares without redness or discharge. Hearing is intact. Lips are w/o lesion. Teeth are in good repair. Pulmonary/Chest: normal respiratory effort. Normal respiratory rate. No respiratory distress .    Skin: warm and dry, no rash or erythema to visible areas. Psych: Affect appropriate. Mood euthymic. Thought process is normal without evidence of depression or psychosis. Good insight and appropriate interaction. Cognition and memory appear to be intact. ASSESSMENT & PLAN  1. Bronchitis    Likely continuation of what we were dealing with a few wks ago  Failed doxy  Start levaquin  Resume pred, as a taper this time  con't prn albuterol  F/u if no better  Reviewed ER precautions, pt understands. - levoFLOXacin (LEVAQUIN) 500 MG tablet; Take 1 tablet by mouth daily for 10 days  Dispense: 10 tablet; Refill: 0  - predniSONE (DELTASONE) 10 MG tablet; 4 tabs PO ONCE Daily x 4 days, then 2 tabs PO ONCE daily x 4 days, then 1 TAB PO ONCE daily x 4 days, then 1/2 TAB PO ONCE daily x 4 days  Dispense: 30 tablet; Refill: 0    2. Chronic obstructive pulmonary disease with acute exacerbation (HCC)    - levoFLOXacin (LEVAQUIN) 500 MG tablet; Take 1 tablet by mouth daily for 10 days  Dispense: 10 tablet; Refill: 0  - predniSONE (DELTASONE) 10 MG tablet; 4 tabs PO ONCE Daily x 4 days, then 2 tabs PO ONCE daily x 4 days, then 1 TAB PO ONCE daily x 4 days, then 1/2 TAB PO ONCE daily x 4 days  Dispense: 30 tablet; Refill: 0      DISPOSITION    Return if symptoms worsen or fail to improve. Ning Raymundo released without restrictions. Future Appointments   Date Time Provider Hola Carrion   1/20/2022  4:00 PM STR CT IMAGING RM1  [facility] OUT EXP STR Radiolog   2/2/2022 12:45 PM Reginald Duron MD N [email or web address]   6/30/2022 10:20 AM [id number] received counseling on the following healthy behaviors: nutrition, exercise and medication adherence    Barriers to learning and self management: none    Discussed use, benefit, and side effects of prescribed medications. Barriers to medication compliance addressed. All patient questions answered. Pt voiced understanding. Electronically signed by Schuyler York DO on 1/12/2022 at 11:12 AM

## 2022-01-20 ENCOUNTER — HOSPITAL ENCOUNTER (OUTPATIENT)
Dept: CT IMAGING | Age: 67
Discharge: HOME | End: 2022-01-20
Payer: MEDICARE

## 2022-01-20 DIAGNOSIS — Z87.891 PERSONAL HISTORY OF TOBACCO USE: ICD-10-CM

## 2022-01-20 PROCEDURE — 71271 CT THORAX LUNG CANCER SCR C-: CPT

## 2022-01-24 ENCOUNTER — TELEPHONE (OUTPATIENT)
Dept: FAMILY MEDICINE CLINIC | Age: 67
End: 2022-01-24

## 2022-01-24 NOTE — TELEPHONE ENCOUNTER
----- Message from Liu Keene DO sent at 1/23/2022 10:05 AM EST -----  Please let pt know that LDCT shows a few small pulm nodules. No cancer. Radiologist recommends repeat CT chest in 6 months to ensure stability of the nodules. Will call her to get done as time gets closer. Let me know if questions, thanks!

## 2022-02-24 ENCOUNTER — TELEMEDICINE (OUTPATIENT)
Dept: FAMILY MEDICINE CLINIC | Age: 67
End: 2022-02-24
Payer: MEDICARE

## 2022-02-24 ENCOUNTER — TELEPHONE (OUTPATIENT)
Dept: FAMILY MEDICINE CLINIC | Age: 67
End: 2022-02-24

## 2022-02-24 VITALS — RESPIRATION RATE: 14 BRPM

## 2022-02-24 DIAGNOSIS — J44.1 CHRONIC OBSTRUCTIVE PULMONARY DISEASE WITH ACUTE EXACERBATION (HCC): ICD-10-CM

## 2022-02-24 DIAGNOSIS — J01.90 ACUTE RHINOSINUSITIS: Primary | ICD-10-CM

## 2022-02-24 PROCEDURE — 1090F PRES/ABSN URINE INCON ASSESS: CPT | Performed by: FAMILY MEDICINE

## 2022-02-24 PROCEDURE — 99214 OFFICE O/P EST MOD 30 MIN: CPT | Performed by: FAMILY MEDICINE

## 2022-02-24 PROCEDURE — 4040F PNEUMOC VAC/ADMIN/RCVD: CPT | Performed by: FAMILY MEDICINE

## 2022-02-24 PROCEDURE — 3023F SPIROM DOC REV: CPT | Performed by: FAMILY MEDICINE

## 2022-02-24 PROCEDURE — G8427 DOCREV CUR MEDS BY ELIG CLIN: HCPCS | Performed by: FAMILY MEDICINE

## 2022-02-24 PROCEDURE — 3017F COLORECTAL CA SCREEN DOC REV: CPT | Performed by: FAMILY MEDICINE

## 2022-02-24 PROCEDURE — 1123F ACP DISCUSS/DSCN MKR DOCD: CPT | Performed by: FAMILY MEDICINE

## 2022-02-24 PROCEDURE — G8417 CALC BMI ABV UP PARAM F/U: HCPCS | Performed by: FAMILY MEDICINE

## 2022-02-24 PROCEDURE — G8399 PT W/DXA RESULTS DOCUMENT: HCPCS | Performed by: FAMILY MEDICINE

## 2022-02-24 PROCEDURE — 1036F TOBACCO NON-USER: CPT | Performed by: FAMILY MEDICINE

## 2022-02-24 PROCEDURE — G8484 FLU IMMUNIZE NO ADMIN: HCPCS | Performed by: FAMILY MEDICINE

## 2022-02-24 RX ORDER — AMOXICILLIN AND CLAVULANATE POTASSIUM 875; 125 MG/1; MG/1
1 TABLET, FILM COATED ORAL 2 TIMES DAILY
Qty: 20 TABLET | Refills: 0 | Status: SHIPPED | OUTPATIENT
Start: 2022-02-24 | End: 2022-03-06

## 2022-02-24 RX ORDER — BENZONATATE 100 MG/1
CAPSULE ORAL
Qty: 60 CAPSULE | Refills: 0 | Status: SHIPPED | OUTPATIENT
Start: 2022-02-24 | End: 2022-03-06

## 2022-02-24 RX ORDER — FLUTICASONE PROPIONATE 50 MCG
1 SPRAY, SUSPENSION (ML) NASAL DAILY
Qty: 16 G | Refills: 0 | Status: SHIPPED | OUTPATIENT
Start: 2022-02-24 | End: 2022-03-10

## 2022-02-24 NOTE — TELEPHONE ENCOUNTER
Called pt and she states she has been coughing Since Saturday. Denies SOB/tightness in chest,  Ear pain, sore throat from coughing. Not around anyone sick that she knows of. No fever.

## 2022-02-24 NOTE — PROGRESS NOTES
Chief Complaint   Patient presents with    Sinus Problem    Cough    COPD       TELEHEALTH EVALUATION -- Audio/Visual (During OCGAL-32 public health emergency)    Due to COVID 19 outbreak, patient's office visit was converted to a virtual visit. Patient (and/or legal guardian) was contacted and agreed to proceed with a virtual visit via Doxy. me  The risks and benefits of converting to a virtual visit were discussed in light of the current infectious disease epidemic. Patient (and/or legal guardian) also understood that insurance coverage and co-pays are up to their individual insurance plans. Services were provided through a video synchronous discussion virtually to substitute for in-person clinic visit    Location of patient: home  Location of physician: office    Identification confirmed by: Patient : [id number]    Lisa Pearce, was evaluated through a synchronous (real-time) audio-video encounter. The patient (or guardian if applicable) is aware that this is a billable service, which includes applicable co-pays. This Virtual Visit was conducted with patient's (and/or legal guardian's) consent. The visit was conducted pursuant to the emergency declaration under the [address] [place] [address], [address] [place] [address] [facility] authority and the [employer] General Act. Patient identification was verified, and a caregiver was present when appropriate. The patient was located in a state where the provider was licensed to provide care. he patient (and/or legal guardian) has also been advised to contact this office for worsening conditions or problems, and seek emergency medical treatment and/or call 911 if deemed necessary. Services were provided through a video synchronous discussion virtually to substitute for in-person clinic visit. Due to this being a TeleHealth encounter, evaluation of certain organ systems is limited.       History obtained from the patient. SUBJECTIVE:  Joshua Kebede is a 78 y.o. female that presents today for    -URI type sxs:   Started about a wk  Was seen in Jan for bronchitis and AE COPD that lingered from [address]  Those sxs abated and was feeling well until this past weekend  Nasal congestion  Drainage  Sore throat  Cough  No SOB  Some wheeze  Using alb now, typically doesn't need unless ill  No fevers    Fever - No  Runny nose or congestion -  Yes   Cough -  Yes  Sore throat -  Yes  Headache, fatigue, joint pains, muscle aches -  No  Double Sickening - Yes  Shortness of breath/Wheezing? -  As above  Nausea/Vomiting/Diarrhea?   No  Sick contacts - No  Maxillary Tooth Pain -  Yes  Treatment tried and response - otc meds, alb, no better    Age/Gender Health Maintenance    Lipid -   Lab Results   Component Value Date    CHOL 182 12/27/2021    CHOL 181 12/02/2020    CHOL 200 (H) 12/30/2019     Lab Results   Component Value Date    TRIG 222 (H) 12/27/2021    TRIG 178 12/02/2020    TRIG 236 (H) 12/30/2019     Lab Results   Component Value Date    HDL 62 12/27/2021    HDL 43 12/02/2020    HDL 47 12/30/2019     Lab Results   Component Value Date    LDLCALC 76 12/27/2021    LDLCALC 102 12/02/2020    [address] 106 12/30/2019       Colon Cancer Screening - Completed NOV 2020, 1 polyp per pt, repeat 3 years per GI  Lung Cancer Screening (Age 48 to [de-identified] with 20 pack year hx, current smoker or quit within past 15 years) - due, ordered    Tetanus - records pending  Influenza Vaccine - UTD DEC 2021  Pneumonia Vaccine - UTD PPV 23 in OCT 2017  Zoster - to get at pharmacy per medicare rules     Breast Cancer Screening - NEG AUG 2021  Cervical Cancer Screening - NEG FEB 2020  Osteoporosis Screening - Normal AUG 2021    Diabetes Health Maintenance    A1C -   Lab Results   Component Value Date    LABA1C 6.0 12/30/2021    LABA1C 6.2 06/30/2021    LABA1C 5.8 12/07/2020    LABA1C 6.3 06/05/2020    LABA1C 6.5 12/30/2019    LABA1C 6.1 08/20/2019    LABA1C 6.4 02/21/2019    LABA1C 5.7 07/12/2018     ACE/ARB - yes, lisinopril 5mg  Eye - next visit  Foot -  UTD DEC 2021  ASA - yes  Microal/Cr -   Lab Results   Component Value Date    LABMICR < 1.20 12/27/2021    LABCREA 201.1 12/27/2021    MACRR 6 12/27/2021     No results found for: CREATINEUR, MICROALBUR, MALBCR      eGFR -   No results found for: GFRESTIMATE  Lab Results   Component Value Date    LABGLOM 62 12/27/2021     No results found for: CRCLEARANCE  No results found for: EGFRNONAA  No results found for: EGFRAA    Statin - yes, lipitor      Current Outpatient Medications   Medication Sig Dispense Refill    amoxicillin-clavulanate (AUGMENTIN) 875-125 MG per tablet Take 1 tablet by mouth 2 times daily for 10 days 20 tablet 0    benzonatate (TESSALON PERLES) 100 MG capsule Take 1 to 2 tablets by mouth every 8 hours as needed for cough.  60 capsule 0    fluticasone (FLONASE) 50 MCG/ACT nasal spray 1 spray by Nasal route daily for 14 days 16 g 0    metoprolol tartrate (LOPRESSOR) 25 MG tablet TAKE 1/2 TABLET TWICE DAILY 90 tablet 3    traZODone (DESYREL) 50 MG tablet Take 1 tablet by mouth nightly 90 tablet 3    albuterol sulfate HFA (PROVENTIL HFA) 108 (90 Base) MCG/ACT inhaler Inhale 2 puffs into the lungs every 4 hours as needed for Wheezing or Shortness of Breath 1 each 0    potassium chloride (KLOR-CON M) 20 MEQ extended release tablet TAKE 1 TABLET EVERY DAY 90 tablet 3    atorvastatin (LIPITOR) 10 MG tablet TAKE 1 TABLET EVERY EVENING 90 tablet 3    citalopram (CELEXA) 20 MG tablet TAKE 1 TABLET EVERY DAY 90 tablet 3    lisinopril (PRINIVIL;ZESTRIL) 5 MG tablet TAKE 1 TABLET TWICE DAILY 180 tablet 3    rOPINIRole (REQUIP) 2 MG tablet TAKE 1 TABLET TWICE DAILY 180 tablet 3    furosemide (LASIX) 20 MG tablet TAKE 1 TABLET EVERY DAY 90 tablet 3    omeprazole (PRILOSEC) 20 MG delayed release capsule TAKE 1 CAPSULE EVERY MORNING BEFORE BREAKFAST 90 capsule 3    ibuprofen (ADVIL;MOTRIN) 800 MG tablet Take 1 tablet by mouth every 8 hours as needed for Pain 60 tablet 0    nortriptyline (PAMELOR) 25 MG capsule Take 1 capsule by mouth nightly 90 capsule 3    aspirin 81 MG tablet Take 81 mg by mouth daily      acetaminophen (TYLENOL) 325 MG tablet Take 2 tablets by mouth every 4 hours as needed (post op pain) 120 tablet 3     No current facility-administered medications for this visit. Orders Placed This Encounter   Medications    amoxicillin-clavulanate (AUGMENTIN) 875-125 MG per tablet     Sig: Take 1 tablet by mouth 2 times daily for 10 days     Dispense:  20 tablet     Refill:  0    benzonatate (TESSALON PERLES) 100 MG capsule     Sig: Take 1 to 2 tablets by mouth every 8 hours as needed for cough. Dispense:  60 capsule     Refill:  0    fluticasone (FLONASE) 50 MCG/ACT nasal spray     Si spray by Nasal route daily for 14 days     Dispense:  16 g     Refill:  0         All medications reviewed and reconciled, including OTC and herbal medications. Updated list given to patient. Patient Active Problem List    Diagnosis Date Noted    Fibromyalgia     [facility] 2) [facility]     COPD (chronic obstructive pulmonary disease) ([facility]     Valvular heart disease 2019    RLS (restless legs syndrome) 2019    Dyslipidemia     Former smoker     GERD (gastroesophageal reflux disease)     Heart failure with preserved ejection fraction ([facility]     Hypertension, essential     Major depression     THAYER (nonalcoholic steatohepatitis)     History of aortic stenosis, s/p valve replacement x 2     History of iron deficiency anemia     S/P mitral valve repair 2018     Dr. Curt Hurd H/O prosthetic aortic valve replacement 2018     Early bioprosthetic aortic valve failure with severe symptomatic prosthetic aortic regurgitation      History of subarachnoid hemorrhage 2015     Spontaneous SAH. Admitted to [facility]. Extensive w/u for cause was negative.        Osteoarthritis     S/P AVR (aortic valve replacement) 01/01/2015     x 2, last replacement July 2018 d/t failure of the 1st         Past Medical History:   Diagnosis Date    [facility] disease) [facility]     DM2 (diabetes mellitus, type 2) [facility]     Dyslipidemia     Fibromyalgia     Former smoker     GERD (gastroesophageal reflux disease)     H/O prosthetic aortic valve replacement     Early bioprosthetic aortic valve failure with severe symptomatic prosthetic aortic regurgitation is now s/p REDO AORTIC VALVE REPLACEMENT, MITRAL VALVE REPAIR, TRICUSPID VALVE REPAIR, WILBERTO performed by Asia Kim MD at [address] [place] [address] with preserved ejection fraction ([facility]     History of aortic stenosis, s/p valve replacement x 2     History of iron deficiency anemia     History of subarachnoid hemorrhage 03/23/2015    Spontaneous SAH. Admitted to [facility]. Extensive w/u for cause was negative.      Hypertension, essential     Major depression     Morbid obesity ([address]     THAYER (nonalcoholic steatohepatitis)     Osteoarthritis     RLS (restless legs syndrome) 01/16/2019    S/P AVR (aortic valve replacement) 2015    x 2, last replacement July 2018 d/t failure of the 1st    S/P mitral valve repair 07/13/2018    Dr. Haim Roper Valvular heart disease 01/16/2019       Past Surgical History:   Procedure Laterality Date    AORTIC VALVE REPLACEMENT      cow valve    BRAIN SURGERY      to drain blood    CARDIAC CATHETERIZATION  2004 or 2005    [facility]    COLONOSCOPY      DIAGNOSTIC CARDIAC CATH LAB PROCEDURE      HYSTERECTOMY      age 49   [place] OVARY REMOVAL Bilateral     age 54    PARTIAL HYSTERECTOMY N/A 2004    Endometriosis    CA ECHO TRANSESOPHAG R-T 2D IMG ACQUISJ I&R ONLY Left 7/3/2018    TRANSESOPHAGEAL ECHOCARDIOGRAM performed by Tammy Rossi MD at [facility] OFFICE/OUTPT VISIT,PROCEDURE ONLY N/A 7/13/2018    REDO AORTIC VALVE REPLACEMENT, MITRAL VALVE REPAIR, TRICUSPID VALVE REPAIR, WILBERTO performed by Alejandro Yepez MD at [address]         Allergies   Allergen Reactions    Latex Rash    Demerol Hcl [Meperidine] Hives       Social History     Tobacco Use    Smoking status: Former Smoker     Packs/day: 0.50     Years: 44.00     Pack years: 22.00     Types: Cigarettes     Quit date: 6/23/2018     Years since quitting: 3.6    Smokeless tobacco: Former User   Substance Use Topics    Alcohol use: No       Family History   Problem Relation Age of Onset    Heart Attack Mother     Heart Disease Mother     Heart Surgery Mother     Hypertension Mother     High Blood Pressure Mother     Diabetes Mother     Diabetes Father     Breast Cancer Maternal Grandmother         Unsure age   [facility] Colon Cancer Neg Hx          I have reviewed the patient's past medical history, past surgical history, allergies, medications, social and family history and I have made updates where appropriate.       Review of Systems  Positive responses are highlighted in bold    Constitutional:  Fever, Chills, Night Sweats, Fatigue, Unexpected changes in weight  HENT:  Ear pain, Tinnitus, Nosebleeds, Trouble swallowing, Hearing loss, Sore throat  Cardiovascular:  Chest Pain, Palpitations, Orthopnea, Paroxysmal Nocturnal Dyspnea  Respiratory:  Cough, Wheezing, Shortness of breath, Chest tightness, Apnea  Gastrointestinal:  Nausea, Vomiting, Diarrhea, Constipation, Heartburn, Blood in stool  Genitourinary:  Difficulty or painful urination, Flank pain, Change in frequency, Urgency  Skin:  Color change, Rash, Itching, Wound  Musculoskeletal:  Joint pain, Back pain, Gait problems, Joint swelling, Myalgias  Neurological:  Dizziness, Headaches, Presyncope, Numbness, Seizures, Tremors  Endocrine:  Heat Intolerance, Cold Intolerance, Polydipsia, Polyphagia, Polyuria      PHYSICAL EXAM:  Not all vitals able to be obtained, video visit  Patient-Reported Vitals 2/24/2022   Patient-Reported Pulse 79 Patient-Reported Temperature 98.4      Vitals:    02/24/22 0915   Resp: 14          General Appearance: A&O x 3, No acute distress,well developed and well- nourished  Head: normocephalic and atraumatic  Eyes: pupils equal, round, and reactive to light, extraocular eye movements intact, conjunctivae and eye lids without erythema  ENT: External ears w/o redness, external nares without redness or discharge. Hearing is intact. Lips are w/o lesion. Teeth are in good repair. Pulmonary/Chest: normal respiratory effort. Normal respiratory rate. No respiratory distress . Skin: warm and dry, no rash or erythema to visible areas. Psych: Affect appropriate. Mood euthymic. Thought process is normal without evidence of depression or psychosis. Good insight and appropriate interaction. Cognition and memory appear to be intact. ASSESSMENT & PLAN  1. Acute rhinosinusitis    Acute sinus infection causing mild AE COPD  Augmentin, tessalon and flonase  con't prn alb  No need for steroids at this time  Fluids, rest  F/u if no better  Reviewed ER precautions, pt understands. - amoxicillin-clavulanate (AUGMENTIN) 875-125 MG per tablet; Take 1 tablet by mouth 2 times daily for 10 days  Dispense: 20 tablet; Refill: 0  - benzonatate (TESSALON PERLES) 100 MG capsule; Take 1 to 2 tablets by mouth every 8 hours as needed for cough. Dispense: 60 capsule; Refill: 0  - fluticasone (FLONASE) 50 MCG/ACT nasal spray; 1 spray by Nasal route daily for 14 days  Dispense: 16 g; Refill: 0    2. Chronic obstructive pulmonary disease with acute exacerbation (HCC)    - amoxicillin-clavulanate (AUGMENTIN) 875-125 MG per tablet; Take 1 tablet by mouth 2 times daily for 10 days  Dispense: 20 tablet; Refill: 0  - benzonatate (TESSALON PERLES) 100 MG capsule; Take 1 to 2 tablets by mouth every 8 hours as needed for cough. Dispense: 60 capsule;  Refill: 0  - fluticasone (FLONASE) 50 MCG/ACT nasal spray; 1 spray by Nasal route daily for 14 days Dispense: 16 g; Refill: 0      DISPOSITION    Return if symptoms worsen or fail to improve. Saintair Oms released without restrictions. Future Appointments   Date Time Provider Hola Carrion   4/7/2022  2:30 PM Giles Luis MD [facility] - [address] [address]   6/30/2022 10:20 AM Nick Phillip DO [facility] - [address] received counseling on the following healthy behaviors: nutrition, exercise and medication adherence    Barriers to learning and self management: none    Discussed use, benefit, and side effects of prescribed medications. Barriers to medication compliance addressed. All patient questions answered. Pt voiced understanding.        Electronically signed by iNck Phillip DO on 2/24/2022 at 9:26 AM

## 2022-02-24 NOTE — TELEPHONE ENCOUNTER
----- Message from Pino Negron sent at 2/24/2022  8:29 AM EST -----  Subject: Message to Provider    QUESTIONS  Information for Provider? Pt stated that she has a cough that she is not   able to stop and it is causing her ears and throat to hurt, she is asking   for a prescription to be sent over to [address] [place] [address] & [facility], [address] [address] [address] [place] [phone] please follow up   ---------------------------------------------------------------------------  --------------  CALL BACK INFO  What is the best way for the office to contact you? OK to leave message on   voicemail  Preferred Call Back Phone Number? [phone]  ---------------------------------------------------------------------------  --------------  SCRIPT ANSWERS  Relationship to Patient?  Self

## 2022-04-07 ENCOUNTER — OFFICE VISIT (OUTPATIENT)
Dept: CARDIOLOGY CLINIC | Age: 67
End: 2022-04-07
Payer: MEDICARE

## 2022-04-07 VITALS
DIASTOLIC BLOOD PRESSURE: 64 MMHG | HEART RATE: 63 BPM | SYSTOLIC BLOOD PRESSURE: 122 MMHG | WEIGHT: 187 LBS | HEIGHT: 62 IN | BODY MASS INDEX: 34.41 KG/M2

## 2022-04-07 DIAGNOSIS — Z95.2 H/O PROSTHETIC AORTIC VALVE REPLACEMENT: Primary | ICD-10-CM

## 2022-04-07 DIAGNOSIS — Z95.2 S/P AVR: ICD-10-CM

## 2022-04-07 DIAGNOSIS — I10 PRIMARY HYPERTENSION: ICD-10-CM

## 2022-04-07 PROCEDURE — 93000 ELECTROCARDIOGRAM COMPLETE: CPT | Performed by: NUCLEAR MEDICINE

## 2022-04-07 PROCEDURE — 1036F TOBACCO NON-USER: CPT | Performed by: NUCLEAR MEDICINE

## 2022-04-07 PROCEDURE — 1123F ACP DISCUSS/DSCN MKR DOCD: CPT | Performed by: NUCLEAR MEDICINE

## 2022-04-07 PROCEDURE — 4040F PNEUMOC VAC/ADMIN/RCVD: CPT | Performed by: NUCLEAR MEDICINE

## 2022-04-07 PROCEDURE — 99213 OFFICE O/P EST LOW 20 MIN: CPT | Performed by: NUCLEAR MEDICINE

## 2022-04-07 PROCEDURE — G8427 DOCREV CUR MEDS BY ELIG CLIN: HCPCS | Performed by: NUCLEAR MEDICINE

## 2022-04-07 PROCEDURE — 3017F COLORECTAL CA SCREEN DOC REV: CPT | Performed by: NUCLEAR MEDICINE

## 2022-04-07 PROCEDURE — 1090F PRES/ABSN URINE INCON ASSESS: CPT | Performed by: NUCLEAR MEDICINE

## 2022-04-07 PROCEDURE — G8417 CALC BMI ABV UP PARAM F/U: HCPCS | Performed by: NUCLEAR MEDICINE

## 2022-04-07 PROCEDURE — G8399 PT W/DXA RESULTS DOCUMENT: HCPCS | Performed by: NUCLEAR MEDICINE

## 2022-04-07 NOTE — PROGRESS NOTES
[address] [facility] [address]  [address]  [facility] [address]  Dept: [phone]  Dept Fax: [phone]  Loc: [phone]    Visit Date: 4/7/2022    Carlee Salgado is a 78 y.o. female who presents todayfor:  Chief Complaint   Patient presents with    Cardiac Valve Problem    Hypertension     Known  MVR and AVR  AVR twice  No chest pain   No changes in breathing  No dizziness  No syncope  Bp is stable   On statins for hyperlipidemia    HPI:  HPI  Past Medical History:   Diagnosis Date    COPD (chronic obstructive pulmonary disease) ([address]     DM2 (diabetes mellitus, type 2) ([address]     Dyslipidemia     Fibromyalgia     Former smoker     GERD (gastroesophageal reflux disease)     H/O prosthetic aortic valve replacement     Early bioprosthetic aortic valve failure with severe symptomatic prosthetic aortic regurgitation is now s/p REDO AORTIC VALVE REPLACEMENT, MITRAL VALVE REPAIR, TRICUSPID VALVE REPAIR, WILBERTO performed by Lydia Orantes MD at [address] [place] [address] Drive Heart failure with preserved ejection fraction ([facility]     History of aortic stenosis, s/p valve replacement x 2     History of iron deficiency anemia     History of subarachnoid hemorrhage 03/23/2015    Spontaneous SAH. Admitted to [facility]. Extensive w/u for cause was negative.      Hypertension, essential     Major depression     Morbid obesity ([address]     THAYER (nonalcoholic steatohepatitis)     Osteoarthritis     RLS (restless legs syndrome) 01/16/2019    S/P AVR (aortic valve replacement) 2015    x 2, last replacement July 2018 d/t failure of the 1st    S/P mitral valve repair 07/13/2018    Dr. Cecille Bonner Valvular heart disease 01/16/2019      Past Surgical History:   Procedure Laterality Date    AORTIC VALVE REPLACEMENT      cow valve    BRAIN SURGERY      to drain blood    [facility]  [facility] or [facility]    [facility]    COLONOSCOPY      DIAGNOSTIC CARDIAC CATH LAB PROCEDURE      HYSTERECTOMY age 49    OVARY REMOVAL Bilateral     age 49   [address] N/A 2004    Endometriosis    FL ECHO TRANSESOPHAG R-T 2D IMG ACQUISJ I&R ONLY Left 7/3/2018    TRANSESOPHAGEAL ECHOCARDIOGRAM performed by Jason Watkins MD at [facility]    FL OFFICE/OUTPT VISIT,PROCEDURE ONLY N/A 7/13/2018    REDO AORTIC VALVE REPLACEMENT, MITRAL VALVE REPAIR, TRICUSPID VALVE REPAIR, WILBERTO performed by Milena Salcedo MD at [address]       Family History   Problem Relation Age of Onset    Heart Attack Mother     Heart Disease Mother     Heart Surgery Mother     Hypertension Mother     High Blood Pressure Mother     Diabetes Mother     Diabetes Father     Breast Cancer Maternal Grandmother         Unsure age   [facility] Colon Cancer Neg Hx      Social History     Tobacco Use    Smoking status: Former Smoker     Packs/day: 0.50     Years: 44.00     Pack years: 22.00     Types: Cigarettes     Quit date: 6/23/2018     Years since quitting: 3.7    Smokeless tobacco: Former User   Substance Use Topics    Alcohol use: No      Current Outpatient Medications   Medication Sig Dispense Refill    metoprolol tartrate (LOPRESSOR) 25 MG tablet TAKE 1/2 TABLET TWICE DAILY 90 tablet 3    traZODone (DESYREL) 50 MG tablet Take 1 tablet by mouth nightly 90 tablet 3    albuterol sulfate HFA (PROVENTIL HFA) 108 (90 Base) MCG/ACT inhaler Inhale 2 puffs into the lungs every 4 hours as needed for Wheezing or Shortness of Breath 1 each 0    potassium chloride (KLOR-CON M) 20 MEQ extended release tablet TAKE 1 TABLET EVERY DAY 90 tablet 3    atorvastatin (LIPITOR) 10 MG tablet TAKE 1 TABLET EVERY EVENING 90 tablet 3    citalopram (CELEXA) 20 MG tablet TAKE 1 TABLET EVERY DAY 90 tablet 3    lisinopril (PRINIVIL;ZESTRIL) 5 MG tablet TAKE 1 TABLET TWICE DAILY 180 tablet 3    rOPINIRole (REQUIP) 2 MG tablet TAKE 1 TABLET TWICE DAILY 180 tablet 3    furosemide (LASIX) 20 MG tablet TAKE 1 TABLET EVERY DAY 90 tablet 3  omeprazole (PRILOSEC) 20 MG delayed release capsule TAKE 1 CAPSULE EVERY MORNING BEFORE BREAKFAST 90 capsule 3    ibuprofen (ADVIL;MOTRIN) 800 MG tablet Take 1 tablet by mouth every 8 hours as needed for Pain 60 tablet 0    aspirin 81 MG tablet Take 81 mg by mouth daily      acetaminophen (TYLENOL) 325 MG tablet Take 2 tablets by mouth every 4 hours as needed (post op pain) 120 tablet 3     No current facility-administered medications for this visit.      Allergies   Allergen Reactions    Latex Rash    Demerol Hcl [Meperidine] Hives     Health Maintenance   Topic Date Due    Diabetic retinal exam  Never done    DTaP/Tdap/Td vaccine (1 - Tdap) Never done    Shingles Vaccine (1 of 2) Never done    A1C test (Diabetic or Prediabetic)  06/30/2022    Breast cancer screen  08/26/2022    Pneumococcal 65+ years Vaccine (1 of 1 - PPSV23) 10/21/2022    Diabetic microalbuminuria test  12/27/2022    Lipid screen  12/27/2022    Potassium monitoring  12/27/2022    Creatinine monitoring  12/27/2022    Diabetic foot exam  12/30/2022    Depression Monitoring  12/30/2022    Annual Wellness Visit (AWV)  12/31/2022    Low dose CT lung screening  01/20/2023    Colorectal Cancer Screen  11/25/2023    DEXA (modify frequency per FRAX score)  Completed    Flu vaccine  Completed    COVID-19 Vaccine  Completed    Hepatitis C screen  Completed    Hepatitis A vaccine  Aged Out    Hib vaccine  Aged Out    Meningococcal (ACWY) vaccine  Aged Out       Subjective:  Review of Systems  General:   No fever, no chills, No fatigue or weight loss  Pulmonary:    No dyspnea, no wheezing  Cardiac:    Denies recent chest pain,   GI:     No nausea or vomiting, no abdominal pain  Neuro:    No dizziness or light headedness,   Musculoskeletal:  No recent active issues  Extremities:   No edema, no obvious claudication       Objective:  Physical Exam  /64   Pulse 63   Ht 5' 2\" (1.575 m)   Wt 187 lb (84.8 kg)   BMI 34.20 kg/m² General:   Well developed, well nourished  Lungs:   Clear to auscultation  Heart:    Normal S1 S2, Slight murmur. no rubs, no gallops  Abdomen:   Soft, non tender, no organomegalies, positive bowel sounds  Extremities:   No edema, no cyanosis, good peripheral pulses  Neurological:   Awake, alert, oriented. No obvious focal deficits  Musculoskelatal:  No obvious deformities    Assessment:      Diagnosis Orders   1. H/O prosthetic aortic valve replacement  EKG 12 lead   2. S/P AVR  EKG 12 lead   3. Primary hypertension     as above  Cardiac fair for now   ECG in office was done today. I reviewed the ECG. No acute findings      Plan:  No follow-ups on file. As above  Continue risk factor modification and medical management  Thank you for allowing me to participate in the care of your patient. Please don't hesitate to contact me regarding any further issues related to the patient care    Orders Placed:  Orders Placed This Encounter   Procedures    EKG 12 lead     Order Specific Question:   Reason for Exam?     Answer: Other       Medications Prescribed:  No orders of the defined types were placed in this encounter. Discussed use, benefit, and side effects of prescribed medications. All patient questions answered. Pt voicedunderstanding. Instructed to continue current medications, diet and exercise. Continue risk factor modification and medical management. Patient agreed with treatment plan. Follow up as directed.     Electronically signedby Mone Duffy MD on 4/7/2022 at 2:37 PM

## 2022-05-07 DIAGNOSIS — R10.13 EPIGASTRIC ABDOMINAL PAIN: ICD-10-CM

## 2022-05-09 RX ORDER — OMEPRAZOLE 20 MG/1
CAPSULE, DELAYED RELEASE ORAL
Qty: 90 CAPSULE | Refills: 3 | Status: SHIPPED | OUTPATIENT
Start: 2022-05-09

## 2022-05-24 DIAGNOSIS — Z95.2 H/O PROSTHETIC AORTIC VALVE REPLACEMENT: ICD-10-CM

## 2022-05-24 DIAGNOSIS — I38 VALVULAR HEART DISEASE: ICD-10-CM

## 2022-05-25 RX ORDER — FUROSEMIDE 20 MG/1
TABLET ORAL
Qty: 90 TABLET | Refills: 3 | Status: SHIPPED | OUTPATIENT
Start: 2022-05-25

## 2022-05-25 NOTE — TELEPHONE ENCOUNTER
Recent Visits  Date Type Provider Dept   12/30/21 Office Visit Joyce Bhandari, DO [facility]   06/30/21 Office Visit Joyce Bhandari, DO [facility]   06/22/21 Office Visit Joyce Bhandari, DO [facility]   05/03/21 Office Visit Joyce Bhandari, DO [facility]   01/22/21 Office Visit Joyce Bhandari, DO [facility]   01/05/21 Office Visit Marina Almaguer, APRN - CNP [facility]   12/07/20 Office Visit Joyce Bhandari, DO [facility]   Showing recent visits within past 540 days with a meds authorizing provider and meeting all other requirements  Future Appointments  Date Type Provider Dept   06/30/22 Appointment Joyce Bhandari, DO [facility]   Showing future appointments within next 150 days with a meds authorizing provider and meeting all other requirements      Future Appointments   Date Time Provider Hola Carrion   6/30/2022 10:20 AM [id number] [facility]   4/6/2023  2:45 PM Sloan Apley, MD [address] [facility]

## 2022-06-24 ENCOUNTER — TELEPHONE (OUTPATIENT)
Dept: FAMILY MEDICINE CLINIC | Age: 67
End: 2022-06-24

## 2022-06-24 DIAGNOSIS — R91.8 PULMONARY NODULES: Primary | ICD-10-CM

## 2022-06-24 NOTE — TELEPHONE ENCOUNTER
Please let pt know that she is due for 6 month f/u CT chest to monitor lung nodule end of July 2022. Ordered. Let me know if questions, thanks! Diagnosis Orders   1.  Pulmonary nodules  CT CHEST WO CONTRAST     Future Appointments   Date Time Provider Department Center   6/30/2022 10:20 AM Rennie Rinne, DO [facility] [address] [place] [address]   4/6/2023  2:45 PM [address], MD N [facility] [address]

## 2022-06-24 NOTE — TELEPHONE ENCOUNTER
Pt informed and understanding   Pt transferred to [facility] at [facility]

## 2022-07-05 RX ORDER — LISINOPRIL 5 MG/1
TABLET ORAL
Qty: 180 TABLET | Refills: 3 | Status: SHIPPED | OUTPATIENT
Start: 2022-07-05

## 2022-07-27 ENCOUNTER — HOSPITAL ENCOUNTER (OUTPATIENT)
Dept: CT IMAGING | Age: 67
Discharge: HOME | End: 2022-07-27
Payer: MEDICARE

## 2022-07-27 ENCOUNTER — TELEPHONE (OUTPATIENT)
Dept: FAMILY MEDICINE CLINIC | Age: 67
End: 2022-07-27

## 2022-07-27 DIAGNOSIS — R91.8 PULMONARY NODULES: ICD-10-CM

## 2022-07-27 DIAGNOSIS — J15.9 BACTERIAL PNEUMONIA: ICD-10-CM

## 2022-07-27 DIAGNOSIS — R91.1 PULMONARY NODULE: Primary | ICD-10-CM

## 2022-07-27 PROCEDURE — 71250 CT THORAX DX C-: CPT

## 2022-07-27 RX ORDER — LEVOFLOXACIN 500 MG/1
500 TABLET, FILM COATED ORAL DAILY
Qty: 7 TABLET | Refills: 0 | Status: SHIPPED | OUTPATIENT
Start: 2022-07-27 | End: 2022-08-03

## 2022-07-27 NOTE — TELEPHONE ENCOUNTER
Pt informed and understanding   Pt states she has had a little cough for a couple weeks   Non-productive

## 2022-07-27 NOTE — TELEPHONE ENCOUNTER
----- Message from Isidro Busby DO sent at 7/27/2022 12:32 PM EDT -----  Please let pt know that CT chest shows an area of consolidation that could be a normal finding or an early pneumonia. Any cough, wheezing or fever in the last few wks? Let me know, thanks!

## 2022-08-10 ENCOUNTER — TELEPHONE (OUTPATIENT)
Dept: FAMILY MEDICINE CLINIC | Age: 67
End: 2022-08-10

## 2022-08-11 ENCOUNTER — OFFICE VISIT (OUTPATIENT)
Dept: FAMILY MEDICINE CLINIC | Age: 67
End: 2022-08-11
Payer: MEDICARE

## 2022-08-11 VITALS
SYSTOLIC BLOOD PRESSURE: 120 MMHG | RESPIRATION RATE: 16 BRPM | HEART RATE: 57 BPM | TEMPERATURE: 98.3 F | BODY MASS INDEX: 33.13 KG/M2 | OXYGEN SATURATION: 97 % | WEIGHT: 187 LBS | DIASTOLIC BLOOD PRESSURE: 60 MMHG | HEIGHT: 63 IN

## 2022-08-11 DIAGNOSIS — E11.9 TYPE 2 DIABETES MELLITUS WITHOUT COMPLICATION, WITHOUT LONG-TERM CURRENT USE OF INSULIN (HCC): ICD-10-CM

## 2022-08-11 DIAGNOSIS — G47.00 INSOMNIA, UNSPECIFIED TYPE: ICD-10-CM

## 2022-08-11 DIAGNOSIS — J44.1 CHRONIC OBSTRUCTIVE PULMONARY DISEASE WITH ACUTE EXACERBATION (HCC): ICD-10-CM

## 2022-08-11 DIAGNOSIS — M79.7 FIBROMYALGIA: ICD-10-CM

## 2022-08-11 DIAGNOSIS — Z86.79 HISTORY OF AORTIC STENOSIS: Chronic | ICD-10-CM

## 2022-08-11 DIAGNOSIS — I10 HYPERTENSION, ESSENTIAL: ICD-10-CM

## 2022-08-11 DIAGNOSIS — J40 BRONCHITIS: Primary | ICD-10-CM

## 2022-08-11 DIAGNOSIS — F33.42 RECURRENT MAJOR DEPRESSIVE DISORDER, IN FULL REMISSION (HCC): ICD-10-CM

## 2022-08-11 DIAGNOSIS — K21.9 GASTROESOPHAGEAL REFLUX DISEASE, UNSPECIFIED WHETHER ESOPHAGITIS PRESENT: ICD-10-CM

## 2022-08-11 DIAGNOSIS — E78.5 DYSLIPIDEMIA: ICD-10-CM

## 2022-08-11 DIAGNOSIS — G25.81 RLS (RESTLESS LEGS SYNDROME): ICD-10-CM

## 2022-08-11 DIAGNOSIS — I50.32 CHRONIC HEART FAILURE WITH PRESERVED EJECTION FRACTION (HCC): ICD-10-CM

## 2022-08-11 DIAGNOSIS — Z95.2 S/P AVR (AORTIC VALVE REPLACEMENT): Chronic | ICD-10-CM

## 2022-08-11 DIAGNOSIS — Z98.890 S/P MITRAL VALVE REPAIR: Chronic | ICD-10-CM

## 2022-08-11 LAB
HBA1C MFR BLD: 5.9 %
HBA1C MFR BLD: 5.9 % (ref 4.3–5.7)

## 2022-08-11 PROCEDURE — 1036F TOBACCO NON-USER: CPT | Performed by: FAMILY MEDICINE

## 2022-08-11 PROCEDURE — G8427 DOCREV CUR MEDS BY ELIG CLIN: HCPCS | Performed by: FAMILY MEDICINE

## 2022-08-11 PROCEDURE — G8399 PT W/DXA RESULTS DOCUMENT: HCPCS | Performed by: FAMILY MEDICINE

## 2022-08-11 PROCEDURE — 3017F COLORECTAL CA SCREEN DOC REV: CPT | Performed by: FAMILY MEDICINE

## 2022-08-11 PROCEDURE — 3044F HG A1C LEVEL LT 7.0%: CPT | Performed by: FAMILY MEDICINE

## 2022-08-11 PROCEDURE — 99214 OFFICE O/P EST MOD 30 MIN: CPT | Performed by: FAMILY MEDICINE

## 2022-08-11 PROCEDURE — G8417 CALC BMI ABV UP PARAM F/U: HCPCS | Performed by: FAMILY MEDICINE

## 2022-08-11 PROCEDURE — 1090F PRES/ABSN URINE INCON ASSESS: CPT | Performed by: FAMILY MEDICINE

## 2022-08-11 PROCEDURE — 2022F DILAT RTA XM EVC RTNOPTHY: CPT | Performed by: FAMILY MEDICINE

## 2022-08-11 PROCEDURE — 3023F SPIROM DOC REV: CPT | Performed by: FAMILY MEDICINE

## 2022-08-11 PROCEDURE — 83036 HEMOGLOBIN GLYCOSYLATED A1C: CPT | Performed by: FAMILY MEDICINE

## 2022-08-11 PROCEDURE — 1123F ACP DISCUSS/DSCN MKR DOCD: CPT | Performed by: FAMILY MEDICINE

## 2022-08-11 RX ORDER — PREDNISONE 20 MG/1
40 TABLET ORAL DAILY
Qty: 10 TABLET | Refills: 0 | Status: SHIPPED | OUTPATIENT
Start: 2022-08-11 | End: 2022-08-16

## 2022-08-11 RX ORDER — DOXYCYCLINE HYCLATE 100 MG/1
100 CAPSULE ORAL 2 TIMES DAILY
Qty: 20 CAPSULE | Refills: 0 | Status: SHIPPED | OUTPATIENT
Start: 2022-08-11 | End: 2022-08-21

## 2022-08-11 RX ORDER — ALBUTEROL SULFATE 90 UG/1
2 AEROSOL, METERED RESPIRATORY (INHALATION) EVERY 4 HOURS PRN
Qty: 1 EACH | Refills: 0 | Status: SHIPPED | OUTPATIENT
Start: 2022-08-11

## 2022-08-11 RX ORDER — AMITRIPTYLINE HYDROCHLORIDE 25 MG/1
25 TABLET, FILM COATED ORAL NIGHTLY
Qty: 90 TABLET | Refills: 3 | Status: SHIPPED | OUTPATIENT
Start: 2022-08-11

## 2022-08-11 RX ORDER — ALBUTEROL SULFATE 90 UG/1
2 AEROSOL, METERED RESPIRATORY (INHALATION) EVERY 4 HOURS PRN
Qty: 2 EACH | Refills: 5 | Status: SHIPPED | OUTPATIENT
Start: 2022-08-11

## 2022-08-11 SDOH — ECONOMIC STABILITY: FOOD INSECURITY: WITHIN THE PAST 12 MONTHS, YOU WORRIED THAT YOUR FOOD WOULD RUN OUT BEFORE YOU GOT MONEY TO BUY MORE.: NEVER TRUE

## 2022-08-11 SDOH — ECONOMIC STABILITY: FOOD INSECURITY: WITHIN THE PAST 12 MONTHS, THE FOOD YOU BOUGHT JUST DIDN'T LAST AND YOU DIDN'T HAVE MONEY TO GET MORE.: NEVER TRUE

## 2022-08-11 ASSESSMENT — PATIENT HEALTH QUESTIONNAIRE - PHQ9
SUM OF ALL RESPONSES TO PHQ QUESTIONS 1-9: 0
10. IF YOU CHECKED OFF ANY PROBLEMS, HOW DIFFICULT HAVE THESE PROBLEMS MADE IT FOR YOU TO DO YOUR WORK, TAKE CARE OF THINGS AT HOME, OR GET ALONG WITH OTHER PEOPLE: 0
4. FEELING TIRED OR HAVING LITTLE ENERGY: 0
SUM OF ALL RESPONSES TO PHQ QUESTIONS 1-9: 0
SUM OF ALL RESPONSES TO PHQ QUESTIONS 1-9: 0
8. MOVING OR SPEAKING SO SLOWLY THAT OTHER PEOPLE COULD HAVE NOTICED. OR THE OPPOSITE, BEING SO FIGETY OR RESTLESS THAT YOU HAVE BEEN MOVING AROUND A LOT MORE THAN USUAL: 0
6. FEELING BAD ABOUT YOURSELF - OR THAT YOU ARE A FAILURE OR HAVE LET YOURSELF OR YOUR FAMILY DOWN: 0
5. POOR APPETITE OR OVEREATING: 0
SUM OF ALL RESPONSES TO PHQ9 QUESTIONS 1 & 2: 0
9. THOUGHTS THAT YOU WOULD BE BETTER OFF DEAD, OR OF HURTING YOURSELF: 0
7. TROUBLE CONCENTRATING ON THINGS, SUCH AS READING THE NEWSPAPER OR WATCHING TELEVISION: 0
3. TROUBLE FALLING OR STAYING ASLEEP: 0
SUM OF ALL RESPONSES TO PHQ QUESTIONS 1-9: 0
1. LITTLE INTEREST OR PLEASURE IN DOING THINGS: 0
2. FEELING DOWN, DEPRESSED OR HOPELESS: 0

## 2022-08-11 ASSESSMENT — SOCIAL DETERMINANTS OF HEALTH (SDOH): HOW HARD IS IT FOR YOU TO PAY FOR THE VERY BASICS LIKE FOOD, HOUSING, MEDICAL CARE, AND HEATING?: NOT HARD AT ALL

## 2022-08-11 NOTE — PROGRESS NOTES
Chief Complaint   Patient presents with    Cough    Follow-up     DM2 and chronic issues noted below       History obtained from the patient. SUBJECTIVE:  Laura Diamond is a 78 y.o. female that presents today for    -URI type sxs:   Sxs started 7 days ago  Inc cough  SOB  Body aches  And headaches  No fevers  Had old alb inhaler, did help, but ran out    Was treated for ? Pneumonia on CT chest 2+ wks ago. These sxs started soon after stopping levaquin  Was exposed to COVID 2-3 wks ago. Fever - No  Runny nose or congestion -  Yes   Cough -  Yes  Sore throat -  No  Headache, fatigue, joint pains, muscle aches -  Yes  Double Sickening - Yes  Shortness of breath/Wheezing? -  as above  Nausea/Vomiting/Diarrhea? No  Sick contacts - Yes  Maxillary Tooth Pain -  No  Treatment tried and response - otc meds. No better      -DM2: Diet controlled  Working on life style changes now  Working on wt loss  a1c down to 5.9    Lab Results   Component Value Date/Time    LABA1C 5.9 08/11/2022 10:00 AM    LABA1C 5.9 08/11/2022 08:56 AM    LABA1C 6.0 12/30/2021 09:53 AM    LABA1C 6.2 06/30/2021 09:22 AM    LABA1C 5.8 12/07/2020 09:22 AM    LABA1C 6.3 06/05/2020 08:46 AM    LABA1C 6.5 12/30/2019 08:35 AM    LABA1C 6.1 08/20/2019 08:08 AM    LABA1C 6.4 02/21/2019 08:32 AM    LABA1C 5.7 07/12/2018 09:47 AM       -Fibromyalgia:  Was on deysi 1st, didn't help and caused fatigue  Had been on Pamelor for a few years  Stopped in April d/t GI upset  Having inc myalgias since then        -RLS PRIOR VISIT: pt c/o legs feeling restless at night for the last year, trouble sleeping d/t it. Pain in legs at night, moves legs and better. Never seen or treated before. UPDATE PRIOR VISIT: requip helping, works well, good dose.       UPDATE PRIOR VISIT:   RLS sxs worse  Now during the day as well  Legs hurt, when moves, sxs improve  L leg seems to be worse than R  sxs started to get worse a wk or 2 wks ago  Nothing changed 2 wks ago that she can recall.    No back or groin pain   No new rashes    UPDATE PRIOR VISIT:   A bit worse  On 0.75 of requip, would like to try higher dose    UPDATE PRIOR VISIT:   No better with inc of requip to 1mg qhs  Asking what else can do  Iron levels ok     UPDATE PRIOR VISIT:   Night time sxs fine with reqip  Getting sxs during the day  Pain in legs, feels has to move  Gets those sxs at night, but thye go away when takes requp  Recent labs ok     UPDATE PRIOR VISIT:   No change in above  EMG r/o neuropathy  Was to be on 2mg requip bid  Only taking 1mg qhs     UPDATE TODAY:   sxs improved  On requip 2mg bid  Working well enough  No pain       -HTN:    HPI:    Taking meds as prescribed ?: yes  Tolerating well ?: yes  Side Effects ?: denies  BP at home ?: <140/90  Working on TLCS ?: yes  Chest Pain/SOB/Palpitations? denies    BP Readings from Last 3 Encounters:   08/11/22 120/60   04/07/22 122/64   12/30/21 112/60         -HFpEF/Hx of Aortic Valve replacement and Mitral Valve repair:  No CP, SOB, orthopnea or PND  Follows with cardio      -HLD:    HPI:    Taking meds as prescribed ?: yes  Tolerating well ?: yes  Side Effects ?: denies  Muscle Pain?: denies  Working on TLCS ?: eys      -Depression:  Depression stable and doing well per pt  Insomnia better as well  celexa working as is trazodone  Denies sI/HI      -GERD:    HPI:    Taking meds as prescribed ?: yes  Tolerating well ?: yes  Side Effects ?: denies  Dysphagia ?: denies  Odynophagia ?: denies  Melena ?: denies  Working on TLCS ?: yes      Age/Gender Health Maintenance    Lipid -   Lab Results   Component Value Date    CHOL 182 12/27/2021    CHOL 181 12/02/2020    CHOL 200 (H) 12/30/2019     Lab Results   Component Value Date    TRIG 222 (H) 12/27/2021    TRIG 178 12/02/2020    TRIG 236 (H) 12/30/2019     Lab Results   Component Value Date    HDL 62 12/27/2021    HDL 43 12/02/2020    HDL 47 12/30/2019     Lab Results   Component Value Date    LDLCALC 76 12/27/2021 Shortness of Breath 1 each 0    doxycycline hyclate (VIBRAMYCIN) 100 MG capsule Take 1 capsule by mouth in the morning and 1 capsule before bedtime. Do all this for 10 days. 20 capsule 0    predniSONE (DELTASONE) 20 MG tablet Take 2 tablets by mouth in the morning for 5 days. 10 tablet 0    lisinopril (PRINIVIL;ZESTRIL) 5 MG tablet TAKE 1 TABLET TWICE DAILY 180 tablet 3    furosemide (LASIX) 20 MG tablet TAKE 1 TABLET EVERY DAY 90 tablet 3    omeprazole (PRILOSEC) 20 MG delayed release capsule TAKE 1 CAPSULE EVERY MORNING BEFORE BREAKFAST 90 capsule 3    metoprolol tartrate (LOPRESSOR) 25 MG tablet TAKE 1/2 TABLET TWICE DAILY 90 tablet 3    traZODone (DESYREL) 50 MG tablet Take 1 tablet by mouth nightly 90 tablet 3    potassium chloride (KLOR-CON M) 20 MEQ extended release tablet TAKE 1 TABLET EVERY DAY 90 tablet 3    atorvastatin (LIPITOR) 10 MG tablet TAKE 1 TABLET EVERY EVENING 90 tablet 3    citalopram (CELEXA) 20 MG tablet TAKE 1 TABLET EVERY DAY 90 tablet 3    rOPINIRole (REQUIP) 2 MG tablet TAKE 1 TABLET TWICE DAILY 180 tablet 3    ibuprofen (ADVIL;MOTRIN) 800 MG tablet Take 1 tablet by mouth every 8 hours as needed for Pain 60 tablet 0    aspirin 81 MG tablet Take 81 mg by mouth daily      acetaminophen (TYLENOL) 325 MG tablet Take 2 tablets by mouth every 4 hours as needed (post op pain) 120 tablet 3     No current facility-administered medications for this visit.      Orders Placed This Encounter   Medications    amitriptyline (ELAVIL) 25 MG tablet     Sig: Take 1 tablet by mouth nightly     Dispense:  90 tablet     Refill:  3    albuterol sulfate HFA (PROVENTIL;VENTOLIN;PROAIR) 108 (90 Base) MCG/ACT inhaler     Sig: Inhale 2 puffs into the lungs every 4 hours as needed for Wheezing or Shortness of Breath     Dispense:  2 each     Refill:  5    albuterol sulfate HFA (PROVENTIL;VENTOLIN;PROAIR) 108 (90 Base) MCG/ACT inhaler     Sig: Inhale 2 puffs into the lungs every 4 hours as needed for Wheezing or Shortness of Breath     Dispense:  1 each     Refill:  0    doxycycline hyclate (VIBRAMYCIN) 100 MG capsule     Sig: Take 1 capsule by mouth in the morning and 1 capsule before bedtime. Do all this for 10 days. Dispense:  20 capsule     Refill:  0    predniSONE (DELTASONE) 20 MG tablet     Sig: Take 2 tablets by mouth in the morning for 5 days. Dispense:  10 tablet     Refill:  0         All medications reviewed and reconciled, including OTC and herbal medications. Updated list given to patient. Patient Active Problem List    Diagnosis Date Noted    Fibromyalgia     DM2 (diabetes mellitus, type 2) ([facility] [address]     COPD (chronic obstructive pulmonary disease) ([facility] [address]     Valvular heart disease 01/16/2019    RLS (restless legs syndrome) 01/16/2019    Dyslipidemia     Former smoker     GERD (gastroesophageal reflux disease)     Heart failure with preserved ejection fraction (HCC)     Hypertension, essential     Major depression     THAYER (nonalcoholic steatohepatitis)     History of aortic stenosis, s/p valve replacement x 2     History of iron deficiency anemia     S/P mitral valve repair 07/13/2018     Dr. Pura Escobar      H/O prosthetic aortic valve replacement 07/03/2018     Early bioprosthetic aortic valve failure with severe symptomatic prosthetic aortic regurgitation      History of subarachnoid hemorrhage 03/23/2015     Spontaneous SAH. Admitted to [facility]. Extensive w/u for cause was negative.        Osteoarthritis     S/P AVR (aortic valve replacement) 01/01/2015     x 2, last replacement July 2018 d/t failure of the 1st         Past Medical History:   Diagnosis Date    COPD (chronic obstructive pulmonary disease) (HCC)     DM2 (diabetes mellitus, type 2) (HCC)     Dyslipidemia     Fibromyalgia     Former smoker     GERD (gastroesophageal reflux disease)     H/O prosthetic aortic valve replacement     Early bioprosthetic aortic valve failure with severe symptomatic prosthetic aortic regurgitation is now s/p REDO AORTIC VALVE REPLACEMENT, MITRAL VALVE REPAIR, TRICUSPID VALVE REPAIR, WILBERTO performed by Tasneem Fabian MD at [address] [facility] failure with preserved ejection fraction [facility])     History of aortic stenosis, s/p valve replacement x 2     History of iron deficiency anemia     History of subarachnoid hemorrhage 2015    Spontaneous SAH. Admitted to [address] [place] [address]. Extensive w/u for cause was negative.      Hypertension, essential     Major depression     Morbid obesity (HCC)     THAYER (nonalcoholic steatohepatitis)     Osteoarthritis     RLS (restless legs syndrome) 2019    S/P AVR (aortic valve replacement) 2015    x 2, last replacement 2018 d/t failure of the 1st    S/P mitral valve repair 2018    Dr. Dexter Kelly    Valvular heart disease 2019       Past Surgical History:   Procedure Laterality Date    AORTIC VALVE REPLACEMENT      cow valve    BRAIN SURGERY      to drain blood    CARDIAC CATHETERIZATION   or     [place]    COLONOSCOPY      DIAGNOSTIC CARDIAC CATH LAB PROCEDURE      HYSTERECTOMY (CERVIX STATUS UNKNOWN)      age 49    OVARY REMOVAL Bilateral     age 49    PARTIAL HYSTERECTOMY (CERVIX NOT REMOVED) N/A     Endometriosis    MD ECHO TRANSESOPHAG R-T 2D IMG ACQUISJ I&R ONLY Left 7/3/2018    TRANSESOPHAGEAL ECHOCARDIOGRAM performed by Rach Dodd MD at [address] [facility] OFFICE/OUTPT VISIT,PROCEDURE ONLY N/A 2018    REDO AORTIC VALVE REPLACEMENT, MITRAL VALVE REPAIR, TRICUSPID VALVE REPAIR, WILBERTO performed by Tasneem Fabian MD at [facility]   Allergen Reactions    Latex Rash    Demerol Hcl [Meperidine] Hives       Social History     Tobacco Use    Smoking status: Former     Packs/day: 0.50     Years: 44.00     Pack years: 22.00     Types: Cigarettes     Quit date: 2018     Years since quittin.1    Smokeless tobacco: Former   Substance Use Topics    Alcohol use: No       Family History   Problem Relation Age of Onset    Heart Attack Mother     Heart Disease Mother     Heart Surgery Mother     Hypertension Mother     High Blood Pressure Mother     Diabetes Mother     Diabetes Father     Breast Cancer Maternal Grandmother         Unsure age    Colon Cancer Neg Hx          I have reviewed the patient's past medical history, past surgical history, allergies, medications, social and family history and I have made updates where appropriate. Review of Systems  Positive responses are highlighted in bold    Constitutional:  Fever, Chills, Night Sweats, Fatigue, Unexpected changes in weight  HENT:  Ear pain, Tinnitus, Nosebleeds, Trouble swallowing, Hearing loss, Sore throat  Cardiovascular:  Chest Pain, Palpitations, Orthopnea, Paroxysmal Nocturnal Dyspnea  Respiratory:  Cough, Wheezing, Shortness of breath, Chest tightness, Apnea  Gastrointestinal:  Nausea, Vomiting, Diarrhea, Constipation, Heartburn, Blood in stool  Genitourinary:  Difficulty or painful urination, Flank pain, Change in frequency, Urgency  Skin:  Color change, Rash, Itching, Wound  Musculoskeletal:  Joint pain, Back pain, Gait problems, Joint swelling, Myalgias  Neurological:  Dizziness, Headaches, Presyncope, Numbness, Seizures, Tremors  Endocrine:  Heat Intolerance, Cold Intolerance, Polydipsia, Polyphagia, Polyuria      PHYSICAL EXAM:  Vitals:    08/11/22 0932   BP: 120/60   Site: Right Upper Arm   Position: Sitting   Cuff Size: Large Adult   Pulse: 57   Resp: 16   Temp: 98.3 °F (36.8 °C)   SpO2: 97%   Weight: 187 lb (84.8 kg)   Height: 5' 2.5\" (1.588 m)     Body mass index is 33.66 kg/m².        VS Reviewed  General Appearance: A&O x 3, No acute distress,well developed and well- nourished  Eyes: pupils equal, round, and reactive to light, extraocular eye movements intact, conjunctivae and eye lids without erythema  ENT: external ear and ear canal clear bilaterally, TMs intact and regular, nose without deformity, nasal mucosa and turbinates normal without polyps, oropharynx normal, dentition is normal for age  Neck: supple and non-tender without mass, no thyromegaly or thyroid nodules, no cervical lymphadenopathy  Pulmonary/Chest: good air movement bilaterally. Scattered rhonchi. No wheezing or crackles. No accessory muscle use. No distress. Cardiovascular: S1 and S2 auscultated w/ RRR. No murmurs appreciated today. No rubs, clicks, or gallops, distal pulses intact. Abdomen: soft, non-tender, non-distended, bowel sounds physiologic,  no rebound or guarding, no masses or hernias noted. Liver and spleen without enlargement. Extremities: no cyanosis, clubbing or edema of the lower extremities. +2 PT/DP bilaterally. Skin: warm and dry, no rash or erythema      Results for POC orders placed in visit on 08/11/22   POCT glycosylated hemoglobin (Hb A1C)   Result Value Ref Range    Hemoglobin A1C 5.9 (H) 4.3 - 5.7 %       Narrative   PROCEDURE: CT CHEST WO CONTRAST       CLINICAL INFORMATION: Pulmonary nodules. COMPARISON: Low-dose CT chest 1/20/2022. CTA chest 7/11/2018. TECHNIQUE:    5 mm axial imaging through the chest without contrast. Coronal and sagittal reconstructions were performed. All CT scans at this facility use dose modulation, iterative reconstruction, and/or weight based dosing when appropriate to reduce the radiation dose to as low as reasonably achievable. FINDINGS:   Heart/mediastinum: The thyroid gland is unremarkable. Median sternotomy wires and 2 prosthetic valves appear stable. The heart size is normal. No pericardial effusion is observed. The aorta is not dilated. No mediastinal, hilar, or axillary    lymphadenopathy is visualized. Lungs: Small area of consolidation is noted in the right lower lobe posteriorly. This obscures the previously seen subpleural 5 mm right lower lobe pulmonary nodule. No pulmonary mass or nodule is observed. No pleural effusion or pneumothorax is    identified.  The central airways are patent and unremarkable bilaterally. Upper abdomen: No acute findings are noted in the limited images through the upper abdomen. Musculoskeletal:  Multilevel degenerative disc disease is noted in the thoracic spine. The visualized skeletal structures appear intact. Impression   Small area of consolidation in the right lower lobe posteriorly is nonspecific possibly secondary to dense atelectasis versus pneumonia in the appropriate clinical setting. It obscures the area of lung where the previously seen 5 mm subpleural right    lower lobe pulmonary nodule was visualized. A3 month follow-up noncontrast CT thorax is advised to ensure resolution. **This report has been created using voice recognition software. It may contain minor errors which are inherent in voice recognition technology. **       Final report electronically signed by Dr Vel Turner on 7/27/2022 9:40 AM       ECHO 07 July 2022   Summary   Technically difficult examination. Ejection fraction is visually estimated at 55%. Overall left ventricular function is normal.   The aortic valve leaflets were not well visualized. Normally functioning bioprosthetic valve in aortic position. Calcification of the mitral valve noted. Mild mitral regurgitation is present. ASSESSMENT & PLAN  1. Bronchitis    AE COPD/bronchitis  VSS  Exam reassuring  Add prn alb  Add doxy  Add pred burst  F/u if no better  Reviewed ER precautions, pt understands. - doxycycline hyclate (VIBRAMYCIN) 100 MG capsule; Take 1 capsule by mouth in the morning and 1 capsule before bedtime. Do all this for 10 days. Dispense: 20 capsule; Refill: 0  - predniSONE (DELTASONE) 20 MG tablet; Take 2 tablets by mouth in the morning for 5 days. Dispense: 10 tablet; Refill: 0    2. Chronic obstructive pulmonary disease with acute exacerbation (HCC)    - albuterol sulfate HFA (PROVENTIL;VENTOLIN;PROAIR) 108 (90 Base) MCG/ACT inhaler;  Inhale 2 puffs into the lungs every 4 hours as needed for Wheezing or Shortness of Breath  Dispense: 2 each; Refill: 5  - albuterol sulfate HFA (PROVENTIL;VENTOLIN;PROAIR) 108 (90 Base) MCG/ACT inhaler; Inhale 2 puffs into the lungs every 4 hours as needed for Wheezing or Shortness of Breath  Dispense: 1 each; Refill: 0  - doxycycline hyclate (VIBRAMYCIN) 100 MG capsule; Take 1 capsule by mouth in the morning and 1 capsule before bedtime. Do all this for 10 days. Dispense: 20 capsule; Refill: 0  - predniSONE (DELTASONE) 20 MG tablet; Take 2 tablets by mouth in the morning for 5 days. Dispense: 10 tablet; Refill: 0    3. Type 2 diabetes mellitus without complication, without long-term current use of insulin (HCC)    Stable  Con't diet control  F/u 6 months    - POCT glycosylated hemoglobin (Hb A1C)    4. Fibromyalgia    Failed deysi and pamelor d/t side effects  Trial Elavil  Call in 4 wks if still having issues    - amitriptyline (ELAVIL) 25 MG tablet; Take 1 tablet by mouth nightly  Dispense: 90 tablet; Refill: 3    5. RLS (restless legs syndrome)    Stable  Con't requip    6. Hypertension, essential    At goal  con't lopressor and lisinopil  Labs UTD    7. Chronic heart failure with preserved ejection fraction (HCC)    Stable  con't BP control and cardio f/u    8. History of aortic stenosis, s/p valve replacement x 2    As above    9. S/P AVR (aortic valve replacement)    As above    10. S/P mitral valve repair    As above    11. Dyslipidemia    Stable  Con't lipitor    12. Recurrent major depressive disorder, in full remission ([facility] [address]    Stable  con't celexa/trazodone    13. Insomnia, unspecified type      14. Gastroesophageal reflux disease, unspecified whether esophagitis present    Stable  Con't PPI      DISPOSITION    Return in about 5 months (around 1/11/2023) for AWV, follow-up on chronic medical conditions, sooner as needed. Milton Aiken released without restrictions.     Future Appointments   Date Time Provider Hola Carrion 10/27/2022 10:20 AM STR CT IMAGING RM1  [facility]   4/6/2023  2:45 PM Carley Cook MD N [facility] [id number] received counseling on the following healthy behaviors: nutrition, exercise and medication adherence    Barriers to learning and self management: none    Discussed use, benefit, and side effects of prescribed medications. Barriers to medication compliance addressed. All patient questions answered. Pt voiced understanding.        Electronically signed by French Kamara DO on 8/11/2022 at 12:24 PM

## 2022-08-15 ENCOUNTER — TELEPHONE (OUTPATIENT)
Dept: FAMILY MEDICINE CLINIC | Age: 67
End: 2022-08-15

## 2022-08-15 NOTE — TELEPHONE ENCOUNTER
Future Appointments   Date Time Provider Hola Wolfei   10/27/2022 10:20 AM STR CT IMAGING RM1  [facility]   2/15/2023  8:00 AM Delvis Torres DO [facility]   4/6/2023  2:45 PM Lidia Mccauley MD N [facility]

## 2022-08-15 NOTE — TELEPHONE ENCOUNTER
----- Message from Jean Paul Joshi DO sent at 8/15/2022  7:09 AM EDT -----  Pt seen 8/11  Needs 6 month f/u for DM2 etc  OV  Thanks!

## 2022-09-20 ENCOUNTER — TELEPHONE (OUTPATIENT)
Dept: FAMILY MEDICINE CLINIC | Age: 67
End: 2022-09-20

## 2022-09-20 DIAGNOSIS — M54.50 ACUTE BILATERAL LOW BACK PAIN WITHOUT SCIATICA: Primary | ICD-10-CM

## 2022-09-20 RX ORDER — TIZANIDINE 4 MG/1
4 TABLET ORAL 3 TIMES DAILY PRN
Qty: 45 TABLET | Refills: 0 | Status: SHIPPED | OUTPATIENT
Start: 2022-09-20

## 2022-09-20 NOTE — TELEPHONE ENCOUNTER
Noted pt did not go to walk in today  Called pt  Having inc pain the last few days    No bowel/bladder issues  Will see tomorrow at 1300  Rx for flexeril sent     Diagnosis Orders   1.  Acute bilateral low back pain without sciatica  tiZANidine (ZANAFLEX) 4 MG tablet

## 2022-09-20 NOTE — TELEPHONE ENCOUNTER
I am totally full today and have to leave early to get to calling hours for death in the family, so I can't double book anywhere. I could see tomorrow or pt could do walk in clinic today with Kaz Nunez or Trudi  If sxs are very severe, could do ER as well  Let me know if questions, thanks!

## 2022-09-20 NOTE — TELEPHONE ENCOUNTER
Patient calling to report that she has been unable to sleep more than 2 hours the past 4-5 nights due to left side back and both legs in pain.  Patient states that she can not lay, sit, stand nor walk without being in great pain and none of the medications are helping her pain nor sleep    Please advise

## 2022-09-21 ENCOUNTER — OFFICE VISIT (OUTPATIENT)
Dept: FAMILY MEDICINE CLINIC | Age: 67
End: 2022-09-21
Payer: MEDICARE

## 2022-09-21 VITALS
TEMPERATURE: 98.3 F | DIASTOLIC BLOOD PRESSURE: 68 MMHG | WEIGHT: 195.6 LBS | RESPIRATION RATE: 14 BRPM | SYSTOLIC BLOOD PRESSURE: 134 MMHG | HEART RATE: 71 BPM | OXYGEN SATURATION: 97 % | BODY MASS INDEX: 34.66 KG/M2 | HEIGHT: 63 IN

## 2022-09-21 DIAGNOSIS — M54.42 ACUTE BILATERAL LOW BACK PAIN WITH LEFT-SIDED SCIATICA: Primary | ICD-10-CM

## 2022-09-21 DIAGNOSIS — E11.9 TYPE 2 DIABETES MELLITUS WITHOUT COMPLICATION, WITHOUT LONG-TERM CURRENT USE OF INSULIN (HCC): ICD-10-CM

## 2022-09-21 DIAGNOSIS — Z23 NEED FOR INFLUENZA VACCINATION: ICD-10-CM

## 2022-09-21 PROCEDURE — G8417 CALC BMI ABV UP PARAM F/U: HCPCS | Performed by: FAMILY MEDICINE

## 2022-09-21 PROCEDURE — 90694 VACC AIIV4 NO PRSRV 0.5ML IM: CPT | Performed by: FAMILY MEDICINE

## 2022-09-21 PROCEDURE — 3044F HG A1C LEVEL LT 7.0%: CPT | Performed by: FAMILY MEDICINE

## 2022-09-21 PROCEDURE — 99213 OFFICE O/P EST LOW 20 MIN: CPT | Performed by: FAMILY MEDICINE

## 2022-09-21 PROCEDURE — 1090F PRES/ABSN URINE INCON ASSESS: CPT | Performed by: FAMILY MEDICINE

## 2022-09-21 PROCEDURE — 1123F ACP DISCUSS/DSCN MKR DOCD: CPT | Performed by: FAMILY MEDICINE

## 2022-09-21 PROCEDURE — G8399 PT W/DXA RESULTS DOCUMENT: HCPCS | Performed by: FAMILY MEDICINE

## 2022-09-21 PROCEDURE — 2022F DILAT RTA XM EVC RTNOPTHY: CPT | Performed by: FAMILY MEDICINE

## 2022-09-21 PROCEDURE — G0008 ADMIN INFLUENZA VIRUS VAC: HCPCS | Performed by: FAMILY MEDICINE

## 2022-09-21 PROCEDURE — G8427 DOCREV CUR MEDS BY ELIG CLIN: HCPCS | Performed by: FAMILY MEDICINE

## 2022-09-21 PROCEDURE — 1036F TOBACCO NON-USER: CPT | Performed by: FAMILY MEDICINE

## 2022-09-21 PROCEDURE — 3017F COLORECTAL CA SCREEN DOC REV: CPT | Performed by: FAMILY MEDICINE

## 2022-09-21 RX ORDER — PREDNISONE 20 MG/1
40 TABLET ORAL DAILY
Qty: 10 TABLET | Refills: 0 | Status: SHIPPED | OUTPATIENT
Start: 2022-09-21 | End: 2022-09-26

## 2022-09-21 NOTE — PROGRESS NOTES
Chief Complaint   Patient presents with    Back Pain     History obtained from the patient. SUBJECTIVE:  Julito La is a 78 y.o. female that presents today for    -Low Back Pain:    HPI:   Chronic issue  Worse the last 5 days  Low back  Radiates down L leg  Given zanaflex last night when I spoke to her on the phone  Did help some  Motrin helps some  No bowel/bladder problems    She describes the pain as dull, aching, throbbing  in the lumbar region. Inciting injury or history of trauma? No  Pain is relieved by - as above  Pain is aggravated by - bending, twisting and lifting  Radiation of the pain? Yes - L LE  Paresthesias of the extremities? No  Saddle anesthesia? No  Bowel or bladder incontinence? No  Treatments tried - as above      Age/Gender Health Maintenance    Lipid -   Lab Results   Component Value Date    CHOL 182 12/27/2021    CHOL 181 12/02/2020    CHOL 200 (H) 12/30/2019     Lab Results   Component Value Date    TRIG 222 (H) 12/27/2021    TRIG 178 12/02/2020    TRIG 236 (H) 12/30/2019     Lab Results   Component Value Date    HDL 62 12/27/2021    HDL 43 12/02/2020    HDL 47 12/30/2019     Lab Results   Component Value Date    LDLCALC 76 12/27/2021    LDLCALC 102 12/02/2020    LDLCALC 106 12/30/2019       Colon Cancer Screening - Completed NOV 2020, 1 polyp per pt, repeat 3 years per GI  Lung Cancer Screening - Negative for cancer July 2022 but with ?  Pneumonia, repeat 3 months    Tetanus - records pending  Influenza Vaccine - UTD FALL 2022  Pneumonia Vaccine - UTD PPV 23 in OCT 2017  Zoster - to get at pharmacy per medicare rules     Breast Cancer Screening - NEG AUG 2021  Cervical Cancer Screening - NEG FEB 2020  Osteoporosis Screening - Normal AUG 2021    Diabetes Health Maintenance    A1C -   Lab Results   Component Value Date/Time    LABA1C 5.9 08/11/2022 10:00 AM    LABA1C 5.9 08/11/2022 08:56 AM    LABA1C 6.0 12/30/2021 09:53 AM    LABA1C 6.2 06/30/2021 09:22 AM    LABA1C 5.8 12/07/2020 09:22 AM    LABA1C 6.3 06/05/2020 08:46 AM    LABA1C 6.5 12/30/2019 08:35 AM    LABA1C 6.1 08/20/2019 08:08 AM    LABA1C 6.4 02/21/2019 08:32 AM    LABA1C 5.7 07/12/2018 09:47 AM     ACE/ARB - yes, lisinopril 5mg  Eye - next visit  Foot -  UTD DEC 2021  ASA - yes  Microal/Cr -   Lab Results   Component Value Date    LABMICR < 1.20 12/27/2021    LABCREA 201.1 12/27/2021    MACRR 6 12/27/2021     No results found for: Jose Ramon Sepulveda, MALBCR      eGFR -   No results found for: GFRESTIMATE  Lab Results   Component Value Date/Time    LABGLOM 62 12/27/2021 10:58 AM     No results found for: CRCLEARANCE  No results found for: EGFRNONAA  No results found for: EGFRAA    Statin - yes, lipitor      Current Outpatient Medications   Medication Sig Dispense Refill    predniSONE (DELTASONE) 20 MG tablet Take 2 tablets by mouth daily for 5 days 10 tablet 0    tiZANidine (ZANAFLEX) 4 MG tablet Take 1 tablet by mouth 3 times daily as needed (back pain/spasm) 45 tablet 0    amitriptyline (ELAVIL) 25 MG tablet Take 1 tablet by mouth nightly 90 tablet 3    albuterol sulfate HFA (PROVENTIL;VENTOLIN;PROAIR) 108 (90 Base) MCG/ACT inhaler Inhale 2 puffs into the lungs every 4 hours as needed for Wheezing or Shortness of Breath 2 each 5    albuterol sulfate HFA (PROVENTIL;VENTOLIN;PROAIR) 108 (90 Base) MCG/ACT inhaler Inhale 2 puffs into the lungs every 4 hours as needed for Wheezing or Shortness of Breath 1 each 0    lisinopril (PRINIVIL;ZESTRIL) 5 MG tablet TAKE 1 TABLET TWICE DAILY 180 tablet 3    furosemide (LASIX) 20 MG tablet TAKE 1 TABLET EVERY DAY 90 tablet 3    omeprazole (PRILOSEC) 20 MG delayed release capsule TAKE 1 CAPSULE EVERY MORNING BEFORE BREAKFAST 90 capsule 3    metoprolol tartrate (LOPRESSOR) 25 MG tablet TAKE 1/2 TABLET TWICE DAILY 90 tablet 3    traZODone (DESYREL) 50 MG tablet Take 1 tablet by mouth nightly 90 tablet 3    potassium chloride (KLOR-CON M) 20 MEQ extended release tablet TAKE 1 TABLET EVERY DAY 90 tablet 3    atorvastatin (LIPITOR) 10 MG tablet TAKE 1 TABLET EVERY EVENING 90 tablet 3    citalopram (CELEXA) 20 MG tablet TAKE 1 TABLET EVERY DAY 90 tablet 3    rOPINIRole (REQUIP) 2 MG tablet TAKE 1 TABLET TWICE DAILY 180 tablet 3    ibuprofen (ADVIL;MOTRIN) 800 MG tablet Take 1 tablet by mouth every 8 hours as needed for Pain 60 tablet 0    aspirin 81 MG tablet Take 81 mg by mouth daily      acetaminophen (TYLENOL) 325 MG tablet Take 2 tablets by mouth every 4 hours as needed (post op pain) 120 tablet 3     No current facility-administered medications for this visit. Orders Placed This Encounter   Medications    predniSONE (DELTASONE) 20 MG tablet     Sig: Take 2 tablets by mouth daily for 5 days     Dispense:  10 tablet     Refill:  0           All medications reviewed and reconciled, including OTC and herbal medications. Updated list given to patient. Patient Active Problem List    Diagnosis Date Noted    Fibromyalgia     DM2 (diabetes mellitus, type 2) ([facility] [address]     COPD (chronic obstructive pulmonary disease) ([facility] [address]     Valvular heart disease 01/16/2019    RLS (restless legs syndrome) 01/16/2019    Dyslipidemia     Former smoker     GERD (gastroesophageal reflux disease)     Heart failure with preserved ejection fraction (HCC)     Hypertension, essential     Major depression     THAYER (nonalcoholic steatohepatitis)     History of aortic stenosis, s/p valve replacement x 2     History of iron deficiency anemia     S/P mitral valve repair 07/13/2018     Dr. Sergo Arana      H/O prosthetic aortic valve replacement 07/03/2018     Early bioprosthetic aortic valve failure with severe symptomatic prosthetic aortic regurgitation      History of subarachnoid hemorrhage 03/23/2015     Spontaneous SAH. Admitted to [facility]. Extensive w/u for cause was negative.        Osteoarthritis     S/P AVR (aortic valve replacement) 01/01/2015     x 2, last replacement July 2018 d/t failure of the 1st         Past Medical History:   Diagnosis Date    COPD (chronic obstructive pulmonary disease) (HCC)     DM2 (diabetes mellitus, type 2) (HCC)     Dyslipidemia     Fibromyalgia     Former smoker     GERD (gastroesophageal reflux disease)     H/O prosthetic aortic valve replacement     Early bioprosthetic aortic valve failure with severe symptomatic prosthetic aortic regurgitation is now s/p REDO AORTIC VALVE REPLACEMENT, MITRAL VALVE REPAIR, TRICUSPID VALVE REPAIR, WILBERTO performed by Denny Mcgowan MD at [address] [facility] failure with preserved ejection fraction ([address]     History of aortic stenosis, s/p valve replacement x 2     History of iron deficiency anemia     History of subarachnoid hemorrhage 03/23/2015    Spontaneous SAH. Admitted to [facility]. Extensive w/u for cause was negative.      Hypertension, essential     Major depression     Morbid obesity (HCC)     THAYER (nonalcoholic steatohepatitis)     Osteoarthritis     RLS (restless legs syndrome) 01/16/2019    S/P AVR (aortic valve replacement) 2015    x 2, last replacement July 2018 d/t failure of the 1st    S/P mitral valve repair 07/13/2018    Dr. Kayce Garrido    Valvular heart disease 01/16/2019       Past Surgical History:   Procedure Laterality Date    AORTIC VALVE REPLACEMENT      cow valve    BRAIN SURGERY      to drain blood    CARDIAC CATHETERIZATION  2004 or 2005    [facility]    COLONOSCOPY      DIAGNOSTIC CARDIAC CATH LAB PROCEDURE      HYSTERECTOMY (CERVIX STATUS UNKNOWN)      age 49    OVARY REMOVAL Bilateral     age 49    PARTIAL HYSTERECTOMY (CERVIX NOT REMOVED) N/A 2004    Endometriosis    NV ECHO TRANSESOPHAG R-T 2D IMG ACQUISJ I&R ONLY Left 7/3/2018    TRANSESOPHAGEAL ECHOCARDIOGRAM performed by Elaine Whitaker MD at [facility]    NV OFFICE/OUTPT VISIT,PROCEDURE ONLY N/A 7/13/2018    REDO AORTIC VALVE REPLACEMENT, MITRAL VALVE REPAIR, TRICUSPID VALVE REPAIR, WILBERTO performed by Denny Mcgowan MD at [address] [facility]   Allergen Reactions    Latex Rash Demerol Hcl [Meperidine] Hives       Social History     Tobacco Use    Smoking status: Former     Packs/day: 0.50     Years: 44.00     Pack years: 22.00     Types: Cigarettes     Quit date: 2018     Years since quittin.2    Smokeless tobacco: Former   Substance Use Topics    Alcohol use: No       Family History   Problem Relation Age of Onset    Heart Attack Mother     Heart Disease Mother     Heart Surgery Mother     Hypertension Mother     High Blood Pressure Mother     Diabetes Mother     Diabetes Father     Breast Cancer Maternal Grandmother         Unsure age    Colon Cancer Neg Hx          I have reviewed the patient's past medical history, past surgical history, allergies, medications, social and family history and I have made updates where appropriate. Review of Systems  Positive responses are highlighted in bold    Constitutional:  Fever, Chills, Night Sweats, Fatigue, Unexpected changes in weight  HENT:  Ear pain, Tinnitus, Nosebleeds, Trouble swallowing, Hearing loss, Sore throat  Cardiovascular:  Chest Pain, Palpitations, Orthopnea, Paroxysmal Nocturnal Dyspnea  Respiratory:  Cough, Wheezing, Shortness of breath, Chest tightness, Apnea  Gastrointestinal:  Nausea, Vomiting, Diarrhea, Constipation, Heartburn, Blood in stool  Genitourinary:  Difficulty or painful urination, Flank pain, Change in frequency, Urgency  Skin:  Color change, Rash, Itching, Wound  Musculoskeletal:  Joint pain, Back pain, Gait problems, Joint swelling, Myalgias  Neurological:  Dizziness, Headaches, Presyncope, Numbness, Seizures, Tremors  Endocrine:  Heat Intolerance, Cold Intolerance, Polydipsia, Polyphagia, Polyuria      PHYSICAL EXAM:  Vitals:    22 1255   BP: 134/68   Pulse: 71   Resp: 14   Temp: 98.3 °F (36.8 °C)   TempSrc: Oral   SpO2: 97%   Weight: 195 lb 9.6 oz (88.7 kg)   Height: 5' 2.5\" (1.588 m)     Body mass index is 35.21 kg/m².   Pain Score:   9    VS Reviewed  General Appearance: A&O x 3, No with left-sided sciatica    Reassuring exam today  Con't Zanaflex  Con't prn motrin  Con't heating pain  Given HEP  Pred burst  Refer to [facility] at [facility]  If no better after PT, or if sxs change/worsen prior to being done with  PT, f/u in office    Is diet controlled diabetic  A1c 5.9 last  Monitor for s/s hyperglycemia while on prednisone    - predniSONE (DELTASONE) 20 MG tablet; Take 2 tablets by mouth daily for 5 days  Dispense: 10 tablet; Refill: 0  - External Referral To Physical Therapy    2. Type 2 diabetes mellitus without complication, without long-term current use of insulin ([facility] [address]      3. Need for influenza vaccination    - Influenza, FLUAD, (age 72 y+), IM, Preservative Free, 0.5 mL      DISPOSITION    Return if symptoms worsen or fail to improve. Juan Mediate released without restrictions. Future Appointments   Date Time Provider Hola Carrion   10/27/2022 10:20 AM STR CT IMAGING RM1  [facility] EXP [facility]iolog   2/15/2023  8:00 AM Camelia Lynch DO [facility] [address] [facility]   4/6/2023  2:45 PM Kenneth Lemon MD [facility] received counseling on the following healthy behaviors: nutrition, exercise and medication adherence    Barriers to learning and self management: none    Discussed use, benefit, and side effects of prescribed medications. Barriers to medication compliance addressed. All patient questions answered. Pt voiced understanding.        Electronically signed by Camelia Lynch DO on 9/21/2022 at 1:30 PM

## 2022-09-21 NOTE — PROGRESS NOTES
Vaccine Information Sheet, \"Influenza - Inactivated\"  given to Santo Hubbard, or parent/legal guardian of  Santo Hubbard and verbalized understanding. Patient responses:    Have you ever had a reaction to a flu vaccine? No  Do you have an allergy to eggs, neomycin or polymixin? No  Do you have an allergy to Thimerosal, contact lens solution, or Merthiolate? No  Have you ever had [place]illian [place] Syndrome? No  Do you have any current illness? No  Do you have a temperature above 100 degrees? No  Are you pregnant? No  If pregnant, permission obtained from physician? Yes  Do you have an active neurological disorder? No      Flu vaccine given per order. Please see immunization tab.

## 2022-09-29 DIAGNOSIS — G25.81 RLS (RESTLESS LEGS SYNDROME): Chronic | ICD-10-CM

## 2022-09-29 RX ORDER — CITALOPRAM 20 MG/1
TABLET ORAL
Qty: 90 TABLET | Refills: 3 | Status: SHIPPED | OUTPATIENT
Start: 2022-09-29

## 2022-09-29 RX ORDER — ROPINIROLE 2 MG/1
TABLET, FILM COATED ORAL
Qty: 180 TABLET | Refills: 3 | Status: SHIPPED | OUTPATIENT
Start: 2022-09-29

## 2022-09-29 NOTE — TELEPHONE ENCOUNTER
Future Appointments   Date Time Provider Hola Wolfei   10/27/2022 10:20 AM STR CT IMAGING RM1  [facility] EXP STR Radiolog   2/15/2023  8:00 AM Tiffany Hooper DO [facility] [place] [address]   4/6/2023  2:45 PM Grady Hummel MD N [facility] [place] [address]

## 2022-09-29 NOTE — TELEPHONE ENCOUNTER
Future Appointments   Date Time Provider Hola Wolfei   10/27/2022 10:20 AM STR CT IMAGING RM1  [facility] EXP STR Radiolog   2/15/2023  8:00 AM Camilo Diana DO [facility] [place] [address]   4/6/2023  2:45 PM Dylan Palumbo MD N [facility] [place] [address]

## 2022-10-27 ENCOUNTER — HOSPITAL ENCOUNTER (OUTPATIENT)
Dept: CT IMAGING | Age: 67
Discharge: HOME | End: 2022-10-27
Payer: MEDICARE

## 2022-10-27 DIAGNOSIS — R91.1 PULMONARY NODULE: ICD-10-CM

## 2022-10-27 PROCEDURE — 71250 CT THORAX DX C-: CPT

## 2022-10-28 ENCOUNTER — TELEPHONE (OUTPATIENT)
Dept: FAMILY MEDICINE CLINIC | Age: 67
End: 2022-10-28

## 2022-10-28 NOTE — TELEPHONE ENCOUNTER
----- Message from Cale Toro DO sent at 10/27/2022  4:53 PM EDT -----  Please let pt know that CT chest shows stable nodule  Radiologist recommends repeat CT in 6 months. Will call her to get done as time gets closer  Let me know if questions, thanks!

## 2022-11-10 DIAGNOSIS — E78.5 DYSLIPIDEMIA: ICD-10-CM

## 2022-11-10 RX ORDER — ATORVASTATIN CALCIUM 10 MG/1
TABLET, FILM COATED ORAL
Qty: 90 TABLET | Refills: 3 | Status: SHIPPED | OUTPATIENT
Start: 2022-11-10

## 2022-11-10 NOTE — TELEPHONE ENCOUNTER
Recent Visits  Date Type Provider Dept   09/21/22 Office Visit Jeanie Failing, DO [facility]   08/11/22 Office Visit Jeanie Failing, DO [facility]   12/30/21 Office Visit Jeanie Failing, DO [facility]   06/30/21 Office Visit Jeanie Failing, DO [facility]   06/22/21 Office Visit Jeanie Failing, DO [facility]   Showing recent visits within past 540 days with a meds authorizing provider and meeting all other requirements  Future Appointments  Date Type Provider Dept   02/15/23 Appointment Jeanie Failing, DO [facility]   Showing future appointments within next 150 days with a meds authorizing provider and meeting all other requirements     Future Appointments   Date Time Provider Hola Carrion   2/15/2023  8:00 AM Carlotta BORDEN AM [facility]   4/6/2023  2:45 PM Stephania Vale MD N [id number] [facility]

## 2022-12-13 DIAGNOSIS — E87.6 HYPOKALEMIA: ICD-10-CM

## 2022-12-13 RX ORDER — POTASSIUM CHLORIDE 20 MEQ/1
TABLET, EXTENDED RELEASE ORAL
Qty: 90 TABLET | Refills: 3 | Status: SHIPPED | OUTPATIENT
Start: 2022-12-13

## 2023-01-30 NOTE — PROGRESS NOTES
Chief Complaint   Patient presents with    Fatigue     Feeling really tired, SOB not sleeping at night and tired throughout the day. Little over a week ago started feeling \"weird\"    Insomnia       History obtained from the patient.    SUBJECTIVE:  Hui Dean is a 67 y.o. female that presents today for    -Fatigue, SOB, feeling weird:  Started a wk ago  Noted sig inc fatigue  VALIENTE  Hard to get moving  No chest pain  No orthopnea  No PND  Prior to a wk ago felt fine  Hasn't been checking pulse at home  Today HR 49 on EKG, marked sinus dimitry  Is on Lopressor 12.5mg bid      -Insomnia:  On trazodone 50mg  However, last 4 wks, trouble falling and staying asleep  Depression stable  On celexa for that  No SI/HI      Age/Gender Health Maintenance    Lipid -   Lab Results   Component Value Date    CHOL 182 12/27/2021    CHOL 181 12/02/2020    CHOL 200 (H) 12/30/2019     Lab Results   Component Value Date    TRIG 222 (H) 12/27/2021    TRIG 178 12/02/2020    TRIG 236 (H) 12/30/2019     Lab Results   Component Value Date    HDL 62 12/27/2021    HDL 43 12/02/2020    HDL 47 12/30/2019     Lab Results   Component Value Date    LDLCALC 76 12/27/2021    LDLCALC 102 12/02/2020    LDLCALC 106 12/30/2019       Colon Cancer Screening - Completed NOV 2020, 1 polyp per pt, repeat 3 years per GI  Lung Cancer Screening - + pulm nodule OCT 2022, repeat 6 months.     Tetanus - records pending  Influenza Vaccine - UTD FALL 2022  Pneumonia Vaccine - UTD PPV 23 in OCT 2017  Zoster - to get at pharmacy per medicare rules     Breast Cancer Screening - NEG AUG 2021  Cervical Cancer Screening - NEG FEB 2020  Osteoporosis Screening - Normal AUG 2021    Diabetes Health Maintenance    A1C -   Lab Results   Component Value Date/Time    LABA1C 5.9 08/11/2022 10:00 AM    LABA1C 5.9 08/11/2022 08:56 AM    LABA1C 6.0 12/30/2021 09:53 AM    LABA1C 6.2 06/30/2021 09:22 AM    LABA1C 5.8 12/07/2020 09:22 AM    LABA1C 6.3 06/05/2020 08:46 AM    LABA1C 6.5  12/30/2019 08:35 AM    LABA1C 6.1 08/20/2019 08:08 AM    LABA1C 6.4 02/21/2019 08:32 AM    LABA1C 5.7 07/12/2018 09:47 AM     ACE/ARB - yes, lisinopril 5mg  Eye - next visit  Foot -  UTD DEC 2021  ASA - yes  Microal/Cr -   Lab Results   Component Value Date    LABMICR < 1.20 12/27/2021    LABCREA 201.1 12/27/2021    MACRR 6 12/27/2021     No results found for: HIRAM Balbuena      eGFR -   No results found for: GFRESTIMATE  Lab Results   Component Value Date/Time    LABGLOM 62 12/27/2021 10:58 AM     No results found for: CRCLEARANCE  No results found for: EGFRNONAA  No results found for: EGFRAA    Statin - yes, lipitor      Current Outpatient Medications   Medication Sig Dispense Refill    potassium chloride (KLOR-CON M) 20 MEQ extended release tablet TAKE 1 TABLET EVERY DAY 90 tablet 3    atorvastatin (LIPITOR) 10 MG tablet TAKE 1 TABLET EVERY EVENING 90 tablet 3    rOPINIRole (REQUIP) 2 MG tablet TAKE 1 TABLET TWICE DAILY 180 tablet 3    citalopram (CELEXA) 20 MG tablet TAKE 1 TABLET EVERY DAY 90 tablet 3    tiZANidine (ZANAFLEX) 4 MG tablet Take 1 tablet by mouth 3 times daily as needed (back pain/spasm) 45 tablet 0    amitriptyline (ELAVIL) 25 MG tablet Take 1 tablet by mouth nightly 90 tablet 3    albuterol sulfate HFA (PROVENTIL;VENTOLIN;PROAIR) 108 (90 Base) MCG/ACT inhaler Inhale 2 puffs into the lungs every 4 hours as needed for Wheezing or Shortness of Breath 2 each 5    lisinopril (PRINIVIL;ZESTRIL) 5 MG tablet TAKE 1 TABLET TWICE DAILY 180 tablet 3    furosemide (LASIX) 20 MG tablet TAKE 1 TABLET EVERY DAY 90 tablet 3    omeprazole (PRILOSEC) 20 MG delayed release capsule TAKE 1 CAPSULE EVERY MORNING BEFORE BREAKFAST 90 capsule 3    metoprolol tartrate (LOPRESSOR) 25 MG tablet TAKE 1/2 TABLET TWICE DAILY 90 tablet 3    traZODone (DESYREL) 50 MG tablet Take 1 tablet by mouth nightly (Patient taking differently: Take 100 mg by mouth nightly) 90 tablet 3    ibuprofen (ADVIL;MOTRIN) 800 MG tablet Take 1 tablet by mouth every 8 hours as needed for Pain 60 tablet 0    aspirin 81 MG tablet Take 81 mg by mouth daily      acetaminophen (TYLENOL) 325 MG tablet Take 2 tablets by mouth every 4 hours as needed (post op pain) 120 tablet 3     No current facility-administered medications for this visit. No orders of the defined types were placed in this encounter. All medications reviewed and reconciled, including OTC and herbal medications. Updated list given to patient. Patient Active Problem List    Diagnosis Date Noted    Fibromyalgia     DM2 (diabetes mellitus, type 2) ([facility] [address]     COPD (chronic obstructive pulmonary disease) ([facility] [address]     Valvular heart disease 01/16/2019    RLS (restless legs syndrome) 01/16/2019    Dyslipidemia     Former smoker     GERD (gastroesophageal reflux disease)     Heart failure with preserved ejection fraction (HCC)     Hypertension, essential     Major depression     THAYER (nonalcoholic steatohepatitis)     History of aortic stenosis, s/p valve replacement x 2     History of iron deficiency anemia     S/P mitral valve repair 07/13/2018     Dr. Moisés Alves      H/O prosthetic aortic valve replacement 07/03/2018     Early bioprosthetic aortic valve failure with severe symptomatic prosthetic aortic regurgitation      History of subarachnoid hemorrhage 03/23/2015     Spontaneous SAH. Admitted to [address] [place] [address]. Extensive w/u for cause was negative.        Osteoarthritis     S/P AVR (aortic valve replacement) 01/01/2015     x 2, last replacement July 2018 d/t failure of the 1st         Past Medical History:   Diagnosis Date    COPD (chronic obstructive pulmonary disease) (HCC)     DM2 (diabetes mellitus, type 2) ([facility])     Dyslipidemia     Fibromyalgia     Former smoker     GERD (gastroesophageal reflux disease)     H/O prosthetic aortic valve replacement     Early bioprosthetic aortic valve failure with severe symptomatic prosthetic aortic regurgitation is now s/p REDO AORTIC VALVE REPLACEMENT, MITRAL VALVE REPAIR, TRICUSPID VALVE REPAIR, WILBERTO performed by Tracy Scott MD at [address] failure with preserved ejection fraction [facility])     History of aortic stenosis, s/p valve replacement x 2     History of iron deficiency anemia     History of subarachnoid hemorrhage 2015    Spontaneous SAH. Admitted to [facility]. Extensive w/u for cause was negative.      Hypertension, essential     Major depression     Morbid obesity (HCC)     THAYER (nonalcoholic steatohepatitis)     Osteoarthritis     RLS (restless legs syndrome) 2019    S/P AVR (aortic valve replacement) 2015    x 2, last replacement 2018 d/t failure of the 1st    S/P mitral valve repair 2018    Dr. Theron Tamayo    Valvular heart disease 2019       Past Surgical History:   Procedure Laterality Date    AORTIC VALVE REPLACEMENT      cow valve    BRAIN SURGERY      to drain blood    CARDIAC CATHETERIZATION   or     [facility]    COLONOSCOPY      DIAGNOSTIC CARDIAC CATH LAB PROCEDURE      HYSTERECTOMY (CERVIX STATUS UNKNOWN)      age 49    OVARY REMOVAL Bilateral     age 49    PARTIAL HYSTERECTOMY (CERVIX NOT REMOVED) N/A     Endometriosis    [place] ECHO TRANSESOPHAG R-T 2D IMG ACQUISJ I&R ONLY Left 7/3/2018    TRANSESOPHAGEAL ECHOCARDIOGRAM performed by Melanie Nuñez MD at [address] [facility] OFFICE/OUTPT VISIT,PROCEDURE ONLY N/A 2018    REDO AORTIC VALVE REPLACEMENT, MITRAL VALVE REPAIR, TRICUSPID VALVE REPAIR, WILBERTO performed by Tracy Scott MD at [address] [facility]   Allergen Reactions    Latex Rash    Demerol Hcl [Meperidine] Hives       Social History     Tobacco Use    Smoking status: Former     Packs/day: 0.50     Years: 44.00     Pack years: 22.00     Types: Cigarettes     Quit date: 2018     Years since quittin.6    Smokeless tobacco: Former   Substance Use Topics    Alcohol use: No       Family History   Problem Relation Age of Onset    Heart Attack Mother     Heart Disease Mother     Heart Surgery Mother     Hypertension Mother     High Blood Pressure Mother     Diabetes Mother     Diabetes Father     Breast Cancer Maternal Grandmother         Unsure age    Colon Cancer Neg Hx        I have reviewed the patient's past medical history, past surgical history, allergies, medications, social and family history and I have made updates where appropriate. Review of Systems  Positive responses are highlighted in bold    Constitutional:  Fever, Chills, Night Sweats, Fatigue, Unexpected changes in weight  HENT:  Ear pain, Tinnitus, Nosebleeds, Trouble swallowing, Hearing loss, Sore throat  Cardiovascular:  Chest Pain, Palpitations, Orthopnea, Paroxysmal Nocturnal Dyspnea  Respiratory:  Cough, Wheezing, Shortness of breath, Chest tightness, Apnea  Gastrointestinal:  Nausea, Vomiting, Diarrhea, Constipation, Heartburn, Blood in stool  Genitourinary:  Difficulty or painful urination, Flank pain, Change in frequency, Urgency  Skin:  Color change, Rash, Itching, Wound  Musculoskeletal:  Joint pain, Back pain, Gait problems, Joint swelling, Myalgias  Neurological:  Dizziness, Headaches, Presyncope, Numbness, Seizures, Tremors  Endocrine:  Heat Intolerance, Cold Intolerance, Polydipsia, Polyphagia, Polyuria      PHYSICAL EXAM:  Vitals:    01/31/23 0917   BP: 132/80   Pulse: 54   Resp: 18   Temp: 97.6 °F (36.4 °C)   TempSrc: Oral   SpO2: 99%   Weight: 187 lb 6.4 oz (85 kg)   Height: 5' 2.5\" (1.588 m)     Body mass index is 33.73 kg/m².        VS Reviewed  General Appearance: A&O x 3, No acute distress,well developed and well- nourished  Eyes: pupils equal, round, and reactive to light, extraocular eye movements intact, conjunctivae and eye lids without erythema  ENT: external ear and ear canal clear bilaterally, TMs intact and regular, nose without deformity, nasal mucosa and turbinates normal without polyps, oropharynx normal, dentition is normal for age  Neck: supple and non-tender without mass, no thyromegaly or thyroid nodules, no cervical lymphadenopathy  Pulmonary/Chest: clear to auscultation bilaterally- no wheezes, rales or rhonchi, normal air movement, no respiratory distress or retractions  Cardiovascular: S1 and S2 auscultated w/ slow rate and normal rhythm. No murmurs, rubs, clicks, or gallops, distal pulses intact. Abdomen: soft, non-tender, non-distended, bowel sounds physiologic,  no rebound or guarding, no masses or hernias noted. Liver and spleen without enlargement. Extremities: no cyanosis, clubbing or edema of the lower extremities. Skin: warm and dry, no rash or erythema      EKG: Marked sinus bradycardia, no ST-T changes      ASSESSMENT & PLAN  1. Bradycardia, sinus    Marked dimitry 49  EKG otherwise stable  Pt is stable clinically, but her sxs likely related to slow HR  D/w cardiology, who can see her tomorrow  In the mean time, hold lopressor  Labs below  Reviewed ER precautions, pt understands. - EKG 12 Lead - Clinic Performed  - CBC with Auto Differential; Future  - Comprehensive Metabolic Panel; Future  - Lipid Panel; Future  - TSH with Reflex; Future  - Microalbumin / Creatinine Urine Ratio; Future    2. Chronic heart failure with preserved ejection fraction (HCC)    As above  Stable  Con't lisinopril, lasix  Hold lopressor    - CBC with Auto Differential; Future  - Comprehensive Metabolic Panel; Future  - Lipid Panel; Future  - TSH with Reflex; Future  - Microalbumin / Creatinine Urine Ratio; Future    3. Hypertension, essential    As above    - CBC with Auto Differential; Future  - Comprehensive Metabolic Panel; Future  - Lipid Panel; Future  - TSH with Reflex; Future  - Microalbumin / Creatinine Urine Ratio; Future    4. Valvular heart disease    As above    - CBC with Auto Differential; Future  - Comprehensive Metabolic Panel; Future  - Lipid Panel; Future  - TSH with Reflex; Future  - Microalbumin / Creatinine Urine Ratio; Future    5.  Insomnia, unspecified type    Inc trazodone to 100mg  Con't Celexa  F/u as scheduled in a few wks and can reassess then    6. Recurrent major depressive disorder, in full remission ([address]    As above      DISPOSITION    Return in about 3 weeks (around 2/21/2023) for Follow-up Diabetes, follow-up on chronic medical conditions, sooner as needed. Lina Craw released without restrictions. Future Appointments   Date Time Provider Hola Carrion   2/1/2023  9:00 AM Althea [facility] - [place] [address]   2/15/2023  8:00 AM Shirlene Lanes, DO [facility] - [place] [address]   4/6/2023  2:45 PM Kenneth Baker MD N [facility] - [phone] received counseling on the following healthy behaviors: nutrition, exercise and medication adherence    Barriers to learning and self management: none    Discussed use, benefit, and side effects of prescribed medications. Barriers to medication compliance addressed. All patient questions answered. Pt voiced understanding.        Electronically signed by Shirlene Lanes, DO on 1/31/2023 at 10:11 AM

## 2023-01-31 ENCOUNTER — OFFICE VISIT (OUTPATIENT)
Dept: FAMILY MEDICINE CLINIC | Age: 68
End: 2023-01-31
Payer: MEDICARE

## 2023-01-31 ENCOUNTER — NURSE ONLY (OUTPATIENT)
Dept: LAB | Age: 68
End: 2023-01-31

## 2023-01-31 ENCOUNTER — TELEPHONE (OUTPATIENT)
Dept: FAMILY MEDICINE CLINIC | Age: 68
End: 2023-01-31

## 2023-01-31 VITALS
DIASTOLIC BLOOD PRESSURE: 80 MMHG | HEIGHT: 63 IN | OXYGEN SATURATION: 99 % | WEIGHT: 187.4 LBS | HEART RATE: 54 BPM | RESPIRATION RATE: 18 BRPM | BODY MASS INDEX: 33.2 KG/M2 | SYSTOLIC BLOOD PRESSURE: 132 MMHG | TEMPERATURE: 97.6 F

## 2023-01-31 DIAGNOSIS — I50.32 CHRONIC HEART FAILURE WITH PRESERVED EJECTION FRACTION (HCC): Chronic | ICD-10-CM

## 2023-01-31 DIAGNOSIS — I10 HYPERTENSION, ESSENTIAL: Chronic | ICD-10-CM

## 2023-01-31 DIAGNOSIS — R00.1 BRADYCARDIA, SINUS: Primary | ICD-10-CM

## 2023-01-31 DIAGNOSIS — I38 VALVULAR HEART DISEASE: Chronic | ICD-10-CM

## 2023-01-31 DIAGNOSIS — G47.00 INSOMNIA, UNSPECIFIED TYPE: ICD-10-CM

## 2023-01-31 DIAGNOSIS — F33.42 RECURRENT MAJOR DEPRESSIVE DISORDER, IN FULL REMISSION (HCC): ICD-10-CM

## 2023-01-31 DIAGNOSIS — R00.1 BRADYCARDIA, SINUS: ICD-10-CM

## 2023-01-31 LAB
ALBUMIN SERPL BCG-MCNC: 4.5 G/DL (ref 3.5–5.1)
ALP SERPL-CCNC: 56 U/L (ref 38–126)
ALT SERPL W/O P-5'-P-CCNC: 20 U/L (ref 11–66)
ANION GAP SERPL CALC-SCNC: 12 MEQ/L (ref 8–16)
AST SERPL-CCNC: 31 U/L (ref 5–40)
BASOPHILS ABSOLUTE: 0 THOU/MM3 (ref 0–0.1)
BASOPHILS NFR BLD AUTO: 0.5 %
BILIRUB SERPL-MCNC: 0.4 MG/DL (ref 0.3–1.2)
BUN SERPL-MCNC: 14 MG/DL (ref 7–22)
CALCIUM SERPL-MCNC: 9.7 MG/DL (ref 8.5–10.5)
CHLORIDE SERPL-SCNC: 108 MEQ/L (ref 98–111)
CHOLEST SERPL-MCNC: 187 MG/DL (ref 100–199)
CO2 SERPL-SCNC: 22 MEQ/L (ref 23–33)
CREAT SERPL-MCNC: 0.9 MG/DL (ref 0.4–1.2)
CREAT UR-MCNC: 179.4 MG/DL
DEPRECATED RDW RBC AUTO: 41.9 FL (ref 35–45)
EOSINOPHIL NFR BLD AUTO: 1.9 %
EOSINOPHILS ABSOLUTE: 0.1 THOU/MM3 (ref 0–0.4)
ERYTHROCYTE [DISTWIDTH] IN BLOOD BY AUTOMATED COUNT: 13.2 % (ref 11.5–14.5)
GFR SERPL CREATININE-BSD FRML MDRD: > 60 ML/MIN/1.73M2
GLUCOSE SERPL-MCNC: 102 MG/DL (ref 70–108)
HCT VFR BLD AUTO: 41 % (ref 37–47)
HDLC SERPL-MCNC: 49 MG/DL
HGB BLD-MCNC: 13 GM/DL (ref 12–16)
IMM GRANULOCYTES # BLD AUTO: 0.02 THOU/MM3 (ref 0–0.07)
IMM GRANULOCYTES NFR BLD AUTO: 0.3 %
LDLC SERPL CALC-MCNC: 103 MG/DL
LYMPHOCYTES ABSOLUTE: 2.7 THOU/MM3 (ref 1–4.8)
LYMPHOCYTES NFR BLD AUTO: 35.6 %
MCH RBC QN AUTO: 27.7 PG (ref 26–33)
MCHC RBC AUTO-ENTMCNC: 31.7 GM/DL (ref 32.2–35.5)
MCV RBC AUTO: 87.2 FL (ref 81–99)
MICROALBUMIN UR-MCNC: < 1.2 MG/DL
MICROALBUMIN/CREAT RATIO PNL UR: 7 MG/G (ref 0–30)
MONOCYTES ABSOLUTE: 0.5 THOU/MM3 (ref 0.4–1.3)
MONOCYTES NFR BLD AUTO: 7 %
NEUTROPHILS NFR BLD AUTO: 54.7 %
NRBC BLD AUTO-RTO: 0 /100 WBC
PLATELET # BLD AUTO: 214 THOU/MM3 (ref 130–400)
PMV BLD AUTO: 12.1 FL (ref 9.4–12.4)
POTASSIUM SERPL-SCNC: 4.2 MEQ/L (ref 3.5–5.2)
PROT SERPL-MCNC: 7.3 G/DL (ref 6.1–8)
RBC # BLD AUTO: 4.7 MILL/MM3 (ref 4.2–5.4)
SEGMENTED NEUTROPHILS ABSOLUTE COUNT: 4.1 THOU/MM3 (ref 1.8–7.7)
SODIUM SERPL-SCNC: 142 MEQ/L (ref 135–145)
TRIGL SERPL-MCNC: 176 MG/DL (ref 0–199)
TSH SERPL DL<=0.005 MIU/L-ACNC: 1.13 UIU/ML (ref 0.4–4.2)
WBC # BLD AUTO: 7.5 THOU/MM3 (ref 4.8–10.8)

## 2023-01-31 PROCEDURE — 1123F ACP DISCUSS/DSCN MKR DOCD: CPT | Performed by: FAMILY MEDICINE

## 2023-01-31 PROCEDURE — 3079F DIAST BP 80-89 MM HG: CPT | Performed by: FAMILY MEDICINE

## 2023-01-31 PROCEDURE — 3017F COLORECTAL CA SCREEN DOC REV: CPT | Performed by: FAMILY MEDICINE

## 2023-01-31 PROCEDURE — 99214 OFFICE O/P EST MOD 30 MIN: CPT | Performed by: FAMILY MEDICINE

## 2023-01-31 PROCEDURE — G8484 FLU IMMUNIZE NO ADMIN: HCPCS | Performed by: FAMILY MEDICINE

## 2023-01-31 PROCEDURE — 1090F PRES/ABSN URINE INCON ASSESS: CPT | Performed by: FAMILY MEDICINE

## 2023-01-31 PROCEDURE — G8417 CALC BMI ABV UP PARAM F/U: HCPCS | Performed by: FAMILY MEDICINE

## 2023-01-31 PROCEDURE — G8399 PT W/DXA RESULTS DOCUMENT: HCPCS | Performed by: FAMILY MEDICINE

## 2023-01-31 PROCEDURE — 1036F TOBACCO NON-USER: CPT | Performed by: FAMILY MEDICINE

## 2023-01-31 PROCEDURE — 93000 ELECTROCARDIOGRAM COMPLETE: CPT | Performed by: FAMILY MEDICINE

## 2023-01-31 PROCEDURE — G8427 DOCREV CUR MEDS BY ELIG CLIN: HCPCS | Performed by: FAMILY MEDICINE

## 2023-01-31 PROCEDURE — 3075F SYST BP GE 130 - 139MM HG: CPT | Performed by: FAMILY MEDICINE

## 2023-01-31 ASSESSMENT — PATIENT HEALTH QUESTIONNAIRE - PHQ9
6. FEELING BAD ABOUT YOURSELF - OR THAT YOU ARE A FAILURE OR HAVE LET YOURSELF OR YOUR FAMILY DOWN: 0
SUM OF ALL RESPONSES TO PHQ QUESTIONS 1-9: 8
6. FEELING BAD ABOUT YOURSELF - OR THAT YOU ARE A FAILURE OR HAVE LET YOURSELF OR YOUR FAMILY DOWN: 0
7. TROUBLE CONCENTRATING ON THINGS, SUCH AS READING THE NEWSPAPER OR WATCHING TELEVISION: 0
9. THOUGHTS THAT YOU WOULD BE BETTER OFF DEAD, OR OF HURTING YOURSELF: 0
2. FEELING DOWN, DEPRESSED OR HOPELESS: 2
4. FEELING TIRED OR HAVING LITTLE ENERGY: 3
SUM OF ALL RESPONSES TO PHQ QUESTIONS 1-9: 8
10. IF YOU CHECKED OFF ANY PROBLEMS, HOW DIFFICULT HAVE THESE PROBLEMS MADE IT FOR YOU TO DO YOUR WORK, TAKE CARE OF THINGS AT HOME, OR GET ALONG WITH OTHER PEOPLE: 1
5. POOR APPETITE OR OVEREATING: 0
8. MOVING OR SPEAKING SO SLOWLY THAT OTHER PEOPLE COULD HAVE NOTICED. OR THE OPPOSITE, BEING SO FIGETY OR RESTLESS THAT YOU HAVE BEEN MOVING AROUND A LOT MORE THAN USUAL: 0
SUM OF ALL RESPONSES TO PHQ QUESTIONS 1-9: 8
SUM OF ALL RESPONSES TO PHQ QUESTIONS 1-9: 8
5. POOR APPETITE OR OVEREATING: 0
8. MOVING OR SPEAKING SO SLOWLY THAT OTHER PEOPLE COULD HAVE NOTICED. OR THE OPPOSITE, BEING SO FIGETY OR RESTLESS THAT YOU HAVE BEEN MOVING AROUND A LOT MORE THAN USUAL: 0
4. FEELING TIRED OR HAVING LITTLE ENERGY: 3
SUM OF ALL RESPONSES TO PHQ QUESTIONS 1-9: 8
10. IF YOU CHECKED OFF ANY PROBLEMS, HOW DIFFICULT HAVE THESE PROBLEMS MADE IT FOR YOU TO DO YOUR WORK, TAKE CARE OF THINGS AT HOME, OR GET ALONG WITH OTHER PEOPLE: 1
3. TROUBLE FALLING OR STAYING ASLEEP: 3
SUM OF ALL RESPONSES TO PHQ QUESTIONS 1-9: 8
3. TROUBLE FALLING OR STAYING ASLEEP: 3
SUM OF ALL RESPONSES TO PHQ QUESTIONS 1-9: 8
SUM OF ALL RESPONSES TO PHQ QUESTIONS 1-9: 8
9. THOUGHTS THAT YOU WOULD BE BETTER OFF DEAD, OR OF HURTING YOURSELF: 0
1. LITTLE INTEREST OR PLEASURE IN DOING THINGS: 2

## 2023-01-31 NOTE — TELEPHONE ENCOUNTER
----- Message from Tootie Richardson, DO sent at 1/31/2023 12:37 PM EST -----  Please let pt know that labs look good  Con't with plan from office  Let me know if questions, thanks!

## 2023-02-01 ENCOUNTER — OFFICE VISIT (OUTPATIENT)
Dept: CARDIOLOGY CLINIC | Age: 68
End: 2023-02-01
Payer: MEDICARE

## 2023-02-01 VITALS
HEART RATE: 66 BPM | HEIGHT: 63 IN | WEIGHT: 185 LBS | BODY MASS INDEX: 32.78 KG/M2 | SYSTOLIC BLOOD PRESSURE: 143 MMHG | DIASTOLIC BLOOD PRESSURE: 59 MMHG

## 2023-02-01 DIAGNOSIS — R06.09 DOE (DYSPNEA ON EXERTION): Primary | ICD-10-CM

## 2023-02-01 DIAGNOSIS — Z95.2 H/O PROSTHETIC AORTIC VALVE REPLACEMENT: ICD-10-CM

## 2023-02-01 DIAGNOSIS — R42 LIGHTHEADEDNESS: ICD-10-CM

## 2023-02-01 DIAGNOSIS — I10 PRIMARY HYPERTENSION: ICD-10-CM

## 2023-02-01 DIAGNOSIS — R00.1 BRADYCARDIA: ICD-10-CM

## 2023-02-01 DIAGNOSIS — R53.83 OTHER FATIGUE: ICD-10-CM

## 2023-02-01 PROCEDURE — G8417 CALC BMI ABV UP PARAM F/U: HCPCS | Performed by: NURSE PRACTITIONER

## 2023-02-01 PROCEDURE — G8427 DOCREV CUR MEDS BY ELIG CLIN: HCPCS | Performed by: NURSE PRACTITIONER

## 2023-02-01 PROCEDURE — 1036F TOBACCO NON-USER: CPT | Performed by: NURSE PRACTITIONER

## 2023-02-01 PROCEDURE — G8399 PT W/DXA RESULTS DOCUMENT: HCPCS | Performed by: NURSE PRACTITIONER

## 2023-02-01 PROCEDURE — 1090F PRES/ABSN URINE INCON ASSESS: CPT | Performed by: NURSE PRACTITIONER

## 2023-02-01 PROCEDURE — 3078F DIAST BP <80 MM HG: CPT | Performed by: NURSE PRACTITIONER

## 2023-02-01 PROCEDURE — G8484 FLU IMMUNIZE NO ADMIN: HCPCS | Performed by: NURSE PRACTITIONER

## 2023-02-01 PROCEDURE — 99212 OFFICE O/P EST SF 10 MIN: CPT | Performed by: NURSE PRACTITIONER

## 2023-02-01 PROCEDURE — 3017F COLORECTAL CA SCREEN DOC REV: CPT | Performed by: NURSE PRACTITIONER

## 2023-02-01 PROCEDURE — 1123F ACP DISCUSS/DSCN MKR DOCD: CPT | Performed by: NURSE PRACTITIONER

## 2023-02-01 PROCEDURE — 3077F SYST BP >= 140 MM HG: CPT | Performed by: NURSE PRACTITIONER

## 2023-02-01 NOTE — PATIENT INSTRUCTIONS
Continue current medications as prescribed. Continue to not take the Lopressor. Keep log of blood pressure and heart rate as discussed. Have the event monitor applied as ordered. Have the echo done as ordered. Follow-up with your PCP as scheduled. Follow-up with Dr. Janice Downey on 4/6/23  as scheduled or sooner if need.

## 2023-02-01 NOTE — PROGRESS NOTES
[address]  [place] [address]  Dept: [phone]  Dept Fax: [phone]  Loc: [phone]    Visit Date: 2/1/2023    Primary cardiologist: Eryn Benito is a 75 y.o. female who presents today for: Evaluation of bradycardia, shortness of breath and fatigue. Referred per PCP. Saw PCP on 1/31/2023 noting feeling tired, short of breath and not sleeping at night. Tired throughout the day. This started a little over a week ago. EKG noted heart rate of 49, sinus bradycardia in the office. Patient currently on Lopressor 12.5 twice daily. Lopressor held and referred to cardiology. Physical exam noted slow rate but normal rhythm. Lungs clear and no edema. Chief Complaint   Patient presents with    Follow-up     Bradycardia   Last seen in the office on 4/7/2022 per Dr. Evelio Alvarez for reevaluation of known cardiac valve problems and hypertension. .  Office note:  Known  MVR and AVR  AVR twice  No chest pain   No changes in breathing  No dizziness  No syncope  Bp is stable   On statins for hyperlipidemia  Assessment:    Diagnosis Orders   1. H/O prosthetic aortic valve replacement  EKG 12 lead   2. S/P AVR  EKG 12 lead   3. Primary hypertension      as above  Cardiac fair for now   ECG in office was done today. I reviewed the ECG. No acute findings  Plan:  No follow-ups on file.   As above  Continue risk factor modification and medical management      HPI:  HPI  Past Medical History:   Diagnosis Date    COPD (chronic obstructive pulmonary disease) ([facility] [address]     DM2 (diabetes mellitus, type 2) ([facility])     Dyslipidemia     Fibromyalgia     Former smoker     GERD (gastroesophageal reflux disease)     H/O prosthetic aortic valve replacement     Early bioprosthetic aortic valve failure with severe symptomatic prosthetic aortic regurgitation is now s/p REDO AORTIC VALVE REPLACEMENT, MITRAL VALVE REPAIR, TRICUSPID VALVE REPAIR, WILBERTO performed by Mike Vazquez MD at [address] failure with preserved ejection fraction [facility])     History of aortic stenosis, s/p valve replacement x 2     History of iron deficiency anemia     History of subarachnoid hemorrhage 2015    Spontaneous SAH. Admitted to [address] [place] [address]. Extensive w/u for cause was negative.      Hypertension, essential     Major depression     Morbid obesity (HCC)     THAYER (nonalcoholic steatohepatitis)     Osteoarthritis     RLS (restless legs syndrome) 2019    S/P AVR (aortic valve replacement) 2015    x 2, last replacement 2018 d/t failure of the 1st    S/P mitral valve repair 2018    Dr. Juan John    Valvular heart disease 2019      Past Surgical History:   Procedure Laterality Date    AORTIC VALVE REPLACEMENT      cow valve    BRAIN SURGERY      to drain blood    CARDIAC CATHETERIZATION   or     [facility]    COLONOSCOPY      DIAGNOSTIC CARDIAC CATH LAB PROCEDURE      HYSTERECTOMY (CERVIX STATUS UNKNOWN)      age 49    OVARY REMOVAL Bilateral     age 49    PARTIAL HYSTERECTOMY (CERVIX NOT REMOVED) N/A     Endometriosis    NM ECHO TRANSESOPHAG R-T 2D IMG ACQUISJ I&R ONLY Left 7/3/2018    TRANSESOPHAGEAL ECHOCARDIOGRAM performed by Rea Mendoza MD at [address] [facility] OFFICE/OUTPT VISIT,PROCEDURE ONLY N/A 2018    REDO AORTIC VALVE REPLACEMENT, MITRAL VALVE REPAIR, TRICUSPID VALVE REPAIR, WILBERTO performed by Mike Vazquez MD at [address] [facility] History   Problem Relation Age of Onset    Heart Attack Mother     Heart Disease Mother     Heart Surgery Mother     Hypertension Mother     High Blood Pressure Mother     Diabetes Mother     Diabetes Father     Breast Cancer Maternal Grandmother         Unsure age    Colon Cancer Neg Hx      Social History     Tobacco Use    Smoking status: Former     Packs/day: 0.50     Years: 44.00     Pack years: 22.00     Types: Cigarettes     Quit date: 2018     Years since quittin.6 Smokeless tobacco: Former   Substance Use Topics    Alcohol use: No      Current Outpatient Medications   Medication Sig Dispense Refill    potassium chloride (KLOR-CON M) 20 MEQ extended release tablet TAKE 1 TABLET EVERY DAY 90 tablet 3    atorvastatin (LIPITOR) 10 MG tablet TAKE 1 TABLET EVERY EVENING 90 tablet 3    rOPINIRole (REQUIP) 2 MG tablet TAKE 1 TABLET TWICE DAILY 180 tablet 3    citalopram (CELEXA) 20 MG tablet TAKE 1 TABLET EVERY DAY 90 tablet 3    tiZANidine (ZANAFLEX) 4 MG tablet Take 1 tablet by mouth 3 times daily as needed (back pain/spasm) 45 tablet 0    amitriptyline (ELAVIL) 25 MG tablet Take 1 tablet by mouth nightly 90 tablet 3    albuterol sulfate HFA (PROVENTIL;VENTOLIN;PROAIR) 108 (90 Base) MCG/ACT inhaler Inhale 2 puffs into the lungs every 4 hours as needed for Wheezing or Shortness of Breath 2 each 5    lisinopril (PRINIVIL;ZESTRIL) 5 MG tablet TAKE 1 TABLET TWICE DAILY 180 tablet 3    furosemide (LASIX) 20 MG tablet TAKE 1 TABLET EVERY DAY 90 tablet 3    omeprazole (PRILOSEC) 20 MG delayed release capsule TAKE 1 CAPSULE EVERY MORNING BEFORE BREAKFAST 90 capsule 3    metoprolol tartrate (LOPRESSOR) 25 MG tablet TAKE 1/2 TABLET TWICE DAILY 90 tablet 3    traZODone (DESYREL) 50 MG tablet Take 1 tablet by mouth nightly (Patient taking differently: Take 100 mg by mouth nightly) 90 tablet 3    ibuprofen (ADVIL;MOTRIN) 800 MG tablet Take 1 tablet by mouth every 8 hours as needed for Pain 60 tablet 0    aspirin 81 MG tablet Take 81 mg by mouth daily      acetaminophen (TYLENOL) 325 MG tablet Take 2 tablets by mouth every 4 hours as needed (post op pain) 120 tablet 3     No current facility-administered medications for this visit.      Allergies   Allergen Reactions    Latex Rash    Demerol Hcl [Meperidine] Hives     Health Maintenance   Topic Date Due    Diabetic retinal exam  Never done    DTaP/Tdap/Td vaccine (1 - Tdap) Never done    Shingles vaccine (1 of 2) Never done    COVID-19 Vaccine (4 - Booster for [employer] series) 02/25/2022    Breast cancer screen  08/26/2022    Diabetic foot exam  12/30/2022    Annual Wellness Visit (AWV)  12/31/2022    Low dose CT lung screening  01/20/2023    A1C test (Diabetic or Prediabetic)  02/11/2023    Colorectal Cancer Screen  11/25/2023    Diabetic Alb to Cr ratio (uACR) test  01/31/2024    Lipids  01/31/2024    Depression Monitoring  01/31/2024    GFR test (Diabetes, CKD 3-4, OR last GFR 15-59)  01/31/2024    DEXA (modify frequency per FRAX score)  Completed    Flu vaccine  Completed    Pneumococcal 65+ years Vaccine  Completed    Hepatitis C screen  Completed    Hepatitis A vaccine  Aged Out    Hib vaccine  Aged Out    Meningococcal (ACWY) vaccine  Aged Out     Today's visit:   Subjective:  Still feels tired - \"funny feeling\" - little lightheaded and dizzy  Always having headaches  Will get some chest discomfort in R chest and into R shoulder and arm - goes away if gets up and starts moving  No swelling issues  Breathing feels little heavy at rest; definitely with exertion  No syncope or near syncope  No real change in symptoms since stopping Lopressor 1/31/2023    Subjective:  Review of Systems  General:   No fever, no chills, progressive fatigue for past 2 weeks;  weight loss of 10 pounds over the last 6 months  Pulmonary:    (+) dyspnea, no wheezing  Cardiac:    Denies recent chest pain, atypical right shoulder and arm discomfort alleviated with range of motion  GI:     No nausea or vomiting, no abdominal pain  Neuro:      light headedness,   Musculoskeletal:  No recent active issues  Extremities:   No edema, no obvious claudication       Objective:  Physical Exam  BP (!) 143/59   Pulse 66   Ht 5' 2.5\" (1.588 m)   Wt 185 lb (83.9 kg)   BMI 33.30 kg/m²     General:   Well developed, well nourished, appears well  Lungs:    Clear to auscultation  Heart:    regular rhythm, normal rate. Normal S1 S2, Slight murmur.  no rubs, no gallops  Abdomen:   Soft, non tender, no organomegalies, positive bowel sounds  Extremities:   No edema, no cyanosis, good peripheral pulses  Neurological:   Awake, alert, oriented. No obvious focal deficits  Musculoskelatal:  No obvious deformities    EK2023 (PCP office)  Sinus bradycardia 49/min without acute change    EK2022  Sinus rhythm 63/min without acute change    Echo: 2021  Indications:Heart Failure. Additional Medical History:Hypertension, Hyperlipidemia, Heart failure,  Osteoarthritis, GERD, Former smokre, COPD, Diabetes, Family history of heart  disease, MV repair Ring Annulo Physio 28mm, AVR Bovine Trifecta w/ Slide  21mm, TV Repair Annulo 28mm   Summary   Technically difficult examination. Ejection fraction is visually estimated at 55%. Overall left ventricular function is normal.   The aortic valve leaflets were not well visualized. Normally functioning bioprosthetic valve in aortic position. Calcification of the mitral valve noted. Mild mitral regurgitation is present. Signature    ----------------------------------------------------------------   Electronically signed by Indra Dominguez MD (Interpreting   physician) on 2021 at 04:01 PM    Lexiscan stress test 10/5/2020   Summary   This Nuclear Medicine study was negative for ischemia . normal EF    Recommendation   Medical management. Signatures    ----------------------------------------------------------------   Electronically signed by Indra Dominguez MD (Interpreting   Cardiologist) on 10/05/2020 at 15:21    Assessment:      Diagnosis Orders   1. VALIENTE (dyspnea on exertion)  Cardiac event monitor    Echo 2D w doppler w color complete      2. Other fatigue  Cardiac event monitor    Echo 2D w doppler w color complete      3. Bradycardia  Cardiac event monitor    Echo 2D w doppler w color complete      4. Lightheadedness  Cardiac event monitor    Echo 2D w doppler w color complete      5. H/O prosthetic aortic valve replacement        6. Primary hypertension          ECG in PCP office on 1/31/2023 noted sinus bradycardia 49/min. Metoprolol 12.5 twice daily held. Heart rate 66 today with no change in symptoms. No anginal symptoms, no evidence of acute decompensated heart failure, euvolemic on exam.  Plan:  Check event monitor, repeat echo. Additional studies as clinical and diagnostic studies warrant. Continue current medications as prescribed. Continue to not take the Lopressor. Keep log of blood pressure and heart rate as discussed. Have the event monitor applied as ordered. Have the echo done as ordered. Follow-up with your PCP as scheduled. Follow-up with Dr. Barb Major on 4/6/23  as scheduled or sooner if need. Return in about 2 months (around 4/6/2023), or if symptoms worsen or fail to improve, for Dr. Barb Major. As above  Continue risk factor modification and medical management  Thank you for allowing me to participate in the care of your patient. Please don't hesitate to contact me regarding any further issues related to the patient care    Orders Placed:  Orders Placed This Encounter   Procedures    Cardiac event monitor     Standing Status:   Future     Standing Expiration Date:   2/1/2024     Scheduling Instructions:      30 days event    Echo 2D w doppler w color complete     Standing Status:   Future     Standing Expiration Date:   2/1/2024     Order Specific Question:   Reason for exam:     Answer:   increasing VALIENTE, fatigue, lightheadedness       Medications Prescribed:  No orders of the defined types were placed in this encounter. Discussed use, benefit, and side effects of prescribed medications. All patient questions answered. Pt voicedunderstanding. Instructed to continue current medications, diet and exercise. Continue risk factor modification and medical management. Patient agreed with treatment plan. Follow up as directed.     Electronically signedby JUAN Barros CNP on 2/1/2023 at 9:06 AM

## 2023-02-13 ENCOUNTER — HOSPITAL ENCOUNTER (OUTPATIENT)
Dept: NON INVASIVE DIAGNOSTICS | Age: 68
Discharge: HOME | End: 2023-02-13
Payer: MEDICARE

## 2023-02-13 DIAGNOSIS — R06.09 DOE (DYSPNEA ON EXERTION): ICD-10-CM

## 2023-02-13 DIAGNOSIS — R42 LIGHTHEADEDNESS: ICD-10-CM

## 2023-02-13 DIAGNOSIS — R53.83 OTHER FATIGUE: ICD-10-CM

## 2023-02-13 DIAGNOSIS — R00.1 BRADYCARDIA: ICD-10-CM

## 2023-02-13 LAB
LV EF: 60 %
LVEF MODALITY: NORMAL

## 2023-02-13 PROCEDURE — 93270 REMOTE 30 DAY ECG REV/REPORT: CPT

## 2023-02-13 PROCEDURE — 93306 TTE W/DOPPLER COMPLETE: CPT

## 2023-02-20 NOTE — PROGRESS NOTES
Chief Complaint   Patient presents with    Medicare AWV    Wheezing    Depression    Follow-up     DM2 and chronic issues noted below       History obtained from the patient. SUBJECTIVE:  Radha Us is a 75 y.o. female that presents today for    -URI type sxs:   Going on a wk  Nasal congestion  Drainage  Cough  Inc wheezing  Using albuterol more  No SOB  No fever    Fever - No  Runny nose or congestion -  Yes   Cough -  Yes  Sore throat -  Yes  Headache, fatigue, joint pains, muscle aches -  No  Double Sickening - Yes  Shortness of breath/Wheezing? -  as above  Nausea/Vomiting/Diarrhea? No  Sick contacts - No  Maxillary Tooth Pain -  Yes  Treatment tried and response - otc meds, no better      -Depression:  Worse of late  On celexa 20 and trazodone 100mg  Inc stress at home  Discussed med adjustment, declines  Wants to work through things  She will f/u if not improving  No SI/HI      -Fatigue, SOB, feeling weird LAST VISIT:  Started a wk ago  Noted sig inc fatigue  VALIENTE  Hard to get moving  No chest pain  No orthopnea  No PND  Prior to a wk ago felt fine  Hasn't been checking pulse at home  Today HR 49 on EKG, marked sinus dimitry  Is on Lopressor 12.5mg bid    UPDATE TODAY:   Saw cardio  BB held  Getting w/u with echo  and event monitor etc  Sxs better off BB      -HFpEF/Hx of Aortic Valve replacement and Mitral Valve repair:  No CP, SOB, orthopnea or PND  Follows with cardio      -DM2: Diet controlled  Working on life style changes now  Working on wt loss  a1c at 6.3  Was 5.9 last time      -Fibromyalgia:  Was on deysi 1st, didn't help and caused fatigue  Had been on Pamelor for a few years  Stopped in April d/t GI upset  On Elavil now, working well  No GI sxs        -RLS PRIOR VISIT: pt c/o legs feeling restless at night for the last year, trouble sleeping d/t it. Pain in legs at night, moves legs and better. Never seen or treated before. UPDATE PRIOR VISIT: requip helping, works well, good dose. UPDATE PRIOR VISIT:   RLS sxs worse  Now during the day as well  Legs hurt, when moves, sxs improve  L leg seems to be worse than R  sxs started to get worse a wk or 2 wks ago  Nothing changed 2 wks ago that she can recall.    No back or groin pain   No new rashes     UPDATE PRIOR VISIT:   A bit worse  On 0.75 of requip, would like to try higher dose     UPDATE PRIOR VISIT:   No better with inc of requip to 1mg qhs  Asking what else can do  Iron levels ok      UPDATE PRIOR VISIT:   Night time sxs fine with reqip  Getting sxs during the day  Pain in legs, feels has to move  Gets those sxs at night, but thye go away when takes requp  Recent labs ok      UPDATE PRIOR VISIT:   No change in above  EMG r/o neuropathy  Was to be on 2mg requip bid  Only taking 1mg qhs     UPDATE TODAY:   sxs improved  On requip 2mg bid  Working well enough  No pain        -HTN:     HPI:     Taking meds as prescribed ?: yes  Tolerating well ?: yes  Side Effects ?: denies  BP at home ?: <140/90  Working on TLCS ?: yes  Chest Pain/SOB/Palpitations? denies       -HLD:     HPI:     Taking meds as prescribed ?: yes  Tolerating well ?: yes  Side Effects ?: denies  Muscle Pain?: denies  Working on [employer] ?: eys       -GERD:     HPI:     Taking meds as prescribed ?: yes  Tolerating well ?: yes  Side Effects ?: denies  Dysphagia ?: denies  Odynophagia ?: denies  Melena ?: denies  Working on TLCS ?: yes        Age/Gender Health Maintenance    Lipid -   Lab Results   Component Value Date    CHOL 187 01/31/2023    CHOL 182 12/27/2021    CHOL 181 12/02/2020     Lab Results   Component Value Date    TRIG 176 01/31/2023    TRIG 222 (H) 12/27/2021    TRIG 178 12/02/2020     Lab Results   Component Value Date    HDL 49 01/31/2023    HDL 62 12/27/2021    HDL 43 12/02/2020     Lab Results   Component Value Date    LDLCALC 103 01/31/2023    LDLCALC 76 12/27/2021    [address] 102 12/02/2020       Colon Cancer Screening - Completed NOV 2020, 1 polyp per pt, repeat 3 years per GI  Lung Cancer Screening - + pulm nodule OCT 2022, repeat 6 months.      Tetanus - get at pharmacy per medicare rules  Influenza Vaccine - UTD FALL 2022  Pneumonia Vaccine - UTD PPV 23 in OCT 2017 and PCV 20 in FEB 2023  Zoster - to get at pharmacy per medicare rules     Breast Cancer Screening - NEG AUG 2021  Cervical Cancer Screening - NEG FEB 2020  Osteoporosis Screening - Normal AUG 2021      Diabetes Health Maintenance    A1C -   Lab Results   Component Value Date/Time    LABA1C 6.3 02/21/2023 07:15 AM    LABA1C 5.9 08/11/2022 10:00 AM    LABA1C 5.9 08/11/2022 08:56 AM    LABA1C 6.0 12/30/2021 09:53 AM    LABA1C 6.2 06/30/2021 09:22 AM    LABA1C 5.8 12/07/2020 09:22 AM    LABA1C 6.3 06/05/2020 08:46 AM    LABA1C 6.5 12/30/2019 08:35 AM    LABA1C 6.1 08/20/2019 08:08 AM    LABA1C 6.4 02/21/2019 08:32 AM    LABA1C 5.7 07/12/2018 09:47 AM     ACE/ARB - yes, lisinopril 5mg  Eye - next visit  Foot -  UTD FEB 2023  ASA - yes  Microal/Cr -   Lab Results   Component Value Date    LABMICR < 1.20 01/31/2023    LABCREA 179.4 01/31/2023    MACRR 7 01/31/2023     No results found for: CREATINEUR, MICROALBUR, MALBCR      eGFR -   No results found for: GFRESTIMATE  Lab Results   Component Value Date/Time    LABGLOM >60 01/31/2023 10:28 AM     No results found for: CRCLEARANCE  No results found for: EGFRNONAA  No results found for: EGFRAA    Statin - yes, lipitor      Current Outpatient Medications   Medication Sig Dispense Refill    doxycycline hyclate (VIBRA-TABS) 100 MG tablet Take 1 tablet by mouth 2 times daily for 10 days 20 tablet 0    predniSONE (DELTASONE) 20 MG tablet Take 2 tablets by mouth daily for 5 days 10 tablet 0    metoprolol tartrate (LOPRESSOR) 25 MG tablet TAKE 1/2 TABLET TWICE DAILY 90 tablet 0    potassium chloride (KLOR-CON M) 20 MEQ extended release tablet TAKE 1 TABLET EVERY DAY 90 tablet 3    atorvastatin (LIPITOR) 10 MG tablet TAKE 1 TABLET EVERY EVENING 90 tablet 3    rOPINIRole (REQUIP) 2 MG tablet TAKE 1 TABLET TWICE DAILY 180 tablet 3    citalopram (CELEXA) 20 MG tablet TAKE 1 TABLET EVERY DAY 90 tablet 3    tiZANidine (ZANAFLEX) 4 MG tablet Take 1 tablet by mouth 3 times daily as needed (back pain/spasm) 45 tablet 0    amitriptyline (ELAVIL) 25 MG tablet Take 1 tablet by mouth nightly 90 tablet 3    albuterol sulfate HFA (PROVENTIL;VENTOLIN;PROAIR) 108 (90 Base) MCG/ACT inhaler Inhale 2 puffs into the lungs every 4 hours as needed for Wheezing or Shortness of Breath 2 each 5    lisinopril (PRINIVIL;ZESTRIL) 5 MG tablet TAKE 1 TABLET TWICE DAILY 180 tablet 3    furosemide (LASIX) 20 MG tablet TAKE 1 TABLET EVERY DAY 90 tablet 3    omeprazole (PRILOSEC) 20 MG delayed release capsule TAKE 1 CAPSULE EVERY MORNING BEFORE BREAKFAST 90 capsule 3    traZODone (DESYREL) 50 MG tablet Take 1 tablet by mouth nightly (Patient taking differently: Take 100 mg by mouth nightly) 90 tablet 3    ibuprofen (ADVIL;MOTRIN) 800 MG tablet Take 1 tablet by mouth every 8 hours as needed for Pain 60 tablet 0    aspirin 81 MG tablet Take 81 mg by mouth daily       No current facility-administered medications for this visit. Orders Placed This Encounter   Medications    doxycycline hyclate (VIBRA-TABS) 100 MG tablet     Sig: Take 1 tablet by mouth 2 times daily for 10 days     Dispense:  20 tablet     Refill:  0    predniSONE (DELTASONE) 20 MG tablet     Sig: Take 2 tablets by mouth daily for 5 days     Dispense:  10 tablet     Refill:  0         All medications reviewed and reconciled, including OTC and herbal medications. Updated list given to patient.        Patient Active Problem List    Diagnosis Date Noted    Fibromyalgia     DM2 (diabetes mellitus, type 2) ([facility]     COPD (chronic obstructive pulmonary disease) ([facility]     Valvular heart disease 01/16/2019    RLS (restless legs syndrome) 01/16/2019    Dyslipidemia     Former smoker     GERD (gastroesophageal reflux disease)     Heart failure with preserved ejection fraction (HCC)     Hypertension, essential     Major depression     THAYER (nonalcoholic steatohepatitis)     History of aortic stenosis, s/p valve replacement x 2     History of iron deficiency anemia     S/P mitral valve repair 07/13/2018     Dr. Maye Grimes      H/O prosthetic aortic valve replacement 07/03/2018     Early bioprosthetic aortic valve failure with severe symptomatic prosthetic aortic regurgitation      History of subarachnoid hemorrhage 03/23/2015     Spontaneous SAH. Admitted to [facility]. Extensive w/u for cause was negative. Osteoarthritis     S/P AVR (aortic valve replacement) 01/01/2015     x 2, last replacement July 2018 d/t failure of the 1st         Past Medical History:   Diagnosis Date    COPD (chronic obstructive pulmonary disease) (HCC)     DM2 (diabetes mellitus, type 2) (HCC)     Dyslipidemia     Fibromyalgia     Former smoker     GERD (gastroesophageal reflux disease)     H/O prosthetic aortic valve replacement     Early bioprosthetic aortic valve failure with severe symptomatic prosthetic aortic regurgitation is now s/p REDO AORTIC VALVE REPLACEMENT, MITRAL VALVE REPAIR, TRICUSPID VALVE REPAIR, WILBERTO performed by Brien Valenzuela MD at [address] [facility] failure with preserved ejection fraction ([address]     History of aortic stenosis, s/p valve replacement x 2     History of iron deficiency anemia     History of subarachnoid hemorrhage 03/23/2015    Spontaneous SAH. Admitted to [facility]. Extensive w/u for cause was negative.      Hypertension, essential     Major depression     Morbid obesity (HCC)     THAYER (nonalcoholic steatohepatitis)     Osteoarthritis     RLS (restless legs syndrome) 01/16/2019    S/P AVR (aortic valve replacement) 2015    x 2, last replacement July 2018 d/t failure of the 1st    S/P mitral valve repair 07/13/2018    Dr. Maye Grimes    Valvular heart disease 01/16/2019       Past Surgical History:   Procedure Laterality Date    AORTIC VALVE REPLACEMENT      cow valve    BRAIN SURGERY      to drain blood    CARDIAC CATHETERIZATION   or     [facility]    COLONOSCOPY      DIAGNOSTIC CARDIAC CATH LAB PROCEDURE      HYSTERECTOMY (CERVIX STATUS UNKNOWN)      age 49    OVARY REMOVAL Bilateral     age 49    PARTIAL HYSTERECTOMY (CERVIX NOT REMOVED) N/A 2004    Endometriosis    [place] ECHO TRANSESOPHAG R-T 2D IMG ACQUISJ I&R ONLY Left 7/3/2018    TRANSESOPHAGEAL ECHOCARDIOGRAM performed by Richard Narayan MD at [address] [facility] OFFICE/OUTPT VISIT,PROCEDURE ONLY N/A 2018    REDO AORTIC VALVE REPLACEMENT, MITRAL VALVE REPAIR, TRICUSPID VALVE REPAIR, WILBERTO performed by Sarah Carlos MD at [place] [address]         Allergies   Allergen Reactions    Latex Rash    Demerol Hcl [Meperidine] Hives       Social History     Tobacco Use    Smoking status: Former     Packs/day: 0.50     Years: 44.00     Pack years: 22.00     Types: Cigarettes     Quit date: 2018     Years since quittin.6    Smokeless tobacco: Former   Substance Use Topics    Alcohol use: No       Family History   Problem Relation Age of Onset    Heart Attack Mother     Heart Disease Mother     Heart Surgery Mother     Hypertension Mother     High Blood Pressure Mother     Diabetes Mother     Diabetes Father     Breast Cancer Maternal Grandmother         Unsure age    Colon Cancer Neg Hx        I have reviewed the patient's past medical history, past surgical history, allergies, medications, social and family history and I have made updates where appropriate.       Review of Systems  Positive responses are highlighted in bold    Constitutional:  Fever, Chills, Night Sweats, Fatigue, Unexpected changes in weight  HENT:  Ear pain, Tinnitus, Nosebleeds, Trouble swallowing, Hearing loss, Sore throat  Cardiovascular:  Chest Pain, Palpitations, Orthopnea, Paroxysmal Nocturnal Dyspnea  Respiratory:  Cough, Wheezing, Shortness of breath, Chest tightness, Apnea  Gastrointestinal:  Nausea, Vomiting, Diarrhea, Constipation, Heartburn, Blood in stool  Genitourinary:  Difficulty or painful urination, Flank pain, Change in frequency, Urgency  Skin:  Color change, Rash, Itching, Wound  Musculoskeletal:  Joint pain, Back pain, Gait problems, Joint swelling, Myalgias  Neurological:  Dizziness, Headaches, Presyncope, Numbness, Seizures, Tremors  Endocrine:  Heat Intolerance, Cold Intolerance, Polydipsia, Polyphagia, Polyuria      PHYSICAL EXAM:  Vitals:    02/21/23 0802   BP: 138/70   Pulse: 71   Resp: 16   Temp: 98.2 °F (36.8 °C)   SpO2: 97%   Weight: 188 lb (85.3 kg)   Height: 5' 2.5\" (1.588 m)     Body mass index is 33.84 kg/m². VS Reviewed  General Appearance: A&O x 3, No acute distress,well developed and well- nourished  Eyes: pupils equal, round, and reactive to light, extraocular eye movements intact, conjunctivae and eye lids without erythema  ENT: bilateral TM normal without fluid or infection, neck without nodes, throat normal without erythema or exudate, sinuses nontender, post nasal drip noted, nasal mucosa congested, and Oropharynx clear, without erythema, exudate, or thrush. Neck: supple and non-tender without mass, no thyromegaly or thyroid nodules, no cervical lymphadenopathy  Pulmonary/Chest: good air movement bilaterally. Scattered wheezing. No rhonchi or crackles. No accessory muscle use. No distress. Cardiovascular: S1 and S2 auscultated w/ RRR. No murmurs, rubs, clicks, or gallops, distal pulses intact. Abdomen: soft, non-tender, non-distended, bowel sounds physiologic,  no rebound or guarding, no masses or hernias noted. Liver and spleen without enlargement. Extremities: no cyanosis, clubbing or edema of the lower extremities. Skin: warm and dry, no rash or erythema  Psych: Affect constricted. Mood mildly depressed. Thought process is normal without evidence of psychosis. Good insight and appropriate interaction. Cognition and memory appear to be intact. Foot: Bilat 2+DP/PT bilaterally. Skin warm, dry and intact.  Sensation normal throughout to touch and with monofilament. Results for POC orders placed in visit on 02/21/23   POCT glycosylated hemoglobin (Hb A1C)   Result Value Ref Range    Hemoglobin A1C 6.3 (H) 4.3 - 5.7 %       ASSESSMENT & PLAN  1. Sinobronchitis    AE COPd, mild  Con't prn alb  Add doxy, pred  F/u if no better  Reviewed ER precautions, pt understands. - doxycycline hyclate (VIBRA-TABS) 100 MG tablet; Take 1 tablet by mouth 2 times daily for 10 days  Dispense: 20 tablet; Refill: 0  - predniSONE (DELTASONE) 20 MG tablet; Take 2 tablets by mouth daily for 5 days  Dispense: 10 tablet; Refill: 0    2. Chronic obstructive pulmonary disease, unspecified COPD type ([facility]    As above    3. Recurrent major depressive disorder, in partial remission ([facility]    Worse of late  Pt declines med adjustment  Con't Celexa and Trazodone at present dosages  F/u if not improving next 4 to 8 wks    4. Insomnia, unspecified type    As above    5. Bradycardia, sinus    Improved with holding BB  Con't that  Con't w/u via cardio    6. Chronic heart failure with preserved ejection fraction (HCC)    Stable  Con't current meds and cardio f/u    7. History of aortic stenosis, s/p valve replacement x 2    As above    8. S/P AVR (aortic valve replacement)    As above    9. S/P mitral valve repair    As above    10. Type 2 diabetes mellitus without complication, without long-term current use of insulin (HCC)    Stable  Con't diet control  F/u 6 months    - POCT glycosylated hemoglobin (Hb A1C)  -  DIABETES FOOT EXAM    11. Fibromyalgia    Stable  Con't elavil    12. RLS (restless legs syndrome)    Stable  Con't requip    13. Hypertension, essential    Stable  Con't meds    14. Dyslipidemia    Stable  Con't lipitor    15. Gastroesophageal reflux disease, unspecified whether esophagitis present    Stable  Con;t PPI    16.  Encounter for screening mammogram for malignant neoplasm of breast    - [facility] DIGITAL SCREEN BILATERAL; Future      DISPOSITION    Return in about 6 months (around 8/21/2023) for Follow-up Diabetes, follow-up on chronic medical conditions, sooner as needed. [facility] released without restrictions. Future Appointments   Date Time Provider Hola Carrion   4/6/2023  2:45 PM Linnea Clay MD N [facility] - [facility]   8/21/2023  8:00 AM Carmen Barron DO [address] [facility] received counseling on the following healthy behaviors: nutrition, exercise and medication adherence    Barriers to learning and self management: none    Discussed use, benefit, and side effects of prescribed medications. Barriers to medication compliance addressed. All patient questions answered. Pt voiced understanding. Electronically signed by Carmen Barron DO on 2/21/2023 at 8:32 AM        Medicare Annual Wellness Visit    [address] [place] [address] is here for Medicare AWV, Wheezing, Depression, and Follow-up (DM2 and chronic issues noted below)    Assessment & Plan   Medicare annual wellness visit, subsequent     Recommendations for Preventive Services Due: see orders and patient instructions/AVS.  Recommended screening schedule for the next 5-10 years is provided to the patient in written form: see Patient Instructions/AVS.     Return in about 6 months (around 8/21/2023) for Follow-up Diabetes, follow-up on chronic medical conditions, sooner as needed. Subjective     Patient's complete Health Risk Assessment and screening values have been reviewed and are found in Flowsheets. The following problems were reviewed today and where indicated follow up appointments were made and/or referrals ordered.     Positive Risk Factor Screenings with Interventions:        Depression:  PHQ-2 Score: 2  PHQ-9 Total Score: 5    Interpretation:   1-4 = minimal  5-9 = mild  10-14 = moderate  15-19 = moderately severe  20-27 = severe  Interventions:  See above          General HRA Questions:  Select all that apply: (!) Stress    Stress Interventions:  See AVS for additional education material       Weight and Activity:  Physical Activity: Insufficiently Active    Days of Exercise per Week: 3 days    Minutes of Exercise per Session: 20 min     On average, how many days per week do you engage in moderate to strenuous exercise (like a brisk walk)?: 3 days  Have you lost any weight without trying in the past 3 months?: No  Body mass index: (!) 33.83    Obesity Interventions:  See AVS for additional education material      Dentist Screen:  Have you seen the dentist within the past year?: (!) No    Intervention:  Advised to schedule with their dentist     Vision Screen:  Do you have difficulty driving, watching TV, or doing any of your daily activities because of your eyesight?: (!) Yes  Have you had an eye exam within the past year?: Appointment is scheduled  No results found. Interventions:   Patient encouraged to make appointment with their eye specialist    Safety:  Do you have either shower bars, grab bars, non-slip mats or non-slip surfaces in your shower or bathtub?: (!) No  Interventions:  See AVS for additional education material     Lung Cancer Screening:  UTD          Objective   Vitals:    02/21/23 0802   BP: 138/70   Pulse: 71   Resp: 16   Temp: 98.2 °F (36.8 °C)   SpO2: 97%   Weight: 188 lb (85.3 kg)   Height: 5' 2.5\" (1.588 m)      Body mass index is 33.84 kg/m². Allergies   Allergen Reactions    Latex Rash    Demerol Hcl [Meperidine] Hives     Prior to Visit Medications    Medication Sig Taking?  Authorizing Provider   doxycycline hyclate (VIBRA-TABS) 100 MG tablet Take 1 tablet by mouth 2 times daily for 10 days Yes Jelly Coello DO   predniSONE (DELTASONE) 20 MG tablet Take 2 tablets by mouth daily for 5 days Yes Jelly Coello, DO   metoprolol tartrate (LOPRESSOR) 25 MG tablet TAKE 1/2 TABLET TWICE DAILY  Luis Angel Reeder MD   potassium chloride (KLOR-CON M) 20 MEQ extended release tablet TAKE 1 TABLET EVERY DAY Yes Kiran Sharma DO   atorvastatin (LIPITOR) 10 MG tablet TAKE 1 TABLET EVERY EVENING Yes Kiran Sharma DO   rOPINIRole (REQUIP) 2 MG tablet TAKE 1 TABLET TWICE DAILY Yes Kiran Sharma DO   citalopram (CELEXA) 20 MG tablet TAKE 1 TABLET EVERY DAY Yes Kiran Sharma DO   tiZANidine (ZANAFLEX) 4 MG tablet Take 1 tablet by mouth 3 times daily as needed (back pain/spasm) Yes Maninder Garcia DO   amitriptyline (ELAVIL) 25 MG tablet Take 1 tablet by mouth nightly Yes Kiran Sharma DO   albuterol sulfate HFA (PROVENTIL;VENTOLIN;PROAIR) 108 (90 Base) MCG/ACT inhaler Inhale 2 puffs into the lungs every 4 hours as needed for Wheezing or Shortness of Breath Yes Kiran Sharma DO   lisinopril (PRINIVIL;ZESTRIL) 5 MG tablet TAKE 1 TABLET TWICE DAILY Yes Sari Reyes MD   furosemide (LASIX) 20 MG tablet TAKE 1 TABLET EVERY DAY Yes Kiran Sharma DO   omeprazole (PRILOSEC) 20 MG delayed release capsule TAKE 1 CAPSULE EVERY MORNING BEFORE BREAKFAST Yes Kiran Sharma DO   traZODone (DESYREL) 50 MG tablet Take 1 tablet by mouth nightly  Patient taking differently: Take 100 mg by mouth nightly Yes Maninder Garcia DO   ibuprofen (ADVIL;MOTRIN) 800 MG tablet Take 1 tablet by mouth every 8 hours as needed for Pain Yes Cuate Molina DO   aspirin 81 MG tablet Take 81 mg by mouth daily Yes Historical Provider, MD Rankin (Including outside providers/suppliers regularly involved in providing care):   Patient Care Team:  Maninder Garcia DO as PCP - General (Family Medicine)  Maninder Garcia DO as PCP - Empaneled Provider  Sari Reyes MD as Cardiologist (Cardiology)     Reviewed and updated this visit:  Tobacco  Allergies  Meds  Problems  Med Hx  Surg Hx  Soc Hx  Fam Hx               Care plan reviewed with and given to patient.        Electronically signed by Maninder Garcia DO on 2/21/2023 at 8:32 AM

## 2023-02-21 ENCOUNTER — OFFICE VISIT (OUTPATIENT)
Dept: FAMILY MEDICINE CLINIC | Age: 68
End: 2023-02-21
Payer: MEDICARE

## 2023-02-21 VITALS
RESPIRATION RATE: 16 BRPM | HEIGHT: 63 IN | DIASTOLIC BLOOD PRESSURE: 70 MMHG | OXYGEN SATURATION: 97 % | SYSTOLIC BLOOD PRESSURE: 138 MMHG | WEIGHT: 188 LBS | BODY MASS INDEX: 33.31 KG/M2 | TEMPERATURE: 98.2 F | HEART RATE: 71 BPM

## 2023-02-21 DIAGNOSIS — Z12.31 ENCOUNTER FOR SCREENING MAMMOGRAM FOR MALIGNANT NEOPLASM OF BREAST: ICD-10-CM

## 2023-02-21 DIAGNOSIS — E11.9 TYPE 2 DIABETES MELLITUS WITHOUT COMPLICATION, WITHOUT LONG-TERM CURRENT USE OF INSULIN (HCC): ICD-10-CM

## 2023-02-21 DIAGNOSIS — E78.5 DYSLIPIDEMIA: ICD-10-CM

## 2023-02-21 DIAGNOSIS — I50.32 CHRONIC HEART FAILURE WITH PRESERVED EJECTION FRACTION (HCC): ICD-10-CM

## 2023-02-21 DIAGNOSIS — F33.41 RECURRENT MAJOR DEPRESSIVE DISORDER, IN PARTIAL REMISSION (HCC): ICD-10-CM

## 2023-02-21 DIAGNOSIS — Z86.79 HISTORY OF AORTIC STENOSIS: ICD-10-CM

## 2023-02-21 DIAGNOSIS — I10 HYPERTENSION, ESSENTIAL: ICD-10-CM

## 2023-02-21 DIAGNOSIS — J44.9 CHRONIC OBSTRUCTIVE PULMONARY DISEASE, UNSPECIFIED COPD TYPE (HCC): ICD-10-CM

## 2023-02-21 DIAGNOSIS — K21.9 GASTROESOPHAGEAL REFLUX DISEASE, UNSPECIFIED WHETHER ESOPHAGITIS PRESENT: ICD-10-CM

## 2023-02-21 DIAGNOSIS — Z00.00 MEDICARE ANNUAL WELLNESS VISIT, SUBSEQUENT: Primary | ICD-10-CM

## 2023-02-21 DIAGNOSIS — Z95.2 S/P AVR (AORTIC VALVE REPLACEMENT): ICD-10-CM

## 2023-02-21 DIAGNOSIS — G47.00 INSOMNIA, UNSPECIFIED TYPE: ICD-10-CM

## 2023-02-21 DIAGNOSIS — J40 SINOBRONCHITIS: ICD-10-CM

## 2023-02-21 DIAGNOSIS — J32.9 SINOBRONCHITIS: ICD-10-CM

## 2023-02-21 DIAGNOSIS — R00.1 BRADYCARDIA, SINUS: ICD-10-CM

## 2023-02-21 DIAGNOSIS — Z98.890 S/P MITRAL VALVE REPAIR: ICD-10-CM

## 2023-02-21 DIAGNOSIS — M79.7 FIBROMYALGIA: ICD-10-CM

## 2023-02-21 DIAGNOSIS — G25.81 RLS (RESTLESS LEGS SYNDROME): ICD-10-CM

## 2023-02-21 LAB — HBA1C MFR BLD: 6.3 % (ref 4.3–5.7)

## 2023-02-21 PROCEDURE — 1036F TOBACCO NON-USER: CPT | Performed by: FAMILY MEDICINE

## 2023-02-21 PROCEDURE — 3044F HG A1C LEVEL LT 7.0%: CPT | Performed by: FAMILY MEDICINE

## 2023-02-21 PROCEDURE — G8484 FLU IMMUNIZE NO ADMIN: HCPCS | Performed by: FAMILY MEDICINE

## 2023-02-21 PROCEDURE — G8417 CALC BMI ABV UP PARAM F/U: HCPCS | Performed by: FAMILY MEDICINE

## 2023-02-21 PROCEDURE — G0439 PPPS, SUBSEQ VISIT: HCPCS | Performed by: FAMILY MEDICINE

## 2023-02-21 PROCEDURE — G0009 ADMIN PNEUMOCOCCAL VACCINE: HCPCS | Performed by: FAMILY MEDICINE

## 2023-02-21 PROCEDURE — G8427 DOCREV CUR MEDS BY ELIG CLIN: HCPCS | Performed by: FAMILY MEDICINE

## 2023-02-21 PROCEDURE — 90677 PCV20 VACCINE IM: CPT | Performed by: FAMILY MEDICINE

## 2023-02-21 PROCEDURE — 1090F PRES/ABSN URINE INCON ASSESS: CPT | Performed by: FAMILY MEDICINE

## 2023-02-21 PROCEDURE — 3078F DIAST BP <80 MM HG: CPT | Performed by: FAMILY MEDICINE

## 2023-02-21 PROCEDURE — 3023F SPIROM DOC REV: CPT | Performed by: FAMILY MEDICINE

## 2023-02-21 PROCEDURE — 99214 OFFICE O/P EST MOD 30 MIN: CPT | Performed by: FAMILY MEDICINE

## 2023-02-21 PROCEDURE — G8399 PT W/DXA RESULTS DOCUMENT: HCPCS | Performed by: FAMILY MEDICINE

## 2023-02-21 PROCEDURE — 3017F COLORECTAL CA SCREEN DOC REV: CPT | Performed by: FAMILY MEDICINE

## 2023-02-21 PROCEDURE — 1123F ACP DISCUSS/DSCN MKR DOCD: CPT | Performed by: FAMILY MEDICINE

## 2023-02-21 PROCEDURE — 3075F SYST BP GE 130 - 139MM HG: CPT | Performed by: FAMILY MEDICINE

## 2023-02-21 PROCEDURE — 2022F DILAT RTA XM EVC RTNOPTHY: CPT | Performed by: FAMILY MEDICINE

## 2023-02-21 RX ORDER — DOXYCYCLINE HYCLATE 100 MG
100 TABLET ORAL 2 TIMES DAILY
Qty: 20 TABLET | Refills: 0 | Status: SHIPPED | OUTPATIENT
Start: 2023-02-21 | End: 2023-03-03

## 2023-02-21 RX ORDER — PREDNISONE 20 MG/1
40 TABLET ORAL DAILY
Qty: 10 TABLET | Refills: 0 | Status: SHIPPED | OUTPATIENT
Start: 2023-02-21 | End: 2023-02-26

## 2023-02-21 SDOH — ECONOMIC STABILITY: INCOME INSECURITY: HOW HARD IS IT FOR YOU TO PAY FOR THE VERY BASICS LIKE FOOD, HOUSING, MEDICAL CARE, AND HEATING?: NOT HARD AT ALL

## 2023-02-21 SDOH — ECONOMIC STABILITY: HOUSING INSECURITY
IN THE LAST 12 MONTHS, WAS THERE A TIME WHEN YOU DID NOT HAVE A STEADY PLACE TO SLEEP OR SLEPT IN A SHELTER (INCLUDING NOW)?: NO

## 2023-02-21 SDOH — ECONOMIC STABILITY: FOOD INSECURITY: WITHIN THE PAST 12 MONTHS, THE FOOD YOU BOUGHT JUST DIDN'T LAST AND YOU DIDN'T HAVE MONEY TO GET MORE.: NEVER TRUE

## 2023-02-21 SDOH — ECONOMIC STABILITY: FOOD INSECURITY: WITHIN THE PAST 12 MONTHS, YOU WORRIED THAT YOUR FOOD WOULD RUN OUT BEFORE YOU GOT MONEY TO BUY MORE.: NEVER TRUE

## 2023-02-21 ASSESSMENT — PATIENT HEALTH QUESTIONNAIRE - PHQ9
9. THOUGHTS THAT YOU WOULD BE BETTER OFF DEAD, OR OF HURTING YOURSELF: 0
SUM OF ALL RESPONSES TO PHQ QUESTIONS 1-9: 5
2. FEELING DOWN, DEPRESSED OR HOPELESS: 1
7. TROUBLE CONCENTRATING ON THINGS, SUCH AS READING THE NEWSPAPER OR WATCHING TELEVISION: 0
1. LITTLE INTEREST OR PLEASURE IN DOING THINGS: 1
5. POOR APPETITE OR OVEREATING: 1
SUM OF ALL RESPONSES TO PHQ QUESTIONS 1-9: 5
10. IF YOU CHECKED OFF ANY PROBLEMS, HOW DIFFICULT HAVE THESE PROBLEMS MADE IT FOR YOU TO DO YOUR WORK, TAKE CARE OF THINGS AT HOME, OR GET ALONG WITH OTHER PEOPLE: 0
3. TROUBLE FALLING OR STAYING ASLEEP: 1
4. FEELING TIRED OR HAVING LITTLE ENERGY: 1
8. MOVING OR SPEAKING SO SLOWLY THAT OTHER PEOPLE COULD HAVE NOTICED. OR THE OPPOSITE, BEING SO FIGETY OR RESTLESS THAT YOU HAVE BEEN MOVING AROUND A LOT MORE THAN USUAL: 0
SUM OF ALL RESPONSES TO PHQ QUESTIONS 1-9: 5
SUM OF ALL RESPONSES TO PHQ QUESTIONS 1-9: 5
6. FEELING BAD ABOUT YOURSELF - OR THAT YOU ARE A FAILURE OR HAVE LET YOURSELF OR YOUR FAMILY DOWN: 0
SUM OF ALL RESPONSES TO PHQ9 QUESTIONS 1 & 2: 2

## 2023-02-21 ASSESSMENT — COLUMBIA-SUICIDE SEVERITY RATING SCALE - C-SSRS
6. HAVE YOU EVER DONE ANYTHING, STARTED TO DO ANYTHING, OR PREPARED TO DO ANYTHING TO END YOUR LIFE?: NO
2. HAVE YOU ACTUALLY HAD ANY THOUGHTS OF KILLING YOURSELF?: NO
1. WITHIN THE PAST MONTH, HAVE YOU WISHED YOU WERE DEAD OR WISHED YOU COULD GO TO SLEEP AND NOT WAKE UP?: NO

## 2023-02-21 ASSESSMENT — LIFESTYLE VARIABLES: HOW MANY STANDARD DRINKS CONTAINING ALCOHOL DO YOU HAVE ON A TYPICAL DAY: PATIENT DOES NOT DRINK

## 2023-02-21 NOTE — PATIENT INSTRUCTIONS
Learning About Mindfulness for Stress  What are mindfulness and stress? Stress is what you feel when you have to handle more than you are used to. A lot of things can cause stress. You may feel stress when you go on a job interview, take a test, or run a race. This kind of short-term stress is normal and even useful. It can help you if you need to work hard or react quickly. Stress also can last a long time. Long-term stress is caused by stressful situations or events. Examples of long-term stress include long-term health problems, ongoing problems at work, and conflicts in your family. Long-term stress can harm your health. Mindfulness is a focus only on things happening in the present moment. It's a process of purposefully paying attention to and being aware of your surroundings, your emotions, your thoughts, and how your body feels. You are aware of these things, but you aren't judging these experiences as \"good\" or \"bad. \" Mindfulness can help you learn to calm your mind and body to help you cope with illness, pain, and stress. How does mindfulness help to relieve stress? Mindfulness can help quiet your mind and relax your body. Studies show that it can help some people sleep better, feel less anxious, and bring their blood pressure down. And it's been shown to help some people live and cope better with certain health problems like heart disease, depression, chronic pain, and cancer. How do you practice mindfulness? To be mindful is to pay attention, to be present, and to be accepting. When you're mindful, you do just one thing and you pay close attention to that one thing. For example, you may sit quietly and notice your emotions or how your food tastes and smells. When you're present, you focus on the things that are happening right now. You let go of your thoughts about the past and the future. When you dwell on the past or the future, you miss moments that can heal and strengthen you.  You may miss moments like hearing a child laugh or seeing a friendly face when you think you're all alone. When you're accepting, you don't  the present moment. Instead you accept your thoughts and feelings as they come. You can practice anytime, anywhere, and in any way you choose. You can practice in many ways. Here are a few ideas:  While doing your chores, like washing the dishes, let your mind focus on what's in your hand. What does the dish feel like? Is the water warm or cold? Go outside and take a few deep breaths. What is the air like? Is it warm or cold? When you can, take some time at the start of your day to sit alone and think. Take a slow walk by yourself. Count your steps while you breathe in and out. Try yoga breathing exercises, stretches, and poses to strengthen and relax your muscles. At work, if you can, try to stop for a few moments each hour. Note how your body feels. Let yourself regroup and let your mind settle before you return to what you were doing. If you struggle with anxiety or \"worry thoughts,\" imagine your mind as a blue paulo and your worry thoughts as clouds. Now imagine those worry thoughts floating across your mind's paulo. Just let them pass by as you watch. Follow-up care is a key part of your treatment and safety. Be sure to make and go to all appointments, and call your doctor if you are having problems. It's also a good idea to know your test results and keep a list of the medicines you take. Where can you learn more? Go to http://www.castillo.com/ and enter M676 to learn more about \"Learning About Mindfulness for Stress. \"  Current as of: February 9, 2022               Content Version: 13.5  © 2006-2022 Healthwise, Incorporated. Care instructions adapted under license by [facility] ([facility]).  If you have questions about a medical condition or this instruction, always ask your healthcare professional. [address] any warranty or liability for your use of this information. Learning About Stress  What is stress? Stress is what you feel when you have to handle more than you are used to. Stress is a fact of life for most people, and it affects everyone differently. What causes stress for you may not be stressful for someone else. A lot of things can cause stress. You may feel stress when you go on a job interview, take a test, or run a race. This kind of short-term stress is normal and even useful. It can help you if you need to work hard or react quickly. For example, stress can help you finish an important job on time. Stress also can last a long time. Long-term stress is caused by stressful situations or events. Examples of long-term stress include long-term health problems, ongoing problems at work, or conflicts in your family. Long-term stress can harm your health. How does stress affect your health? When you are stressed, your body responds as though you are in danger. It makes hormones that speed up your heart, make you breathe faster, and give you a burst of energy. This is called the fight-or-flight stress response. If the stress is over quickly, your body goes back to normal and no harm is done. But if stress happens too often or lasts too long, it can have bad effects. Long-term stress can make you more likely to get sick, and it can make symptoms of some diseases worse. If you tense up when you are stressed, you may develop neck, shoulder, or low back pain. Stress is linked to high blood pressure and heart disease. Stress also harms your emotional health. It can make you small, tense, or depressed. Your relationships may suffer, and you may not do well at work or school. What can you do to manage stress? How to relax your mind   Write. It may help to write about things that are bothering you. This helps you find out how much stress you feel and what is causing it. When you know this, you can find better ways to cope.   Let your feelings out. Talk, laugh, cry, and express anger when you need to. Talking with friends, family, a counselor, or a member of the clergy about your feelings is a healthy way to relieve stress. Do something you enjoy. For example, listen to music or go to a movie. Practice your hobby or do volunteer work. Meditate. This can help you relax, because you are not worrying about what happened before or what may happen in the future. Do guided imagery. Imagine yourself in any setting that helps you feel calm. You can use audiotapes, books, or a teacher to guide you. How to relax your body   Do something active. Exercise or activity can help reduce stress. Walking is a great way to get started. Even everyday activities such as housecleaning or yard work can help. Do breathing exercises. For example:  From a standing position, bend forward from the waist with your knees slightly bent. Let your arms dangle close to the floor. Breathe in slowly and deeply as you return to a standing position. Roll up slowly and lift your head last.  Hold your breath for just a few seconds in the standing position. Breathe out slowly and bend forward from the waist.  Try yoga or arnav chi. These techniques combine exercise and meditation. You may need some training at first to learn them. What can you do to prevent stress? Manage your time. This helps you find time to do the things you want and need to do. Get enough sleep. Your body recovers from the stresses of the day while you are sleeping. Get support. Your family, friends, and community can make a difference in how you experience stress. Where can you learn more? Go to http://www.castillo.com/ and enter N032 to learn more about \"Learning About Stress. \"  Current as of: October 6, 2021               Content Version: 13.5  © 9886-0599 Healthwise, Incorporated. Care instructions adapted under license by [facility] ([facility]).  If you have questions about a medical condition or this instruction, always ask your healthcare professional. [address] any warranty or liability for your use of this information. Learning About Dental Care for Older Adults  Dental care for older adults: Overview  Dental care for older people is much the same as for younger adults. But older adults do have concerns that younger adults do not. Older adults may have problems with gum disease and decay on the roots of their teeth. They may need missing teeth replaced or broken fillings fixed. Or they may have dentures that need to be cared for. Some older adults may have trouble holding a toothbrush. You can help remind the person you are caring for to brush and floss their teeth or to clean their dentures. In some cases, you may need to do the brushing and other dental care tasks. People who have trouble using their hands or who have dementia may need this extra help. How can you help with dental care? Normal dental care  To keep the teeth and gums healthy:  Brush the teeth with fluoride toothpaste twice a day--in the morning and at night--and floss at least once a day. Plaque can quickly build up on the teeth of older adults. Watch for the signs of gum disease. These signs include gums that bleed after brushing or after eating hard foods, such as apples. See a dentist regularly. Many experts recommend checkups every 6 months. Keep the dentist up to date on any new medications the person is taking. Encourage a balanced diet that includes whole grains, vegetables, and fruits, and that is low in saturated fat and sodium. Encourage the person you're caring for not to use tobacco products. They can affect dental and general health. Many older adults have a fixed income and feel that they can't afford dental care. But most [facility] and [facility] have programs in which dentists help older adults by lowering fees.  Contact your area's public health offices or  for information about dental care in your area. Using a toothbrush  Older adults with arthritis sometimes have trouble brushing their teeth because they can't easily hold the toothbrush. Their hands and fingers may be stiff, painful, or weak. If this is the case, you can: Offer an electric toothbrush. Enlarge the handle of a non-electric toothbrush by wrapping a sponge, an elastic bandage, or adhesive tape around it. Push the toothbrush handle through a ball made of rubber or soft foam.  Make the handle longer and thicker by taping Popsicle sticks or tongue depressors to it. You may also be able to buy special toothbrushes, toothpaste dispensers, and floss holders. Your doctor may recommend a soft-bristle toothbrush if the person you care for bleeds easily. Bleeding can happen because of a health problem or from certain medicines. A toothpaste for sensitive teeth may help if the person you care for has sensitive teeth. How do you brush and floss someone's teeth? If the person you are caring for has a hard time cleaning their teeth on their own, you may need to brush and floss their teeth for them. It may be easiest to have the person sit and face away from you, and to sit or stand behind them. That way you can steady their head against your arm as you reach around to floss and brush their teeth. Choose a place that has good lighting and is comfortable for both of you. Before you begin, gather your supplies. You will need gloves, floss, a toothbrush, and a container to hold water if you are not near a sink. Wash and dry your hands well and put on gloves. Start by flossing:  Gently work a piece of floss between each of the teeth toward the gums. A plastic flossing tool may make this easier, and they are available at most [facility]. Curve the floss around each tooth into a U-shape and gently slide it under the gum line. Move the floss firmly up and down several times to scrape off the plaque.   After you've finished flossing, throw away the used floss and begin brushing:  Wet the brush and apply toothpaste. Place the brush at a 45-degree angle where the teeth meet the gums. Press firmly, and move the brush in small circles over the surface of the teeth. Be careful not to brush too hard. Vigorous brushing can make the gums pull away from the teeth and can scratch the tooth enamel. Brush all surfaces of the teeth, on the tongue side and on the cheek side. Pay special attention to the front teeth and all surfaces of the back teeth. Brush chewing surfaces with short back-and-forth strokes. After you've finished, help the person rinse the remaining toothpaste from their mouth. Where can you learn more? Go to http://www.woods.com/ and enter F944 to learn more about \"Learning About Dental Care for Older Adults. \"  Current as of: June 16, 2022               Content Version: 13.5  © 2006-2022 [employer]. Care instructions adapted under license by [address] [place] [address]. If you have questions about a medical condition or this instruction, always ask your healthcare professional. [place] [address] any warranty or liability for your use of this information. Learning About Vision Tests  What are vision tests? The four most common vision tests are visual acuity tests, refraction, visual field tests, and color vision tests. Visual acuity (sharpness) tests  These tests are used: To see if you need glasses or contact lenses. To monitor an eye problem. To check an eye injury. Visual acuity tests are done as part of routine exams. You may also have this test when you get your 's license or apply for some types of jobs. Visual field tests  These tests are used: To check for vision loss in any area of your range of vision. To screen for certain eye diseases.   To look for nerve damage after a stroke, head injury, or other problem that could reduce blood flow to the brain. Refraction and color tests  A refraction test is done to find the right prescription for glasses and contact lenses. A color vision test is done to check for color blindness. Color vision is often tested as part of a routine exam. You may also have this test when you apply for a job where recognizing different colors is important, such as , electronics, or the [employer]. How are vision tests done? Visual acuity test   You cover one eye at a time. You read aloud from a wall chart across the room. You read aloud from a small card that you hold in your hand. Refraction   You look into a special device. The device puts lenses of different strengths in front of each eye to see how strong your glasses or contact lenses need to be. Visual field tests   Your doctor may have you look through special machines. Or your doctor may simply have you stare straight ahead while they move a finger into and out of your field of vision. Color vision test   You look at pieces of printed test patterns in various colors. You say what number or symbol you see. Your doctor may have you trace the number or symbol using a pointer. How do these tests feel? There is very little chance of having a problem from this test. If dilating drops are used for a vision test, they may make the eyes sting and cause a medicine taste in the mouth. Follow-up care is a key part of your treatment and safety. Be sure to make and go to all appointments, and call your doctor if you are having problems. It's also a good idea to know your test results and keep a list of the medicines you take. Where can you learn more? Go to http://www.castillo.com/ and enter G551 to learn more about \"Learning About Vision Tests. \"  Current as of: October 12, 2022               Content Version: 13.5  © 8837-1556 Healthwise, Incorporated. Care instructions adapted under license by [facility] ([facility]).  If you have questions about a medical condition or this instruction, always ask your healthcare professional. [address] any warranty or liability for your use of this information. Advance Directives: Care Instructions  Overview  An advance directive is a legal way to state your wishes at the end of your life. It tells your family and your doctor what to do if you can't say what you want. There are two main types of advance directives. You can change them any time your wishes change. Living will. This form tells your family and your doctor your wishes about life support and other treatment. The form is also called a declaration. Medical power of . This form lets you name a person to make treatment decisions for you when you can't speak for yourself. This person is called a health care agent (health care proxy, health care surrogate). The form is also called a durable power of  for health care. If you do not have an advance directive, decisions about your medical care may be made by a family member, or by a doctor or a  who doesn't know you. It may help to think of an advance directive as a gift to the people who care for you. If you have one, they won't have to make tough decisions by themselves. For more information, including forms for your state, see the [address] website (www.caringinfo.org/planning/advance-directives/). Follow-up care is a key part of your treatment and safety. Be sure to make and go to all appointments, and call your doctor if you are having problems. It's also a good idea to know your test results and keep a list of the medicines you take. What should you include in an advance directive? Many states have a unique advance directive form. (It may ask you to address specific issues.) Or you might use a universal form that's approved by many states. If your form doesn't tell you what to address, it may be hard to know what to include in your advance directive.  Use the questions below to help you get started. Who do you want to make decisions about your medical care if you are not able to? What life-support measures do you want if you have a serious illness that gets worse over time or can't be cured? What are you most afraid of that might happen? (Maybe you're afraid of having pain, losing your independence, or being kept alive by machines.)  Where would you prefer to die? (Your home? A hospital? A nursing home?)  Do you want to donate your organs when you die? Do you want certain Anabaptism practices performed before you die? When should you call for help? Be sure to contact your doctor if you have any questions. Where can you learn more? Go to http://www.castillo.com/ and enter R264 to learn more about \"Advance Directives: Care Instructions. \"  Current as of: June 16, 2022               Content Version: 13.5  © 7194-6201 "[employer]. Care instructions adapted under license by [facility] ([facility]). If you have questions about a medical condition or this instruction, always ask your healthcare professional. [place] [address] any warranty or liability for your use of this information. Starting a Weight Loss Plan: Care Instructions  Overview     If you're thinking about losing weight, it can be hard to know where to start. Your doctor can help you set up a weight loss plan that best meets your needs. You may want to take a class on nutrition or exercise, or you could join a weight loss support group. If you have questions about how to make changes to your eating or exercise habits, ask your doctor about seeing a registered dietitian or an exercise specialist.  It can be a big challenge to lose weight. But you don't have to make huge changes at once. Make small changes, and stick with them. When those changes become habit, add a few more changes.   If you don't think you're ready to make changes right now, try to pick a date in the future. Make an appointment to see your doctor to discuss whether the time is right for you to start a plan. Follow-up care is a key part of your treatment and safety. Be sure to make and go to all appointments, and call your doctor if you are having problems. It's also a good idea to know your test results and keep a list of the medicines you take. How can you care for yourself at home? Set realistic goals. Many people expect to lose much more weight than is likely. A weight loss of 5% to 10% of your body weight may be enough to improve your health. Get family and friends involved to provide support. Talk to them about why you are trying to lose weight, and ask them to help. They can help by participating in exercise and having meals with you, even if they may be eating something different. Find what works best for you. If you do not have time or do not like to cook, a program that offers meal replacement bars or shakes may be better for you. Or if you like to prepare meals, finding a plan that includes daily menus and recipes may be best.  Ask your doctor about other health professionals who can help you achieve your weight loss goals. A dietitian can help you make healthy changes in your diet. An exercise specialist or  can help you develop a safe and effective exercise program.  A counselor or psychiatrist can help you cope with issues such as depression, anxiety, or family problems that can make it hard to focus on weight loss. Consider joining a support group for people who are trying to lose weight. Your doctor can suggest groups in your area. Where can you learn more? Go to http://www.woods.com/ and enter U357 to learn more about \"Starting a Weight Loss Plan: Care Instructions. \"  Current as of: August 25, 2022               Content Version: 13.5  © 7581-1019 Healthwise, Incorporated. Care instructions adapted under license by [facility] [employer] [facility] ([facility]).  If you have questions about a medical condition or this instruction, always ask your healthcare professional. [address] any warranty or liability for your use of this information. A Healthy Heart: Care Instructions  Your Care Instructions     Coronary artery disease, also called heart disease, occurs when a substance called plaque builds up in the vessels that supply oxygen-rich blood to your heart muscle. This can narrow the blood vessels and reduce blood flow. A heart attack happens when blood flow is completely blocked. A high-fat diet, smoking, and other factors increase the risk of heart disease. Your doctor has found that you have a chance of having heart disease. You can do lots of things to keep your heart healthy. It may not be easy, but you can change your diet, exercise more, and quit smoking. These steps really work to lower your chance of heart disease. Follow-up care is a key part of your treatment and safety. Be sure to make and go to all appointments, and call your doctor if you are having problems. It's also a good idea to know your test results and keep a list of the medicines you take. How can you care for yourself at home? Diet    Use less salt when you cook and eat. This helps lower your blood pressure. Taste food before salting. Add only a little salt when you think you need it. With time, your taste buds will adjust to less salt.     Eat fewer snack items, fast foods, canned soups, and other high-salt, high-fat, processed foods.     Read food labels and try to avoid saturated and trans fats. They increase your risk of heart disease by raising cholesterol levels.     Limit the amount of solid fat-butter, margarine, and shortening-you eat. Use olive, peanut, or canola oil when you cook. Bake, broil, and steam foods instead of frying them.     Eat a variety of fruit and vegetables every day.  Dark green, deep orange, red, or yellow fruits and vegetables are especially good for you. Examples include spinach, carrots, peaches, and berries.     Foods high in fiber can reduce your cholesterol and provide important vitamins and minerals. High-fiber foods include whole-grain cereals and breads, oatmeal, beans, brown rice, citrus fruits, and apples.     Eat lean proteins. Heart-healthy proteins include seafood, lean meats and poultry, eggs, beans, peas, nuts, seeds, and soy products.     Limit drinks and foods with added sugar. These include candy, desserts, and soda pop. Lifestyle changes    If your doctor recommends it, get more exercise. Walking is a good choice. Bit by bit, increase the amount you walk every day. Try for at least 30 minutes on most days of the week. You also may want to swim, bike, or do other activities.     Do not smoke. If you need help quitting, talk to your doctor about stop-smoking programs and medicines. These can increase your chances of quitting for good. Quitting smoking may be the most important step you can take to protect your heart. It is never too late to quit.     Limit alcohol to 2 drinks a day for men and 1 drink a day for women. Too much alcohol can cause health problems.     Manage other health problems such as diabetes, high blood pressure, and high cholesterol. If you think you may have a problem with alcohol or drug use, talk to your doctor. Medicines    Take your medicines exactly as prescribed. Call your doctor if you think you are having a problem with your medicine.     If your doctor recommends aspirin, take the amount directed each day. Make sure you take aspirin and not another kind of pain reliever, such as acetaminophen (Tylenol). When should you call for help? Call 911 if you have symptoms of a heart attack.  These may include:    Chest pain or pressure, or a strange feeling in the chest.     Sweating.     Shortness of breath.     Pain, pressure, or a strange feeling in the back, neck, jaw, or upper belly or in one or both shoulders or arms.     Lightheadedness or sudden weakness.     A fast or irregular heartbeat. After you call 911, the  may tell you to chew 1 adult-strength or 2 to 4 low-dose aspirin. Wait for an ambulance. Do not try to drive yourself. Watch closely for changes in your health, and be sure to contact your doctor if you have any problems. Where can you learn more? Go to http://www.castillo.com/ and enter F075 to learn more about \"A Healthy Heart: Care Instructions. \"  Current as of: September 7, 2022               Content Version: 13.5  © 4816-9642 Healthwise, [employer]. Care instructions adapted under license by [facility] ([facility]). If you have questions about a medical condition or this instruction, always ask your healthcare professional. [address] any warranty or liability for your use of this information. Personalized Preventive Plan for Alicia Tanner - 2/21/2023  Medicare offers a range of preventive health benefits. Some of the tests and screenings are paid in full while other may be subject to a deductible, co-insurance, and/or copay. Some of these benefits include a comprehensive review of your medical history including lifestyle, illnesses that may run in your family, and various assessments and screenings as appropriate. After reviewing your medical record and screening and assessments performed today your provider may have ordered immunizations, labs, imaging, and/or referrals for you. A list of these orders (if applicable) as well as your Preventive Care list are included within your After Visit Summary for your review. Other Preventive Recommendations:    A preventive eye exam performed by an eye specialist is recommended every 1-2 years to screen for glaucoma; cataracts, macular degeneration, and other eye disorders. A preventive dental visit is recommended every 6 months.   Try to get at least 150 minutes of exercise per week or 10,000 steps per day on a pedometer . Order or download the FREE \"Exercise & Physical Activity: Your Everyday Guide\" from [employer]. Call 6-785.391.4138 or search [employer] online. You need 5712-0173 mg of calcium and 6417-8221 IU of vitamin D per day. It is possible to meet your calcium requirement with diet alone, but a vitamin D supplement is usually necessary to meet this goal.  When exposed to the sun, use a sunscreen that protects against both UVA and UVB radiation with an SPF of 30 or greater. Reapply every 2 to 3 hours or after sweating, drying off with a towel, or swimming. Always wear a seat belt when traveling in a car. Always wear a helmet when riding a bicycle or motorcycle.

## 2023-02-22 ENCOUNTER — TELEPHONE (OUTPATIENT)
Dept: FAMILY MEDICINE CLINIC | Age: 68
End: 2023-02-22

## 2023-02-22 NOTE — TELEPHONE ENCOUNTER
PROVIDER FEEDBACK LOOP NO SHOW     Patient:Hui Dean  : 1955  Referring Provider: Aletha Hinson  Referral Type: Other     Procedures: [id number] - ANNABELLA SANTOS DIGITAL SCREEN BILATERAL  Date Service Ordered 2023        Jonnathan Hernandez did not show for their scheduled test ordered by your office. Please call your patient to explain significance of ordered test and direct patient to call Central Scheduling to schedule test at their earliest convenience. Please complete one of the following actions from Quick Actions buttons:     Route to Provider:  Route message to ordering provider to seek next steps in care plan. Telephone Encounter:  Telephone encounter will open. Call patient to explain significance of ordered test and direct patient to call Central Scheduling to schedule test then Document details of call. Open Referral:  Review referral notes or details if needed. Close Referral:  Referral will open. Document in Notes section of referral why the referral is being closed. Examples of referral closure:  Patient had test done outside of [facility] [address] (list location), Patient refuses test, Patient no longer having symptoms, Unable to reach patient. Only close the referral if you are sure the test will not proceed.      Thank you     Central Scheduling

## 2023-02-22 NOTE — TELEPHONE ENCOUNTER
Future Appointments   Date Time Provider Hola Carrion   2/24/2023  9:00 AM STR [address] [facility]LLA [facility] [place]   4/6/2023  2:45 PM Choco Dominguez MD N SRPX [facility] - [address] [facility]   8/21/2023  8:00 AM [address] [facility]

## 2023-02-24 ENCOUNTER — HOSPITAL ENCOUNTER (OUTPATIENT)
Dept: MAMMOGRAPHY | Age: 68
Discharge: HOME | End: 2023-02-24

## 2023-02-24 DIAGNOSIS — Z12.31 ENCOUNTER FOR SCREENING MAMMOGRAM FOR MALIGNANT NEOPLASM OF BREAST: ICD-10-CM

## 2023-03-17 ENCOUNTER — HOSPITAL ENCOUNTER (OUTPATIENT)
Dept: MAMMOGRAPHY | Age: 68
Discharge: HOME | End: 2023-03-17
Payer: MEDICARE

## 2023-03-17 PROCEDURE — 77067 SCR MAMMO BI INCL CAD: CPT

## 2023-03-20 ENCOUNTER — TELEPHONE (OUTPATIENT)
Dept: FAMILY MEDICINE CLINIC | Age: 68
End: 2023-03-20

## 2023-03-20 NOTE — TELEPHONE ENCOUNTER
----- Message from Brenda Cancino DO sent at 3/19/2023  9:05 AM EDT -----  Please let pt know that mammogram is normal. Let me know if questions, thanks!

## 2023-03-28 ENCOUNTER — TELEPHONE (OUTPATIENT)
Dept: CARDIOLOGY CLINIC | Age: 68
End: 2023-03-28

## 2023-03-29 DIAGNOSIS — I48.0 PAROXYSMAL A-FIB (HCC): Primary | ICD-10-CM

## 2023-03-29 NOTE — TELEPHONE ENCOUNTER
Looks like caleb saw her   Please pass the question by her  I have not seen the patient in a year  I would think eliquis but see what Tony Morales knows

## 2023-03-29 NOTE — TELEPHONE ENCOUNTER
Patient notified and voiced understanding. What blood thinner do you want to start her on? Needs sent to [facility].

## 2023-03-29 NOTE — PROGRESS NOTES
Patient contacted and informed event monitor showed periods of paroxysmal atrial fib. Rate controlled. Discussed dx of atrial fib and associated stroke risk. Discussed [address] options of Coumadin, Eliquis and Xarelto. Verbalized understanding. Agreeable to start Eliquis. Rx sent to her pharmacy. Will further discuss [address] options if associated cost is burden at follow-up appointment with Dr. Ronen House on 4/6/2023.   Rebecca Payne, APRN - CNP

## 2023-03-29 NOTE — TELEPHONE ENCOUNTER
Event shows episodes PAfib which is new for her. Rate looks to be controlled. Will need [address]. Attempted to call patient; no answer and no identifying info on VM so did not leave message. She also had an echo which had no concerning findings and normal EF.    Rebecca Payne, APRN - CNP

## 2023-04-06 ENCOUNTER — OFFICE VISIT (OUTPATIENT)
Dept: CARDIOLOGY CLINIC | Age: 68
End: 2023-04-06
Payer: MEDICARE

## 2023-04-06 VITALS
SYSTOLIC BLOOD PRESSURE: 126 MMHG | DIASTOLIC BLOOD PRESSURE: 63 MMHG | BODY MASS INDEX: 34.05 KG/M2 | WEIGHT: 192.2 LBS | HEART RATE: 76 BPM | HEIGHT: 63 IN

## 2023-04-06 DIAGNOSIS — Z95.2 S/P MVR (MITRAL VALVE REPLACEMENT): ICD-10-CM

## 2023-04-06 DIAGNOSIS — R00.1 BRADYCARDIA: Primary | ICD-10-CM

## 2023-04-06 DIAGNOSIS — Z95.2 S/P AVR: ICD-10-CM

## 2023-04-06 PROCEDURE — 1090F PRES/ABSN URINE INCON ASSESS: CPT | Performed by: NUCLEAR MEDICINE

## 2023-04-06 PROCEDURE — 99214 OFFICE O/P EST MOD 30 MIN: CPT | Performed by: NUCLEAR MEDICINE

## 2023-04-06 PROCEDURE — G8399 PT W/DXA RESULTS DOCUMENT: HCPCS | Performed by: NUCLEAR MEDICINE

## 2023-04-06 PROCEDURE — 3017F COLORECTAL CA SCREEN DOC REV: CPT | Performed by: NUCLEAR MEDICINE

## 2023-04-06 PROCEDURE — 1123F ACP DISCUSS/DSCN MKR DOCD: CPT | Performed by: NUCLEAR MEDICINE

## 2023-04-06 PROCEDURE — 1036F TOBACCO NON-USER: CPT | Performed by: NUCLEAR MEDICINE

## 2023-04-06 PROCEDURE — G8417 CALC BMI ABV UP PARAM F/U: HCPCS | Performed by: NUCLEAR MEDICINE

## 2023-04-06 PROCEDURE — 3078F DIAST BP <80 MM HG: CPT | Performed by: NUCLEAR MEDICINE

## 2023-04-06 PROCEDURE — G8427 DOCREV CUR MEDS BY ELIG CLIN: HCPCS | Performed by: NUCLEAR MEDICINE

## 2023-04-06 PROCEDURE — 3074F SYST BP LT 130 MM HG: CPT | Performed by: NUCLEAR MEDICINE

## 2023-04-06 NOTE — PROGRESS NOTES
Pt questions about Lopressor
HFA (PROVENTIL;VENTOLIN;PROAIR) 108 (90 Base) MCG/ACT inhaler Inhale 2 puffs into the lungs every 4 hours as needed for Wheezing or Shortness of Breath 2 each 5    lisinopril (PRINIVIL;ZESTRIL) 5 MG tablet TAKE 1 TABLET TWICE DAILY 180 tablet 3    furosemide (LASIX) 20 MG tablet TAKE 1 TABLET EVERY DAY 90 tablet 3    omeprazole (PRILOSEC) 20 MG delayed release capsule TAKE 1 CAPSULE EVERY MORNING BEFORE BREAKFAST 90 capsule 3    traZODone (DESYREL) 50 MG tablet Take 1 tablet by mouth nightly (Patient taking differently: Take 2 tablets by mouth nightly) 90 tablet 3    ibuprofen (ADVIL;MOTRIN) 800 MG tablet Take 1 tablet by mouth every 8 hours as needed for Pain 60 tablet 0    aspirin 81 MG tablet Take 1 tablet by mouth daily       No current facility-administered medications for this visit.      Allergies   Allergen Reactions    Latex Rash    Demerol Hcl [Meperidine] Hives     Health Maintenance   Topic Date Due    Diabetic retinal exam  Never done    DTaP/Tdap/Td vaccine (1 - Tdap) Never done    Shingles vaccine (1 of 2) Never done    COVID-19 Vaccine (4 - Booster for Pfizer series) 02/25/2022    Low dose CT lung screening  01/20/2023    A1C test (Diabetic or Prediabetic)  08/21/2023    Colorectal Cancer Screen  11/25/2023    Diabetic Alb to Cr ratio (uACR) test  01/31/2024    Lipids  01/31/2024    GFR test (Diabetes, CKD 3-4, OR last GFR 15-59)  01/31/2024    Diabetic foot exam  02/21/2024    Depression Monitoring  02/21/2024    Annual Wellness Visit (AWV)  02/22/2024    Breast cancer screen  03/17/2024    DEXA (modify frequency per FRAX score)  Completed    Flu vaccine  Completed    Pneumococcal 65+ years Vaccine  Completed    Hepatitis C screen  Completed    Hepatitis A vaccine  Aged Out    Hib vaccine  Aged Out    Meningococcal (ACWY) vaccine  Aged Out       Subjective:  General:   No fever, no chills, No fatigue or weight loss  Pulmonary:    some dyspnea, no wheezing  Cardiac:    Denies recent chest pain,   GI:

## 2023-04-19 NOTE — PROGRESS NOTES
Smokeless tobacco: Former   Substance Use Topics    Alcohol use: No       Family History   Problem Relation Age of Onset    Heart Attack Mother     Heart Disease Mother     Heart Surgery Mother     Hypertension Mother     High Blood Pressure Mother     Diabetes Mother     Diabetes Father     Breast Cancer Maternal Grandmother         Unsure age    Colon Cancer Neg Hx        I have reviewed the patient's past medical history, past surgical history, allergies, medications, social and family history and I have made updates where appropriate. Review of Systems  Positive responses are highlighted in bold    Constitutional:  Fever, Chills, Night Sweats, Fatigue, Unexpected changes in weight  HENT:  Ear pain, Tinnitus, Nosebleeds, Trouble swallowing, Hearing loss, Sore throat  Cardiovascular:  Chest Pain, Palpitations, Orthopnea, Paroxysmal Nocturnal Dyspnea  Respiratory:  Cough, Wheezing, Shortness of breath, Chest tightness, Apnea  Gastrointestinal:  Nausea, Vomiting, Diarrhea, Constipation, Heartburn, Blood in stool  Genitourinary:  Difficulty or painful urination, Flank pain, Change in frequency, Urgency  Skin:  Color change, Rash, Itching, Wound  Musculoskeletal:  Joint pain, Back pain, Gait problems, Joint swelling, Myalgias  Neurological:  Dizziness, Headaches, Presyncope, Numbness, Seizures, Tremors  Endocrine:  Heat Intolerance, Cold Intolerance, Polydipsia, Polyphagia, Polyuria      PHYSICAL EXAM:  Vitals:    04/20/23 1042   BP: 128/62   Pulse: 86   Resp: 16   Temp: 98.3 °F (36.8 °C)   SpO2: 100%   Weight: 189 lb (85.7 kg)   Height: 5' 2.5\" (1.588 m)     Body mass index is 34.02 kg/m².        VS Reviewed  General Appearance: A&O x 3, No acute distress,well developed and well- nourished  Eyes: pupils equal, round, and reactive to light, extraocular eye movements intact, conjunctivae and eye lids without erythema  ENT: external ear and ear canal clear bilaterally, TMs intact and regular, nose without deformity,

## 2023-04-20 ENCOUNTER — OFFICE VISIT (OUTPATIENT)
Dept: FAMILY MEDICINE CLINIC | Age: 68
End: 2023-04-20

## 2023-04-20 ENCOUNTER — APPOINTMENT (OUTPATIENT)
Dept: CT IMAGING | Age: 68
DRG: 381 | End: 2023-04-20
Payer: MEDICARE

## 2023-04-20 ENCOUNTER — HOSPITAL ENCOUNTER (INPATIENT)
Age: 68
LOS: 3 days | Discharge: HOME | DRG: 381 | End: 2023-04-23
Attending: EMERGENCY MEDICINE
Payer: MEDICARE

## 2023-04-20 VITALS
TEMPERATURE: 98.3 F | HEART RATE: 86 BPM | SYSTOLIC BLOOD PRESSURE: 128 MMHG | DIASTOLIC BLOOD PRESSURE: 62 MMHG | OXYGEN SATURATION: 100 % | BODY MASS INDEX: 33.49 KG/M2 | HEIGHT: 63 IN | RESPIRATION RATE: 16 BRPM | WEIGHT: 189 LBS

## 2023-04-20 DIAGNOSIS — K62.5 BRBPR (BRIGHT RED BLOOD PER RECTUM): ICD-10-CM

## 2023-04-20 DIAGNOSIS — K92.2 UPPER GI BLEED: Primary | ICD-10-CM

## 2023-04-20 DIAGNOSIS — D50.0 BLOOD LOSS ANEMIA: ICD-10-CM

## 2023-04-20 DIAGNOSIS — K92.1 MELENA: ICD-10-CM

## 2023-04-20 DIAGNOSIS — I50.32 CHRONIC HEART FAILURE WITH PRESERVED EJECTION FRACTION (HCC): Chronic | ICD-10-CM

## 2023-04-20 DIAGNOSIS — I48.0 PAROXYSMAL ATRIAL FIBRILLATION (HCC): Chronic | ICD-10-CM

## 2023-04-20 DIAGNOSIS — R10.13 EPIGASTRIC ABDOMINAL PAIN: Primary | ICD-10-CM

## 2023-04-20 DIAGNOSIS — Z95.2 S/P AVR (AORTIC VALVE REPLACEMENT): Chronic | ICD-10-CM

## 2023-04-20 LAB
ABO: NORMAL
ALBUMIN SERPL BCG-MCNC: 4.6 G/DL (ref 3.5–5.1)
ALP SERPL-CCNC: 50 U/L (ref 38–126)
ALT SERPL W/O P-5'-P-CCNC: 49 U/L (ref 11–66)
ANION GAP SERPL CALC-SCNC: 16 MEQ/L (ref 8–16)
ANTIBODY SCREEN: NORMAL
APTT PPP: 34.9 SECONDS (ref 22–38)
AST SERPL-CCNC: 104 U/L (ref 5–40)
BASOPHILS ABSOLUTE: 0 THOU/MM3 (ref 0–0.1)
BASOPHILS NFR BLD AUTO: 0.2 %
BILIRUB SERPL-MCNC: 0.4 MG/DL (ref 0.3–1.2)
BUN SERPL-MCNC: 13 MG/DL (ref 7–22)
CALCIUM SERPL-MCNC: 9.1 MG/DL (ref 8.5–10.5)
CHLORIDE SERPL-SCNC: 100 MEQ/L (ref 98–111)
CO2 SERPL-SCNC: 22 MEQ/L (ref 23–33)
CREAT SERPL-MCNC: 1.1 MG/DL (ref 0.4–1.2)
DEPRECATED RDW RBC AUTO: 46.1 FL (ref 35–45)
EOSINOPHIL NFR BLD AUTO: 0.4 %
EOSINOPHILS ABSOLUTE: 0 THOU/MM3 (ref 0–0.4)
ERYTHROCYTE [DISTWIDTH] IN BLOOD BY AUTOMATED COUNT: 14.3 % (ref 11.5–14.5)
GFR SERPL CREATININE-BSD FRML MDRD: 55 ML/MIN/1.73M2
GLUCOSE SERPL-MCNC: 118 MG/DL (ref 70–108)
HCT VFR BLD AUTO: 31.3 % (ref 37–47)
HCT VFR BLD AUTO: 34.1 % (ref 37–47)
HEMOCCULT STL QL: POSITIVE
HGB BLD-MCNC: 10.6 GM/DL (ref 12–16)
HGB BLD-MCNC: 9.9 GM/DL (ref 12–16)
IMM GRANULOCYTES # BLD AUTO: 0.03 THOU/MM3 (ref 0–0.07)
IMM GRANULOCYTES NFR BLD AUTO: 0.4 %
INR PPP: 1.27 (ref 0.85–1.13)
LYMPHOCYTES ABSOLUTE: 1 THOU/MM3 (ref 1–4.8)
LYMPHOCYTES NFR BLD AUTO: 11.9 %
MCH RBC QN AUTO: 27.5 PG (ref 26–33)
MCHC RBC AUTO-ENTMCNC: 31.1 GM/DL (ref 32.2–35.5)
MCV RBC AUTO: 88.3 FL (ref 81–99)
MONOCYTES ABSOLUTE: 0.5 THOU/MM3 (ref 0.4–1.3)
MONOCYTES NFR BLD AUTO: 6.3 %
NEUTROPHILS NFR BLD AUTO: 80.8 %
NRBC BLD AUTO-RTO: 0 /100 WBC
OSMOLALITY SERPL CALC.SUM OF ELEC: 276.9 MOSMOL/KG (ref 275–300)
PLATELET # BLD AUTO: 175 THOU/MM3 (ref 130–400)
PMV BLD AUTO: 11.5 FL (ref 9.4–12.4)
POTASSIUM SERPL-SCNC: 3.6 MEQ/L (ref 3.5–5.2)
PROT SERPL-MCNC: 7.8 G/DL (ref 6.1–8)
RBC # BLD AUTO: 3.86 MILL/MM3 (ref 4.2–5.4)
RH FACTOR: NORMAL
SEGMENTED NEUTROPHILS ABSOLUTE COUNT: 6.7 THOU/MM3 (ref 1.8–7.7)
SODIUM SERPL-SCNC: 138 MEQ/L (ref 135–145)
WBC # BLD AUTO: 8.3 THOU/MM3 (ref 4.8–10.8)

## 2023-04-20 PROCEDURE — C9113 INJ PANTOPRAZOLE SODIUM, VIA: HCPCS | Performed by: STUDENT IN AN ORGANIZED HEALTH CARE EDUCATION/TRAINING PROGRAM

## 2023-04-20 PROCEDURE — 6370000000 HC RX 637 (ALT 250 FOR IP)

## 2023-04-20 PROCEDURE — 99223 1ST HOSP IP/OBS HIGH 75: CPT

## 2023-04-20 PROCEDURE — 36415 COLL VENOUS BLD VENIPUNCTURE: CPT

## 2023-04-20 PROCEDURE — 6360000002 HC RX W HCPCS: Performed by: EMERGENCY MEDICINE

## 2023-04-20 PROCEDURE — 96375 TX/PRO/DX INJ NEW DRUG ADDON: CPT

## 2023-04-20 PROCEDURE — 82272 OCCULT BLD FECES 1-3 TESTS: CPT

## 2023-04-20 PROCEDURE — 93005 ELECTROCARDIOGRAM TRACING: CPT

## 2023-04-20 PROCEDURE — 99285 EMERGENCY DEPT VISIT HI MDM: CPT

## 2023-04-20 PROCEDURE — 74177 CT ABD & PELVIS W/CONTRAST: CPT

## 2023-04-20 PROCEDURE — 6360000004 HC RX CONTRAST MEDICATION: Performed by: STUDENT IN AN ORGANIZED HEALTH CARE EDUCATION/TRAINING PROGRAM

## 2023-04-20 PROCEDURE — 6360000002 HC RX W HCPCS

## 2023-04-20 PROCEDURE — 6370000000 HC RX 637 (ALT 250 FOR IP): Performed by: EMERGENCY MEDICINE

## 2023-04-20 PROCEDURE — 6360000002 HC RX W HCPCS: Performed by: STUDENT IN AN ORGANIZED HEALTH CARE EDUCATION/TRAINING PROGRAM

## 2023-04-20 PROCEDURE — 85018 HEMOGLOBIN: CPT

## 2023-04-20 PROCEDURE — 96365 THER/PROPH/DIAG IV INF INIT: CPT

## 2023-04-20 PROCEDURE — 80053 COMPREHEN METABOLIC PANEL: CPT

## 2023-04-20 PROCEDURE — 86901 BLOOD TYPING SEROLOGIC RH(D): CPT

## 2023-04-20 PROCEDURE — 2580000003 HC RX 258

## 2023-04-20 PROCEDURE — 2580000003 HC RX 258: Performed by: STUDENT IN AN ORGANIZED HEALTH CARE EDUCATION/TRAINING PROGRAM

## 2023-04-20 PROCEDURE — 1200000003 HC TELEMETRY R&B

## 2023-04-20 PROCEDURE — 86850 RBC ANTIBODY SCREEN: CPT

## 2023-04-20 PROCEDURE — 85025 COMPLETE CBC W/AUTO DIFF WBC: CPT

## 2023-04-20 PROCEDURE — 85014 HEMATOCRIT: CPT

## 2023-04-20 PROCEDURE — 86900 BLOOD TYPING SEROLOGIC ABO: CPT

## 2023-04-20 PROCEDURE — 85730 THROMBOPLASTIN TIME PARTIAL: CPT

## 2023-04-20 PROCEDURE — 85610 PROTHROMBIN TIME: CPT

## 2023-04-20 RX ORDER — ATORVASTATIN CALCIUM 10 MG/1
10 TABLET, FILM COATED ORAL EVERY EVENING
Status: DISCONTINUED | OUTPATIENT
Start: 2023-04-20 | End: 2023-04-23 | Stop reason: HOSPADM

## 2023-04-20 RX ORDER — SODIUM CHLORIDE 0.9 % (FLUSH) 0.9 %
5-40 SYRINGE (ML) INJECTION PRN
Status: DISCONTINUED | OUTPATIENT
Start: 2023-04-20 | End: 2023-04-23 | Stop reason: HOSPADM

## 2023-04-20 RX ORDER — ACETAMINOPHEN 650 MG/1
650 SUPPOSITORY RECTAL EVERY 6 HOURS PRN
Status: DISCONTINUED | OUTPATIENT
Start: 2023-04-20 | End: 2023-04-23 | Stop reason: HOSPADM

## 2023-04-20 RX ORDER — ONDANSETRON 2 MG/ML
4 INJECTION INTRAMUSCULAR; INTRAVENOUS EVERY 6 HOURS PRN
Status: DISCONTINUED | OUTPATIENT
Start: 2023-04-20 | End: 2023-04-23 | Stop reason: HOSPADM

## 2023-04-20 RX ORDER — CITALOPRAM 20 MG/1
20 TABLET ORAL DAILY
Status: DISCONTINUED | OUTPATIENT
Start: 2023-04-21 | End: 2023-04-23 | Stop reason: HOSPADM

## 2023-04-20 RX ORDER — METOPROLOL TARTRATE 50 MG/1
25 TABLET, FILM COATED ORAL 2 TIMES DAILY
Status: DISCONTINUED | OUTPATIENT
Start: 2023-04-20 | End: 2023-04-20

## 2023-04-20 RX ORDER — FUROSEMIDE 20 MG/1
20 TABLET ORAL DAILY
Status: DISCONTINUED | OUTPATIENT
Start: 2023-04-20 | End: 2023-04-23 | Stop reason: HOSPADM

## 2023-04-20 RX ORDER — ROPINIROLE 1 MG/1
2 TABLET, FILM COATED ORAL 2 TIMES DAILY
Status: DISCONTINUED | OUTPATIENT
Start: 2023-04-20 | End: 2023-04-23 | Stop reason: HOSPADM

## 2023-04-20 RX ORDER — ONDANSETRON 4 MG/1
4 TABLET, ORALLY DISINTEGRATING ORAL EVERY 8 HOURS PRN
Status: DISCONTINUED | OUTPATIENT
Start: 2023-04-20 | End: 2023-04-23 | Stop reason: HOSPADM

## 2023-04-20 RX ORDER — POTASSIUM CHLORIDE 20 MEQ/1
40 TABLET, EXTENDED RELEASE ORAL PRN
Status: DISCONTINUED | OUTPATIENT
Start: 2023-04-20 | End: 2023-04-23 | Stop reason: HOSPADM

## 2023-04-20 RX ORDER — ACETAMINOPHEN 325 MG/1
650 TABLET ORAL ONCE
Status: COMPLETED | OUTPATIENT
Start: 2023-04-20 | End: 2023-04-20

## 2023-04-20 RX ORDER — ONDANSETRON 2 MG/ML
4 INJECTION INTRAMUSCULAR; INTRAVENOUS ONCE
Status: COMPLETED | OUTPATIENT
Start: 2023-04-20 | End: 2023-04-20

## 2023-04-20 RX ORDER — 0.9 % SODIUM CHLORIDE 0.9 %
1000 INTRAVENOUS SOLUTION INTRAVENOUS ONCE
Status: COMPLETED | OUTPATIENT
Start: 2023-04-20 | End: 2023-04-20

## 2023-04-20 RX ORDER — SODIUM CHLORIDE 9 MG/ML
INJECTION, SOLUTION INTRAVENOUS PRN
Status: DISCONTINUED | OUTPATIENT
Start: 2023-04-20 | End: 2023-04-23 | Stop reason: HOSPADM

## 2023-04-20 RX ORDER — SODIUM CHLORIDE 0.9 % (FLUSH) 0.9 %
5-40 SYRINGE (ML) INJECTION EVERY 12 HOURS SCHEDULED
Status: DISCONTINUED | OUTPATIENT
Start: 2023-04-20 | End: 2023-04-23 | Stop reason: HOSPADM

## 2023-04-20 RX ORDER — POLYETHYLENE GLYCOL 3350 17 G/17G
17 POWDER, FOR SOLUTION ORAL DAILY PRN
Status: DISCONTINUED | OUTPATIENT
Start: 2023-04-20 | End: 2023-04-23 | Stop reason: HOSPADM

## 2023-04-20 RX ORDER — PROMETHAZINE HYDROCHLORIDE 25 MG/ML
6.25 INJECTION, SOLUTION INTRAMUSCULAR; INTRAVENOUS EVERY 6 HOURS PRN
Status: DISCONTINUED | OUTPATIENT
Start: 2023-04-20 | End: 2023-04-23 | Stop reason: HOSPADM

## 2023-04-20 RX ORDER — NAPROXEN 500 MG/1
500 TABLET ORAL 2 TIMES DAILY WITH MEALS
COMMUNITY
End: 2023-04-20

## 2023-04-20 RX ORDER — TIZANIDINE 4 MG/1
4 TABLET ORAL 3 TIMES DAILY PRN
Status: DISCONTINUED | OUTPATIENT
Start: 2023-04-20 | End: 2023-04-23 | Stop reason: HOSPADM

## 2023-04-20 RX ORDER — ASPIRIN 81 MG/1
81 TABLET ORAL DAILY
Status: DISCONTINUED | OUTPATIENT
Start: 2023-04-20 | End: 2023-04-23 | Stop reason: HOSPADM

## 2023-04-20 RX ORDER — ALBUTEROL SULFATE 90 UG/1
2 AEROSOL, METERED RESPIRATORY (INHALATION) EVERY 4 HOURS PRN
Status: DISCONTINUED | OUTPATIENT
Start: 2023-04-20 | End: 2023-04-23 | Stop reason: HOSPADM

## 2023-04-20 RX ORDER — LISINOPRIL 5 MG/1
5 TABLET ORAL 2 TIMES DAILY
Status: DISCONTINUED | OUTPATIENT
Start: 2023-04-20 | End: 2023-04-23 | Stop reason: HOSPADM

## 2023-04-20 RX ORDER — POTASSIUM CHLORIDE 7.45 MG/ML
10 INJECTION INTRAVENOUS PRN
Status: DISCONTINUED | OUTPATIENT
Start: 2023-04-20 | End: 2023-04-23 | Stop reason: HOSPADM

## 2023-04-20 RX ORDER — ACETAMINOPHEN 325 MG/1
650 TABLET ORAL EVERY 6 HOURS PRN
Status: DISCONTINUED | OUTPATIENT
Start: 2023-04-20 | End: 2023-04-23 | Stop reason: HOSPADM

## 2023-04-20 RX ORDER — MAGNESIUM SULFATE IN WATER 40 MG/ML
2000 INJECTION, SOLUTION INTRAVENOUS PRN
Status: DISCONTINUED | OUTPATIENT
Start: 2023-04-20 | End: 2023-04-23 | Stop reason: HOSPADM

## 2023-04-20 RX ORDER — TRAZODONE HYDROCHLORIDE 100 MG/1
100 TABLET ORAL NIGHTLY
Status: DISCONTINUED | OUTPATIENT
Start: 2023-04-20 | End: 2023-04-23 | Stop reason: HOSPADM

## 2023-04-20 RX ORDER — POTASSIUM CHLORIDE 20 MEQ/1
20 TABLET, EXTENDED RELEASE ORAL DAILY
Status: DISCONTINUED | OUTPATIENT
Start: 2023-04-21 | End: 2023-04-23 | Stop reason: HOSPADM

## 2023-04-20 RX ORDER — SODIUM CHLORIDE 9 MG/ML
INJECTION, SOLUTION INTRAVENOUS CONTINUOUS
Status: DISCONTINUED | OUTPATIENT
Start: 2023-04-20 | End: 2023-04-21

## 2023-04-20 RX ORDER — AMITRIPTYLINE HYDROCHLORIDE 25 MG/1
25 TABLET, FILM COATED ORAL NIGHTLY
Status: DISCONTINUED | OUTPATIENT
Start: 2023-04-20 | End: 2023-04-23 | Stop reason: HOSPADM

## 2023-04-20 RX ADMIN — SODIUM CHLORIDE 80 MG: 9 INJECTION, SOLUTION INTRAVENOUS at 14:35

## 2023-04-20 RX ADMIN — SODIUM CHLORIDE 8 MG/HR: 9 INJECTION, SOLUTION INTRAVENOUS at 15:10

## 2023-04-20 RX ADMIN — TIZANIDINE 4 MG: 4 TABLET ORAL at 17:49

## 2023-04-20 RX ADMIN — IOPAMIDOL 80 ML: 755 INJECTION, SOLUTION INTRAVENOUS at 13:06

## 2023-04-20 RX ADMIN — ACETAMINOPHEN 650 MG: 325 TABLET ORAL at 17:49

## 2023-04-20 RX ADMIN — AMITRIPTYLINE HYDROCHLORIDE 25 MG: 25 TABLET, FILM COATED ORAL at 20:26

## 2023-04-20 RX ADMIN — SODIUM CHLORIDE 1000 ML: 9 INJECTION, SOLUTION INTRAVENOUS at 12:38

## 2023-04-20 RX ADMIN — SODIUM CHLORIDE 1000 ML: 9 INJECTION, SOLUTION INTRAVENOUS at 17:45

## 2023-04-20 RX ADMIN — ROPINIROLE HYDROCHLORIDE 2 MG: 1 TABLET, FILM COATED ORAL at 20:09

## 2023-04-20 RX ADMIN — ATORVASTATIN CALCIUM 10 MG: 10 TABLET, FILM COATED ORAL at 23:27

## 2023-04-20 RX ADMIN — SODIUM CHLORIDE: 9 INJECTION, SOLUTION INTRAVENOUS at 20:25

## 2023-04-20 RX ADMIN — TRAZODONE HYDROCHLORIDE 100 MG: 100 TABLET ORAL at 23:27

## 2023-04-20 RX ADMIN — ACETAMINOPHEN 650 MG: 325 TABLET ORAL at 14:33

## 2023-04-20 RX ADMIN — ONDANSETRON 4 MG: 2 INJECTION INTRAMUSCULAR; INTRAVENOUS at 14:33

## 2023-04-20 RX ADMIN — ONDANSETRON 4 MG: 2 INJECTION INTRAMUSCULAR; INTRAVENOUS at 17:49

## 2023-04-20 RX ADMIN — PROMETHAZINE HYDROCHLORIDE 6.25 MG: 25 INJECTION, SOLUTION INTRAMUSCULAR; INTRAVENOUS at 18:28

## 2023-04-20 ASSESSMENT — PAIN - FUNCTIONAL ASSESSMENT
PAIN_FUNCTIONAL_ASSESSMENT: 0-10

## 2023-04-20 ASSESSMENT — PAIN SCALES - GENERAL
PAINLEVEL_OUTOF10: 10
PAINLEVEL_OUTOF10: 8
PAINLEVEL_OUTOF10: 9
PAINLEVEL_OUTOF10: 9
PAINLEVEL_OUTOF10: 0
PAINLEVEL_OUTOF10: 6
PAINLEVEL_OUTOF10: 9
PAINLEVEL_OUTOF10: 9
PAINLEVEL_OUTOF10: 7

## 2023-04-20 ASSESSMENT — PAIN DESCRIPTION - LOCATION
LOCATION: ABDOMEN
LOCATION: LEG
LOCATION: ABDOMEN
LOCATION: ABDOMEN

## 2023-04-20 ASSESSMENT — PAIN DESCRIPTION - ORIENTATION: ORIENTATION: LEFT

## 2023-04-20 ASSESSMENT — PAIN DESCRIPTION - DESCRIPTORS
DESCRIPTORS: STABBING
DESCRIPTORS: STABBING
DESCRIPTORS: BURNING;STABBING

## 2023-04-21 ENCOUNTER — ANESTHESIA (OUTPATIENT)
Dept: ENDOSCOPY | Age: 68
End: 2023-04-21
Payer: MEDICARE

## 2023-04-21 ENCOUNTER — ANESTHESIA EVENT (OUTPATIENT)
Dept: ENDOSCOPY | Age: 68
End: 2023-04-21
Payer: MEDICARE

## 2023-04-21 LAB
ANION GAP SERPL CALC-SCNC: 12 MEQ/L (ref 8–16)
ANION GAP SERPL CALC-SCNC: 13 MEQ/L (ref 8–16)
BASOPHILS ABSOLUTE: 0 THOU/MM3 (ref 0–0.1)
BASOPHILS NFR BLD AUTO: 0.1 %
BUN SERPL-MCNC: 14 MG/DL (ref 7–22)
BUN SERPL-MCNC: 15 MG/DL (ref 7–22)
C CAYETANENSIS DNA SPEC QL NAA+PROBE: NOT DETECTED
C DIFF TOX GENS STL QL NAA+PROBE: NOT DETECTED
CA-I BLD ISE-SCNC: 1.08 MMOL/L (ref 1.12–1.32)
CALCIUM SERPL-MCNC: 7.9 MG/DL (ref 8.5–10.5)
CALCIUM SERPL-MCNC: 8.2 MG/DL (ref 8.5–10.5)
CAMPY SP DNA.DIARRHEA STL QL NAA+PROBE: NOT DETECTED
CHLORIDE SERPL-SCNC: 106 MEQ/L (ref 98–111)
CHLORIDE SERPL-SCNC: 106 MEQ/L (ref 98–111)
CO2 SERPL-SCNC: 18 MEQ/L (ref 23–33)
CO2 SERPL-SCNC: 18 MEQ/L (ref 23–33)
CREAT SERPL-MCNC: 1 MG/DL (ref 0.4–1.2)
CREAT SERPL-MCNC: 1.1 MG/DL (ref 0.4–1.2)
CRYPTOSP DNA SPEC QL NAA+PROBE: NOT DETECTED
DEPRECATED RDW RBC AUTO: 46.6 FL (ref 35–45)
E COLI O157H7 DNA SPEC QL NAA+PROBE: ABNORMAL
E HISTOLYT DNA SPEC QL NAA+PROBE: NOT DETECTED
EAEC PAA PLAS AGGR+AATA ST NAA+NON-PRB: NOT DETECTED
EC STX1+STX2 + H7 FLIC SPEC NAA+PROBE: NOT DETECTED
EOSINOPHIL NFR BLD AUTO: 0 %
EOSINOPHILS ABSOLUTE: 0 THOU/MM3 (ref 0–0.4)
EPEC EAE GENE STL QL NAA+NON-PROBE: NOT DETECTED
ERYTHROCYTE [DISTWIDTH] IN BLOOD BY AUTOMATED COUNT: 14.5 % (ref 11.5–14.5)
ETEC LTA+ST1A+ST1B TOX ST NAA+NON-PROBE: NOT DETECTED
G LAMBLIA DNA SPEC QL NAA+PROBE: NOT DETECTED
GFR SERPL CREATININE-BSD FRML MDRD: 55 ML/MIN/1.73M2
GFR SERPL CREATININE-BSD FRML MDRD: > 60 ML/MIN/1.73M2
GLUCOSE SERPL-MCNC: 116 MG/DL (ref 70–108)
GLUCOSE SERPL-MCNC: 90 MG/DL (ref 70–108)
HADV DNA SPEC QL NAA+PROBE: NOT DETECTED
HASTV RNA SPEC QL NAA+PROBE: NOT DETECTED
HCT VFR BLD AUTO: 28.4 % (ref 37–47)
HCT VFR BLD AUTO: 31.5 % (ref 37–47)
HCT VFR BLD AUTO: 31.9 % (ref 37–47)
HGB BLD-MCNC: 8.8 GM/DL (ref 12–16)
HGB BLD-MCNC: 9.6 GM/DL (ref 12–16)
HGB BLD-MCNC: 9.8 GM/DL (ref 12–16)
IMM GRANULOCYTES # BLD AUTO: 0.05 THOU/MM3 (ref 0–0.07)
IMM GRANULOCYTES NFR BLD AUTO: 0.4 %
LYMPHOCYTES ABSOLUTE: 1 THOU/MM3 (ref 1–4.8)
LYMPHOCYTES NFR BLD AUTO: 8.4 %
MAGNESIUM SERPL-MCNC: 1.2 MG/DL (ref 1.6–2.4)
MAGNESIUM SERPL-MCNC: 2.5 MG/DL (ref 1.6–2.4)
MAGNESIUM SERPL-MCNC: 2.7 MG/DL (ref 1.6–2.4)
MCH RBC QN AUTO: 27.2 PG (ref 26–33)
MCHC RBC AUTO-ENTMCNC: 31 GM/DL (ref 32.2–35.5)
MCV RBC AUTO: 87.9 FL (ref 81–99)
MONOCYTES ABSOLUTE: 0.6 THOU/MM3 (ref 0.4–1.3)
MONOCYTES NFR BLD AUTO: 5.2 %
NEUTROPHILS NFR BLD AUTO: 85.9 %
NOROVIRUS GI + GII RNA STL NAA+PROBE: NOT DETECTED
NRBC BLD AUTO-RTO: 0 /100 WBC
P SHIGELLOIDES DNA STL QL NAA+PROBE: NOT DETECTED
PLATELET # BLD AUTO: 142 THOU/MM3 (ref 130–400)
PMV BLD AUTO: 12.1 FL (ref 9.4–12.4)
POTASSIUM SERPL-SCNC: 3.4 MEQ/L (ref 3.5–5.2)
POTASSIUM SERPL-SCNC: 4.3 MEQ/L (ref 3.5–5.2)
RBC # BLD AUTO: 3.23 MILL/MM3 (ref 4.2–5.4)
RV RNA SPEC QL NAA+PROBE: DETECTED
SALMONELLA DNA SPEC QL NAA+PROBE: NOT DETECTED
SAPOVIRUS RNA SPEC QL NAA+PROBE: NOT DETECTED
SEGMENTED NEUTROPHILS ABSOLUTE COUNT: 10.3 THOU/MM3 (ref 1.8–7.7)
SHIGELLA SP+EIEC IPAH ST NAA+NON-PROBE: NOT DETECTED
SODIUM SERPL-SCNC: 136 MEQ/L (ref 135–145)
SODIUM SERPL-SCNC: 137 MEQ/L (ref 135–145)
V CHOLERAE DNA SPEC QL NAA+PROBE: NOT DETECTED
VIBRIO DNA SPEC NAA+PROBE: NOT DETECTED
WBC # BLD AUTO: 12 THOU/MM3 (ref 4.8–10.8)
Y ENTERO RECN STL QL NAA+PROBE: NOT DETECTED

## 2023-04-21 PROCEDURE — 82330 ASSAY OF CALCIUM: CPT

## 2023-04-21 PROCEDURE — 6360000002 HC RX W HCPCS

## 2023-04-21 PROCEDURE — 80048 BASIC METABOLIC PNL TOTAL CA: CPT

## 2023-04-21 PROCEDURE — 6360000002 HC RX W HCPCS: Performed by: NURSE ANESTHETIST, CERTIFIED REGISTERED

## 2023-04-21 PROCEDURE — 2580000003 HC RX 258

## 2023-04-21 PROCEDURE — 87507 IADNA-DNA/RNA PROBE TQ 12-25: CPT

## 2023-04-21 PROCEDURE — 83735 ASSAY OF MAGNESIUM: CPT

## 2023-04-21 PROCEDURE — 2580000003 HC RX 258: Performed by: NURSE ANESTHETIST, CERTIFIED REGISTERED

## 2023-04-21 PROCEDURE — 3700000000 HC ANESTHESIA ATTENDED CARE: Performed by: INTERNAL MEDICINE

## 2023-04-21 PROCEDURE — 36415 COLL VENOUS BLD VENIPUNCTURE: CPT

## 2023-04-21 PROCEDURE — C9113 INJ PANTOPRAZOLE SODIUM, VIA: HCPCS

## 2023-04-21 PROCEDURE — 6370000000 HC RX 637 (ALT 250 FOR IP)

## 2023-04-21 PROCEDURE — 0DJ08ZZ INSPECTION OF UPPER INTESTINAL TRACT, VIA NATURAL OR ARTIFICIAL OPENING ENDOSCOPIC: ICD-10-PCS | Performed by: INTERNAL MEDICINE

## 2023-04-21 PROCEDURE — 3609017100 HC EGD: Performed by: INTERNAL MEDICINE

## 2023-04-21 PROCEDURE — 2580000003 HC RX 258: Performed by: INTERNAL MEDICINE

## 2023-04-21 PROCEDURE — 1200000003 HC TELEMETRY R&B

## 2023-04-21 PROCEDURE — 97165 OT EVAL LOW COMPLEX 30 MIN: CPT

## 2023-04-21 PROCEDURE — 3700000001 HC ADD 15 MINUTES (ANESTHESIA): Performed by: INTERNAL MEDICINE

## 2023-04-21 PROCEDURE — 97535 SELF CARE MNGMENT TRAINING: CPT

## 2023-04-21 PROCEDURE — 7100000010 HC PHASE II RECOVERY - FIRST 15 MIN: Performed by: INTERNAL MEDICINE

## 2023-04-21 PROCEDURE — 93010 ELECTROCARDIOGRAM REPORT: CPT | Performed by: INTERNAL MEDICINE

## 2023-04-21 PROCEDURE — 6370000000 HC RX 637 (ALT 250 FOR IP): Performed by: INTERNAL MEDICINE

## 2023-04-21 PROCEDURE — 85014 HEMATOCRIT: CPT

## 2023-04-21 PROCEDURE — 85018 HEMOGLOBIN: CPT

## 2023-04-21 PROCEDURE — 85025 COMPLETE CBC W/AUTO DIFF WBC: CPT

## 2023-04-21 RX ORDER — CALCIUM GLUCONATE 20 MG/ML
2000 INJECTION, SOLUTION INTRAVENOUS ONCE
Status: COMPLETED | OUTPATIENT
Start: 2023-04-21 | End: 2023-04-21

## 2023-04-21 RX ORDER — SODIUM CHLORIDE 9 MG/ML
INJECTION, SOLUTION INTRAVENOUS CONTINUOUS PRN
Status: DISCONTINUED | OUTPATIENT
Start: 2023-04-21 | End: 2023-04-21 | Stop reason: SDUPTHER

## 2023-04-21 RX ORDER — PANTOPRAZOLE SODIUM 40 MG/1
40 TABLET, DELAYED RELEASE ORAL
Status: DISCONTINUED | OUTPATIENT
Start: 2023-04-21 | End: 2023-04-23 | Stop reason: HOSPADM

## 2023-04-21 RX ORDER — SODIUM CHLORIDE, SODIUM LACTATE, POTASSIUM CHLORIDE, CALCIUM CHLORIDE 600; 310; 30; 20 MG/100ML; MG/100ML; MG/100ML; MG/100ML
INJECTION, SOLUTION INTRAVENOUS ONCE
Status: COMPLETED | OUTPATIENT
Start: 2023-04-21 | End: 2023-04-21

## 2023-04-21 RX ADMIN — PROPOFOL 30 MG: 10 INJECTION, EMULSION INTRAVENOUS at 12:24

## 2023-04-21 RX ADMIN — PROPOFOL 30 MG: 10 INJECTION, EMULSION INTRAVENOUS at 12:30

## 2023-04-21 RX ADMIN — MAGNESIUM SULFATE HEPTAHYDRATE 2000 MG: 40 INJECTION, SOLUTION INTRAVENOUS at 05:33

## 2023-04-21 RX ADMIN — SODIUM CHLORIDE: 9 INJECTION, SOLUTION INTRAVENOUS at 12:23

## 2023-04-21 RX ADMIN — LISINOPRIL 5 MG: 5 TABLET ORAL at 20:58

## 2023-04-21 RX ADMIN — CITALOPRAM HYDROBROMIDE 20 MG: 20 TABLET ORAL at 11:14

## 2023-04-21 RX ADMIN — AMITRIPTYLINE HYDROCHLORIDE 25 MG: 25 TABLET, FILM COATED ORAL at 20:58

## 2023-04-21 RX ADMIN — ROPINIROLE HYDROCHLORIDE 2 MG: 1 TABLET, FILM COATED ORAL at 11:14

## 2023-04-21 RX ADMIN — TRAZODONE HYDROCHLORIDE 100 MG: 100 TABLET ORAL at 20:58

## 2023-04-21 RX ADMIN — PROPOFOL 60 MG: 10 INJECTION, EMULSION INTRAVENOUS at 12:28

## 2023-04-21 RX ADMIN — FUROSEMIDE 20 MG: 20 TABLET ORAL at 11:14

## 2023-04-21 RX ADMIN — POTASSIUM CHLORIDE 40 MEQ: 1500 TABLET, EXTENDED RELEASE ORAL at 05:31

## 2023-04-21 RX ADMIN — APIXABAN 5 MG: 5 TABLET, FILM COATED ORAL at 20:58

## 2023-04-21 RX ADMIN — SODIUM CHLORIDE, POTASSIUM CHLORIDE, SODIUM LACTATE AND CALCIUM CHLORIDE: 600; 310; 30; 20 INJECTION, SOLUTION INTRAVENOUS at 11:50

## 2023-04-21 RX ADMIN — PROPOFOL 30 MG: 10 INJECTION, EMULSION INTRAVENOUS at 12:26

## 2023-04-21 RX ADMIN — POTASSIUM CHLORIDE 20 MEQ: 1500 TABLET, EXTENDED RELEASE ORAL at 11:14

## 2023-04-21 RX ADMIN — SODIUM CHLORIDE 8 MG/HR: 9 INJECTION, SOLUTION INTRAVENOUS at 01:33

## 2023-04-21 RX ADMIN — ATORVASTATIN CALCIUM 10 MG: 10 TABLET, FILM COATED ORAL at 16:43

## 2023-04-21 RX ADMIN — CALCIUM GLUCONATE 2000 MG: 20 INJECTION, SOLUTION INTRAVENOUS at 18:00

## 2023-04-21 RX ADMIN — PANTOPRAZOLE SODIUM 40 MG: 40 TABLET, DELAYED RELEASE ORAL at 16:43

## 2023-04-21 RX ADMIN — ROPINIROLE HYDROCHLORIDE 2 MG: 1 TABLET, FILM COATED ORAL at 20:58

## 2023-04-21 RX ADMIN — SODIUM CHLORIDE, PRESERVATIVE FREE 10 ML: 5 INJECTION INTRAVENOUS at 11:14

## 2023-04-21 RX ADMIN — ACETAMINOPHEN 650 MG: 325 TABLET ORAL at 20:57

## 2023-04-21 RX ADMIN — ACETAMINOPHEN 650 MG: 325 TABLET ORAL at 11:13

## 2023-04-21 RX ADMIN — LISINOPRIL 5 MG: 5 TABLET ORAL at 11:14

## 2023-04-21 RX ADMIN — MAGNESIUM SULFATE HEPTAHYDRATE 2000 MG: 40 INJECTION, SOLUTION INTRAVENOUS at 03:06

## 2023-04-21 RX ADMIN — SODIUM CHLORIDE, PRESERVATIVE FREE 10 ML: 5 INJECTION INTRAVENOUS at 20:57

## 2023-04-21 RX ADMIN — ONDANSETRON 4 MG: 2 INJECTION INTRAMUSCULAR; INTRAVENOUS at 22:26

## 2023-04-21 ASSESSMENT — PAIN DESCRIPTION - PAIN TYPE: TYPE: ACUTE PAIN

## 2023-04-21 ASSESSMENT — PAIN DESCRIPTION - FREQUENCY: FREQUENCY: INTERMITTENT

## 2023-04-21 ASSESSMENT — PAIN DESCRIPTION - LOCATION
LOCATION: LEG
LOCATION: ABDOMEN

## 2023-04-21 ASSESSMENT — PAIN DESCRIPTION - ORIENTATION: ORIENTATION: RIGHT;LEFT;MID;LOWER

## 2023-04-21 ASSESSMENT — PAIN SCALES - GENERAL
PAINLEVEL_OUTOF10: 7
PAINLEVEL_OUTOF10: 8
PAINLEVEL_OUTOF10: 4

## 2023-04-21 ASSESSMENT — PAIN - FUNCTIONAL ASSESSMENT
PAIN_FUNCTIONAL_ASSESSMENT: ACTIVITIES ARE NOT PREVENTED
PAIN_FUNCTIONAL_ASSESSMENT: ACTIVITIES ARE NOT PREVENTED

## 2023-04-21 ASSESSMENT — PAIN DESCRIPTION - DESCRIPTORS: DESCRIPTORS: ACHING

## 2023-04-21 ASSESSMENT — PAIN DESCRIPTION - ONSET: ONSET: OTHER (COMMENT)

## 2023-04-21 NOTE — ANESTHESIA PRE PROCEDURE
4.3 04/21/2023 08:58 AM    K 3.4 04/21/2023 12:45 AM     04/21/2023 08:58 AM    CO2 18 04/21/2023 08:58 AM    BUN 14 04/21/2023 08:58 AM    CREATININE 1.0 04/21/2023 08:58 AM    LABGLOM >60 04/21/2023 08:58 AM    GLUCOSE 90 04/21/2023 08:58 AM    PROT 7.8 04/20/2023 11:30 AM    CALCIUM 8.2 04/21/2023 08:58 AM    BILITOT 0.4 04/20/2023 11:30 AM    ALKPHOS 50 04/20/2023 11:30 AM     04/20/2023 11:30 AM    ALT 49 04/20/2023 11:30 AM       POC Tests: No results for input(s): POCGLU, POCNA, POCK, POCCL, POCBUN, POCHEMO, POCHCT in the last 72 hours. Coags:   Lab Results   Component Value Date/Time    INR 1.27 04/20/2023 11:30 AM    APTT 34.9 04/20/2023 11:30 AM       HCG (If Applicable):   Lab Results   Component Value Date    PREGSERUM NEGATIVE 05/04/2012        ABGs: No results found for: PHART, PO2ART, MCT5RIH, GPV0XMA, BEART, R2LHLQOI     Type & Screen (If Applicable):  Lab Results   Component Value Date    LABRH NEG 04/20/2023       Anesthesia Evaluation   no history of anesthetic complications:   Airway: Mallampati: II  TM distance: >3 FB   Neck ROM: full  Mouth opening: > = 3 FB   Dental:    (+) upper dentures      Pulmonary:normal exam    (+) COPD:                             Cardiovascular:  Exercise tolerance: good (>4 METS),   (+) hypertension:, valvular problems/murmurs: AI and MR, CHF:, murmur,       ECG reviewed  Rhythm: regular  Rate: normal  Echocardiogram reviewed                  Neuro/Psych:   (+) CVA:, psychiatric history:            GI/Hepatic/Renal:   (+) GERD:, liver disease:,           Endo/Other:    (+) Diabetes, . Pt had no PAT visit       Abdominal:             Vascular: negative vascular ROS. Other Findings:             Anesthesia Plan      MAC     ASA 3       Induction: intravenous. Anesthetic plan and risks discussed with patient. Plan discussed with attending.                     Olga Head, APRN - CRNA   4/21/2023

## 2023-04-21 NOTE — ANESTHESIA POSTPROCEDURE EVALUATION
Department of Anesthesiology  Postprocedure Note    Patient: Agatha Ramesh  MRN: [id number]  YOB: 1955  Date of evaluation: 4/21/2023      Procedure Summary     Date: 04/21/23 Room / Location: [address] [place] [address] 12 [facility]    Anesthesia Start: 8936 Anesthesia Stop: 1233    Procedure: EGD ESOPHAGOGASTRODUODENOSCOPY Diagnosis:       Melena      Other iron deficiency anemia      (Melena [K92.1])      (Other iron deficiency anemia [D50.8])    Surgeons: Violet Noyola MD Responsible Provider: Sharyle Coria, MD    Anesthesia Type: MAC ASA Status: 3          Anesthesia Type: No value filed.     Krysta Phase I: Krysta Score: 10    Krysta Phase II:        Anesthesia Post Evaluation    Patient location during evaluation: bedside  Patient participation: complete - patient cannot participate  Level of consciousness: awake and alert  Pain score: 0  Airway patency: patent  Nausea & Vomiting: no vomiting and no nausea  Complications: no  Cardiovascular status: blood pressure returned to baseline and hemodynamically stable  Respiratory status: acceptable, nasal cannula, nonlabored ventilation and spontaneous ventilation  Hydration status: stable

## 2023-04-22 LAB
ANION GAP SERPL CALC-SCNC: 11 MEQ/L (ref 8–16)
ANION GAP SERPL CALC-SCNC: 12 MEQ/L (ref 8–16)
BASOPHILS ABSOLUTE: 0 THOU/MM3 (ref 0–0.1)
BASOPHILS NFR BLD AUTO: 0.1 %
BUN SERPL-MCNC: 16 MG/DL (ref 7–22)
BUN SERPL-MCNC: 17 MG/DL (ref 7–22)
CA-I BLD ISE-SCNC: 1.19 MMOL/L (ref 1.12–1.32)
CALCIUM SERPL-MCNC: 8.7 MG/DL (ref 8.5–10.5)
CALCIUM SERPL-MCNC: 8.9 MG/DL (ref 8.5–10.5)
CHLORIDE SERPL-SCNC: 109 MEQ/L (ref 98–111)
CHLORIDE SERPL-SCNC: 110 MEQ/L (ref 98–111)
CO2 SERPL-SCNC: 15 MEQ/L (ref 23–33)
CO2 SERPL-SCNC: 18 MEQ/L (ref 23–33)
CREAT SERPL-MCNC: 0.9 MG/DL (ref 0.4–1.2)
CREAT SERPL-MCNC: 1 MG/DL (ref 0.4–1.2)
DEPRECATED RDW RBC AUTO: 47 FL (ref 35–45)
EOSINOPHIL NFR BLD AUTO: 0.1 %
EOSINOPHILS ABSOLUTE: 0 THOU/MM3 (ref 0–0.4)
ERYTHROCYTE [DISTWIDTH] IN BLOOD BY AUTOMATED COUNT: 14.6 % (ref 11.5–14.5)
FERRITIN SERPL IA-MCNC: 184 NG/ML (ref 10–291)
FOLATE SERPL-MCNC: 10.6 NG/ML (ref 4.8–24.2)
GFR SERPL CREATININE-BSD FRML MDRD: > 60 ML/MIN/1.73M2
GFR SERPL CREATININE-BSD FRML MDRD: > 60 ML/MIN/1.73M2
GLUCOSE SERPL-MCNC: 104 MG/DL (ref 70–108)
GLUCOSE SERPL-MCNC: 133 MG/DL (ref 70–108)
HCT VFR BLD AUTO: 31.8 % (ref 37–47)
HGB BLD-MCNC: 9.9 GM/DL (ref 12–16)
HGB RETIC QN AUTO: 27.7 PG (ref 28.2–35.7)
IMM GRANULOCYTES # BLD AUTO: 0.03 THOU/MM3 (ref 0–0.07)
IMM GRANULOCYTES NFR BLD AUTO: 0.4 %
IMM RETICS NFR: 16.8 % (ref 3–15.9)
IRON SATN MFR SERPL: 7 % (ref 20–50)
IRON SERPL-MCNC: 21 UG/DL (ref 50–170)
LYMPHOCYTES ABSOLUTE: 1.2 THOU/MM3 (ref 1–4.8)
LYMPHOCYTES NFR BLD AUTO: 17.6 %
MAGNESIUM SERPL-MCNC: 1.9 MG/DL (ref 1.6–2.4)
MAGNESIUM SERPL-MCNC: 2.1 MG/DL (ref 1.6–2.4)
MCH RBC QN AUTO: 27.3 PG (ref 26–33)
MCHC RBC AUTO-ENTMCNC: 31.1 GM/DL (ref 32.2–35.5)
MCV RBC AUTO: 87.6 FL (ref 81–99)
MONOCYTES ABSOLUTE: 0.6 THOU/MM3 (ref 0.4–1.3)
MONOCYTES NFR BLD AUTO: 8.7 %
NEUTROPHILS NFR BLD AUTO: 73.1 %
NRBC BLD AUTO-RTO: 0 /100 WBC
PHOSPHATE SERPL-MCNC: 3 MG/DL (ref 2.4–4.7)
PLATELET # BLD AUTO: 144 THOU/MM3 (ref 130–400)
PMV BLD AUTO: 11.6 FL (ref 9.4–12.4)
POTASSIUM SERPL-SCNC: 4 MEQ/L (ref 3.5–5.2)
POTASSIUM SERPL-SCNC: 4.2 MEQ/L (ref 3.5–5.2)
RBC # BLD AUTO: 3.63 MILL/MM3 (ref 4.2–5.4)
RETICS # AUTO: 53 THOU/MM3 (ref 20–115)
RETICS/RBC NFR AUTO: 1.5 % (ref 0.5–2)
SEGMENTED NEUTROPHILS ABSOLUTE COUNT: 5 THOU/MM3 (ref 1.8–7.7)
SODIUM SERPL-SCNC: 136 MEQ/L (ref 135–145)
SODIUM SERPL-SCNC: 139 MEQ/L (ref 135–145)
TIBC SERPL-MCNC: 316 UG/DL (ref 171–450)
VIT B12 SERPL-MCNC: 343 PG/ML (ref 211–911)
WBC # BLD AUTO: 6.8 THOU/MM3 (ref 4.8–10.8)

## 2023-04-22 PROCEDURE — 82728 ASSAY OF FERRITIN: CPT

## 2023-04-22 PROCEDURE — 2580000003 HC RX 258

## 2023-04-22 PROCEDURE — 6370000000 HC RX 637 (ALT 250 FOR IP)

## 2023-04-22 PROCEDURE — 1200000003 HC TELEMETRY R&B

## 2023-04-22 PROCEDURE — 84100 ASSAY OF PHOSPHORUS: CPT

## 2023-04-22 PROCEDURE — 80048 BASIC METABOLIC PNL TOTAL CA: CPT

## 2023-04-22 PROCEDURE — 85025 COMPLETE CBC W/AUTO DIFF WBC: CPT

## 2023-04-22 PROCEDURE — 83540 ASSAY OF IRON: CPT

## 2023-04-22 PROCEDURE — 83550 IRON BINDING TEST: CPT

## 2023-04-22 PROCEDURE — 82746 ASSAY OF FOLIC ACID SERUM: CPT

## 2023-04-22 PROCEDURE — 83735 ASSAY OF MAGNESIUM: CPT

## 2023-04-22 PROCEDURE — 6370000000 HC RX 637 (ALT 250 FOR IP): Performed by: INTERNAL MEDICINE

## 2023-04-22 PROCEDURE — 99232 SBSQ HOSP IP/OBS MODERATE 35: CPT

## 2023-04-22 PROCEDURE — 82607 VITAMIN B-12: CPT

## 2023-04-22 PROCEDURE — 36415 COLL VENOUS BLD VENIPUNCTURE: CPT

## 2023-04-22 PROCEDURE — 85046 RETICYTE/HGB CONCENTRATE: CPT

## 2023-04-22 PROCEDURE — 82330 ASSAY OF CALCIUM: CPT

## 2023-04-22 RX ORDER — SODIUM CHLORIDE, SODIUM LACTATE, POTASSIUM CHLORIDE, CALCIUM CHLORIDE 600; 310; 30; 20 MG/100ML; MG/100ML; MG/100ML; MG/100ML
INJECTION, SOLUTION INTRAVENOUS CONTINUOUS
Status: ACTIVE | OUTPATIENT
Start: 2023-04-22 | End: 2023-04-23

## 2023-04-22 RX ADMIN — APIXABAN 5 MG: 5 TABLET, FILM COATED ORAL at 11:54

## 2023-04-22 RX ADMIN — SODIUM CHLORIDE, POTASSIUM CHLORIDE, SODIUM LACTATE AND CALCIUM CHLORIDE: 600; 310; 30; 20 INJECTION, SOLUTION INTRAVENOUS at 15:42

## 2023-04-22 RX ADMIN — ROPINIROLE HYDROCHLORIDE 2 MG: 1 TABLET, FILM COATED ORAL at 20:42

## 2023-04-22 RX ADMIN — LIDOCAINE HYDROCHLORIDE: 20 SOLUTION ORAL; TOPICAL at 10:46

## 2023-04-22 RX ADMIN — ATORVASTATIN CALCIUM 10 MG: 10 TABLET, FILM COATED ORAL at 18:08

## 2023-04-22 RX ADMIN — PANTOPRAZOLE SODIUM 40 MG: 40 TABLET, DELAYED RELEASE ORAL at 05:49

## 2023-04-22 RX ADMIN — LISINOPRIL 5 MG: 5 TABLET ORAL at 11:54

## 2023-04-22 RX ADMIN — LISINOPRIL 5 MG: 5 TABLET ORAL at 20:42

## 2023-04-22 RX ADMIN — ROPINIROLE HYDROCHLORIDE 2 MG: 1 TABLET, FILM COATED ORAL at 11:55

## 2023-04-22 RX ADMIN — PANTOPRAZOLE SODIUM 40 MG: 40 TABLET, DELAYED RELEASE ORAL at 18:08

## 2023-04-22 RX ADMIN — SODIUM CHLORIDE, PRESERVATIVE FREE 10 ML: 5 INJECTION INTRAVENOUS at 11:55

## 2023-04-22 RX ADMIN — APIXABAN 5 MG: 5 TABLET, FILM COATED ORAL at 20:42

## 2023-04-22 RX ADMIN — FUROSEMIDE 20 MG: 20 TABLET ORAL at 11:55

## 2023-04-22 RX ADMIN — ASPIRIN 81 MG: 81 TABLET, COATED ORAL at 11:51

## 2023-04-22 RX ADMIN — TRAZODONE HYDROCHLORIDE 100 MG: 100 TABLET ORAL at 20:42

## 2023-04-22 RX ADMIN — AMITRIPTYLINE HYDROCHLORIDE 25 MG: 25 TABLET, FILM COATED ORAL at 20:42

## 2023-04-22 RX ADMIN — ONDANSETRON 4 MG: 4 TABLET, ORALLY DISINTEGRATING ORAL at 10:46

## 2023-04-22 RX ADMIN — CITALOPRAM HYDROBROMIDE 20 MG: 20 TABLET ORAL at 11:55

## 2023-04-22 RX ADMIN — POTASSIUM CHLORIDE 20 MEQ: 1500 TABLET, EXTENDED RELEASE ORAL at 11:51

## 2023-04-22 ASSESSMENT — PAIN SCALES - GENERAL
PAINLEVEL_OUTOF10: 5
PAINLEVEL_OUTOF10: 3

## 2023-04-22 ASSESSMENT — PAIN DESCRIPTION - LOCATION
LOCATION: ABDOMEN
LOCATION: ABDOMEN

## 2023-04-23 VITALS
RESPIRATION RATE: 16 BRPM | SYSTOLIC BLOOD PRESSURE: 123 MMHG | HEIGHT: 63 IN | HEART RATE: 78 BPM | WEIGHT: 191.8 LBS | TEMPERATURE: 97.8 F | BODY MASS INDEX: 33.98 KG/M2 | DIASTOLIC BLOOD PRESSURE: 44 MMHG | OXYGEN SATURATION: 98 %

## 2023-04-23 LAB
ANION GAP SERPL CALC-SCNC: 11 MEQ/L (ref 8–16)
BASOPHILS ABSOLUTE: 0 THOU/MM3 (ref 0–0.1)
BASOPHILS NFR BLD AUTO: 0.2 %
BUN SERPL-MCNC: 14 MG/DL (ref 7–22)
CALCIUM SERPL-MCNC: 8.9 MG/DL (ref 8.5–10.5)
CHLORIDE SERPL-SCNC: 109 MEQ/L (ref 98–111)
CO2 SERPL-SCNC: 18 MEQ/L (ref 23–33)
CREAT SERPL-MCNC: 0.9 MG/DL (ref 0.4–1.2)
DEPRECATED RDW RBC AUTO: 48 FL (ref 35–45)
EOSINOPHIL NFR BLD AUTO: 1.7 %
EOSINOPHILS ABSOLUTE: 0.1 THOU/MM3 (ref 0–0.4)
ERYTHROCYTE [DISTWIDTH] IN BLOOD BY AUTOMATED COUNT: 14.9 % (ref 11.5–14.5)
GFR SERPL CREATININE-BSD FRML MDRD: > 60 ML/MIN/1.73M2
GLUCOSE SERPL-MCNC: 89 MG/DL (ref 70–108)
HCT VFR BLD AUTO: 30.3 % (ref 37–47)
HGB BLD-MCNC: 9.4 GM/DL (ref 12–16)
IMM GRANULOCYTES # BLD AUTO: 0.02 THOU/MM3 (ref 0–0.07)
IMM GRANULOCYTES NFR BLD AUTO: 0.3 %
LYMPHOCYTES ABSOLUTE: 2.5 THOU/MM3 (ref 1–4.8)
LYMPHOCYTES NFR BLD AUTO: 41.6 %
MAGNESIUM SERPL-MCNC: 1.7 MG/DL (ref 1.6–2.4)
MCH RBC QN AUTO: 27.4 PG (ref 26–33)
MCHC RBC AUTO-ENTMCNC: 31 GM/DL (ref 32.2–35.5)
MCV RBC AUTO: 88.3 FL (ref 81–99)
MONOCYTES ABSOLUTE: 0.6 THOU/MM3 (ref 0.4–1.3)
MONOCYTES NFR BLD AUTO: 10.2 %
NEUTROPHILS NFR BLD AUTO: 46 %
NRBC BLD AUTO-RTO: 0 /100 WBC
PLATELET # BLD AUTO: 147 THOU/MM3 (ref 130–400)
PMV BLD AUTO: 11.9 FL (ref 9.4–12.4)
POTASSIUM SERPL-SCNC: 4.4 MEQ/L (ref 3.5–5.2)
RBC # BLD AUTO: 3.43 MILL/MM3 (ref 4.2–5.4)
SCAN OF BLOOD SMEAR: NORMAL
SEGMENTED NEUTROPHILS ABSOLUTE COUNT: 2.8 THOU/MM3 (ref 1.8–7.7)
SODIUM SERPL-SCNC: 138 MEQ/L (ref 135–145)
WBC # BLD AUTO: 6 THOU/MM3 (ref 4.8–10.8)

## 2023-04-23 PROCEDURE — 2580000003 HC RX 258

## 2023-04-23 PROCEDURE — 6360000002 HC RX W HCPCS

## 2023-04-23 PROCEDURE — 6370000000 HC RX 637 (ALT 250 FOR IP)

## 2023-04-23 PROCEDURE — 83735 ASSAY OF MAGNESIUM: CPT

## 2023-04-23 PROCEDURE — 85025 COMPLETE CBC W/AUTO DIFF WBC: CPT

## 2023-04-23 PROCEDURE — 6370000000 HC RX 637 (ALT 250 FOR IP): Performed by: INTERNAL MEDICINE

## 2023-04-23 PROCEDURE — 99239 HOSP IP/OBS DSCHRG MGMT >30: CPT

## 2023-04-23 PROCEDURE — 80048 BASIC METABOLIC PNL TOTAL CA: CPT

## 2023-04-23 PROCEDURE — 36415 COLL VENOUS BLD VENIPUNCTURE: CPT

## 2023-04-23 RX ORDER — FERROUS SULFATE 325(65) MG
325 TABLET ORAL 2 TIMES DAILY WITH MEALS
Status: DISCONTINUED | OUTPATIENT
Start: 2023-04-23 | End: 2023-04-23 | Stop reason: HOSPADM

## 2023-04-23 RX ORDER — FERROUS SULFATE 325(65) MG
325 TABLET ORAL 2 TIMES DAILY WITH MEALS
Qty: 60 TABLET | Refills: 0 | Status: SHIPPED | OUTPATIENT
Start: 2023-04-23 | End: 2023-04-24 | Stop reason: SDUPTHER

## 2023-04-23 RX ORDER — MAGNESIUM SULFATE 1 G/100ML
1000 INJECTION INTRAVENOUS ONCE
Status: COMPLETED | OUTPATIENT
Start: 2023-04-23 | End: 2023-04-23

## 2023-04-23 RX ORDER — PANTOPRAZOLE SODIUM 40 MG/1
40 TABLET, DELAYED RELEASE ORAL
Qty: 60 TABLET | Refills: 0 | Status: SHIPPED | OUTPATIENT
Start: 2023-04-23 | End: 2023-04-24 | Stop reason: SDUPTHER

## 2023-04-23 RX ADMIN — APIXABAN 5 MG: 5 TABLET, FILM COATED ORAL at 08:04

## 2023-04-23 RX ADMIN — POTASSIUM CHLORIDE 20 MEQ: 1500 TABLET, EXTENDED RELEASE ORAL at 08:06

## 2023-04-23 RX ADMIN — PANTOPRAZOLE SODIUM 40 MG: 40 TABLET, DELAYED RELEASE ORAL at 06:47

## 2023-04-23 RX ADMIN — LISINOPRIL 5 MG: 5 TABLET ORAL at 10:44

## 2023-04-23 RX ADMIN — MAGNESIUM SULFATE HEPTAHYDRATE 1000 MG: 1 INJECTION, SOLUTION INTRAVENOUS at 10:41

## 2023-04-23 RX ADMIN — CITALOPRAM HYDROBROMIDE 20 MG: 20 TABLET ORAL at 08:04

## 2023-04-23 RX ADMIN — ASPIRIN 81 MG: 81 TABLET, COATED ORAL at 08:06

## 2023-04-23 RX ADMIN — SODIUM CHLORIDE, PRESERVATIVE FREE 10 ML: 5 INJECTION INTRAVENOUS at 08:05

## 2023-04-23 RX ADMIN — SODIUM CHLORIDE: 9 INJECTION, SOLUTION INTRAVENOUS at 10:39

## 2023-04-23 RX ADMIN — ROPINIROLE HYDROCHLORIDE 2 MG: 1 TABLET, FILM COATED ORAL at 08:05

## 2023-04-24 ENCOUNTER — TELEPHONE (OUTPATIENT)
Dept: FAMILY MEDICINE CLINIC | Age: 68
End: 2023-04-24

## 2023-04-24 DIAGNOSIS — D50.9 IRON DEFICIENCY ANEMIA, UNSPECIFIED IRON DEFICIENCY ANEMIA TYPE: ICD-10-CM

## 2023-04-24 DIAGNOSIS — K27.9 PUD (PEPTIC ULCER DISEASE): Primary | ICD-10-CM

## 2023-04-24 DIAGNOSIS — K92.2 UPPER GI BLEED: ICD-10-CM

## 2023-04-24 RX ORDER — FERROUS SULFATE 325(65) MG
325 TABLET ORAL 2 TIMES DAILY WITH MEALS
Qty: 60 TABLET | Refills: 2 | Status: SHIPPED | OUTPATIENT
Start: 2023-04-24

## 2023-04-24 RX ORDER — PANTOPRAZOLE SODIUM 40 MG/1
40 TABLET, DELAYED RELEASE ORAL
Qty: 60 TABLET | Refills: 2 | Status: SHIPPED | OUTPATIENT
Start: 2023-04-24

## 2023-04-24 NOTE — TELEPHONE ENCOUNTER
Care Transitions Initial Follow Up Call    Outreach made within 2 business days of discharge: Yes    Patient: Maxine Epley Patient : 1955   MRN: [id number]  Reason for Admission: There are no discharge diagnoses documented for the most recent discharge. Discharge Date: 23       Spoke with: PATIENT      Discharge department/facility: [facility]     TCM Interactive Patient Contact:  Was patient able to fill all prescriptions: Yes  Was patient instructed to bring all medications to the follow-up visit: Yes  Is patient taking all medications as directed in the discharge summary?  Yes  Does patient understand their discharge instructions: Yes  Does patient have questions or concerns that need addressed prior to 7-14 day follow up office visit: no    Scheduled appointment with PCP within 7-14 days    Follow Up  Future Appointments   Date Time Provider Hola Carrion   2023  8:00 AM STR CT IMAGING RM1  [facility] OUT EXP STR [facility]   2023 [facility] [place]   [facility]23  8:00 AM Nelly Benito DO [facility] [place] [address] [address]   10/19/2023  3:45 PM Neptali Burnette MD N SRPX [facility] Krysten Leyva LPN

## 2023-04-24 NOTE — TELEPHONE ENCOUNTER
Pt states meds were ordered in the hospital to start now but they sent them to the mail order  She would like them sent to local pharm for now  [address]

## 2023-04-24 NOTE — TELEPHONE ENCOUNTER
Diagnosis Orders   1. PUD (peptic ulcer disease)  pantoprazole (PROTONIX) 40 MG tablet    ferrous sulfate (IRON 325) 325 (65 Fe) MG tablet      2. Upper GI bleed  pantoprazole (PROTONIX) 40 MG tablet    ferrous sulfate (IRON 325) 325 (65 Fe) MG tablet      3.  Iron deficiency anemia, unspecified iron deficiency anemia type  pantoprazole (PROTONIX) 40 MG tablet    ferrous sulfate (IRON 325) 325 (65 Fe) MG tablet

## 2023-04-28 LAB
EKG ATRIAL RATE: 89 BPM
EKG P AXIS: 51 DEGREES
EKG P-R INTERVAL: 196 MS
EKG Q-T INTERVAL: 362 MS
EKG QRS DURATION: 74 MS
EKG QTC CALCULATION (BAZETT): 440 MS
EKG R AXIS: 7 DEGREES
EKG T AXIS: 142 DEGREES
EKG VENTRICULAR RATE: 89 BPM

## 2023-05-01 NOTE — PROGRESS NOTES
Chief Complaint   Patient presents with    Follow-Up from [facility], GI bleed     History obtained from the patient. SUBJECTIVE:  Genevieve Dwyer is a 75 y.o. female that presents today for    -Patient admitted to [facility] from 4/20 to 4/23 for issues noted below. D/c summary: \"Discharge Assessment and Plan:     Erosive esophagitis, with epigastric pain: Reported 1 week history of melena, along with 2 to 3 days of epigastric pain. FOBT positive on 4/20. CT A/P shows colonic mesenteric adenitis and presence of liquid stool in the colon. EGD showing erosive esophagitis, no active bleeding. Continue Protonix twice daily on discharge. Follow-up with GI in 1 month. Acute normocytic anemia, due to GI bleed from above and BRENT: Iron 21. Hemoglobin on arrival was 10.6, was 13.0 on 1/31/2023. Hemoglobin was monitored every 8 hours. Hemoglobin remained stable, 9.4 this morning. Stool color is now green/brown, evidence of active bleeding. Will discharge on ferrous sulfate 3 and 25 mg twice daily with meals. Outpatient CBC scheduled for 4/26. Follow-up with PCP. Viral gastroenteritis, improved: Patient reports diarrhea over the last week initially reporting black/tarry stools which have now become green in color. CT abdomen pelvis showed colonic mesenteric adenitis and present of liquid stool in the colon. Likely the result of viral infection. GI panel showing rotavirus. Diarrhea has decreased in frequency. Encourage oral hydration. Electrolyte disturbances secondary to diarrhea, resolved: Magnesium 1.2, potassium 3.4, ionized calcium 1.08 on arrival.  Electrolyte replacement protocol was ordered. Potassium 4.4, ionized calcium 1.19, magnesium 1.7. 1 gram of magnesium prior to discharge. Follow up with PCP. Paroxysmal atrial fibrillation: EKG on arrival showing sinus rhythm. Eliquis initially held due to concerns for upper GI bleed. No active bleeding on EGD and hemoglobin has been stable.   Okay

## 2023-05-02 ENCOUNTER — HOSPITAL ENCOUNTER (OUTPATIENT)
Dept: CT IMAGING | Age: 68
Discharge: HOME | End: 2023-05-02
Payer: MEDICARE

## 2023-05-02 ENCOUNTER — OFFICE VISIT (OUTPATIENT)
Dept: FAMILY MEDICINE CLINIC | Age: 68
End: 2023-05-02

## 2023-05-02 VITALS
BODY MASS INDEX: 32.78 KG/M2 | HEART RATE: 74 BPM | RESPIRATION RATE: 16 BRPM | TEMPERATURE: 97.9 F | SYSTOLIC BLOOD PRESSURE: 118 MMHG | WEIGHT: 185 LBS | HEIGHT: 63 IN | DIASTOLIC BLOOD PRESSURE: 60 MMHG | OXYGEN SATURATION: 98 %

## 2023-05-02 DIAGNOSIS — I48.0 PAROXYSMAL ATRIAL FIBRILLATION (HCC): Chronic | ICD-10-CM

## 2023-05-02 DIAGNOSIS — A08.0 VIRAL GASTROENTERITIS DUE TO ROTAVIRUSES: ICD-10-CM

## 2023-05-02 DIAGNOSIS — D62 ACUTE BLOOD LOSS ANEMIA: ICD-10-CM

## 2023-05-02 DIAGNOSIS — I50.32 CHRONIC HEART FAILURE WITH PRESERVED EJECTION FRACTION (HCC): Chronic | ICD-10-CM

## 2023-05-02 DIAGNOSIS — Z95.2 S/P AVR (AORTIC VALVE REPLACEMENT): Chronic | ICD-10-CM

## 2023-05-02 DIAGNOSIS — I10 HYPERTENSION, ESSENTIAL: Chronic | ICD-10-CM

## 2023-05-02 DIAGNOSIS — K92.2 UPPER GI BLEED: ICD-10-CM

## 2023-05-02 DIAGNOSIS — J01.90 ACUTE RHINOSINUSITIS: ICD-10-CM

## 2023-05-02 DIAGNOSIS — R91.1 PULMONARY NODULE: ICD-10-CM

## 2023-05-02 DIAGNOSIS — E11.9 TYPE 2 DIABETES MELLITUS WITHOUT COMPLICATION, WITHOUT LONG-TERM CURRENT USE OF INSULIN (HCC): Chronic | ICD-10-CM

## 2023-05-02 DIAGNOSIS — K22.10 EROSIVE ESOPHAGITIS: Primary | ICD-10-CM

## 2023-05-02 PROCEDURE — 71250 CT THORAX DX C-: CPT

## 2023-05-02 RX ORDER — BENZONATATE 100 MG/1
CAPSULE ORAL
Qty: 60 CAPSULE | Refills: 0 | Status: SHIPPED | OUTPATIENT
Start: 2023-05-02 | End: 2023-05-12

## 2023-05-02 RX ORDER — DOXYCYCLINE HYCLATE 100 MG
100 TABLET ORAL 2 TIMES DAILY
Qty: 20 TABLET | Refills: 0 | Status: SHIPPED | OUTPATIENT
Start: 2023-05-02 | End: 2023-05-12

## 2023-05-02 NOTE — PATIENT INSTRUCTIONS
LAB INSTRUCTIONS:    Please complete labs this week    Please fast for 8 hours prior to lab collection. The clinic will call you within 1 week of collection. If you have not heard from us within that amount of time, please call us at [phone].

## 2023-05-05 ENCOUNTER — NURSE ONLY (OUTPATIENT)
Dept: LAB | Age: 68
End: 2023-05-05

## 2023-05-05 DIAGNOSIS — D62 ACUTE BLOOD LOSS ANEMIA: ICD-10-CM

## 2023-05-05 DIAGNOSIS — A08.0 VIRAL GASTROENTERITIS DUE TO ROTAVIRUSES: ICD-10-CM

## 2023-05-05 DIAGNOSIS — K92.2 UPPER GI BLEED: ICD-10-CM

## 2023-05-05 DIAGNOSIS — I48.0 PAROXYSMAL ATRIAL FIBRILLATION (HCC): Chronic | ICD-10-CM

## 2023-05-05 DIAGNOSIS — K22.10 EROSIVE ESOPHAGITIS: ICD-10-CM

## 2023-05-05 LAB
ANION GAP SERPL CALC-SCNC: 12 MEQ/L (ref 8–16)
BASOPHILS ABSOLUTE: 0 THOU/MM3 (ref 0–0.1)
BASOPHILS NFR BLD AUTO: 0.6 %
BUN SERPL-MCNC: 12 MG/DL (ref 7–22)
CALCIUM SERPL-MCNC: 9.2 MG/DL (ref 8.5–10.5)
CHLORIDE SERPL-SCNC: 107 MEQ/L (ref 98–111)
CO2 SERPL-SCNC: 23 MEQ/L (ref 23–33)
CREAT SERPL-MCNC: 0.8 MG/DL (ref 0.4–1.2)
DEPRECATED RDW RBC AUTO: 50.4 FL (ref 35–45)
EOSINOPHIL NFR BLD AUTO: 1.5 %
EOSINOPHILS ABSOLUTE: 0.1 THOU/MM3 (ref 0–0.4)
ERYTHROCYTE [DISTWIDTH] IN BLOOD BY AUTOMATED COUNT: 15.3 % (ref 11.5–14.5)
GFR SERPL CREATININE-BSD FRML MDRD: > 60 ML/MIN/1.73M2
GLUCOSE SERPL-MCNC: 108 MG/DL (ref 70–108)
HCT VFR BLD AUTO: 31.4 % (ref 37–47)
HGB BLD-MCNC: 9.5 GM/DL (ref 12–16)
IMM GRANULOCYTES # BLD AUTO: 0.04 THOU/MM3 (ref 0–0.07)
IMM GRANULOCYTES NFR BLD AUTO: 0.5 %
LYMPHOCYTES ABSOLUTE: 2.4 THOU/MM3 (ref 1–4.8)
LYMPHOCYTES NFR BLD AUTO: 28.9 %
MCH RBC QN AUTO: 27.4 PG (ref 26–33)
MCHC RBC AUTO-ENTMCNC: 30.3 GM/DL (ref 32.2–35.5)
MCV RBC AUTO: 90.5 FL (ref 81–99)
MONOCYTES ABSOLUTE: 0.6 THOU/MM3 (ref 0.4–1.3)
MONOCYTES NFR BLD AUTO: 7.8 %
NEUTROPHILS NFR BLD AUTO: 60.7 %
NRBC BLD AUTO-RTO: 0 /100 WBC
PLATELET # BLD AUTO: 259 THOU/MM3 (ref 130–400)
PMV BLD AUTO: 11.3 FL (ref 9.4–12.4)
POTASSIUM SERPL-SCNC: 4.5 MEQ/L (ref 3.5–5.2)
RBC # BLD AUTO: 3.47 MILL/MM3 (ref 4.2–5.4)
SEGMENTED NEUTROPHILS ABSOLUTE COUNT: 5 THOU/MM3 (ref 1.8–7.7)
SODIUM SERPL-SCNC: 142 MEQ/L (ref 135–145)
WBC # BLD AUTO: 8.2 THOU/MM3 (ref 4.8–10.8)

## 2023-05-07 DIAGNOSIS — D62 ACUTE BLOOD LOSS ANEMIA: Primary | ICD-10-CM

## 2023-05-07 DIAGNOSIS — D64.9 NORMOCYTIC ANEMIA: ICD-10-CM

## 2023-05-08 ENCOUNTER — TELEPHONE (OUTPATIENT)
Dept: FAMILY MEDICINE CLINIC | Age: 68
End: 2023-05-08

## 2023-05-08 NOTE — TELEPHONE ENCOUNTER
----- Message from Anne Tao, DO sent at 5/7/2023  9:12 AM EDT -----  Please let pt know that cbc is stable  Bmp is WNL  Recommend repeat cbc in 6 wks,  fasting, to monitor

## 2023-05-08 NOTE — TELEPHONE ENCOUNTER
Left message on answering machine requesting pt to call back at earliest convenience. Labs mailed to patient.

## 2023-06-05 DIAGNOSIS — Z95.2 H/O PROSTHETIC AORTIC VALVE REPLACEMENT: ICD-10-CM

## 2023-06-05 DIAGNOSIS — G47.00 INSOMNIA, UNSPECIFIED TYPE: ICD-10-CM

## 2023-06-05 DIAGNOSIS — I38 VALVULAR HEART DISEASE: ICD-10-CM

## 2023-06-06 RX ORDER — TRAZODONE HYDROCHLORIDE 50 MG/1
TABLET ORAL
Qty: 90 TABLET | Refills: 3 | Status: SHIPPED | OUTPATIENT
Start: 2023-06-06

## 2023-06-06 RX ORDER — FUROSEMIDE 20 MG/1
TABLET ORAL
Qty: 90 TABLET | Refills: 3 | Status: SHIPPED | OUTPATIENT
Start: 2023-06-06

## 2023-06-06 NOTE — TELEPHONE ENCOUNTER
Last visit- 5/2/2023  Next visit- 8/21/2023    Requested Prescriptions     Pending Prescriptions Disp Refills    traZODone (DESYREL) 50 MG tablet [Pharmacy Med Name: TRAZODONE HYDROCHLORIDE 50 MG Tablet] 90 tablet 3     Sig: TAKE 1 TABLET EVERY NIGHT    furosemide (LASIX) 20 MG tablet [Pharmacy Med Name: FUROSEMIDE 20 MG Tablet] 90 tablet 3     Sig: TAKE 1 TABLET EVERY DAY

## 2023-06-20 ENCOUNTER — OFFICE VISIT (OUTPATIENT)
Dept: FAMILY MEDICINE CLINIC | Age: 68
End: 2023-06-20
Payer: MEDICARE

## 2023-06-20 VITALS
OXYGEN SATURATION: 99 % | BODY MASS INDEX: 32.46 KG/M2 | HEIGHT: 63 IN | SYSTOLIC BLOOD PRESSURE: 122 MMHG | WEIGHT: 183.2 LBS | DIASTOLIC BLOOD PRESSURE: 50 MMHG | TEMPERATURE: 98.2 F | RESPIRATION RATE: 16 BRPM | HEART RATE: 55 BPM

## 2023-06-20 DIAGNOSIS — M54.16 LUMBAR RADICULOPATHY: ICD-10-CM

## 2023-06-20 DIAGNOSIS — J44.1 CHRONIC OBSTRUCTIVE PULMONARY DISEASE WITH ACUTE EXACERBATION (HCC): Primary | ICD-10-CM

## 2023-06-20 LAB
Lab: 0
QC PASS/FAIL: 0
SARS-COV-2 RDRP RESP QL NAA+PROBE: NEGATIVE

## 2023-06-20 PROCEDURE — 3017F COLORECTAL CA SCREEN DOC REV: CPT | Performed by: FAMILY MEDICINE

## 2023-06-20 PROCEDURE — 1090F PRES/ABSN URINE INCON ASSESS: CPT | Performed by: FAMILY MEDICINE

## 2023-06-20 PROCEDURE — G8399 PT W/DXA RESULTS DOCUMENT: HCPCS | Performed by: FAMILY MEDICINE

## 2023-06-20 PROCEDURE — 1123F ACP DISCUSS/DSCN MKR DOCD: CPT | Performed by: FAMILY MEDICINE

## 2023-06-20 PROCEDURE — 3078F DIAST BP <80 MM HG: CPT | Performed by: FAMILY MEDICINE

## 2023-06-20 PROCEDURE — G8417 CALC BMI ABV UP PARAM F/U: HCPCS | Performed by: FAMILY MEDICINE

## 2023-06-20 PROCEDURE — G8427 DOCREV CUR MEDS BY ELIG CLIN: HCPCS | Performed by: FAMILY MEDICINE

## 2023-06-20 PROCEDURE — 99214 OFFICE O/P EST MOD 30 MIN: CPT | Performed by: FAMILY MEDICINE

## 2023-06-20 PROCEDURE — 1036F TOBACCO NON-USER: CPT | Performed by: FAMILY MEDICINE

## 2023-06-20 PROCEDURE — 3074F SYST BP LT 130 MM HG: CPT | Performed by: FAMILY MEDICINE

## 2023-06-20 PROCEDURE — 87635 SARS-COV-2 COVID-19 AMP PRB: CPT | Performed by: FAMILY MEDICINE

## 2023-06-20 PROCEDURE — 3023F SPIROM DOC REV: CPT | Performed by: FAMILY MEDICINE

## 2023-06-20 RX ORDER — ALBUTEROL SULFATE 90 UG/1
2 AEROSOL, METERED RESPIRATORY (INHALATION) EVERY 4 HOURS PRN
Qty: 1 EACH | Refills: 0 | Status: SHIPPED | OUTPATIENT
Start: 2023-06-20

## 2023-06-20 RX ORDER — AMOXICILLIN AND CLAVULANATE POTASSIUM 875; 125 MG/1; MG/1
1 TABLET, FILM COATED ORAL 2 TIMES DAILY
Qty: 20 TABLET | Refills: 0 | Status: SHIPPED | OUTPATIENT
Start: 2023-06-20 | End: 2023-06-30

## 2023-06-20 RX ORDER — PREDNISONE 20 MG/1
40 TABLET ORAL DAILY
Qty: 10 TABLET | Refills: 0 | Status: SHIPPED | OUTPATIENT
Start: 2023-06-20 | End: 2023-06-25

## 2023-06-20 NOTE — PROGRESS NOTES
Chief Complaint   Patient presents with    Cough    Wheezing    Shortness of Breath    Leg Pain     History obtained from the patient. SUBJECTIVE:  Deb Jerome is a 75 y.o. female that presents today for    -URI type sxs:   Going on the last few days  Cough  Wheezing  Mild SOB  No fevers  Needing albuterol more often  No CP, orthopnea or PND    Fever - No  Runny nose or congestion -  Yes   Cough -  Yes  Sore throat -  Yes  Headache, fatigue, joint pains, muscle aches -  No  Double Sickening - Yes  Shortness of breath/Wheezing? -  as above  Nausea/Vomiting/Diarrhea? No  Sick contacts - Yes  Maxillary Tooth Pain -  Yes  Treatment tried and response - otc meds, no better      -Low back/leg pain:  Last wk, when sits down at night, gets aching pain from back down both legs to the knees  During the day no issues  No fall  No injury  No bowel/bladder issues  No sxs right now. Age/Gender Health Maintenance    Lipid -   Lab Results   Component Value Date    CHOL 187 01/31/2023    CHOL 182 12/27/2021    CHOL 181 12/02/2020     Lab Results   Component Value Date    TRIG 176 01/31/2023    TRIG 222 (H) 12/27/2021    TRIG 178 12/02/2020     Lab Results   Component Value Date    HDL 49 01/31/2023    HDL 62 12/27/2021    HDL 43 12/02/2020     Lab Results   Component Value Date    LDLCALC 103 01/31/2023    LDLCALC 76 12/27/2021    LDLCALC 102 12/02/2020       Colon Cancer Screening - Completed NOV 2020, 1 polyp per pt, repeat 3 years per GI  Lung Cancer Screening - NEG CT Chest MAY 2023, repeat 1 year.      Tetanus - get at pharmacy per medicare rules  Influenza Vaccine - UTD FALL 2022  Pneumonia Vaccine - UTD PPV 23 in OCT 2017 and PCV 20 in FEB 2023  Zoster - to get at pharmacy per medicare rules     Breast Cancer Screening - NEG AUG 2021  Cervical Cancer Screening - NEG FEB 2020  Osteoporosis Screening - Normal AUG 2021      Diabetes Health Maintenance    A1C -   Lab Results   Component Value Date/Time    LABA1C

## 2023-06-23 ENCOUNTER — HOSPITAL ENCOUNTER (OUTPATIENT)
Age: 68
Discharge: HOME | End: 2023-06-23
Payer: MEDICARE

## 2023-06-23 DIAGNOSIS — D62 ACUTE BLOOD LOSS ANEMIA: ICD-10-CM

## 2023-06-23 DIAGNOSIS — I48.0 PAROXYSMAL A-FIB (HCC): ICD-10-CM

## 2023-06-23 DIAGNOSIS — D50.0 BLOOD LOSS ANEMIA: ICD-10-CM

## 2023-06-23 DIAGNOSIS — D64.9 NORMOCYTIC ANEMIA: ICD-10-CM

## 2023-06-23 LAB
BASOPHILS ABSOLUTE: 0 THOU/MM3 (ref 0–0.1)
BASOPHILS NFR BLD AUTO: 0.3 %
DEPRECATED RDW RBC AUTO: 50.3 FL (ref 35–45)
EOSINOPHIL NFR BLD AUTO: 2.8 %
EOSINOPHILS ABSOLUTE: 0.3 THOU/MM3 (ref 0–0.4)
ERYTHROCYTE [DISTWIDTH] IN BLOOD BY AUTOMATED COUNT: 14.9 % (ref 11.5–14.5)
FERRITIN SERPL IA-MCNC: 57 NG/ML (ref 10–291)
FOLATE SERPL-MCNC: 7.1 NG/ML (ref 4.8–24.2)
HCT VFR BLD AUTO: 35.3 % (ref 37–47)
HGB BLD-MCNC: 10.8 GM/DL (ref 12–16)
HGB RETIC QN AUTO: 29.5 PG (ref 28.2–35.7)
IMM GRANULOCYTES # BLD AUTO: 0.04 THOU/MM3 (ref 0–0.07)
IMM GRANULOCYTES NFR BLD AUTO: 0.4 %
IMM RETICS NFR: 13.1 % (ref 3–15.9)
IRON SERPL-MCNC: 45 UG/DL (ref 50–170)
LYMPHOCYTES ABSOLUTE: 3.9 THOU/MM3 (ref 1–4.8)
LYMPHOCYTES NFR BLD AUTO: 34.7 %
MCH RBC QN AUTO: 28.1 PG (ref 26–33)
MCHC RBC AUTO-ENTMCNC: 30.6 GM/DL (ref 32.2–35.5)
MCV RBC AUTO: 91.9 FL (ref 81–99)
MONOCYTES ABSOLUTE: 0.8 THOU/MM3 (ref 0.4–1.3)
MONOCYTES NFR BLD AUTO: 7.1 %
NEUTROPHILS NFR BLD AUTO: 54.7 %
NRBC BLD AUTO-RTO: 0 /100 WBC
PLATELET # BLD AUTO: 202 THOU/MM3 (ref 130–400)
PMV BLD AUTO: 11.5 FL (ref 9.4–12.4)
RBC # BLD AUTO: 3.84 MILL/MM3 (ref 4.2–5.4)
RETICS # AUTO: 71 THOU/MM3 (ref 20–115)
RETICS/RBC NFR AUTO: 1.9 % (ref 0.5–2)
SEGMENTED NEUTROPHILS ABSOLUTE COUNT: 6.1 THOU/MM3 (ref 1.8–7.7)
TIBC SERPL-MCNC: 381 UG/DL (ref 171–450)
VIT B12 SERPL-MCNC: 299 PG/ML (ref 211–911)
WBC # BLD AUTO: 11.1 THOU/MM3 (ref 4.8–10.8)

## 2023-06-23 PROCEDURE — 85046 RETICYTE/HGB CONCENTRATE: CPT

## 2023-06-23 PROCEDURE — 36415 COLL VENOUS BLD VENIPUNCTURE: CPT

## 2023-06-23 PROCEDURE — 83550 IRON BINDING TEST: CPT

## 2023-06-23 PROCEDURE — 82607 VITAMIN B-12: CPT

## 2023-06-23 PROCEDURE — 82728 ASSAY OF FERRITIN: CPT

## 2023-06-23 PROCEDURE — 85025 COMPLETE CBC W/AUTO DIFF WBC: CPT

## 2023-06-23 PROCEDURE — 83540 ASSAY OF IRON: CPT

## 2023-06-23 PROCEDURE — 82746 ASSAY OF FOLIC ACID SERUM: CPT

## 2023-06-23 RX ORDER — APIXABAN 5 MG/1
TABLET, FILM COATED ORAL
Qty: 180 TABLET | Refills: 1 | Status: SHIPPED | OUTPATIENT
Start: 2023-06-23

## 2023-06-25 DIAGNOSIS — E53.8 B12 DEFICIENCY: ICD-10-CM

## 2023-06-25 DIAGNOSIS — D50.9 IRON DEFICIENCY ANEMIA, UNSPECIFIED IRON DEFICIENCY ANEMIA TYPE: Primary | ICD-10-CM

## 2023-06-25 RX ORDER — PERPHENAZINE/AMITRIPTYLINE HCL 2 MG-25 MG
1 TABLET ORAL DAILY
Qty: 90 TABLET | Refills: 1 | Status: SHIPPED | OUTPATIENT
Start: 2023-06-25

## 2023-06-26 ENCOUNTER — TELEPHONE (OUTPATIENT)
Dept: FAMILY MEDICINE CLINIC | Age: 68
End: 2023-06-26

## 2023-07-17 DIAGNOSIS — K27.9 PUD (PEPTIC ULCER DISEASE): ICD-10-CM

## 2023-07-17 DIAGNOSIS — D50.9 IRON DEFICIENCY ANEMIA, UNSPECIFIED IRON DEFICIENCY ANEMIA TYPE: ICD-10-CM

## 2023-07-17 DIAGNOSIS — K92.2 UPPER GI BLEED: ICD-10-CM

## 2023-07-17 RX ORDER — FERROUS SULFATE 325(65) MG
325 TABLET ORAL 2 TIMES DAILY WITH MEALS
Qty: 60 TABLET | Refills: 2 | Status: SHIPPED | OUTPATIENT
Start: 2023-07-17

## 2023-07-17 NOTE — TELEPHONE ENCOUNTER
Recent Visits  Date Type Provider Dept   06/20/23 Office Visit Javon Callahan, DO [facility]   05/02/23 Office Visit Javon Callahan, DO [facility]   04/20/23 Office Visit Javon Callahan, DO [facility]   02/21/23 Office Visit Javon Callahan, DO [facility]   01/31/23 Office Visit Javon Callahan, DO [facility]   09/21/22 Office Visit Javon Callahan, DO [facility]   08/11/22 Office Visit Javon Callahan, DO [facility]   Showing recent visits within past 540 days with a meds authorizing provider and meeting all other requirements  Future Appointments  Date Type Provider Dept   08/21/23 Appointment Javon Callahan,  [facility]   Showing future appointments within next 150 days with a meds authorizing provider and meeting all other requirements

## 2023-08-04 ENCOUNTER — TELEPHONE (OUTPATIENT)
Dept: FAMILY MEDICINE CLINIC | Age: 68
End: 2023-08-04

## 2023-08-04 DIAGNOSIS — M54.16 LUMBAR RADICULOPATHY: Primary | ICD-10-CM

## 2023-08-04 NOTE — TELEPHONE ENCOUNTER
Rx up front for pt to  unless wants mailed to her     Diagnosis Orders   1.  Lumbar radiculopathy  Handicap Placard MIS

## 2023-08-08 ENCOUNTER — TELEPHONE (OUTPATIENT)
Dept: CARDIOLOGY CLINIC | Age: 68
End: 2023-08-08

## 2023-08-08 NOTE — TELEPHONE ENCOUNTER
Form out for Signature.
Gregorio.
Pre op Risk Assessment    Procedure Colonoscopy  Physician Dr. Fiorella Victoria  Date of surgery/procedure 9-13-23    Last OV 4-6-23  Last Stress 10-5-20  Last Echo 2-13-23  Last Cath 3-24-18  Last Stent None in Epic; hx AVR and MVR  Is patient on blood thinners Eliquis  Hold Meds/how many days 2 days    Fax: [phone]
Statement Selected

## 2023-08-09 ENCOUNTER — TELEPHONE (OUTPATIENT)
Dept: FAMILY MEDICINE CLINIC | Age: 68
End: 2023-08-09

## 2023-08-09 DIAGNOSIS — J44.1 CHRONIC OBSTRUCTIVE PULMONARY DISEASE WITH ACUTE EXACERBATION (HCC): Primary | ICD-10-CM

## 2023-08-09 RX ORDER — CEFDINIR 300 MG/1
300 CAPSULE ORAL 2 TIMES DAILY
Qty: 20 CAPSULE | Refills: 0 | Status: SHIPPED | OUTPATIENT
Start: 2023-08-09 | End: 2023-08-11

## 2023-08-09 RX ORDER — PREDNISONE 20 MG/1
40 TABLET ORAL DAILY
Qty: 10 TABLET | Refills: 0 | Status: SHIPPED | OUTPATIENT
Start: 2023-08-09 | End: 2023-08-14

## 2023-08-09 NOTE — TELEPHONE ENCOUNTER
Pt called with symptoms of body aches, cough with light yellow phlegm, and SOB (using inhaler q4hrs). Symptoms started 2 days ago with pt taking musinex and tylenol, which have helped very little. Pt was offered an appointment but stated it's hard for her to get around and also her  just had knee surgery.      [facility]      Please advise

## 2023-08-09 NOTE — TELEPHONE ENCOUNTER
Ok  Con't albuterol inhaler  Add cefdinir/pred  If not improving, needs seen  If worsens, needs seen  If severe, ER  Let me know if questions, thanks! Diagnosis Orders   1.  Chronic obstructive pulmonary disease with acute exacerbation (HCC)  cefdinir (OMNICEF) 300 MG capsule    predniSONE (DELTASONE) 20 MG tablet

## 2023-08-11 ENCOUNTER — HOSPITAL ENCOUNTER (OUTPATIENT)
Dept: GENERAL RADIOLOGY | Age: 68
Discharge: HOME | End: 2023-08-11
Payer: MEDICARE

## 2023-08-11 ENCOUNTER — HOSPITAL ENCOUNTER (OUTPATIENT)
Age: 68
Discharge: HOME | End: 2023-08-11
Payer: MEDICARE

## 2023-08-11 ENCOUNTER — NURSE ONLY (OUTPATIENT)
Dept: LAB | Age: 68
End: 2023-08-11

## 2023-08-11 ENCOUNTER — OFFICE VISIT (OUTPATIENT)
Dept: CARDIOLOGY CLINIC | Age: 68
End: 2023-08-11

## 2023-08-11 VITALS
HEIGHT: 63 IN | HEART RATE: 56 BPM | SYSTOLIC BLOOD PRESSURE: 117 MMHG | BODY MASS INDEX: 32.67 KG/M2 | DIASTOLIC BLOOD PRESSURE: 36 MMHG | WEIGHT: 184.4 LBS

## 2023-08-11 DIAGNOSIS — Z95.2 S/P AVR (AORTIC VALVE REPLACEMENT): Primary | ICD-10-CM

## 2023-08-11 DIAGNOSIS — D50.9 IRON DEFICIENCY ANEMIA, UNSPECIFIED IRON DEFICIENCY ANEMIA TYPE: ICD-10-CM

## 2023-08-11 DIAGNOSIS — Z95.2 S/P MVR (MITRAL VALVE REPLACEMENT): ICD-10-CM

## 2023-08-11 DIAGNOSIS — E53.8 B12 DEFICIENCY: ICD-10-CM

## 2023-08-11 DIAGNOSIS — R06.09 DYSPNEA ON EXERTION: ICD-10-CM

## 2023-08-11 DIAGNOSIS — Z95.2 S/P AVR (AORTIC VALVE REPLACEMENT): ICD-10-CM

## 2023-08-11 LAB
BASOPHILS ABSOLUTE: 0.1 THOU/MM3 (ref 0–0.1)
BASOPHILS NFR BLD AUTO: 0.4 %
DEPRECATED RDW RBC AUTO: 53.9 FL (ref 35–45)
EOSINOPHIL NFR BLD AUTO: 0.6 %
EOSINOPHILS ABSOLUTE: 0.1 THOU/MM3 (ref 0–0.4)
ERYTHROCYTE [DISTWIDTH] IN BLOOD BY AUTOMATED COUNT: 15.7 % (ref 11.5–14.5)
FERRITIN SERPL IA-MCNC: 59 NG/ML (ref 10–291)
HCT VFR BLD AUTO: 28.4 % (ref 37–47)
HGB BLD-MCNC: 8.8 GM/DL (ref 12–16)
IMM GRANULOCYTES # BLD AUTO: 0.06 THOU/MM3 (ref 0–0.07)
IMM GRANULOCYTES NFR BLD AUTO: 0.5 %
IRON SERPL-MCNC: 33 UG/DL (ref 50–170)
LYMPHOCYTES ABSOLUTE: 3.7 THOU/MM3 (ref 1–4.8)
LYMPHOCYTES NFR BLD AUTO: 27.9 %
MCH RBC QN AUTO: 29.1 PG (ref 26–33)
MCHC RBC AUTO-ENTMCNC: 31 GM/DL (ref 32.2–35.5)
MCV RBC AUTO: 94 FL (ref 81–99)
MONOCYTES ABSOLUTE: 1 THOU/MM3 (ref 0.4–1.3)
MONOCYTES NFR BLD AUTO: 7.8 %
NEUTROPHILS NFR BLD AUTO: 62.8 %
NRBC BLD AUTO-RTO: 0 /100 WBC
PLATELET # BLD AUTO: 192 THOU/MM3 (ref 130–400)
PMV BLD AUTO: 11.4 FL (ref 9.4–12.4)
RBC # BLD AUTO: 3.02 MILL/MM3 (ref 4.2–5.4)
SEGMENTED NEUTROPHILS ABSOLUTE COUNT: 8.4 THOU/MM3 (ref 1.8–7.7)
TIBC SERPL-MCNC: 336 UG/DL (ref 171–450)
VIT B12 SERPL-MCNC: 489 PG/ML (ref 211–911)
WBC # BLD AUTO: 13.3 THOU/MM3 (ref 4.8–10.8)

## 2023-08-11 PROCEDURE — 71046 X-RAY EXAM CHEST 2 VIEWS: CPT

## 2023-08-14 ENCOUNTER — TELEPHONE (OUTPATIENT)
Dept: FAMILY MEDICINE CLINIC | Age: 68
End: 2023-08-14

## 2023-08-14 DIAGNOSIS — K92.2 UPPER GI BLEED: ICD-10-CM

## 2023-08-14 DIAGNOSIS — D50.9 IRON DEFICIENCY ANEMIA, UNSPECIFIED IRON DEFICIENCY ANEMIA TYPE: ICD-10-CM

## 2023-08-14 DIAGNOSIS — K27.9 PUD (PEPTIC ULCER DISEASE): Primary | ICD-10-CM

## 2023-08-14 NOTE — TELEPHONE ENCOUNTER
Edwin Jaquez  She may be a candidate for iron infusions. We can see if GI can coordinate and or we can get her setup with hematology. Let me know what she would like to do, thanks!

## 2023-08-14 NOTE — TELEPHONE ENCOUNTER
----- Message from Moose Hall, DO sent at 8/14/2023  6:45 AM EDT -----  Please let pt know that anemia present yet, a bit lower than it was prior. Iron is low yet  Still taking iron twice daily? Any bleeding, blood in stool or black stools? Still taking pantoprazole twice daily? When was the last time she saw GI? When does she see them again? Let me know. Thanks!

## 2023-08-14 NOTE — TELEPHONE ENCOUNTER
Consult placed  Would like her to get a repeat cbc Thursday  Non-fasting ok  Let me know if questions, thanks! Diagnosis Orders   1. PUD (peptic ulcer disease)  CBC with Auto Differential      2. Upper GI bleed  CBC with Auto Differential      3.  Iron deficiency anemia, unspecified iron deficiency anemia type  Saqib Vaca MD, Hematology & Oncology, [facility]    CBC with Auto Differential

## 2023-08-14 NOTE — TELEPHONE ENCOUNTER
Pt would like to be referred to hematology and she is informed that they will be calling her to set up appointment

## 2023-08-14 NOTE — TELEPHONE ENCOUNTER
I called and spoke to [facility] and she verbalized understanding and states that she is still taking the iron 2x daily and the pantoprazole 2x daily . Pt states that she is having occasional bleeding in her stools but states that she recently seen Dr.Sheikh NAYLOR and she has a colonoscopy scheduled on Sept 13,23 with Dr. Niki Yao. Pt had no questions at this time.

## 2023-08-17 ENCOUNTER — NURSE ONLY (OUTPATIENT)
Dept: LAB | Age: 68
End: 2023-08-17

## 2023-08-17 DIAGNOSIS — K92.2 UPPER GI BLEED: ICD-10-CM

## 2023-08-17 DIAGNOSIS — K27.9 PUD (PEPTIC ULCER DISEASE): Primary | ICD-10-CM

## 2023-08-17 DIAGNOSIS — K27.9 PUD (PEPTIC ULCER DISEASE): ICD-10-CM

## 2023-08-17 DIAGNOSIS — D50.9 IRON DEFICIENCY ANEMIA, UNSPECIFIED IRON DEFICIENCY ANEMIA TYPE: ICD-10-CM

## 2023-08-17 LAB
BASOPHILS ABSOLUTE: 0 THOU/MM3 (ref 0–0.1)
BASOPHILS NFR BLD AUTO: 0.3 %
DEPRECATED RDW RBC AUTO: 51.7 FL (ref 35–45)
EOSINOPHIL NFR BLD AUTO: 1.6 %
EOSINOPHILS ABSOLUTE: 0.1 THOU/MM3 (ref 0–0.4)
ERYTHROCYTE [DISTWIDTH] IN BLOOD BY AUTOMATED COUNT: 15 % (ref 11.5–14.5)
HCT VFR BLD AUTO: 30.2 % (ref 37–47)
HGB BLD-MCNC: 9.1 GM/DL (ref 12–16)
IMM GRANULOCYTES # BLD AUTO: 0.07 THOU/MM3 (ref 0–0.07)
IMM GRANULOCYTES NFR BLD AUTO: 0.8 %
LYMPHOCYTES ABSOLUTE: 2.5 THOU/MM3 (ref 1–4.8)
LYMPHOCYTES NFR BLD AUTO: 27.2 %
MCH RBC QN AUTO: 28.4 PG (ref 26–33)
MCHC RBC AUTO-ENTMCNC: 30.1 GM/DL (ref 32.2–35.5)
MCV RBC AUTO: 94.4 FL (ref 81–99)
MONOCYTES ABSOLUTE: 0.8 THOU/MM3 (ref 0.4–1.3)
MONOCYTES NFR BLD AUTO: 8.3 %
NEUTROPHILS NFR BLD AUTO: 61.8 %
NRBC BLD AUTO-RTO: 0 /100 WBC
PLATELET # BLD AUTO: 189 THOU/MM3 (ref 130–400)
PMV BLD AUTO: 11.5 FL (ref 9.4–12.4)
RBC # BLD AUTO: 3.2 MILL/MM3 (ref 4.2–5.4)
SEGMENTED NEUTROPHILS ABSOLUTE COUNT: 5.7 THOU/MM3 (ref 1.8–7.7)
WBC # BLD AUTO: 9.2 THOU/MM3 (ref 4.8–10.8)

## 2023-08-17 RX ORDER — LISINOPRIL 5 MG/1
TABLET ORAL
Qty: 180 TABLET | Refills: 0 | Status: SHIPPED | OUTPATIENT
Start: 2023-08-17

## 2023-08-18 ENCOUNTER — HOSPITAL ENCOUNTER (OUTPATIENT)
Dept: NON INVASIVE DIAGNOSTICS | Age: 68
Discharge: HOME | End: 2023-08-18
Attending: NUCLEAR MEDICINE
Payer: MEDICARE

## 2023-08-18 ENCOUNTER — TELEPHONE (OUTPATIENT)
Dept: FAMILY MEDICINE CLINIC | Age: 68
End: 2023-08-18

## 2023-08-18 DIAGNOSIS — Z95.2 S/P AVR (AORTIC VALVE REPLACEMENT): ICD-10-CM

## 2023-08-18 DIAGNOSIS — R06.09 DYSPNEA ON EXERTION: ICD-10-CM

## 2023-08-18 DIAGNOSIS — Z95.2 S/P MVR (MITRAL VALVE REPLACEMENT): ICD-10-CM

## 2023-08-18 PROCEDURE — 3430000000 HC RX DIAGNOSTIC RADIOPHARMACEUTICAL: Performed by: NUCLEAR MEDICINE

## 2023-08-18 PROCEDURE — 78452 HT MUSCLE IMAGE SPECT MULT: CPT | Performed by: NUCLEAR MEDICINE

## 2023-08-18 PROCEDURE — 93017 CV STRESS TEST TRACING ONLY: CPT | Performed by: NUCLEAR MEDICINE

## 2023-08-18 PROCEDURE — 96374 THER/PROPH/DIAG INJ IV PUSH: CPT | Performed by: NUCLEAR MEDICINE

## 2023-08-18 PROCEDURE — 6360000002 HC RX W HCPCS

## 2023-08-18 PROCEDURE — A9500 TC99M SESTAMIBI: HCPCS | Performed by: NUCLEAR MEDICINE

## 2023-08-18 RX ORDER — TETRAKIS(2-METHOXYISOBUTYLISOCYANIDE)COPPER(I) TETRAFLUOROBORATE 1 MG/ML
8.7 INJECTION, POWDER, LYOPHILIZED, FOR SOLUTION INTRAVENOUS
Status: COMPLETED | OUTPATIENT
Start: 2023-08-18 | End: 2023-08-18

## 2023-08-18 RX ORDER — TETRAKIS(2-METHOXYISOBUTYLISOCYANIDE)COPPER(I) TETRAFLUOROBORATE 1 MG/ML
30.8 INJECTION, POWDER, LYOPHILIZED, FOR SOLUTION INTRAVENOUS
Status: COMPLETED | OUTPATIENT
Start: 2023-08-18 | End: 2023-08-18

## 2023-08-18 RX ADMIN — Medication 30.8 MILLICURIE: at 08:10

## 2023-08-18 RX ADMIN — Medication 8.7 MILLICURIE: at 07:10

## 2023-08-18 NOTE — TELEPHONE ENCOUNTER
----- Message from Sharona Byrd, DO sent at 8/17/2023  9:08 PM EDT -----  Please let pt know that anemia a little better at 9.1  Repeat cbc again in 2 wks, non-fasting ok  Let me know if questions, thanks!

## 2023-08-20 DIAGNOSIS — G25.81 RLS (RESTLESS LEGS SYNDROME): Chronic | ICD-10-CM

## 2023-08-20 RX ORDER — DOCUSATE SODIUM 100 MG/1
100 CAPSULE, LIQUID FILLED ORAL DAILY
COMMUNITY
Start: 2023-08-03

## 2023-08-20 NOTE — PROGRESS NOTES
Updated list given to patient. Patient Active Problem List    Diagnosis Date Noted    Iron deficiency anemia 06/25/2023    B12 deficiency 06/25/2023    Upper gastrointestinal bleed 04/20/2023    Atrial fibrillation (HCC)     Fibromyalgia     DM2 (diabetes mellitus, type 2) (HCC)     COPD (chronic obstructive pulmonary disease) ([address])     Valvular heart disease 01/16/2019    RLS (restless legs syndrome) 01/16/2019    Dyslipidemia     Former smoker     GERD (gastroesophageal reflux disease)     Heart failure with preserved ejection fraction (HCC)     Hypertension, essential     Major depression     THAYER (nonalcoholic steatohepatitis)     History of aortic stenosis, s/p valve replacement x 2     History of iron deficiency anemia     S/P mitral valve repair 07/13/2018     Dr. 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 Admitted to [facility]. Extensive w/u for cause was negative.          Osteoarthritis     S/P AVR (aortic valve replacement) 01/01/2015     x 2, last replacement July 2018 d/t failure of the 1st           Past Medical History:   Diagnosis Date    Atrial fibrillation (HCC)     COPD (chronic obstructive pulmonary disease) (HCC)     DM2 (diabetes mellitus, type 2) ([facility])     Dyslipidemia     Fibromyalgia     Former smoker     GERD (gastroesophageal reflux disease)     H/O prosthetic aortic valve replacement     Early bioprosthetic aortic valve failure with severe symptomatic prosthetic aortic regurgitation is now s/p REDO AORTIC VALVE REPLACEMENT, MITRAL VALVE REPAIR, TRICUSPID VALVE REPAIR, WILBERTO performed by Marshal Delgado MD at [facility] failure with preserved ejection fraction ([address])     History of aortic stenosis, s/p valve replacement x 2     History of iron deficiency anemia     History of subarachnoid

## 2023-08-21 RX ORDER — ROPINIROLE 2 MG/1
TABLET, FILM COATED ORAL
Qty: 180 TABLET | Refills: 3 | Status: SHIPPED | OUTPATIENT
Start: 2023-08-21

## 2023-08-21 NOTE — TELEPHONE ENCOUNTER
Recent Visits  Date Type Provider Dept   06/20/23 Office Visit Bj Ferrari, DO [facility]   05/02/23 Office Visit Bj Ferrari, DO [facility]   04/20/23 Office Visit Bj Ferrari, DO [facility]   02/21/23 Office Visit Bj Ferrari, DO [facility]   01/31/23 Office Visit Bj Ferrari, DO [facility]   09/21/22 Office Visit Bj Ferrari, DO [facility]   08/11/22 Office Visit Bj Ferrari, DO [facility]   Showing recent visits within past 540 days with a meds authorizing provider and meeting all other requirements  Future Appointments  Date Type Provider Dept   08/22/23 Appointment jB Ferrari, DO [facility]   Showing future appointments within next 150 days with a meds authorizing provider and meeting all other requirements

## 2023-08-22 ENCOUNTER — NURSE ONLY (OUTPATIENT)
Dept: LAB | Age: 68
End: 2023-08-22

## 2023-08-22 ENCOUNTER — TELEPHONE (OUTPATIENT)
Dept: FAMILY MEDICINE CLINIC | Age: 68
End: 2023-08-22

## 2023-08-22 ENCOUNTER — OFFICE VISIT (OUTPATIENT)
Dept: FAMILY MEDICINE CLINIC | Age: 68
End: 2023-08-22
Payer: MEDICARE

## 2023-08-22 VITALS
WEIGHT: 184.2 LBS | TEMPERATURE: 97.3 F | RESPIRATION RATE: 16 BRPM | SYSTOLIC BLOOD PRESSURE: 128 MMHG | HEART RATE: 68 BPM | DIASTOLIC BLOOD PRESSURE: 72 MMHG | BODY MASS INDEX: 32.64 KG/M2 | OXYGEN SATURATION: 98 % | HEIGHT: 63 IN

## 2023-08-22 DIAGNOSIS — F33.42 RECURRENT MAJOR DEPRESSIVE DISORDER, IN FULL REMISSION (HCC): ICD-10-CM

## 2023-08-22 DIAGNOSIS — K92.2 UPPER GI BLEED: ICD-10-CM

## 2023-08-22 DIAGNOSIS — D62 ACUTE BLOOD LOSS ANEMIA: ICD-10-CM

## 2023-08-22 DIAGNOSIS — R30.0 DYSURIA: ICD-10-CM

## 2023-08-22 DIAGNOSIS — G25.81 RLS (RESTLESS LEGS SYNDROME): ICD-10-CM

## 2023-08-22 DIAGNOSIS — I48.0 PAROXYSMAL ATRIAL FIBRILLATION (HCC): Chronic | ICD-10-CM

## 2023-08-22 DIAGNOSIS — K22.10 EROSIVE ESOPHAGITIS: Primary | ICD-10-CM

## 2023-08-22 DIAGNOSIS — M79.7 FIBROMYALGIA: ICD-10-CM

## 2023-08-22 DIAGNOSIS — K21.00 GASTROESOPHAGEAL REFLUX DISEASE WITH ESOPHAGITIS, UNSPECIFIED WHETHER HEMORRHAGE: ICD-10-CM

## 2023-08-22 DIAGNOSIS — Z95.2 S/P AVR (AORTIC VALVE REPLACEMENT): ICD-10-CM

## 2023-08-22 DIAGNOSIS — E78.5 DYSLIPIDEMIA: ICD-10-CM

## 2023-08-22 DIAGNOSIS — R06.09 DOE (DYSPNEA ON EXERTION): ICD-10-CM

## 2023-08-22 DIAGNOSIS — J44.9 CHRONIC OBSTRUCTIVE PULMONARY DISEASE, UNSPECIFIED COPD TYPE (HCC): Chronic | ICD-10-CM

## 2023-08-22 DIAGNOSIS — I10 HYPERTENSION, ESSENTIAL: ICD-10-CM

## 2023-08-22 DIAGNOSIS — N39.0 ACUTE URINARY TRACT INFECTION: Primary | ICD-10-CM

## 2023-08-22 DIAGNOSIS — E11.9 TYPE 2 DIABETES MELLITUS WITHOUT COMPLICATION, WITHOUT LONG-TERM CURRENT USE OF INSULIN (HCC): Chronic | ICD-10-CM

## 2023-08-22 DIAGNOSIS — I50.32 CHRONIC HEART FAILURE WITH PRESERVED EJECTION FRACTION (HCC): Chronic | ICD-10-CM

## 2023-08-22 LAB
BACTERIA: ABNORMAL
BILIRUB UR QL STRIP: NEGATIVE
CASTS #/AREA URNS LPF: ABNORMAL /LPF
CASTS #/AREA URNS LPF: ABNORMAL /LPF
CHARACTER UR: CLEAR
CHARCOAL URNS QL MICRO: ABNORMAL
COLOR UR: YELLOW
CRYSTALS URNS QL MICRO: ABNORMAL
EPITHELIAL CELLS, UA: ABNORMAL /HPF
GLUCOSE UR QL STRIP.AUTO: NEGATIVE MG/DL
HBA1C MFR BLD: 5.6 % (ref 4.3–5.7)
HGB UR QL STRIP.AUTO: ABNORMAL
KETONES UR QL STRIP.AUTO: NEGATIVE
LEUKOCYTE ESTERASE UR QL STRIP.AUTO: ABNORMAL
NITRITE UR QL STRIP.AUTO: POSITIVE
PH UR STRIP.AUTO: 5.5 [PH] (ref 5–9)
PROT UR STRIP.AUTO-MCNC: 30 MG/DL
RBC #/AREA URNS HPF: ABNORMAL /HPF
RENAL EPI CELLS #/AREA URNS HPF: ABNORMAL /[HPF]
SPECIFIC GRAVITY UA: 1.02 (ref 1–1.03)
UROBILINOGEN, URINE: 0.2 EU/DL (ref 0–1)
WBC #/AREA URNS HPF: ABNORMAL /HPF
YEAST LIKE FUNGI URNS QL MICRO: ABNORMAL

## 2023-08-22 PROCEDURE — G8427 DOCREV CUR MEDS BY ELIG CLIN: HCPCS | Performed by: FAMILY MEDICINE

## 2023-08-22 PROCEDURE — 3023F SPIROM DOC REV: CPT | Performed by: FAMILY MEDICINE

## 2023-08-22 PROCEDURE — 3017F COLORECTAL CA SCREEN DOC REV: CPT | Performed by: FAMILY MEDICINE

## 2023-08-22 PROCEDURE — G8417 CALC BMI ABV UP PARAM F/U: HCPCS | Performed by: FAMILY MEDICINE

## 2023-08-22 PROCEDURE — 1090F PRES/ABSN URINE INCON ASSESS: CPT | Performed by: FAMILY MEDICINE

## 2023-08-22 PROCEDURE — G8399 PT W/DXA RESULTS DOCUMENT: HCPCS | Performed by: FAMILY MEDICINE

## 2023-08-22 PROCEDURE — 99214 OFFICE O/P EST MOD 30 MIN: CPT | Performed by: FAMILY MEDICINE

## 2023-08-22 PROCEDURE — 3074F SYST BP LT 130 MM HG: CPT | Performed by: FAMILY MEDICINE

## 2023-08-22 PROCEDURE — 2022F DILAT RTA XM EVC RTNOPTHY: CPT | Performed by: FAMILY MEDICINE

## 2023-08-22 PROCEDURE — 3078F DIAST BP <80 MM HG: CPT | Performed by: FAMILY MEDICINE

## 2023-08-22 PROCEDURE — 1036F TOBACCO NON-USER: CPT | Performed by: FAMILY MEDICINE

## 2023-08-22 PROCEDURE — 1123F ACP DISCUSS/DSCN MKR DOCD: CPT | Performed by: FAMILY MEDICINE

## 2023-08-22 PROCEDURE — 3044F HG A1C LEVEL LT 7.0%: CPT | Performed by: FAMILY MEDICINE

## 2023-08-22 RX ORDER — NITROFURANTOIN 25; 75 MG/1; MG/1
100 CAPSULE ORAL 2 TIMES DAILY
Qty: 20 CAPSULE | Refills: 0 | Status: SHIPPED | OUTPATIENT
Start: 2023-08-22 | End: 2023-09-01

## 2023-08-22 NOTE — TELEPHONE ENCOUNTER
----- Message from Sharona Byrd DO sent at 8/22/2023 10:30 AM EDT -----  Please let pt know that urine is suspicious for a UTI. Recommend start Macrobid bid x 5 days  Repeat UA/culture in 2 wks  Let me know if questions, thanks!

## 2023-08-24 ENCOUNTER — TELEPHONE (OUTPATIENT)
Dept: FAMILY MEDICINE CLINIC | Age: 68
End: 2023-08-24

## 2023-08-24 DIAGNOSIS — K27.9 PUD (PEPTIC ULCER DISEASE): ICD-10-CM

## 2023-08-24 DIAGNOSIS — K92.2 UPPER GI BLEED: ICD-10-CM

## 2023-08-24 DIAGNOSIS — D50.9 IRON DEFICIENCY ANEMIA, UNSPECIFIED IRON DEFICIENCY ANEMIA TYPE: ICD-10-CM

## 2023-08-24 LAB
BACTERIA UR CULT: ABNORMAL
ORGANISM: ABNORMAL

## 2023-08-24 RX ORDER — PANTOPRAZOLE SODIUM 40 MG/1
40 TABLET, DELAYED RELEASE ORAL
Qty: 60 TABLET | Refills: 0 | Status: CANCELLED | OUTPATIENT
Start: 2023-08-24

## 2023-08-24 RX ORDER — PANTOPRAZOLE SODIUM 40 MG/1
40 TABLET, DELAYED RELEASE ORAL
Qty: 180 TABLET | Refills: 3 | Status: SHIPPED | OUTPATIENT
Start: 2023-08-24

## 2023-08-24 RX ORDER — PANTOPRAZOLE SODIUM 40 MG/1
40 TABLET, DELAYED RELEASE ORAL
Qty: 60 TABLET | Refills: 0 | Status: SHIPPED | OUTPATIENT
Start: 2023-08-24

## 2023-08-24 NOTE — TELEPHONE ENCOUNTER
Pt is needing a 30 day supply to the local pharmacy and a 90 day supply to her mail order pharmacy. Both orders are pending.

## 2023-08-24 NOTE — TELEPHONE ENCOUNTER
----- Message from Suzie Velasquez DO sent at 8/24/2023  7:47 AM EDT -----  Please let pt know final urine culture growing bacterial c/w UTI  Is senstive to the macrobid we have her on  Complete the macrobid and get repeat urine in 2 wks as discussed  F/u if no better  Let me know if questions, thanks!

## 2023-08-29 ENCOUNTER — HOSPITAL ENCOUNTER (EMERGENCY)
Age: 68
Discharge: HOME | End: 2023-08-29
Attending: EMERGENCY MEDICINE
Payer: MEDICARE

## 2023-08-29 VITALS
TEMPERATURE: 99 F | BODY MASS INDEX: 33.99 KG/M2 | SYSTOLIC BLOOD PRESSURE: 125 MMHG | DIASTOLIC BLOOD PRESSURE: 44 MMHG | HEIGHT: 61 IN | WEIGHT: 180 LBS | HEART RATE: 78 BPM | RESPIRATION RATE: 27 BRPM | OXYGEN SATURATION: 97 %

## 2023-08-29 DIAGNOSIS — D64.9 CHRONIC ANEMIA: Primary | ICD-10-CM

## 2023-08-29 DIAGNOSIS — N18.32 STAGE 3B CHRONIC KIDNEY DISEASE (HCC): ICD-10-CM

## 2023-08-29 LAB
ABO: NORMAL
ALBUMIN SERPL BCG-MCNC: 3.3 G/DL (ref 3.5–5.1)
ALP SERPL-CCNC: 43 U/L (ref 38–126)
ALT SERPL W/O P-5'-P-CCNC: 16 U/L (ref 11–66)
ANION GAP SERPL CALC-SCNC: 13 MEQ/L (ref 8–16)
ANTIBODY SCREEN: NORMAL
APTT PPP: 37 SECONDS (ref 22–38)
AST SERPL-CCNC: 20 U/L (ref 5–40)
BASOPHILS ABSOLUTE: 0 THOU/MM3 (ref 0–0.1)
BASOPHILS NFR BLD AUTO: 0.1 %
BILIRUB CONJ SERPL-MCNC: < 0.2 MG/DL (ref 0–0.3)
BILIRUB SERPL-MCNC: 0.4 MG/DL (ref 0.3–1.2)
BUN SERPL-MCNC: 15 MG/DL (ref 7–22)
CALCIUM SERPL-MCNC: 9 MG/DL (ref 8.5–10.5)
CHLORIDE SERPL-SCNC: 106 MEQ/L (ref 98–111)
CO2 SERPL-SCNC: 20 MEQ/L (ref 23–33)
CREAT SERPL-MCNC: 1.4 MG/DL (ref 0.4–1.2)
DEPRECATED RDW RBC AUTO: 47.9 FL (ref 35–45)
EOSINOPHIL NFR BLD AUTO: 5.1 %
EOSINOPHILS ABSOLUTE: 0.5 THOU/MM3 (ref 0–0.4)
ERYTHROCYTE [DISTWIDTH] IN BLOOD BY AUTOMATED COUNT: 14.1 % (ref 11.5–14.5)
FERRITIN SERPL IA-MCNC: 51 NG/ML (ref 10–291)
GFR SERPL CREATININE-BSD FRML MDRD: 41 ML/MIN/1.73M2
GLUCOSE SERPL-MCNC: 151 MG/DL (ref 70–108)
HCT VFR BLD AUTO: 29.6 % (ref 37–47)
HEMOCCULT STL QL: NEGATIVE
HGB BLD-MCNC: 8.9 GM/DL (ref 12–16)
HGB RETIC QN AUTO: 24.7 PG (ref 28.2–35.7)
IMM GRANULOCYTES # BLD AUTO: 0.05 THOU/MM3 (ref 0–0.07)
IMM GRANULOCYTES NFR BLD AUTO: 0.5 %
IMM RETICS NFR: 19 % (ref 3–15.9)
INR PPP: 1.56 (ref 0.85–1.13)
IRON SERPL-MCNC: 26 UG/DL (ref 50–170)
LIPASE SERPL-CCNC: 17.1 U/L (ref 5.6–51.3)
LYMPHOCYTES ABSOLUTE: 1.1 THOU/MM3 (ref 1–4.8)
LYMPHOCYTES NFR BLD AUTO: 11.1 %
MCH RBC QN AUTO: 28 PG (ref 26–33)
MCHC RBC AUTO-ENTMCNC: 30.1 GM/DL (ref 32.2–35.5)
MCV RBC AUTO: 93.1 FL (ref 81–99)
MONOCYTES ABSOLUTE: 0.7 THOU/MM3 (ref 0.4–1.3)
MONOCYTES NFR BLD AUTO: 7.4 %
NEUTROPHILS NFR BLD AUTO: 75.8 %
NRBC BLD AUTO-RTO: 0 /100 WBC
OSMOLALITY SERPL CALC.SUM OF ELEC: 281.3 MOSMOL/KG (ref 275–300)
PLATELET # BLD AUTO: 202 THOU/MM3 (ref 130–400)
PMV BLD AUTO: 11 FL (ref 9.4–12.4)
POTASSIUM SERPL-SCNC: 4.5 MEQ/L (ref 3.5–5.2)
PROT SERPL-MCNC: 6.3 G/DL (ref 6.1–8)
RBC # BLD AUTO: 3.18 MILL/MM3 (ref 4.2–5.4)
RETICS # AUTO: 56 THOU/MM3 (ref 20–115)
RETICS/RBC NFR AUTO: 1.8 % (ref 0.5–2)
RH FACTOR: NORMAL
SEGMENTED NEUTROPHILS ABSOLUTE COUNT: 7.7 THOU/MM3 (ref 1.8–7.7)
SODIUM SERPL-SCNC: 139 MEQ/L (ref 135–145)
TIBC SERPL-MCNC: 340 UG/DL (ref 171–450)
TROPONIN, HIGH SENSITIVITY: 26 NG/L (ref 0–12)
TROPONIN, HIGH SENSITIVITY: 27 NG/L (ref 0–12)
WBC # BLD AUTO: 10.1 THOU/MM3 (ref 4.8–10.8)

## 2023-08-29 PROCEDURE — 85730 THROMBOPLASTIN TIME PARTIAL: CPT

## 2023-08-29 PROCEDURE — 85610 PROTHROMBIN TIME: CPT

## 2023-08-29 PROCEDURE — 96361 HYDRATE IV INFUSION ADD-ON: CPT

## 2023-08-29 PROCEDURE — 82728 ASSAY OF FERRITIN: CPT

## 2023-08-29 PROCEDURE — 82272 OCCULT BLD FECES 1-3 TESTS: CPT

## 2023-08-29 PROCEDURE — 83540 ASSAY OF IRON: CPT

## 2023-08-29 PROCEDURE — 2580000003 HC RX 258: Performed by: EMERGENCY MEDICINE

## 2023-08-29 PROCEDURE — 93010 ELECTROCARDIOGRAM REPORT: CPT | Performed by: NUCLEAR MEDICINE

## 2023-08-29 PROCEDURE — 80053 COMPREHEN METABOLIC PANEL: CPT

## 2023-08-29 PROCEDURE — 93005 ELECTROCARDIOGRAM TRACING: CPT | Performed by: EMERGENCY MEDICINE

## 2023-08-29 PROCEDURE — 85025 COMPLETE CBC W/AUTO DIFF WBC: CPT

## 2023-08-29 PROCEDURE — 84484 ASSAY OF TROPONIN QUANT: CPT

## 2023-08-29 PROCEDURE — 85046 RETICYTE/HGB CONCENTRATE: CPT

## 2023-08-29 PROCEDURE — 36415 COLL VENOUS BLD VENIPUNCTURE: CPT

## 2023-08-29 PROCEDURE — 82248 BILIRUBIN DIRECT: CPT

## 2023-08-29 PROCEDURE — 99284 EMERGENCY DEPT VISIT MOD MDM: CPT

## 2023-08-29 PROCEDURE — 86901 BLOOD TYPING SEROLOGIC RH(D): CPT

## 2023-08-29 PROCEDURE — 83690 ASSAY OF LIPASE: CPT

## 2023-08-29 PROCEDURE — 83550 IRON BINDING TEST: CPT

## 2023-08-29 PROCEDURE — 96360 HYDRATION IV INFUSION INIT: CPT

## 2023-08-29 PROCEDURE — 86900 BLOOD TYPING SEROLOGIC ABO: CPT

## 2023-08-29 PROCEDURE — 86850 RBC ANTIBODY SCREEN: CPT

## 2023-08-29 RX ORDER — 0.9 % SODIUM CHLORIDE 0.9 %
500 INTRAVENOUS SOLUTION INTRAVENOUS ONCE
Status: COMPLETED | OUTPATIENT
Start: 2023-08-29 | End: 2023-08-29

## 2023-08-29 RX ADMIN — SODIUM CHLORIDE 500 ML: 9 INJECTION, SOLUTION INTRAVENOUS at 20:24

## 2023-08-29 ASSESSMENT — PAIN SCALES - GENERAL
PAINLEVEL_OUTOF10: 8
PAINLEVEL_OUTOF10: 6

## 2023-08-29 ASSESSMENT — PAIN DESCRIPTION - DESCRIPTORS: DESCRIPTORS: ACHING

## 2023-08-29 ASSESSMENT — PAIN DESCRIPTION - LOCATION: LOCATION: GENERALIZED

## 2023-08-29 ASSESSMENT — PAIN - FUNCTIONAL ASSESSMENT
PAIN_FUNCTIONAL_ASSESSMENT: 0-10
PAIN_FUNCTIONAL_ASSESSMENT: 0-10

## 2023-08-29 NOTE — ED TRIAGE NOTES
Pt presents to the ED for rectal bleeding x1 month. Pt states she has not been able to see her GI doctor. Pt complaining of generalized body aches.

## 2023-08-29 NOTE — ED NOTES
Pt resting on cot. Family at bedside.  Pt denies any needs at this time     Cristóbal Carter, RN  08/29/23 2843

## 2023-08-30 LAB
EKG ATRIAL RATE: 84 BPM
EKG P AXIS: 62 DEGREES
EKG P-R INTERVAL: 194 MS
EKG Q-T INTERVAL: 370 MS
EKG QRS DURATION: 72 MS
EKG QTC CALCULATION (BAZETT): 437 MS
EKG R AXIS: 32 DEGREES
EKG T AXIS: 99 DEGREES
EKG VENTRICULAR RATE: 84 BPM

## 2023-08-30 NOTE — ED NOTES
Pt laying in bed talking with family at bedside. Updated on plan of care. Voiced no needs. Call light in reach.      Kay Paniagua RN  08/29/23 2027

## 2023-08-30 NOTE — ED NOTES
Pt in bed talking with family at bedside. Updated on plan of care. Voiced no needs. Call light in reach.      Dav Anaya RN  08/29/23 1510

## 2023-08-31 ENCOUNTER — TELEPHONE (OUTPATIENT)
Dept: FAMILY MEDICINE CLINIC | Age: 68
End: 2023-08-31

## 2023-08-31 ENCOUNTER — PATIENT MESSAGE (OUTPATIENT)
Dept: FAMILY MEDICINE CLINIC | Age: 68
End: 2023-08-31

## 2023-08-31 DIAGNOSIS — N17.9 AKI (ACUTE KIDNEY INJURY) (HCC): Primary | ICD-10-CM

## 2023-08-31 NOTE — TELEPHONE ENCOUNTER
----- Message from Fadia Carver sent at 8/30/2023  3:00 PM EDT -----  Subject: Referral Request    Reason for referral request? re follow follow up for stage three b ckd and   anemia   Provider patient wants to be referred to(if known):     Provider Phone Number(if known):[phone]    Additional Information for Provider?   ---------------------------------------------------------------------------  --------------  [address]    [phone]; OK to leave message on voicemail  ---------------------------------------------------------------------------  --------------

## 2023-08-31 NOTE — TELEPHONE ENCOUNTER
Diagnosis Orders   1.  EMELI (acute kidney injury) [facility])  Basic Metabolic Panel

## 2023-08-31 NOTE — TELEPHONE ENCOUNTER
I responded back to patient via [employer] earlier today  Please have her read that and/or call her and go over that response  Let me know if questions, thanks!

## 2023-08-31 NOTE — TELEPHONE ENCOUNTER
From: AYANNA SILVA  To: Magda Escobar  Sent: 8/31/2023 10:10 AM EDT  Subject: referral    Leon Johnson,    I got a message stating you wanted to be referred, is it for kidney disease and anemia?       Thank you and have a great day    Aleena Potts

## 2023-09-05 ENCOUNTER — TELEPHONE (OUTPATIENT)
Dept: FAMILY MEDICINE CLINIC | Age: 68
End: 2023-09-05

## 2023-09-05 ENCOUNTER — NURSE ONLY (OUTPATIENT)
Dept: LAB | Age: 68
End: 2023-09-05

## 2023-09-05 ENCOUNTER — HOSPITAL ENCOUNTER (INPATIENT)
Age: 68
LOS: 4 days | Discharge: HOME | DRG: 393 | End: 2023-09-09
Attending: STUDENT IN AN ORGANIZED HEALTH CARE EDUCATION/TRAINING PROGRAM
Payer: MEDICARE

## 2023-09-05 DIAGNOSIS — N17.9 AKI (ACUTE KIDNEY INJURY) (HCC): ICD-10-CM

## 2023-09-05 DIAGNOSIS — D62 ACUTE BLOOD LOSS ANEMIA: ICD-10-CM

## 2023-09-05 DIAGNOSIS — K92.2 LOWER GI BLEED: Primary | ICD-10-CM

## 2023-09-05 DIAGNOSIS — K27.9 PUD (PEPTIC ULCER DISEASE): ICD-10-CM

## 2023-09-05 DIAGNOSIS — K92.2 GASTROINTESTINAL HEMORRHAGE, UNSPECIFIED GASTROINTESTINAL HEMORRHAGE TYPE: ICD-10-CM

## 2023-09-05 DIAGNOSIS — N39.0 ACUTE URINARY TRACT INFECTION: ICD-10-CM

## 2023-09-05 LAB
ABO: NORMAL
ALBUMIN SERPL BCG-MCNC: 3.9 G/DL (ref 3.5–5.1)
ALP SERPL-CCNC: 51 U/L (ref 38–126)
ALT SERPL W/O P-5'-P-CCNC: 11 U/L (ref 11–66)
ANION GAP SERPL CALC-SCNC: 10 MEQ/L (ref 8–16)
ANION GAP SERPL CALC-SCNC: 13 MEQ/L (ref 8–16)
ANTIBODY SCREEN: NORMAL
APTT PPP: 37.6 SECONDS (ref 22–38)
AST SERPL-CCNC: 17 U/L (ref 5–40)
BACTERIA: NORMAL
BASOPHILS ABSOLUTE: 0.1 THOU/MM3 (ref 0–0.1)
BASOPHILS ABSOLUTE: 0.1 THOU/MM3 (ref 0–0.1)
BASOPHILS NFR BLD AUTO: 0.6 %
BASOPHILS NFR BLD AUTO: 0.8 %
BILIRUB SERPL-MCNC: 0.2 MG/DL (ref 0.3–1.2)
BILIRUB UR QL STRIP: NEGATIVE
BUN SERPL-MCNC: 17 MG/DL (ref 7–22)
BUN SERPL-MCNC: 18 MG/DL (ref 7–22)
CALCIUM SERPL-MCNC: 9 MG/DL (ref 8.5–10.5)
CALCIUM SERPL-MCNC: 9 MG/DL (ref 8.5–10.5)
CASTS #/AREA URNS LPF: NORMAL /LPF
CASTS #/AREA URNS LPF: NORMAL /LPF
CHARACTER UR: CLEAR
CHARCOAL URNS QL MICRO: NORMAL
CHLORIDE SERPL-SCNC: 106 MEQ/L (ref 98–111)
CHLORIDE SERPL-SCNC: 108 MEQ/L (ref 98–111)
CO2 SERPL-SCNC: 21 MEQ/L (ref 23–33)
CO2 SERPL-SCNC: 24 MEQ/L (ref 23–33)
COLOR UR: YELLOW
CREAT SERPL-MCNC: 1.1 MG/DL (ref 0.4–1.2)
CREAT SERPL-MCNC: 1.2 MG/DL (ref 0.4–1.2)
CRYSTALS URNS QL MICRO: NORMAL
DEPRECATED RDW RBC AUTO: 46.5 FL (ref 35–45)
DEPRECATED RDW RBC AUTO: 47.7 FL (ref 35–45)
EOSINOPHIL NFR BLD AUTO: 6.1 %
EOSINOPHIL NFR BLD AUTO: 6.6 %
EOSINOPHILS ABSOLUTE: 0.5 THOU/MM3 (ref 0–0.4)
EOSINOPHILS ABSOLUTE: 0.6 THOU/MM3 (ref 0–0.4)
EPITHELIAL CELLS, UA: NORMAL /HPF
ERYTHROCYTE [DISTWIDTH] IN BLOOD BY AUTOMATED COUNT: 14.2 % (ref 11.5–14.5)
ERYTHROCYTE [DISTWIDTH] IN BLOOD BY AUTOMATED COUNT: 14.2 % (ref 11.5–14.5)
GFR SERPL CREATININE-BSD FRML MDRD: 49 ML/MIN/1.73M2
GFR SERPL CREATININE-BSD FRML MDRD: 54 ML/MIN/1.73M2
GLUCOSE SERPL-MCNC: 127 MG/DL (ref 70–108)
GLUCOSE SERPL-MCNC: 158 MG/DL (ref 70–108)
GLUCOSE UR QL STRIP.AUTO: NEGATIVE MG/DL
HCT VFR BLD AUTO: 23.5 % (ref 37–47)
HCT VFR BLD AUTO: 25.1 % (ref 37–47)
HGB BLD-MCNC: 7.2 GM/DL (ref 12–16)
HGB BLD-MCNC: 7.6 GM/DL (ref 12–16)
HGB UR QL STRIP.AUTO: NEGATIVE
IMM GRANULOCYTES # BLD AUTO: 0.06 THOU/MM3 (ref 0–0.07)
IMM GRANULOCYTES # BLD AUTO: 0.06 THOU/MM3 (ref 0–0.07)
IMM GRANULOCYTES NFR BLD AUTO: 0.7 %
IMM GRANULOCYTES NFR BLD AUTO: 0.7 %
INR PPP: 1.3 (ref 0.85–1.13)
KETONES UR QL STRIP.AUTO: NEGATIVE
LEUKOCYTE ESTERASE UR QL STRIP.AUTO: NEGATIVE
LYMPHOCYTES ABSOLUTE: 2.3 THOU/MM3 (ref 1–4.8)
LYMPHOCYTES ABSOLUTE: 2.8 THOU/MM3 (ref 1–4.8)
LYMPHOCYTES NFR BLD AUTO: 26 %
LYMPHOCYTES NFR BLD AUTO: 31.9 %
MCH RBC QN AUTO: 27.4 PG (ref 26–33)
MCH RBC QN AUTO: 27.5 PG (ref 26–33)
MCHC RBC AUTO-ENTMCNC: 30.3 GM/DL (ref 32.2–35.5)
MCHC RBC AUTO-ENTMCNC: 30.6 GM/DL (ref 32.2–35.5)
MCV RBC AUTO: 89.7 FL (ref 81–99)
MCV RBC AUTO: 90.6 FL (ref 81–99)
MONOCYTES ABSOLUTE: 0.6 THOU/MM3 (ref 0.4–1.3)
MONOCYTES ABSOLUTE: 0.7 THOU/MM3 (ref 0.4–1.3)
MONOCYTES NFR BLD AUTO: 7 %
MONOCYTES NFR BLD AUTO: 7.8 %
NEUTROPHILS NFR BLD AUTO: 53 %
NEUTROPHILS NFR BLD AUTO: 58.8 %
NITRITE UR QL STRIP.AUTO: NEGATIVE
NRBC BLD AUTO-RTO: 0 /100 WBC
NRBC BLD AUTO-RTO: 0 /100 WBC
OSMOLALITY SERPL CALC.SUM OF ELEC: 288.3 MOSMOL/KG (ref 275–300)
PH UR STRIP.AUTO: 5.5 [PH] (ref 5–9)
PLATELET # BLD AUTO: 241 THOU/MM3 (ref 130–400)
PLATELET # BLD AUTO: 254 THOU/MM3 (ref 130–400)
PMV BLD AUTO: 10.7 FL (ref 9.4–12.4)
PMV BLD AUTO: 11 FL (ref 9.4–12.4)
POTASSIUM SERPL-SCNC: 3.9 MEQ/L (ref 3.5–5.2)
POTASSIUM SERPL-SCNC: 4.2 MEQ/L (ref 3.5–5.2)
PROT SERPL-MCNC: 6.7 G/DL (ref 6.1–8)
PROT UR STRIP.AUTO-MCNC: NEGATIVE MG/DL
RBC # BLD AUTO: 2.62 MILL/MM3 (ref 4.2–5.4)
RBC # BLD AUTO: 2.77 MILL/MM3 (ref 4.2–5.4)
RBC #/AREA URNS HPF: NORMAL /HPF
RENAL EPI CELLS #/AREA URNS HPF: NORMAL /[HPF]
RH FACTOR: NORMAL
SEGMENTED NEUTROPHILS ABSOLUTE COUNT: 4.7 THOU/MM3 (ref 1.8–7.7)
SEGMENTED NEUTROPHILS ABSOLUTE COUNT: 5.2 THOU/MM3 (ref 1.8–7.7)
SODIUM SERPL-SCNC: 140 MEQ/L (ref 135–145)
SODIUM SERPL-SCNC: 142 MEQ/L (ref 135–145)
SPECIFIC GRAVITY UA: 1.01 (ref 1–1.03)
UROBILINOGEN, URINE: 0.2 EU/DL (ref 0–1)
WBC # BLD AUTO: 8.9 THOU/MM3 (ref 4.8–10.8)
WBC # BLD AUTO: 8.9 THOU/MM3 (ref 4.8–10.8)
WBC #/AREA URNS HPF: NORMAL /HPF
YEAST LIKE FUNGI URNS QL MICRO: NORMAL

## 2023-09-05 PROCEDURE — 99285 EMERGENCY DEPT VISIT HI MDM: CPT

## 2023-09-05 PROCEDURE — 86901 BLOOD TYPING SEROLOGIC RH(D): CPT

## 2023-09-05 PROCEDURE — 80053 COMPREHEN METABOLIC PANEL: CPT

## 2023-09-05 PROCEDURE — 85730 THROMBOPLASTIN TIME PARTIAL: CPT

## 2023-09-05 PROCEDURE — 36415 COLL VENOUS BLD VENIPUNCTURE: CPT

## 2023-09-05 PROCEDURE — 85610 PROTHROMBIN TIME: CPT

## 2023-09-05 PROCEDURE — 85025 COMPLETE CBC W/AUTO DIFF WBC: CPT

## 2023-09-05 PROCEDURE — 93005 ELECTROCARDIOGRAM TRACING: CPT | Performed by: STUDENT IN AN ORGANIZED HEALTH CARE EDUCATION/TRAINING PROGRAM

## 2023-09-05 PROCEDURE — 86900 BLOOD TYPING SEROLOGIC ABO: CPT

## 2023-09-05 PROCEDURE — 6370000000 HC RX 637 (ALT 250 FOR IP)

## 2023-09-05 PROCEDURE — 1200000000 HC SEMI PRIVATE

## 2023-09-05 PROCEDURE — 99223 1ST HOSP IP/OBS HIGH 75: CPT | Performed by: STUDENT IN AN ORGANIZED HEALTH CARE EDUCATION/TRAINING PROGRAM

## 2023-09-05 PROCEDURE — 86850 RBC ANTIBODY SCREEN: CPT

## 2023-09-05 RX ORDER — SODIUM CHLORIDE 9 MG/ML
INJECTION, SOLUTION INTRAVENOUS PRN
Status: DISCONTINUED | OUTPATIENT
Start: 2023-09-05 | End: 2023-09-09 | Stop reason: HOSPADM

## 2023-09-05 RX ORDER — FERROUS SULFATE 325(65) MG
325 TABLET ORAL 2 TIMES DAILY WITH MEALS
Status: DISCONTINUED | OUTPATIENT
Start: 2023-09-06 | End: 2023-09-09 | Stop reason: HOSPADM

## 2023-09-05 RX ORDER — SODIUM CHLORIDE 0.9 % (FLUSH) 0.9 %
5-40 SYRINGE (ML) INJECTION EVERY 12 HOURS SCHEDULED
Status: DISCONTINUED | OUTPATIENT
Start: 2023-09-05 | End: 2023-09-09 | Stop reason: HOSPADM

## 2023-09-05 RX ORDER — TRAZODONE HYDROCHLORIDE 50 MG/1
50 TABLET ORAL NIGHTLY PRN
Status: DISCONTINUED | OUTPATIENT
Start: 2023-09-05 | End: 2023-09-09 | Stop reason: HOSPADM

## 2023-09-05 RX ORDER — ATORVASTATIN CALCIUM 10 MG/1
10 TABLET, FILM COATED ORAL EVERY EVENING
Status: DISCONTINUED | OUTPATIENT
Start: 2023-09-06 | End: 2023-09-09 | Stop reason: HOSPADM

## 2023-09-05 RX ORDER — CITALOPRAM 20 MG/1
20 TABLET ORAL DAILY
Status: DISCONTINUED | OUTPATIENT
Start: 2023-09-06 | End: 2023-09-09 | Stop reason: HOSPADM

## 2023-09-05 RX ORDER — POLYETHYLENE GLYCOL 3350 17 G/17G
17 POWDER, FOR SOLUTION ORAL DAILY PRN
Status: DISCONTINUED | OUTPATIENT
Start: 2023-09-05 | End: 2023-09-09 | Stop reason: HOSPADM

## 2023-09-05 RX ORDER — ASPIRIN 81 MG/1
81 TABLET, CHEWABLE ORAL DAILY
Status: DISCONTINUED | OUTPATIENT
Start: 2023-09-06 | End: 2023-09-09 | Stop reason: HOSPADM

## 2023-09-05 RX ORDER — ACETAMINOPHEN 650 MG/1
650 SUPPOSITORY RECTAL EVERY 6 HOURS PRN
Status: DISCONTINUED | OUTPATIENT
Start: 2023-09-05 | End: 2023-09-09 | Stop reason: HOSPADM

## 2023-09-05 RX ORDER — PANTOPRAZOLE SODIUM 40 MG/1
40 TABLET, DELAYED RELEASE ORAL
Status: DISCONTINUED | OUTPATIENT
Start: 2023-09-06 | End: 2023-09-09 | Stop reason: HOSPADM

## 2023-09-05 RX ORDER — FUROSEMIDE 20 MG/1
20 TABLET ORAL DAILY
Status: DISCONTINUED | OUTPATIENT
Start: 2023-09-06 | End: 2023-09-09 | Stop reason: HOSPADM

## 2023-09-05 RX ORDER — SODIUM CHLORIDE 0.9 % (FLUSH) 0.9 %
5-40 SYRINGE (ML) INJECTION PRN
Status: DISCONTINUED | OUTPATIENT
Start: 2023-09-05 | End: 2023-09-09 | Stop reason: HOSPADM

## 2023-09-05 RX ORDER — AMITRIPTYLINE HYDROCHLORIDE 25 MG/1
25 TABLET, FILM COATED ORAL NIGHTLY
Status: DISCONTINUED | OUTPATIENT
Start: 2023-09-05 | End: 2023-09-06

## 2023-09-05 RX ORDER — POTASSIUM CHLORIDE 20 MEQ/1
20 TABLET, EXTENDED RELEASE ORAL DAILY
Status: DISCONTINUED | OUTPATIENT
Start: 2023-09-06 | End: 2023-09-09 | Stop reason: HOSPADM

## 2023-09-05 RX ORDER — ONDANSETRON 2 MG/ML
4 INJECTION INTRAMUSCULAR; INTRAVENOUS EVERY 6 HOURS PRN
Status: DISCONTINUED | OUTPATIENT
Start: 2023-09-05 | End: 2023-09-09 | Stop reason: HOSPADM

## 2023-09-05 RX ORDER — ROPINIROLE 1 MG/1
2 TABLET, FILM COATED ORAL 2 TIMES DAILY
Status: DISCONTINUED | OUTPATIENT
Start: 2023-09-06 | End: 2023-09-09 | Stop reason: HOSPADM

## 2023-09-05 RX ORDER — ACETAMINOPHEN 325 MG/1
650 TABLET ORAL EVERY 6 HOURS PRN
Status: DISCONTINUED | OUTPATIENT
Start: 2023-09-05 | End: 2023-09-09 | Stop reason: HOSPADM

## 2023-09-05 RX ORDER — LISINOPRIL 5 MG/1
5 TABLET ORAL 2 TIMES DAILY
Status: DISCONTINUED | OUTPATIENT
Start: 2023-09-06 | End: 2023-09-09 | Stop reason: HOSPADM

## 2023-09-05 RX ORDER — ALBUTEROL SULFATE 90 UG/1
2 AEROSOL, METERED RESPIRATORY (INHALATION) EVERY 4 HOURS PRN
Status: DISCONTINUED | OUTPATIENT
Start: 2023-09-05 | End: 2023-09-09 | Stop reason: HOSPADM

## 2023-09-05 RX ORDER — ONDANSETRON 4 MG/1
4 TABLET, ORALLY DISINTEGRATING ORAL EVERY 8 HOURS PRN
Status: DISCONTINUED | OUTPATIENT
Start: 2023-09-05 | End: 2023-09-09 | Stop reason: HOSPADM

## 2023-09-05 RX ADMIN — ACETAMINOPHEN 650 MG: 325 TABLET ORAL at 23:28

## 2023-09-05 ASSESSMENT — PAIN - FUNCTIONAL ASSESSMENT
PAIN_FUNCTIONAL_ASSESSMENT: NONE - DENIES PAIN

## 2023-09-05 ASSESSMENT — PAIN SCALES - GENERAL: PAINLEVEL_OUTOF10: 7

## 2023-09-05 NOTE — ED NOTES
Pt updated on plan for admission. No needs expressed at this time.  [address]  09/05/23 2204

## 2023-09-05 NOTE — ED TRIAGE NOTES
Pt presents to the ER with c/o an abnormal lab. Pt had blood work done this morning for f/u ER visit which showed a decrease in her hgb. Pt reports bloody stools for the last 2-3 months. Pt has a colonoscopy scheduled for next week. Pt denies pain. Pt is on eliquis. Pt is alert and oriented, respirations even and unlabored.  VSS

## 2023-09-05 NOTE — TELEPHONE ENCOUNTER
Patient informed and verbalized understanding. States having a lot of blood in her stool, not vomiting any blood, and stool is black/tarry. States has messages left with Dr Nancy Rodriges office.  Colonoscopy scheduled for next week

## 2023-09-05 NOTE — ED NOTES
Pt resting in bed, respirations even and unlabored. No needs expressed at this time. Call light within reach.  [place], [place]  09/05/23 1312

## 2023-09-05 NOTE — TELEPHONE ENCOUNTER
----- Message from Becka Hodge, DO sent at 9/5/2023 12:31 PM EDT -----  Please let pt know that kidney fxn returned to normal.   UA looks improved, culture pending though. Anemia is worse from the 29th, is she having blood in stool, vomiting blood or black/tarry stools? Let me know, thanks!

## 2023-09-06 ENCOUNTER — TELEPHONE (OUTPATIENT)
Dept: FAMILY MEDICINE CLINIC | Age: 68
End: 2023-09-06

## 2023-09-06 LAB
ANION GAP SERPL CALC-SCNC: 13 MEQ/L (ref 8–16)
BACTERIA UR CULT: ABNORMAL
BASOPHILS ABSOLUTE: 0.1 THOU/MM3 (ref 0–0.1)
BASOPHILS NFR BLD AUTO: 0.8 %
BUN SERPL-MCNC: 18 MG/DL (ref 7–22)
CALCIUM SERPL-MCNC: 9 MG/DL (ref 8.5–10.5)
CHLORIDE SERPL-SCNC: 106 MEQ/L (ref 98–111)
CO2 SERPL-SCNC: 23 MEQ/L (ref 23–33)
CREAT SERPL-MCNC: 1.2 MG/DL (ref 0.4–1.2)
DEPRECATED RDW RBC AUTO: 47.1 FL (ref 35–45)
EKG ATRIAL RATE: 76 BPM
EKG P AXIS: -3 DEGREES
EKG P-R INTERVAL: 176 MS
EKG Q-T INTERVAL: 418 MS
EKG QRS DURATION: 78 MS
EKG QTC CALCULATION (BAZETT): 470 MS
EKG R AXIS: 17 DEGREES
EKG T AXIS: 112 DEGREES
EKG VENTRICULAR RATE: 76 BPM
EOSINOPHIL NFR BLD AUTO: 6.5 %
EOSINOPHILS ABSOLUTE: 0.6 THOU/MM3 (ref 0–0.4)
ERYTHROCYTE [DISTWIDTH] IN BLOOD BY AUTOMATED COUNT: 14.3 % (ref 11.5–14.5)
GFR SERPL CREATININE-BSD FRML MDRD: 49 ML/MIN/1.73M2
GLUCOSE SERPL-MCNC: 108 MG/DL (ref 70–108)
HCT VFR BLD AUTO: 23.4 % (ref 37–47)
HCT VFR BLD AUTO: 25.1 % (ref 37–47)
HGB BLD-MCNC: 7.1 GM/DL (ref 12–16)
HGB BLD-MCNC: 7.5 GM/DL (ref 12–16)
IMM GRANULOCYTES # BLD AUTO: 0.07 THOU/MM3 (ref 0–0.07)
IMM GRANULOCYTES NFR BLD AUTO: 0.7 %
LYMPHOCYTES ABSOLUTE: 3 THOU/MM3 (ref 1–4.8)
LYMPHOCYTES NFR BLD AUTO: 30.6 %
MAGNESIUM SERPL-MCNC: 1.7 MG/DL (ref 1.6–2.4)
MCH RBC QN AUTO: 27 PG (ref 26–33)
MCHC RBC AUTO-ENTMCNC: 29.9 GM/DL (ref 32.2–35.5)
MCV RBC AUTO: 90.3 FL (ref 81–99)
MONOCYTES ABSOLUTE: 0.8 THOU/MM3 (ref 0.4–1.3)
MONOCYTES NFR BLD AUTO: 8.1 %
NEUTROPHILS NFR BLD AUTO: 53.3 %
NRBC BLD AUTO-RTO: 0 /100 WBC
ORGANISM: ABNORMAL
OSMOLALITY SERPL CALC.SUM OF ELEC: 285.5 MOSMOL/KG (ref 275–300)
PLATELET # BLD AUTO: 260 THOU/MM3 (ref 130–400)
PMV BLD AUTO: 10.8 FL (ref 9.4–12.4)
POTASSIUM SERPL-SCNC: 4.2 MEQ/L (ref 3.5–5.2)
RBC # BLD AUTO: 2.78 MILL/MM3 (ref 4.2–5.4)
SEGMENTED NEUTROPHILS ABSOLUTE COUNT: 5.2 THOU/MM3 (ref 1.8–7.7)
SODIUM SERPL-SCNC: 142 MEQ/L (ref 135–145)
WBC # BLD AUTO: 9.8 THOU/MM3 (ref 4.8–10.8)

## 2023-09-06 PROCEDURE — 80048 BASIC METABOLIC PNL TOTAL CA: CPT

## 2023-09-06 PROCEDURE — 6360000002 HC RX W HCPCS

## 2023-09-06 PROCEDURE — 6370000000 HC RX 637 (ALT 250 FOR IP)

## 2023-09-06 PROCEDURE — 85018 HEMOGLOBIN: CPT

## 2023-09-06 PROCEDURE — 83735 ASSAY OF MAGNESIUM: CPT

## 2023-09-06 PROCEDURE — 99233 SBSQ HOSP IP/OBS HIGH 50: CPT | Performed by: NURSE PRACTITIONER

## 2023-09-06 PROCEDURE — 94640 AIRWAY INHALATION TREATMENT: CPT

## 2023-09-06 PROCEDURE — 6370000000 HC RX 637 (ALT 250 FOR IP): Performed by: STUDENT IN AN ORGANIZED HEALTH CARE EDUCATION/TRAINING PROGRAM

## 2023-09-06 PROCEDURE — 85014 HEMATOCRIT: CPT

## 2023-09-06 PROCEDURE — 85025 COMPLETE CBC W/AUTO DIFF WBC: CPT

## 2023-09-06 PROCEDURE — 93010 ELECTROCARDIOGRAM REPORT: CPT | Performed by: INTERNAL MEDICINE

## 2023-09-06 PROCEDURE — 36415 COLL VENOUS BLD VENIPUNCTURE: CPT

## 2023-09-06 PROCEDURE — 2580000003 HC RX 258

## 2023-09-06 PROCEDURE — 1200000000 HC SEMI PRIVATE

## 2023-09-06 RX ORDER — ALBUTEROL SULFATE 90 UG/1
2 AEROSOL, METERED RESPIRATORY (INHALATION)
Status: DISCONTINUED | OUTPATIENT
Start: 2023-09-06 | End: 2023-09-09 | Stop reason: HOSPADM

## 2023-09-06 RX ORDER — POLYETHYLENE GLYCOL 3350, SODIUM CHLORIDE, POTASSIUM CHLORIDE, SODIUM BICARBONATE, AND SODIUM SULFATE 240; 5.84; 2.98; 6.72; 22.72 G/4L; G/4L; G/4L; G/4L; G/4L
4000 POWDER, FOR SOLUTION ORAL ONCE
Status: COMPLETED | OUTPATIENT
Start: 2023-09-06 | End: 2023-09-06

## 2023-09-06 RX ADMIN — ATORVASTATIN CALCIUM 10 MG: 10 TABLET, FILM COATED ORAL at 18:15

## 2023-09-06 RX ADMIN — TIOTROPIUM BROMIDE INHALATION SPRAY 2 PUFF: 3.12 SPRAY, METERED RESPIRATORY (INHALATION) at 08:44

## 2023-09-06 RX ADMIN — SODIUM CHLORIDE, PRESERVATIVE FREE 10 ML: 5 INJECTION INTRAVENOUS at 08:50

## 2023-09-06 RX ADMIN — LISINOPRIL 5 MG: 5 TABLET ORAL at 19:32

## 2023-09-06 RX ADMIN — ACETAMINOPHEN 650 MG: 325 TABLET ORAL at 16:20

## 2023-09-06 RX ADMIN — SODIUM CHLORIDE, PRESERVATIVE FREE 10 ML: 5 INJECTION INTRAVENOUS at 02:18

## 2023-09-06 RX ADMIN — ROPINIROLE HYDROCHLORIDE 2 MG: 1 TABLET, FILM COATED ORAL at 19:33

## 2023-09-06 RX ADMIN — ALBUTEROL SULFATE 2 PUFF: 90 AEROSOL, METERED RESPIRATORY (INHALATION) at 08:44

## 2023-09-06 RX ADMIN — ONDANSETRON 4 MG: 2 INJECTION INTRAMUSCULAR; INTRAVENOUS at 06:48

## 2023-09-06 RX ADMIN — LISINOPRIL 5 MG: 5 TABLET ORAL at 02:17

## 2023-09-06 RX ADMIN — PANTOPRAZOLE SODIUM 40 MG: 40 TABLET, DELAYED RELEASE ORAL at 05:21

## 2023-09-06 RX ADMIN — POTASSIUM CHLORIDE 20 MEQ: 1500 TABLET, EXTENDED RELEASE ORAL at 08:50

## 2023-09-06 RX ADMIN — ROPINIROLE HYDROCHLORIDE 2 MG: 1 TABLET, FILM COATED ORAL at 02:17

## 2023-09-06 RX ADMIN — LISINOPRIL 5 MG: 5 TABLET ORAL at 08:50

## 2023-09-06 RX ADMIN — ALBUTEROL SULFATE 2 PUFF: 90 AEROSOL, METERED RESPIRATORY (INHALATION) at 17:27

## 2023-09-06 RX ADMIN — PANTOPRAZOLE SODIUM 40 MG: 40 TABLET, DELAYED RELEASE ORAL at 16:15

## 2023-09-06 RX ADMIN — CITALOPRAM 20 MG: 20 TABLET, FILM COATED ORAL at 08:50

## 2023-09-06 RX ADMIN — ROPINIROLE HYDROCHLORIDE 2 MG: 1 TABLET, FILM COATED ORAL at 08:50

## 2023-09-06 RX ADMIN — SODIUM CHLORIDE, PRESERVATIVE FREE 10 ML: 5 INJECTION INTRAVENOUS at 19:33

## 2023-09-06 RX ADMIN — POLYETHYLENE GLYCOL-3350 AND ELECTROLYTES WITH FLAVOR PACK 4000 ML: 240; 5.84; 2.98; 6.72; 22.72 POWDER, FOR SOLUTION ORAL at 04:41

## 2023-09-06 RX ADMIN — FUROSEMIDE 20 MG: 40 TABLET ORAL at 08:50

## 2023-09-06 ASSESSMENT — PAIN DESCRIPTION - FREQUENCY
FREQUENCY: CONTINUOUS

## 2023-09-06 ASSESSMENT — PAIN DESCRIPTION - PAIN TYPE
TYPE: CHRONIC PAIN

## 2023-09-06 ASSESSMENT — PAIN DESCRIPTION - DESCRIPTORS
DESCRIPTORS: ACHING;DISCOMFORT
DESCRIPTORS: ACHING
DESCRIPTORS: ACHING;DISCOMFORT

## 2023-09-06 ASSESSMENT — PAIN - FUNCTIONAL ASSESSMENT
PAIN_FUNCTIONAL_ASSESSMENT: ACTIVITIES ARE NOT PREVENTED

## 2023-09-06 ASSESSMENT — PAIN DESCRIPTION - LOCATION
LOCATION: BACK;LEG
LOCATION: BACK
LOCATION: BACK;LEG

## 2023-09-06 ASSESSMENT — PAIN DESCRIPTION - ONSET
ONSET: ON-GOING

## 2023-09-06 ASSESSMENT — PAIN DESCRIPTION - ORIENTATION
ORIENTATION: LOWER;LEFT
ORIENTATION: LEFT
ORIENTATION: LEFT

## 2023-09-06 ASSESSMENT — PAIN SCALES - GENERAL
PAINLEVEL_OUTOF10: 7
PAINLEVEL_OUTOF10: 8
PAINLEVEL_OUTOF10: 3
PAINLEVEL_OUTOF10: 0

## 2023-09-06 NOTE — CARE COORDINATION
Case Management Assessment  Initial Evaluation    Date/Time of Evaluation: 9/6/2023 1:03 PM  Assessment Completed by: Herbert Madrid RN    If patient is discharged prior to next notation, then this note serves as note for discharge by case management. Patient Name: Woodrow Joseph                   YOB: 1955  Diagnosis: Acute blood loss anemia [D62]  GI bleed [K92.2]  Lower GI bleed [K92.2]                   Date / Time: 9/5/2023  4:33 PM  [facility]: [facility]     Patient Admission Status: Inpatient   Readmission Risk Low 0-14, Mod 15-19), High > 20: Readmission Risk Score: 11.8    Current PCP: Cale Sun, DO  PCP verified by CM? Yes    Chart Reviewed: Yes      History Provided by: Patient  Patient Orientation: Alert and Oriented    Patient Cognition: Alert    Hospitalization in the last 30 days (Readmission):  No    If yes, Readmission Assessment in CM Navigator will be completed. Advance Directives:      Code Status: Full Code   Patient's Primary Decision Maker is: Legal Next of Kin      Discharge Planning:    Patient lives with: Spouse/Significant Other Type of Home: House  Primary Care Giver: Self  Patient Support Systems include: Spouse/Significant Other   Current Financial resources: Medicare  Current community resources: None  Current services prior to admission: None            Current DME:              Type of Home Care services:  None    ADLS  Prior functional level: Independent in ADLs/IADLs  Current functional level: Independent in ADLs/IADLs    Family can provide assistance at DC: Yes  Would you like Case Management to discuss the discharge plan with any other family members/significant others, and if so, who?  Yes  Plans to Return to Present Housing: Yes  Other Identified Issues/Barriers to RETURNING to current housing: no  Potential Assistance needed at discharge: N/A            Potential DME:    Patient expects to discharge to: [address] [place] [address] for transportation at discharge:

## 2023-09-06 NOTE — ED NOTES
Pt updated on IP bed assignment. Pt given boxed lunch per request. No other needs expressed.  [address]  09/05/23 4627

## 2023-09-06 NOTE — ED NOTES
Pt sitting in chair. Updated on admission status.  Pt given ice water pre request. VSS     Aury DIYA Pimentel  09/05/23 2044

## 2023-09-06 NOTE — CONSENT
Informed Consent for Blood Component Transfusion Note    I have discussed with the patient the rationale for blood component transfusion; its benefits in treating or preventing fatigue, organ damage, or death; and its risk which includes mild transfusion reactions, rare risk of blood borne infection, or more serious but rare reactions. I have discussed the alternatives to transfusion, including the risk and consequences of not receiving transfusion. The patient had an opportunity to ask questions and had agreed to proceed with transfusion of blood components.     Electronically signed by JUAN Stallings CNP on 9/6/23 at 9:24 AM EDT

## 2023-09-06 NOTE — PLAN OF CARE
Problem: Discharge Planning  Goal: Discharge to home or other facility with appropriate resources  9/6/2023 1322 by Deloris Lynn RN  Outcome: Progressing  Flowsheets (Taken 9/6/2023 5318)  Discharge to home or other facility with appropriate resources:   Identify barriers to discharge with patient and caregiver   Arrange for needed discharge resources and transportation as appropriate   Identify discharge learning needs (meds, wound care, etc)   Refer to discharge planning if patient needs post-hospital services based on physician order or complex needs related to functional status, cognitive ability or social support system     Problem: Pain  Goal: Verbalizes/displays adequate comfort level or baseline comfort level  9/6/2023 1322 by Deloris Lynn RN  Outcome: Progressing  Flowsheets (Taken 9/6/2023 0879)  Verbalizes/displays adequate comfort level or baseline comfort level:   Encourage patient to monitor pain and request assistance   Assess pain using appropriate pain scale   Administer analgesics based on type and severity of pain and evaluate response   Implement non-pharmacological measures as appropriate and evaluate response   Notify Licensed Independent Practitioner if interventions unsuccessful or patient reports new pain     Problem: ABCDS Injury Assessment  Goal: Absence of physical injury  9/6/2023 1322 by Deloris Lynn RN  Outcome: Progressing     Problem: Safety - Adult  Goal: Free from fall injury  9/6/2023 1322 by Deloris Lynn RN  Outcome: Progressing     Problem: Gastrointestinal - Adult  Goal: Minimal or absence of nausea and vomiting  Outcome: Progressing  Flowsheets (Taken 9/6/2023 0812)  Minimal or absence of nausea and vomiting:   Administer ordered antiemetic medications as needed   Provide nonpharmacologic comfort measures as appropriate     Problem: Gastrointestinal - Adult  Goal: Maintains or returns to baseline bowel function  Outcome:

## 2023-09-06 NOTE — CONSULTS
Consult History & Physical      Patient:  Eder Celis  YOB: 1955  MRN: [id number]     Acct: [de-identified]    Chief Complaint:  Colonoscopy, lower GIB    Date of Service: Pt seen/examined in consultation on 9/6/2023    History Of Present Illness:      75 y.o. female who we are asked to see/evaluate by JUAN Sun * for medical management of colonoscopy, lower GIB. Patient presented to the ED last night at the recommendation of her PCP for low Hgb on OP labs. Patient has a history of chronic anemia and rectal bleeding. She is on Eliquis for a history of afib. Patient is scheduled for an OP colonoscopy 09/13/23 for anemia and rectal bleeding. Initial Hgb 7.6 and most recent 7.5. She last took her Eliquis yesterday 09/05/23. Denies NSAID use. Denies abdominal pain, nausea, vomiting, diarrhea, constipation, and melena. Admitting team ordered bowel prep which patient has consumed half so far. Past Medical History:    Past Medical History:   Diagnosis Date    Atrial fibrillation (HCC)     COPD (chronic obstructive pulmonary disease) (HCC)     DM2 (diabetes mellitus, type 2) ([address])     Dyslipidemia     Fibromyalgia     Former smoker     GERD (gastroesophageal reflux disease)     H/O prosthetic aortic valve replacement     Early bioprosthetic aortic valve failure with severe symptomatic prosthetic aortic regurgitation is now s/p REDO AORTIC VALVE REPLACEMENT, MITRAL VALVE REPAIR, TRICUSPID VALVE REPAIR, WILBERTO performed by Yasmine Espinoza MD at [address] [place] [address] failure with preserved ejection fraction ([address])     History of aortic stenosis, s/p valve replacement x 2     History of iron deficiency anemia     History of subarachnoid hemorrhage 03/23/2015    Spontaneous SAH. Admitted to [facility]. Extensive w/u for cause was negative.      Hypertension, essential     Major depression     Morbid obesity (HCC)     THAYER (nonalcoholic steatohepatitis)     Osteoarthritis     RLS (restless legs syndrome) 01/16/2019 exaxcerbated  CHF, not exacerbated  Hx of HTN  Hx of depression  CKD stage III  HLD  S/P AVR/MVR  Hx of erosive esophagitis- on PPI PTA    PLAN:    Monitor H & H, transfuse prn  Nursing to monitor stool output & document  Hold Eliquis  Finish bowel prep  Clear liquid diet, no red dye  Continue PPI BID, home dose  Will plan for colonoscopy 09/08/23  RN updated  Case discussed with primary attending   Will follow       Case reviewed and impression/plan reviewed in collaboration with Dr. Geno Briceno  Electronically signed by JUAN Hamlin - CNP on 9/6/2023 at 9:51 AM    Nelly Ragland  Thank you for the consultation.

## 2023-09-06 NOTE — TELEPHONE ENCOUNTER
Bere Davila with [facility] calling for hosp f/u appt  Pt scheduled in same day/ no other reg openings available in time frame for appt  Let me know if this doesn't work  Future Appointments   Date Time Provider [address]  [address]   9/21/2023  1:40 PM Deven Sewell, [address] [place] [address] Dr Goodman   9/26/2023 11:00 AM Pincus Hammans, APRN - [facility]   10/2/2023  4:00 PM STR PULMONARY FUNCTION ROOM 1 STR PFT [facility]   10/3/2023  8:20 AM Deven Sewell, DO [address] [facility]   10/19/2023  3:45 PM Jenniffer Morrison MD N [address] [facility],  [address]

## 2023-09-06 NOTE — PLAN OF CARE
Problem: Discharge Planning  Goal: Discharge to home or other facility with appropriate resources  Outcome: Progressing     Problem: Pain  Goal: Verbalizes/displays adequate comfort level or baseline comfort level  Outcome: Progressing  Flowsheets (Taken 9/6/2023 0201)  Verbalizes/displays adequate comfort level or baseline comfort level:   Encourage patient to monitor pain and request assistance   Assess pain using appropriate pain scale   Administer analgesics based on type and severity of pain and evaluate response   Implement non-pharmacological measures as appropriate and evaluate response     Problem: ABCDS Injury Assessment  Goal: Absence of physical injury  Outcome: Progressing  Flowsheets (Taken 9/6/2023 0201)  Absence of Physical Injury: Implement safety measures based on patient assessment     Problem: Safety - Adult  Goal: Free from fall injury  Outcome: Progressing  Flowsheets (Taken 9/6/2023 0201)  Free From Fall Injury: Instruct family/caregiver on patient safety

## 2023-09-06 NOTE — ED NOTES
ED to inpatient nurses report    Chief Complaint   Patient presents with    Abnormal Lab      Present to ED from home  LOC: alert and orientated to name, place, date  Vital signs   Vitals:    09/05/23 2040 09/05/23 2213 09/05/23 2316 09/05/23 2330   BP: (!) 128/47 (!) 117/59 (!) 111/56    Pulse: 68 69     Resp: 18 19     Temp:       TempSrc:       SpO2: 97% 98% 96% 97%   Weight:       Height:          Oxygen Baseline room air    Current needs required room air   LDAs:   Peripheral IV 09/05/23 Left Antecubital (Active)   Site Assessment Clean, dry & intact 09/05/23 2213   Line Status Normal saline locked 09/05/23 2213   Phlebitis Assessment No symptoms 09/05/23 2213   Infiltration Assessment 0 09/05/23 2213   Dressing Status Clean, dry & intact 09/05/23 2213   Dressing Type Transparent 09/05/23 1646   Dressing Intervention New 09/05/23 1646     Mobility: Independent  Pending ED orders: N/A  Present condition: stable      C-SSRS Risk of Suicide: No Risk  Swallow Screening    Preferred Language: Steve     Electronically signed by Aj Acosta RN on 9/6/2023 at 1:16 AM       Aj Acosta RN  09/06/23 8707 Erythromycin Counseling:  I discussed with the patient the risks of erythromycin including but not limited to GI upset, allergic reaction, drug rash, diarrhea, increase in liver enzymes, and yeast infections.

## 2023-09-06 NOTE — PROGRESS NOTES
Hospitalist Progress Note    Patient:  Janessa Dean      Unit/Bed:8A-10/010-A    YOB: 1955    MRN: [id number]       Acct: [de-identified]     PCP: Elwyn Moritz, DO    Date of Admission: 9/5/2023    Assessment/Plan:    Acute on chronic normocytic anemia/lower GI bleed--bowel prep ordered by admitting team, no colonoscopy today secondary to patient having Eliquis yesterday, await formal GI input, patient agreed to blood transfusion if needed on 9/6; planning colonoscopy on 9/8; will monitor H/H every 12 hours and transfuse if hemoglobin less than 7  Tubular adenoma colonic polyp--please see #1  COPD stage II--on RA, on Spiriva  Chronic diastolic heart failure  Primary hypertension, uncontrolled--on Lasix and will hold, on lisinopril  CKD stage IIIa   RLS--on Requip  Depression--treated  History of stroke  History of erosive esophagitis--on Protonix 40 mg twice daily  PAF--aspirin and Eliquis on hold  Status post AVR/MVR--follows with Dr. Svitlana Lyn  Remote tobacco abuse  Hyperlipidemia--statin  Obesity class I with BMI 34.01      Expected discharge date: Pending clinical course    Disposition:    [x] Home       [] TCU       [] Rehab       [] Psych       [] SNF       [] [address]       [] Other-    Chief Complaint: Normal lab work    Hospital Course: Per H&P done 9/5/2023: \"Patient is a 23-year-old female, medical history of A-fib, and deficiency anemia, aortic valve replacement , stroke, COPD, type 2 diabetes, dyslipidemia, primary hypertension, who presents to ED for abnormal CBC lab work noting for decreased hemoglobin/hematocrit 2/2 lower GI bleed X 5 months but worsening in past month. Patient notes bright red blood in toilet bowl and soft black tarry well-formed stool, but does report supplemental iron intake due to history of iron deficiency anemia, which may discolor stool. Patient denies pain with defecation. Patient refused rectal exam in ED.      Patient most recent [] SQ Heparin                                 [] Encourage ambulation           [] Already on Anticoagulation     Code Status: Full Code    PT/OT Eval Status: Monitor    Tele:   [x] Yes SR HR 75 with occasional PVC's             [] no    Active Hospital Problems    Diagnosis Date Noted    GI bleed [K92.2] 09/05/2023       Electronically signed by JUAN Jean CNP on 9/6/2023 at 6:47 AM

## 2023-09-06 NOTE — TELEPHONE ENCOUNTER
[facility]  She just admitted yesterday  VERNELL hasn't seen her yet  I don't see where she's set for discharge? Make more sense to schedule once we know discharge date?

## 2023-09-06 NOTE — PROGRESS NOTES
Pt started bowel prep at 0515. Pt reported that she did have 1 BM, but flushed toilet. Pt reminded not to flush so that nursing can evaluate.

## 2023-09-06 NOTE — H&P
Internal Medicine Resident History and Physical          Name: Bonnie Snyder, female, : 1955, MRN: [id number]    PCP: Jocelyn Quintero DO    Date of Admission: 2023  Date of Service: Pt seen/examined on 23  and Admitted to Inpatient with expected LOS greater than two midnights due to medical therapy. Assessment and Plan:  Acute on chronic normocytic anemia, 2/2 lower GI bleed, iso iron deficiency anemia: Patient has 5 month history of GI bleed, worsening in past month, and not presenting with fatigue and shortness of breath with minimal exertion. Patient noted increased stool frequency with melena stool appearance. Hemoglobin decreased from 7.6 to 7.2.  Patient originally scheduled to have colonoscopy with Dr. Geronimo Conroy on    Holding aspirin, Eliquis, and iron at this time  Trending H&H  Transfuse if hemoglobin < 7  GI consulted for inpatient colonoscopy  Patient made NPO and bowel prep started  Nurse at her place to document stool consistency, color, possible presence of blood  Tubular adenoma colonic polyp: Patient has history of colonic polyps, last colonoscopy 2020 noted for tubular adenoma  GI consulted for inpatient colonoscopy as noted above  COPD, stage 2: PFT 2019 FEV1 74%  Continue home medications  Chronic Conditions (reviewed and stable unless otherwise stated)   HFpEF: Echo 23, EF 60%  CKD, stage 3a: GFR 54  RLS, controlled: continue Requip  Depression, controlled: continue Celexa  Obesity: BMI 34        =======================================================================      Chief Complaint: Abnormal lab work    History Of Present Illness:    Patient is a 79-year-old female, medical history of A-fib, and deficiency anemia, aortic valve replacement , stroke, COPD, type 2 diabetes, dyslipidemia, primary hypertension, who presents to ED for abnormal CBC lab work noting for decreased hemoglobin/hematocrit 2/2 lower GI bleed X 5 months but Hx      Diagnosis Date    Atrial fibrillation (HCC)     COPD (chronic obstructive pulmonary disease) (HCC)     DM2 (diabetes mellitus, type 2) (HCC)     Dyslipidemia     Fibromyalgia     Former smoker     GERD (gastroesophageal reflux disease)     H/O prosthetic aortic valve replacement     Early bioprosthetic aortic valve failure with severe symptomatic prosthetic aortic regurgitation is now s/p REDO AORTIC VALVE REPLACEMENT, MITRAL VALVE REPAIR, TRICUSPID VALVE REPAIR, WILBERTO performed by Yolette Hoang MD at [address] [place] [address] with preserved ejection fraction ([address])     History of aortic stenosis, s/p valve replacement x 2     History of iron deficiency anemia     History of subarachnoid hemorrhage 03/23/2015    Spontaneous SAH. Admitted to [facility]. Extensive w/u for cause was negative.      Hypertension, essential     Major depression     Morbid obesity (HCC)     THAYER (nonalcoholic steatohepatitis)     Osteoarthritis     RLS (restless legs syndrome) 01/16/2019    S/P AVR (aortic valve replacement) 2015    x 2, last replacement July 2018 d/t failure of the 1st    S/P mitral valve repair 07/13/2018    Dr. Hoskins Meals    Valvular heart disease 01/16/2019       Surgical Hx      Procedure Laterality Date    AORTIC VALVE REPLACEMENT      cow valve    BRAIN SURGERY      to drain blood    CARDIAC CATHETERIZATION  2004 or 2005    [facility]    COLONOSCOPY      DIAGNOSTIC CARDIAC CATH LAB PROCEDURE      HYSTERECTOMY (CERVIX STATUS UNKNOWN)      age 49    OVARY REMOVAL Bilateral     age 49    PARTIAL HYSTERECTOMY (CERVIX NOT REMOVED) N/A 2004    Endometriosis    MS ECHO TRANSESOPHAG R-T 2D IMG ACQUISJ I&R ONLY Left 7/3/2018    TRANSESOPHAGEAL ECHOCARDIOGRAM performed by Aston Hartman MD at [facility]    MS OFFICE/OUTPT VISIT,PROCEDURE ONLY N/A 7/13/2018    REDO AORTIC VALVE REPLACEMENT, MITRAL VALVE REPAIR, TRICUSPID VALVE REPAIR, WILBERTO performed by Yolette Hoang MD at [address] [place] [address] N/A 4/21/2023

## 2023-09-07 LAB
ANION GAP SERPL CALC-SCNC: 14 MEQ/L (ref 8–16)
BASOPHILS ABSOLUTE: 0 THOU/MM3 (ref 0–0.1)
BASOPHILS NFR BLD AUTO: 0.6 %
BUN SERPL-MCNC: 12 MG/DL (ref 7–22)
CALCIUM SERPL-MCNC: 9.1 MG/DL (ref 8.5–10.5)
CHLORIDE SERPL-SCNC: 108 MEQ/L (ref 98–111)
CO2 SERPL-SCNC: 24 MEQ/L (ref 23–33)
CREAT SERPL-MCNC: 1 MG/DL (ref 0.4–1.2)
DEPRECATED RDW RBC AUTO: 47.6 FL (ref 35–45)
EOSINOPHIL NFR BLD AUTO: 4.5 %
EOSINOPHILS ABSOLUTE: 0.3 THOU/MM3 (ref 0–0.4)
ERYTHROCYTE [DISTWIDTH] IN BLOOD BY AUTOMATED COUNT: 14.1 % (ref 11.5–14.5)
GFR SERPL CREATININE-BSD FRML MDRD: > 60 ML/MIN/1.73M2
GLUCOSE SERPL-MCNC: 117 MG/DL (ref 70–108)
HCT VFR BLD AUTO: 24.4 % (ref 37–47)
HGB BLD-MCNC: 7.3 GM/DL (ref 12–16)
IMM GRANULOCYTES # BLD AUTO: 0.04 THOU/MM3 (ref 0–0.07)
IMM GRANULOCYTES NFR BLD AUTO: 0.6 %
LYMPHOCYTES ABSOLUTE: 2 THOU/MM3 (ref 1–4.8)
LYMPHOCYTES NFR BLD AUTO: 30 %
MCH RBC QN AUTO: 27.3 PG (ref 26–33)
MCHC RBC AUTO-ENTMCNC: 29.9 GM/DL (ref 32.2–35.5)
MCV RBC AUTO: 91.4 FL (ref 81–99)
MONOCYTES ABSOLUTE: 0.6 THOU/MM3 (ref 0.4–1.3)
MONOCYTES NFR BLD AUTO: 8.8 %
NEUTROPHILS NFR BLD AUTO: 55.5 %
NRBC BLD AUTO-RTO: 0 /100 WBC
PLATELET # BLD AUTO: 240 THOU/MM3 (ref 130–400)
PMV BLD AUTO: 11.1 FL (ref 9.4–12.4)
POTASSIUM SERPL-SCNC: 4.3 MEQ/L (ref 3.5–5.2)
RBC # BLD AUTO: 2.67 MILL/MM3 (ref 4.2–5.4)
SEGMENTED NEUTROPHILS ABSOLUTE COUNT: 3.8 THOU/MM3 (ref 1.8–7.7)
SODIUM SERPL-SCNC: 146 MEQ/L (ref 135–145)
WBC # BLD AUTO: 6.8 THOU/MM3 (ref 4.8–10.8)

## 2023-09-07 PROCEDURE — 2580000003 HC RX 258: Performed by: NURSE PRACTITIONER

## 2023-09-07 PROCEDURE — 36415 COLL VENOUS BLD VENIPUNCTURE: CPT

## 2023-09-07 PROCEDURE — 99233 SBSQ HOSP IP/OBS HIGH 50: CPT | Performed by: NURSE PRACTITIONER

## 2023-09-07 PROCEDURE — 85025 COMPLETE CBC W/AUTO DIFF WBC: CPT

## 2023-09-07 PROCEDURE — 1200000000 HC SEMI PRIVATE

## 2023-09-07 PROCEDURE — 6370000000 HC RX 637 (ALT 250 FOR IP)

## 2023-09-07 PROCEDURE — 94640 AIRWAY INHALATION TREATMENT: CPT

## 2023-09-07 PROCEDURE — 2580000003 HC RX 258

## 2023-09-07 PROCEDURE — 80048 BASIC METABOLIC PNL TOTAL CA: CPT

## 2023-09-07 RX ORDER — SODIUM CHLORIDE 450 MG/100ML
INJECTION, SOLUTION INTRAVENOUS CONTINUOUS
Status: DISCONTINUED | OUTPATIENT
Start: 2023-09-07 | End: 2023-09-08

## 2023-09-07 RX ADMIN — SODIUM CHLORIDE: 4.5 INJECTION, SOLUTION INTRAVENOUS at 10:30

## 2023-09-07 RX ADMIN — ATORVASTATIN CALCIUM 10 MG: 10 TABLET, FILM COATED ORAL at 16:55

## 2023-09-07 RX ADMIN — SODIUM CHLORIDE, PRESERVATIVE FREE 10 ML: 5 INJECTION INTRAVENOUS at 09:21

## 2023-09-07 RX ADMIN — ACETAMINOPHEN 650 MG: 325 TABLET ORAL at 19:55

## 2023-09-07 RX ADMIN — LISINOPRIL 5 MG: 5 TABLET ORAL at 19:55

## 2023-09-07 RX ADMIN — ALBUTEROL SULFATE 2 PUFF: 90 AEROSOL, METERED RESPIRATORY (INHALATION) at 07:42

## 2023-09-07 RX ADMIN — TIOTROPIUM BROMIDE INHALATION SPRAY 2 PUFF: 3.12 SPRAY, METERED RESPIRATORY (INHALATION) at 07:42

## 2023-09-07 RX ADMIN — ROPINIROLE HYDROCHLORIDE 2 MG: 1 TABLET, FILM COATED ORAL at 19:55

## 2023-09-07 RX ADMIN — ALBUTEROL SULFATE 2 PUFF: 90 AEROSOL, METERED RESPIRATORY (INHALATION) at 15:47

## 2023-09-07 RX ADMIN — ROPINIROLE HYDROCHLORIDE 2 MG: 1 TABLET, FILM COATED ORAL at 09:21

## 2023-09-07 RX ADMIN — PANTOPRAZOLE SODIUM 40 MG: 40 TABLET, DELAYED RELEASE ORAL at 16:55

## 2023-09-07 RX ADMIN — CITALOPRAM 20 MG: 20 TABLET, FILM COATED ORAL at 09:21

## 2023-09-07 RX ADMIN — LISINOPRIL 5 MG: 5 TABLET ORAL at 09:21

## 2023-09-07 RX ADMIN — PANTOPRAZOLE SODIUM 40 MG: 40 TABLET, DELAYED RELEASE ORAL at 05:31

## 2023-09-07 ASSESSMENT — PAIN SCALES - GENERAL
PAINLEVEL_OUTOF10: 0
PAINLEVEL_OUTOF10: 3

## 2023-09-07 ASSESSMENT — PAIN DESCRIPTION - ORIENTATION: ORIENTATION: LOWER;LEFT;RIGHT

## 2023-09-07 ASSESSMENT — PAIN DESCRIPTION - PAIN TYPE: TYPE: CHRONIC PAIN

## 2023-09-07 ASSESSMENT — PAIN DESCRIPTION - DESCRIPTORS: DESCRIPTORS: ACHING;OTHER (COMMENT)

## 2023-09-07 ASSESSMENT — PAIN DESCRIPTION - LOCATION: LOCATION: BACK;LEG

## 2023-09-07 ASSESSMENT — PAIN - FUNCTIONAL ASSESSMENT: PAIN_FUNCTIONAL_ASSESSMENT: ACTIVITIES ARE NOT PREVENTED

## 2023-09-07 ASSESSMENT — PAIN DESCRIPTION - FREQUENCY: FREQUENCY: CONTINUOUS

## 2023-09-07 ASSESSMENT — PAIN DESCRIPTION - ONSET: ONSET: ON-GOING

## 2023-09-07 NOTE — PROGRESS NOTES
Hospitalist Progress Note    Patient:  Dalton Bateman      Unit/Bed:8A-10/010-A    YOB: 1955    MRN: [id number]       Acct: [de-identified]     PCP: Bj Ferrari DO    Date of Admission: 9/5/2023    Assessment/Plan:    Acute on chronic normocytic anemia/lower GI bleed--colonoscopy planned for 9/8 secondary to patient having Eliquis 9/5, appreciate GI input, patient agreed to blood transfusion if needed on 9/6; will monitor H/H every 12 hours and transfuse if hemoglobin less than 7  Tubular adenoma colonic polyp--please see #1  Hypernatremia, mild--0.45 normal saline at 50 mils an hour, check in the morning  COPD stage II--on RA, on Spiriva  Chronic diastolic heart failure  Primary hypertension, uncontrolled--on Lasix and will hold, on lisinopril; monitor  CKD stage IIIa   RLS--on Requip  Depression--treated  History of stroke  History of erosive esophagitis--on Protonix 40 mg twice daily  PAF--aspirin and Eliquis on hold  Status post AVR/MVR--follows with Dr. Katelynn Curry  Remote tobacco abuse  Hyperlipidemia--statin  Obesity class I with BMI 34.01      Expected discharge date: Pending clinical course    Disposition:    [x] Home       [] TCU       [] Rehab       [] Psych       [] SNF       [] [address] [facility] [address]       [] Other-    Chief Complaint: Normal lab work    Hospital Course: Per H&P done 9/5/2023: \"Patient is a 65-year-old female, medical history of A-fib, and deficiency anemia, aortic valve replacement , stroke, COPD, type 2 diabetes, dyslipidemia, primary hypertension, who presents to ED for abnormal CBC lab work noting for decreased hemoglobin/hematocrit 2/2 lower GI bleed X 5 months but worsening in past month. Patient notes bright red blood in toilet bowl and soft black tarry well-formed stool, but does report supplemental iron intake due to history of iron deficiency anemia, which may discolor stool. Patient denies pain with defecation. Patient refused rectal exam in ED.

## 2023-09-07 NOTE — CARE COORDINATION
9/7/23, 8:19 AM EDT    DISCHARGE ON GOING EVALUATION    [address] day: 2  Location: 8A-10/010-A Reason for admit: Acute blood loss anemia [D62]  GI bleed [K92.2]  Lower GI bleed [K92.2]     Barriers to Discharge: Hospitalist and GI following. Sodium 146. Hgb 7.3 (low was 7.1). Holding Eliquis. Protonix po bid. Planning colonoscopy 9/8. PCP: Ericka Simmons DO  Readmission Risk Score: 11.7%  Patient Goals/Plan/Treatment Preferences: From home with . Monitor for needs.

## 2023-09-07 NOTE — PROGRESS NOTES
[facility]--. [address] [facility] PROGRESS NOTE      Patient: John Carter  Room #: 8A-10/010-A            YOB: 1955  Age: 75 y.o. Gender: female            Admit Date & Time: 9/5/2023  4:33 PM    Assessment:    The patient was in her bed and sharing her room with her daughter and grand children. The grand children were actively taking the patient's apple sauce and playing around the room. This led to a short visit so that the patient could keep the children controled. Interventions: The  began a care coping discussion but the situation changed when a child put their hand in her apple sauce and warranted a quick prayer for healing and protection of the patient's family. Outcomes: The patient was thankful for the short prayer and blessing. Plan: 1. Spiritual care will continue to follow the patient according to [facility]. [place]'s spiritual care SOP.        Electronically signed by Wilfrido Hurtado on 9/7/2023 at 6:23 PM.  [place]  [phone]

## 2023-09-08 ENCOUNTER — ANESTHESIA EVENT (OUTPATIENT)
Dept: ENDOSCOPY | Age: 68
End: 2023-09-08
Payer: MEDICARE

## 2023-09-08 ENCOUNTER — ANESTHESIA (OUTPATIENT)
Dept: ENDOSCOPY | Age: 68
End: 2023-09-08
Payer: MEDICARE

## 2023-09-08 LAB
ANION GAP SERPL CALC-SCNC: 11 MEQ/L (ref 8–16)
BASOPHILS ABSOLUTE: 0 THOU/MM3 (ref 0–0.1)
BASOPHILS NFR BLD AUTO: 0.5 %
BUN SERPL-MCNC: 9 MG/DL (ref 7–22)
CALCIUM SERPL-MCNC: 8.9 MG/DL (ref 8.5–10.5)
CHLORIDE SERPL-SCNC: 106 MEQ/L (ref 98–111)
CO2 SERPL-SCNC: 25 MEQ/L (ref 23–33)
CREAT SERPL-MCNC: 1 MG/DL (ref 0.4–1.2)
DEPRECATED RDW RBC AUTO: 45.6 FL (ref 35–45)
EOSINOPHIL NFR BLD AUTO: 3.8 %
EOSINOPHILS ABSOLUTE: 0.2 THOU/MM3 (ref 0–0.4)
ERYTHROCYTE [DISTWIDTH] IN BLOOD BY AUTOMATED COUNT: 13.9 % (ref 11.5–14.5)
GFR SERPL CREATININE-BSD FRML MDRD: > 60 ML/MIN/1.73M2
GLUCOSE SERPL-MCNC: 113 MG/DL (ref 70–108)
HCT VFR BLD AUTO: 23.1 % (ref 37–47)
HCT VFR BLD AUTO: 23.6 % (ref 37–47)
HGB BLD-MCNC: 7.1 GM/DL (ref 12–16)
HGB BLD-MCNC: 7.1 GM/DL (ref 12–16)
IMM GRANULOCYTES # BLD AUTO: 0.02 THOU/MM3 (ref 0–0.07)
IMM GRANULOCYTES NFR BLD AUTO: 0.3 %
LYMPHOCYTES ABSOLUTE: 1.7 THOU/MM3 (ref 1–4.8)
LYMPHOCYTES NFR BLD AUTO: 28.2 %
MCH RBC QN AUTO: 27 PG (ref 26–33)
MCHC RBC AUTO-ENTMCNC: 30.1 GM/DL (ref 32.2–35.5)
MCV RBC AUTO: 89.7 FL (ref 81–99)
MONOCYTES ABSOLUTE: 0.5 THOU/MM3 (ref 0.4–1.3)
MONOCYTES NFR BLD AUTO: 8.9 %
NEUTROPHILS NFR BLD AUTO: 58.3 %
NRBC BLD AUTO-RTO: 0 /100 WBC
PLATELET # BLD AUTO: 231 THOU/MM3 (ref 130–400)
PMV BLD AUTO: 10.7 FL (ref 9.4–12.4)
POTASSIUM SERPL-SCNC: 3.6 MEQ/L (ref 3.5–5.2)
RBC # BLD AUTO: 2.63 MILL/MM3 (ref 4.2–5.4)
SEGMENTED NEUTROPHILS ABSOLUTE COUNT: 3.6 THOU/MM3 (ref 1.8–7.7)
SODIUM SERPL-SCNC: 142 MEQ/L (ref 135–145)
WBC # BLD AUTO: 6.1 THOU/MM3 (ref 4.8–10.8)

## 2023-09-08 PROCEDURE — 2580000003 HC RX 258: Performed by: NURSE ANESTHETIST, CERTIFIED REGISTERED

## 2023-09-08 PROCEDURE — 36415 COLL VENOUS BLD VENIPUNCTURE: CPT

## 2023-09-08 PROCEDURE — 2580000003 HC RX 258: Performed by: NURSE PRACTITIONER

## 2023-09-08 PROCEDURE — 99233 SBSQ HOSP IP/OBS HIGH 50: CPT | Performed by: NURSE PRACTITIONER

## 2023-09-08 PROCEDURE — 6370000000 HC RX 637 (ALT 250 FOR IP)

## 2023-09-08 PROCEDURE — 3609027000 HC COLONOSCOPY: Performed by: INTERNAL MEDICINE

## 2023-09-08 PROCEDURE — 6360000002 HC RX W HCPCS: Performed by: NURSE ANESTHETIST, CERTIFIED REGISTERED

## 2023-09-08 PROCEDURE — 85025 COMPLETE CBC W/AUTO DIFF WBC: CPT

## 2023-09-08 PROCEDURE — 2580000003 HC RX 258

## 2023-09-08 PROCEDURE — 7100000010 HC PHASE II RECOVERY - FIRST 15 MIN: Performed by: INTERNAL MEDICINE

## 2023-09-08 PROCEDURE — 1200000000 HC SEMI PRIVATE

## 2023-09-08 PROCEDURE — 80048 BASIC METABOLIC PNL TOTAL CA: CPT

## 2023-09-08 PROCEDURE — 3700000001 HC ADD 15 MINUTES (ANESTHESIA): Performed by: INTERNAL MEDICINE

## 2023-09-08 PROCEDURE — 94760 N-INVAS EAR/PLS OXIMETRY 1: CPT

## 2023-09-08 PROCEDURE — 0DBM8ZX EXCISION OF DESCENDING COLON, VIA NATURAL OR ARTIFICIAL OPENING ENDOSCOPIC, DIAGNOSTIC: ICD-10-PCS | Performed by: INTERNAL MEDICINE

## 2023-09-08 PROCEDURE — 3700000000 HC ANESTHESIA ATTENDED CARE: Performed by: INTERNAL MEDICINE

## 2023-09-08 PROCEDURE — 0DBL8ZX EXCISION OF TRANSVERSE COLON, VIA NATURAL OR ARTIFICIAL OPENING ENDOSCOPIC, DIAGNOSTIC: ICD-10-PCS | Performed by: INTERNAL MEDICINE

## 2023-09-08 PROCEDURE — 85014 HEMATOCRIT: CPT

## 2023-09-08 PROCEDURE — 85018 HEMOGLOBIN: CPT

## 2023-09-08 PROCEDURE — 94640 AIRWAY INHALATION TREATMENT: CPT

## 2023-09-08 PROCEDURE — 7100000011 HC PHASE II RECOVERY - ADDTL 15 MIN: Performed by: INTERNAL MEDICINE

## 2023-09-08 PROCEDURE — 2709999900 HC NON-CHARGEABLE SUPPLY: Performed by: INTERNAL MEDICINE

## 2023-09-08 PROCEDURE — 88305 TISSUE EXAM BY PATHOLOGIST: CPT

## 2023-09-08 RX ORDER — SODIUM CHLORIDE 9 MG/ML
INJECTION, SOLUTION INTRAVENOUS CONTINUOUS PRN
Status: DISCONTINUED | OUTPATIENT
Start: 2023-09-08 | End: 2023-09-08 | Stop reason: SDUPTHER

## 2023-09-08 RX ORDER — PROPOFOL 10 MG/ML
INJECTION, EMULSION INTRAVENOUS PRN
Status: DISCONTINUED | OUTPATIENT
Start: 2023-09-08 | End: 2023-09-08 | Stop reason: SDUPTHER

## 2023-09-08 RX ADMIN — ROPINIROLE HYDROCHLORIDE 2 MG: 1 TABLET, FILM COATED ORAL at 07:40

## 2023-09-08 RX ADMIN — LISINOPRIL 5 MG: 5 TABLET ORAL at 07:40

## 2023-09-08 RX ADMIN — ROPINIROLE HYDROCHLORIDE 2 MG: 1 TABLET, FILM COATED ORAL at 19:15

## 2023-09-08 RX ADMIN — LISINOPRIL 5 MG: 5 TABLET ORAL at 19:15

## 2023-09-08 RX ADMIN — PANTOPRAZOLE SODIUM 40 MG: 40 TABLET, DELAYED RELEASE ORAL at 17:55

## 2023-09-08 RX ADMIN — PROPOFOL 50 MG: 10 INJECTION, EMULSION INTRAVENOUS at 15:59

## 2023-09-08 RX ADMIN — TIOTROPIUM BROMIDE INHALATION SPRAY 2 PUFF: 3.12 SPRAY, METERED RESPIRATORY (INHALATION) at 07:48

## 2023-09-08 RX ADMIN — PROPOFOL 50 MG: 10 INJECTION, EMULSION INTRAVENOUS at 16:06

## 2023-09-08 RX ADMIN — CITALOPRAM 20 MG: 20 TABLET, FILM COATED ORAL at 07:40

## 2023-09-08 RX ADMIN — ALBUTEROL SULFATE 2 PUFF: 90 AEROSOL, METERED RESPIRATORY (INHALATION) at 17:37

## 2023-09-08 RX ADMIN — ONDANSETRON 4 MG: 4 TABLET, ORALLY DISINTEGRATING ORAL at 23:32

## 2023-09-08 RX ADMIN — PROPOFOL 50 MG: 10 INJECTION, EMULSION INTRAVENOUS at 15:58

## 2023-09-08 RX ADMIN — PROPOFOL 50 MG: 10 INJECTION, EMULSION INTRAVENOUS at 16:00

## 2023-09-08 RX ADMIN — ACETAMINOPHEN 650 MG: 325 TABLET ORAL at 19:17

## 2023-09-08 RX ADMIN — ATORVASTATIN CALCIUM 10 MG: 10 TABLET, FILM COATED ORAL at 17:55

## 2023-09-08 RX ADMIN — ALBUTEROL SULFATE 2 PUFF: 90 AEROSOL, METERED RESPIRATORY (INHALATION) at 07:48

## 2023-09-08 RX ADMIN — PROPOFOL 50 MG: 10 INJECTION, EMULSION INTRAVENOUS at 16:02

## 2023-09-08 RX ADMIN — SODIUM CHLORIDE, PRESERVATIVE FREE 10 ML: 5 INJECTION INTRAVENOUS at 19:16

## 2023-09-08 RX ADMIN — SODIUM CHLORIDE: 4.5 INJECTION, SOLUTION INTRAVENOUS at 07:41

## 2023-09-08 RX ADMIN — SODIUM CHLORIDE: 9 INJECTION, SOLUTION INTRAVENOUS at 14:41

## 2023-09-08 ASSESSMENT — PAIN DESCRIPTION - LOCATION: LOCATION: LEG

## 2023-09-08 ASSESSMENT — PAIN DESCRIPTION - FREQUENCY: FREQUENCY: CONTINUOUS

## 2023-09-08 ASSESSMENT — PAIN DESCRIPTION - ONSET: ONSET: ON-GOING

## 2023-09-08 ASSESSMENT — PAIN DESCRIPTION - PAIN TYPE: TYPE: CHRONIC PAIN

## 2023-09-08 ASSESSMENT — PAIN SCALES - GENERAL
PAINLEVEL_OUTOF10: 0
PAINLEVEL_OUTOF10: 2

## 2023-09-08 ASSESSMENT — PAIN DESCRIPTION - ORIENTATION: ORIENTATION: LOWER;LEFT;RIGHT

## 2023-09-08 ASSESSMENT — PAIN - FUNCTIONAL ASSESSMENT
PAIN_FUNCTIONAL_ASSESSMENT: NONE - DENIES PAIN
PAIN_FUNCTIONAL_ASSESSMENT: ACTIVITIES ARE NOT PREVENTED

## 2023-09-08 ASSESSMENT — COPD QUESTIONNAIRES: CAT_SEVERITY: MILD

## 2023-09-08 ASSESSMENT — PAIN DESCRIPTION - DESCRIPTORS: DESCRIPTORS: ACHING

## 2023-09-08 NOTE — PROGRESS NOTES
Admitted to [facility] for colonoscopy with Dr. Mady Cardoso. Consents signed. Call light within reach.       and sister in waiting room

## 2023-09-08 NOTE — PLAN OF CARE
Problem: Discharge Planning  Goal: Discharge to home or other facility with appropriate resources  Outcome: Progressing  Flowsheets (Taken 9/7/2023 1945)  Discharge to home or other facility with appropriate resources:   Identify barriers to discharge with patient and caregiver   Arrange for needed discharge resources and transportation as appropriate   Identify discharge learning needs (meds, wound care, etc)   Refer to discharge planning if patient needs post-hospital services based on physician order or complex needs related to functional status, cognitive ability or social support system     Problem: Pain  Goal: Verbalizes/displays adequate comfort level or baseline comfort level  Outcome: Progressing  Flowsheets (Taken 9/7/2023 1945)  Verbalizes/displays adequate comfort level or baseline comfort level:   Encourage patient to monitor pain and request assistance   Assess pain using appropriate pain scale   Administer analgesics based on type and severity of pain and evaluate response   Consider cultural and social influences on pain and pain management   Implement non-pharmacological measures as appropriate and evaluate response     Problem: ABCDS Injury Assessment  Goal: Absence of physical injury  Outcome: Progressing  Flowsheets (Taken 9/7/2023 2329)  Absence of Physical Injury: Implement safety measures based on patient assessment     Problem: Safety - Adult  Goal: Free from fall injury  Outcome: Progressing  Flowsheets (Taken 9/7/2023 2329)  Free From Fall Injury:   Instruct family/caregiver on patient safety   Based on caregiver fall risk screen, instruct family/caregiver to ask for assistance with transferring infant if caregiver noted to have fall risk factors     Problem: Gastrointestinal - Adult  Goal: Minimal or absence of nausea and vomiting  Outcome: Progressing  Flowsheets (Taken 9/7/2023 1945)  Minimal or absence of nausea and vomiting:   Administer IV fluids as ordered to ensure adequate

## 2023-09-08 NOTE — CARE COORDINATION
9/8/23, 8:18 AM EDT    DISCHARGE ON GOING EVALUATION    [address] day: 3  Location: 8A-10/010-A Reason for admit: Acute blood loss anemia [D62]  GI bleed [K92.2]  Lower GI bleed [K92.2]   Procedure: none  Barriers to Discharge: Hospitalist and GI following. Planning colonoscopy today. IVF. Holding Aspirin, Eliquis, Lasix, Potassium. H&H 7.1/23.6  PCP: Alina Mccloud DO  Readmission Risk Score: 11.9%  Patient Goals/Plan/Treatment Preferences: Planning to return home with her . Potential discharge over the weekend  9/8/23, 1:15 PM EDT    Patient goals/plan/ treatment preferences discussed by  and . Patient goals/plan/ treatment preferences reviewed with patient/ family. Patient/ family verbalize understanding of discharge plan and are in agreement with goal/plan/treatment preferences. Understanding was demonstrated using the teach back method. AVS provided by RN at time of discharge, which includes all necessary medical information pertaining to the patients current course of illness, treatment, post-discharge goals of care, and treatment preferences.      Services At/After Discharge: None       IMM Letter  IMM Letter given to Patient/Family/Significant other/Guardian/POA/by[de-identified] SALBADOR KEANE CM  IMM Letter date given[de-identified] 09/08/23  IMM Letter time given[de-identified] 0099

## 2023-09-08 NOTE — POST SEDATION
[address]  Sedation/Analgesia Post Sedation Record    Patient: Marshall Buchanan: 1955  Med Rec#: [id number] Acc#: [id number]   Procedure Performed By: Sharonda Falk MD  Primary Care Physician: Miguelito Keenan DO    POST-PROCEDURE    Physicians/Assistants: Sharonda Falk MD  Procedure Performed:    Sedation/Anesthesia:     Estimated Blood Loss:          ml  Specimens Removed:  []None [x]Other:      Disposition of Specimen:  [x]Pathology []Other      Complications:   [x]None Immediate []Other:     Post-procedure Diagnosis/Findings:             Recommendations:           Akhil Nava MD, MD [facility]  Electronically signed 9/8/2023 at 4:29 PM

## 2023-09-08 NOTE — PROGRESS NOTES
Recovery mode. Patient denies discomfort. Passing gas, taking fluids. Dr. Jh Francis discussed findings with patient and family. Report called to [employer] on 8A. Discharge instructions provided and understanding verbalized.

## 2023-09-08 NOTE — ANESTHESIA POSTPROCEDURE EVALUATION
Department of Anesthesiology  Postprocedure Note    Patient: Kiley Jacobson  MRN: [id number]  YOB: 1955  Date of evaluation: 9/8/2023      Procedure Summary     Date: 09/08/23 Room / Location: [address] [place] [address] / [address] [place] [address]    Anesthesia Start: 8032 Anesthesia Stop: 6818    Procedure: COLONOSCOPY Diagnosis:       Other iron deficiency anemia      Gastrointestinal hemorrhage associated with other gastritis      (Other iron deficiency anemia [D50.8])      (Gastrointestinal hemorrhage associated with other gastritis [K29.61])    Surgeons: Cesario Dickson MD Responsible Provider: Talon Crane DO    Anesthesia Type: MAC ASA Status: 3          Anesthesia Type: No value filed.     Krysta Phase I: Krysta Score: 10    Krysta Phase II:        Anesthesia Post Evaluation    Patient location during evaluation: bedside  Patient participation: complete - patient participated  Level of consciousness: sleepy but conscious  Airway patency: patent  Nausea & Vomiting: no nausea and no vomiting  Complications: no  Cardiovascular status: hemodynamically stable  Respiratory status: acceptable, room air and spontaneous ventilation  Hydration status: euvolemic  Pain management: adequate

## 2023-09-08 NOTE — PROGRESS NOTES
Colonoscopy completed. Pt tolerated well. 3 polyps removed, 1 jars labeled and sent to lab for processing. Jar A tissue unrecovered. Specimen not sent to lab. Photos taken. Scope #  used.

## 2023-09-08 NOTE — ANESTHESIA PRE PROCEDURE
Aortic valve replaced x 2. Mitral valve repair. Neuro/Psych:   (+) CVA:, neuromuscular disease:, depression/anxiety             GI/Hepatic/Renal:   (+) GERD:, liver disease:, bowel prep,           Endo/Other:    (+) blood dyscrasia: anemia, arthritis: OA., .                 Abdominal:             Vascular: negative vascular ROS. Other Findings:           Anesthesia Plan      MAC     ASA 3       Induction: intravenous. Anesthetic plan and risks discussed with patient, spouse and sibling. Plan discussed with attending.                     JUAN Serrano - CRNA   9/8/2023

## 2023-09-09 VITALS
SYSTOLIC BLOOD PRESSURE: 145 MMHG | HEIGHT: 61 IN | BODY MASS INDEX: 33.99 KG/M2 | OXYGEN SATURATION: 97 % | RESPIRATION RATE: 17 BRPM | WEIGHT: 180 LBS | TEMPERATURE: 98.4 F | HEART RATE: 69 BPM | DIASTOLIC BLOOD PRESSURE: 37 MMHG

## 2023-09-09 LAB
ANION GAP SERPL CALC-SCNC: 13 MEQ/L (ref 8–16)
BUN SERPL-MCNC: 10 MG/DL (ref 7–22)
CALCIUM SERPL-MCNC: 8.8 MG/DL (ref 8.5–10.5)
CHLORIDE SERPL-SCNC: 106 MEQ/L (ref 98–111)
CO2 SERPL-SCNC: 23 MEQ/L (ref 23–33)
CREAT SERPL-MCNC: 1 MG/DL (ref 0.4–1.2)
DEPRECATED RDW RBC AUTO: 45.8 FL (ref 35–45)
ERYTHROCYTE [DISTWIDTH] IN BLOOD BY AUTOMATED COUNT: 13.9 % (ref 11.5–14.5)
GFR SERPL CREATININE-BSD FRML MDRD: > 60 ML/MIN/1.73M2
GLUCOSE SERPL-MCNC: 127 MG/DL (ref 70–108)
HCT VFR BLD AUTO: 24.1 % (ref 37–47)
HGB BLD-MCNC: 7.3 GM/DL (ref 12–16)
MCH RBC QN AUTO: 27.2 PG (ref 26–33)
MCHC RBC AUTO-ENTMCNC: 30.3 GM/DL (ref 32.2–35.5)
MCV RBC AUTO: 89.9 FL (ref 81–99)
PLATELET # BLD AUTO: 230 THOU/MM3 (ref 130–400)
PMV BLD AUTO: 10.8 FL (ref 9.4–12.4)
POTASSIUM SERPL-SCNC: 3.6 MEQ/L (ref 3.5–5.2)
RBC # BLD AUTO: 2.68 MILL/MM3 (ref 4.2–5.4)
SODIUM SERPL-SCNC: 142 MEQ/L (ref 135–145)
WBC # BLD AUTO: 6.9 THOU/MM3 (ref 4.8–10.8)

## 2023-09-09 PROCEDURE — 6370000000 HC RX 637 (ALT 250 FOR IP)

## 2023-09-09 PROCEDURE — 2580000003 HC RX 258

## 2023-09-09 PROCEDURE — 85027 COMPLETE CBC AUTOMATED: CPT

## 2023-09-09 PROCEDURE — 99239 HOSP IP/OBS DSCHRG MGMT >30: CPT | Performed by: NURSE PRACTITIONER

## 2023-09-09 PROCEDURE — 36415 COLL VENOUS BLD VENIPUNCTURE: CPT

## 2023-09-09 PROCEDURE — 94640 AIRWAY INHALATION TREATMENT: CPT

## 2023-09-09 PROCEDURE — 80048 BASIC METABOLIC PNL TOTAL CA: CPT

## 2023-09-09 RX ADMIN — ROPINIROLE HYDROCHLORIDE 2 MG: 1 TABLET, FILM COATED ORAL at 07:39

## 2023-09-09 RX ADMIN — CITALOPRAM 20 MG: 20 TABLET, FILM COATED ORAL at 07:39

## 2023-09-09 RX ADMIN — LISINOPRIL 5 MG: 5 TABLET ORAL at 07:39

## 2023-09-09 RX ADMIN — TIOTROPIUM BROMIDE INHALATION SPRAY 2 PUFF: 3.12 SPRAY, METERED RESPIRATORY (INHALATION) at 09:42

## 2023-09-09 RX ADMIN — PANTOPRAZOLE SODIUM 40 MG: 40 TABLET, DELAYED RELEASE ORAL at 05:28

## 2023-09-09 RX ADMIN — ALBUTEROL SULFATE 2 PUFF: 90 AEROSOL, METERED RESPIRATORY (INHALATION) at 09:41

## 2023-09-09 RX ADMIN — SODIUM CHLORIDE, PRESERVATIVE FREE 10 ML: 5 INJECTION INTRAVENOUS at 07:39

## 2023-09-09 ASSESSMENT — PAIN SCALES - GENERAL: PAINLEVEL_OUTOF10: 0

## 2023-09-09 NOTE — OP NOTE
[place] [address]                                OPERATIVE REPORT    PATIENT NAME: Akhil Ochoa                      :        1955  MED REC NO:   [id number]                           ROOM:       0010  ACCOUNT NO:   [de-identified]                           ADMIT DATE: 2023  PROVIDER:     Valentín Laguerre M.D.    Dinora Dasen:  2023    PROCEDURE:  Colonoscopy plus snare plus biopsy. SURGEON:  Valentín Laguerre MD    INDICATION FOR THE PROCEDURE:  The patient with a history of GI bleed. See the office note and preop note for rest of the clinicals. ASA CLASSIFICATION:  III. MEDICATIONS:  Per Anesthesia. BIOPSY:  Yes. PHOTOGRAPHS:  Yes. BLOOD LOSS:  Less than 5 mL. DESCRIPTION OF THE PROCEDURE:  Informed consent was obtained after  explaining risks and benefits of the procedure and conscious sedation. Possible complications including bleeding, perforation, reaction to  medicine, but not limiting to death, were discussed. Afterwards,  PCF-180 colonoscope was advanced to the cecum. Landmarks were  identified. No polyp or mass seen in the cecum. Ascending colon had a  polyp removed with cold snare, this was around 7 to 10 mm, sample was  lost.  Transverse colon, had a polyp removed with cold biopsy, it was  close to hepatic flexure. In the descending colon, there was another  polyp removed with cold biopsies and diverticulosis were appreciated. Internal hemorrhoids were seen. Tolerated the procedure well. IMPRESSION:  1.  Multiple colon polyps, post snare polypectomy and post cold biopsy. 2.  Previous history of colon polyps. 3.  Diverticulosis. 4.  Internal hemorrhoids. RECOMMENDATIONS:  The patient probably had a bleed from diverticulosis  or hemorrhoid and the Eliquis has probably made it worse.   We should  probably resume diet and Eliquis can be resumed today as well or in the  morning and we can sign off. The patient does carry high risk of GI  bleeding because of Eliquis. Thing like Watchman can be a  consideration. Follow up colonoscopy in three to five years. The  patient can be discharged home soon.         Angel Luis Merrill M.D.    D: 09/08/2023 16:38:58       T: 09/08/2023 19:59:08     [email or web address]  Job#: [id number]     Doc#: [id number]    CC:

## 2023-09-09 NOTE — DISCHARGE SUMMARY
Hospital Medicine Discharge Summary      Patient Identification:   Teresa Castro   : 1955  MRN: [id number]   Account: [de-identified]      Patient's PCP: Leif Goode DO    Admit Date: 2023     Discharge Date:   2023    Admitting Physician: No admitting provider for patient encounter. Discharging Nurse Practitioner: Deshaun Walters, APRN - CNP     Discharge Diagnoses with Assessment/Plan:  Acute on chronic normocytic anemia/lower GI bleed, possibly secondary from diverticulosis or hemorrhoids possibly induced from Eliquis--colonoscopy on  showed multiple colon polyps, post snare polypectomy and post cold biopsy along with diverticulosis and internal hemorrhoids; appreciate GI input~signed off on  and Eliquis can be resumed~patient agrees to resume Eliquis however wants to speak to her cardiologist about the Watchman procedure; hemoglobin improved to 7.3 today, hemodynamically stable, plan on doing outpatient H/H in a few days with results to PCP, needs follow-up with GI as well  Tubular adenoma colonic polyp--please see #1  Hypernatremia, mild--resolved  COPD stage II--on RA, on Spiriva  Chronic diastolic heart failure  Primary hypertension, uncontrolled--on Lasix, lisinopril; stable  CKD stage IIIa   RLS--on Requip  Depression--treated  History of stroke  History of erosive esophagitis--on Protonix 40 mg twice daily  PAF--Eliquis resumed on  per okay with GI; possibly needs outpatient work-up to consider Watchman procedure secondary to high risk of bleeding with Eliquis~needs follow-up appointment with primary cardiologist  Status post AVR/MVR--follows with Dr. Ade Newman  Remote tobacco abuse  Hyperlipidemia--statin  Obesity class I with BMI 34.01     The patient was seen and examined on day of discharge and this discharge summary is in conjunction with any daily progress note from day of discharge.     Hospital Course:   Teresa Castro is a 75 y.o. female admitted to [address] [place] [address] [address] [facility]    Follow up  to discuss Watchman procedure as high risk of GIB with Eliquis         Discharge Medications:        Medication List        CHANGE how you take these medications      traZODone 50 MG tablet  Commonly known as: DESYREL  TAKE 1 TABLET EVERY NIGHT  What changed:   how much to take  how to take this  when to take this  reasons to take this            CONTINUE taking these medications      albuterol sulfate  (90 Base) MCG/ACT inhaler  Commonly known as: PROVENTIL;VENTOLIN;PROAIR  Inhale 2 puffs into the lungs every 4 hours as needed for Wheezing or Shortness of Breath     atorvastatin 10 MG tablet  Commonly known as: LIPITOR  TAKE 1 TABLET EVERY EVENING     B-12 3000 MCG Subl  Place 1 tablet under the tongue daily     citalopram 20 MG tablet  Commonly known as: CELEXA  TAKE 1 TABLET EVERY DAY     docusate sodium 100 MG capsule  Commonly known as: COLACE     Eliquis 5 MG Tabs tablet  Generic drug: apixaban  TAKE 1 TABLET BY MOUTH TWICE DAILY TO  PREVENT  STROKE  ASSOCIATED  WITH  ATRIAL  FIBRILLATION.      ferrous sulfate 325 (65 Fe) MG tablet  Commonly known as: IRON 325  Take 1 tablet by mouth 2 times daily (with meals)     furosemide 20 MG tablet  Commonly known as: LASIX  TAKE 1 TABLET EVERY DAY     Handicap Placard Misc  by Does not apply route Expires 04 AUG 2028     lisinopril 5 MG tablet  Commonly known as: PRINIVIL;ZESTRIL  TAKE 1 TABLET TWICE DAILY     * pantoprazole 40 MG tablet  Commonly known as: PROTONIX  Take 1 tablet by mouth 2 times daily (before meals)     * pantoprazole 40 MG tablet  Commonly known as: PROTONIX  Take 1 tablet by mouth 2 times daily (before meals)     potassium chloride 20 MEQ extended release tablet  Commonly known as: KLOR-CON M  TAKE 1 TABLET EVERY DAY     rOPINIRole 2 MG tablet  Commonly known as: REQUIP  TAKE 1 TABLET TWICE DAILY     tiotropium 2.5 MCG/ACT Aers inhaler  Commonly known as: Spiriva

## 2023-09-09 NOTE — PLAN OF CARE
Problem: Discharge Planning  Goal: Discharge to home or other facility with appropriate resources  9/9/2023 0914 by Brisa Gomez RN  Outcome: Completed  9/8/2023 2237 by Kelli Briscoe RN  Outcome: Progressing  Flowsheets (Taken 9/8/2023 1920)  Discharge to home or other facility with appropriate resources:   Identify barriers to discharge with patient and caregiver   Arrange for needed discharge resources and transportation as appropriate   Identify discharge learning needs (meds, wound care, etc)   Refer to discharge planning if patient needs post-hospital services based on physician order or complex needs related to functional status, cognitive ability or social support system     Problem: Pain  Goal: Verbalizes/displays adequate comfort level or baseline comfort level  9/9/2023 0914 by Brisa Gomez RN  Outcome: Completed  9/8/2023 2237 by Kelli Briscoe RN  Outcome: Progressing  Flowsheets (Taken 9/8/2023 1900)  Verbalizes/displays adequate comfort level or baseline comfort level:   Encourage patient to monitor pain and request assistance   Assess pain using appropriate pain scale   Administer analgesics based on type and severity of pain and evaluate response   Implement non-pharmacological measures as appropriate and evaluate response   Consider cultural and social influences on pain and pain management     Problem: ABCDS Injury Assessment  Goal: Absence of physical injury  9/9/2023 0914 by Brisa Gomez RN  Outcome: Completed  9/8/2023 2237 by Kelli Briscoe RN  Outcome: Progressing  Flowsheets (Taken 9/8/2023 2235)  Absence of Physical Injury: Implement safety measures based on patient assessment     Problem: Safety - Adult  Goal: Free from fall injury  9/9/2023 0914 by Brisa Gomez RN  Outcome: Completed  9/8/2023 2237 by Kelli Briscoe RN  Outcome: Progressing  Flowsheets (Taken 9/8/2023 2235)  Free From Fall Injury:   Instruct family/caregiver on patient safety   Based on caregiver fall Progressing  Note: Clear lung sounds throughout. Even and unlabored breathe sounds. O2 saturation above 90% on room air.  Denies SOB     Problem: Chronic Conditions and Co-morbidities  Goal: Patient's chronic conditions and co-morbidity symptoms are monitored and maintained or improved  9/9/2023 0914 by Jesika Mohr RN  Outcome: Completed  9/8/2023 2237 by Christy Thomas RN  Outcome: Progressing  Flowsheets (Taken 9/8/2023 1920)  Care Plan - Patient's Chronic Conditions and Co-Morbidity Symptoms are Monitored and Maintained or Improved:   Monitor and assess patient's chronic conditions and comorbid symptoms for stability, deterioration, or improvement   Collaborate with multidisciplinary team to address chronic and comorbid conditions and prevent exacerbation or deterioration   Update acute care plan with appropriate goals if chronic or comorbid symptoms are exacerbated and prevent overall improvement and discharge

## 2023-09-09 NOTE — PROGRESS NOTES
Discharge teaching and instructions for diagnosis/procedure of GI bleed will be completed with patient using teachback method after discharge order placed. AVS will be reviewed. Printed prescriptions will be given to patient. Patient will be asked for his  understanding regarding prescriptions, follow up appointments, and care of self at home. Awaiting ride for discharge home. Patient discharged in stable condition. IV removed.

## 2023-09-09 NOTE — PLAN OF CARE
Problem: Discharge Planning  Goal: Discharge to home or other facility with appropriate resources  Outcome: Progressing  Flowsheets (Taken 9/8/2023 1920)  Discharge to home or other facility with appropriate resources:   Identify barriers to discharge with patient and caregiver   Arrange for needed discharge resources and transportation as appropriate   Identify discharge learning needs (meds, wound care, etc)   Refer to discharge planning if patient needs post-hospital services based on physician order or complex needs related to functional status, cognitive ability or social support system     Problem: Pain  Goal: Verbalizes/displays adequate comfort level or baseline comfort level  Outcome: Progressing  Flowsheets (Taken 9/8/2023 1900)  Verbalizes/displays adequate comfort level or baseline comfort level:   Encourage patient to monitor pain and request assistance   Assess pain using appropriate pain scale   Administer analgesics based on type and severity of pain and evaluate response   Implement non-pharmacological measures as appropriate and evaluate response   Consider cultural and social influences on pain and pain management     Problem: ABCDS Injury Assessment  Goal: Absence of physical injury  Outcome: Progressing  Flowsheets (Taken 9/8/2023 2235)  Absence of Physical Injury: Implement safety measures based on patient assessment     Problem: Safety - Adult  Goal: Free from fall injury  Outcome: Progressing  Flowsheets (Taken 9/8/2023 2235)  Free From Fall Injury:   Instruct family/caregiver on patient safety   Based on caregiver fall risk screen, instruct family/caregiver to ask for assistance with transferring infant if caregiver noted to have fall risk factors     Problem: Gastrointestinal - Adult  Goal: Minimal or absence of nausea and vomiting  Outcome: Progressing  Flowsheets (Taken 9/8/2023 1920)  Minimal or absence of nausea and vomiting:   Administer IV fluids as ordered to ensure adequate Patient participated in plan of care and contributed to goal setting.

## 2023-09-11 ENCOUNTER — CARE COORDINATION (OUTPATIENT)
Dept: CASE MANAGEMENT | Age: 68
End: 2023-09-11

## 2023-09-11 ENCOUNTER — TELEPHONE (OUTPATIENT)
Dept: CARDIOLOGY CLINIC | Age: 68
End: 2023-09-11

## 2023-09-11 DIAGNOSIS — K92.2 GASTROINTESTINAL HEMORRHAGE, UNSPECIFIED GASTROINTESTINAL HEMORRHAGE TYPE: Primary | ICD-10-CM

## 2023-09-11 PROCEDURE — 1111F DSCHRG MED/CURRENT MED MERGE: CPT | Performed by: FAMILY MEDICINE

## 2023-09-11 NOTE — ACP (ADVANCE CARE PLANNING)
Has ACP docs at home, recently filled out but keeps forgetting to bring in. Encouraged to bring for scanning.

## 2023-09-11 NOTE — CARE COORDINATION
[facility] Care Transitions Initial Follow Up Call    Call within 2 business days of discharge: Yes    Patient Current Location:  Home: [address]  [address] [facility] [address]    Care Transition Nurse contacted the patient by telephone to perform post hospital discharge assessment. Verified name and  with patient as identifiers. Provided introduction to self, and explanation of the Care Transition Nurse role. Patient: Sara Cadena Patient : 1955   MRN: [id number]  Reason for Admission: lower GI bleed  Discharge Date: 23 RARS: Readmission Risk Score: 12.7      Last Discharge [address] [facility] [facility]       Date Complaint Diagnosis Description Type Department Provider    23 Abnormal Lab Lower GI bleed . .. ED to Hosp-Admission (Discharged) (ADMITTED) JUAN Kelly - CNP; Ed. .. Challenges to be reviewed by the provider   Additional needs identified to be addressed with provider: No  none               Method of communication with provider: none. Spoke with Paramjit Mane, said she is doing ok but is getting SOB with exertion. She wonders if her iron is low. Will get H&H tomorrow. Said she is ok at rest and if she gets up and walks slowly, short distances, she is ok but if she tries to carry things or walk further, then gets SOB. Recovers quickly with rest.  She has a pulse ox but hasn't checked when she is SOB. Encouraged her to do so and if below 88% would qualify for O2. Does have hx of COPD, CHF. She monitors her wt about every other day, and monitors her BP. Offered RPM, wants to think about it. Reviewed medications/changes. Denies any further signs of bleeding. Her bowels are moving, no blood or dark stool seen. Had colonoscopy and will see GI 10/4. Appetite is fair, said she is not real hungry but is drinking adequate fluids. Her appetite was good prior, said was \"eating like a hog. \"  Will see PCP tomorrow and will discuss the SOB.   Will see cardio on Wed to discuss

## 2023-09-11 NOTE — TELEPHONE ENCOUNTER
Patient was discharged from [address] [place] [address] this past weekend and advised to follow up with Dr Jose Flaherty to discuss watchman? Please verify and call her back to schedule follow up.

## 2023-09-11 NOTE — PROGRESS NOTES
Histology called to review lab specimens that were received from colonoscopy on 9/8/2023. Orders updated.

## 2023-09-11 NOTE — PROGRESS NOTES
Chief Complaint   Patient presents with    Follow-Up from Hospital     Lower GI bleed     History obtained from the patient. SUBJECTIVE:  Florentino Beck is a 75 y.o. female that presents today for    -Patient admitted to [facility] from 9/5 to 9/9/2023 for issues noted below. D/c summary: \"Discharging Nurse Practitioner: JUAN Padilla - CNP      Discharge Diagnoses with Assessment/Plan:  Acute on chronic normocytic anemia/lower GI bleed, possibly secondary from diverticulosis or hemorrhoids possibly induced from Eliquis--colonoscopy on 9/8 showed multiple colon polyps, post snare polypectomy and post cold biopsy along with diverticulosis and internal hemorrhoids; appreciate GI input~signed off on 9/8 and Eliquis can be resumed~patient agrees to resume Eliquis however wants to speak to her cardiologist about the Watchman procedure; hemoglobin improved to 7.3 today, hemodynamically stable, plan on doing outpatient H/H in a few days with results to PCP, needs follow-up with GI as well  Tubular adenoma colonic polyp--please see #1  Hypernatremia, mild--resolved  COPD stage II--on RA, on Spiriva  Chronic diastolic heart failure  Primary hypertension, uncontrolled--on Lasix, lisinopril; stable  CKD stage IIIa   RLS--on Requip  Depression--treated  History of stroke  History of erosive esophagitis--on Protonix 40 mg twice daily  PAF--Eliquis resumed on 9/9 per okay with GI; possibly needs outpatient work-up to consider Watchman procedure secondary to high risk of bleeding with Eliquis~needs follow-up appointment with primary cardiologist  Status post AVR/MVR--follows with Dr. Kitty Mirza  Remote tobacco abuse  Hyperlipidemia--statin  Obesity class I with BMI 34.01     The patient was seen and examined on day of discharge and this discharge summary is in conjunction with any daily progress note from day of discharge.      Hospital Course:   Florentino Beck is a 75 y.o. female admitted to [facility] on

## 2023-09-12 ENCOUNTER — NURSE ONLY (OUTPATIENT)
Dept: LAB | Age: 68
End: 2023-09-12

## 2023-09-12 ENCOUNTER — OFFICE VISIT (OUTPATIENT)
Dept: FAMILY MEDICINE CLINIC | Age: 68
End: 2023-09-12

## 2023-09-12 VITALS
WEIGHT: 181.8 LBS | DIASTOLIC BLOOD PRESSURE: 62 MMHG | OXYGEN SATURATION: 97 % | BODY MASS INDEX: 34.32 KG/M2 | TEMPERATURE: 97.6 F | HEIGHT: 61 IN | SYSTOLIC BLOOD PRESSURE: 122 MMHG | RESPIRATION RATE: 16 BRPM | HEART RATE: 74 BPM

## 2023-09-12 DIAGNOSIS — N39.0 ACUTE UTI: ICD-10-CM

## 2023-09-12 DIAGNOSIS — R06.09 DOE (DYSPNEA ON EXERTION): ICD-10-CM

## 2023-09-12 DIAGNOSIS — F33.42 RECURRENT MAJOR DEPRESSIVE DISORDER, IN FULL REMISSION (HCC): ICD-10-CM

## 2023-09-12 DIAGNOSIS — E11.9 TYPE 2 DIABETES MELLITUS WITHOUT COMPLICATION, WITHOUT LONG-TERM CURRENT USE OF INSULIN (HCC): ICD-10-CM

## 2023-09-12 DIAGNOSIS — M79.7 FIBROMYALGIA: ICD-10-CM

## 2023-09-12 DIAGNOSIS — I50.32 CHRONIC HEART FAILURE WITH PRESERVED EJECTION FRACTION (HCC): ICD-10-CM

## 2023-09-12 DIAGNOSIS — Z95.2 S/P AVR (AORTIC VALVE REPLACEMENT): ICD-10-CM

## 2023-09-12 DIAGNOSIS — I48.0 PAROXYSMAL ATRIAL FIBRILLATION (HCC): ICD-10-CM

## 2023-09-12 DIAGNOSIS — K92.2 UPPER GI BLEED: ICD-10-CM

## 2023-09-12 DIAGNOSIS — I10 HYPERTENSION, ESSENTIAL: ICD-10-CM

## 2023-09-12 DIAGNOSIS — E78.5 DYSLIPIDEMIA: ICD-10-CM

## 2023-09-12 DIAGNOSIS — K22.10 EROSIVE ESOPHAGITIS: ICD-10-CM

## 2023-09-12 DIAGNOSIS — K92.2 LOWER GI BLEED: Primary | ICD-10-CM

## 2023-09-12 DIAGNOSIS — K92.2 GASTROINTESTINAL HEMORRHAGE, UNSPECIFIED GASTROINTESTINAL HEMORRHAGE TYPE: ICD-10-CM

## 2023-09-12 DIAGNOSIS — G25.81 RLS (RESTLESS LEGS SYNDROME): ICD-10-CM

## 2023-09-12 DIAGNOSIS — J44.9 CHRONIC OBSTRUCTIVE PULMONARY DISEASE, UNSPECIFIED COPD TYPE (HCC): ICD-10-CM

## 2023-09-12 DIAGNOSIS — K21.00 GASTROESOPHAGEAL REFLUX DISEASE WITH ESOPHAGITIS, UNSPECIFIED WHETHER HEMORRHAGE: ICD-10-CM

## 2023-09-12 DIAGNOSIS — D62 ACUTE BLOOD LOSS ANEMIA: ICD-10-CM

## 2023-09-12 DIAGNOSIS — E53.8 B12 DEFICIENCY: ICD-10-CM

## 2023-09-12 LAB
BACTERIA: ABNORMAL
BILIRUB UR QL STRIP: NEGATIVE
CASTS #/AREA URNS LPF: ABNORMAL /LPF
CASTS #/AREA URNS LPF: ABNORMAL /LPF
CHARACTER UR: CLEAR
CHARCOAL URNS QL MICRO: ABNORMAL
COLOR UR: YELLOW
CRYSTALS URNS QL MICRO: ABNORMAL
EPITHELIAL CELLS, UA: ABNORMAL /HPF
GLUCOSE UR QL STRIP.AUTO: NEGATIVE MG/DL
HCT VFR BLD AUTO: 24 % (ref 37–47)
HGB BLD-MCNC: 7.2 GM/DL (ref 12–16)
HGB UR QL STRIP.AUTO: NEGATIVE
KETONES UR QL STRIP.AUTO: NEGATIVE
LEUKOCYTE ESTERASE UR QL STRIP.AUTO: NEGATIVE
NITRITE UR QL STRIP.AUTO: NEGATIVE
PH UR STRIP.AUTO: 5.5 [PH] (ref 5–9)
PROT UR STRIP.AUTO-MCNC: ABNORMAL MG/DL
RBC #/AREA URNS HPF: ABNORMAL /HPF
RENAL EPI CELLS #/AREA URNS HPF: ABNORMAL /[HPF]
SPECIFIC GRAVITY UA: 1.01 (ref 1–1.03)
UROBILINOGEN, URINE: 0.2 EU/DL (ref 0–1)
WBC #/AREA URNS HPF: ABNORMAL /HPF
YEAST LIKE FUNGI URNS QL MICRO: ABNORMAL

## 2023-09-12 RX ORDER — AMITRIPTYLINE HYDROCHLORIDE 25 MG/1
25 TABLET, FILM COATED ORAL NIGHTLY
COMMUNITY

## 2023-09-12 NOTE — PATIENT INSTRUCTIONS
LAB INSTRUCTIONS:    Please complete labs IN 4 week(s). Please fast for 8 hours prior to lab collection. The clinic will call you within 1 week of collection. If you have not heard from us within that amount of time, please call us at [phone].

## 2023-09-13 ENCOUNTER — OFFICE VISIT (OUTPATIENT)
Dept: CARDIOLOGY CLINIC | Age: 68
End: 2023-09-13
Payer: MEDICARE

## 2023-09-13 VITALS
DIASTOLIC BLOOD PRESSURE: 80 MMHG | SYSTOLIC BLOOD PRESSURE: 130 MMHG | WEIGHT: 184 LBS | BODY MASS INDEX: 34.77 KG/M2 | HEART RATE: 82 BPM

## 2023-09-13 DIAGNOSIS — I48.92 ATRIAL FIBRILLATION AND FLUTTER (HCC): ICD-10-CM

## 2023-09-13 DIAGNOSIS — Z95.2 S/P AVR: Primary | ICD-10-CM

## 2023-09-13 DIAGNOSIS — Z98.890 S/P MVR (MITRAL VALVE REPAIR): ICD-10-CM

## 2023-09-13 DIAGNOSIS — I48.91 ATRIAL FIBRILLATION AND FLUTTER (HCC): ICD-10-CM

## 2023-09-13 LAB
BACTERIA UR CULT: ABNORMAL
ORGANISM: ABNORMAL

## 2023-09-13 PROCEDURE — G8427 DOCREV CUR MEDS BY ELIG CLIN: HCPCS | Performed by: NUCLEAR MEDICINE

## 2023-09-13 PROCEDURE — 3075F SYST BP GE 130 - 139MM HG: CPT | Performed by: NUCLEAR MEDICINE

## 2023-09-13 PROCEDURE — G8399 PT W/DXA RESULTS DOCUMENT: HCPCS | Performed by: NUCLEAR MEDICINE

## 2023-09-13 PROCEDURE — 1036F TOBACCO NON-USER: CPT | Performed by: NUCLEAR MEDICINE

## 2023-09-13 PROCEDURE — 99214 OFFICE O/P EST MOD 30 MIN: CPT | Performed by: NUCLEAR MEDICINE

## 2023-09-13 PROCEDURE — 1090F PRES/ABSN URINE INCON ASSESS: CPT | Performed by: NUCLEAR MEDICINE

## 2023-09-13 PROCEDURE — 1111F DSCHRG MED/CURRENT MED MERGE: CPT | Performed by: NUCLEAR MEDICINE

## 2023-09-13 PROCEDURE — 1123F ACP DISCUSS/DSCN MKR DOCD: CPT | Performed by: NUCLEAR MEDICINE

## 2023-09-13 PROCEDURE — 3079F DIAST BP 80-89 MM HG: CPT | Performed by: NUCLEAR MEDICINE

## 2023-09-13 PROCEDURE — 3017F COLORECTAL CA SCREEN DOC REV: CPT | Performed by: NUCLEAR MEDICINE

## 2023-09-13 PROCEDURE — G8417 CALC BMI ABV UP PARAM F/U: HCPCS | Performed by: NUCLEAR MEDICINE

## 2023-09-13 NOTE — PROGRESS NOTES
[address] [facility]  [address]  [facility] [address]  Dept: [phone]  Dept Fax: [phone]  Loc: [phone]    Visit Date: 9/13/2023    Marcos Schroeder is a 75 y.o. female who presents todayfor:  Chief Complaint   Patient presents with    6 Month Follow-Up     Discuss Watchman & GI Bleed    Valvular Heart Disease    Hypertension     Known AVR and MVR  Non mechanical   Also PAF  GI bleeding   Here to discuss options  Recurrent GI bleed  No chest pain  Some baseline dyspnea  BP is stable  No dizziness  No syncope      HPI:  HPI  Past Medical History:   Diagnosis Date    Atrial fibrillation (HCC)     COPD (chronic obstructive pulmonary disease) (HCC)     DM2 (diabetes mellitus, type 2) (HCC)     Dyslipidemia     Fibromyalgia     Former smoker     GERD (gastroesophageal reflux disease)     H/O prosthetic aortic valve replacement     Early bioprosthetic aortic valve failure with severe symptomatic prosthetic aortic regurgitation is now s/p REDO AORTIC VALVE REPLACEMENT, MITRAL VALVE REPAIR, TRICUSPID VALVE REPAIR, WILBERTO performed by Geovanna Lancaster MD at [address] [facility] failure with preserved ejection fraction ([address] [facility])     History of aortic stenosis, s/p valve replacement x 2     History of iron deficiency anemia     History of subarachnoid hemorrhage 03/23/2015    Spontaneous SAH. Admitted to [facility]. Extensive w/u for cause was negative.      Hypertension, essential     Major depression     Morbid obesity (HCC)     THAYER (nonalcoholic steatohepatitis)     Osteoarthritis     RLS (restless legs syndrome) 01/16/2019    S/P AVR (aortic valve replacement) 2015    x 2, last replacement July 2018 d/t failure of the 1st    S/P mitral valve repair 07/13/2018    Dr. Edson Buchanan    Valvular heart disease 01/16/2019      Past Surgical History:   Procedure Laterality Date    AORTIC VALVE REPLACEMENT      cow valve    BRAIN SURGERY      to drain blood    CARDIAC CATHETERIZATION

## 2023-09-14 ENCOUNTER — TELEPHONE (OUTPATIENT)
Dept: FAMILY MEDICINE CLINIC | Age: 68
End: 2023-09-14

## 2023-09-14 NOTE — TELEPHONE ENCOUNTER
----- Message from Elmira Jensen DO sent at 9/13/2023  6:14 PM EDT -----  Please let pt know that f/u urine looks good  Let me know if questions, thanks!

## 2023-09-18 ENCOUNTER — CARE COORDINATION (OUTPATIENT)
Dept: CASE MANAGEMENT | Age: 68
End: 2023-09-18

## 2023-09-18 NOTE — CARE COORDINATION
Care Transitions Outreach Attempt-1st attempt    Call within 2 business days of discharge: Yes   Attempted to reach patient for subsequent transitional call. Left HIPPA compliant VM to return call directly to [phone]. Patient: Montrell Salazar Patient : 1955 MRN: [id number]    Last Discharge [address] [facility] [facility]       Date Complaint Diagnosis Description Type Department Provider    23 Abnormal Lab Lower GI bleed . .. ED to Hosp-Admission (Discharged) (ADMITTED) [address] JUAN Coates - CNP; Ed. ..               Noted following upcoming appointments from discharge chart review:   [facility] follow up appointment(s):   Future Appointments   Date Time Provider [address]   2023  9:15 AM STR OUT PT ONC C[facility] STRZ OP 30 [facility]   2023  9:30 AM Mills Cushing, MD N Oncology [facility]   2023  8:30 AM [facility]   10/2/2023  4:00 PM STR PULMONARY FUNCTION ROOM 1 STRZ PFT [place] HOD   10/17/2023 10:40 AM Jeremiah Joseph DO Fam Med [facility]   2024  8:45 AM Yadira Hinson MD N [facility] Heart [facility]     Non-[facility] follow up appointment(s): sam

## 2023-09-19 ENCOUNTER — HOSPITAL ENCOUNTER (OUTPATIENT)
Dept: INFUSION THERAPY | Age: 68
Discharge: HOME | End: 2023-09-19
Payer: MEDICARE

## 2023-09-19 ENCOUNTER — OFFICE VISIT (OUTPATIENT)
Dept: ONCOLOGY | Age: 68
End: 2023-09-19
Payer: MEDICARE

## 2023-09-19 VITALS
RESPIRATION RATE: 16 BRPM | SYSTOLIC BLOOD PRESSURE: 144 MMHG | HEART RATE: 67 BPM | DIASTOLIC BLOOD PRESSURE: 63 MMHG | OXYGEN SATURATION: 99 % | TEMPERATURE: 97.8 F

## 2023-09-19 VITALS
HEART RATE: 73 BPM | BODY MASS INDEX: 33.61 KG/M2 | RESPIRATION RATE: 16 BRPM | DIASTOLIC BLOOD PRESSURE: 60 MMHG | WEIGHT: 178 LBS | TEMPERATURE: 97.8 F | OXYGEN SATURATION: 95 % | SYSTOLIC BLOOD PRESSURE: 140 MMHG | HEIGHT: 61 IN

## 2023-09-19 DIAGNOSIS — Z86.2 HISTORY OF IRON DEFICIENCY ANEMIA: Primary | ICD-10-CM

## 2023-09-19 DIAGNOSIS — Z86.2 HISTORY OF IRON DEFICIENCY ANEMIA: Primary | Chronic | ICD-10-CM

## 2023-09-19 DIAGNOSIS — Z86.2 HISTORY OF IRON DEFICIENCY ANEMIA: Chronic | ICD-10-CM

## 2023-09-19 LAB
ABO: NORMAL
ABSOLUTE IMMATURE GRANULOCYTE: 0.02 THOU/MM3 (ref 0–0.07)
ANTIBODY SCREEN: NORMAL
BASOPHILS ABSOLUTE: 0 THOU/MM3 (ref 0–0.1)
BASOPHILS NFR BLD AUTO: 0 % (ref 0–3)
DAT IGG: NORMAL
DAT POLY-SP REAG RBC QL: NORMAL
EOSINOPHIL NFR BLD AUTO: 5 % (ref 0–4)
EOSINOPHILS ABSOLUTE: 0.4 THOU/MM3 (ref 0–0.4)
ERYTHROCYTE [DISTWIDTH] IN BLOOD BY AUTOMATED COUNT: 16.9 % (ref 11.5–14.5)
FERRITIN SERPL IA-MCNC: 55 NG/ML (ref 10–291)
FOLATE SERPL-MCNC: 6.2 NG/ML (ref 4.8–24.2)
HCT VFR BLD AUTO: 26.3 % (ref 37–47)
HGB BLD-MCNC: 7.8 GM/DL (ref 12–16)
HGB RETIC QN AUTO: 30.4 PG (ref 28.2–35.7)
IMM RETICS NFR: 24.2 % (ref 3–15.9)
IMMATURE GRANULOCYTES: 0 %
IRON SATN MFR SERPL: 6 % (ref 20–50)
IRON SERPL-MCNC: 26 UG/DL (ref 50–170)
LDH SERPL L TO P-CCNC: 271 U/L (ref 100–190)
LYMPHOCYTES ABSOLUTE: 1.8 THOU/MM3 (ref 1–4.8)
LYMPHOCYTES NFR BLD AUTO: 22 % (ref 15–47)
MCH RBC QN AUTO: 27.1 PG (ref 26–33)
MCHC RBC AUTO-ENTMCNC: 29.7 GM/DL (ref 32.2–35.5)
MCV RBC AUTO: 91 FL (ref 81–99)
MONOCYTES ABSOLUTE: 0.6 THOU/MM3 (ref 0.4–1.3)
MONOCYTES NFR BLD AUTO: 7 % (ref 0–12)
NEUTROPHILS NFR BLD AUTO: 65 % (ref 43–75)
PLATELET # BLD AUTO: 221 THOU/MM3 (ref 130–400)
PMV BLD AUTO: 11.3 FL (ref 9.4–12.4)
RBC # BLD AUTO: 2.88 MILL/MM3 (ref 4.2–5.4)
RETICS # AUTO: 144 THOU/MM3 (ref 20–115)
RETICS/RBC NFR AUTO: 5 % (ref 0.5–2)
RH FACTOR: NORMAL
SEGMENTED NEUTROPHILS ABSOLUTE COUNT: 5.1 THOU/MM3 (ref 1.8–7.7)
TIBC SERPL-MCNC: 421 UG/DL (ref 171–450)
VIT B12 SERPL-MCNC: 1520 PG/ML (ref 211–911)
WBC # BLD AUTO: 7.9 THOU/MM3 (ref 4.8–10.8)

## 2023-09-19 PROCEDURE — 82728 ASSAY OF FERRITIN: CPT

## 2023-09-19 PROCEDURE — G8417 CALC BMI ABV UP PARAM F/U: HCPCS | Performed by: INTERNAL MEDICINE

## 2023-09-19 PROCEDURE — 2580000003 HC RX 258: Performed by: INTERNAL MEDICINE

## 2023-09-19 PROCEDURE — 1111F DSCHRG MED/CURRENT MED MERGE: CPT | Performed by: INTERNAL MEDICINE

## 2023-09-19 PROCEDURE — 99205 OFFICE O/P NEW HI 60 MIN: CPT | Performed by: INTERNAL MEDICINE

## 2023-09-19 PROCEDURE — 3078F DIAST BP <80 MM HG: CPT | Performed by: INTERNAL MEDICINE

## 2023-09-19 PROCEDURE — 3017F COLORECTAL CA SCREEN DOC REV: CPT | Performed by: INTERNAL MEDICINE

## 2023-09-19 PROCEDURE — 85025 COMPLETE CBC W/AUTO DIFF WBC: CPT

## 2023-09-19 PROCEDURE — G8399 PT W/DXA RESULTS DOCUMENT: HCPCS | Performed by: INTERNAL MEDICINE

## 2023-09-19 PROCEDURE — 83550 IRON BINDING TEST: CPT

## 2023-09-19 PROCEDURE — 86880 COOMBS TEST DIRECT: CPT

## 2023-09-19 PROCEDURE — 1123F ACP DISCUSS/DSCN MKR DOCD: CPT | Performed by: INTERNAL MEDICINE

## 2023-09-19 PROCEDURE — 99211 OFF/OP EST MAY X REQ PHY/QHP: CPT

## 2023-09-19 PROCEDURE — 1036F TOBACCO NON-USER: CPT | Performed by: INTERNAL MEDICINE

## 2023-09-19 PROCEDURE — 86923 COMPATIBILITY TEST ELECTRIC: CPT

## 2023-09-19 PROCEDURE — 1090F PRES/ABSN URINE INCON ASSESS: CPT | Performed by: INTERNAL MEDICINE

## 2023-09-19 PROCEDURE — 83010 ASSAY OF HAPTOGLOBIN QUANT: CPT

## 2023-09-19 PROCEDURE — 82746 ASSAY OF FOLIC ACID SERUM: CPT

## 2023-09-19 PROCEDURE — 3077F SYST BP >= 140 MM HG: CPT | Performed by: INTERNAL MEDICINE

## 2023-09-19 PROCEDURE — 85046 RETICYTE/HGB CONCENTRATE: CPT

## 2023-09-19 PROCEDURE — G8427 DOCREV CUR MEDS BY ELIG CLIN: HCPCS | Performed by: INTERNAL MEDICINE

## 2023-09-19 PROCEDURE — 86901 BLOOD TYPING SEROLOGIC RH(D): CPT

## 2023-09-19 PROCEDURE — P9016 RBC LEUKOCYTES REDUCED: HCPCS

## 2023-09-19 PROCEDURE — 82607 VITAMIN B-12: CPT

## 2023-09-19 PROCEDURE — 83615 LACTATE (LD) (LDH) ENZYME: CPT

## 2023-09-19 PROCEDURE — 36415 COLL VENOUS BLD VENIPUNCTURE: CPT

## 2023-09-19 PROCEDURE — 86850 RBC ANTIBODY SCREEN: CPT

## 2023-09-19 PROCEDURE — 86900 BLOOD TYPING SEROLOGIC ABO: CPT

## 2023-09-19 PROCEDURE — 83921 ORGANIC ACID SINGLE QUANT: CPT

## 2023-09-19 PROCEDURE — 83540 ASSAY OF IRON: CPT

## 2023-09-19 PROCEDURE — 36430 TRANSFUSION BLD/BLD COMPNT: CPT

## 2023-09-19 RX ORDER — SODIUM CHLORIDE 9 MG/ML
20 INJECTION, SOLUTION INTRAVENOUS CONTINUOUS
Status: CANCELLED | OUTPATIENT
Start: 2023-09-19

## 2023-09-19 RX ORDER — ACETAMINOPHEN 325 MG/1
650 TABLET ORAL
Status: CANCELLED | OUTPATIENT
Start: 2023-09-26

## 2023-09-19 RX ORDER — HEPARIN SODIUM (PORCINE) LOCK FLUSH IV SOLN 100 UNIT/ML 100 UNIT/ML
500 SOLUTION INTRAVENOUS PRN
Status: CANCELLED | OUTPATIENT
Start: 2023-09-26

## 2023-09-19 RX ORDER — SODIUM CHLORIDE 9 MG/ML
INJECTION, SOLUTION INTRAVENOUS CONTINUOUS
OUTPATIENT
Start: 2023-09-19

## 2023-09-19 RX ORDER — DIPHENHYDRAMINE HYDROCHLORIDE 50 MG/ML
50 INJECTION INTRAMUSCULAR; INTRAVENOUS
OUTPATIENT
Start: 2023-09-19

## 2023-09-19 RX ORDER — SODIUM CHLORIDE 9 MG/ML
5-250 INJECTION, SOLUTION INTRAVENOUS PRN
Status: CANCELLED | OUTPATIENT
Start: 2023-09-26

## 2023-09-19 RX ORDER — SODIUM CHLORIDE 0.9 % (FLUSH) 0.9 %
5-40 SYRINGE (ML) INJECTION PRN
Status: CANCELLED | OUTPATIENT
Start: 2023-09-26

## 2023-09-19 RX ORDER — DIPHENHYDRAMINE HCL 25 MG
25 TABLET ORAL ONCE
OUTPATIENT
Start: 2023-09-19 | End: 2023-09-19

## 2023-09-19 RX ORDER — ACETAMINOPHEN 325 MG/1
650 TABLET ORAL ONCE
Status: CANCELLED | OUTPATIENT
Start: 2023-09-19 | End: 2023-09-19

## 2023-09-19 RX ORDER — SODIUM CHLORIDE 9 MG/ML
INJECTION, SOLUTION INTRAVENOUS CONTINUOUS
Status: CANCELLED | OUTPATIENT
Start: 2023-09-19

## 2023-09-19 RX ORDER — ONDANSETRON 2 MG/ML
8 INJECTION INTRAMUSCULAR; INTRAVENOUS
OUTPATIENT
Start: 2023-09-19

## 2023-09-19 RX ORDER — FAMOTIDINE 10 MG/ML
20 INJECTION, SOLUTION INTRAVENOUS
Status: CANCELLED | OUTPATIENT
Start: 2023-09-26

## 2023-09-19 RX ORDER — DEXAMETHASONE 0.5 MG/1
10 TABLET ORAL ONCE
Status: CANCELLED | OUTPATIENT
Start: 2023-09-19 | End: 2023-09-19

## 2023-09-19 RX ORDER — SODIUM CHLORIDE 9 MG/ML
INJECTION, SOLUTION INTRAVENOUS CONTINUOUS
Status: CANCELLED | OUTPATIENT
Start: 2023-09-26

## 2023-09-19 RX ORDER — SODIUM CHLORIDE 9 MG/ML
25 INJECTION, SOLUTION INTRAVENOUS PRN
Status: CANCELLED | OUTPATIENT
Start: 2023-09-19

## 2023-09-19 RX ORDER — SODIUM CHLORIDE 9 MG/ML
20 INJECTION, SOLUTION INTRAVENOUS CONTINUOUS
Status: DISCONTINUED | OUTPATIENT
Start: 2023-09-19 | End: 2023-09-20 | Stop reason: HOSPADM

## 2023-09-19 RX ORDER — ONDANSETRON 2 MG/ML
8 INJECTION INTRAMUSCULAR; INTRAVENOUS
Status: CANCELLED | OUTPATIENT
Start: 2023-09-26

## 2023-09-19 RX ORDER — ACETAMINOPHEN 325 MG/1
650 TABLET ORAL
OUTPATIENT
Start: 2023-09-19

## 2023-09-19 RX ORDER — DEXAMETHASONE 4 MG/1
10 TABLET ORAL ONCE
OUTPATIENT
Start: 2023-09-19 | End: 2023-09-19

## 2023-09-19 RX ORDER — SODIUM CHLORIDE 0.9 % (FLUSH) 0.9 %
5-40 SYRINGE (ML) INJECTION PRN
OUTPATIENT
Start: 2023-09-19

## 2023-09-19 RX ORDER — ALBUTEROL SULFATE 90 UG/1
4 AEROSOL, METERED RESPIRATORY (INHALATION) PRN
OUTPATIENT
Start: 2023-09-19

## 2023-09-19 RX ORDER — FAMOTIDINE 10 MG/ML
20 INJECTION, SOLUTION INTRAVENOUS
Status: CANCELLED | OUTPATIENT
Start: 2023-09-19

## 2023-09-19 RX ORDER — EPINEPHRINE 1 MG/ML
0.3 INJECTION, SOLUTION INTRAMUSCULAR; SUBCUTANEOUS PRN
OUTPATIENT
Start: 2023-09-19

## 2023-09-19 RX ORDER — DIPHENHYDRAMINE HYDROCHLORIDE 50 MG/ML
50 INJECTION INTRAMUSCULAR; INTRAVENOUS
Status: CANCELLED | OUTPATIENT
Start: 2023-09-19

## 2023-09-19 RX ORDER — EPINEPHRINE 1 MG/ML
0.3 INJECTION, SOLUTION, CONCENTRATE INTRAVENOUS PRN
Status: CANCELLED | OUTPATIENT
Start: 2023-09-26

## 2023-09-19 RX ORDER — ACETAMINOPHEN 325 MG/1
650 TABLET ORAL
Status: CANCELLED | OUTPATIENT
Start: 2023-09-19

## 2023-09-19 RX ORDER — DIPHENHYDRAMINE HYDROCHLORIDE 50 MG/ML
50 INJECTION INTRAMUSCULAR; INTRAVENOUS
Status: CANCELLED | OUTPATIENT
Start: 2023-09-26

## 2023-09-19 RX ORDER — EPINEPHRINE 1 MG/ML
0.3 INJECTION, SOLUTION, CONCENTRATE INTRAVENOUS PRN
Status: CANCELLED | OUTPATIENT
Start: 2023-09-19

## 2023-09-19 RX ORDER — ONDANSETRON 2 MG/ML
8 INJECTION INTRAMUSCULAR; INTRAVENOUS
Status: CANCELLED | OUTPATIENT
Start: 2023-09-19

## 2023-09-19 RX ORDER — DIPHENHYDRAMINE HCL 25 MG
25 TABLET ORAL ONCE
Status: CANCELLED | OUTPATIENT
Start: 2023-09-19 | End: 2023-09-19

## 2023-09-19 RX ORDER — ACETAMINOPHEN 325 MG/1
650 TABLET ORAL ONCE
OUTPATIENT
Start: 2023-09-19 | End: 2023-09-19

## 2023-09-19 RX ORDER — SODIUM CHLORIDE 9 MG/ML
25 INJECTION, SOLUTION INTRAVENOUS PRN
OUTPATIENT
Start: 2023-09-19

## 2023-09-19 RX ORDER — SODIUM CHLORIDE 0.9 % (FLUSH) 0.9 %
5-40 SYRINGE (ML) INJECTION PRN
Status: CANCELLED | OUTPATIENT
Start: 2023-09-19

## 2023-09-19 RX ORDER — ALBUTEROL SULFATE 90 UG/1
4 AEROSOL, METERED RESPIRATORY (INHALATION) PRN
Status: CANCELLED | OUTPATIENT
Start: 2023-09-19

## 2023-09-19 RX ORDER — ALBUTEROL SULFATE 90 UG/1
4 AEROSOL, METERED RESPIRATORY (INHALATION) PRN
Status: CANCELLED | OUTPATIENT
Start: 2023-09-26

## 2023-09-19 RX ADMIN — SODIUM CHLORIDE 20 ML/HR: 9 INJECTION, SOLUTION INTRAVENOUS at 10:47

## 2023-09-19 ASSESSMENT — ENCOUNTER SYMPTOMS
CONSTIPATION: 0
ABDOMINAL PAIN: 0
VOMITING: 0
TROUBLE SWALLOWING: 0
COUGH: 0
WHEEZING: 0
DIARRHEA: 0
SORE THROAT: 0
SHORTNESS OF BREATH: 1
NAUSEA: 0

## 2023-09-19 NOTE — DISCHARGE INSTRUCTIONS
Type and screen, one unit of PRBC when available today or tomorrow. Authorization requested for IV iron. Venofer 300 mg for 3 doses; weekly. Follow up in 5 weeks for MD visit and labs CBC, iron studies to assess response to therapy. Please contact your Oncologist if you have any questions regarding the 1 unit Blood that you received today. You are instructed to call the office or go to the Emergency Dept. If you experience any of the following symptoms:    Dizziness/lightheadedness   Acute nausea or vomiting-not relieved by medications  Headaches-not relieved by medications  New chest pain or pressure  New rash /itching  New shortness of breath  Fever,chills or signs or symptoms of infection    Make sure you are drinking 48 to 64 ounces of water daily-if you are unable to drink fluids let us know right away.

## 2023-09-19 NOTE — PROGRESS NOTES
[place] [address]  Dept: [phone]  Loc: [address]  1955   No ref. provider found   Javon Callahan DO         CHIEF COMPLAINT  Chief Complaint   Patient presents with    New Patient     Iron Deficiency Anemia           HISTORY OF PRESENT ILLNESS  Ms. Emerald Pierce is a pleasant 71-year-old who is here for initial consultation for acute blood loss anemia secondary to GI bleed. Colonoscopy 9/8/2023 with findings of multiple colon polyps, diverticulosis and internal hemorrhoids the etiology of acute blood loss was considered secondary to Eliquis. She is on long-term anticoagulation secondary to cardiac etiology. She was hospitalized twice in September, 2023 for bright red blood per rectum and dark tarry stools. Her medical history is significant for hypertension, chronic diastolic heart failure, CKD stage III, depression, restless leg syndrome, history of stroke in 2015 without residual weakness, COPD, erosive esophagitis on Protonix 40 mg twice daily, PAF, remote history of tobacco dependence hyperlipidemia, status post AVR, status post mitral valve repair, atrial fibrillation and flutter following with Dr. Mauri Estrada and she will see Dr. Andrea Howard for evaluation for watchman procedure due to elevated bleeding risk on  long term anti coagulation. On my evaluation today patient states that she did not have further episodes of bright red blood per rectum or black tarry stools after the colonoscopy 9/8/2023. She is extremely weak and tired, feels dizzy  with postural variation. She reports shortness of breath with minimal exertion and palpitations. Labs significant for H&H: 7.2/24 on 9/20/2023. She denies previous history of blood transfusion or IV iron therapy.       MONITORING PARAMETERS:  Hematological parameters: H&H:        PAST MEDICAL HISTORY  Past Medical History:

## 2023-09-19 NOTE — PATIENT INSTRUCTIONS
Type and screen, one unit of PRBC when available today or tomorrow. Authorization requested for IV iron. Venofer 300 mg for 3 doses; weekly. Follow up in 5 weeks for MD visit and labs CBC, iron studies to assess response to therapy.

## 2023-09-19 NOTE — PLAN OF CARE
Problem: Chronic Conditions and Co-morbidities  Goal: Patient's chronic conditions and co-morbidity symptoms are monitored and maintained or improved  Outcome: Adequate for Discharge  Flowsheets (Taken 9/19/2023 1620)  Care Plan - Patient's Chronic Conditions and Co-Morbidity Symptoms are Monitored and Maintained or Improved:   Monitor and assess patient's chronic conditions and comorbid symptoms for stability, deterioration, or improvement   Collaborate with multidisciplinary team to address chronic and comorbid conditions and prevent exacerbation or deterioration  Note: Patient educated blood product transfusion protocol:    Patient receiving 1 unit PRBC's:      - Blood product transfusion information sheet given: questions answered and consent signed  - Take vital signs/ monitor lungs sound prior to transfusion  - Monitor patient for 15 minutes after transfusion started  - Take vital signs / monitor lungs sound in 15 minutes and post transfusion  - Assess IV site   - Monitor patient closely for potential transfusion reaction    Call MD if develop complications once discharged. Problem: Safety - Adult  Goal: Free from fall injury  Outcome: Adequate for Discharge  Flowsheets (Taken 9/19/2023 1620)  Free From Fall Injury: Instruct family/caregiver on patient safety  Note: Free from falls while in O.P. Oncology. Problem: Discharge Planning  Goal: Discharge to home or other facility with appropriate resources  Outcome: Adequate for Discharge  Flowsheets (Taken 9/19/2023 1620)  Discharge to home or other facility with appropriate resources: Identify barriers to discharge with patient and caregiver  Note: Verbalize understanding of discharge instructions, follow up appointments, and when to call Physician. Care plan reviewed with patient. Patient verbalize understanding of the plan of care and contribute to goal setting.

## 2023-09-21 ENCOUNTER — CARE COORDINATION (OUTPATIENT)
Dept: CASE MANAGEMENT | Age: 68
End: 2023-09-21

## 2023-09-21 LAB — HAPTOGLOB SERPL-MCNC: 81 MG/DL (ref 30–200)

## 2023-09-21 NOTE — CARE COORDINATION
[facility] Care Transitions Follow Up Call    Patient Current Location:  Home: [address] [place] [address]    Care Transition Nurse contacted the patient by telephone to follow up after admission on 23. Verified name and  with patient as identifiers. Patient: Johny Lenz  Patient : 1955   MRN: [id number]  Reason for Admission: lower GI bleed  Discharge Date: 23 RARS: Readmission Risk Score: 12.7      Needs to be reviewed by the provider   Additional needs identified to be addressed with provider: No  none             Method of communication with provider: none. Spoke with Juancarlos Henley, said she is feeling a little better. Had blood transfusion a few days ago. Still dealing with VALIENTE with walking longer distances. Pulse ox has been 97-98%. Will start getting iron infusions tomorrow. Denies any signs of bleeding. She monitors her BP and wt but not everyday. Discussed RPM, wants to think about it. Appetite has improved, drinking adequate fluids. Will see cardio next week to discuss Watchman. No other issues to report. Denies any other needs. No other questions or concerns at this time. Will continue to follow. Addressed changes since last contact:  none  Discussed follow-up appointments. If no appointment was previously scheduled, appointment scheduling offered: Yes. Is follow up appointment scheduled within 7 days of discharge? Yes.     Follow Up  Future Appointments   Date Time Provider [facility] [address]   2023 12:30 PM STR OUT PT ONC INJ RM 01 STRZ OP ONC [place] HOD   2023  8:30 AM Adis Majano MD N SRPX Heart [facility]   2023 12:30 PM STR OUT PT ONC INJ RM 10 STRZ OP ONC [place] HOD   10/2/2023  4:00 PM STR PULMONARY FUNCTION ROOM 1 STRZ PFT [facility]   10/6/2023 12:30 PM STR OUT PT ONC INJ RM 01 STRZ OP ONC [place] HOD   10/17/2023  9:30 AM MD EVERT Barrera Oncology [facility]   10/17/2023 10:40 AM Karissa Smith DO [facility]   2024

## 2023-09-22 ENCOUNTER — HOSPITAL ENCOUNTER (OUTPATIENT)
Dept: INFUSION THERAPY | Age: 68
Discharge: HOME | End: 2023-09-22
Payer: MEDICARE

## 2023-09-22 ENCOUNTER — TELEPHONE (OUTPATIENT)
Dept: CARDIOLOGY CLINIC | Age: 68
End: 2023-09-22

## 2023-09-22 VITALS
DIASTOLIC BLOOD PRESSURE: 58 MMHG | RESPIRATION RATE: 16 BRPM | BODY MASS INDEX: 32.07 KG/M2 | SYSTOLIC BLOOD PRESSURE: 136 MMHG | HEART RATE: 74 BPM | WEIGHT: 181 LBS | TEMPERATURE: 98.1 F | OXYGEN SATURATION: 100 % | HEIGHT: 63 IN

## 2023-09-22 DIAGNOSIS — D50.9 IRON DEFICIENCY ANEMIA, UNSPECIFIED IRON DEFICIENCY ANEMIA TYPE: Primary | ICD-10-CM

## 2023-09-22 LAB — METHYLMALONATE SERPL-SCNC: NORMAL

## 2023-09-22 PROCEDURE — 6360000002 HC RX W HCPCS: Performed by: INTERNAL MEDICINE

## 2023-09-22 PROCEDURE — 2580000003 HC RX 258: Performed by: INTERNAL MEDICINE

## 2023-09-22 PROCEDURE — 96366 THER/PROPH/DIAG IV INF ADDON: CPT

## 2023-09-22 PROCEDURE — 96365 THER/PROPH/DIAG IV INF INIT: CPT

## 2023-09-22 RX ORDER — ALBUTEROL SULFATE 90 UG/1
4 AEROSOL, METERED RESPIRATORY (INHALATION) PRN
Status: CANCELLED | OUTPATIENT
Start: 2023-09-29

## 2023-09-22 RX ORDER — DIPHENHYDRAMINE HYDROCHLORIDE 50 MG/ML
50 INJECTION INTRAMUSCULAR; INTRAVENOUS
Status: CANCELLED | OUTPATIENT
Start: 2023-09-29

## 2023-09-22 RX ORDER — SODIUM CHLORIDE 9 MG/ML
5-250 INJECTION, SOLUTION INTRAVENOUS PRN
Status: CANCELLED | OUTPATIENT
Start: 2023-09-29

## 2023-09-22 RX ORDER — HEPARIN 100 UNIT/ML
500 SYRINGE INTRAVENOUS PRN
Status: CANCELLED | OUTPATIENT
Start: 2023-09-29

## 2023-09-22 RX ORDER — SODIUM CHLORIDE 9 MG/ML
INJECTION, SOLUTION INTRAVENOUS CONTINUOUS
Status: CANCELLED | OUTPATIENT
Start: 2023-09-29

## 2023-09-22 RX ORDER — EPINEPHRINE 1 MG/ML
0.3 INJECTION, SOLUTION INTRAMUSCULAR; SUBCUTANEOUS PRN
Status: CANCELLED | OUTPATIENT
Start: 2023-09-29

## 2023-09-22 RX ORDER — ACETAMINOPHEN 325 MG/1
650 TABLET ORAL
Status: CANCELLED | OUTPATIENT
Start: 2023-09-29

## 2023-09-22 RX ORDER — SODIUM CHLORIDE 0.9 % (FLUSH) 0.9 %
5-40 SYRINGE (ML) INJECTION PRN
Status: CANCELLED | OUTPATIENT
Start: 2023-09-29

## 2023-09-22 RX ORDER — SODIUM CHLORIDE 9 MG/ML
5-250 INJECTION, SOLUTION INTRAVENOUS PRN
Status: DISCONTINUED | OUTPATIENT
Start: 2023-09-22 | End: 2023-09-23 | Stop reason: HOSPADM

## 2023-09-22 RX ORDER — ONDANSETRON 2 MG/ML
8 INJECTION INTRAMUSCULAR; INTRAVENOUS
Status: CANCELLED | OUTPATIENT
Start: 2023-09-29

## 2023-09-22 RX ADMIN — SODIUM CHLORIDE 20 ML/HR: 9 INJECTION, SOLUTION INTRAVENOUS at 12:39

## 2023-09-22 RX ADMIN — IRON SUCROSE 300 MG: 20 INJECTION, SOLUTION INTRAVENOUS at 12:39

## 2023-09-22 NOTE — PLAN OF CARE
Problem: Chronic Conditions and Co-morbidities  Goal: Patient's chronic conditions and co-morbidity symptoms are monitored and maintained or improved  Outcome: Adequate for Discharge  Flowsheets (Taken 9/22/2023 123)  Care Plan - Patient's Chronic Conditions and Co-Morbidity Symptoms are Monitored and Maintained or Improved:   Monitor and assess patient's chronic conditions and comorbid symptoms for stability, deterioration, or improvement   Collaborate with multidisciplinary team to address chronic and comorbid conditions and prevent exacerbation or deterioration  Note: Patient verbalizes understanding to verbal information given on venofer, including action and possible side effects. Aware to call MD if develop complications. Problem: Safety - Adult  Goal: Free from fall injury  Outcome: Adequate for Discharge  Flowsheets (Taken 9/22/2023 1239)  Free From Fall Injury: Instruct family/caregiver on patient safety  Note: Patient free of falls this visit. Fall risks assessed. Precautions discussed. Problem: Discharge Planning  Goal: Discharge to home or other facility with appropriate resources  Outcome: Adequate for Discharge  Flowsheets (Taken 9/22/2023 1239)  Discharge to home or other facility with appropriate resources:   Identify barriers to discharge with patient and caregiver   Arrange for needed discharge resources and transportation as appropriate  Note: Patient verbalizes understanding of discharge instructions, follow up appointment, and when to call physician if needed      Care plan reviewed with patient. Patient verbalizes understanding of the plan of care and contributes to goal setting.

## 2023-09-28 ENCOUNTER — CARE COORDINATION (OUTPATIENT)
Dept: CASE MANAGEMENT | Age: 68
End: 2023-09-28

## 2023-09-28 NOTE — CARE COORDINATION
[facility] Care Transitions Follow Up Call    Patient Current Location:  Home: [address]    Care Transition Nurse contacted the patient by telephone to follow up after admission on 23. Verified name and  with patient as identifiers. Patient: Montrell Salazar  Patient : 1955   MRN: [id number]  Reason for Admission: lower GI bleed  Discharge Date: 23 RARS: Readmission Risk Score: 12.7      Needs to be reviewed by the provider   Additional needs identified to be addressed with provider: No  none             Method of communication with provider: none. Spoke with Renee Melvin, said she is feeling much better. Believes the iron infusion has \"kicked in.\"  Will have 2 more infusions. Her SOB with walking long distances has improved. Denies any signs of bleeding. Her BP and wt have been stable. Eating well, said a little too well, drinking adequate fluids. No other issues to report. Denies any other needs. No other questions or concerns at this time. Will continue to follow. Addressed changes since last contact:  none  Discussed follow-up appointments. If no appointment was previously scheduled, appointment scheduling offered: Yes. Is follow up appointment scheduled within 7 days of discharge? Yes.     Follow Up  Future Appointments   Date Time Provider [address]   2023 12:30 PM STR OUT PT ONC INJ RM 10 STRZ OP ONC [place] HOD   10/2/2023  4:00 PM STR PULMONARY FUNCTION ROOM 1 STRZ PFT [facility]   10/6/2023 12:30 PM STR OUT PT ONC INJ RM 01 STRZ OP ONC [place] HOD   10/10/2023  9:30 AM Dmitry Carranza MD N SRPX Heart [facility]   10/17/2023  9:30 AM Mills Cushing, MD N Oncology [facility]   10/17/2023 10:40 AM Jeremiah Joseph DO [facility]   2024  8:45 AM MD EVERT Reina SRPX Heart [facility]     Non-[facility] follow up appointment(s): sam    Care Transition Nurse reviewed medical action plan and red flags with patient and discussed any

## 2023-09-29 ENCOUNTER — HOSPITAL ENCOUNTER (OUTPATIENT)
Dept: INFUSION THERAPY | Age: 68
Discharge: HOME | End: 2023-09-29
Payer: MEDICARE

## 2023-09-29 VITALS
HEART RATE: 56 BPM | HEIGHT: 63 IN | OXYGEN SATURATION: 96 % | SYSTOLIC BLOOD PRESSURE: 130 MMHG | DIASTOLIC BLOOD PRESSURE: 58 MMHG | BODY MASS INDEX: 32.34 KG/M2 | WEIGHT: 182.5 LBS | RESPIRATION RATE: 16 BRPM | TEMPERATURE: 97.9 F

## 2023-09-29 DIAGNOSIS — D50.9 IRON DEFICIENCY ANEMIA, UNSPECIFIED IRON DEFICIENCY ANEMIA TYPE: Primary | ICD-10-CM

## 2023-09-29 PROBLEM — K92.2 LOWER GI BLEED: Status: ACTIVE | Noted: 2023-09-29

## 2023-09-29 PROCEDURE — 6360000002 HC RX W HCPCS: Performed by: INTERNAL MEDICINE

## 2023-09-29 PROCEDURE — 96365 THER/PROPH/DIAG IV INF INIT: CPT

## 2023-09-29 PROCEDURE — 2580000003 HC RX 258: Performed by: INTERNAL MEDICINE

## 2023-09-29 PROCEDURE — 96366 THER/PROPH/DIAG IV INF ADDON: CPT

## 2023-09-29 RX ORDER — HEPARIN 100 UNIT/ML
500 SYRINGE INTRAVENOUS PRN
Status: CANCELLED | OUTPATIENT
Start: 2023-10-06

## 2023-09-29 RX ORDER — ACETAMINOPHEN 325 MG/1
650 TABLET ORAL
Status: CANCELLED | OUTPATIENT
Start: 2023-10-06

## 2023-09-29 RX ORDER — DIPHENHYDRAMINE HYDROCHLORIDE 50 MG/ML
50 INJECTION INTRAMUSCULAR; INTRAVENOUS
Status: CANCELLED | OUTPATIENT
Start: 2023-10-06

## 2023-09-29 RX ORDER — ALBUTEROL SULFATE 90 UG/1
4 AEROSOL, METERED RESPIRATORY (INHALATION) PRN
Status: CANCELLED | OUTPATIENT
Start: 2023-10-06

## 2023-09-29 RX ORDER — SODIUM CHLORIDE 0.9 % (FLUSH) 0.9 %
5-40 SYRINGE (ML) INJECTION PRN
Status: CANCELLED | OUTPATIENT
Start: 2023-10-06

## 2023-09-29 RX ORDER — ONDANSETRON 2 MG/ML
8 INJECTION INTRAMUSCULAR; INTRAVENOUS
Status: CANCELLED | OUTPATIENT
Start: 2023-10-06

## 2023-09-29 RX ORDER — EPINEPHRINE 1 MG/ML
0.3 INJECTION, SOLUTION INTRAMUSCULAR; SUBCUTANEOUS PRN
Status: CANCELLED | OUTPATIENT
Start: 2023-10-06

## 2023-09-29 RX ORDER — SODIUM CHLORIDE 9 MG/ML
INJECTION, SOLUTION INTRAVENOUS CONTINUOUS
Status: CANCELLED | OUTPATIENT
Start: 2023-10-06

## 2023-09-29 RX ORDER — SODIUM CHLORIDE 9 MG/ML
5-250 INJECTION, SOLUTION INTRAVENOUS PRN
Status: CANCELLED | OUTPATIENT
Start: 2023-10-06

## 2023-09-29 RX ORDER — SODIUM CHLORIDE 9 MG/ML
5-250 INJECTION, SOLUTION INTRAVENOUS PRN
Status: DISCONTINUED | OUTPATIENT
Start: 2023-09-29 | End: 2023-09-30 | Stop reason: HOSPADM

## 2023-09-29 RX ADMIN — IRON SUCROSE 300 MG: 20 INJECTION, SOLUTION INTRAVENOUS at 12:30

## 2023-09-29 RX ADMIN — SODIUM CHLORIDE 25 ML/HR: 9 INJECTION, SOLUTION INTRAVENOUS at 12:29

## 2023-09-29 NOTE — DISCHARGE INSTRUCTIONS
Please contact your Oncologist if you have any questions regarding the iron treatment Venofer that you received today. Patient instructed if experience any of the symptoms following today's iron treatment to notify MD immediately or go to emergency department.     * dizziness/lightheadedness  *acute nausea/vomiting - not relieved with medication  *headache - not relieved from Tylenol/pain medication  *chest pain/pressure  *rash/itching  *shortness of breath        Drink fluids - 48oz fluids daily  Call if develop fever/ chills/ signs or symptoms of infection

## 2023-09-29 NOTE — PLAN OF CARE
Problem: Chronic Conditions and Co-morbidities  Goal: Patient's chronic conditions and co-morbidity symptoms are monitored and maintained or improved  Outcome: Adequate for Discharge  Flowsheets (Taken 9/29/2023 1406)  Care Plan - Patient's Chronic Conditions and Co-Morbidity Symptoms are Monitored and Maintained or Improved: Monitor and assess patient's chronic conditions and comorbid symptoms for stability, deterioration, or improvement  Note: Venofer reviewed, patient verbalizes understanding of medication being administered and potential side effects. Problem: Safety - Adult  Goal: Free from fall injury  Outcome: Adequate for Discharge  Flowsheets (Taken 9/29/2023 1406)  Free From Fall Injury: Instruct family/caregiver on patient safety     Problem: Discharge Planning  Goal: Discharge to home or other facility with appropriate resources  Outcome: Adequate for Discharge  Flowsheets (Taken 9/29/2023 1406)  Discharge to home or other facility with appropriate resources: Identify barriers to discharge with patient and caregiver     Care plan reviewed with patient. Patient verbalize understanding of the plan of care and contribute to goal setting.

## 2023-09-30 DIAGNOSIS — J44.1 CHRONIC OBSTRUCTIVE PULMONARY DISEASE WITH ACUTE EXACERBATION (HCC): ICD-10-CM

## 2023-10-02 ENCOUNTER — HOSPITAL ENCOUNTER (OUTPATIENT)
Dept: PULMONOLOGY | Age: 68
Discharge: HOME | End: 2023-10-02
Attending: NUCLEAR MEDICINE
Payer: MEDICARE

## 2023-10-02 ENCOUNTER — TELEPHONE (OUTPATIENT)
Dept: CARDIOLOGY CLINIC | Age: 68
End: 2023-10-02

## 2023-10-02 DIAGNOSIS — Z95.2 S/P AVR (AORTIC VALVE REPLACEMENT): ICD-10-CM

## 2023-10-02 DIAGNOSIS — R06.09 DYSPNEA ON EXERTION: ICD-10-CM

## 2023-10-02 DIAGNOSIS — Z95.2 S/P MVR (MITRAL VALVE REPLACEMENT): ICD-10-CM

## 2023-10-02 PROCEDURE — 94726 PLETHYSMOGRAPHY LUNG VOLUMES: CPT

## 2023-10-02 PROCEDURE — 94060 EVALUATION OF WHEEZING: CPT

## 2023-10-02 PROCEDURE — 94729 DIFFUSING CAPACITY: CPT

## 2023-10-02 RX ORDER — ALBUTEROL SULFATE 90 UG/1
AEROSOL, METERED RESPIRATORY (INHALATION)
Qty: 2 EACH | Refills: 5 | Status: SHIPPED | OUTPATIENT
Start: 2023-10-02

## 2023-10-02 NOTE — TELEPHONE ENCOUNTER
Patient see's Dr Laurie Ross on: 10/10 @ 80 am    Referring Provider: Modesto Osei MD    Primary Cardiologist: Modesto Osei MD    WSDF send to:     History: COPD, DM, dyslipidemai, fibromyalgia, former smoker, GERD,  HTN, THAYER, RLS, CKD, depression, stroke in 2015, hyperlipidemia, erosive esophagitis, bioprosthetic AVR 2015, redo in 2018 with Dr Shasha Hobson      Echo: 2/13/23      Labs: 9/9/23          [address] vs antiplatelet inhibitor:    Do you have trouble swallowing? Any issues with anesthesia or sedation? Mari Her is being referred for Left Atrial Appendage Closure with WATCHMAN device for management of stroke risk resulting from non-valvular atrial fibrillation. Based on their past history, it has been determined that they are poor candidates for long-term oral-anticoagulation, however may be tolerant of short term treatment with anti-thrombotic therapy as necessary. Patient is high risk for stroke or systemic thromboembolism. Patient [id number]  is calculated:      Risk   Factors  Component Value   C CHF No 0   H HTN Yes 1   A2 Age >= 76 No,  (74 y.o.) 0   D DM Yes 1   S2 Prior Stroke/TIA Yes 2   V Vascular Disease Yes 1   A Age 77-78 Yes,  (74 y.o.) 1   Sc Sex female 1    [id number]  Score  7     Vascular: AVR, MVR    Specifically regarding risk of anticoagulation they have demonstrated:  History of bleeding (eg.  Intracerebral, subdural, GI, retro-peritoneal)  Intolerance oral anticoagulation  Increased bleeding risk (e.g. Thrombocytopenia, anemia)  High risk of recurrent falls  Occupation related high bleeding risk  Need for prolonged dual anti platelet therapy  Severe renal failure  Poor compliance with anticoagulant therapy

## 2023-10-02 NOTE — TELEPHONE ENCOUNTER
Recent Visits  Date Type Provider Dept   09/12/23 Office Visit Jimbo Saini, DO [facility]   08/22/23 Office Visit Jimbo Saini, DO [facility]   06/20/23 Office Visit Jimbo Saini, DO [facility]   05/02/23 Office Visit Jimbo Saini, DO [facility]   04/20/23 Office Visit Jimbo Saini, DO [facility]   02/21/23 Office Visit Jimbo Saini, DO [facility]   01/31/23 Office Visit Jimbo Saini, DO [facility]   09/21/22 Office Visit Jimbo Saini, DO [facility]   08/11/22 Office Visit Jimbo Saini, DO [facility]   Showing recent visits within past 540 days with a meds authorizing provider and meeting all other requirements  Future Appointments  Date Type Provider Dept   10/17/23 Appointment Jimbo Saini, DO [facility]   Showing future appointments within next 150 days with a meds authorizing provider and meeting all other requirements

## 2023-10-05 ENCOUNTER — CARE COORDINATION (OUTPATIENT)
Dept: CASE MANAGEMENT | Age: 68
End: 2023-10-05

## 2023-10-05 ENCOUNTER — CARE COORDINATION (OUTPATIENT)
Dept: CARE COORDINATION | Age: 68
End: 2023-10-05

## 2023-10-05 NOTE — CARE COORDINATION
Care Transitions Outreach Attempt-1st attempt    Call within 2 business days of discharge: Yes   Attempted to reach patient for subsequent transitional call. Left HIPPA compliant VM to return call directly to [phone]. Patient: Danny Way Patient : 1955 MRN: [id number]    Last Discharge Facility       Date Complaint Diagnosis Description Type Department Provider    23 Abnormal Lab Lower GI bleed . .. ED to Hosp-Admission (Discharged) (ADMITTED) Julieth Simpson, APRN - CNP; M...             Noted following upcoming appointments from discharge chart review:   [facility] follow up appointment(s):   Future Appointments   Date Time Provider [address]   10/6/2023 12:30 PM STR OUT PT ONC INJ RM 01 STRZ  [facility]   10/10/2023  9:30 AM Secundino Ormond, MD N [facility]   10/17/2023  9:30 AM MD EVERT Clement Oncology [facility]   10/17/2023 10:40 AM Latesha Schafer DO [facility]   2024  8:45 AM MD EVERT Miller [facility] Heart [facility]     Non-[facility] follow up appointment(s): sam

## 2023-10-05 NOTE — CARE COORDINATION
Patient got my information from her sister on getting assistance on her high cost medication. I am going to try to assist her on Spiriva. She needs to see if she is eligible for Eliquis first before I can start on that application. I am mailing her portion of Spiriva application to her.         [address] [place] [address]   Medication Assistance  [address] [employer]    (g) [phone] (q) [phone]

## 2023-10-06 ENCOUNTER — HOSPITAL ENCOUNTER (OUTPATIENT)
Dept: INFUSION THERAPY | Age: 68
Discharge: HOME | End: 2023-10-06
Payer: MEDICARE

## 2023-10-06 VITALS
TEMPERATURE: 98 F | OXYGEN SATURATION: 98 % | HEART RATE: 56 BPM | DIASTOLIC BLOOD PRESSURE: 59 MMHG | SYSTOLIC BLOOD PRESSURE: 143 MMHG | RESPIRATION RATE: 18 BRPM

## 2023-10-06 DIAGNOSIS — D50.9 IRON DEFICIENCY ANEMIA, UNSPECIFIED IRON DEFICIENCY ANEMIA TYPE: Primary | ICD-10-CM

## 2023-10-06 PROCEDURE — 96365 THER/PROPH/DIAG IV INF INIT: CPT

## 2023-10-06 PROCEDURE — 2580000003 HC RX 258: Performed by: INTERNAL MEDICINE

## 2023-10-06 PROCEDURE — 96366 THER/PROPH/DIAG IV INF ADDON: CPT

## 2023-10-06 PROCEDURE — 6360000002 HC RX W HCPCS: Performed by: INTERNAL MEDICINE

## 2023-10-06 RX ORDER — ONDANSETRON 2 MG/ML
8 INJECTION INTRAMUSCULAR; INTRAVENOUS
OUTPATIENT
Start: 2023-10-06

## 2023-10-06 RX ORDER — ACETAMINOPHEN 325 MG/1
650 TABLET ORAL
OUTPATIENT
Start: 2023-10-06

## 2023-10-06 RX ORDER — SODIUM CHLORIDE 0.9 % (FLUSH) 0.9 %
5-40 SYRINGE (ML) INJECTION PRN
OUTPATIENT
Start: 2023-10-06

## 2023-10-06 RX ORDER — SODIUM CHLORIDE 9 MG/ML
5-250 INJECTION, SOLUTION INTRAVENOUS PRN
OUTPATIENT
Start: 2023-10-06

## 2023-10-06 RX ORDER — ALBUTEROL SULFATE 90 UG/1
4 AEROSOL, METERED RESPIRATORY (INHALATION) PRN
OUTPATIENT
Start: 2023-10-06

## 2023-10-06 RX ORDER — SODIUM CHLORIDE 9 MG/ML
INJECTION, SOLUTION INTRAVENOUS CONTINUOUS
OUTPATIENT
Start: 2023-10-06

## 2023-10-06 RX ORDER — HEPARIN 100 UNIT/ML
500 SYRINGE INTRAVENOUS PRN
OUTPATIENT
Start: 2023-10-06

## 2023-10-06 RX ORDER — EPINEPHRINE 1 MG/ML
0.3 INJECTION, SOLUTION INTRAMUSCULAR; SUBCUTANEOUS PRN
OUTPATIENT
Start: 2023-10-06

## 2023-10-06 RX ORDER — SODIUM CHLORIDE 9 MG/ML
5-250 INJECTION, SOLUTION INTRAVENOUS PRN
Status: DISCONTINUED | OUTPATIENT
Start: 2023-10-06 | End: 2023-10-07 | Stop reason: HOSPADM

## 2023-10-06 RX ORDER — DIPHENHYDRAMINE HYDROCHLORIDE 50 MG/ML
50 INJECTION INTRAMUSCULAR; INTRAVENOUS
OUTPATIENT
Start: 2023-10-06

## 2023-10-06 RX ADMIN — IRON SUCROSE 300 MG: 20 INJECTION, SOLUTION INTRAVENOUS at 12:49

## 2023-10-06 RX ADMIN — SODIUM CHLORIDE 20 ML/HR: 9 INJECTION, SOLUTION INTRAVENOUS at 12:47

## 2023-10-06 NOTE — PLAN OF CARE
Problem: Chronic Conditions and Co-morbidities  Goal: Patient's chronic conditions and co-morbidity symptoms are monitored and maintained or improved  Outcome: Adequate for Discharge  Flowsheets (Taken 10/6/2023 1500)  Care Plan - Patient's Chronic Conditions and Co-Morbidity Symptoms are Monitored and Maintained or Improved:   Monitor and assess patient's chronic conditions and comorbid symptoms for stability, deterioration, or improvement   Collaborate with multidisciplinary team to address chronic and comorbid conditions and prevent exacerbation or deterioration  Note: Patient verbalizes understanding to verbal information given on venofer infusion, including action and possible side effects. Aware to call MD if develop complications. Problem: Safety - Adult  Goal: Free from fall injury  Outcome: Adequate for Discharge  Flowsheets (Taken 10/6/2023 1500)  Free From Fall Injury: Instruct family/caregiver on patient safety  Note: Patient free of falls this visit. Fall risks assessed. Precautions discussed. Problem: Discharge Planning  Goal: Discharge to home or other facility with appropriate resources  Outcome: Adequate for Discharge  Flowsheets (Taken 10/6/2023 1500)  Discharge to home or other facility with appropriate resources:   Identify barriers to discharge with patient and caregiver   Arrange for needed discharge resources and transportation as appropriate  Note: Patient verbalizes understanding of discharge instructions, follow up appointment, and when to call physician if needed      Care plan reviewed with patient. Patient verbalizes understanding of the plan of care and contributes to goal setting.

## 2023-10-10 ENCOUNTER — CARE COORDINATION (OUTPATIENT)
Dept: CASE MANAGEMENT | Age: 68
End: 2023-10-10

## 2023-10-10 ENCOUNTER — OFFICE VISIT (OUTPATIENT)
Dept: CARDIOLOGY CLINIC | Age: 68
End: 2023-10-10
Payer: MEDICARE

## 2023-10-10 VITALS
BODY MASS INDEX: 32.43 KG/M2 | DIASTOLIC BLOOD PRESSURE: 68 MMHG | HEIGHT: 63 IN | WEIGHT: 183 LBS | HEART RATE: 64 BPM | SYSTOLIC BLOOD PRESSURE: 118 MMHG

## 2023-10-10 DIAGNOSIS — I48.0 PAROXYSMAL A-FIB (HCC): Primary | ICD-10-CM

## 2023-10-10 PROBLEM — D68.69 SECONDARY HYPERCOAGULABLE STATE (HCC): Status: ACTIVE | Noted: 2023-10-10

## 2023-10-10 PROCEDURE — 1036F TOBACCO NON-USER: CPT | Performed by: INTERNAL MEDICINE

## 2023-10-10 PROCEDURE — 99215 OFFICE O/P EST HI 40 MIN: CPT | Performed by: INTERNAL MEDICINE

## 2023-10-10 PROCEDURE — 3074F SYST BP LT 130 MM HG: CPT | Performed by: INTERNAL MEDICINE

## 2023-10-10 PROCEDURE — 3078F DIAST BP <80 MM HG: CPT | Performed by: INTERNAL MEDICINE

## 2023-10-10 PROCEDURE — 1123F ACP DISCUSS/DSCN MKR DOCD: CPT | Performed by: INTERNAL MEDICINE

## 2023-10-10 PROCEDURE — G8484 FLU IMMUNIZE NO ADMIN: HCPCS | Performed by: INTERNAL MEDICINE

## 2023-10-10 PROCEDURE — 3017F COLORECTAL CA SCREEN DOC REV: CPT | Performed by: INTERNAL MEDICINE

## 2023-10-10 PROCEDURE — 1090F PRES/ABSN URINE INCON ASSESS: CPT | Performed by: INTERNAL MEDICINE

## 2023-10-10 PROCEDURE — G8427 DOCREV CUR MEDS BY ELIG CLIN: HCPCS | Performed by: INTERNAL MEDICINE

## 2023-10-10 PROCEDURE — G8399 PT W/DXA RESULTS DOCUMENT: HCPCS | Performed by: INTERNAL MEDICINE

## 2023-10-10 PROCEDURE — G8417 CALC BMI ABV UP PARAM F/U: HCPCS | Performed by: INTERNAL MEDICINE

## 2023-10-10 NOTE — PROGRESS NOTES
Denies any chest pain, swelling in lower extremities, or dizziness/lightheadedness. SOB with exertion. States sometimes her heart beats really fast and she can feel it.

## 2023-10-10 NOTE — TELEPHONE ENCOUNTER
Patient saw Dr Andrew Carter in the office today and she would like to proceed with the [address] [place] [address] Loop. She has a history of GI bleeds and will need GI clearance.     WSDF: Tonya Mcgregor MD  Phone: [phone]  Fax: [phone]

## 2023-10-10 NOTE — PROGRESS NOTES
[address]  Dept: [phone]  Dept Fax: [phone]  Loc: [phone]    Visit Date: 10/10/2023    Ms. Lala Noel is a 70 y.o. female  who presented for:  Chief Complaint   Patient presents with    Follow-up     Discuss Watchman       HPI:   HPI     69 yo F with recent GI bleed. Sees Dr. Romana Iraheta. She has had recurrent GI bleeds. She has them quite often. She has been given PRBCs and taking iron. She is back on Eliquis due to high risk. Hgb 7.0. No other bleeding. No [address] for any other reason. She has had AVR/MVR,TV repair in the past.  She also has hx of spontaneous SAH and iron defic anemia and gets iron infusions. No problems swallowing. Two prior heart surgeries. Her sister had TAVR and her sister in law had TAVR/TMVR. Preserved EF. CXR without JACKSON clip    Referring Provider: Jean Carlos Cruz MD     Primary Cardiologist: Jean Carlos Cruz MD     WSDF send to:      History: COPD, DM, dyslipidemai, fibromyalgia, former smoker, GERD,  HTN, THAYER, RLS, CKD, depression, stroke in 2015, hyperlipidemia, erosive esophagitis, bioprosthetic AVR 2015, redo in 2018 with Dr Renée Alvarez       Echo: 2/13/23       Labs: 9/9/23             Reina Brown is being referred for Left Atrial Appendage Closure with WATCHMAN device for management of stroke risk resulting from non-valvular atrial fibrillation. Based on their past history, it has been determined that they are poor candidates for long-term oral-anticoagulation, however may be tolerant of short term treatment with anti-thrombotic therapy as necessary. Patient is high risk for stroke or systemic thromboembolism.  Patient [id number]  is calculated:        Risk   Factors   Component Value   C CHF No 0   H HTN Yes 1   A2 Age >= 76 No,  (74 y.o.) 0   D DM Yes 1   S2 Prior Stroke/TIA Yes 2   V Vascular Disease Yes 1   A Age 77-78 Yes,  (74 y.o.) 1   Sc Sex

## 2023-10-10 NOTE — CARE COORDINATION
Care Transitions Outreach Attempt-2nd attempt    Call within 2 business days of discharge: Yes   Attempted to reach patient for subsequent transitional call. Left HIPPA compliant VM to return call directly to [phone]. If no return call, CTN will sign off-2nd attempt. Unable to reach letter not sent, end of program.  Saw cardio today. Patient: Eder Celis Patient : 1955 MRN: [id number]    Last Discharge Facility       Date Complaint Diagnosis Description Type Department Provider    23 Abnormal Lab Lower GI bleed . .. ED to Hosp-Admission (Discharged) (ADMITTED) María Simpson, APRN - CNP; M...               Noted following upcoming appointments from discharge chart review:   [id number] [facility],[place] [address] follow up appointment(s):   Future Appointments   Date Time Provider [address]   10/17/2023  9:30 AM MD [facility]   10/17/2023 10:40 AM Zachary [address] [place] [address]   2024  8:45 AM MD EVERT Chapman [facility]     Non-[facility]  follow up appointment(s): na

## 2023-10-11 NOTE — TELEPHONE ENCOUNTER
WSDF received back, signed by Meryle Chi CNP.  LVM with Dr Estuardo Mujica MA that this must be signed by a physician

## 2023-10-12 ENCOUNTER — TELEMEDICINE (OUTPATIENT)
Dept: FAMILY MEDICINE CLINIC | Age: 68
End: 2023-10-12
Payer: MEDICARE

## 2023-10-12 ENCOUNTER — TELEPHONE (OUTPATIENT)
Dept: FAMILY MEDICINE CLINIC | Age: 68
End: 2023-10-12

## 2023-10-12 VITALS — RESPIRATION RATE: 14 BRPM

## 2023-10-12 DIAGNOSIS — J32.9 SINOBRONCHITIS: Primary | ICD-10-CM

## 2023-10-12 DIAGNOSIS — J40 SINOBRONCHITIS: Primary | ICD-10-CM

## 2023-10-12 DIAGNOSIS — J44.1 CHRONIC OBSTRUCTIVE PULMONARY DISEASE WITH ACUTE EXACERBATION (HCC): ICD-10-CM

## 2023-10-12 PROCEDURE — G8427 DOCREV CUR MEDS BY ELIG CLIN: HCPCS | Performed by: FAMILY MEDICINE

## 2023-10-12 PROCEDURE — 1123F ACP DISCUSS/DSCN MKR DOCD: CPT | Performed by: FAMILY MEDICINE

## 2023-10-12 PROCEDURE — 99214 OFFICE O/P EST MOD 30 MIN: CPT | Performed by: FAMILY MEDICINE

## 2023-10-12 PROCEDURE — 1090F PRES/ABSN URINE INCON ASSESS: CPT | Performed by: FAMILY MEDICINE

## 2023-10-12 PROCEDURE — G8399 PT W/DXA RESULTS DOCUMENT: HCPCS | Performed by: FAMILY MEDICINE

## 2023-10-12 PROCEDURE — 3017F COLORECTAL CA SCREEN DOC REV: CPT | Performed by: FAMILY MEDICINE

## 2023-10-12 RX ORDER — DOXYCYCLINE HYCLATE 100 MG
100 TABLET ORAL 2 TIMES DAILY
Qty: 20 TABLET | Refills: 0 | Status: SHIPPED | OUTPATIENT
Start: 2023-10-12 | End: 2023-10-22

## 2023-10-12 RX ORDER — PREDNISONE 20 MG/1
40 TABLET ORAL DAILY
Qty: 10 TABLET | Refills: 0 | Status: SHIPPED | OUTPATIENT
Start: 2023-10-12 | End: 2023-10-17

## 2023-10-12 NOTE — TELEPHONE ENCOUNTER
Pt called with concerns of a cough that started a couple days ago, now pt has light yellow phlegm and a runny nose. Pt has no fever or any other symptoms.     Please advise      Ilia Jay

## 2023-10-12 NOTE — TELEPHONE ENCOUNTER
Called and spoke to patient, she does not have a ride. I spoke to Dr. Pratima Garcia and he wanted to see if patient could do VV. I got patient scheduled for VV today with Dr. Pratima Garcia.

## 2023-10-12 NOTE — TELEPHONE ENCOUNTER
Would like to see  Can she come in today?     Future Appointments   Date Time Provider [address]   10/17/2023  9:15 AM STR OUT PT ONC [facility] STRZ OP ONC [place] [facility]   10/17/2023  9:30 AM Mecca Valencia MD N Oncology [facility]   10/17/2023 10:40 AM Felix Lugo DO [facility]   5/22/2024  8:45 AM Jen Allen MD N [address] [facility], [address]

## 2023-10-12 NOTE — PROGRESS NOTES
Chief Complaint   Patient presents with    Cough     [address] Selena Graham, was evaluated through a synchronous (real-time) audio-video encounter. The patient (or guardian if applicable) is aware that this is a billable service, which includes applicable co-pays. This Virtual Visit was conducted with patient's (and/or legal guardian's) consent. Patient identification was verified, and a caregiver was present when appropriate. The patient was located at Home: [address]  [address] [address]  Provider was located at [facility] ([address] [place], [place] [address]t): [address] [place] [address] [address]      History obtained from the patient. SUBJECTIVE:  [address] Symone Graham is a 70 y.o. female that presents today for    -URI type sxs:   5 days  Nasal congestion  Drainage  Cough  Mild wheezing  Occ SOB  Using alb more  No fever  Not able to afford Spiriva    Fever - No  Runny nose or congestion -  Yes   Cough -  Yes  Sore throat -  No  Headache, fatigue, joint pains, muscle aches -  No  Double Sickening - Yes  Shortness of breath/Wheezing? -  as above  Nausea/Vomiting/Diarrhea? No  Sick contacts - Yes  Maxillary Tooth Pain -  Yes  Treatment tried and response - as above      Age/Gender Health Maintenance    Lipid -   Lab Results   Component Value Date    CHOL 187 01/31/2023    CHOL 182 12/27/2021    CHOL 181 12/02/2020     Lab Results   Component Value Date    TRIG 176 01/31/2023    TRIG 222 (H) 12/27/2021    TRIG 178 12/02/2020     Lab Results   Component Value Date    HDL 49 01/31/2023    HDL 62 12/27/2021    HDL 43 12/02/2020     Lab Results   Component Value Date    LDLCALC 103 01/31/2023    LDLCALC 76 12/27/2021    LDLCALC 102 12/02/2020       Colon Cancer Screening - Completed NOV 2020, 1 polyp per pt, repeat 3 years per GI  Lung Cancer Screening - NEG CT Chest MAY 2023, repeat 1 year.      Tetanus - get at pharmacy per medicare rules  Influenza Vaccine - UTD FALL 2022  Pneumonia Vaccine - UTD PPV 23 in OCT 2017 and PCV 20 in FEB

## 2023-10-16 PROBLEM — D68.69 SECONDARY HYPERCOAGULABLE STATE (HCC): Status: RESOLVED | Noted: 2023-10-10 | Resolved: 2023-10-16

## 2023-10-17 ENCOUNTER — OFFICE VISIT (OUTPATIENT)
Dept: FAMILY MEDICINE CLINIC | Age: 68
End: 2023-10-17
Payer: MEDICARE

## 2023-10-17 VITALS
TEMPERATURE: 97.5 F | WEIGHT: 178 LBS | HEIGHT: 63 IN | HEART RATE: 59 BPM | SYSTOLIC BLOOD PRESSURE: 122 MMHG | OXYGEN SATURATION: 98 % | RESPIRATION RATE: 18 BRPM | BODY MASS INDEX: 31.54 KG/M2 | DIASTOLIC BLOOD PRESSURE: 74 MMHG

## 2023-10-17 DIAGNOSIS — K21.00 GASTROESOPHAGEAL REFLUX DISEASE WITH ESOPHAGITIS, UNSPECIFIED WHETHER HEMORRHAGE: ICD-10-CM

## 2023-10-17 DIAGNOSIS — N39.0 ACUTE UTI: ICD-10-CM

## 2023-10-17 DIAGNOSIS — M79.7 FIBROMYALGIA: ICD-10-CM

## 2023-10-17 DIAGNOSIS — I50.32 CHRONIC HEART FAILURE WITH PRESERVED EJECTION FRACTION (HCC): ICD-10-CM

## 2023-10-17 DIAGNOSIS — G25.81 RLS (RESTLESS LEGS SYNDROME): ICD-10-CM

## 2023-10-17 DIAGNOSIS — E53.8 B12 DEFICIENCY: ICD-10-CM

## 2023-10-17 DIAGNOSIS — Z95.2 S/P AVR (AORTIC VALVE REPLACEMENT): ICD-10-CM

## 2023-10-17 DIAGNOSIS — Z23 NEED FOR INFLUENZA VACCINATION: ICD-10-CM

## 2023-10-17 DIAGNOSIS — J44.9 CHRONIC OBSTRUCTIVE PULMONARY DISEASE, UNSPECIFIED COPD TYPE (HCC): ICD-10-CM

## 2023-10-17 DIAGNOSIS — R06.09 DOE (DYSPNEA ON EXERTION): ICD-10-CM

## 2023-10-17 DIAGNOSIS — K92.2 LOWER GI BLEED: Primary | ICD-10-CM

## 2023-10-17 DIAGNOSIS — I10 HYPERTENSION, ESSENTIAL: ICD-10-CM

## 2023-10-17 DIAGNOSIS — K22.10 EROSIVE ESOPHAGITIS: ICD-10-CM

## 2023-10-17 DIAGNOSIS — I48.0 PAROXYSMAL ATRIAL FIBRILLATION (HCC): ICD-10-CM

## 2023-10-17 DIAGNOSIS — E11.9 TYPE 2 DIABETES MELLITUS WITHOUT COMPLICATION, WITHOUT LONG-TERM CURRENT USE OF INSULIN (HCC): ICD-10-CM

## 2023-10-17 DIAGNOSIS — E78.5 DYSLIPIDEMIA: ICD-10-CM

## 2023-10-17 DIAGNOSIS — K92.2 UPPER GI BLEED: ICD-10-CM

## 2023-10-17 DIAGNOSIS — D62 ACUTE BLOOD LOSS ANEMIA: ICD-10-CM

## 2023-10-17 DIAGNOSIS — F33.42 RECURRENT MAJOR DEPRESSIVE DISORDER, IN FULL REMISSION (HCC): ICD-10-CM

## 2023-10-17 PROCEDURE — G8427 DOCREV CUR MEDS BY ELIG CLIN: HCPCS | Performed by: FAMILY MEDICINE

## 2023-10-17 PROCEDURE — G8399 PT W/DXA RESULTS DOCUMENT: HCPCS | Performed by: FAMILY MEDICINE

## 2023-10-17 PROCEDURE — 3078F DIAST BP <80 MM HG: CPT | Performed by: FAMILY MEDICINE

## 2023-10-17 PROCEDURE — 1036F TOBACCO NON-USER: CPT | Performed by: FAMILY MEDICINE

## 2023-10-17 PROCEDURE — 2022F DILAT RTA XM EVC RTNOPTHY: CPT | Performed by: FAMILY MEDICINE

## 2023-10-17 PROCEDURE — G8417 CALC BMI ABV UP PARAM F/U: HCPCS | Performed by: FAMILY MEDICINE

## 2023-10-17 PROCEDURE — 1090F PRES/ABSN URINE INCON ASSESS: CPT | Performed by: FAMILY MEDICINE

## 2023-10-17 PROCEDURE — 3017F COLORECTAL CA SCREEN DOC REV: CPT | Performed by: FAMILY MEDICINE

## 2023-10-17 PROCEDURE — 3044F HG A1C LEVEL LT 7.0%: CPT | Performed by: FAMILY MEDICINE

## 2023-10-17 PROCEDURE — 1123F ACP DISCUSS/DSCN MKR DOCD: CPT | Performed by: FAMILY MEDICINE

## 2023-10-17 PROCEDURE — 3074F SYST BP LT 130 MM HG: CPT | Performed by: FAMILY MEDICINE

## 2023-10-17 PROCEDURE — 99214 OFFICE O/P EST MOD 30 MIN: CPT | Performed by: FAMILY MEDICINE

## 2023-10-17 PROCEDURE — G0008 ADMIN INFLUENZA VIRUS VAC: HCPCS | Performed by: FAMILY MEDICINE

## 2023-10-17 PROCEDURE — G8484 FLU IMMUNIZE NO ADMIN: HCPCS | Performed by: FAMILY MEDICINE

## 2023-10-17 PROCEDURE — 90694 VACC AIIV4 NO PRSRV 0.5ML IM: CPT | Performed by: FAMILY MEDICINE

## 2023-10-17 PROCEDURE — 3023F SPIROM DOC REV: CPT | Performed by: FAMILY MEDICINE

## 2023-10-17 NOTE — PROGRESS NOTES
Vaccine Information Sheet, \"Influenza - Inactivated\"  given to Teresa Pulling, or parent/legal guardian of  Teresa Castro and verbalized understanding. Patient responses:    Have you ever had a reaction to a flu vaccine? No  Do you have an allergy to eggs, neomycin or polymixin? No  Do you have an allergy to Thimerosal, contact lens solution, or Merthiolate? No  Have you ever had Guillian Artesian Syndrome? No  Do you have any current illness? No  Do you have a temperature above 100 degrees? No  Are you pregnant? No          If pregnant, permission obtained from physician? No  Do you have an active neurological disorder? No      Flu vaccine given per order. Please see immunization tab.
with severe symptomatic prosthetic aortic regurgitation      History of subarachnoid hemorrhage 03/23/2015     Spontaneous SAH. Admitted to [place]. Extensive w/u for cause was negative. Osteoarthritis     S/P AVR (aortic valve replacement) 01/01/2015     x 2, last replacement July 2018 d/t failure of the 1st         Past Medical History:   Diagnosis Date    Atrial fibrillation (HCC)     COPD (chronic obstructive pulmonary disease) (HCC)     DM2 (diabetes mellitus, type 2) (HCC)     Dyslipidemia     Fibromyalgia     Former smoker     GERD (gastroesophageal reflux disease)     H/O prosthetic aortic valve replacement     Early bioprosthetic aortic valve failure with severe symptomatic prosthetic aortic regurgitation is now s/p REDO AORTIC VALVE REPLACEMENT, MITRAL VALVE REPAIR, TRICUSPID VALVE REPAIR, WILBERTO performed by Rachid Drummond MD at [facility] failure with preserved ejection fraction ([address])     History of aortic stenosis, s/p valve replacement x 2     History of iron deficiency anemia     History of subarachnoid hemorrhage 03/23/2015    Spontaneous SAH. Admitted to [place]. Extensive w/u for cause was negative.      Hypertension, essential     Major depression     Morbid obesity (HCC)     THAYER (nonalcoholic steatohepatitis)     Osteoarthritis     RLS (restless legs syndrome) 01/16/2019    S/P AVR (aortic valve replacement) 2015    x 2, last replacement July 2018 d/t failure of the 1st    S/P mitral valve repair 07/13/2018    Dr. Ramandeep Vargas    Valvular heart disease 01/16/2019       Past Surgical History:   Procedure Laterality Date    AORTIC VALVE REPLACEMENT      cow valve    BRAIN SURGERY      to drain blood    CARDIAC CATHETERIZATION  2004 or 2005    [facility]    COLONOSCOPY      COLONOSCOPY N/A 9/8/2023    COLONOSCOPY performed by Karo Bauman MD at [facility] CATH LAB PROCEDURE      HYSTERECTOMY (CERVIX STATUS UNKNOWN)      age 49    OVARY REMOVAL Bilateral     age 49    PARTIAL

## 2023-10-18 ENCOUNTER — OFFICE VISIT (OUTPATIENT)
Dept: ONCOLOGY | Age: 68
End: 2023-10-18
Payer: MEDICARE

## 2023-10-18 ENCOUNTER — HOSPITAL ENCOUNTER (OUTPATIENT)
Dept: INFUSION THERAPY | Age: 68
Discharge: HOME | End: 2023-10-18
Payer: MEDICARE

## 2023-10-18 VITALS
TEMPERATURE: 97.8 F | OXYGEN SATURATION: 97 % | DIASTOLIC BLOOD PRESSURE: 63 MMHG | RESPIRATION RATE: 16 BRPM | SYSTOLIC BLOOD PRESSURE: 139 MMHG | HEART RATE: 73 BPM

## 2023-10-18 VITALS
HEIGHT: 63 IN | OXYGEN SATURATION: 97 % | WEIGHT: 177.8 LBS | BODY MASS INDEX: 31.5 KG/M2 | RESPIRATION RATE: 16 BRPM | TEMPERATURE: 97.8 F | SYSTOLIC BLOOD PRESSURE: 139 MMHG | HEART RATE: 73 BPM | DIASTOLIC BLOOD PRESSURE: 63 MMHG

## 2023-10-18 DIAGNOSIS — Z86.2 HISTORY OF IRON DEFICIENCY ANEMIA: Primary | ICD-10-CM

## 2023-10-18 DIAGNOSIS — Z86.2 HISTORY OF IRON DEFICIENCY ANEMIA: ICD-10-CM

## 2023-10-18 LAB
ABSOLUTE IMMATURE GRANULOCYTE: 0.08 THOU/MM3 (ref 0–0.07)
BASOPHILS ABSOLUTE: 0 THOU/MM3 (ref 0–0.1)
BASOPHILS NFR BLD AUTO: 0 % (ref 0–3)
EOSINOPHIL NFR BLD AUTO: 2 % (ref 0–4)
EOSINOPHILS ABSOLUTE: 0.2 THOU/MM3 (ref 0–0.4)
ERYTHROCYTE [DISTWIDTH] IN BLOOD BY AUTOMATED COUNT: 14.9 % (ref 11.5–14.5)
FERRITIN SERPL IA-MCNC: 374 NG/ML (ref 10–291)
FOLATE SERPL-MCNC: 3.6 NG/ML (ref 4.8–24.2)
HCT VFR BLD AUTO: 35.9 % (ref 37–47)
HGB BLD-MCNC: 11.3 GM/DL (ref 12–16)
HGB RETIC QN AUTO: 33.7 PG (ref 28.2–35.7)
IMM RETICS NFR: 8.9 % (ref 3–15.9)
IMMATURE GRANULOCYTES: 1 %
IRON SATN MFR SERPL: 22 % (ref 20–50)
IRON SERPL-MCNC: 58 UG/DL (ref 50–170)
LYMPHOCYTES ABSOLUTE: 2.8 THOU/MM3 (ref 1–4.8)
LYMPHOCYTES NFR BLD AUTO: 28 % (ref 15–47)
MCH RBC QN AUTO: 27.8 PG (ref 26–33)
MCHC RBC AUTO-ENTMCNC: 31.5 GM/DL (ref 32.2–35.5)
MCV RBC AUTO: 88 FL (ref 81–99)
MONOCYTES ABSOLUTE: 0.8 THOU/MM3 (ref 0.4–1.3)
MONOCYTES NFR BLD AUTO: 9 % (ref 0–12)
NEUTROPHILS NFR BLD AUTO: 61 % (ref 43–75)
PLATELET # BLD AUTO: 181 THOU/MM3 (ref 130–400)
PMV BLD AUTO: 10.2 FL (ref 9.4–12.4)
RBC # BLD AUTO: 4.06 MILL/MM3 (ref 4.2–5.4)
RETICS # AUTO: 52 THOU/MM3 (ref 20–115)
RETICS/RBC NFR AUTO: 1.3 % (ref 0.5–2)
SEGMENTED NEUTROPHILS ABSOLUTE COUNT: 6 THOU/MM3 (ref 1.8–7.7)
TIBC SERPL-MCNC: 267 UG/DL (ref 171–450)
VIT B12 SERPL-MCNC: 656 PG/ML (ref 211–911)
WBC # BLD AUTO: 9.9 THOU/MM3 (ref 4.8–10.8)

## 2023-10-18 PROCEDURE — 3017F COLORECTAL CA SCREEN DOC REV: CPT | Performed by: INTERNAL MEDICINE

## 2023-10-18 PROCEDURE — 82728 ASSAY OF FERRITIN: CPT

## 2023-10-18 PROCEDURE — 83540 ASSAY OF IRON: CPT

## 2023-10-18 PROCEDURE — 85025 COMPLETE CBC W/AUTO DIFF WBC: CPT

## 2023-10-18 PROCEDURE — 82746 ASSAY OF FOLIC ACID SERUM: CPT

## 2023-10-18 PROCEDURE — G8399 PT W/DXA RESULTS DOCUMENT: HCPCS | Performed by: INTERNAL MEDICINE

## 2023-10-18 PROCEDURE — 1090F PRES/ABSN URINE INCON ASSESS: CPT | Performed by: INTERNAL MEDICINE

## 2023-10-18 PROCEDURE — G8484 FLU IMMUNIZE NO ADMIN: HCPCS | Performed by: INTERNAL MEDICINE

## 2023-10-18 PROCEDURE — 82607 VITAMIN B-12: CPT

## 2023-10-18 PROCEDURE — 99211 OFF/OP EST MAY X REQ PHY/QHP: CPT

## 2023-10-18 PROCEDURE — 1123F ACP DISCUSS/DSCN MKR DOCD: CPT | Performed by: INTERNAL MEDICINE

## 2023-10-18 PROCEDURE — 1036F TOBACCO NON-USER: CPT | Performed by: INTERNAL MEDICINE

## 2023-10-18 PROCEDURE — G8417 CALC BMI ABV UP PARAM F/U: HCPCS | Performed by: INTERNAL MEDICINE

## 2023-10-18 PROCEDURE — 83550 IRON BINDING TEST: CPT

## 2023-10-18 PROCEDURE — 3075F SYST BP GE 130 - 139MM HG: CPT | Performed by: INTERNAL MEDICINE

## 2023-10-18 PROCEDURE — 3078F DIAST BP <80 MM HG: CPT | Performed by: INTERNAL MEDICINE

## 2023-10-18 PROCEDURE — 85046 RETICYTE/HGB CONCENTRATE: CPT

## 2023-10-18 PROCEDURE — 36415 COLL VENOUS BLD VENIPUNCTURE: CPT

## 2023-10-18 PROCEDURE — 99213 OFFICE O/P EST LOW 20 MIN: CPT | Performed by: INTERNAL MEDICINE

## 2023-10-18 PROCEDURE — G8427 DOCREV CUR MEDS BY ELIG CLIN: HCPCS | Performed by: INTERNAL MEDICINE

## 2023-10-18 ASSESSMENT — ENCOUNTER SYMPTOMS
NAUSEA: 0
VOMITING: 0
ABDOMINAL PAIN: 0
CONSTIPATION: 0
COUGH: 0
TROUBLE SWALLOWING: 0
SORE THROAT: 0
WHEEZING: 0
DIARRHEA: 0
SHORTNESS OF BREATH: 0

## 2023-10-18 NOTE — PROGRESS NOTES
[facility]  [address] [place] [address]  Dept: [phone]  Loc: [address]  1955   No ref. provider found   Erica English DO         CHIEF COMPLAINT  Chief Complaint   Patient presents with    Follow-up     History of iron deficiency anemia          HISTORY OF PRESENT ILLNESS  Ms. Marshall Boston is a pleasant 58-year-old who is here for initial consultation for acute blood loss anemia secondary to GI bleed. Colonoscopy 9/8/2023 with findings of multiple colon polyps, diverticulosis and internal hemorrhoids the etiology of acute blood loss was considered secondary to Eliquis. She is on long-term anticoagulation secondary to cardiac etiology. She was hospitalized twice in September, 2023 for bright red blood per rectum and dark tarry stools. Her medical history is significant for hypertension, chronic diastolic heart failure, CKD stage III, depression, restless leg syndrome, history of stroke in 2015 without residual weakness, COPD, erosive esophagitis on Protonix 40 mg twice daily, PAF, remote history of tobacco dependence hyperlipidemia, status post AVR, status post mitral valve repair, atrial fibrillation and flutter following with Dr. Ac Jaquez and she will see Dr. Rosales Allen for evaluation for watchman procedure due to elevated bleeding risk on  long term anti coagulation. On my evaluation today patient states that she did not have further episodes of bright red blood per rectum or black tarry stools after the colonoscopy 9/8/2023. She is extremely weak and tired, feels dizzy  with postural variation. She reports shortness of breath with minimal exertion and palpitations. Labs significant for H&H: 7.2/24 on 9/20/2023. She denies previous history of blood transfusion or IV iron therapy.       MONITORING PARAMETERS:  Hematological parameters: H&H:    10/18/2023Great response to IV iron

## 2023-10-18 NOTE — PATIENT INSTRUCTIONS
Follow up in 8 weeks for lab only visit( CBC, iron studies ordered). Follow up in 9 weeks for MD visit to review the results of the labs. Great response to IV iron.

## 2023-10-19 RX ORDER — CLOPIDOGREL BISULFATE 75 MG/1
75 TABLET ORAL DAILY
Qty: 90 TABLET | Refills: 1 | Status: SHIPPED | OUTPATIENT
Start: 2023-11-01

## 2023-10-19 RX ORDER — FOLIC ACID 1 MG/1
1 TABLET ORAL DAILY
Qty: 90 TABLET | Refills: 1 | Status: SHIPPED | OUTPATIENT
Start: 2023-10-19

## 2023-10-19 NOTE — PROGRESS NOTES
Labs reviewed, discussed the results with MsSteve Dean 10/19/2023  Prescriptions for folic acid 1 mg daily sent to the pharmacy, [address] [facility].

## 2023-11-01 ENCOUNTER — PREP FOR PROCEDURE (OUTPATIENT)
Dept: CARDIOLOGY | Age: 68
End: 2023-11-01

## 2023-11-01 RX ORDER — SODIUM CHLORIDE 9 MG/ML
INJECTION, SOLUTION INTRAVENOUS PRN
Status: CANCELLED | OUTPATIENT
Start: 2023-11-01

## 2023-11-01 RX ORDER — SODIUM CHLORIDE 9 MG/ML
INJECTION, SOLUTION INTRAVENOUS CONTINUOUS
Status: CANCELLED | OUTPATIENT
Start: 2023-11-01

## 2023-11-01 RX ORDER — SODIUM CHLORIDE 0.9 % (FLUSH) 0.9 %
5-40 SYRINGE (ML) INJECTION EVERY 12 HOURS SCHEDULED
Status: CANCELLED | OUTPATIENT
Start: 2023-11-01

## 2023-11-01 RX ORDER — CHLORHEXIDINE GLUCONATE 4 G/100ML
SOLUTION TOPICAL ONCE
Status: CANCELLED | OUTPATIENT
Start: 2023-11-01 | End: 2023-11-01

## 2023-11-01 RX ORDER — SODIUM CHLORIDE 0.9 % (FLUSH) 0.9 %
5-40 SYRINGE (ML) INJECTION PRN
Status: CANCELLED | OUTPATIENT
Start: 2023-11-01

## 2023-11-02 ENCOUNTER — HOSPITAL ENCOUNTER (INPATIENT)
Dept: INPATIENT UNIT | Age: 68
LOS: 1 days | Discharge: HOME | DRG: 274 | End: 2023-11-02
Attending: INTERNAL MEDICINE | Admitting: INTERNAL MEDICINE
Payer: MEDICARE

## 2023-11-02 VITALS
HEIGHT: 63 IN | OXYGEN SATURATION: 94 % | SYSTOLIC BLOOD PRESSURE: 140 MMHG | BODY MASS INDEX: 31.18 KG/M2 | RESPIRATION RATE: 25 BRPM | DIASTOLIC BLOOD PRESSURE: 50 MMHG | WEIGHT: 176 LBS | TEMPERATURE: 97.5 F | HEART RATE: 76 BPM

## 2023-11-02 PROBLEM — I48.0 PAROXYSMAL A-FIB (HCC): Status: ACTIVE | Noted: 2023-11-02

## 2023-11-02 LAB
ABO: NORMAL
ACTIVATED CLOTTING TIME: 377 SECONDS (ref 1–150)
ALBUMIN SERPL BCG-MCNC: 4.3 G/DL (ref 3.5–5.1)
ALP SERPL-CCNC: 45 U/L (ref 38–126)
ALT SERPL W/O P-5'-P-CCNC: 14 U/L (ref 11–66)
ANION GAP SERPL CALC-SCNC: 11 MEQ/L (ref 8–16)
ANTIBODY SCREEN: NORMAL
APTT PPP: 35.7 SECONDS (ref 22–38)
AST SERPL-CCNC: 18 U/L (ref 5–40)
BILIRUB SERPL-MCNC: 0.4 MG/DL (ref 0.3–1.2)
BUN SERPL-MCNC: 14 MG/DL (ref 7–22)
CALCIUM SERPL-MCNC: 9.6 MG/DL (ref 8.5–10.5)
CHLORIDE SERPL-SCNC: 105 MEQ/L (ref 98–111)
CO2 SERPL-SCNC: 26 MEQ/L (ref 23–33)
CREAT SERPL-MCNC: 1 MG/DL (ref 0.4–1.2)
DEPRECATED RDW RBC AUTO: 47.8 FL (ref 35–45)
ERYTHROCYTE [DISTWIDTH] IN BLOOD BY AUTOMATED COUNT: 14.9 % (ref 11.5–14.5)
GFR SERPL CREATININE-BSD FRML MDRD: > 60 ML/MIN/1.73M2
GLUCOSE SERPL-MCNC: 116 MG/DL (ref 70–108)
HCT VFR BLD AUTO: 39.6 % (ref 37–47)
HGB BLD-MCNC: 12.3 GM/DL (ref 12–16)
INR PPP: 1.02 (ref 0.85–1.13)
MCH RBC QN AUTO: 27.5 PG (ref 26–33)
MCHC RBC AUTO-ENTMCNC: 31.1 GM/DL (ref 32.2–35.5)
MCV RBC AUTO: 88.6 FL (ref 81–99)
NT-PROBNP SERPL IA-MCNC: 512 PG/ML (ref 0–124)
PLATELET # BLD AUTO: 185 THOU/MM3 (ref 130–400)
PMV BLD AUTO: 11 FL (ref 9.4–12.4)
POTASSIUM SERPL-SCNC: 4.5 MEQ/L (ref 3.5–5.2)
PROT SERPL-MCNC: 7.8 G/DL (ref 6.1–8)
RBC # BLD AUTO: 4.47 MILL/MM3 (ref 4.2–5.4)
RH FACTOR: NORMAL
SODIUM SERPL-SCNC: 142 MEQ/L (ref 135–145)
WBC # BLD AUTO: 7.2 THOU/MM3 (ref 4.8–10.8)

## 2023-11-02 PROCEDURE — 86901 BLOOD TYPING SEROLOGIC RH(D): CPT

## 2023-11-02 PROCEDURE — 86923 COMPATIBILITY TEST ELECTRIC: CPT

## 2023-11-02 PROCEDURE — 6360000002 HC RX W HCPCS

## 2023-11-02 PROCEDURE — 80053 COMPREHEN METABOLIC PANEL: CPT

## 2023-11-02 PROCEDURE — 6360000002 HC RX W HCPCS: Performed by: PHYSICIAN ASSISTANT

## 2023-11-02 PROCEDURE — 85027 COMPLETE CBC AUTOMATED: CPT

## 2023-11-02 PROCEDURE — 86850 RBC ANTIBODY SCREEN: CPT

## 2023-11-02 PROCEDURE — 6370000000 HC RX 637 (ALT 250 FOR IP): Performed by: INTERNAL MEDICINE

## 2023-11-02 PROCEDURE — 93010 ELECTROCARDIOGRAM REPORT: CPT | Performed by: NUCLEAR MEDICINE

## 2023-11-02 PROCEDURE — 85347 COAGULATION TIME ACTIVATED: CPT

## 2023-11-02 PROCEDURE — C1769 GUIDE WIRE: HCPCS

## 2023-11-02 PROCEDURE — 33340 PERQ CLSR TCAT L ATR APNDGE: CPT

## 2023-11-02 PROCEDURE — 6370000000 HC RX 637 (ALT 250 FOR IP)

## 2023-11-02 PROCEDURE — 85730 THROMBOPLASTIN TIME PARTIAL: CPT

## 2023-11-02 PROCEDURE — 2500000003 HC RX 250 WO HCPCS

## 2023-11-02 PROCEDURE — 85610 PROTHROMBIN TIME: CPT

## 2023-11-02 PROCEDURE — C1894 INTRO/SHEATH, NON-LASER: HCPCS

## 2023-11-02 PROCEDURE — 02L73DK OCCLUSION OF LEFT ATRIAL APPENDAGE WITH INTRALUMINAL DEVICE, PERCUTANEOUS APPROACH: ICD-10-PCS | Performed by: INTERNAL MEDICINE

## 2023-11-02 PROCEDURE — 36415 COLL VENOUS BLD VENIPUNCTURE: CPT

## 2023-11-02 PROCEDURE — 83880 ASSAY OF NATRIURETIC PEPTIDE: CPT

## 2023-11-02 PROCEDURE — 2580000003 HC RX 258: Performed by: PHYSICIAN ASSISTANT

## 2023-11-02 PROCEDURE — 1200000000 HC SEMI PRIVATE

## 2023-11-02 PROCEDURE — 6360000004 HC RX CONTRAST MEDICATION: Performed by: INTERNAL MEDICINE

## 2023-11-02 PROCEDURE — C1760 CLOSURE DEV, VASC: HCPCS

## 2023-11-02 PROCEDURE — C1889 IMPLANT/INSERT DEVICE, NOC: HCPCS

## 2023-11-02 PROCEDURE — 93005 ELECTROCARDIOGRAM TRACING: CPT | Performed by: PHYSICIAN ASSISTANT

## 2023-11-02 PROCEDURE — 33340 PERQ CLSR TCAT L ATR APNDGE: CPT | Performed by: INTERNAL MEDICINE

## 2023-11-02 PROCEDURE — 86900 BLOOD TYPING SEROLOGIC ABO: CPT

## 2023-11-02 RX ORDER — SODIUM CHLORIDE 0.9 % (FLUSH) 0.9 %
5-40 SYRINGE (ML) INJECTION PRN
Status: DISCONTINUED | OUTPATIENT
Start: 2023-11-02 | End: 2023-11-02 | Stop reason: HOSPADM

## 2023-11-02 RX ORDER — CHLORHEXIDINE GLUCONATE 4 G/100ML
SOLUTION TOPICAL ONCE
Status: DISCONTINUED | OUTPATIENT
Start: 2023-11-02 | End: 2023-11-02 | Stop reason: HOSPADM

## 2023-11-02 RX ORDER — OXYCODONE HYDROCHLORIDE AND ACETAMINOPHEN 5; 325 MG/1; MG/1
1 TABLET ORAL EVERY 4 HOURS PRN
Status: DISCONTINUED | OUTPATIENT
Start: 2023-11-02 | End: 2023-11-02 | Stop reason: HOSPADM

## 2023-11-02 RX ORDER — ASPIRIN 81 MG/1
81 TABLET, CHEWABLE ORAL DAILY
Qty: 30 TABLET | Refills: 3 | Status: SHIPPED | OUTPATIENT
Start: 2023-11-02

## 2023-11-02 RX ORDER — SODIUM CHLORIDE 9 MG/ML
INJECTION, SOLUTION INTRAVENOUS PRN
Status: DISCONTINUED | OUTPATIENT
Start: 2023-11-02 | End: 2023-11-02 | Stop reason: HOSPADM

## 2023-11-02 RX ORDER — CLOPIDOGREL BISULFATE 75 MG/1
75 TABLET ORAL DAILY
Status: DISCONTINUED | OUTPATIENT
Start: 2023-11-02 | End: 2023-11-02 | Stop reason: HOSPADM

## 2023-11-02 RX ORDER — ROPINIROLE 1 MG/1
2 TABLET, FILM COATED ORAL 2 TIMES DAILY
Status: DISCONTINUED | OUTPATIENT
Start: 2023-11-02 | End: 2023-11-02 | Stop reason: HOSPADM

## 2023-11-02 RX ORDER — CLOPIDOGREL 300 MG/1
600 TABLET, FILM COATED ORAL ONCE
Status: COMPLETED | OUTPATIENT
Start: 2023-11-02 | End: 2023-11-02

## 2023-11-02 RX ORDER — ASPIRIN 325 MG
325 TABLET, DELAYED RELEASE (ENTERIC COATED) ORAL ONCE
Status: COMPLETED | OUTPATIENT
Start: 2023-11-02 | End: 2023-11-02

## 2023-11-02 RX ORDER — SODIUM CHLORIDE 9 MG/ML
INJECTION, SOLUTION INTRAVENOUS CONTINUOUS
Status: DISCONTINUED | OUTPATIENT
Start: 2023-11-02 | End: 2023-11-02 | Stop reason: HOSPADM

## 2023-11-02 RX ORDER — ACETAMINOPHEN 325 MG/1
650 TABLET ORAL EVERY 4 HOURS PRN
Status: DISCONTINUED | OUTPATIENT
Start: 2023-11-02 | End: 2023-11-02 | Stop reason: HOSPADM

## 2023-11-02 RX ORDER — SODIUM CHLORIDE 0.9 % (FLUSH) 0.9 %
5-40 SYRINGE (ML) INJECTION EVERY 12 HOURS SCHEDULED
Status: DISCONTINUED | OUTPATIENT
Start: 2023-11-02 | End: 2023-11-02 | Stop reason: HOSPADM

## 2023-11-02 RX ORDER — ASPIRIN 81 MG/1
81 TABLET, CHEWABLE ORAL DAILY
Status: DISCONTINUED | OUTPATIENT
Start: 2023-11-02 | End: 2023-11-02 | Stop reason: HOSPADM

## 2023-11-02 RX ADMIN — IOPAMIDOL 100 ML: 755 INJECTION, SOLUTION INTRAVENOUS at 15:42

## 2023-11-02 RX ADMIN — CLOPIDOGREL BISULFATE 600 MG: 300 TABLET, FILM COATED ORAL at 16:59

## 2023-11-02 RX ADMIN — SODIUM CHLORIDE: 9 INJECTION, SOLUTION INTRAVENOUS at 11:11

## 2023-11-02 RX ADMIN — Medication 2000 MG: at 13:14

## 2023-11-02 RX ADMIN — ASPIRIN 325 MG: 325 TABLET, COATED ORAL at 17:00

## 2023-11-02 NOTE — CARE COORDINATION
Case Management Assessment  Initial Evaluation    Date/Time of Evaluation: 11/2/2023 2:24 PM  Assessment Completed by: Kaila Morrissey RN    If patient is discharged prior to next notation, then this note serves as note for discharge by case management. Patient Name: Korene Severin                   YOB: 1955  Diagnosis: Paroxysmal A-fib [facility]) [I48.0]                   Date / Time: 11/2/2023 10:07 AM  Location: [address] [place] [address]     Patient Admission Status: Inpatient   Readmission Risk Low 0-14, Mod 15-19), High > 20: Readmission Risk Score: 16.7    Current PCP: Lucy Tovar, DO  PCP verified by CM? Yes    Chart Reviewed: Yes      History Provided by: Patient  Patient Orientation: Alert and Oriented    Patient Cognition: Alert    Hospitalization in the last 30 days (Readmission):  No    If yes, Readmission Assessment in CM Navigator will be completed. Advance Directives:      Code Status: Full Code   Patient's Primary Decision Maker is: Patient Declined (Legal Next of Kin Remains as Decision Maker)      Discharge Planning:    Patient lives with: Spouse/Significant Other Type of Home: House  Primary Care Giver: Self  Patient Support Systems include: Spouse/Significant Other, Family Members, Children   Current Financial resources: Medicare  Current community resources: None  Current services prior to admission: None            Current DME:              Type of Home Care services:  None    ADLS  Prior functional level: Independent in ADLs/IADLs  Current functional level: Independent in ADLs/IADLs    Family can provide assistance at DC: Yes  Would you like Case Management to discuss the discharge plan with any other family members/significant others, and if so, who?  No  Plans to Return to Present Housing: Yes  Other Identified Issues/Barriers to RETURNING to current housing: none  Potential Assistance needed at discharge: N/A            Potential DME:    Patient expects to discharge to: [address] [place] [address]

## 2023-11-02 NOTE — DISCHARGE INSTR - DIET

## 2023-11-02 NOTE — H&P
[address]  Sedation/Analgesia History & Physical    Pt Name: Dalton Bateman  Account number: [de-identified]  MRN: [id number]  YOB: 1955  Provider Performing Procedure: Russ Wilson MD MD  Referring Provider: Russ Wilson MD   Primary Care Physician: Bj Ferrari DO  Date: 11/2/2023    PRE-PROCEDURE    Code Status: FULL CODE  Brief History/Pre-Procedure Diagnosis:   PAF, [id number] = 7  Not a candidate for long term [address]    Consent: : I have discussed with the patient risks, benefits, and alternatives (and relevant risks, benefits, and side effects related to alternatives or not receiving care), and likelihood of the success. The patient and/or representative appear to understand and agree to proceed. The discussion encompasses risks, benefits, and side effects related to the alternatives and the risks related to not receiving the proposed care, treatment, and services. The indication, risks and benefits of the procedure and possible therapeutic consequences and alternatives were discussed with the patient. The patient was given the opportunity to ask questions and to have them answered to his/her satisfaction. Risks of the procedure include but are not limited to the following: Bleeding, hematoma including retroperitoneal hemmorhage, infection, pain and discomfort, injury to the aorta and other blood vessels, rhythm disturbance, low blood pressure, myocardial infarction, stroke, kidney damage/failure, myocardial perforation, allergic reactions to sedatives/contrast material, loss of pulse/vascular compromise requiring surgery, aneurysm/pseudoaneurysm formation, possible loss of a limb/hand/leg, needing blood transfusion, requiring emergent open heart surgery or emergent coronary intervention, the need for intubation/respiratory support, the requirement for defibrillation/cardioversion, and death. Alternatives to and omission of the suggested procedure were discussed. expectations with patient and/or responsible adult completed. [x]Patient examined immediately prior to the procedure.  (Refer to nursing sedation/analgesia documentation record)    Real Boudreaux MD MD   Electronically signed 11/2/2023 at 2:02 PM

## 2023-11-02 NOTE — DISCHARGE INSTR - COC
Continuity of Care Form    Patient Name: Aidee Bojorquez   :  1955  MRN:  [id number]    Admit date:  2023  Discharge date:  ***    Code Status Order: Full Code   Advance Directives:     Admitting Physician:  Shavon Sheets MD  PCP: Coy Barroso DO    Discharging Nurse: [facility] Unit/Room#: 2E-17/017-A  Discharging Unit Phone Number: ***    Emergency Contact:   Extended Emergency Contact Information  Primary Emergency Contact: Felton Dean  Address: [address] [place], [address] [place]av[place] [address] [place] Phone: [phone]  Mobile Phone: [phone]  Relation: Spouse  Hearing or visual needs: None  Other needs: None  Preferred language: [place]   needed? No  Secondary Emergency Contact: [address] [place] [address] [place] Phone: [phone]  Relation: Child  Hearing or visual needs: None  Other needs: None  Preferred language: English   needed?  No    Past Surgical History:  Past Surgical History:   Procedure Laterality Date    AORTIC VALVE REPLACEMENT      cow valve    BRAIN SURGERY      to drain blood    CARDIAC CATHETERIZATION   or     [facility]    COLONOSCOPY      COLONOSCOPY N/A 2023    COLONOSCOPY performed by Florian Alejandro MD at [address] [place] [address] CATH LAB PROCEDURE      HYSTERECTOMY (CERVIX STATUS UNKNOWN)      age 49    OVARY REMOVAL Bilateral     age 49    PARTIAL HYSTERECTOMY (CERVIX NOT REMOVED) N/A     Endometriosis    OH ECHO TRANSESOPHAG R-T 2D IMG ACQUISJ I&R ONLY Left 7/3/2018    TRANSESOPHAGEAL ECHOCARDIOGRAM performed by Jamar Greenberg MD at [address] [place] [address] OFFICE/OUTPT VISIT,PROCEDURE ONLY N/A 2018    REDO AORTIC VALVE REPLACEMENT, MITRAL VALVE REPAIR, TRICUSPID VALVE REPAIR, WILBERTO performed by Jagruti Felix MD at [facility] 2023    EGD ESOPHAGOGASTRODUODENOSCOPY performed by Alex Mccarty MD at [facility]

## 2023-11-02 NOTE — PROGRESS NOTES
1049 Patient admitted to 2E17  Ambulatory for Casey. Patient NPO. Patient accompanied by niece. Vital signs obtained. Assessment and data collection intiated. Oriented to room. Policies and procedures for 2E explained. All questions answered with no further questions at this time. Fall prevention and safety precautions discussed with patient. Patient aware cath lab on a delay due to an emergency. [address] reviewed with patient and family. Patient and family verbalize understanding of the plan of care and contribute to goal setting. 1403 To cath lab per bed, stable condition. [address] [facility] [address] taken over from cath lab, right groin stable . Patient reinstructed on bedrest, instructed to keep legs straight, not to cross legs, not to lift head off of pillow, not to laugh hard, if coughs to guard site with hand, voices understanding, taking water without difficulty. 1645 gag reflux present, taking water without difficulty. 1650 taking turkey sandwich, chips, applesauce. 1659 Plavix 600 mg po as ordered, patient instructed to take this always with food. 1700 aspirin  mg po taken as ordered, patient also instructed to take this with food (hx gi bleed)      2000 Discharge instructions given, voices understanding. 2040 Up in ruiz, activity tolerated well. 2037 Discharged per wheelchair, stable condition.

## 2023-11-02 NOTE — BRIEF OP NOTE
[facility]  Sedation/Analgesia Post Sedation Record    Pt Name: Eitan Garcia  Account number: [de-identified]  MRN: [id number]  YOB: 1955  Procedure Performed By: MD MD Gita Reece [facility]  Primary Care Physician: Kaila Machado DO  Date: 11/2/2023    POST-PROCEDURE    Physicians/Assistants: MD MD Gita Reece RPVI    Procedure Performed:WATCHMAN      Sedation/Anesthesia: Versed/ Fentanyl and 2% xylocaine local anesthesia. Estimated Blood Loss: < 50 ml. Specimens Removed: None         Disposition of Specimen: N/A        Complications: No Immediate Complications.        Post-procedure Diagnosis/Findings:           WFLX  27       MD MD Gita Reece, JUJU  Electronically signed 11/2/2023 at 3:38 PM  Interventional Cardiology

## 2023-11-02 NOTE — DISCHARGE INSTRUCTIONS
Dental card and ER card given to patient. Post [facility] Discharge Instructions  We are here for you! If you have any questions please call: Structural Heart Team [phone]    Do you have the help you need at home? It is our REQUIREMENT to have 24 hour care for at least the first 24 post WATCHMAN implant. ACTIVITY:  NO DRIVING for 7 days following procedure. You may ride in a car. No flights of stairs or no heavy lifting for the seven days after the procedure. Then proceed to normal activities as prior to the procedure. Do not lift more than five to ten pounds for the first week you are home.  (A gallon of milk is 7 lbs)  Get up and get dressed every day. Avoid wearing tight or restrictive clothing. Do not stay in bed. Set a daily routine. This is an important step in re-gaining your strength. Walk as much as you can. This will help facilitate the healing process. Plan rest periods during the day. If possible avoid strenuous or vigorous activities and exercise if possible (I.e. climbing stairs)  Avoid work that increases muscle tension.        (straining with bowel movements, moving furniture, etc.)  -  While coughing, sneezing or during bowel movement, support the puncture site by applying gentle compression with the palm of your hand    WOUND CARE:  You may shower 24 hours post procedure. Shower every day. No bathtubs, pools, or hot tubs for the first week you are home. Cleanse wound with mild soap and water. Reapply a clean bandaid on the puncture site daily times 5 days or until site is healed. Keep wound dry. A small amount of bloody or clear drainage is normal.   Watch for signs of infections: redness, incision hot to the touch, fever greater than 101 degrees, swelling at the groin or incision site, or discolored drainage from your incision. NORMAL OBSERVATIONS:  - Slight bump or groin tenderness, which may last up to one week.   - Some bruising or

## 2023-11-03 ENCOUNTER — CARE COORDINATION (OUTPATIENT)
Dept: CASE MANAGEMENT | Age: 68
End: 2023-11-03

## 2023-11-03 ENCOUNTER — TELEPHONE (OUTPATIENT)
Dept: CARDIOLOGY CLINIC | Age: 68
End: 2023-11-03

## 2023-11-03 LAB
EKG ATRIAL RATE: 59 BPM
EKG P AXIS: -10 DEGREES
EKG P-R INTERVAL: 236 MS
EKG Q-T INTERVAL: 494 MS
EKG QRS DURATION: 80 MS
EKG QTC CALCULATION (BAZETT): 489 MS
EKG R AXIS: 28 DEGREES
EKG T AXIS: 86 DEGREES
EKG VENTRICULAR RATE: 59 BPM

## 2023-11-03 NOTE — CARE COORDINATION
Care Transitions Initial Outreach Attempt-1st attempt    Call within 2 business days of discharge: Yes   Attempted initial 24 hour transitional call to patient. Left HIPPA compliant VM to return call directly to [phone].     Patient: Jesus Andrea Patient : 1955 MRN: [id number]    Last Discharge Facility       Date Complaint Diagnosis Description Type Department Provider    23   Admission (Discharged) Lawrence Medina MD                Noted following upcoming appointments from discharge chart review:   [facility] follow up appointment(s):   Future Appointments   Date Time Provider [address]   2023 10:00 AM Freya Gaines MD N Oncology [address] [facility]   2024 12:40  [address], [facility] [address]   2024  8:45 AM Maria Elena Treadwell MD N SRPX Heart [facility]     Non-[facility]  follow up appointment(s): na

## 2023-11-03 NOTE — TELEPHONE ENCOUNTER
Post WATCHMAN (same day discharge) follow up call: The WATCHMAN was implanted on 11/2/23 by Dr. Feliz Evans and met all criteria to be discharged the same day. A follow up phone call was made and patient states that she is doing well and is getting ready to eat breakfast and take . No complaints of pain, hematoma, bruising, oozing, numbness or tingling. Patient was discharged home on 11/2/23 and has been instructed to take Plavix and Aspirin starting today. The below education was reviewed and the 45 day WILBERTO is set for 12/15/23 with Dr. Roxana Gongora. Education was given regarding:  Walk as much as you can. This will help facilitate the healing process. Cleanse wound with mild soap and water. Keep wound dry. A small amount of bloody or clear drainage is normal.   Watch for signs of infections: redness, incision hot to the touch, fever greater than 101 degrees, swelling at the groin or incision site, or discolored drainage from your incision. DO NOT STOP TAKING ANY MEDICATION OR YOUR PRESCRIBED ANTICOAGULATION WITHOUT SPEAKING WITH YOUR CARDIOLOGIST! Take your pills as ordered at time of discharge. Continue all anticoagulation as instructed at discharge. Call with any signs of bleeding. Antibiotic coverage will be needed post WATCHMAN for 6 months post implant if the following is needed: dental procedures including cleanings, gastrointestinal (GI) or genitourinary (), respiratory procedures and procedures on infected skin or muscle    What symptoms or health problems do you need to look out for after you leave the hospital? Call if you have any of these symptoms ([phone]): Weight pound gain of 3 pounds in one day or 5 pounds in one week.  Weight gain is NOT normal.    Chest pain or discomfort  Swelling of the legs, ankles or feet  Increasing shortness of breath  Change in the color or temperature of your lower legs and feet  Develop abdominal pain or unrelieved nausea and/or vomiting  Develop any

## 2023-11-03 NOTE — PROCEDURES
[place] [address]                            CARDIAC CATHETERIZATION    PATIENT NAME: Tom Navarro                      :        1955  MED REC NO:   [id number]                           ROOM:       0017  ACCOUNT NO:   [de-identified]                           ADMIT DATE: 2023  PROVIDER:     Bonifacio Rocha MD    DATE OF PROCEDURE:  2023    SURGEON:  Bonifacio Rocha MD    PROCEDURE:  Percutaneous left atrial appendage occlusion (Watchman). INDICATION FOR PROCEDURE:  Paroxysmal atrial fibrillation, CHADs2-VASc  score of 7, not a candidate for long-term anticoagulation. DESCRIPTION OF PROCEDURE:  After informed consent was obtained from the  patient, she was brought to the cardiac catheterization laboratory and  prepped in sterile fashion. Right femoral vein was chosen as the  primary point of access. Pre-procedure timeout was completed. After  the patient was sedated, WILBERTO probe was inserted. No left atrial or left  atrial appendage thrombus was seen. Anatomy is deemed appropriate to  proceed with the procedure. After infiltration of the right inguinal  region with 2% lidocaine using micropuncture and modified Seldinger  technique under fluoroscopic guidance and ultrasound guidance, I was  able to insert a 12-Uzbek short sheath in the right femoral vein. I  then over a 0.035 Supra Core wire, inserted a preshaped VersaCross  dilator and inserted a Watchman double-curved sheath. I exchanged out  for [facility] transseptal wire. IV heparin 2000 units were given. I then,  using WILBERTO guidance and performed an inferior and posterior transseptal  puncture without any complication. I advanced the catheter into the  left atrium and deaired the system. I then inserted a 5-Uzbek pigtail  under wet-to-wet connection into the double-curved sheath.   In an DAVID  caudal projection, I manipulated into the left atrial

## 2023-11-06 ENCOUNTER — CARE COORDINATION (OUTPATIENT)
Dept: CASE MANAGEMENT | Age: 68
End: 2023-11-06

## 2023-11-06 DIAGNOSIS — I48.0 PAROXYSMAL A-FIB (HCC): ICD-10-CM

## 2023-11-06 DIAGNOSIS — Z95.818 PRESENCE OF WATCHMAN LEFT ATRIAL APPENDAGE CLOSURE DEVICE: Primary | ICD-10-CM

## 2023-11-06 PROCEDURE — 1111F DSCHRG MED/CURRENT MED MERGE: CPT | Performed by: FAMILY MEDICINE

## 2023-11-06 NOTE — CARE COORDINATION
Care Transitions Initial Follow Up Call    Call within 2 business days of discharge: Yes    Patient Current Location:  [facility] [address] [place] [address]    Care Transition Nurse contacted the patient by telephone to perform post hospital discharge assessment. Verified name and  with patient as identifiers. Provided introduction to self, and explanation of the Care Transition Nurse role. Patient: Kiley Jacobson Patient : 1955   MRN: [id number]  Reason for Admission: s/p Watchman  Discharge Date: 23 RARS: Readmission Risk Score: 16.6      Last Discharge Facility       Date Complaint Diagnosis Description Type Department Provider    23   Admission (Discharged) Jagdish Santana MD              Challenges to be reviewed by the provider   Additional needs identified to be addressed with provider: No  none               Method of communication with provider: none. Spoke with Annie Sepulveda, said she is doing very well, \"everything is great. \"  Denies fever, chills, chest pain, SOB/VALIENTE, n/v/d. Groin site healing well. Reviewed post Watchman instructions on AVS, voiced understanding. Dr Maricel Sorto office also reviewed over telephone Friday, no f/u needed. Will have WILBERTO 12/15/23. Reviewed medications/changes, taking as directed. Aware to monitor wt daily as per AVS instructions. Appetite and fluid intake is good. Will not see PCP d/t planned procedure. No other issues to report. Denies any other needs. No other questions or concerns at this time. Will continue to follow. Care Transition Nurse reviewed discharge instructions, medical action plan, and red flags with patient who verbalized understanding. The patient was given an opportunity to ask questions and does not have any further questions or concerns at this time. Were discharge instructions available to patient? Yes.  Reviewed appropriate site of care based on symptoms and resources available to patient including:

## 2023-11-10 DIAGNOSIS — K92.2 UPPER GI BLEED: ICD-10-CM

## 2023-11-10 DIAGNOSIS — K27.9 PUD (PEPTIC ULCER DISEASE): ICD-10-CM

## 2023-11-10 DIAGNOSIS — D50.9 IRON DEFICIENCY ANEMIA, UNSPECIFIED IRON DEFICIENCY ANEMIA TYPE: ICD-10-CM

## 2023-11-10 RX ORDER — FERROUS SULFATE 325(65) MG
325 TABLET ORAL 2 TIMES DAILY WITH MEALS
Qty: 60 TABLET | Refills: 2 | Status: SHIPPED | OUTPATIENT
Start: 2023-11-10

## 2023-11-13 ENCOUNTER — CARE COORDINATION (OUTPATIENT)
Dept: CASE MANAGEMENT | Age: 68
End: 2023-11-13

## 2023-11-13 NOTE — CARE COORDINATION
Care Transitions Follow Up Call    Patient Current Location:  Home: [address] [address]    Care Transition Nurse contacted the patient by telephone to follow up after admission on 23. Verified name and  with patient as identifiers. Patient: Cornell Car  Patient : 1955   MRN: [id number]  Reason for Admission: s/p Watchman  Discharge Date: 23 RARS: Readmission Risk Score: 16.6      Needs to be reviewed by the provider   Additional needs identified to be addressed with provider: No  none             Method of communication with provider: none. CTN call to Juan J Rivera today and she says she is feeling really good. Denies chest pain, irreg HR, sob, swelling, garrett, fever, chills, n/v/d, dizziness, malaise. XP=222/68 wt.=175 (same)  BLOOD SUGAR= does not take  WILBERTO scheduled for 12/15/23  PCP 24  R groin insertion site healed. Eating, drinking & sleeping well. Active, no problem w/ ADL's. Denies problems w/ urination/bowels. No other concerns voiced at this time. Will continue to follow. Addressed changes since last contact:  none  Discussed follow-up appointments. If no appointment was previously scheduled, appointment scheduling offered: Yes. Is follow up appointment scheduled within 7 days of discharge? Not necessary-planned procedure. Follow Up  Future Appointments   Date Time Provider [address] [place] [address]   2023 10:00 AM Kaylah Tosacno MD N Oncology [facility]   2024 12:40 PM [place]ian Palomino DO [address] [place] [address]   2024  8:45 AM Lewis Pitts     External follow up appointment(s):     Care Transition Nurse reviewed discharge instructions, medical action plan, and red flags with patient and discussed any barriers to care and/or understanding of plan of care after discharge.  Discussed appropriate site of care based on symptoms and resources available to patient including: PCP  Specialist  Urgent care

## 2023-11-21 ENCOUNTER — CARE COORDINATION (OUTPATIENT)
Dept: CASE MANAGEMENT | Age: 68
End: 2023-11-21

## 2023-11-21 DIAGNOSIS — G25.81 RLS (RESTLESS LEGS SYNDROME): Chronic | ICD-10-CM

## 2023-11-21 RX ORDER — ROPINIROLE 2 MG/1
2 TABLET, FILM COATED ORAL 2 TIMES DAILY
Qty: 180 TABLET | Refills: 3 | Status: SHIPPED | OUTPATIENT
Start: 2023-11-21

## 2023-11-21 NOTE — CARE COORDINATION
Care Transitions Outreach Attempt-1st attempt    Call within 2 business days of discharge: Yes   Attempted to reach patient for subsequent transitional call. Left HIPPA compliant VM to return call directly to [phone].    Next call-final    Patient: Chace Mosse Patient : 1955 MRN: [id number]    Last Discharge Facility       Date Complaint Diagnosis Description Type Department Provider    23   Admission (Discharged) Tj Bhakta MD                Noted following upcoming appointments from discharge chart review:   [facility] follow up appointment(s):   Future Appointments   Date Time Provider [address]   2023 10:00 AM Puma Santiago MD N Oncology [facility]   2024 12:40  [facility]   2024  8:45 AM Domo Cox MD N SRPX Heart [facility]     Non-[facility]  follow up appointment(s): na

## 2023-11-21 NOTE — TELEPHONE ENCOUNTER
This medication refill is regarding a telephone request. Refill requested by patient. Requested Prescriptions     Pending Prescriptions Disp Refills    rOPINIRole (REQUIP) 2 MG tablet 180 tablet 3     Sig: Take 1 tablet by mouth 2 times daily       Date of last visit: 10/17/2023   Date of next visit: 2/19/2024  Date of last refill: 8/24/2023  180/3  (Patient is out of medication, she requested a refill from [facility] it just has not arrived and she is in pain.)    Last Lipid Panel:    Lab Results   Component Value Date/Time    CHOL 187 01/31/2023 10:28 AM    TRIG 176 01/31/2023 10:28 AM    HDL 49 01/31/2023 10:28 AM    LDLCALC 103 01/31/2023 10:28 AM     Last CMP:   Lab Results   Component Value Date     11/02/2023    K 4.5 11/02/2023     11/02/2023    CO2 26 11/02/2023    BUN 14 11/02/2023    CREATININE 1.0 11/02/2023    GLUCOSE 116 (H) 11/02/2023    CALCIUM 9.6 11/02/2023    PROT 7.8 11/02/2023    LABALBU 4.3 11/02/2023    BILITOT 0.4 11/02/2023    ALKPHOS 45 11/02/2023    AST 18 11/02/2023    ALT 14 11/02/2023    LABGLOM >60 11/02/2023       Last Thyroid:    Lab Results   Component Value Date    TSH 1.130 01/31/2023    T4FREE 1.19 07/02/2018     Last Hemoglobin A1C:    Lab Results   Component Value Date/Time    LABA1C 5.6 08/22/2023 06:56 AM    LABA1C 5.9 08/11/2022 10:00 AM    AVGG 135 12/30/2019 08:35 AM       Rx verified, ordered and set to EP.

## 2023-11-27 ENCOUNTER — CARE COORDINATION (OUTPATIENT)
Dept: CASE MANAGEMENT | Age: 68
End: 2023-11-27

## 2023-11-27 NOTE — CARE COORDINATION
Care Transitions Outreach Attempt    Call within 2 business days of discharge: Yes   Attempted to reach patient for transitions of care follow up. Unable to reach patient. Left HIPAA compliant message w/ CTN # x2 day #2 w/ no response. Program closed per protocol. Patient: Cornell Car Patient : 1955 MRN: [id number]    Last Discharge Facility       Date Complaint Diagnosis Description Type Department Provider    23   Admission (Discharged) Tracie Antoine MD              Was this an external facility discharge?  No Discharge Facility Name:     Noted following upcoming appointments from discharge chart review:    follow up appointment(s):   Future Appointments   Date Time Provider [address]   2023 10:00 AM Kaylah Toscano MD N Oncology [facility]   2024 12:40 PM [address] [place] [address]   2024  8:45 AM Shiv Mendiola MD N [facility],  [address]       follow up appointment(s):

## 2023-12-06 RX ORDER — CITALOPRAM 20 MG/1
TABLET ORAL
Qty: 90 TABLET | Refills: 3 | Status: SHIPPED | OUTPATIENT
Start: 2023-12-06

## 2023-12-06 NOTE — TELEPHONE ENCOUNTER
Recent Visits  Date Type Provider Dept   10/17/23 Office Visit Reji Urias, DO [facility]   09/12/23 Office Visit Reji Urias, DO [facility]   08/22/23 Office Visit Reji Urias, DO [facility]   06/20/23 Office Visit Reji Urias, DO [facility]   05/02/23 Office Visit Reji Urias, DO [facility]   04/20/23 Office Visit Reji Urias, DO [facility]   02/21/23 Office Visit Reji Urias, DO [facility]   01/31/23 Office Visit Reji Urias, DO [facility]   09/21/22 Office Visit Reji Urias, DO [facility]   08/11/22 Office Visit Reji Urias, DO [facility]   Showing recent visits within past 540 days with a meds authorizing provider and meeting all other requirements  Future Appointments  Date Type Provider Dept   02/19/24 Appointment Reji Urias, DO [facility]   Showing future appointments within next 150 days with a meds authorizing provider and meeting all other requirements

## 2023-12-11 RX ORDER — SODIUM CHLORIDE 0.9 % (FLUSH) 0.9 %
5-40 SYRINGE (ML) INJECTION PRN
Status: DISCONTINUED | OUTPATIENT
Start: 2023-12-11 | End: 2023-12-15 | Stop reason: HOSPADM

## 2023-12-11 RX ORDER — SODIUM CHLORIDE 0.9 % (FLUSH) 0.9 %
5-40 SYRINGE (ML) INJECTION EVERY 12 HOURS SCHEDULED
Status: DISCONTINUED | OUTPATIENT
Start: 2023-12-11 | End: 2023-12-15 | Stop reason: HOSPADM

## 2023-12-11 RX ORDER — SODIUM CHLORIDE 9 MG/ML
25 INJECTION, SOLUTION INTRAVENOUS PRN
Status: DISCONTINUED | OUTPATIENT
Start: 2023-12-11 | End: 2023-12-15 | Stop reason: HOSPADM

## 2023-12-11 RX ORDER — SODIUM CHLORIDE 9 MG/ML
INJECTION, SOLUTION INTRAVENOUS CONTINUOUS
Status: DISCONTINUED | OUTPATIENT
Start: 2023-12-11 | End: 2023-12-15 | Stop reason: HOSPADM

## 2023-12-15 ENCOUNTER — APPOINTMENT (OUTPATIENT)
Age: 68
End: 2023-12-15
Attending: INTERNAL MEDICINE
Payer: MEDICARE

## 2023-12-15 ENCOUNTER — HOSPITAL ENCOUNTER (OUTPATIENT)
Age: 68
Setting detail: OUTPATIENT SURGERY
Discharge: HOME | End: 2023-12-15
Attending: NUCLEAR MEDICINE | Admitting: NUCLEAR MEDICINE
Payer: MEDICARE

## 2023-12-15 VITALS
RESPIRATION RATE: 15 BRPM | HEART RATE: 62 BPM | DIASTOLIC BLOOD PRESSURE: 53 MMHG | HEIGHT: 63 IN | WEIGHT: 180.6 LBS | TEMPERATURE: 97.2 F | SYSTOLIC BLOOD PRESSURE: 127 MMHG | OXYGEN SATURATION: 94 % | BODY MASS INDEX: 32 KG/M2

## 2023-12-15 DIAGNOSIS — Z95.818 PRESENCE OF WATCHMAN LEFT ATRIAL APPENDAGE CLOSURE DEVICE: Primary | ICD-10-CM

## 2023-12-15 LAB — ECHO BSA: 1.9 M2

## 2023-12-15 PROCEDURE — 6360000002 HC RX W HCPCS: Performed by: NUCLEAR MEDICINE

## 2023-12-15 PROCEDURE — 2580000003 HC RX 258: Performed by: NUCLEAR MEDICINE

## 2023-12-15 PROCEDURE — 93325 DOPPLER ECHO COLOR FLOW MAPG: CPT

## 2023-12-15 RX ORDER — FENTANYL CITRATE 50 UG/ML
INJECTION, SOLUTION INTRAMUSCULAR; INTRAVENOUS PRN
Status: DISCONTINUED | OUTPATIENT
Start: 2023-12-15 | End: 2023-12-15 | Stop reason: ALTCHOICE

## 2023-12-15 RX ORDER — MIDAZOLAM HYDROCHLORIDE 1 MG/ML
INJECTION INTRAMUSCULAR; INTRAVENOUS PRN
Status: DISCONTINUED | OUTPATIENT
Start: 2023-12-15 | End: 2023-12-15 | Stop reason: ALTCHOICE

## 2023-12-15 RX ADMIN — SODIUM CHLORIDE: 9 INJECTION, SOLUTION INTRAVENOUS at 14:38

## 2023-12-15 ASSESSMENT — PAIN - FUNCTIONAL ASSESSMENT: PAIN_FUNCTIONAL_ASSESSMENT: NONE - DENIES PAIN

## 2023-12-15 NOTE — H&P
[facility]  Sedation/Analgesia History & Physical    Pt Name: Geno Carvalho  Account number: [de-identified]  MRN: [id number]  YOB: 1955  Provider Performing Procedure: Che Boudreaux MD MD [facility]  Primary Care Physician: Becka Hodge DO  Date: 12/15/2023    PRE-PROCEDURE    Code Status: FULL CODE  Brief History/Pre-Procedure Diagnosis:   See chart      Consent: : I have discussed with the patient risks, benefits, and alternatives (and relevant risks, benefits, and side effects related to alternatives or not receiving care), and likelihood of the success. The patient and/or representative appear to understand and agree to proceed. The discussion encompasses risks, benefits, and side effects related to the alternatives and the risks related to not receiving the proposed care, treatment, and services. MEDICAL HISTORY  []ASHD/ANGINA/MI/CHF   [x]Hypertension  []Diabetes  []Hyperlipidemia  []Smoking  []Family Hx of ASHD  []Additional information:       has a past medical history of Atrial fibrillation ([address]), COPD (chronic obstructive pulmonary disease) ([address]), DM2 (diabetes mellitus, type 2) ([address]), Dyslipidemia, Fibromyalgia, Former smoker, GERD (gastroesophageal reflux disease), H/O prosthetic aortic valve replacement, Heart failure with preserved ejection fraction ([address]), History of aortic stenosis, s/p valve replacement x 2, History of iron deficiency anemia, History of subarachnoid hemorrhage, Hyperlipidemia, Hypertension, essential, Major depression, Morbid obesity ([address]), THAYER (nonalcoholic steatohepatitis), Osteoarthritis, RLS (restless legs syndrome), S/P AVR (aortic valve replacement), S/P mitral valve repair, and Valvular heart disease. SURGICAL HISTORY   has a past surgical history that includes Cardiac catheterization (2004 or 2005); Diagnostic Cardiac Cath Lab Procedure; brain surgery; vascular surgery;  Aortic valve replacement; Colonoscopy; pr echo transesophag r-t 2d img acquisj i&r only (Left, 7/3/2018); pr office/outpt visit,procedure only (N/A, 7/13/2018); Partial hysterectomy (N/A, 2004); Hysterectomy; Ovary removal (Bilateral); Upper gastrointestinal endoscopy (N/A, 4/21/2023); and Colonoscopy (N/A, 9/8/2023). Additional information:       ALLERGIES   Allergies as of 10/19/2023 - Fully Reviewed 10/18/2023   Allergen Reaction Noted    Latex Rash 05/05/2015    Demerol hcl [meperidine] Hives 01/09/2015     Additional information:       MEDICATIONS   Aspirin  [x] 81 mg  [] 325 mg  [] None  Antiplatelet drug therapy use last 5 days  []No []Yes  Coumadin Use Last 5 Days []No []Yes  Other anticoagulant use last 5 days  []No []Yes    Current Facility-Administered Medications:     sodium chloride flush 0.9 % injection 5-40 mL, 5-40 mL, IntraVENous, 2 times per day, Maria Elena Treadwell MD    sodium chloride flush 0.9 % injection 5-40 mL, 5-40 mL, IntraVENous, PRN, Kenneth Hernandez MD    0.9 % sodium chloride infusion, 25 mL, IntraVENous, PRN, Jess Hernandez MD    0.9 % sodium chloride infusion, , IntraVENous, Continuous, Kenneth Hernandez MD  Prior to Admission medications    Medication Sig Start Date End Date Taking? Authorizing Provider   citalopram (CELEXA) 20 MG tablet TAKE 1 TABLET EVERY DAY 12/6/23   Sammy BRITT, DO   rOPINIRole (REQUIP) 2 MG tablet Take 1 tablet by mouth 2 times daily 11/21/23   Sammy BRITT, DO   ferrous sulfate (IRON 325) 325 (65 Fe) MG tablet Take 1 tablet by mouth 2 times daily (with meals) 11/10/23   Sharona Byrd DO   aspirin 81 MG chewable tablet Take 1 tablet by mouth daily 11/2/23   Rebecca Payne APRN - CNP   folic acid (FOLVITE) 1 MG tablet Take 1 tablet by mouth daily 10/19/23   Freya Gaines MD   clopidogrel (PLAVIX) 75 MG tablet Take 1 tablet by mouth daily Please start taking on 11/3.  This is the day after your procedure 11/1/23   Jamar Torres MD   albuterol sulfate HFA

## 2023-12-15 NOTE — PROGRESS NOTES
Recovery mode, arouses easily, denies discomfort. Dr. Theo Victoria discussed findings and plan of care with pt and , understandings verbalized.

## 2023-12-18 ENCOUNTER — TELEPHONE (OUTPATIENT)
Dept: CARDIOLOGY CLINIC | Age: 68
End: 2023-12-18

## 2023-12-19 NOTE — TELEPHONE ENCOUNTER
Patient sees Dr Sheyla Paulino:  2023 at [address] [place] [address]. workup:     History: Iron deficiency anemia (Dr. Bonnie Clemons), GIB (Dr. Jonathan Estrada), HTN, CHF - Diastolic, CKD stage III, Depression, RLS, CVA on 3/23/2015, COPD Stage II , Erosive esophagitis, PAD, Former smoker, HLD, DM II, Fibromyalgia, GERD, SAH on 3/23/2015, Obesity, THAYER, AVR x 2 (2015 - Placed by Dr. Kris Herrera at with a 23mm Tirfecta Tissue Aortic Valve) (2018 - 21mm [employer] Trifecta was placed by Dr. Madelaine Rosales), MV repair (2018 - 28mm Durham Physio annuloplasty band), TV repair (2018 - 28mm Durham MC3 annuloplasty band) , OA, B12 deficiency, Paroxysmal A. Fib, Watchman FLX 27mm (2023)     ECHO: 12/15/2023       [id number]  SB w/ First degree AVB    Cardiac Catheterization: Will need to obtain     CHF appointment: Will need an appointment set up if moving forward     Dental Clearance: Upper Dentures     Labs: 2023

## 2023-12-21 ENCOUNTER — TELEPHONE (OUTPATIENT)
Dept: CARDIOLOGY CLINIC | Age: 68
End: 2023-12-21

## 2023-12-21 DIAGNOSIS — I35.0 SEVERE AORTIC STENOSIS: ICD-10-CM

## 2023-12-21 DIAGNOSIS — I35.1 SEVERE AORTIC INSUFFICIENCY: Primary | ICD-10-CM

## 2023-12-21 DIAGNOSIS — R42 DIZZINESS: ICD-10-CM

## 2023-12-21 NOTE — TELEPHONE ENCOUNTER
PROCEDURE: cardiac cath     DATE OF SERVICE: 1/10/2024    SERVICE LOCATION: [facility]    CPT CODE: 75651    PHYSICIAN: DR. Gavino López     DATE PRIOR AUTH SUBMITTED: 12/21/2023    STATUS:APPROVED    CASE NUMBER: [id number]    AUTH NUMBER: [id number]     VALID:  1/10/2024-02/10/2024

## 2023-12-22 ENCOUNTER — TELEPHONE (OUTPATIENT)
Dept: CARDIOLOGY CLINIC | Age: 68
End: 2023-12-22

## 2023-12-22 NOTE — TELEPHONE ENCOUNTER
This patient has completed the 45 day post WATCHMAN WILBERTO on 12/15/2023. The patient will remain on DAPT per structural heart protocol. Dr. Andrade Bland do you agree?

## 2023-12-26 ENCOUNTER — OFFICE VISIT (OUTPATIENT)
Dept: CARDIOTHORACIC SURGERY | Age: 68
End: 2023-12-26
Payer: MEDICARE

## 2023-12-26 VITALS
BODY MASS INDEX: 33.34 KG/M2 | SYSTOLIC BLOOD PRESSURE: 118 MMHG | DIASTOLIC BLOOD PRESSURE: 54 MMHG | HEART RATE: 127 BPM | WEIGHT: 181.2 LBS | OXYGEN SATURATION: 97 % | HEIGHT: 62 IN

## 2023-12-26 DIAGNOSIS — I35.1 NONRHEUMATIC AORTIC VALVE INSUFFICIENCY: Primary | ICD-10-CM

## 2023-12-26 PROCEDURE — 1124F ACP DISCUSS-NO DSCNMKR DOCD: CPT | Performed by: PHYSICIAN ASSISTANT

## 2023-12-26 PROCEDURE — G8427 DOCREV CUR MEDS BY ELIG CLIN: HCPCS | Performed by: PHYSICIAN ASSISTANT

## 2023-12-26 PROCEDURE — 3074F SYST BP LT 130 MM HG: CPT | Performed by: PHYSICIAN ASSISTANT

## 2023-12-26 PROCEDURE — 1090F PRES/ABSN URINE INCON ASSESS: CPT | Performed by: PHYSICIAN ASSISTANT

## 2023-12-26 PROCEDURE — 99204 OFFICE O/P NEW MOD 45 MIN: CPT | Performed by: PHYSICIAN ASSISTANT

## 2023-12-26 PROCEDURE — 3078F DIAST BP <80 MM HG: CPT | Performed by: PHYSICIAN ASSISTANT

## 2023-12-26 PROCEDURE — G8484 FLU IMMUNIZE NO ADMIN: HCPCS | Performed by: PHYSICIAN ASSISTANT

## 2023-12-26 PROCEDURE — 3017F COLORECTAL CA SCREEN DOC REV: CPT | Performed by: PHYSICIAN ASSISTANT

## 2023-12-26 PROCEDURE — G8417 CALC BMI ABV UP PARAM F/U: HCPCS | Performed by: PHYSICIAN ASSISTANT

## 2023-12-26 PROCEDURE — 1036F TOBACCO NON-USER: CPT | Performed by: PHYSICIAN ASSISTANT

## 2023-12-26 PROCEDURE — G8399 PT W/DXA RESULTS DOCUMENT: HCPCS | Performed by: PHYSICIAN ASSISTANT

## 2023-12-26 NOTE — PATIENT INSTRUCTIONS
You may receive a survey regarding the care you received during your visit. Your input is valuable to us. We encourage you to complete and return your survey. We hope you will choose us in the future for your healthcare needs. Thank you for choosing [facility]!     Your Medical Assistant Today:  Lara GOMEZ     Your Provider for Today: Pranay Serra PA-C  Ph. [phone] opt 1

## 2023-12-26 NOTE — PROGRESS NOTES
CT surgery New Patient Office Appointment        Patient's Name/Date of Birth: Randi Noriega / [birth year] (70 y.o.)    PCP: Kassidy Jeronimo DO    Date: December 26, 2023     CC:     Chief Complaint   Patient presents with    New Patient     Ref Carin Hemp-  TAVR  Eval        HPI  We had the pleasure of seeing Randi Noriega in the office today, as you know this is a very pleasant 70y.o. year old female with a history of severe symptomatic aortic stenosis. She has noted increase fatigue and shortness of breath. She is S/p AVR X 2, 6/15 and 7/18. WILBERTO:12/15/23    Left Ventricle: Normal left ventricular systolic function with a visually estimated EF of 55 - 60%. Left ventricle size is normal. Normal wall thickness. Normal wall motion. Aortic Valve: Not well visualized. Bioprosthetic valve. Moderate to severe regurgitation. Mitral Valve: Valve repaired by annular ring. Mild to moderate regurgitation. Tricuspid Valve: Repaired by annular ring. Left Atrium: Left atrium is moderately dilated. Normal sized appendage. No left atrial appendage thrombus noted. No left atrial appendage mass noted. No thrombus present on the JACKSON device. No maude-device leak of JACKSON device. Interatrial Septum: No interatrial shunt visualized with color Doppler. Agitated saline study was negative with and without provocation. Image quality is adequate. Echo Findings    Left Ventricle Normal left ventricular systolic function with a visually estimated EF of 55 - 60%. Left ventricle size is normal. Normal wall thickness. Normal wall motion. Left Atrium Left atrium is moderately dilated. Normal sized appendage. No left atrial appendage thrombus noted. No left atrial appendage mass noted. No thrombus present on the JACKSON device. No maude-device leak of JACKSON device. Normal sized pulmonary veins. Interatrial Septum No interatrial shunt visualized with color Doppler.  Agitated saline study was negative with and without

## 2023-12-28 ENCOUNTER — HOSPITAL ENCOUNTER (OUTPATIENT)
Dept: INTERVENTIONAL RADIOLOGY/VASCULAR | Age: 68
Discharge: HOME | End: 2023-12-30
Attending: INTERNAL MEDICINE
Payer: MEDICARE

## 2023-12-28 ENCOUNTER — HOSPITAL ENCOUNTER (OUTPATIENT)
Dept: CT IMAGING | Age: 68
Discharge: HOME | End: 2023-12-28
Payer: MEDICARE

## 2023-12-28 DIAGNOSIS — R42 DIZZINESS: ICD-10-CM

## 2023-12-28 DIAGNOSIS — I35.1 SEVERE AORTIC INSUFFICIENCY: ICD-10-CM

## 2023-12-28 LAB — POC CREATININE WHOLE BLOOD: 1 MG/DL (ref 0.5–1.2)

## 2023-12-28 PROCEDURE — 82565 ASSAY OF CREATININE: CPT

## 2023-12-28 PROCEDURE — 71275 CT ANGIOGRAPHY CHEST: CPT

## 2023-12-28 PROCEDURE — 74174 CTA ABD&PLVS W/CONTRAST: CPT

## 2023-12-28 PROCEDURE — 93880 EXTRACRANIAL BILAT STUDY: CPT

## 2023-12-28 PROCEDURE — 6360000004 HC RX CONTRAST MEDICATION: Performed by: INTERNAL MEDICINE

## 2023-12-28 RX ADMIN — IOPAMIDOL 125 ML: 755 INJECTION, SOLUTION INTRAVENOUS at 12:49

## 2024-01-03 ENCOUNTER — TELEPHONE (OUTPATIENT)
Dept: FAMILY MEDICINE CLINIC | Age: 69
End: 2024-01-03

## 2024-01-03 ENCOUNTER — HOSPITAL ENCOUNTER (OUTPATIENT)
Age: 69
Discharge: HOME | End: 2024-01-03
Payer: MEDICARE

## 2024-01-03 DIAGNOSIS — J40 SINOBRONCHITIS: ICD-10-CM

## 2024-01-03 DIAGNOSIS — R68.89 FLU-LIKE SYMPTOMS: Primary | ICD-10-CM

## 2024-01-03 DIAGNOSIS — J44.1 CHRONIC OBSTRUCTIVE PULMONARY DISEASE WITH ACUTE EXACERBATION (HCC): ICD-10-CM

## 2024-01-03 DIAGNOSIS — J32.9 SINOBRONCHITIS: ICD-10-CM

## 2024-01-03 LAB
FLUAV RNA RESP QL NAA+PROBE: NOT DETECTED
FLUBV RNA RESP QL NAA+PROBE: NOT DETECTED
SARS-COV-2 RNA RESP QL NAA+PROBE: NOT DETECTED

## 2024-01-03 PROCEDURE — 87636 SARSCOV2 & INF A&B AMP PRB: CPT

## 2024-01-03 RX ORDER — PREDNISONE 20 MG/1
40 TABLET ORAL DAILY
Qty: 10 TABLET | Refills: 0 | Status: SHIPPED | OUTPATIENT
Start: 2024-01-03 | End: 2024-01-08

## 2024-01-03 RX ORDER — DOXYCYCLINE HYCLATE 100 MG
100 TABLET ORAL 2 TIMES DAILY
Qty: 20 TABLET | Refills: 0 | Status: SHIPPED | OUTPATIENT
Start: 2024-01-03 | End: 2024-01-13

## 2024-01-03 NOTE — TELEPHONE ENCOUNTER
Pt called with symptoms of a cough (deep), phlegm (green, thick), sinus congestion, body aches, all started on  Monday. Schedule for heart cath with  on 1/10/24.    Please advise      [facility]

## 2024-01-03 NOTE — TELEPHONE ENCOUNTER
----- Message from Kiran Sharma, DO sent at 1/3/2024  1:42 PM EST -----  Please let pt know that covid/flu neg  Rx for ATB and prednisone sent to pharmacy  F/u if no better  Let me know if questions, thanks!

## 2024-01-03 NOTE — TELEPHONE ENCOUNTER
Ok  Will call once available  Should be some-time today     Diagnosis Orders   1. Flu-like symptoms  COVID-19    Rapid Influenza A/B Antigens

## 2024-01-03 NOTE — TELEPHONE ENCOUNTER
Got it  Would recommend we get her tested for covid/flu based on her sxs etc  Could see in office for visit this AM, or could write order to go to lab to be tested  Let me know what she'd like to do

## 2024-01-04 NOTE — PRE-PROCEDURE INSTRUCTIONS
Message left with Patient  Procedure is scheduled for Wednesday, January 10, admission time is 1pm  Please arrive 15 minutes early  You will [facility] on [facility]nd [facility]  NPO after midnight  Bring drivers license and insurance information  Wear comfortable clean clothes  Shower morning of and night before with liquid antibacterial soap  Remove jewelry   May have to stay overnight if have PTCA/stent - bring an overnight bag.  Leave valuables at home such as cash or credit cards  Bring medications in original bottles - do not take diabetic meds days of procedure.  It is OK to take BP and heart meds.  If you take ASA, plavix, effient or brilinta - please take AM of procedure  If your are on anticoagulants such as coumadin/warfarin, eliquis, xarelto or pradaxa - hold as directed by your Dr  Made aware of visitors limit to 2 at a time  Follow all instructions given by your physician  Please notify doctor office if you need to cancel or reschedule your procedure   needed at discharge  Bring and wear mask

## 2024-01-05 ENCOUNTER — TELEPHONE (OUTPATIENT)
Dept: FAMILY MEDICINE CLINIC | Age: 69
End: 2024-01-05

## 2024-01-05 NOTE — TELEPHONE ENCOUNTER
Please let pt know that Dr. Martines had me review her CTA chest she had done 12/28    It shows a pulm nodule that will need a 3 month f/u CT to monitor.     Will call her to get done as time gets closer.     Should have no bearing on her getting her TAVR done.     Let me know if questions, thanks!    Future Appointments   Date Time Provider Department Center   1/8/2024  1:00 PM Maryellen Solano MD N Oncology [facility]   1/16/2024  9:30 AM Lorna Faulkner, APRN - CNP N SRPX CHF [facility]   2/19/2024 12:40 PM Kiran Sharma, DO Fam Med [facility]   5/22/2024  8:45 AM Kenneth Hernandez MD N SRPX Heart [facility]

## 2024-01-05 NOTE — TELEPHONE ENCOUNTER
CT/CV Surgery appt: 12/26/23 at 10:45am  TAVR CTA: 12/28/23 at 1:00pm, patient to arrive at 12:30pm  Carotid: 12/28/23 at 2:00pm  R&L CATH scheduled for 1/10/24 at 3:00pm with Dr. López, patient to arrive at 1:00pm  CHF appt: 1/16/24 at 9:30am    Patient will be given future appts when in to see Jairon Nix  
Dr. Sharma,     Looks like you have been following this pulmonary nodule.  Can you please review latest CT scans.  From your standpoint, do you feel it is appropriate for patient to proceed with TAVR?  
Orders received from Dr. Martines to obtain a CV/CT Surgery appointment, TAVR CTA, Carotid US, Cardiac Catheterization, Dental Clearance and CHF appointment for optimization.    CT/CV Surgery appointment scheduled on 12/21/23 at 1045  CTA scheduled on ___ at ___  Carotid US scheduled on ___ at ___  Cardiac Catheterization scheduled with Dr. López on ___ at ___ with an arrival time of ___   CHF appointment scheduled on ___ at ___  Pt has dentures.    [id number] completed and scanned into chart.     Dr. Martines, please agree.     Amaris, can you please schedule this patient?  I will review with her at next week's appt.     Nicole, can you check prior auth for heart cath?             
Pcp fine thanks  
Proceed TAVR  
Results of TAVR CTAs:     Dr. Martines, looks like PCP  has been following this.  Do you want clearance sent to PCP or would you like the patient referred to pulmonology?   
STS score completed and scanned into chart.   
normal. The adrenals and pancreas are normal. The gallbladder is normal.  The kidneys are symmetric in size, shape and degree of enhancement. There is no hydronephrosis.    There is no evidence of a small bowel obstruction.  The IVC and aorta are of normal caliber. There is no adenopathy.      PELVIS:  The urinary bladder is normal. There is no pelvic free fluid.  There are some diverticula in the colon.  There is no adenopathy.    BONES: The bones are intact. There are some degenerative changes.    Impression  1. Preoperative TAVR measurements as listed above.  2. There is a 1.1 cm pulmonary nodule at the right lung base. This has increased in size from 0.8 cm on the previous CT scan 10/27/2022. Follow-up chest CT is recommended in 3 months to assess for stability. PET/CT may also be beneficial for further  characterization.            **This report has been created using voice recognition software. It may contain minor errors which are inherent in voice recognition technology.**    Final report electronically signed by Dr Lorenzo Madrigal on 12/28/2023 4:10 PM

## 2024-01-08 RX ORDER — CLOPIDOGREL BISULFATE 75 MG/1
TABLET ORAL
Qty: 90 TABLET | Refills: 1 | Status: SHIPPED | OUTPATIENT
Start: 2024-01-08

## 2024-01-09 ENCOUNTER — TELEPHONE (OUTPATIENT)
Dept: ONCOLOGY | Age: 69
End: 2024-01-09

## 2024-01-09 ENCOUNTER — PREP FOR PROCEDURE (OUTPATIENT)
Dept: CARDIOLOGY | Age: 69
End: 2024-01-09

## 2024-01-09 RX ORDER — SODIUM CHLORIDE 0.9 % (FLUSH) 0.9 %
5-40 SYRINGE (ML) INJECTION EVERY 12 HOURS SCHEDULED
Status: CANCELLED | OUTPATIENT
Start: 2024-01-09

## 2024-01-09 RX ORDER — NITROGLYCERIN 0.4 MG/1
0.4 TABLET SUBLINGUAL EVERY 5 MIN PRN
Status: CANCELLED | OUTPATIENT
Start: 2024-01-09

## 2024-01-09 RX ORDER — SODIUM CHLORIDE 0.9 % (FLUSH) 0.9 %
5-40 SYRINGE (ML) INJECTION PRN
Status: CANCELLED | OUTPATIENT
Start: 2024-01-09

## 2024-01-09 RX ORDER — SODIUM CHLORIDE 9 MG/ML
INJECTION, SOLUTION INTRAVENOUS CONTINUOUS
Status: CANCELLED | OUTPATIENT
Start: 2024-01-09

## 2024-01-09 RX ORDER — ASPIRIN 325 MG
325 TABLET ORAL ONCE
Status: CANCELLED | OUTPATIENT
Start: 2024-01-09 | End: 2024-01-09

## 2024-01-09 RX ORDER — SODIUM CHLORIDE 9 MG/ML
INJECTION, SOLUTION INTRAVENOUS PRN
Status: CANCELLED | OUTPATIENT
Start: 2024-01-09

## 2024-01-09 NOTE — TELEPHONE ENCOUNTER
Called patient about blood work needed for follow up next Friday, 1/19. She said she would get blood work completed later this week- Thursday or Friday. I then reminded her about her appointment for 1/19/24 with Dr. Solano- she verbalized understanding.

## 2024-01-10 ENCOUNTER — HOSPITAL ENCOUNTER (OUTPATIENT)
Age: 69
Setting detail: OUTPATIENT SURGERY
Discharge: HOME | End: 2024-01-10
Attending: NUCLEAR MEDICINE | Admitting: NUCLEAR MEDICINE
Payer: MEDICARE

## 2024-01-10 VITALS
TEMPERATURE: 98.5 F | WEIGHT: 175 LBS | OXYGEN SATURATION: 95 % | DIASTOLIC BLOOD PRESSURE: 48 MMHG | BODY MASS INDEX: 32.2 KG/M2 | SYSTOLIC BLOOD PRESSURE: 110 MMHG | HEIGHT: 62 IN | HEART RATE: 100 BPM | RESPIRATION RATE: 20 BRPM

## 2024-01-10 DIAGNOSIS — I35.0 SEVERE AORTIC STENOSIS: ICD-10-CM

## 2024-01-10 DIAGNOSIS — E87.6 HYPOKALEMIA: ICD-10-CM

## 2024-01-10 DIAGNOSIS — E78.5 DYSLIPIDEMIA: ICD-10-CM

## 2024-01-10 LAB
ABO: NORMAL
ANION GAP SERPL CALC-SCNC: 12 MEQ/L (ref 8–16)
ANTIBODY SCREEN: NORMAL
APTT PPP: 34.7 SECONDS (ref 22–38)
BDY SITE: ABNORMAL
BDY SITE: ABNORMAL
BDY SITE: NORMAL
BUN SERPL-MCNC: 13 MG/DL (ref 7–22)
CALCIUM SERPL-MCNC: 9.7 MG/DL (ref 8.5–10.5)
CHLORIDE SERPL-SCNC: 105 MEQ/L (ref 98–111)
CHOLEST SERPL-MCNC: 164 MG/DL (ref 100–199)
CO2 SERPL-SCNC: 25 MEQ/L (ref 23–33)
COLLECTED BY:: ABNORMAL
COLLECTED BY:: ABNORMAL
COLLECTED BY:: NORMAL
CREAT SERPL-MCNC: 1 MG/DL (ref 0.4–1.2)
DEPRECATED RDW RBC AUTO: 48.9 FL (ref 35–45)
ECHO BSA: 1.86 M2
ERYTHROCYTE [DISTWIDTH] IN BLOOD BY AUTOMATED COUNT: 15.2 % (ref 11.5–14.5)
GFR SERPL CREATININE-BSD FRML MDRD: > 60 ML/MIN/1.73M2
GLUCOSE SERPL-MCNC: 100 MG/DL (ref 70–108)
HCT VFR BLD AUTO: 34.3 % (ref 37–47)
HDLC SERPL-MCNC: 67 MG/DL
HGB BLD-MCNC: 10.9 GM/DL (ref 12–16)
INR PPP: 1.01 (ref 0.85–1.13)
LDLC SERPL CALC-MCNC: 53 MG/DL
MCH RBC QN AUTO: 28.5 PG (ref 26–33)
MCHC RBC AUTO-ENTMCNC: 31.8 GM/DL (ref 32.2–35.5)
MCV RBC AUTO: 89.8 FL (ref 81–99)
PLATELET # BLD AUTO: 229 THOU/MM3 (ref 130–400)
PMV BLD AUTO: 10.6 FL (ref 9.4–12.4)
POTASSIUM SERPL-SCNC: 4.9 MEQ/L (ref 3.5–5.2)
RBC # BLD AUTO: 3.82 MILL/MM3 (ref 4.2–5.4)
RH FACTOR: NORMAL
SAO2 % BLD: 56 % (ref 94–97)
SAO2 % BLD: 66 % (ref 94–97)
SAO2 % BLD: 96 % (ref 94–97)
SODIUM SERPL-SCNC: 142 MEQ/L (ref 135–145)
TRIGL SERPL-MCNC: 221 MG/DL (ref 0–199)
WBC # BLD AUTO: 12.6 THOU/MM3 (ref 4.8–10.8)

## 2024-01-10 PROCEDURE — 93010 ELECTROCARDIOGRAM REPORT: CPT | Performed by: INTERNAL MEDICINE

## 2024-01-10 PROCEDURE — 2709999900 HC NON-CHARGEABLE SUPPLY: Performed by: NUCLEAR MEDICINE

## 2024-01-10 PROCEDURE — C1894 INTRO/SHEATH, NON-LASER: HCPCS | Performed by: NUCLEAR MEDICINE

## 2024-01-10 PROCEDURE — 99152 MOD SED SAME PHYS/QHP 5/>YRS: CPT | Performed by: NUCLEAR MEDICINE

## 2024-01-10 PROCEDURE — 86901 BLOOD TYPING SEROLOGIC RH(D): CPT

## 2024-01-10 PROCEDURE — 2500000003 HC RX 250 WO HCPCS: Performed by: NUCLEAR MEDICINE

## 2024-01-10 PROCEDURE — 86850 RBC ANTIBODY SCREEN: CPT

## 2024-01-10 PROCEDURE — 80061 LIPID PANEL: CPT

## 2024-01-10 PROCEDURE — 93456 R HRT CORONARY ARTERY ANGIO: CPT | Performed by: NUCLEAR MEDICINE

## 2024-01-10 PROCEDURE — 6360000002 HC RX W HCPCS: Performed by: NUCLEAR MEDICINE

## 2024-01-10 PROCEDURE — 85610 PROTHROMBIN TIME: CPT

## 2024-01-10 PROCEDURE — 6360000004 HC RX CONTRAST MEDICATION: Performed by: NUCLEAR MEDICINE

## 2024-01-10 PROCEDURE — 80048 BASIC METABOLIC PNL TOTAL CA: CPT

## 2024-01-10 PROCEDURE — 36415 COLL VENOUS BLD VENIPUNCTURE: CPT

## 2024-01-10 PROCEDURE — C1769 GUIDE WIRE: HCPCS | Performed by: NUCLEAR MEDICINE

## 2024-01-10 PROCEDURE — C1887 CATHETER, GUIDING: HCPCS | Performed by: NUCLEAR MEDICINE

## 2024-01-10 PROCEDURE — 7100000011 HC PHASE II RECOVERY - ADDTL 15 MIN: Performed by: NUCLEAR MEDICINE

## 2024-01-10 PROCEDURE — 85027 COMPLETE CBC AUTOMATED: CPT

## 2024-01-10 PROCEDURE — 86900 BLOOD TYPING SEROLOGIC ABO: CPT

## 2024-01-10 PROCEDURE — 82810 BLOOD GASES O2 SAT ONLY: CPT

## 2024-01-10 PROCEDURE — 7100000010 HC PHASE II RECOVERY - FIRST 15 MIN: Performed by: NUCLEAR MEDICINE

## 2024-01-10 PROCEDURE — 85730 THROMBOPLASTIN TIME PARTIAL: CPT

## 2024-01-10 PROCEDURE — 93005 ELECTROCARDIOGRAM TRACING: CPT | Performed by: PHYSICIAN ASSISTANT

## 2024-01-10 PROCEDURE — 99153 MOD SED SAME PHYS/QHP EA: CPT | Performed by: NUCLEAR MEDICINE

## 2024-01-10 RX ORDER — ASPIRIN 325 MG
325 TABLET ORAL ONCE
Status: DISCONTINUED | OUTPATIENT
Start: 2024-01-10 | End: 2024-01-10 | Stop reason: HOSPADM

## 2024-01-10 RX ORDER — SODIUM CHLORIDE 9 MG/ML
INJECTION, SOLUTION INTRAVENOUS CONTINUOUS
Status: DISCONTINUED | OUTPATIENT
Start: 2024-01-10 | End: 2024-01-10 | Stop reason: HOSPADM

## 2024-01-10 RX ORDER — SODIUM CHLORIDE 0.9 % (FLUSH) 0.9 %
5-40 SYRINGE (ML) INJECTION PRN
Status: DISCONTINUED | OUTPATIENT
Start: 2024-01-10 | End: 2024-01-10 | Stop reason: HOSPADM

## 2024-01-10 RX ORDER — LISINOPRIL 5 MG/1
TABLET ORAL
Qty: 180 TABLET | Refills: 1 | Status: SHIPPED | OUTPATIENT
Start: 2024-01-10

## 2024-01-10 RX ORDER — MIDAZOLAM HYDROCHLORIDE 1 MG/ML
INJECTION INTRAMUSCULAR; INTRAVENOUS PRN
Status: DISCONTINUED | OUTPATIENT
Start: 2024-01-10 | End: 2024-01-10 | Stop reason: HOSPADM

## 2024-01-10 RX ORDER — FENTANYL CITRATE 50 UG/ML
INJECTION, SOLUTION INTRAMUSCULAR; INTRAVENOUS PRN
Status: DISCONTINUED | OUTPATIENT
Start: 2024-01-10 | End: 2024-01-10 | Stop reason: HOSPADM

## 2024-01-10 RX ORDER — POTASSIUM CHLORIDE 20 MEQ/1
TABLET, EXTENDED RELEASE ORAL
Qty: 90 TABLET | Refills: 3 | Status: SHIPPED | OUTPATIENT
Start: 2024-01-10

## 2024-01-10 RX ORDER — ATORVASTATIN CALCIUM 10 MG/1
TABLET, FILM COATED ORAL
Qty: 90 TABLET | Refills: 3 | Status: SHIPPED | OUTPATIENT
Start: 2024-01-10

## 2024-01-10 RX ORDER — SODIUM CHLORIDE 0.9 % (FLUSH) 0.9 %
5-40 SYRINGE (ML) INJECTION EVERY 12 HOURS SCHEDULED
Status: DISCONTINUED | OUTPATIENT
Start: 2024-01-10 | End: 2024-01-10 | Stop reason: HOSPADM

## 2024-01-10 RX ORDER — LIDOCAINE HYDROCHLORIDE 20 MG/ML
INJECTION, SOLUTION EPIDURAL; INFILTRATION; INTRACAUDAL; PERINEURAL PRN
Status: DISCONTINUED | OUTPATIENT
Start: 2024-01-10 | End: 2024-01-10 | Stop reason: HOSPADM

## 2024-01-10 RX ORDER — SODIUM CHLORIDE 9 MG/ML
INJECTION, SOLUTION INTRAVENOUS PRN
Status: DISCONTINUED | OUTPATIENT
Start: 2024-01-10 | End: 2024-01-10 | Stop reason: HOSPADM

## 2024-01-10 RX ORDER — ACETAMINOPHEN 325 MG/1
650 TABLET ORAL EVERY 4 HOURS PRN
Status: DISCONTINUED | OUTPATIENT
Start: 2024-01-10 | End: 2024-01-10 | Stop reason: HOSPADM

## 2024-01-10 RX ORDER — ATROPINE SULFATE 0.4 MG/ML
0.5 INJECTION, SOLUTION INTRAVENOUS
Status: DISCONTINUED | OUTPATIENT
Start: 2024-01-10 | End: 2024-01-10 | Stop reason: HOSPADM

## 2024-01-10 RX ORDER — NITROGLYCERIN 0.4 MG/1
0.4 TABLET SUBLINGUAL EVERY 5 MIN PRN
Status: DISCONTINUED | OUTPATIENT
Start: 2024-01-10 | End: 2024-01-10 | Stop reason: HOSPADM

## 2024-01-10 NOTE — DISCHARGE INSTRUCTIONS
Reviewed with the patient postcardiac cath instructions.  Discussed care of puncture site:  remove dressing in 24 hours, gently clean site with soap and water, pat dry, and apply bandaid daily for 5 days.  Keep site clean and dry.  Do not apply any creams, lotions, or powders to puncture site.  Do not submerse site in water (no tub baths, swimming, or whirlpool) for 5 days.  Take it easy for the next 3-5 days.  No driving for 3 days.  Avoid heaving lifting, pushing, pulling or straining.  Monitor site for bleeding, drainage, or swelling.  Monitor for signs of infection which include:  redness, drainage, soreness, or temp greater than 101 F.  Notify doctor of any abnormal findings as listed.  If bleeding occurs, hold firm pressure at site and call 911.

## 2024-01-10 NOTE — FLOWSHEET NOTE
1200 Patient admitted to 2E4  Ambulatory for left heart catherization  Patient NPO. Patient accompanied by boyfriend.   Vital signs obtained.   Assessment and data collection intiated.   Oriented to room.  Policies and procedures for 2E explained.   All questions answered with no further questions at this time.   Fall precautions reviewed with patient.     1200 Care plan reviewed with patient and boyfriend.  Patient and boyfriend verbalize understanding of the plan of care and contribute to goal setting.       1454 to cath lab per bed, stable condition.     1528 care taken over from cath lab, site with dressing to right femoral is dry and intact. Patient reinstructed not to bend groin, not to cross legs, not to lift head off of pillow, if laughs or coughs to hold site for reinforcement. Voices understanding , taking water without difficulty.    1630 HOB elevated to 30 degrees.  Patient ate. Vitals and site checks per epic charting.     1930 up in room, tolerated activity well. Walked in ruiz tolerated activity well. Up in room.     2000 Discharge instructions given.   Patient goals/plan/treatment preferences discussed by this RN.  Discharge planning provided by this RN.  Patient goals/plan/treatment preferences reviewed with patient/family.  Patient/family verbalize understanding of discharge plan and are in agreement with goal/plan/treatment preferences.  Understanding was demonstrated using the teach back method.  AVS provided by this RN at time of discharge, which includes all necessary medical information pertaining to the patients current course of illness, treatment, post-discharge goals of care, and treatment preferences.      2005 Discharged per wheelchair, stable condition.

## 2024-01-10 NOTE — TELEPHONE ENCOUNTER
Future Appointments   Date Time Provider Department Center   1/16/2024  9:30 AM Lorna Faulkner, APRN - CNP N SRPX CHF [facility]   1/19/2024 10:40 AM Maryellen Solano MD N Oncology [facility]   2/19/2024 12:40 PM Kiran Sharma, DO Fam Med [facility]   5/22/2024  8:45 AM Kenneth Hernandez MD N SRPX Heart [facility]

## 2024-01-10 NOTE — H&P
[facility]  Sedation/Analgesia History & Physical    Pt Name: Hui Dean  Account number: [id number]  MRN: [id number]  YOB: 1955  Provider Performing Procedure: Kenneth Hernandez MD MD [facility]  Primary Care Physician: Kiran Sharma DO  Date: 1/10/2024    PRE-PROCEDURE    Code Status: FULL CODE  Brief History/Pre-Procedure Diagnosis:   For TAVR      Consent: : I have discussed with the patient risks, benefits, and alternatives (and relevant risks, benefits, and side effects related to alternatives or not receiving care), and likelihood of the success.   The patient and/or representative appear to understand and agree to proceed.  The discussion encompasses risks, benefits, and side effects related to the alternatives and the risks related to not receiving the proposed care, treatment, and services.         MEDICAL HISTORY  []ASHD/ANGINA/MI/CHF   [x]Hypertension  []Diabetes  []Hyperlipidemia  []Smoking  []Family Hx of ASHD  []Additional information:       has a past medical history of Atrial fibrillation (HCC), COPD (chronic obstructive pulmonary disease) (HCC), DM2 (diabetes mellitus, type 2) (HCC), Dyslipidemia, Fibromyalgia, Former smoker, GERD (gastroesophageal reflux disease), H/O prosthetic aortic valve replacement, Heart failure with preserved ejection fraction (HCC), History of aortic stenosis, s/p valve replacement x 2, History of iron deficiency anemia, History of subarachnoid hemorrhage, Hyperlipidemia, Hypertension, essential, Major depression, Morbid obesity (HCC), THAYER (nonalcoholic steatohepatitis), Osteoarthritis, RLS (restless legs syndrome), S/P AVR (aortic valve replacement), S/P mitral valve repair, and Valvular heart disease.    SURGICAL HISTORY   has a past surgical history that includes Cardiac catheterization (2004 or 2005); Diagnostic Cardiac Cath Lab Procedure; brain surgery; vascular surgery; Aortic valve replacement; Colonoscopy; pr echo transesophag

## 2024-01-12 ENCOUNTER — NURSE ONLY (OUTPATIENT)
Dept: LAB | Age: 69
End: 2024-01-12

## 2024-01-12 DIAGNOSIS — Z86.2 HISTORY OF IRON DEFICIENCY ANEMIA: ICD-10-CM

## 2024-01-12 LAB
BASOPHILS ABSOLUTE: 0 THOU/MM3 (ref 0–0.1)
BASOPHILS NFR BLD AUTO: 0.4 %
DEPRECATED RDW RBC AUTO: 50.9 FL (ref 35–45)
EKG ATRIAL RATE: 249 BPM
EKG Q-T INTERVAL: 386 MS
EKG QRS DURATION: 74 MS
EKG QTC CALCULATION (BAZETT): 505 MS
EKG R AXIS: 27 DEGREES
EKG T AXIS: 111 DEGREES
EKG VENTRICULAR RATE: 103 BPM
EOSINOPHIL NFR BLD AUTO: 1.8 %
EOSINOPHILS ABSOLUTE: 0.2 THOU/MM3 (ref 0–0.4)
ERYTHROCYTE [DISTWIDTH] IN BLOOD BY AUTOMATED COUNT: 15.2 % (ref 11.5–14.5)
FERRITIN SERPL IA-MCNC: 181 NG/ML (ref 10–291)
HCT VFR BLD AUTO: 36 % (ref 37–47)
HGB BLD-MCNC: 11 GM/DL (ref 12–16)
HGB RETIC QN AUTO: 33.5 PG (ref 28.2–35.7)
IMM GRANULOCYTES # BLD AUTO: 0.22 THOU/MM3 (ref 0–0.07)
IMM GRANULOCYTES NFR BLD AUTO: 2.3 %
IMM RETICS NFR: 19.3 % (ref 3–15.9)
IRON SATN MFR SERPL: 18 % (ref 20–50)
IRON SERPL-MCNC: 53 UG/DL (ref 50–170)
LYMPHOCYTES ABSOLUTE: 2.6 THOU/MM3 (ref 1–4.8)
LYMPHOCYTES NFR BLD AUTO: 26.4 %
MCH RBC QN AUTO: 28.3 PG (ref 26–33)
MCHC RBC AUTO-ENTMCNC: 30.6 GM/DL (ref 32.2–35.5)
MCV RBC AUTO: 92.5 FL (ref 81–99)
MONOCYTES ABSOLUTE: 0.7 THOU/MM3 (ref 0.4–1.3)
MONOCYTES NFR BLD AUTO: 7.6 %
NEUTROPHILS NFR BLD AUTO: 61.5 %
NRBC BLD AUTO-RTO: 0 /100 WBC
PLATELET # BLD AUTO: 202 THOU/MM3 (ref 130–400)
PMV BLD AUTO: 10.8 FL (ref 9.4–12.4)
RBC # BLD AUTO: 3.89 MILL/MM3 (ref 4.2–5.4)
RETICS # AUTO: 96 THOU/MM3 (ref 20–115)
RETICS/RBC NFR AUTO: 2.4 % (ref 0.5–2)
SEGMENTED NEUTROPHILS ABSOLUTE COUNT: 6 THOU/MM3 (ref 1.8–7.7)
TIBC SERPL-MCNC: 290 UG/DL (ref 171–450)
WBC # BLD AUTO: 9.7 THOU/MM3 (ref 4.8–10.8)

## 2024-01-16 ENCOUNTER — OFFICE VISIT (OUTPATIENT)
Dept: CARDIOLOGY CLINIC | Age: 69
End: 2024-01-16
Payer: MEDICARE

## 2024-01-16 VITALS
WEIGHT: 183.8 LBS | DIASTOLIC BLOOD PRESSURE: 60 MMHG | OXYGEN SATURATION: 97 % | SYSTOLIC BLOOD PRESSURE: 134 MMHG | HEIGHT: 62 IN | BODY MASS INDEX: 33.82 KG/M2 | HEART RATE: 88 BPM

## 2024-01-16 DIAGNOSIS — I35.1 SEVERE AORTIC INSUFFICIENCY: ICD-10-CM

## 2024-01-16 DIAGNOSIS — Z95.2 H/O PROSTHETIC AORTIC VALVE REPLACEMENT: ICD-10-CM

## 2024-01-16 DIAGNOSIS — I50.32 CHRONIC HEART FAILURE WITH PRESERVED EJECTION FRACTION (HCC): Primary | ICD-10-CM

## 2024-01-16 DIAGNOSIS — I38 VALVULAR HEART DISEASE: ICD-10-CM

## 2024-01-16 PROCEDURE — 99214 OFFICE O/P EST MOD 30 MIN: CPT | Performed by: NURSE PRACTITIONER

## 2024-01-16 PROCEDURE — G8417 CALC BMI ABV UP PARAM F/U: HCPCS | Performed by: NURSE PRACTITIONER

## 2024-01-16 PROCEDURE — 3078F DIAST BP <80 MM HG: CPT | Performed by: NURSE PRACTITIONER

## 2024-01-16 PROCEDURE — 1090F PRES/ABSN URINE INCON ASSESS: CPT | Performed by: NURSE PRACTITIONER

## 2024-01-16 PROCEDURE — G8427 DOCREV CUR MEDS BY ELIG CLIN: HCPCS | Performed by: NURSE PRACTITIONER

## 2024-01-16 PROCEDURE — 1036F TOBACCO NON-USER: CPT | Performed by: NURSE PRACTITIONER

## 2024-01-16 PROCEDURE — 3075F SYST BP GE 130 - 139MM HG: CPT | Performed by: NURSE PRACTITIONER

## 2024-01-16 PROCEDURE — G8399 PT W/DXA RESULTS DOCUMENT: HCPCS | Performed by: NURSE PRACTITIONER

## 2024-01-16 PROCEDURE — 1124F ACP DISCUSS-NO DSCNMKR DOCD: CPT | Performed by: NURSE PRACTITIONER

## 2024-01-16 PROCEDURE — G8484 FLU IMMUNIZE NO ADMIN: HCPCS | Performed by: NURSE PRACTITIONER

## 2024-01-16 PROCEDURE — 3017F COLORECTAL CA SCREEN DOC REV: CPT | Performed by: NURSE PRACTITIONER

## 2024-01-16 RX ORDER — METOPROLOL SUCCINATE 25 MG/1
12.5 TABLET, EXTENDED RELEASE ORAL DAILY
Qty: 30 TABLET | Refills: 1 | Status: SHIPPED | OUTPATIENT
Start: 2024-01-16

## 2024-01-16 RX ORDER — FUROSEMIDE 20 MG/1
40 TABLET ORAL DAILY
Qty: 60 TABLET | Refills: 2 | Status: SHIPPED | OUTPATIENT
Start: 2024-01-16

## 2024-01-16 ASSESSMENT — ENCOUNTER SYMPTOMS
COUGH: 0
SHORTNESS OF BREATH: 0
ABDOMINAL DISTENTION: 0

## 2024-01-16 NOTE — PROGRESS NOTES
TAVR    Tolerating above noted HF meds, no ill side effects noted. Will continue to monitor kidney function and electrolytes. Will optimize as tolerated.   Pt is compliant w/ medications.    Total visit time of 40 minutes has been spent with patient on education of symptoms, management, medication, and plan of care; as well as review of chart: labs, ECHO, radiology reports, etc.   I personally spent more then 50% of the appt time face to face with the patient.  Daily weights  Fluid restriction of 2 Liters per day  Limit sodium in diet to around 5226-6312 mg/day  Monitor BP  Activity as tolerated     Patient was instructed to call the [facility] Failure [facility] for any changes in symptoms as noted in AVS.      No follow-ups on file. or sooner if needed     Patient given educational materials - see patient instructions.   We discussed the importance of weighing oneself and recording daily. We also discussed the importance of a low sodium diet, higher sodium foods to avoid and better low sodium food options.   Patient verbalizes understanding of plan of care using teach back method, and is agreeable to the treatment plan.       Electronically signed by JUAN Sanabria CNP on 1/16/2024 at 1:31 PM

## 2024-01-16 NOTE — PATIENT INSTRUCTIONS
You may receive a survey regarding the care you received during your visit.  Your input is valuable to us.  We encourage you to complete and return your survey.  We hope you will choose us in the future for your healthcare needs.    Your nurses today were Akbar.  Office hours:   Mon-Thurs 8-4:30  Friday 8-12  Phone: [phone]    Continue:  Continue current medications  Daily weights and record  Fluid restriction of 2 Liters per day  Limit sodium in diet to around 7852-4046 mg/day  Monitor BP  Activity as tolerated     Call the Heart Failure Clinic for any of the following symptoms:   Weight gain of 2-3 pounds in 1 day or 5 pounds in 1 week  Increased shortness of breath  Shortness of breath while laying down  Cough  Chest pain  Swelling in feet, ankles or legs  Bloating in abdomen  Fatigue         Start Toprol 12.5/day

## 2024-01-17 ENCOUNTER — TELEPHONE (OUTPATIENT)
Dept: CARDIOLOGY CLINIC | Age: 69
End: 2024-01-17

## 2024-01-17 DIAGNOSIS — I48.0 PAROXYSMAL ATRIAL FIBRILLATION (HCC): Primary | ICD-10-CM

## 2024-01-17 NOTE — TELEPHONE ENCOUNTER
Discussed w/ Conrad ok to DCCV - no plan for Lilly TAVR in next 4 weeks.  Rene reviewed EKG - ok to proceed w/ WILBERTO/DCCV   Wants Eliquis 2 days prior and 4 weeks - post  Ask where she wants RX.    Order placed for WILBERTO/DCCV    Spoke to pt -  - instructed on above.  Order to scheduling

## 2024-01-18 NOTE — TELEPHONE ENCOUNTER
Jose Alberto/cv scheduled 01-22-24, arrive 12:00pm  call to pt, date, time and instructions reviewed

## 2024-01-19 ENCOUNTER — OFFICE VISIT (OUTPATIENT)
Dept: ONCOLOGY | Age: 69
End: 2024-01-19

## 2024-01-19 ENCOUNTER — PREP FOR PROCEDURE (OUTPATIENT)
Dept: CARDIOLOGY CLINIC | Age: 69
End: 2024-01-19

## 2024-01-19 ENCOUNTER — HOSPITAL ENCOUNTER (OUTPATIENT)
Dept: INFUSION THERAPY | Age: 69
Discharge: HOME | End: 2024-01-19
Payer: MEDICARE

## 2024-01-19 VITALS
HEART RATE: 80 BPM | OXYGEN SATURATION: 96 % | DIASTOLIC BLOOD PRESSURE: 53 MMHG | TEMPERATURE: 98.1 F | SYSTOLIC BLOOD PRESSURE: 108 MMHG | RESPIRATION RATE: 16 BRPM

## 2024-01-19 VITALS
BODY MASS INDEX: 33.86 KG/M2 | WEIGHT: 184 LBS | RESPIRATION RATE: 16 BRPM | OXYGEN SATURATION: 96 % | TEMPERATURE: 98.1 F | HEART RATE: 80 BPM | DIASTOLIC BLOOD PRESSURE: 53 MMHG | HEIGHT: 62 IN | SYSTOLIC BLOOD PRESSURE: 108 MMHG

## 2024-01-19 DIAGNOSIS — Z86.2 HISTORY OF IRON DEFICIENCY ANEMIA: Primary | ICD-10-CM

## 2024-01-19 PROCEDURE — 99211 OFF/OP EST MAY X REQ PHY/QHP: CPT

## 2024-01-19 RX ORDER — SODIUM CHLORIDE 0.9 % (FLUSH) 0.9 %
5-40 SYRINGE (ML) INJECTION PRN
Status: CANCELLED | OUTPATIENT
Start: 2024-01-19

## 2024-01-19 RX ORDER — SODIUM CHLORIDE 0.9 % (FLUSH) 0.9 %
5-40 SYRINGE (ML) INJECTION EVERY 12 HOURS SCHEDULED
Status: CANCELLED | OUTPATIENT
Start: 2024-01-19

## 2024-01-19 RX ORDER — SODIUM CHLORIDE 9 MG/ML
INJECTION, SOLUTION INTRAVENOUS PRN
Status: CANCELLED | OUTPATIENT
Start: 2024-01-19

## 2024-01-19 ASSESSMENT — ENCOUNTER SYMPTOMS
TROUBLE SWALLOWING: 0
CONSTIPATION: 0
SORE THROAT: 0
NAUSEA: 0
ABDOMINAL PAIN: 0
WHEEZING: 0
COUGH: 0
DIARRHEA: 0
VOMITING: 0
SHORTNESS OF BREATH: 0

## 2024-01-19 NOTE — PROGRESS NOTES
out any work activities: up and about more than 50% of waking hours      ASSESSMENT and PLAN:  Iron deficiency anemia secondary to acute blood loss  Long-term use of oral anticoagulation      Plan:  H&H stable(11/36) and iron studies 1/12/24 with adequate stores.    Clinically doing well, no overt signs or symptoms of bleeding.  She will follow-up with cardiology to discuss TAVR.  An opportunity to ask questions was given. All questions were answered to patient's satisfaction.      Orders Placed This Encounter   Procedures    CBC with Auto Differential     Standing Status:   Future     Standing Expiration Date:   1/19/2025    Ferritin     Standing Status:   Future     Standing Expiration Date:   1/19/2025    IRON SATURATION     Standing Status:   Future     Standing Expiration Date:   1/19/2025    Iron and TIBC     Standing Status:   Future     Standing Expiration Date:   1/19/2025    Reticulocytes     Standing Status:   Future     Standing Expiration Date:   1/19/2025    Vitamin B12 & Folate     Standing Status:   Future     Standing Expiration Date:   1/19/2025     Return in about 14 weeks (around 4/26/2024).  Labs to be obtained one week prior to the clinic visit.      This document was created using a voice-recognition program.  Computer generated transcription errors may be present.    Maryellen Soalno MD 1/19/2024 12:19 PM

## 2024-01-22 ENCOUNTER — HOSPITAL ENCOUNTER (OUTPATIENT)
Age: 69
Discharge: HOME | End: 2024-01-24
Payer: MEDICARE

## 2024-01-22 VITALS
RESPIRATION RATE: 22 BRPM | SYSTOLIC BLOOD PRESSURE: 100 MMHG | HEART RATE: 81 BPM | DIASTOLIC BLOOD PRESSURE: 43 MMHG | BODY MASS INDEX: 33.42 KG/M2 | WEIGHT: 181.6 LBS | OXYGEN SATURATION: 97 % | HEIGHT: 62 IN | TEMPERATURE: 98 F

## 2024-01-22 DIAGNOSIS — I48.0 PAROXYSMAL ATRIAL FIBRILLATION (HCC): ICD-10-CM

## 2024-01-22 LAB
ANION GAP SERPL CALC-SCNC: 15 MEQ/L (ref 8–16)
APTT PPP: 43.9 SECONDS (ref 22–38)
BUN SERPL-MCNC: 18 MG/DL (ref 7–22)
CALCIUM SERPL-MCNC: 9.6 MG/DL (ref 8.5–10.5)
CHLORIDE SERPL-SCNC: 106 MEQ/L (ref 98–111)
CO2 SERPL-SCNC: 22 MEQ/L (ref 23–33)
CREAT SERPL-MCNC: 0.9 MG/DL (ref 0.4–1.2)
DEPRECATED RDW RBC AUTO: 49.3 FL (ref 35–45)
ECHO BSA: 1.9 M2
ERYTHROCYTE [DISTWIDTH] IN BLOOD BY AUTOMATED COUNT: 14.9 % (ref 11.5–14.5)
GFR SERPL CREATININE-BSD FRML MDRD: > 60 ML/MIN/1.73M2
GLUCOSE SERPL-MCNC: 111 MG/DL (ref 70–108)
HCT VFR BLD AUTO: 34 % (ref 37–47)
HGB BLD-MCNC: 10.8 GM/DL (ref 12–16)
INR PPP: 1.59 (ref 0.85–1.13)
MCH RBC QN AUTO: 28.7 PG (ref 26–33)
MCHC RBC AUTO-ENTMCNC: 31.8 GM/DL (ref 32.2–35.5)
MCV RBC AUTO: 90.4 FL (ref 81–99)
PLATELET # BLD AUTO: 187 THOU/MM3 (ref 130–400)
PMV BLD AUTO: 10.5 FL (ref 9.4–12.4)
POTASSIUM SERPL-SCNC: 4.7 MEQ/L (ref 3.5–5.2)
RBC # BLD AUTO: 3.76 MILL/MM3 (ref 4.2–5.4)
SODIUM SERPL-SCNC: 143 MEQ/L (ref 135–145)
WBC # BLD AUTO: 7.5 THOU/MM3 (ref 4.8–10.8)

## 2024-01-22 PROCEDURE — 80048 BASIC METABOLIC PNL TOTAL CA: CPT

## 2024-01-22 PROCEDURE — 93005 ELECTROCARDIOGRAM TRACING: CPT | Performed by: STUDENT IN AN ORGANIZED HEALTH CARE EDUCATION/TRAINING PROGRAM

## 2024-01-22 PROCEDURE — 93005 ELECTROCARDIOGRAM TRACING: CPT | Performed by: NUCLEAR MEDICINE

## 2024-01-22 PROCEDURE — 85027 COMPLETE CBC AUTOMATED: CPT

## 2024-01-22 PROCEDURE — 7100000010 HC PHASE II RECOVERY - FIRST 15 MIN: Performed by: NUCLEAR MEDICINE

## 2024-01-22 PROCEDURE — 85730 THROMBOPLASTIN TIME PARTIAL: CPT

## 2024-01-22 PROCEDURE — 92960 CARDIOVERSION ELECTRIC EXT: CPT | Performed by: NUCLEAR MEDICINE

## 2024-01-22 PROCEDURE — 6370000000 HC RX 637 (ALT 250 FOR IP)

## 2024-01-22 PROCEDURE — 85610 PROTHROMBIN TIME: CPT

## 2024-01-22 PROCEDURE — 99152 MOD SED SAME PHYS/QHP 5/>YRS: CPT | Performed by: NUCLEAR MEDICINE

## 2024-01-22 PROCEDURE — 92960 CARDIOVERSION ELECTRIC EXT: CPT

## 2024-01-22 PROCEDURE — 93320 DOPPLER ECHO COMPLETE: CPT | Performed by: NUCLEAR MEDICINE

## 2024-01-22 PROCEDURE — 93325 DOPPLER ECHO COLOR FLOW MAPG: CPT | Performed by: NUCLEAR MEDICINE

## 2024-01-22 PROCEDURE — 2500000003 HC RX 250 WO HCPCS: Performed by: NUCLEAR MEDICINE

## 2024-01-22 PROCEDURE — 7100000011 HC PHASE II RECOVERY - ADDTL 15 MIN: Performed by: NUCLEAR MEDICINE

## 2024-01-22 PROCEDURE — 6360000002 HC RX W HCPCS: Performed by: NUCLEAR MEDICINE

## 2024-01-22 PROCEDURE — 36415 COLL VENOUS BLD VENIPUNCTURE: CPT

## 2024-01-22 PROCEDURE — 93325 DOPPLER ECHO COLOR FLOW MAPG: CPT

## 2024-01-22 PROCEDURE — 93010 ELECTROCARDIOGRAM REPORT: CPT | Performed by: INTERNAL MEDICINE

## 2024-01-22 PROCEDURE — 93312 ECHO TRANSESOPHAGEAL: CPT | Performed by: NUCLEAR MEDICINE

## 2024-01-22 PROCEDURE — 2580000003 HC RX 258: Performed by: STUDENT IN AN ORGANIZED HEALTH CARE EDUCATION/TRAINING PROGRAM

## 2024-01-22 RX ORDER — MIDAZOLAM HYDROCHLORIDE 1 MG/ML
INJECTION INTRAMUSCULAR; INTRAVENOUS PRN
Status: COMPLETED | OUTPATIENT
Start: 2024-01-22 | End: 2024-01-22

## 2024-01-22 RX ORDER — SODIUM CHLORIDE 9 MG/ML
INJECTION, SOLUTION INTRAVENOUS PRN
Status: DISCONTINUED | OUTPATIENT
Start: 2024-01-22 | End: 2024-01-25 | Stop reason: HOSPADM

## 2024-01-22 RX ORDER — SODIUM CHLORIDE 0.9 % (FLUSH) 0.9 %
5-40 SYRINGE (ML) INJECTION PRN
Status: DISCONTINUED | OUTPATIENT
Start: 2024-01-22 | End: 2024-01-25 | Stop reason: HOSPADM

## 2024-01-22 RX ORDER — SODIUM CHLORIDE 0.9 % (FLUSH) 0.9 %
5-40 SYRINGE (ML) INJECTION EVERY 12 HOURS SCHEDULED
Status: DISCONTINUED | OUTPATIENT
Start: 2024-01-22 | End: 2024-01-25 | Stop reason: HOSPADM

## 2024-01-22 RX ORDER — FENTANYL CITRATE 50 UG/ML
INJECTION, SOLUTION INTRAMUSCULAR; INTRAVENOUS PRN
Status: COMPLETED | OUTPATIENT
Start: 2024-01-22 | End: 2024-01-22

## 2024-01-22 RX ORDER — FLUMAZENIL 0.1 MG/ML
INJECTION INTRAVENOUS PRN
Status: COMPLETED | OUTPATIENT
Start: 2024-01-22 | End: 2024-01-22

## 2024-01-22 RX ORDER — NALOXONE HYDROCHLORIDE 0.4 MG/ML
INJECTION, SOLUTION INTRAMUSCULAR; INTRAVENOUS; SUBCUTANEOUS PRN
Status: COMPLETED | OUTPATIENT
Start: 2024-01-22 | End: 2024-01-22

## 2024-01-22 RX ADMIN — FENTANYL CITRATE 25 MCG: 50 INJECTION, SOLUTION INTRAMUSCULAR; INTRAVENOUS at 14:07

## 2024-01-22 RX ADMIN — FENTANYL CITRATE 25 MCG: 50 INJECTION, SOLUTION INTRAMUSCULAR; INTRAVENOUS at 14:02

## 2024-01-22 RX ADMIN — SODIUM CHLORIDE: 9 INJECTION, SOLUTION INTRAVENOUS at 13:02

## 2024-01-22 RX ADMIN — MIDAZOLAM 1 MG: 1 INJECTION INTRAMUSCULAR; INTRAVENOUS at 14:02

## 2024-01-22 RX ADMIN — MIDAZOLAM 1 MG: 1 INJECTION INTRAMUSCULAR; INTRAVENOUS at 14:06

## 2024-01-22 RX ADMIN — FLUMAZENIL 0.2 MG: 0.1 INJECTION INTRAVENOUS at 14:09

## 2024-01-22 RX ADMIN — NALOXONE HYDROCHLORIDE 0.4 MG: 0.4 INJECTION, SOLUTION INTRAMUSCULAR; INTRAVENOUS; SUBCUTANEOUS at 14:09

## 2024-01-22 RX ADMIN — MIDAZOLAM 2 MG: 1 INJECTION INTRAMUSCULAR; INTRAVENOUS at 14:01

## 2024-01-22 RX ADMIN — FENTANYL CITRATE 50 MCG: 50 INJECTION, SOLUTION INTRAMUSCULAR; INTRAVENOUS at 14:01

## 2024-01-22 NOTE — H&P
[facility]  Sedation/Analgesia History & Physical    Pt Name: Hui Dean  Account number: [id number]  MRN: [id number]  YOB: 1955  Provider Performing Procedure: Kenneth Hernandez MD MD [facility]  Primary Care Physician: Kiran Sharma DO  Date: 1/22/2024    PRE-PROCEDURE    Code Status: FULL CODE  Brief History/Pre-Procedure Diagnosis:   Afib      Consent: : I have discussed with the patient risks, benefits, and alternatives (and relevant risks, benefits, and side effects related to alternatives or not receiving care), and likelihood of the success.   The patient and/or representative appear to understand and agree to proceed.  The discussion encompasses risks, benefits, and side effects related to the alternatives and the risks related to not receiving the proposed care, treatment, and services.         MEDICAL HISTORY  []ASHD/ANGINA/MI/CHF   [x]Hypertension  []Diabetes  []Hyperlipidemia  []Smoking  []Family Hx of ASHD  []Additional information:       has a past medical history of Atrial fibrillation (HCC), COPD (chronic obstructive pulmonary disease) (HCC), DM2 (diabetes mellitus, type 2) (HCC), Dyslipidemia, Fibromyalgia, Former smoker, GERD (gastroesophageal reflux disease), H/O prosthetic aortic valve replacement, Heart failure with preserved ejection fraction (HCC), History of aortic stenosis, s/p valve replacement x 2, History of iron deficiency anemia, History of subarachnoid hemorrhage, Hyperlipidemia, Hypertension, essential, Major depression, Morbid obesity (HCC), THAYER (nonalcoholic steatohepatitis), Osteoarthritis, Presence of Watchman left atrial appendage closure device, RLS (restless legs syndrome), S/P AVR (aortic valve replacement), S/P mitral valve repair, and Valvular heart disease.    SURGICAL HISTORY   has a past surgical history that includes Cardiac catheterization (2004 or 2005); Diagnostic Cardiac Cath Lab Procedure; brain surgery; vascular surgery;

## 2024-01-22 NOTE — PLAN OF CARE
Problem: Chronic Conditions and Co-morbidities  Goal: Patient's chronic conditions and co-morbidity symptoms are monitored and maintained or improved  Outcome: Completed  Flowsheets (Taken 1/22/2024 1218)  Care Plan - Patient's Chronic Conditions and Co-Morbidity Symptoms are Monitored and Maintained or Improved:   Monitor and assess patient's chronic conditions and comorbid symptoms for stability, deterioration, or improvement   Collaborate with multidisciplinary team to address chronic and comorbid conditions and prevent exacerbation or deterioration   Update acute care plan with appropriate goals if chronic or comorbid symptoms are exacerbated and prevent overall improvement and discharge     Problem: Pain  Goal: Verbalizes/displays adequate comfort level or baseline comfort level  Outcome: Completed

## 2024-01-22 NOTE — PROGRESS NOTES
1218  Patient admitted to 2E17  Ambulatory for WILBERTO/cardioversion.  Patient NPO [id number] yesterday, meds with sip of water. Patient accompanied by .   Vital signs obtained.   Assessment and data collection intiated.   Oriented to room.  Policies and procedures for 2E explained.   All questions answered with no further questions at this time.   Fall prevention and safety precautions discussed with patient.          1405  cath lab in room setting up for procedure. stable condition.      1417  Care taken over from cath lab  1500 Gag reflux positive ,taking water without difficulty.      1627 Discharge instructions given, voices understanding.

## 2024-01-24 ENCOUNTER — NURSE ONLY (OUTPATIENT)
Dept: LAB | Age: 69
End: 2024-01-24

## 2024-01-24 ENCOUNTER — TELEPHONE (OUTPATIENT)
Dept: CARDIOLOGY CLINIC | Age: 69
End: 2024-01-24

## 2024-01-24 DIAGNOSIS — I50.32 CHRONIC HEART FAILURE WITH PRESERVED EJECTION FRACTION (HCC): ICD-10-CM

## 2024-01-24 DIAGNOSIS — I38 VALVULAR HEART DISEASE: ICD-10-CM

## 2024-01-24 DIAGNOSIS — Z95.2 H/O PROSTHETIC AORTIC VALVE REPLACEMENT: ICD-10-CM

## 2024-01-24 DIAGNOSIS — I50.32 CHRONIC HEART FAILURE WITH PRESERVED EJECTION FRACTION (HCC): Primary | ICD-10-CM

## 2024-01-24 DIAGNOSIS — I48.0 PAROXYSMAL ATRIAL FIBRILLATION (HCC): ICD-10-CM

## 2024-01-24 LAB
ANION GAP SERPL CALC-SCNC: 12 MEQ/L (ref 8–16)
BUN SERPL-MCNC: 25 MG/DL (ref 7–22)
CALCIUM SERPL-MCNC: 9.5 MG/DL (ref 8.5–10.5)
CHLORIDE SERPL-SCNC: 105 MEQ/L (ref 98–111)
CO2 SERPL-SCNC: 24 MEQ/L (ref 23–33)
CREAT SERPL-MCNC: 1 MG/DL (ref 0.4–1.2)
EKG ATRIAL RATE: 72 BPM
EKG P AXIS: 51 DEGREES
EKG P-R INTERVAL: 312 MS
EKG Q-T INTERVAL: 418 MS
EKG Q-T INTERVAL: 464 MS
EKG QRS DURATION: 74 MS
EKG QRS DURATION: 84 MS
EKG QTC CALCULATION (BAZETT): 508 MS
EKG QTC CALCULATION (BAZETT): 514 MS
EKG R AXIS: 22 DEGREES
EKG R AXIS: 5 DEGREES
EKG T AXIS: 74 DEGREES
EKG T AXIS: 88 DEGREES
EKG VENTRICULAR RATE: 72 BPM
EKG VENTRICULAR RATE: 91 BPM
GFR SERPL CREATININE-BSD FRML MDRD: > 60 ML/MIN/1.73M2
GLUCOSE SERPL-MCNC: 120 MG/DL (ref 70–108)
POTASSIUM SERPL-SCNC: 4.6 MEQ/L (ref 3.5–5.2)
SODIUM SERPL-SCNC: 141 MEQ/L (ref 135–145)

## 2024-01-24 NOTE — TELEPHONE ENCOUNTER
I called and talked to patient.   She verbalized understanding to have labs drawn in 2 weeks.     Aga-she is also asking how soon TAVR will be completed.

## 2024-02-01 ENCOUNTER — TELEPHONE (OUTPATIENT)
Dept: CARDIOLOGY CLINIC | Age: 69
End: 2024-02-01

## 2024-02-01 ENCOUNTER — OFFICE VISIT (OUTPATIENT)
Dept: CARDIOLOGY CLINIC | Age: 69
End: 2024-02-01

## 2024-02-01 VITALS
DIASTOLIC BLOOD PRESSURE: 68 MMHG | BODY MASS INDEX: 33.97 KG/M2 | WEIGHT: 184.6 LBS | HEIGHT: 62 IN | HEART RATE: 58 BPM | SYSTOLIC BLOOD PRESSURE: 112 MMHG

## 2024-02-01 DIAGNOSIS — I35.1 SEVERE AORTIC INSUFFICIENCY: Primary | ICD-10-CM

## 2024-02-01 NOTE — TELEPHONE ENCOUNTER
Received call from Dr. Hernadez's office. Appt time needed to be moved to 8:30am. Patient notified.

## 2024-02-01 NOTE — TELEPHONE ENCOUNTER
Per Dr. Martines patient needs cleared from Dr. Hernadez prior to TAVR.  S/P Watchman, was prescribed Eliquis 4 weeks post WILBERTO/CV which was done per Dr. López.

## 2024-02-01 NOTE — TELEPHONE ENCOUNTER
Clearance form faxed to Dr. Hernadez's office.  Called and spoke with [facility].  Appt scheduled for 2/6 0915.  Discussed with patient.  All questions answered.

## 2024-02-01 NOTE — PATIENT INSTRUCTIONS
Your nurses today were DIYA Connell and CODIE Capellan  Your provider today was Dr. Martines  Phone number: [phone]

## 2024-02-02 NOTE — PROGRESS NOTES
[facility] PHYSICIANS [place] [facility]  [facility] CARDIOLOGY  [address]  [address]  [facility] [address]  Dept: [phone]  Dept Fax: [phone]  Loc: [phone]    Visit Date: 2/1/2024    Ms. Dean is a 68 y.o. female  who presented for:  Chief Complaint   Patient presents with    Follow-up     TAVR- discuss [place]    Shortness of Breath     Reports SOB has became a lot worse    Fatigue       HPI:   HPI   67 yo F presents to discuss AI.  Her SOB has become worse.  She has also noted more dark stools.  She had DCCV and was put on limited OAC x 4 weeks post WATCHMAN.  No sig CAD.  Follows with GI - Dr. Hernadez.   She also has moderate-severe AI.  Has Trifecta and low coronaries.  Discussed options regarding coronary protection vs repeat 3rd open surgery.       Current Outpatient Medications:     apixaban (ELIQUIS) 5 MG TABS tablet, Take 1 tablet by mouth 2 times daily Take 2 days prior to cardioversion and 4 weeks after, Disp: 64 tablet, Rfl: 0    metoprolol succinate (TOPROL XL) 25 MG extended release tablet, Take 0.5 tablets by mouth daily, Disp: 30 tablet, Rfl: 1    furosemide (LASIX) 20 MG tablet, Take 2 tablets by mouth daily, Disp: 60 tablet, Rfl: 2    potassium chloride (KLOR-CON M) 20 MEQ extended release tablet, TAKE 1 TABLET EVERY DAY, Disp: 90 tablet, Rfl: 3    atorvastatin (LIPITOR) 10 MG tablet, TAKE 1 TABLET EVERY EVENING, Disp: 90 tablet, Rfl: 3    lisinopril (PRINIVIL;ZESTRIL) 5 MG tablet, TAKE 1 TABLET TWICE DAILY, Disp: 180 tablet, Rfl: 1    clopidogrel (PLAVIX) 75 MG tablet, TAKE 1 TABLET EVERY DAY (START TAKING ON 11/03, THE DAY AFTER YOUR PROCEDURE), Disp: 90 tablet, Rfl: 1    citalopram (CELEXA) 20 MG tablet, TAKE 1 TABLET EVERY DAY, Disp: 90 tablet, Rfl: 3    rOPINIRole (REQUIP) 2 MG tablet, Take 1 tablet by mouth 2 times daily, Disp: 180 tablet, Rfl: 3    ferrous sulfate (IRON 325) 325 (65 Fe) MG tablet, Take 1 tablet by mouth 2 times daily (with meals), Disp: 60

## 2024-02-05 ENCOUNTER — PATIENT MESSAGE (OUTPATIENT)
Dept: FAMILY MEDICINE CLINIC | Age: 69
End: 2024-02-05

## 2024-02-05 DIAGNOSIS — J40 SINOBRONCHITIS: Primary | ICD-10-CM

## 2024-02-05 DIAGNOSIS — J32.9 SINOBRONCHITIS: Primary | ICD-10-CM

## 2024-02-05 RX ORDER — AMOXICILLIN AND CLAVULANATE POTASSIUM 875; 125 MG/1; MG/1
1 TABLET, FILM COATED ORAL 2 TIMES DAILY
Qty: 20 TABLET | Refills: 0 | Status: SHIPPED | OUTPATIENT
Start: 2024-02-05 | End: 2024-02-15

## 2024-02-05 NOTE — TELEPHONE ENCOUNTER
From: Hui Dean  To: Dr. Kiran Sharma  Sent: 2/5/2024 9:46 AM EST  Subject: Cough    I started coughing again 2 days ago and now i am coughing up phlegm and it is a yellowish color. Was wandering if you could call me in a prescription?

## 2024-02-08 ENCOUNTER — TELEPHONE (OUTPATIENT)
Dept: CARDIOLOGY CLINIC | Age: 69
End: 2024-02-08

## 2024-02-08 ENCOUNTER — NURSE ONLY (OUTPATIENT)
Dept: LAB | Age: 69
End: 2024-02-08

## 2024-02-08 DIAGNOSIS — I35.0 SEVERE AORTIC STENOSIS: Primary | ICD-10-CM

## 2024-02-08 DIAGNOSIS — I50.32 CHRONIC HEART FAILURE WITH PRESERVED EJECTION FRACTION (HCC): ICD-10-CM

## 2024-02-08 LAB
ANION GAP SERPL CALC-SCNC: 13 MEQ/L (ref 8–16)
BUN SERPL-MCNC: 8 MG/DL (ref 7–22)
CALCIUM SERPL-MCNC: 9.4 MG/DL (ref 8.5–10.5)
CHLORIDE SERPL-SCNC: 105 MEQ/L (ref 98–111)
CO2 SERPL-SCNC: 23 MEQ/L (ref 23–33)
CREAT SERPL-MCNC: 0.9 MG/DL (ref 0.4–1.2)
GFR SERPL CREATININE-BSD FRML MDRD: > 60 ML/MIN/1.73M2
GLUCOSE SERPL-MCNC: 118 MG/DL (ref 70–108)
POTASSIUM SERPL-SCNC: 4.4 MEQ/L (ref 3.5–5.2)
SODIUM SERPL-SCNC: 141 MEQ/L (ref 135–145)

## 2024-02-09 NOTE — TELEPHONE ENCOUNTER
BMP resulted - kidney/electrolytes look good.   Was reviewing Patels last note - noted pt having dark stools - wanted labs to follow, GI.  She did my BMP but no CBC was ordered - can we make sure she is seeing GI and get CBC in the next week.    thanks

## 2024-02-12 ENCOUNTER — NURSE ONLY (OUTPATIENT)
Dept: LAB | Age: 69
End: 2024-02-12

## 2024-02-12 DIAGNOSIS — I35.0 SEVERE AORTIC STENOSIS: ICD-10-CM

## 2024-02-12 LAB
DEPRECATED RDW RBC AUTO: 59.4 FL (ref 35–45)
ERYTHROCYTE [DISTWIDTH] IN BLOOD BY AUTOMATED COUNT: 16.9 % (ref 11.5–14.5)
HCT VFR BLD AUTO: 26.1 % (ref 37–47)
HGB BLD-MCNC: 7.9 GM/DL (ref 12–16)
MCH RBC QN AUTO: 29.6 PG (ref 26–33)
MCHC RBC AUTO-ENTMCNC: 30.3 GM/DL (ref 32.2–35.5)
MCV RBC AUTO: 97.8 FL (ref 81–99)
PLATELET # BLD AUTO: 209 THOU/MM3 (ref 130–400)
PMV BLD AUTO: 10.9 FL (ref 9.4–12.4)
RBC # BLD AUTO: 2.67 MILL/MM3 (ref 4.2–5.4)
WBC # BLD AUTO: 8.2 THOU/MM3 (ref 4.8–10.8)

## 2024-02-12 NOTE — TELEPHONE ENCOUNTER
Pt has been cleared by GI.  No further GI bleeding issues at this time per pt.  She is taking Eliquis and Plavix with no issues.  No follow-up labs ordered by GI.  Discussed with patient and lab orders sent to [facility].

## 2024-02-13 ENCOUNTER — TELEPHONE (OUTPATIENT)
Dept: ONCOLOGY | Age: 69
End: 2024-02-13

## 2024-02-13 DIAGNOSIS — Z86.2 HISTORY OF IRON DEFICIENCY ANEMIA: Primary | ICD-10-CM

## 2024-02-13 NOTE — TELEPHONE ENCOUNTER
Hgb dropped 7.9 from 10.8  on Eliquis post DCCV x 4 weeks (till 2/22) pt has EVAN (11/2023)    Can we stop Eliquis early?  Continue ASA, plavix?

## 2024-02-13 NOTE — TELEPHONE ENCOUNTER
Conrad ok to continue ASA and Plavix.  Call and let pt know to stop Eliquis  Make sure she lets GI/us know if dark stools return  Repeat CBC in 2 weeks - order placed  Forward labs to GI please.

## 2024-02-13 NOTE — TELEPHONE ENCOUNTER
Called and spoke with patient.  Discussed medication changes and to call us with any dark/bloody stools.  Verbalized understanding.  Labs faxed to Dr. Hernadez at this time.  Med list has been updated.

## 2024-02-13 NOTE — TELEPHONE ENCOUNTER
Patient called stating she has had blood ith each BM for the last week, she had a CBC done yesterday her Hgb was 7.9 she stated you told her to call if she started bleeding again and that she may need IV ion. Please advise.

## 2024-02-14 ENCOUNTER — NURSE ONLY (OUTPATIENT)
Dept: LAB | Age: 69
End: 2024-02-14

## 2024-02-14 DIAGNOSIS — Z86.2 HISTORY OF IRON DEFICIENCY ANEMIA: ICD-10-CM

## 2024-02-14 LAB
BASOPHILS ABSOLUTE: 0 THOU/MM3 (ref 0–0.1)
BASOPHILS NFR BLD AUTO: 0.4 %
DEPRECATED RDW RBC AUTO: 61 FL (ref 35–45)
EOSINOPHIL NFR BLD AUTO: 2.3 %
EOSINOPHILS ABSOLUTE: 0.2 THOU/MM3 (ref 0–0.4)
ERYTHROCYTE [DISTWIDTH] IN BLOOD BY AUTOMATED COUNT: 17.1 % (ref 11.5–14.5)
HCT VFR BLD AUTO: 28.4 % (ref 37–47)
HGB BLD-MCNC: 8.5 GM/DL (ref 12–16)
IMM GRANULOCYTES # BLD AUTO: 0.09 THOU/MM3 (ref 0–0.07)
IMM GRANULOCYTES NFR BLD AUTO: 0.9 %
LYMPHOCYTES ABSOLUTE: 2.3 THOU/MM3 (ref 1–4.8)
LYMPHOCYTES NFR BLD AUTO: 24.7 %
MCH RBC QN AUTO: 29.8 PG (ref 26–33)
MCHC RBC AUTO-ENTMCNC: 29.9 GM/DL (ref 32.2–35.5)
MCV RBC AUTO: 99.6 FL (ref 81–99)
MONOCYTES ABSOLUTE: 0.6 THOU/MM3 (ref 0.4–1.3)
MONOCYTES NFR BLD AUTO: 6.8 %
NEUTROPHILS NFR BLD AUTO: 64.9 %
NRBC BLD AUTO-RTO: 0 /100 WBC
PLATELET # BLD AUTO: 222 THOU/MM3 (ref 130–400)
PMV BLD AUTO: 10.9 FL (ref 9.4–12.4)
RBC # BLD AUTO: 2.85 MILL/MM3 (ref 4.2–5.4)
SEGMENTED NEUTROPHILS ABSOLUTE COUNT: 6.2 THOU/MM3 (ref 1.8–7.7)
WBC # BLD AUTO: 9.5 THOU/MM3 (ref 4.8–10.8)

## 2024-02-15 ENCOUNTER — HOSPITAL ENCOUNTER (OUTPATIENT)
Dept: INFUSION THERAPY | Age: 69
Discharge: HOME | End: 2024-02-15
Payer: MEDICARE

## 2024-02-15 ENCOUNTER — OFFICE VISIT (OUTPATIENT)
Dept: ONCOLOGY | Age: 69
End: 2024-02-15
Payer: MEDICARE

## 2024-02-15 VITALS
WEIGHT: 183.6 LBS | SYSTOLIC BLOOD PRESSURE: 124 MMHG | HEIGHT: 62 IN | DIASTOLIC BLOOD PRESSURE: 57 MMHG | BODY MASS INDEX: 33.79 KG/M2 | RESPIRATION RATE: 16 BRPM | OXYGEN SATURATION: 99 % | TEMPERATURE: 98.2 F | HEART RATE: 61 BPM

## 2024-02-15 VITALS
OXYGEN SATURATION: 99 % | SYSTOLIC BLOOD PRESSURE: 124 MMHG | DIASTOLIC BLOOD PRESSURE: 57 MMHG | HEART RATE: 61 BPM | TEMPERATURE: 98.2 F | RESPIRATION RATE: 16 BRPM

## 2024-02-15 DIAGNOSIS — D64.9 SYMPTOMATIC ANEMIA: Primary | ICD-10-CM

## 2024-02-15 PROCEDURE — 1124F ACP DISCUSS-NO DSCNMKR DOCD: CPT | Performed by: INTERNAL MEDICINE

## 2024-02-15 PROCEDURE — G8399 PT W/DXA RESULTS DOCUMENT: HCPCS | Performed by: INTERNAL MEDICINE

## 2024-02-15 PROCEDURE — 1090F PRES/ABSN URINE INCON ASSESS: CPT | Performed by: INTERNAL MEDICINE

## 2024-02-15 PROCEDURE — 3017F COLORECTAL CA SCREEN DOC REV: CPT | Performed by: INTERNAL MEDICINE

## 2024-02-15 PROCEDURE — G8484 FLU IMMUNIZE NO ADMIN: HCPCS | Performed by: INTERNAL MEDICINE

## 2024-02-15 PROCEDURE — G8417 CALC BMI ABV UP PARAM F/U: HCPCS | Performed by: INTERNAL MEDICINE

## 2024-02-15 PROCEDURE — 99214 OFFICE O/P EST MOD 30 MIN: CPT | Performed by: INTERNAL MEDICINE

## 2024-02-15 PROCEDURE — 3074F SYST BP LT 130 MM HG: CPT | Performed by: INTERNAL MEDICINE

## 2024-02-15 PROCEDURE — 99211 OFF/OP EST MAY X REQ PHY/QHP: CPT

## 2024-02-15 PROCEDURE — G8427 DOCREV CUR MEDS BY ELIG CLIN: HCPCS | Performed by: INTERNAL MEDICINE

## 2024-02-15 PROCEDURE — 3078F DIAST BP <80 MM HG: CPT | Performed by: INTERNAL MEDICINE

## 2024-02-15 PROCEDURE — 1036F TOBACCO NON-USER: CPT | Performed by: INTERNAL MEDICINE

## 2024-02-15 RX ORDER — SODIUM CHLORIDE 9 MG/ML
5-250 INJECTION, SOLUTION INTRAVENOUS PRN
OUTPATIENT
Start: 2024-02-22

## 2024-02-15 RX ORDER — ALBUTEROL SULFATE 90 UG/1
4 AEROSOL, METERED RESPIRATORY (INHALATION) PRN
OUTPATIENT
Start: 2024-02-22

## 2024-02-15 RX ORDER — ONDANSETRON 2 MG/ML
8 INJECTION INTRAMUSCULAR; INTRAVENOUS
OUTPATIENT
Start: 2024-02-22

## 2024-02-15 RX ORDER — FAMOTIDINE 10 MG/ML
20 INJECTION, SOLUTION INTRAVENOUS
OUTPATIENT
Start: 2024-02-22

## 2024-02-15 RX ORDER — EPINEPHRINE 1 MG/ML
0.3 INJECTION, SOLUTION, CONCENTRATE INTRAVENOUS PRN
OUTPATIENT
Start: 2024-02-22

## 2024-02-15 RX ORDER — HEPARIN SODIUM (PORCINE) LOCK FLUSH IV SOLN 100 UNIT/ML 100 UNIT/ML
500 SOLUTION INTRAVENOUS PRN
OUTPATIENT
Start: 2024-02-22

## 2024-02-15 RX ORDER — DIPHENHYDRAMINE HYDROCHLORIDE 50 MG/ML
50 INJECTION INTRAMUSCULAR; INTRAVENOUS
OUTPATIENT
Start: 2024-02-22

## 2024-02-15 RX ORDER — SODIUM CHLORIDE 9 MG/ML
INJECTION, SOLUTION INTRAVENOUS CONTINUOUS
OUTPATIENT
Start: 2024-02-22

## 2024-02-15 RX ORDER — ACETAMINOPHEN 325 MG/1
650 TABLET ORAL
OUTPATIENT
Start: 2024-02-22

## 2024-02-15 RX ORDER — SODIUM CHLORIDE 0.9 % (FLUSH) 0.9 %
5-40 SYRINGE (ML) INJECTION PRN
OUTPATIENT
Start: 2024-02-22

## 2024-02-15 NOTE — PROGRESS NOTES
PHYSICAL EXAM    Vitals:    02/15/24 1108   BP: (!) 124/57   Pulse: 61   Resp: 16   Temp: 98.2 °F (36.8 °C)   SpO2: 99%       Physical Exam  Constitutional:       General: She is not in acute distress.     Appearance: Normal appearance. She is not ill-appearing or toxic-appearing.      Comments: Well-appearing   HENT:      Head: Normocephalic and atraumatic.   Eyes:      Extraocular Movements: Extraocular movements intact.      Pupils: Pupils are equal, round, and reactive to light.   Cardiovascular:      Rate and Rhythm: Normal rate and regular rhythm.   Pulmonary:      Effort: No respiratory distress.      Breath sounds: No stridor. No wheezing, rhonchi or rales.   Abdominal:      General: Bowel sounds are normal. There is no distension.      Palpations: Abdomen is soft.      Tenderness: There is no abdominal tenderness. There is no guarding.   Musculoskeletal:         General: Normal range of motion.   Skin:     General: Skin is warm and dry.      Coloration: Skin is not jaundiced.      Findings: No bruising, erythema or rash.   Neurological:      General: No focal deficit present.      Mental Status: She is alert and oriented to person, place, and time. Mental status is at baseline.      Sensory: No sensory deficit.      Motor: No weakness.      Coordination: Coordination normal.      Gait: Gait normal.          ECOG STATUS    2:  Ambulatory and capable of all self-care but unable to carry out any work activities: up and about more than 50% of waking hours      ASSESSMENT and PLAN:  Iron deficiency anemia secondary to acute blood loss  Long-term use of oral anticoagulation.  Hemorrhoidal bleeding.      Plan:  H&H 8.5/28.4,.  Dropped from 11 January.  Etiology: Hemorrhoidal bleeding.  Following with GI and cardiology.  Eliquis discontinued.  On Plavix and aspirin.  Plans for TAVR by cardiology.  Authorization requested for Venofer 300 mg IV x 3 weekly doses.  Tolerated IV iron in the past without issues.  I

## 2024-02-15 NOTE — PATIENT INSTRUCTIONS
1.  Please schedule a nurse review of labs weekly.  Stat CBC orders every week with type and screen ordered.  2.  If hemoglobin is less than 8.  Transfuse 1 unit of PRBCs.  3.  Authorization for IV iron requested Venofer 300 mg IV every week for 3 weeks.  4.  Follow-up visit with me in 3 weeks MD plus labs (CBC)

## 2024-02-18 SDOH — HEALTH STABILITY: PHYSICAL HEALTH
ON AVERAGE, HOW MANY DAYS PER WEEK DO YOU ENGAGE IN MODERATE TO STRENUOUS EXERCISE (LIKE A BRISK WALK)?: PATIENT DECLINED

## 2024-02-18 ASSESSMENT — PATIENT HEALTH QUESTIONNAIRE - PHQ9
SUM OF ALL RESPONSES TO PHQ QUESTIONS 1-9: 2
SUM OF ALL RESPONSES TO PHQ9 QUESTIONS 1 & 2: 2
SUM OF ALL RESPONSES TO PHQ QUESTIONS 1-9: 2
2. FEELING DOWN, DEPRESSED OR HOPELESS: 1
SUM OF ALL RESPONSES TO PHQ QUESTIONS 1-9: 2
1. LITTLE INTEREST OR PLEASURE IN DOING THINGS: 1
SUM OF ALL RESPONSES TO PHQ QUESTIONS 1-9: 2

## 2024-02-18 ASSESSMENT — LIFESTYLE VARIABLES
HOW OFTEN DO YOU HAVE A DRINK CONTAINING ALCOHOL: NEVER
HOW MANY STANDARD DRINKS CONTAINING ALCOHOL DO YOU HAVE ON A TYPICAL DAY: PATIENT DOES NOT DRINK
HOW OFTEN DO YOU HAVE SIX OR MORE DRINKS ON ONE OCCASION: 1
HOW MANY STANDARD DRINKS CONTAINING ALCOHOL DO YOU HAVE ON A TYPICAL DAY: 0
HOW OFTEN DO YOU HAVE A DRINK CONTAINING ALCOHOL: 1

## 2024-02-18 NOTE — PROGRESS NOTES
Results   Component Value Date/Time    LABGLOM >60 02/08/2024 10:54 AM     No results found for: \"CRCLEARANCE\"  No results found for: \"EGFRNONAA\"  No results found for: \"EGFRAA\"      Statin - yes, lipitor      Current Outpatient Medications   Medication Sig Dispense Refill    metoprolol succinate (TOPROL XL) 25 MG extended release tablet Take 0.5 tablets by mouth daily 30 tablet 1    furosemide (LASIX) 20 MG tablet Take 2 tablets by mouth daily 60 tablet 2    potassium chloride (KLOR-CON M) 20 MEQ extended release tablet TAKE 1 TABLET EVERY DAY 90 tablet 3    atorvastatin (LIPITOR) 10 MG tablet TAKE 1 TABLET EVERY EVENING 90 tablet 3    lisinopril (PRINIVIL;ZESTRIL) 5 MG tablet TAKE 1 TABLET TWICE DAILY 180 tablet 1    clopidogrel (PLAVIX) 75 MG tablet TAKE 1 TABLET EVERY DAY (START TAKING ON 11/03, THE DAY AFTER YOUR PROCEDURE) 90 tablet 1    citalopram (CELEXA) 20 MG tablet TAKE 1 TABLET EVERY DAY 90 tablet 3    rOPINIRole (REQUIP) 2 MG tablet Take 1 tablet by mouth 2 times daily 180 tablet 3    ferrous sulfate (IRON 325) 325 (65 Fe) MG tablet Take 1 tablet by mouth 2 times daily (with meals) 60 tablet 2    aspirin 81 MG chewable tablet Take 1 tablet by mouth daily 30 tablet 3    folic acid (FOLVITE) 1 MG tablet Take 1 tablet by mouth daily 90 tablet 1    albuterol sulfate HFA (PROVENTIL;VENTOLIN;PROAIR) 108 (90 Base) MCG/ACT inhaler INHALE 2 PUFFS EVERY 4 HOURS AS NEEDED FOR WHEEZING OR FOR SHORTNESS OF BREATH 2 each 5    amitriptyline (ELAVIL) 25 MG tablet Take 1 tablet by mouth nightly      pantoprazole (PROTONIX) 40 MG tablet Take 1 tablet by mouth 2 times daily (before meals) 180 tablet 3    docusate sodium (COLACE) 100 MG capsule Take 1 capsule by mouth daily      Handicap Placard MISC by Does not apply route Expires 04 AUG 2028 1 each 0    Cyanocobalamin (B-12) 3000 MCG SUBL Place 1 tablet under the tongue daily 90 tablet 1    traZODone (DESYREL) 50 MG tablet TAKE 1 TABLET EVERY NIGHT 90 tablet 3     No

## 2024-02-19 ENCOUNTER — OFFICE VISIT (OUTPATIENT)
Dept: FAMILY MEDICINE CLINIC | Age: 69
End: 2024-02-19
Payer: MEDICARE

## 2024-02-19 VITALS
RESPIRATION RATE: 18 BRPM | TEMPERATURE: 97 F | BODY MASS INDEX: 34.23 KG/M2 | SYSTOLIC BLOOD PRESSURE: 134 MMHG | OXYGEN SATURATION: 99 % | WEIGHT: 186 LBS | HEART RATE: 50 BPM | DIASTOLIC BLOOD PRESSURE: 78 MMHG | HEIGHT: 62 IN

## 2024-02-19 DIAGNOSIS — K21.00 GASTROESOPHAGEAL REFLUX DISEASE WITH ESOPHAGITIS, UNSPECIFIED WHETHER HEMORRHAGE: ICD-10-CM

## 2024-02-19 DIAGNOSIS — M79.7 FIBROMYALGIA: ICD-10-CM

## 2024-02-19 DIAGNOSIS — E53.8 FOLATE DEFICIENCY: ICD-10-CM

## 2024-02-19 DIAGNOSIS — E53.8 B12 DEFICIENCY: ICD-10-CM

## 2024-02-19 DIAGNOSIS — I10 HYPERTENSION, ESSENTIAL: ICD-10-CM

## 2024-02-19 DIAGNOSIS — G25.81 RLS (RESTLESS LEGS SYNDROME): ICD-10-CM

## 2024-02-19 DIAGNOSIS — D50.9 IRON DEFICIENCY ANEMIA, UNSPECIFIED IRON DEFICIENCY ANEMIA TYPE: ICD-10-CM

## 2024-02-19 DIAGNOSIS — T82.897A AORTIC PROSTHETIC VALVE REGURGITATION, INITIAL ENCOUNTER: ICD-10-CM

## 2024-02-19 DIAGNOSIS — I50.32 CHRONIC HEART FAILURE WITH PRESERVED EJECTION FRACTION (HCC): ICD-10-CM

## 2024-02-19 DIAGNOSIS — E11.9 TYPE 2 DIABETES MELLITUS WITHOUT COMPLICATION, WITHOUT LONG-TERM CURRENT USE OF INSULIN (HCC): ICD-10-CM

## 2024-02-19 DIAGNOSIS — I48.0 PAROXYSMAL ATRIAL FIBRILLATION (HCC): ICD-10-CM

## 2024-02-19 DIAGNOSIS — Z95.818 PRESENCE OF WATCHMAN LEFT ATRIAL APPENDAGE CLOSURE DEVICE: ICD-10-CM

## 2024-02-19 DIAGNOSIS — Z95.2 S/P AVR (AORTIC VALVE REPLACEMENT): ICD-10-CM

## 2024-02-19 DIAGNOSIS — J44.9 CHRONIC OBSTRUCTIVE PULMONARY DISEASE, UNSPECIFIED COPD TYPE (HCC): ICD-10-CM

## 2024-02-19 DIAGNOSIS — K92.2 UPPER GI BLEED: ICD-10-CM

## 2024-02-19 DIAGNOSIS — E78.5 DYSLIPIDEMIA: ICD-10-CM

## 2024-02-19 DIAGNOSIS — K22.10 EROSIVE ESOPHAGITIS: ICD-10-CM

## 2024-02-19 DIAGNOSIS — F33.42 RECURRENT MAJOR DEPRESSIVE DISORDER, IN FULL REMISSION (HCC): ICD-10-CM

## 2024-02-19 DIAGNOSIS — Z00.00 MEDICARE ANNUAL WELLNESS VISIT, SUBSEQUENT: Primary | ICD-10-CM

## 2024-02-19 DIAGNOSIS — R06.09 DOE (DYSPNEA ON EXERTION): ICD-10-CM

## 2024-02-19 DIAGNOSIS — K92.2 LOWER GI BLEED: ICD-10-CM

## 2024-02-19 DIAGNOSIS — D62 ACUTE BLOOD LOSS ANEMIA: ICD-10-CM

## 2024-02-19 LAB — HBA1C MFR BLD: 5.3 % (ref 4.3–5.7)

## 2024-02-19 PROCEDURE — 3078F DIAST BP <80 MM HG: CPT | Performed by: FAMILY MEDICINE

## 2024-02-19 PROCEDURE — G8484 FLU IMMUNIZE NO ADMIN: HCPCS | Performed by: FAMILY MEDICINE

## 2024-02-19 PROCEDURE — 3017F COLORECTAL CA SCREEN DOC REV: CPT | Performed by: FAMILY MEDICINE

## 2024-02-19 PROCEDURE — 3044F HG A1C LEVEL LT 7.0%: CPT | Performed by: FAMILY MEDICINE

## 2024-02-19 PROCEDURE — 3075F SYST BP GE 130 - 139MM HG: CPT | Performed by: FAMILY MEDICINE

## 2024-02-19 PROCEDURE — 1124F ACP DISCUSS-NO DSCNMKR DOCD: CPT | Performed by: FAMILY MEDICINE

## 2024-02-19 PROCEDURE — G0439 PPPS, SUBSEQ VISIT: HCPCS | Performed by: FAMILY MEDICINE

## 2024-02-22 ENCOUNTER — NURSE ONLY (OUTPATIENT)
Dept: LAB | Age: 69
End: 2024-02-22

## 2024-02-22 DIAGNOSIS — E11.9 TYPE 2 DIABETES MELLITUS WITHOUT COMPLICATION, WITHOUT LONG-TERM CURRENT USE OF INSULIN (HCC): ICD-10-CM

## 2024-02-22 DIAGNOSIS — Z86.2 HISTORY OF IRON DEFICIENCY ANEMIA: ICD-10-CM

## 2024-02-22 DIAGNOSIS — D64.9 SYMPTOMATIC ANEMIA: ICD-10-CM

## 2024-02-22 LAB
ABO: NORMAL
ALBUMIN SERPL BCG-MCNC: 4.6 G/DL (ref 3.5–5.1)
ALP SERPL-CCNC: 34 U/L (ref 38–126)
ALT SERPL W/O P-5'-P-CCNC: 9 U/L (ref 11–66)
ANION GAP SERPL CALC-SCNC: 15 MEQ/L (ref 8–16)
ANTIBODY SCREEN: NORMAL
AST SERPL-CCNC: 14 U/L (ref 5–40)
BASOPHILS ABSOLUTE: 0 THOU/MM3 (ref 0–0.1)
BASOPHILS NFR BLD AUTO: 0.4 %
BILIRUB SERPL-MCNC: 0.4 MG/DL (ref 0.3–1.2)
BUN SERPL-MCNC: 10 MG/DL (ref 7–22)
CALCIUM SERPL-MCNC: 9.4 MG/DL (ref 8.5–10.5)
CHLORIDE SERPL-SCNC: 104 MEQ/L (ref 98–111)
CO2 SERPL-SCNC: 21 MEQ/L (ref 23–33)
CREAT SERPL-MCNC: 1.1 MG/DL (ref 0.4–1.2)
CREAT UR-MCNC: 65.1 MG/DL
DEPRECATED RDW RBC AUTO: 58.4 FL (ref 35–45)
EOSINOPHIL NFR BLD AUTO: 2 %
EOSINOPHILS ABSOLUTE: 0.1 THOU/MM3 (ref 0–0.4)
ERYTHROCYTE [DISTWIDTH] IN BLOOD BY AUTOMATED COUNT: 15.9 % (ref 11.5–14.5)
FERRITIN SERPL IA-MCNC: 89 NG/ML (ref 10–291)
FOLATE SERPL-MCNC: > 20 NG/ML (ref 4.8–24.2)
GFR SERPL CREATININE-BSD FRML MDRD: 54 ML/MIN/1.73M2
GLUCOSE SERPL-MCNC: 130 MG/DL (ref 70–108)
HCT VFR BLD AUTO: 30.5 % (ref 37–47)
HGB BLD-MCNC: 9.2 GM/DL (ref 12–16)
HGB RETIC QN AUTO: 30.2 PG (ref 28.2–35.7)
IMM GRANULOCYTES # BLD AUTO: 0.01 THOU/MM3 (ref 0–0.07)
IMM GRANULOCYTES NFR BLD AUTO: 0.1 %
IMM RETICS NFR: 16.6 % (ref 3–15.9)
IRON SATN MFR SERPL: 20 % (ref 20–50)
IRON SERPL-MCNC: 72 UG/DL (ref 50–170)
LDH SERPL L TO P-CCNC: 240 U/L (ref 100–190)
LYMPHOCYTES ABSOLUTE: 1.6 THOU/MM3 (ref 1–4.8)
LYMPHOCYTES NFR BLD AUTO: 22 %
MCH RBC QN AUTO: 30.3 PG (ref 26–33)
MCHC RBC AUTO-ENTMCNC: 30.2 GM/DL (ref 32.2–35.5)
MCV RBC AUTO: 100.3 FL (ref 81–99)
MICROALBUMIN UR-MCNC: 2.13 MG/DL
MICROALBUMIN/CREAT RATIO PNL UR: 33 MG/G (ref 0–30)
MONOCYTES ABSOLUTE: 0.5 THOU/MM3 (ref 0.4–1.3)
MONOCYTES NFR BLD AUTO: 6.5 %
NEUTROPHILS NFR BLD AUTO: 69 %
NRBC BLD AUTO-RTO: 0 /100 WBC
PLATELET # BLD AUTO: 199 THOU/MM3 (ref 130–400)
PMV BLD AUTO: 11.3 FL (ref 9.4–12.4)
POTASSIUM SERPL-SCNC: 4.1 MEQ/L (ref 3.5–5.2)
PROT SERPL-MCNC: 7.6 G/DL (ref 6.1–8)
RBC # BLD AUTO: 3.04 MILL/MM3 (ref 4.2–5.4)
RETICS # AUTO: 144 THOU/MM3 (ref 20–115)
RETICS/RBC NFR AUTO: 4.7 % (ref 0.5–2)
RH FACTOR: NORMAL
SEGMENTED NEUTROPHILS ABSOLUTE COUNT: 5.1 THOU/MM3 (ref 1.8–7.7)
SODIUM SERPL-SCNC: 140 MEQ/L (ref 135–145)
TIBC SERPL-MCNC: 360 UG/DL (ref 171–450)
TSH SERPL DL<=0.005 MIU/L-ACNC: 1.67 UIU/ML (ref 0.4–4.2)
VIT B12 SERPL-MCNC: 1780 PG/ML (ref 211–911)
WBC # BLD AUTO: 7.4 THOU/MM3 (ref 4.8–10.8)

## 2024-02-23 ENCOUNTER — TELEPHONE (OUTPATIENT)
Dept: FAMILY MEDICINE CLINIC | Age: 69
End: 2024-02-23

## 2024-02-23 ENCOUNTER — HOSPITAL ENCOUNTER (OUTPATIENT)
Dept: INFUSION THERAPY | Age: 69
Discharge: HOME | End: 2024-02-23
Payer: MEDICARE

## 2024-02-23 VITALS
RESPIRATION RATE: 18 BRPM | TEMPERATURE: 98.3 F | WEIGHT: 187 LBS | SYSTOLIC BLOOD PRESSURE: 132 MMHG | BODY MASS INDEX: 34.19 KG/M2 | DIASTOLIC BLOOD PRESSURE: 50 MMHG | HEART RATE: 53 BPM | OXYGEN SATURATION: 97 %

## 2024-02-23 DIAGNOSIS — D50.9 IRON DEFICIENCY ANEMIA, UNSPECIFIED IRON DEFICIENCY ANEMIA TYPE: Primary | ICD-10-CM

## 2024-02-23 PROCEDURE — 2580000003 HC RX 258: Performed by: INTERNAL MEDICINE

## 2024-02-23 PROCEDURE — 96366 THER/PROPH/DIAG IV INF ADDON: CPT

## 2024-02-23 PROCEDURE — 6360000002 HC RX W HCPCS: Performed by: INTERNAL MEDICINE

## 2024-02-23 PROCEDURE — 96365 THER/PROPH/DIAG IV INF INIT: CPT

## 2024-02-23 RX ORDER — HEPARIN 100 UNIT/ML
500 SYRINGE INTRAVENOUS PRN
Status: CANCELLED | OUTPATIENT
Start: 2024-03-01

## 2024-02-23 RX ORDER — ONDANSETRON 2 MG/ML
8 INJECTION INTRAMUSCULAR; INTRAVENOUS
Status: CANCELLED | OUTPATIENT
Start: 2024-03-01

## 2024-02-23 RX ORDER — SODIUM CHLORIDE 9 MG/ML
INJECTION, SOLUTION INTRAVENOUS CONTINUOUS
Status: CANCELLED | OUTPATIENT
Start: 2024-03-01

## 2024-02-23 RX ORDER — SODIUM CHLORIDE 9 MG/ML
5-250 INJECTION, SOLUTION INTRAVENOUS PRN
Status: CANCELLED | OUTPATIENT
Start: 2024-03-01

## 2024-02-23 RX ORDER — ACETAMINOPHEN 325 MG/1
650 TABLET ORAL
Status: CANCELLED | OUTPATIENT
Start: 2024-03-01

## 2024-02-23 RX ORDER — DIPHENHYDRAMINE HYDROCHLORIDE 50 MG/ML
50 INJECTION INTRAMUSCULAR; INTRAVENOUS
Status: CANCELLED | OUTPATIENT
Start: 2024-03-01

## 2024-02-23 RX ORDER — EPINEPHRINE 1 MG/ML
0.3 INJECTION, SOLUTION INTRAMUSCULAR; SUBCUTANEOUS PRN
Status: CANCELLED | OUTPATIENT
Start: 2024-03-01

## 2024-02-23 RX ORDER — ALBUTEROL SULFATE 90 UG/1
4 AEROSOL, METERED RESPIRATORY (INHALATION) PRN
Status: CANCELLED | OUTPATIENT
Start: 2024-03-01

## 2024-02-23 RX ORDER — SODIUM CHLORIDE 9 MG/ML
5-250 INJECTION, SOLUTION INTRAVENOUS PRN
Status: DISCONTINUED | OUTPATIENT
Start: 2024-02-23 | End: 2024-02-24 | Stop reason: HOSPADM

## 2024-02-23 RX ORDER — SODIUM CHLORIDE 0.9 % (FLUSH) 0.9 %
5-40 SYRINGE (ML) INJECTION PRN
Status: CANCELLED | OUTPATIENT
Start: 2024-03-01

## 2024-02-23 RX ADMIN — SODIUM CHLORIDE 25 ML/HR: 9 INJECTION, SOLUTION INTRAVENOUS at 10:34

## 2024-02-23 RX ADMIN — SODIUM CHLORIDE 300 MG: 9 INJECTION, SOLUTION INTRAVENOUS at 10:35

## 2024-02-23 NOTE — DISCHARGE INSTRUCTIONS
Please contact your Oncologist if you have any questions regarding the venofer that you received today.      Patient instructed if experience any of the symptoms following today's infusion / to notify MD immediately or go to emergency department.    * dizziness/lightheadedness  *acute nausea/vomiting - not relieved with medication  *headache - not relieved from Tylenol/pain medication  *chest pain/pressure  *rash/itching  *shortness of breath        Drink fluids - 48oz fluids daily  Call if develop fever/ chills/ signs or symptoms of infection

## 2024-02-23 NOTE — PLAN OF CARE
Problem: Chronic Conditions and Co-morbidities  Goal: Patient's chronic conditions and co-morbidity symptoms are monitored and maintained or improved  Outcome: Adequate for Discharge  Flowsheets (Taken 2/23/2024 1027)  Care Plan - Patient's Chronic Conditions and Co-Morbidity Symptoms are Monitored and Maintained or Improved:   Monitor and assess patient's chronic conditions and comorbid symptoms for stability, deterioration, or improvement   Collaborate with multidisciplinary team to address chronic and comorbid conditions and prevent exacerbation or deterioration  Note: Patient verbalizes understanding to verbal information given on venofer, including action and possible side effects. Aware to call MD if develop complications.      Problem: Safety - Adult  Goal: Free from fall injury  Outcome: Adequate for Discharge  Flowsheets (Taken 2/23/2024 1027)  Free From Fall Injury: Instruct family/caregiver on patient safety  Note: Patient free of falls this visit. Fall risks assessed. Precautions discussed.      Problem: Discharge Planning  Goal: Discharge to home or other facility with appropriate resources  Outcome: Adequate for Discharge  Flowsheets (Taken 2/23/2024 1027)  Discharge to home or other facility with appropriate resources:   Identify barriers to discharge with patient and caregiver   Identify discharge learning needs (meds, wound care, etc)  Note: Patient verbalizes understanding of discharge instructions, follow up appointment, and when to call physician if needed     Care plan reviewed with patient.  Patient verbalizes understanding of the plan of care and contributes to goal setting.

## 2024-02-23 NOTE — PROGRESS NOTES
Patient assessed for the following post venofer:    Dizziness   No  Lightheadedness  No      Acute nausea/vomiting No  Headache   No  Chest pain/pressure  No  Rash/itching   No  Shortness of breath  No    Patient kept for 20 minutes observation post infusion.   Patient tolerated venofer without any complications.    Last vital signs:   BP (!) 132/50   Pulse 53   Temp 98.3 °F (36.8 °C) (Oral)   Resp 18   SpO2 97%     .    Patient instructed if experience any of the above symptoms following today's infusion,she is to notify MD immediately or go to the emergency department.    Discharge instructions given to patient. Verbalizes understanding. Ambulated off unit per self  with belongings.

## 2024-02-23 NOTE — TELEPHONE ENCOUNTER
----- Message from Kiran Sharma DO sent at 2/23/2024  6:56 AM EST -----  Please let pt know that labs are stable and appropriate. Let me know if questions, thanks!

## 2024-02-27 ENCOUNTER — TELEPHONE (OUTPATIENT)
Dept: CARDIOLOGY CLINIC | Age: 69
End: 2024-02-27

## 2024-02-27 ENCOUNTER — OFFICE VISIT (OUTPATIENT)
Dept: CARDIOLOGY CLINIC | Age: 69
End: 2024-02-27
Payer: MEDICARE

## 2024-02-27 VITALS
OXYGEN SATURATION: 99 % | HEART RATE: 65 BPM | RESPIRATION RATE: 20 BRPM | BODY MASS INDEX: 34.78 KG/M2 | WEIGHT: 189 LBS | HEIGHT: 62 IN | SYSTOLIC BLOOD PRESSURE: 154 MMHG | DIASTOLIC BLOOD PRESSURE: 50 MMHG

## 2024-02-27 DIAGNOSIS — I49.9 IRREGULAR HEART RHYTHM: ICD-10-CM

## 2024-02-27 DIAGNOSIS — E87.6 HYPOKALEMIA: ICD-10-CM

## 2024-02-27 DIAGNOSIS — I38 VALVULAR HEART DISEASE: ICD-10-CM

## 2024-02-27 DIAGNOSIS — Z95.2 H/O PROSTHETIC AORTIC VALVE REPLACEMENT: ICD-10-CM

## 2024-02-27 DIAGNOSIS — I50.33 CHF (CONGESTIVE HEART FAILURE), NYHA CLASS III, ACUTE ON CHRONIC, DIASTOLIC (HCC): Primary | ICD-10-CM

## 2024-02-27 PROCEDURE — G8427 DOCREV CUR MEDS BY ELIG CLIN: HCPCS | Performed by: NURSE PRACTITIONER

## 2024-02-27 PROCEDURE — 1124F ACP DISCUSS-NO DSCNMKR DOCD: CPT | Performed by: NURSE PRACTITIONER

## 2024-02-27 PROCEDURE — 3078F DIAST BP <80 MM HG: CPT | Performed by: NURSE PRACTITIONER

## 2024-02-27 PROCEDURE — G8484 FLU IMMUNIZE NO ADMIN: HCPCS | Performed by: NURSE PRACTITIONER

## 2024-02-27 PROCEDURE — G8399 PT W/DXA RESULTS DOCUMENT: HCPCS | Performed by: NURSE PRACTITIONER

## 2024-02-27 PROCEDURE — 96374 THER/PROPH/DIAG INJ IV PUSH: CPT | Performed by: NURSE PRACTITIONER

## 2024-02-27 PROCEDURE — 3017F COLORECTAL CA SCREEN DOC REV: CPT | Performed by: NURSE PRACTITIONER

## 2024-02-27 PROCEDURE — 1090F PRES/ABSN URINE INCON ASSESS: CPT | Performed by: NURSE PRACTITIONER

## 2024-02-27 PROCEDURE — 3077F SYST BP >= 140 MM HG: CPT | Performed by: NURSE PRACTITIONER

## 2024-02-27 PROCEDURE — 1036F TOBACCO NON-USER: CPT | Performed by: NURSE PRACTITIONER

## 2024-02-27 PROCEDURE — G8417 CALC BMI ABV UP PARAM F/U: HCPCS | Performed by: NURSE PRACTITIONER

## 2024-02-27 PROCEDURE — 93000 ELECTROCARDIOGRAM COMPLETE: CPT | Performed by: NURSE PRACTITIONER

## 2024-02-27 PROCEDURE — 99215 OFFICE O/P EST HI 40 MIN: CPT | Performed by: NURSE PRACTITIONER

## 2024-02-27 RX ORDER — POTASSIUM CHLORIDE 20 MEQ/1
20 TABLET, EXTENDED RELEASE ORAL 2 TIMES DAILY
Qty: 60 TABLET | Refills: 3 | Status: SHIPPED | OUTPATIENT
Start: 2024-02-27

## 2024-02-27 RX ORDER — FUROSEMIDE 40 MG/1
40 TABLET ORAL 2 TIMES DAILY
Qty: 60 TABLET | Refills: 3 | Status: SHIPPED | OUTPATIENT
Start: 2024-02-27

## 2024-02-27 RX ORDER — FUROSEMIDE 10 MG/ML
60 INJECTION INTRAMUSCULAR; INTRAVENOUS ONCE
Status: COMPLETED | OUTPATIENT
Start: 2024-02-27 | End: 2024-02-27

## 2024-02-27 RX ORDER — METOPROLOL SUCCINATE 25 MG/1
25 TABLET, EXTENDED RELEASE ORAL DAILY
Qty: 30 TABLET | Refills: 3 | Status: SHIPPED | OUTPATIENT
Start: 2024-02-27

## 2024-02-27 RX ORDER — POTASSIUM CHLORIDE 20 MEQ/1
20 TABLET, EXTENDED RELEASE ORAL ONCE
Status: COMPLETED | OUTPATIENT
Start: 2024-02-27 | End: 2024-02-27

## 2024-02-27 RX ADMIN — POTASSIUM CHLORIDE 20 MEQ: 20 TABLET, EXTENDED RELEASE ORAL at 14:46

## 2024-02-27 RX ADMIN — FUROSEMIDE 60 MG: 10 INJECTION INTRAMUSCULAR; INTRAVENOUS at 14:44

## 2024-02-27 ASSESSMENT — ENCOUNTER SYMPTOMS
COUGH: 0
ABDOMINAL DISTENTION: 1
SHORTNESS OF BREATH: 1

## 2024-02-27 NOTE — PATIENT INSTRUCTIONS
You may receive a survey regarding the care you received during your visit.  Your input is valuable to us.  We encourage you to complete and return your survey.  We hope you will choose us in the future for your healthcare needs.    Your nurses today were Akbar.  Office hours:   Mon-Thurs 8-4:30  Friday 8-12  Phone: [phone]    Continue:  Continue current medications  Daily weights and record  Fluid restriction of 2 Liters per day  Limit sodium in diet to around 9303-2106 mg/day  Monitor BP  Activity as tolerated     Call the Heart Failure Clinic for any of the following symptoms:   Weight gain of 2-3 pounds in 1 day or 5 pounds in 1 week  Increased shortness of breath  Shortness of breath while laying down  Cough  Chest pain  Swelling in feet, ankles or legs  Bloating in abdomen  Fatigue            Increase Lasix to 40 twice daily    Increase Potassium to 20 twice daily    Get labs next week

## 2024-02-27 NOTE — PROGRESS NOTES
Venipuncture obtained from right AC IV lasix given with oral potassium as ordered. Patient tolerated procedure without complications or complaints.    
for: \"BNP\"  CBC:   Lab Results   Component Value Date/Time    WBC 7.4 02/22/2024 10:20 AM    RBC 3.04 02/22/2024 10:20 AM    RBC 3.87 05/04/2012 08:45 AM    HGB 9.2 02/22/2024 10:20 AM    HCT 30.5 02/22/2024 10:20 AM     02/22/2024 10:20 AM     CMP:    Lab Results   Component Value Date/Time     02/22/2024 10:20 AM    K 4.1 02/22/2024 10:20 AM    K 3.6 09/09/2023 05:46 AM     02/22/2024 10:20 AM    CO2 21 02/22/2024 10:20 AM    BUN 10 02/22/2024 10:20 AM    CREATININE 1.1 02/22/2024 10:20 AM    LABGLOM 54 02/22/2024 10:20 AM    GLUCOSE 130 02/22/2024 10:20 AM    CALCIUM 9.4 02/22/2024 10:20 AM     Hepatic Function Panel:    Lab Results   Component Value Date/Time    ALKPHOS 34 02/22/2024 10:20 AM    ALT 9 02/22/2024 10:20 AM    AST 14 02/22/2024 10:20 AM    PROT 7.6 02/22/2024 10:20 AM    BILITOT 0.4 02/22/2024 10:20 AM    BILIDIR <0.2 08/29/2023 07:13 PM    LABALBU 4.6 02/22/2024 10:20 AM    LABALBU 4.2 05/04/2012 08:45 AM     Magnesium:    Lab Results   Component Value Date/Time    MG 1.7 09/06/2023 05:45 AM     PT/INR:    Lab Results   Component Value Date/Time    INR 1.59 01/22/2024 12:08 PM     Lipids:    Lab Results   Component Value Date/Time    TRIG 221 01/10/2024 01:12 PM    HDL 67 01/10/2024 01:12 PM    LDLCALC 53 01/10/2024 01:12 PM       ASSESSMENT AND PLAN:      Diagnosis Orders   1. CHF (congestive heart failure), NYHA class III, acute on chronic, diastolic (HCC)  furosemide (LASIX) injection 60 mg    potassium chloride (KLOR-CON M) extended release tablet 20 mEq    Basic Metabolic Panel      2. Irregular heart rhythm  EKG 12 lead      3. H/O prosthetic aortic valve replacement  furosemide (LASIX) 40 MG tablet      4. Valvular heart disease  furosemide (LASIX) 40 MG tablet      5. Hypokalemia  potassium chloride (KLOR-CON M) 20 MEQ extended release tablet        Continue:  GDMT:   ACE/ARB/ARNI - Lisinopril 5 BID   BB - Toprol 12.5/day - increase 25/day   SGLT2 -  no  AA -

## 2024-02-29 ENCOUNTER — NURSE ONLY (OUTPATIENT)
Dept: LAB | Age: 69
End: 2024-02-29

## 2024-02-29 DIAGNOSIS — D64.9 SYMPTOMATIC ANEMIA: ICD-10-CM

## 2024-02-29 LAB
BASOPHILS ABSOLUTE: 0 THOU/MM3 (ref 0–0.1)
BASOPHILS NFR BLD AUTO: 0.6 %
DEPRECATED RDW RBC AUTO: 51.9 FL (ref 35–45)
EOSINOPHIL NFR BLD AUTO: 2.8 %
EOSINOPHILS ABSOLUTE: 0.2 THOU/MM3 (ref 0–0.4)
ERYTHROCYTE [DISTWIDTH] IN BLOOD BY AUTOMATED COUNT: 14.2 % (ref 11.5–14.5)
HCT VFR BLD AUTO: 29.3 % (ref 37–47)
HGB BLD-MCNC: 8.9 GM/DL (ref 12–16)
IMM GRANULOCYTES # BLD AUTO: 0.02 THOU/MM3 (ref 0–0.07)
IMM GRANULOCYTES NFR BLD AUTO: 0.3 %
LYMPHOCYTES ABSOLUTE: 2 THOU/MM3 (ref 1–4.8)
LYMPHOCYTES NFR BLD AUTO: 29.9 %
MCH RBC QN AUTO: 30 PG (ref 26–33)
MCHC RBC AUTO-ENTMCNC: 30.4 GM/DL (ref 32.2–35.5)
MCV RBC AUTO: 98.7 FL (ref 81–99)
MONOCYTES ABSOLUTE: 0.6 THOU/MM3 (ref 0.4–1.3)
MONOCYTES NFR BLD AUTO: 8.5 %
NEUTROPHILS NFR BLD AUTO: 57.9 %
NRBC BLD AUTO-RTO: 0 /100 WBC
PLATELET # BLD AUTO: 180 THOU/MM3 (ref 130–400)
PMV BLD AUTO: 11.3 FL (ref 9.4–12.4)
RBC # BLD AUTO: 2.97 MILL/MM3 (ref 4.2–5.4)
SEGMENTED NEUTROPHILS ABSOLUTE COUNT: 3.9 THOU/MM3 (ref 1.8–7.7)
WBC # BLD AUTO: 6.8 THOU/MM3 (ref 4.8–10.8)

## 2024-03-01 ENCOUNTER — HOSPITAL ENCOUNTER (OUTPATIENT)
Dept: INFUSION THERAPY | Age: 69
Discharge: HOME | End: 2024-03-01
Payer: MEDICARE

## 2024-03-01 VITALS
OXYGEN SATURATION: 98 % | HEART RATE: 56 BPM | DIASTOLIC BLOOD PRESSURE: 59 MMHG | SYSTOLIC BLOOD PRESSURE: 149 MMHG | RESPIRATION RATE: 18 BRPM | TEMPERATURE: 97.7 F

## 2024-03-01 DIAGNOSIS — D50.9 IRON DEFICIENCY ANEMIA, UNSPECIFIED IRON DEFICIENCY ANEMIA TYPE: Primary | ICD-10-CM

## 2024-03-01 PROCEDURE — 96365 THER/PROPH/DIAG IV INF INIT: CPT

## 2024-03-01 PROCEDURE — 6360000002 HC RX W HCPCS: Performed by: INTERNAL MEDICINE

## 2024-03-01 PROCEDURE — 96366 THER/PROPH/DIAG IV INF ADDON: CPT

## 2024-03-01 PROCEDURE — 2580000003 HC RX 258: Performed by: INTERNAL MEDICINE

## 2024-03-01 RX ORDER — SODIUM CHLORIDE 9 MG/ML
INJECTION, SOLUTION INTRAVENOUS CONTINUOUS
Status: CANCELLED | OUTPATIENT
Start: 2024-03-08

## 2024-03-01 RX ORDER — SODIUM CHLORIDE 9 MG/ML
5-250 INJECTION, SOLUTION INTRAVENOUS PRN
Status: DISCONTINUED | OUTPATIENT
Start: 2024-03-01 | End: 2024-03-02 | Stop reason: HOSPADM

## 2024-03-01 RX ORDER — SODIUM CHLORIDE 0.9 % (FLUSH) 0.9 %
5-40 SYRINGE (ML) INJECTION PRN
Status: CANCELLED | OUTPATIENT
Start: 2024-03-08

## 2024-03-01 RX ORDER — ALBUTEROL SULFATE 90 UG/1
4 AEROSOL, METERED RESPIRATORY (INHALATION) PRN
Status: CANCELLED | OUTPATIENT
Start: 2024-03-08

## 2024-03-01 RX ORDER — ACETAMINOPHEN 325 MG/1
650 TABLET ORAL
Status: CANCELLED | OUTPATIENT
Start: 2024-03-08

## 2024-03-01 RX ORDER — DIPHENHYDRAMINE HYDROCHLORIDE 50 MG/ML
50 INJECTION INTRAMUSCULAR; INTRAVENOUS
Status: CANCELLED | OUTPATIENT
Start: 2024-03-08

## 2024-03-01 RX ORDER — SODIUM CHLORIDE 9 MG/ML
5-250 INJECTION, SOLUTION INTRAVENOUS PRN
Status: CANCELLED | OUTPATIENT
Start: 2024-03-08

## 2024-03-01 RX ORDER — EPINEPHRINE 1 MG/ML
0.3 INJECTION, SOLUTION INTRAMUSCULAR; SUBCUTANEOUS PRN
Status: CANCELLED | OUTPATIENT
Start: 2024-03-08

## 2024-03-01 RX ORDER — HEPARIN 100 UNIT/ML
500 SYRINGE INTRAVENOUS PRN
Status: CANCELLED | OUTPATIENT
Start: 2024-03-08

## 2024-03-01 RX ORDER — ONDANSETRON 2 MG/ML
8 INJECTION INTRAMUSCULAR; INTRAVENOUS
Status: CANCELLED | OUTPATIENT
Start: 2024-03-08

## 2024-03-01 RX ADMIN — SODIUM CHLORIDE 210 ML/HR: 9 INJECTION, SOLUTION INTRAVENOUS at 10:09

## 2024-03-01 RX ADMIN — SODIUM CHLORIDE 300 MG: 9 INJECTION, SOLUTION INTRAVENOUS at 10:10

## 2024-03-01 NOTE — PROGRESS NOTES
Patient tolerated Venofer without any complications.Denies dizziness, lightheadedness, acute nausea or vomiting, headache, heart palpitations, rash/itching or increased SOB.    Last vital signs  BP (!) 149/59   Pulse 56   Temp 97.7 °F (36.5 °C) (Oral)   Resp 18   SpO2 98%     Patient instructed if they experience any of the above symptoms following today's visit, he/she is to notify the Physician or go to the Emergency Dept.    Discharge instructions given to patient, Verbalizes understanding. Ambulated off unit per self in stable condition with all belongings.

## 2024-03-01 NOTE — PLAN OF CARE
Problem: Chronic Conditions and Co-morbidities  Goal: Patient's chronic conditions and co-morbidity symptoms are monitored and maintained or improved  Outcome: Adequate for Discharge  Flowsheets (Taken 3/1/2024 1010)  Care Plan - Patient's Chronic Conditions and Co-Morbidity Symptoms are Monitored and Maintained or Improved:   Monitor and assess patient's chronic conditions and comorbid symptoms for stability, deterioration, or improvement   Collaborate with multidisciplinary team to address chronic and comorbid conditions and prevent exacerbation or deterioration  Note: Patient verbalizes understanding to verbal information given on Venofer,action and possible side effects. Aware to call MD if develop complications.        Problem: Safety - Adult  Goal: Free from fall injury  Outcome: Adequate for Discharge  Flowsheets (Taken 3/1/2024 1010)  Free From Fall Injury: Instruct family/caregiver on patient safety     Problem: Discharge Planning  Goal: Discharge to home or other facility with appropriate resources  Outcome: Adequate for Discharge  Flowsheets (Taken 3/1/2024 1010)  Discharge to home or other facility with appropriate resources: Identify barriers to discharge with patient and caregiver   Care plan reviewed with patient and spouse.  Patient and spouse verbalize understanding of the plan of care and contribute to goal setting.

## 2024-03-06 ENCOUNTER — TELEPHONE (OUTPATIENT)
Dept: CARDIOLOGY CLINIC | Age: 69
End: 2024-03-06

## 2024-03-06 ENCOUNTER — NURSE ONLY (OUTPATIENT)
Dept: LAB | Age: 69
End: 2024-03-06

## 2024-03-06 DIAGNOSIS — D64.9 SYMPTOMATIC ANEMIA: ICD-10-CM

## 2024-03-06 DIAGNOSIS — I50.33 CHF (CONGESTIVE HEART FAILURE), NYHA CLASS III, ACUTE ON CHRONIC, DIASTOLIC (HCC): ICD-10-CM

## 2024-03-06 LAB
ANION GAP SERPL CALC-SCNC: 12 MEQ/L (ref 8–16)
BASOPHILS ABSOLUTE: 0 THOU/MM3 (ref 0–0.1)
BASOPHILS NFR BLD AUTO: 0.4 %
BUN SERPL-MCNC: 19 MG/DL (ref 7–22)
CALCIUM SERPL-MCNC: 9.5 MG/DL (ref 8.5–10.5)
CHLORIDE SERPL-SCNC: 102 MEQ/L (ref 98–111)
CO2 SERPL-SCNC: 24 MEQ/L (ref 23–33)
CREAT SERPL-MCNC: 1.1 MG/DL (ref 0.4–1.2)
DEPRECATED RDW RBC AUTO: 49.3 FL (ref 35–45)
EOSINOPHIL NFR BLD AUTO: 2.2 %
EOSINOPHILS ABSOLUTE: 0.2 THOU/MM3 (ref 0–0.4)
ERYTHROCYTE [DISTWIDTH] IN BLOOD BY AUTOMATED COUNT: 13.7 % (ref 11.5–14.5)
GFR SERPL CREATININE-BSD FRML MDRD: 54 ML/MIN/1.73M2
GLUCOSE SERPL-MCNC: 139 MG/DL (ref 70–108)
HCT VFR BLD AUTO: 32.5 % (ref 37–47)
HGB BLD-MCNC: 10 GM/DL (ref 12–16)
IMM GRANULOCYTES # BLD AUTO: 0.03 THOU/MM3 (ref 0–0.07)
IMM GRANULOCYTES NFR BLD AUTO: 0.4 %
LYMPHOCYTES ABSOLUTE: 2 THOU/MM3 (ref 1–4.8)
LYMPHOCYTES NFR BLD AUTO: 28.8 %
MCH RBC QN AUTO: 29.9 PG (ref 26–33)
MCHC RBC AUTO-ENTMCNC: 30.8 GM/DL (ref 32.2–35.5)
MCV RBC AUTO: 97.3 FL (ref 81–99)
MONOCYTES ABSOLUTE: 0.5 THOU/MM3 (ref 0.4–1.3)
MONOCYTES NFR BLD AUTO: 7.6 %
NEUTROPHILS NFR BLD AUTO: 60.6 %
NRBC BLD AUTO-RTO: 0 /100 WBC
PLATELET # BLD AUTO: 227 THOU/MM3 (ref 130–400)
PMV BLD AUTO: 11.2 FL (ref 9.4–12.4)
POTASSIUM SERPL-SCNC: 4.2 MEQ/L (ref 3.5–5.2)
RBC # BLD AUTO: 3.34 MILL/MM3 (ref 4.2–5.4)
SEGMENTED NEUTROPHILS ABSOLUTE COUNT: 4.3 THOU/MM3 (ref 1.8–7.7)
SODIUM SERPL-SCNC: 138 MEQ/L (ref 135–145)
WBC # BLD AUTO: 7.1 THOU/MM3 (ref 4.8–10.8)

## 2024-03-07 ENCOUNTER — TELEPHONE (OUTPATIENT)
Dept: FAMILY MEDICINE CLINIC | Age: 69
End: 2024-03-07

## 2024-03-07 DIAGNOSIS — R91.1 PULMONARY NODULE: Primary | ICD-10-CM

## 2024-03-07 NOTE — TELEPHONE ENCOUNTER
Pt informed and understanding with no further questions at this time.  Transferred to [facility] in C/S

## 2024-03-07 NOTE — TELEPHONE ENCOUNTER
Spoke to patient weight is down 10 lbs. Feeling better. Pt asking will this hold up the TAVR?    Lorna advise?

## 2024-03-07 NOTE — TELEPHONE ENCOUNTER
No - just working out few more details  Keep eye on wts and swelling - call if worsen again  Aga will be in contact in next few weeks.

## 2024-03-07 NOTE — TELEPHONE ENCOUNTER
Please let pt know that she is due for 3 month f/u CT chest, end of March 2024, to f/u on pulm nodule.     Help schedule  Let me know if questions, thanks!     Diagnosis Orders   1. Pulmonary nodule  CT CHEST W CONTRAST        Future Appointments   Date Time Provider Department Center   3/8/2024 10:30 AM STR OUT PT ONC INJ RM 05 STRZ OP ONC [place] HOD   3/14/2024  1:30 PM Lorna Faulkner, APRN - CNP N SRPX CHF [facility] - [place]   3/25/2024 11:00 AM Jourdan Tubbs MD N Oncology [facility] [place]   4/26/2024 10:30 AM Jourdan Tubbs MD N Oncology [facility] [place]   5/22/2024  8:45 AM Kenneth Hernandez MD N SRPX Heart [facility] [place]   8/19/2024  8:00 AM Kiran Sharma, DO Fam Med [facility]

## 2024-03-08 ENCOUNTER — HOSPITAL ENCOUNTER (OUTPATIENT)
Dept: INFUSION THERAPY | Age: 69
Discharge: HOME | End: 2024-03-08
Payer: MEDICARE

## 2024-03-08 VITALS
HEIGHT: 61 IN | TEMPERATURE: 97.8 F | HEART RATE: 48 BPM | SYSTOLIC BLOOD PRESSURE: 153 MMHG | RESPIRATION RATE: 16 BRPM | DIASTOLIC BLOOD PRESSURE: 61 MMHG | WEIGHT: 184 LBS | OXYGEN SATURATION: 97 % | BODY MASS INDEX: 34.74 KG/M2

## 2024-03-08 DIAGNOSIS — D50.9 IRON DEFICIENCY ANEMIA, UNSPECIFIED IRON DEFICIENCY ANEMIA TYPE: Primary | ICD-10-CM

## 2024-03-08 PROCEDURE — 96366 THER/PROPH/DIAG IV INF ADDON: CPT

## 2024-03-08 PROCEDURE — 6360000002 HC RX W HCPCS: Performed by: INTERNAL MEDICINE

## 2024-03-08 PROCEDURE — 96365 THER/PROPH/DIAG IV INF INIT: CPT

## 2024-03-08 PROCEDURE — 2580000003 HC RX 258: Performed by: INTERNAL MEDICINE

## 2024-03-08 RX ORDER — ONDANSETRON 2 MG/ML
8 INJECTION INTRAMUSCULAR; INTRAVENOUS
OUTPATIENT
Start: 2024-03-08

## 2024-03-08 RX ORDER — SODIUM CHLORIDE 9 MG/ML
5-250 INJECTION, SOLUTION INTRAVENOUS PRN
OUTPATIENT
Start: 2024-03-08

## 2024-03-08 RX ORDER — HEPARIN 100 UNIT/ML
500 SYRINGE INTRAVENOUS PRN
OUTPATIENT
Start: 2024-03-08

## 2024-03-08 RX ORDER — EPINEPHRINE 1 MG/ML
0.3 INJECTION, SOLUTION INTRAMUSCULAR; SUBCUTANEOUS PRN
OUTPATIENT
Start: 2024-03-08

## 2024-03-08 RX ORDER — SODIUM CHLORIDE 0.9 % (FLUSH) 0.9 %
5-40 SYRINGE (ML) INJECTION PRN
OUTPATIENT
Start: 2024-03-08

## 2024-03-08 RX ORDER — SODIUM CHLORIDE 9 MG/ML
5-250 INJECTION, SOLUTION INTRAVENOUS PRN
Status: DISCONTINUED | OUTPATIENT
Start: 2024-03-08 | End: 2024-03-09 | Stop reason: HOSPADM

## 2024-03-08 RX ORDER — DIPHENHYDRAMINE HYDROCHLORIDE 50 MG/ML
50 INJECTION INTRAMUSCULAR; INTRAVENOUS
OUTPATIENT
Start: 2024-03-08

## 2024-03-08 RX ORDER — ACETAMINOPHEN 325 MG/1
650 TABLET ORAL
OUTPATIENT
Start: 2024-03-08

## 2024-03-08 RX ORDER — ALBUTEROL SULFATE 90 UG/1
4 AEROSOL, METERED RESPIRATORY (INHALATION) PRN
OUTPATIENT
Start: 2024-03-08

## 2024-03-08 RX ORDER — SODIUM CHLORIDE 9 MG/ML
INJECTION, SOLUTION INTRAVENOUS CONTINUOUS
OUTPATIENT
Start: 2024-03-08

## 2024-03-08 RX ADMIN — SODIUM CHLORIDE 20 ML/HR: 9 INJECTION, SOLUTION INTRAVENOUS at 10:29

## 2024-03-08 RX ADMIN — SODIUM CHLORIDE 300 MG: 9 INJECTION, SOLUTION INTRAVENOUS at 10:31

## 2024-03-08 NOTE — DISCHARGE INSTRUCTIONS
You received Venofer while in outpatient oncology clinic.  Call if any uncontrolled nausea/vomiting  Call if any fevers/chills/ problems or concerns  Call if any bleeding  Call if any chest pain/pressure  Call if any severe shortness of breath  Drink at least (6) 8oz glasses of fluids daily     If develop any of above condition, please call your MD or go to Emergency Department.

## 2024-03-08 NOTE — PLAN OF CARE
Problem: Chronic Conditions and Co-morbidities  Goal: Patient's chronic conditions and co-morbidity symptoms are monitored and maintained or improved  Outcome: Progressing  Flowsheets (Taken 3/8/2024 1047)  Care Plan - Patient's Chronic Conditions and Co-Morbidity Symptoms are Monitored and Maintained or Improved: Monitor and assess patient's chronic conditions and comorbid symptoms for stability, deterioration, or improvement  Note: Patient verbalizes understanding to verbal information given on Venofer action and possible side effects. Aware to call MD if develop complications.      Problem: Safety - Adult  Goal: Free from fall injury  Outcome: Progressing  Flowsheets (Taken 3/8/2024 1047)  Free From Fall Injury: Instruct family/caregiver on patient safety  Note: No falls occurred with visit today.     Problem: Discharge Planning  Goal: Discharge to home or other facility with appropriate resources  Outcome: Progressing  Flowsheets (Taken 3/8/2024 1047)  Discharge to home or other facility with appropriate resources: Identify barriers to discharge with patient and caregiver  Note: Verbalized understanding of discharge instructions, follow-up appointments, and when to call the physician.     Care plan reviewed with patient.  Patient verbalizes understanding of the plan of care and contribute to goal setting.

## 2024-03-08 NOTE — PROGRESS NOTES
Patient assessed for the following post iron infusion:    Dizziness   No  Lightheadedness  No      Acute nausea/vomiting No  Headache   No  Chest pain/pressure  No  Rash/itching   No  Shortness of breath  No    Patient kept for 20 minutes observation post infusion.    Patient tolerated treatment Venofer without any complications.    Last vital signs:   BP (!) 153/61   Pulse (!) 48   Temp 97.8 °F (36.6 °C) (Oral)   Resp 16   Ht 1.549 m (5' 1\")   Wt 83.5 kg (184 lb)   SpO2 97%   BMI 34.77 kg/m²       Patient instructed if experience any of the above symptoms following today's infusion,he/she is to notify MD immediately or go to the emergency department.    Discharge instructions given to patient. Verbalizes understanding. Ambulated off unit per self with belongings.

## 2024-03-14 ENCOUNTER — OFFICE VISIT (OUTPATIENT)
Dept: CARDIOLOGY CLINIC | Age: 69
End: 2024-03-14
Payer: MEDICARE

## 2024-03-14 VITALS
OXYGEN SATURATION: 97 % | DIASTOLIC BLOOD PRESSURE: 52 MMHG | SYSTOLIC BLOOD PRESSURE: 132 MMHG | BODY MASS INDEX: 33.49 KG/M2 | WEIGHT: 182 LBS | HEART RATE: 52 BPM | HEIGHT: 62 IN

## 2024-03-14 DIAGNOSIS — I48.0 PAROXYSMAL A-FIB (HCC): ICD-10-CM

## 2024-03-14 DIAGNOSIS — Z95.2 H/O PROSTHETIC AORTIC VALVE REPLACEMENT: Primary | ICD-10-CM

## 2024-03-14 DIAGNOSIS — I38 VALVULAR HEART DISEASE: ICD-10-CM

## 2024-03-14 DIAGNOSIS — I35.0 NONRHEUMATIC AORTIC VALVE STENOSIS: ICD-10-CM

## 2024-03-14 DIAGNOSIS — I50.32 CHF (CONGESTIVE HEART FAILURE), NYHA CLASS II, CHRONIC, DIASTOLIC (HCC): ICD-10-CM

## 2024-03-14 PROBLEM — D68.69 SECONDARY HYPERCOAGULABLE STATE (HCC): Status: ACTIVE | Noted: 2024-03-14

## 2024-03-14 PROCEDURE — G8484 FLU IMMUNIZE NO ADMIN: HCPCS | Performed by: NURSE PRACTITIONER

## 2024-03-14 PROCEDURE — 1124F ACP DISCUSS-NO DSCNMKR DOCD: CPT | Performed by: NURSE PRACTITIONER

## 2024-03-14 PROCEDURE — 99214 OFFICE O/P EST MOD 30 MIN: CPT | Performed by: NURSE PRACTITIONER

## 2024-03-14 PROCEDURE — 3075F SYST BP GE 130 - 139MM HG: CPT | Performed by: NURSE PRACTITIONER

## 2024-03-14 PROCEDURE — 3078F DIAST BP <80 MM HG: CPT | Performed by: NURSE PRACTITIONER

## 2024-03-14 PROCEDURE — 1036F TOBACCO NON-USER: CPT | Performed by: NURSE PRACTITIONER

## 2024-03-14 PROCEDURE — 1090F PRES/ABSN URINE INCON ASSESS: CPT | Performed by: NURSE PRACTITIONER

## 2024-03-14 PROCEDURE — G8417 CALC BMI ABV UP PARAM F/U: HCPCS | Performed by: NURSE PRACTITIONER

## 2024-03-14 PROCEDURE — G8399 PT W/DXA RESULTS DOCUMENT: HCPCS | Performed by: NURSE PRACTITIONER

## 2024-03-14 PROCEDURE — G8427 DOCREV CUR MEDS BY ELIG CLIN: HCPCS | Performed by: NURSE PRACTITIONER

## 2024-03-14 PROCEDURE — 3017F COLORECTAL CA SCREEN DOC REV: CPT | Performed by: NURSE PRACTITIONER

## 2024-03-14 ASSESSMENT — ENCOUNTER SYMPTOMS
SHORTNESS OF BREATH: 1
ABDOMINAL DISTENTION: 0
COUGH: 0

## 2024-03-14 NOTE — PROGRESS NOTES
Heart Failure Clinic       Visit Date: 3/14/2024  Cardiologist:  Dr. López = Conrad  Primary Care Physician: Kiran Smith DO  Referred by: Conrad Dean is a 68 y.o. female who presents today for:  No chief complaint on file.      HPI:   Hui Dean is a 68 y.o. female who presents to the office for a patient visit in the heart failure clinic.  Accompanied by no one    TYPE HF: Diastolic   Cause:   Valves:  mod-severe AI, mod MR  Device: no  HX:  Iron deficiency anemia (Dr. Solano), GIB (Dr. Hernadez), HTN, CKD stage III, Depression, RLS, CVA on 3/23/2015, COPD Stage II , Erosive esophagitis, PAD, Former smoker, HLD, DM II, Fibromyalgia, GERD, SAH on 3/23/2015, Obesity, THAYER, OA, B12 deficiency, PAF s/p Watchman FLX 27mm (11/2/2023)   AVR x 2 >> 6/2/2015 - Placed by Dr. Kush LUQUE at with a 23mm Tirfecta Tissue Aortic Valve          7/13/2018 - Dr Pineda - AVR (21mm [employer] Trifecta), MV repair (28mm [employer] Physio annuloplasty band), TV repair (28mm [employer] MC3 annuloplasty band)    Dry Wt:  175-180    1/2024: 183#  admitted several times for GI bleed, got WATCHMAN in nov.  Symptoms continued = VALIENTE/SOB, tired.  Worsening past 4-5 months.  Hgb is improved.  She felt it was heart related entire time.   Denies dizziness, lightheadedness.  Denies CP.  No swelling. Some bloating.   Wts stable.   Sleeping in chair past 4-5 mths  BP good - 110-130s, HR running  - feels palpitations, racing at times.  >> increase Lasix 40/day.  Scheduled DCCV 1/22 2/2024 - 189#  Wt usually around 175#  EKG today - NSR  Last week had Fe infusion - felt worse after  Blood in stool subsided since stopping Eliquis  Feeling very bloated.  Sleeping in chair past week - \"can't breath\"   >> IV Lasix 60 IV given.  Increased Lasix to 40 BID    TODAY 3/2024 - 182#  Doing better = does not feel like retaining   Around \"100%\" improved - still some mild SOB  BPs 130s, HR 50-60s - no dizziness.       Past

## 2024-03-14 NOTE — PATIENT INSTRUCTIONS
You may receive a survey regarding the care you received during your visit.  Your input is valuable to us.  We encourage you to complete and return your survey.  We hope you will choose us in the future for your healthcare needs.    Your nurses today were Akbar.  Office hours:   Mon-Thurs 8-4:30  Friday 8-12  Phone: [phone]    Continue:  Continue current medications  Daily weights and record  Fluid restriction of 2 Liters per day  Limit sodium in diet to around 3666-7338 mg/day  Monitor BP  Activity as tolerated     Call the Heart Failure Clinic for any of the following symptoms:   Weight gain of 2-3 pounds in 1 day or 5 pounds in 1 week  Increased shortness of breath  Shortness of breath while laying down  Cough  Chest pain  Swelling in feet, ankles or legs  Bloating in abdomen  Fatigue

## 2024-03-25 ENCOUNTER — HOSPITAL ENCOUNTER (OUTPATIENT)
Dept: INFUSION THERAPY | Age: 69
Discharge: HOME | End: 2024-03-25
Payer: MEDICARE

## 2024-03-25 ENCOUNTER — OFFICE VISIT (OUTPATIENT)
Dept: ONCOLOGY | Age: 69
End: 2024-03-25
Payer: MEDICARE

## 2024-03-25 ENCOUNTER — NURSE ONLY (OUTPATIENT)
Dept: LAB | Age: 69
End: 2024-03-25

## 2024-03-25 ENCOUNTER — TELEPHONE (OUTPATIENT)
Dept: CARDIOLOGY CLINIC | Age: 69
End: 2024-03-25

## 2024-03-25 VITALS
RESPIRATION RATE: 16 BRPM | SYSTOLIC BLOOD PRESSURE: 135 MMHG | DIASTOLIC BLOOD PRESSURE: 59 MMHG | HEART RATE: 51 BPM | OXYGEN SATURATION: 98 % | TEMPERATURE: 97.9 F

## 2024-03-25 VITALS
RESPIRATION RATE: 16 BRPM | HEART RATE: 51 BPM | WEIGHT: 185 LBS | BODY MASS INDEX: 34.04 KG/M2 | DIASTOLIC BLOOD PRESSURE: 59 MMHG | HEIGHT: 62 IN | TEMPERATURE: 97.9 F | OXYGEN SATURATION: 98 % | SYSTOLIC BLOOD PRESSURE: 135 MMHG

## 2024-03-25 DIAGNOSIS — I50.32 CHF (CONGESTIVE HEART FAILURE), NYHA CLASS II, CHRONIC, DIASTOLIC (HCC): ICD-10-CM

## 2024-03-25 DIAGNOSIS — Z86.2 HISTORY OF IRON DEFICIENCY ANEMIA: Primary | ICD-10-CM

## 2024-03-25 DIAGNOSIS — I35.1 SEVERE AORTIC REGURGITATION: ICD-10-CM

## 2024-03-25 DIAGNOSIS — Z95.2 S/P AVR: ICD-10-CM

## 2024-03-25 DIAGNOSIS — I35.0 NONRHEUMATIC AORTIC VALVE STENOSIS: Primary | ICD-10-CM

## 2024-03-25 LAB
ANION GAP SERPL CALC-SCNC: 16 MEQ/L (ref 8–16)
BUN SERPL-MCNC: 23 MG/DL (ref 7–22)
CALCIUM SERPL-MCNC: 9.7 MG/DL (ref 8.5–10.5)
CHLORIDE SERPL-SCNC: 102 MEQ/L (ref 98–111)
CO2 SERPL-SCNC: 24 MEQ/L (ref 23–33)
CREAT SERPL-MCNC: 1.3 MG/DL (ref 0.4–1.2)
GFR SERPL CREATININE-BSD FRML MDRD: 44 ML/MIN/1.73M2
GLUCOSE SERPL-MCNC: 116 MG/DL (ref 70–108)
POTASSIUM SERPL-SCNC: 4.4 MEQ/L (ref 3.5–5.2)
SODIUM SERPL-SCNC: 142 MEQ/L (ref 135–145)

## 2024-03-25 PROCEDURE — 1124F ACP DISCUSS-NO DSCNMKR DOCD: CPT | Performed by: INTERNAL MEDICINE

## 2024-03-25 PROCEDURE — 3075F SYST BP GE 130 - 139MM HG: CPT | Performed by: INTERNAL MEDICINE

## 2024-03-25 PROCEDURE — 3078F DIAST BP <80 MM HG: CPT | Performed by: INTERNAL MEDICINE

## 2024-03-25 PROCEDURE — 99211 OFF/OP EST MAY X REQ PHY/QHP: CPT

## 2024-03-25 PROCEDURE — 99213 OFFICE O/P EST LOW 20 MIN: CPT | Performed by: INTERNAL MEDICINE

## 2024-03-25 ASSESSMENT — ENCOUNTER SYMPTOMS
RESPIRATORY NEGATIVE: 1
ALLERGIC/IMMUNOLOGIC NEGATIVE: 1
GASTROINTESTINAL NEGATIVE: 1
EYES NEGATIVE: 1

## 2024-03-25 NOTE — PROGRESS NOTES
blood pressure is 135/59 (abnormal) and her pulse is 51. Her respiration is 16 and oxygen saturation is 98%.     Estimated body surface area is 1.92 meters squared as calculated from the following:    Height as of this encounter: 1.575 m (5' 2\").    Weight as of this encounter: 83.9 kg (185 lb).     ECO    Physical Exam  Vitals and nursing note reviewed.   Constitutional:       Appearance: Normal appearance. She is normal weight.   HENT:      Head: Normocephalic and atraumatic.      Nose: Nose normal.      Mouth/Throat:      Mouth: Mucous membranes are moist.      Pharynx: Oropharynx is clear.   Eyes:      Conjunctiva/sclera: Conjunctivae normal.      Pupils: Pupils are equal, round, and reactive to light.   Pulmonary:      Breath sounds: Normal breath sounds.   Musculoskeletal:         General: Normal range of motion.      Cervical back: Normal range of motion and neck supple.   Skin:     General: Skin is warm and dry.   Neurological:      General: No focal deficit present.      Mental Status: She is alert and oriented to person, place, and time. Mental status is at baseline.   Psychiatric:         Mood and Affect: Mood normal.         Behavior: Behavior normal.         Thought Content: Thought content normal.         Judgment: Judgment normal.        Devices: none    DATA:  LAB:   CBC with Differential:      Lab Results   Component Value Date/Time    WBC 7.1 2024 11:19 AM    RBC 3.34 2024 11:19 AM    RBC 3.87 2012 08:45 AM    HGB 10.0 2024 11:19 AM    HCT 32.5 2024 11:19 AM     2024 11:19 AM    MCV 97.3 2024 11:19 AM    MCH 29.9 2024 11:19 AM    MCHC 30.8 2024 11:19 AM    RDW 14.9 10/18/2023 09:18 AM    NRBC 0 2024 11:19 AM    NRBC 0 2012 08:45 AM    SEGSPCT 60.6 2024 11:19 AM    MONOPCT 7.6 2024 11:19 AM    MONOSABS 0.5 2024 11:19 AM    LYMPHSABS 2.0 2024 11:19 AM    EOSABS 0.2 2024 11:19 AM    BASOSABS 0.0

## 2024-03-25 NOTE — TELEPHONE ENCOUNTER
Orders received from Dr. Martines to schedule the patient for a TAVR procedure and have the patient hold the follow medications the morning of the TAVR procedure: Lasix, Potassium, Toprol-XL and Lisinopril.    TAVR: 4/9/24 at 0730 with an arrival of 4/9/24 at 0500.   Discharge home with  [facility].    Valve Rep Milton notified with [employer]  Pacer Rep not needed.    Post TAVR instructions per Dr. Martines: have the patient be seen in the [facility] 7 days post TAVR, obtain a CBC, BMP and ECHO prior to the 30 day post TAVR follow up appointment.    7 day post TAVR appointment:4/16/24 at 1100  [facility] Clinic follow up appointment 4/24/24 at 0900  CBC/BMP/ECHO:5/9/24 at ___  30 day post TAVR appointment:5/14/24 at 1000    Primary Cardiologist:  Dr. Rene Glez, can you schedule 30 day post TAVr echo?     Dr. Martines, please agree.     Tonya, can you check prior auth?

## 2024-03-26 ENCOUNTER — TELEPHONE (OUTPATIENT)
Dept: FAMILY MEDICINE CLINIC | Age: 69
End: 2024-03-26

## 2024-03-26 ENCOUNTER — TELEPHONE (OUTPATIENT)
Dept: CARDIOLOGY CLINIC | Age: 69
End: 2024-03-26

## 2024-03-26 DIAGNOSIS — I38 VALVULAR HEART DISEASE: ICD-10-CM

## 2024-03-26 DIAGNOSIS — I50.32 CHF (CONGESTIVE HEART FAILURE), NYHA CLASS II, CHRONIC, DIASTOLIC (HCC): Primary | ICD-10-CM

## 2024-03-26 DIAGNOSIS — Z95.2 H/O PROSTHETIC AORTIC VALVE REPLACEMENT: ICD-10-CM

## 2024-03-26 PROBLEM — I35.1 SEVERE AORTIC REGURGITATION: Status: ACTIVE | Noted: 2024-03-25

## 2024-03-26 RX ORDER — FUROSEMIDE 40 MG/1
TABLET ORAL
Qty: 60 TABLET | Refills: 3
Start: 2024-03-26

## 2024-03-26 NOTE — TELEPHONE ENCOUNTER
[facility]  She would like to schedule this on 4/9/24    She wanted to know if you would be ok to postpone CT scan that is scheduled on 3/29/24 until after tavr   Her creatinine is 1.3 and she didn't want to overload kidneys with dye prior to procedure

## 2024-03-26 NOTE — TELEPHONE ENCOUNTER
Discussed with Dr. Sharma rescheduling CT scan until after TAVR.  Ok per Dr. Sharma.  Rescheduled to next available day.

## 2024-03-26 NOTE — TELEPHONE ENCOUNTER
APPROVED    Precert for TAVR  DOS: 04/09/2024      [x] Cath    [x] Echo    [x] Us Carotid    [x] CT chest, abd, pelvis    [x] CT surgery note    [x] Cardiology note    [x] EKG     Clinicals sent to [employer] via [employer]  Reference # [id number]

## 2024-03-26 NOTE — TELEPHONE ENCOUNTER
All instructions reviewed with patient.  Verbalized understanding.  All instructions mailed at this time.     Discussed repeat BMP 4/3 as ordered by JUANCHO Rothman.  Verbalized understanding.

## 2024-03-26 NOTE — TELEPHONE ENCOUNTER
How long after the TAVR are we talking?    Future Appointments   Date Time Provider Department Center   3/29/2024 11:40 AM STR CT IMAGING RM1  [facility] OUT EXP STR Rad/Card   4/16/2024 11:00 AM [facility] Nix PA-C SRPX CT SURG [facility]   4/24/2024  9:00 AM Lorna Faulkner, APRN - CNP N SRPX CHF [facility]   5/9/2024 11:00 AM STR ECHO 2 STRZ [facility] STR Rad/Card   5/14/2024 10:00 AM Sonny Martines MD N SRPX Heart [facility]   5/22/2024  8:45 AM Kenneth Hernandez MD N SRPX Heart [facility]   6/18/2024 11:00 AM Maryellen Solano MD N Oncology [facility]   8/19/2024  8:00 AM Kiran Sharma, DO Fam Med [facility]

## 2024-03-26 NOTE — TELEPHONE ENCOUNTER
Labs reviewed = getting a little dry.   Has upcoming CT scan w/ contrast  Hold Lasix x 1 day then decrease to 40 AM, 20 PM - hold day of CT scan

## 2024-04-03 ENCOUNTER — TELEPHONE (OUTPATIENT)
Dept: CARDIOLOGY CLINIC | Age: 69
End: 2024-04-03

## 2024-04-03 ENCOUNTER — NURSE ONLY (OUTPATIENT)
Dept: LAB | Age: 69
End: 2024-04-03

## 2024-04-03 DIAGNOSIS — I50.32 CHF (CONGESTIVE HEART FAILURE), NYHA CLASS II, CHRONIC, DIASTOLIC (HCC): ICD-10-CM

## 2024-04-03 LAB
ANION GAP SERPL CALC-SCNC: 14 MEQ/L (ref 8–16)
BUN SERPL-MCNC: 16 MG/DL (ref 7–22)
CALCIUM SERPL-MCNC: 9 MG/DL (ref 8.5–10.5)
CHLORIDE SERPL-SCNC: 105 MEQ/L (ref 98–111)
CO2 SERPL-SCNC: 21 MEQ/L (ref 23–33)
CREAT SERPL-MCNC: 1 MG/DL (ref 0.4–1.2)
GFR SERPL CREATININE-BSD FRML MDRD: 61 ML/MIN/1.73M2
GLUCOSE SERPL-MCNC: 99 MG/DL (ref 70–108)
POTASSIUM SERPL-SCNC: 4.7 MEQ/L (ref 3.5–5.2)
SODIUM SERPL-SCNC: 140 MEQ/L (ref 135–145)

## 2024-04-03 NOTE — TELEPHONE ENCOUNTER
Lorna Faulkner, APRN - CNP  4/3/2024  3:37 PM EDT Back to Top      Labs look good.  Continue meds same (Lasix 40/20) = ok for TAVR next week.

## 2024-04-08 ENCOUNTER — PREP FOR PROCEDURE (OUTPATIENT)
Dept: CARDIOLOGY | Age: 69
End: 2024-04-08

## 2024-04-08 RX ORDER — SODIUM CHLORIDE 9 MG/ML
INJECTION, SOLUTION INTRAVENOUS PRN
Status: CANCELLED | OUTPATIENT
Start: 2024-04-08

## 2024-04-08 RX ORDER — SODIUM CHLORIDE 0.9 % (FLUSH) 0.9 %
5-40 SYRINGE (ML) INJECTION EVERY 12 HOURS SCHEDULED
Status: CANCELLED | OUTPATIENT
Start: 2024-04-08

## 2024-04-08 RX ORDER — SODIUM CHLORIDE 0.9 % (FLUSH) 0.9 %
5-40 SYRINGE (ML) INJECTION PRN
Status: CANCELLED | OUTPATIENT
Start: 2024-04-08

## 2024-04-08 RX ORDER — SODIUM CHLORIDE 9 MG/ML
INJECTION, SOLUTION INTRAVENOUS CONTINUOUS
Status: CANCELLED | OUTPATIENT
Start: 2024-04-08

## 2024-04-09 ENCOUNTER — ANESTHESIA (OUTPATIENT)
Age: 69
DRG: 267 | End: 2024-04-09
Payer: MEDICARE

## 2024-04-09 ENCOUNTER — ANESTHESIA EVENT (OUTPATIENT)
Age: 69
DRG: 267 | End: 2024-04-09
Payer: MEDICARE

## 2024-04-09 ENCOUNTER — HOSPITAL ENCOUNTER (INPATIENT)
Age: 69
LOS: 1 days | Discharge: HOME | DRG: 267 | End: 2024-04-10
Attending: INTERNAL MEDICINE | Admitting: INTERNAL MEDICINE
Payer: MEDICARE

## 2024-04-09 DIAGNOSIS — Z95.2 S/P TAVR (TRANSCATHETER AORTIC VALVE REPLACEMENT): Primary | ICD-10-CM

## 2024-04-09 DIAGNOSIS — E87.6 HYPOKALEMIA: ICD-10-CM

## 2024-04-09 DIAGNOSIS — I35.1 SEVERE AORTIC REGURGITATION: ICD-10-CM

## 2024-04-09 DIAGNOSIS — I38 VALVULAR HEART DISEASE: ICD-10-CM

## 2024-04-09 DIAGNOSIS — Z95.2 H/O PROSTHETIC AORTIC VALVE REPLACEMENT: ICD-10-CM

## 2024-04-09 LAB
ABO: NORMAL
ALBUMIN SERPL BCG-MCNC: 4.2 G/DL (ref 3.5–5.1)
ALP SERPL-CCNC: 41 U/L (ref 38–126)
ALT SERPL W/O P-5'-P-CCNC: 12 U/L (ref 11–66)
ANION GAP SERPL CALC-SCNC: 13 MEQ/L (ref 8–16)
ANTIBODY SCREEN: NORMAL
APTT PPP: 34 SECONDS (ref 22–38)
AST SERPL-CCNC: 16 U/L (ref 5–40)
BILIRUB SERPL-MCNC: 0.3 MG/DL (ref 0.3–1.2)
BUN SERPL-MCNC: 23 MG/DL (ref 7–22)
CALCIUM SERPL-MCNC: 8.9 MG/DL (ref 8.5–10.5)
CHLORIDE SERPL-SCNC: 104 MEQ/L (ref 98–111)
CO2 SERPL-SCNC: 23 MEQ/L (ref 23–33)
CREAT SERPL-MCNC: 1.3 MG/DL (ref 0.4–1.2)
DEPRECATED RDW RBC AUTO: 44.5 FL (ref 35–45)
ERYTHROCYTE [DISTWIDTH] IN BLOOD BY AUTOMATED COUNT: 13.1 % (ref 11.5–14.5)
GFR SERPL CREATININE-BSD FRML MDRD: 44 ML/MIN/1.73M2
GLUCOSE SERPL-MCNC: 111 MG/DL (ref 70–108)
HCT VFR BLD AUTO: 32.6 % (ref 37–47)
HGB BLD-MCNC: 10.4 GM/DL (ref 12–16)
INR PPP: 1.01 (ref 0.85–1.13)
KCT BLD-ACNC: 266 SECONDS (ref 1–150)
KCT BLD-ACNC: 276 SECONDS (ref 1–150)
KCT BLD-ACNC: 293 SECONDS (ref 1–150)
MCH RBC QN AUTO: 29.3 PG (ref 26–33)
MCHC RBC AUTO-ENTMCNC: 31.9 GM/DL (ref 32.2–35.5)
MCV RBC AUTO: 91.8 FL (ref 81–99)
NT-PROBNP SERPL IA-MCNC: 1462 PG/ML (ref 0–124)
PLATELET # BLD AUTO: 174 THOU/MM3 (ref 130–400)
PMV BLD AUTO: 11.5 FL (ref 9.4–12.4)
POTASSIUM SERPL-SCNC: 4.4 MEQ/L (ref 3.5–5.2)
PROT SERPL-MCNC: 7.3 G/DL (ref 6.1–8)
RBC # BLD AUTO: 3.55 MILL/MM3 (ref 4.2–5.4)
RH FACTOR: NORMAL
SODIUM SERPL-SCNC: 140 MEQ/L (ref 135–145)
WBC # BLD AUTO: 6.7 THOU/MM3 (ref 4.8–10.8)

## 2024-04-09 PROCEDURE — 6360000002 HC RX W HCPCS: Performed by: NURSE ANESTHETIST, CERTIFIED REGISTERED

## 2024-04-09 PROCEDURE — 3700000000 HC ANESTHESIA ATTENDED CARE: Performed by: INTERNAL MEDICINE

## 2024-04-09 PROCEDURE — 86923 COMPATIBILITY TEST ELECTRIC: CPT

## 2024-04-09 PROCEDURE — 7100000001 HC PACU RECOVERY - ADDTL 15 MIN: Performed by: INTERNAL MEDICINE

## 2024-04-09 PROCEDURE — 3700000001 HC ADD 15 MINUTES (ANESTHESIA): Performed by: INTERNAL MEDICINE

## 2024-04-09 PROCEDURE — 7100000000 HC PACU RECOVERY - FIRST 15 MIN: Performed by: INTERNAL MEDICINE

## 2024-04-09 PROCEDURE — 93005 ELECTROCARDIOGRAM TRACING: CPT | Performed by: NURSE PRACTITIONER

## 2024-04-09 PROCEDURE — C1769 GUIDE WIRE: HCPCS | Performed by: INTERNAL MEDICINE

## 2024-04-09 PROCEDURE — 85730 THROMBOPLASTIN TIME PARTIAL: CPT

## 2024-04-09 PROCEDURE — C1889 IMPLANT/INSERT DEVICE, NOC: HCPCS | Performed by: INTERNAL MEDICINE

## 2024-04-09 PROCEDURE — 2580000003 HC RX 258: Performed by: NURSE PRACTITIONER

## 2024-04-09 PROCEDURE — 80053 COMPREHEN METABOLIC PANEL: CPT

## 2024-04-09 PROCEDURE — 6370000000 HC RX 637 (ALT 250 FOR IP)

## 2024-04-09 PROCEDURE — 6370000000 HC RX 637 (ALT 250 FOR IP): Performed by: INTERNAL MEDICINE

## 2024-04-09 PROCEDURE — C1887 CATHETER, GUIDING: HCPCS | Performed by: INTERNAL MEDICINE

## 2024-04-09 PROCEDURE — 2709999900 HC NON-CHARGEABLE SUPPLY: Performed by: INTERNAL MEDICINE

## 2024-04-09 PROCEDURE — 33361 REPLACE AORTIC VALVE PERQ: CPT | Performed by: THORACIC SURGERY (CARDIOTHORACIC VASCULAR SURGERY)

## 2024-04-09 PROCEDURE — 36415 COLL VENOUS BLD VENIPUNCTURE: CPT

## 2024-04-09 PROCEDURE — 83880 ASSAY OF NATRIURETIC PEPTIDE: CPT

## 2024-04-09 PROCEDURE — 6360000002 HC RX W HCPCS: Performed by: NURSE PRACTITIONER

## 2024-04-09 PROCEDURE — 93010 ELECTROCARDIOGRAM REPORT: CPT | Performed by: NUCLEAR MEDICINE

## 2024-04-09 PROCEDURE — 02RF38Z REPLACEMENT OF AORTIC VALVE WITH ZOOPLASTIC TISSUE, PERCUTANEOUS APPROACH: ICD-10-PCS | Performed by: THORACIC SURGERY (CARDIOTHORACIC VASCULAR SURGERY)

## 2024-04-09 PROCEDURE — B24BZZ4 ULTRASONOGRAPHY OF HEART WITH AORTA, TRANSESOPHAGEAL: ICD-10-PCS | Performed by: THORACIC SURGERY (CARDIOTHORACIC VASCULAR SURGERY)

## 2024-04-09 PROCEDURE — 2580000003 HC RX 258: Performed by: NURSE ANESTHETIST, CERTIFIED REGISTERED

## 2024-04-09 PROCEDURE — C1894 INTRO/SHEATH, NON-LASER: HCPCS | Performed by: INTERNAL MEDICINE

## 2024-04-09 PROCEDURE — 33361 REPLACE AORTIC VALVE PERQ: CPT | Performed by: INTERNAL MEDICINE

## 2024-04-09 PROCEDURE — 86850 RBC ANTIBODY SCREEN: CPT

## 2024-04-09 PROCEDURE — 2500000003 HC RX 250 WO HCPCS: Performed by: INTERNAL MEDICINE

## 2024-04-09 PROCEDURE — 2500000003 HC RX 250 WO HCPCS: Performed by: NURSE ANESTHETIST, CERTIFIED REGISTERED

## 2024-04-09 PROCEDURE — 2580000003 HC RX 258: Performed by: INTERNAL MEDICINE

## 2024-04-09 PROCEDURE — 86900 BLOOD TYPING SEROLOGIC ABO: CPT

## 2024-04-09 PROCEDURE — C1760 CLOSURE DEV, VASC: HCPCS | Performed by: INTERNAL MEDICINE

## 2024-04-09 PROCEDURE — 93005 ELECTROCARDIOGRAM TRACING: CPT | Performed by: INTERNAL MEDICINE

## 2024-04-09 PROCEDURE — 2720000010 HC SURG SUPPLY STERILE: Performed by: INTERNAL MEDICINE

## 2024-04-09 PROCEDURE — C1773 RET DEV, INSERTABLE: HCPCS | Performed by: INTERNAL MEDICINE

## 2024-04-09 PROCEDURE — 85347 COAGULATION TIME ACTIVATED: CPT

## 2024-04-09 PROCEDURE — 2140000000 HC CCU INTERMEDIATE R&B

## 2024-04-09 PROCEDURE — 86901 BLOOD TYPING SEROLOGIC RH(D): CPT

## 2024-04-09 PROCEDURE — 85610 PROTHROMBIN TIME: CPT

## 2024-04-09 PROCEDURE — 6360000004 HC RX CONTRAST MEDICATION: Performed by: INTERNAL MEDICINE

## 2024-04-09 PROCEDURE — 85027 COMPLETE CBC AUTOMATED: CPT

## 2024-04-09 RX ORDER — POTASSIUM CHLORIDE 20 MEQ/1
20 TABLET, EXTENDED RELEASE ORAL 2 TIMES DAILY
Status: DISCONTINUED | OUTPATIENT
Start: 2024-04-09 | End: 2024-04-10 | Stop reason: HOSPADM

## 2024-04-09 RX ORDER — SODIUM CHLORIDE 0.9 % (FLUSH) 0.9 %
5-40 SYRINGE (ML) INJECTION EVERY 12 HOURS SCHEDULED
Status: DISCONTINUED | OUTPATIENT
Start: 2024-04-09 | End: 2024-04-09

## 2024-04-09 RX ORDER — FENTANYL CITRATE 50 UG/ML
INJECTION, SOLUTION INTRAMUSCULAR; INTRAVENOUS PRN
Status: DISCONTINUED | OUTPATIENT
Start: 2024-04-09 | End: 2024-04-09 | Stop reason: SDUPTHER

## 2024-04-09 RX ORDER — SODIUM CHLORIDE 9 MG/ML
INJECTION, SOLUTION INTRAVENOUS CONTINUOUS
Status: DISCONTINUED | OUTPATIENT
Start: 2024-04-09 | End: 2024-04-10 | Stop reason: HOSPADM

## 2024-04-09 RX ORDER — SODIUM CHLORIDE 0.9 % (FLUSH) 0.9 %
5-40 SYRINGE (ML) INJECTION EVERY 12 HOURS SCHEDULED
Status: DISCONTINUED | OUTPATIENT
Start: 2024-04-09 | End: 2024-04-10 | Stop reason: HOSPADM

## 2024-04-09 RX ORDER — CLOPIDOGREL BISULFATE 75 MG/1
300 TABLET ORAL ONCE
Status: COMPLETED | OUTPATIENT
Start: 2024-04-09 | End: 2024-04-09

## 2024-04-09 RX ORDER — ONDANSETRON 2 MG/ML
4 INJECTION INTRAMUSCULAR; INTRAVENOUS EVERY 6 HOURS PRN
Status: DISCONTINUED | OUTPATIENT
Start: 2024-04-09 | End: 2024-04-10 | Stop reason: HOSPADM

## 2024-04-09 RX ORDER — SODIUM CHLORIDE 0.9 % (FLUSH) 0.9 %
5-40 SYRINGE (ML) INJECTION PRN
Status: DISCONTINUED | OUTPATIENT
Start: 2024-04-09 | End: 2024-04-10 | Stop reason: HOSPADM

## 2024-04-09 RX ORDER — SODIUM CHLORIDE 9 MG/ML
INJECTION, SOLUTION INTRAVENOUS PRN
Status: DISCONTINUED | OUTPATIENT
Start: 2024-04-09 | End: 2024-04-10 | Stop reason: HOSPADM

## 2024-04-09 RX ORDER — SUCCINYLCHOLINE/SOD CL,ISO/PF 200MG/10ML
SYRINGE (ML) INTRAVENOUS PRN
Status: DISCONTINUED | OUTPATIENT
Start: 2024-04-09 | End: 2024-04-09 | Stop reason: SDUPTHER

## 2024-04-09 RX ORDER — ASPIRIN 81 MG/1
81 TABLET ORAL DAILY
Status: DISCONTINUED | OUTPATIENT
Start: 2024-04-10 | End: 2024-04-10 | Stop reason: HOSPADM

## 2024-04-09 RX ORDER — ASPIRIN 325 MG
325 TABLET ORAL ONCE
Status: COMPLETED | OUTPATIENT
Start: 2024-04-09 | End: 2024-04-09

## 2024-04-09 RX ORDER — HEPARIN SODIUM 1000 [USP'U]/ML
INJECTION, SOLUTION INTRAVENOUS; SUBCUTANEOUS PRN
Status: DISCONTINUED | OUTPATIENT
Start: 2024-04-09 | End: 2024-04-09 | Stop reason: SDUPTHER

## 2024-04-09 RX ORDER — ASPIRIN 81 MG/1
81 TABLET, CHEWABLE ORAL DAILY
Status: DISCONTINUED | OUTPATIENT
Start: 2024-04-09 | End: 2024-04-09 | Stop reason: SDUPTHER

## 2024-04-09 RX ORDER — CLOPIDOGREL BISULFATE 75 MG/1
75 TABLET ORAL DAILY
Status: DISCONTINUED | OUTPATIENT
Start: 2024-04-09 | End: 2024-04-09

## 2024-04-09 RX ORDER — ROCURONIUM BROMIDE 10 MG/ML
INJECTION, SOLUTION INTRAVENOUS PRN
Status: DISCONTINUED | OUTPATIENT
Start: 2024-04-09 | End: 2024-04-09 | Stop reason: SDUPTHER

## 2024-04-09 RX ORDER — BISACODYL 5 MG/1
5 TABLET, DELAYED RELEASE ORAL DAILY PRN
Status: DISCONTINUED | OUTPATIENT
Start: 2024-04-09 | End: 2024-04-10 | Stop reason: HOSPADM

## 2024-04-09 RX ORDER — ATORVASTATIN CALCIUM 10 MG/1
10 TABLET, FILM COATED ORAL EVERY EVENING
Status: DISCONTINUED | OUTPATIENT
Start: 2024-04-09 | End: 2024-04-10 | Stop reason: HOSPADM

## 2024-04-09 RX ORDER — ONDANSETRON 2 MG/ML
INJECTION INTRAMUSCULAR; INTRAVENOUS PRN
Status: DISCONTINUED | OUTPATIENT
Start: 2024-04-09 | End: 2024-04-09 | Stop reason: SDUPTHER

## 2024-04-09 RX ORDER — PROPOFOL 10 MG/ML
INJECTION, EMULSION INTRAVENOUS PRN
Status: DISCONTINUED | OUTPATIENT
Start: 2024-04-09 | End: 2024-04-09 | Stop reason: SDUPTHER

## 2024-04-09 RX ORDER — PROTAMINE SULFATE 10 MG/ML
INJECTION, SOLUTION INTRAVENOUS PRN
Status: DISCONTINUED | OUTPATIENT
Start: 2024-04-09 | End: 2024-04-09 | Stop reason: SDUPTHER

## 2024-04-09 RX ORDER — ATROPINE SULFATE 0.4 MG/ML
0.5 INJECTION, SOLUTION INTRAVENOUS
Status: ACTIVE | OUTPATIENT
Start: 2024-04-09 | End: 2024-04-10

## 2024-04-09 RX ORDER — SODIUM CHLORIDE 9 MG/ML
INJECTION, SOLUTION INTRAVENOUS PRN
Status: DISCONTINUED | OUTPATIENT
Start: 2024-04-09 | End: 2024-04-09

## 2024-04-09 RX ORDER — SODIUM CHLORIDE 9 MG/ML
INJECTION, SOLUTION INTRAVENOUS CONTINUOUS PRN
Status: DISCONTINUED | OUTPATIENT
Start: 2024-04-09 | End: 2024-04-09 | Stop reason: SDUPTHER

## 2024-04-09 RX ORDER — DEXAMETHASONE SODIUM PHOSPHATE 10 MG/ML
INJECTION, EMULSION INTRAMUSCULAR; INTRAVENOUS PRN
Status: DISCONTINUED | OUTPATIENT
Start: 2024-04-09 | End: 2024-04-09 | Stop reason: SDUPTHER

## 2024-04-09 RX ORDER — SODIUM CHLORIDE 0.9 % (FLUSH) 0.9 %
5-40 SYRINGE (ML) INJECTION PRN
Status: DISCONTINUED | OUTPATIENT
Start: 2024-04-09 | End: 2024-04-09

## 2024-04-09 RX ORDER — CLOPIDOGREL BISULFATE 75 MG/1
75 TABLET ORAL DAILY
Status: DISCONTINUED | OUTPATIENT
Start: 2024-04-10 | End: 2024-04-10 | Stop reason: HOSPADM

## 2024-04-09 RX ORDER — CITALOPRAM 20 MG/1
20 TABLET ORAL DAILY
Status: DISCONTINUED | OUTPATIENT
Start: 2024-04-09 | End: 2024-04-10 | Stop reason: HOSPADM

## 2024-04-09 RX ORDER — LANOLIN ALCOHOL/MO/W.PET/CERES
3000 CREAM (GRAM) TOPICAL DAILY
Status: DISCONTINUED | OUTPATIENT
Start: 2024-04-09 | End: 2024-04-10 | Stop reason: HOSPADM

## 2024-04-09 RX ORDER — ALBUTEROL SULFATE 90 UG/1
1 AEROSOL, METERED RESPIRATORY (INHALATION) EVERY 4 HOURS PRN
Status: DISCONTINUED | OUTPATIENT
Start: 2024-04-09 | End: 2024-04-10 | Stop reason: HOSPADM

## 2024-04-09 RX ORDER — HYDRALAZINE HYDROCHLORIDE 20 MG/ML
10 INJECTION INTRAMUSCULAR; INTRAVENOUS EVERY 10 MIN PRN
Status: DISCONTINUED | OUTPATIENT
Start: 2024-04-09 | End: 2024-04-10 | Stop reason: HOSPADM

## 2024-04-09 RX ORDER — MIDAZOLAM HYDROCHLORIDE 1 MG/ML
INJECTION INTRAMUSCULAR; INTRAVENOUS PRN
Status: DISCONTINUED | OUTPATIENT
Start: 2024-04-09 | End: 2024-04-09 | Stop reason: SDUPTHER

## 2024-04-09 RX ORDER — EPHEDRINE SULFATE/0.9% NACL/PF 25 MG/5 ML
SYRINGE (ML) INTRAVENOUS PRN
Status: DISCONTINUED | OUTPATIENT
Start: 2024-04-09 | End: 2024-04-09 | Stop reason: SDUPTHER

## 2024-04-09 RX ORDER — LIDOCAINE HYDROCHLORIDE 20 MG/ML
INJECTION, SOLUTION EPIDURAL; INFILTRATION; INTRACAUDAL; PERINEURAL PRN
Status: DISCONTINUED | OUTPATIENT
Start: 2024-04-09 | End: 2024-04-09 | Stop reason: SDUPTHER

## 2024-04-09 RX ORDER — FENTANYL CITRATE 50 UG/ML
50 INJECTION, SOLUTION INTRAMUSCULAR; INTRAVENOUS
Status: ACTIVE | OUTPATIENT
Start: 2024-04-09 | End: 2024-04-09

## 2024-04-09 RX ORDER — ACETAMINOPHEN 325 MG/1
650 TABLET ORAL EVERY 4 HOURS PRN
Status: DISCONTINUED | OUTPATIENT
Start: 2024-04-09 | End: 2024-04-10 | Stop reason: HOSPADM

## 2024-04-09 RX ORDER — PHENYLEPHRINE HCL IN 0.9% NACL 1 MG/10 ML
SYRINGE (ML) INTRAVENOUS PRN
Status: DISCONTINUED | OUTPATIENT
Start: 2024-04-09 | End: 2024-04-09 | Stop reason: SDUPTHER

## 2024-04-09 RX ORDER — ROPINIROLE 1 MG/1
2 TABLET, FILM COATED ORAL 2 TIMES DAILY
Status: DISCONTINUED | OUTPATIENT
Start: 2024-04-09 | End: 2024-04-10 | Stop reason: HOSPADM

## 2024-04-09 RX ORDER — PANTOPRAZOLE SODIUM 40 MG/1
40 TABLET, DELAYED RELEASE ORAL
Status: DISCONTINUED | OUTPATIENT
Start: 2024-04-09 | End: 2024-04-10 | Stop reason: HOSPADM

## 2024-04-09 RX ORDER — POLYETHYLENE GLYCOL 3350 17 G/17G
17 POWDER, FOR SOLUTION ORAL DAILY PRN
Status: DISCONTINUED | OUTPATIENT
Start: 2024-04-09 | End: 2024-04-10 | Stop reason: HOSPADM

## 2024-04-09 RX ORDER — TRAZODONE HYDROCHLORIDE 50 MG/1
50 TABLET ORAL NIGHTLY
Status: DISCONTINUED | OUTPATIENT
Start: 2024-04-09 | End: 2024-04-10 | Stop reason: HOSPADM

## 2024-04-09 RX ORDER — FOLIC ACID 1 MG/1
1 TABLET ORAL DAILY
Status: DISCONTINUED | OUTPATIENT
Start: 2024-04-10 | End: 2024-04-10 | Stop reason: HOSPADM

## 2024-04-09 RX ADMIN — FENTANYL CITRATE 100 MCG: 50 INJECTION, SOLUTION INTRAMUSCULAR; INTRAVENOUS at 09:30

## 2024-04-09 RX ADMIN — POTASSIUM CHLORIDE 20 MEQ: 1500 TABLET, EXTENDED RELEASE ORAL at 21:04

## 2024-04-09 RX ADMIN — SUGAMMADEX 200 MG: 100 INJECTION, SOLUTION INTRAVENOUS at 11:50

## 2024-04-09 RX ADMIN — ROCURONIUM BROMIDE 20 MG: 10 INJECTION INTRAVENOUS at 10:56

## 2024-04-09 RX ADMIN — EPHEDRINE SULFATE 10 MG: 5 INJECTION INTRAVENOUS at 10:23

## 2024-04-09 RX ADMIN — HEPARIN SODIUM 15500 UNITS: 1000 INJECTION INTRAVENOUS; SUBCUTANEOUS at 10:23

## 2024-04-09 RX ADMIN — SODIUM CHLORIDE: 9 INJECTION, SOLUTION INTRAVENOUS at 09:40

## 2024-04-09 RX ADMIN — ROPINIROLE HYDROCHLORIDE 2 MG: 1 TABLET, FILM COATED ORAL at 21:04

## 2024-04-09 RX ADMIN — ASPIRIN 325 MG: 325 TABLET ORAL at 13:30

## 2024-04-09 RX ADMIN — ROCURONIUM BROMIDE 30 MG: 10 INJECTION INTRAVENOUS at 09:41

## 2024-04-09 RX ADMIN — PROPOFOL 150 MG: 10 INJECTION, EMULSION INTRAVENOUS at 09:30

## 2024-04-09 RX ADMIN — EPHEDRINE SULFATE 5 MG: 5 INJECTION INTRAVENOUS at 10:40

## 2024-04-09 RX ADMIN — LIDOCAINE HYDROCHLORIDE 100 MG: 20 INJECTION, SOLUTION EPIDURAL; INFILTRATION; INTRACAUDAL; PERINEURAL at 09:30

## 2024-04-09 RX ADMIN — ATORVASTATIN CALCIUM 10 MG: 10 TABLET, FILM COATED ORAL at 18:42

## 2024-04-09 RX ADMIN — ROCURONIUM BROMIDE 20 MG: 10 INJECTION INTRAVENOUS at 09:59

## 2024-04-09 RX ADMIN — DEXAMETHASONE SODIUM PHOSPHATE 5 MG: 10 INJECTION, EMULSION INTRAMUSCULAR; INTRAVENOUS at 10:03

## 2024-04-09 RX ADMIN — SODIUM CHLORIDE: 9 INJECTION, SOLUTION INTRAVENOUS at 05:54

## 2024-04-09 RX ADMIN — ONDANSETRON 4 MG: 2 INJECTION INTRAMUSCULAR; INTRAVENOUS at 11:45

## 2024-04-09 RX ADMIN — HEPARIN SODIUM 3000 UNITS: 1000 INJECTION INTRAVENOUS; SUBCUTANEOUS at 10:39

## 2024-04-09 RX ADMIN — CLOPIDOGREL BISULFATE 300 MG: 75 TABLET ORAL at 13:30

## 2024-04-09 RX ADMIN — CITALOPRAM 20 MG: 20 TABLET, FILM COATED ORAL at 21:04

## 2024-04-09 RX ADMIN — Medication 100 MCG: at 11:07

## 2024-04-09 RX ADMIN — EPHEDRINE SULFATE 10 MG: 5 INJECTION INTRAVENOUS at 09:55

## 2024-04-09 RX ADMIN — PROTAMINE SULFATE 50 MG: 10 INJECTION, SOLUTION INTRAVENOUS at 11:33

## 2024-04-09 RX ADMIN — Medication 140 MG: at 09:30

## 2024-04-09 RX ADMIN — SODIUM CHLORIDE: 9 INJECTION, SOLUTION INTRAVENOUS at 10:53

## 2024-04-09 RX ADMIN — SODIUM CHLORIDE, PRESERVATIVE FREE 10 ML: 5 INJECTION INTRAVENOUS at 21:04

## 2024-04-09 RX ADMIN — MIDAZOLAM 2 MG: 1 INJECTION INTRAMUSCULAR; INTRAVENOUS at 09:20

## 2024-04-09 RX ADMIN — WATER 2000 MG: 1 INJECTION INTRAMUSCULAR; INTRAVENOUS; SUBCUTANEOUS at 08:58

## 2024-04-09 ASSESSMENT — PAIN SCALES - GENERAL: PAINLEVEL_OUTOF10: 0

## 2024-04-09 ASSESSMENT — PAIN - FUNCTIONAL ASSESSMENT: PAIN_FUNCTIONAL_ASSESSMENT: NONE - DENIES PAIN

## 2024-04-09 NOTE — PLAN OF CARE
Problem: Chronic Conditions and Co-morbidities  Goal: Patient's chronic conditions and co-morbidity symptoms are monitored and maintained or improved  Outcome: Progressing  Flowsheets (Taken 4/9/2024 0509)  Care Plan - Patient's Chronic Conditions and Co-Morbidity Symptoms are Monitored and Maintained or Improved: Monitor and assess patient's chronic conditions and comorbid symptoms for stability, deterioration, or improvement     Problem: Pain  Goal: Verbalizes/displays adequate comfort level or baseline comfort level  Outcome: Progressing     Problem: Cardiovascular - Adult  Goal: Maintains optimal cardiac output and hemodynamic stability  Outcome: Progressing  Flowsheets (Taken 4/9/2024 0509)  Maintains optimal cardiac output and hemodynamic stability:   Monitor blood pressure and heart rate   Monitor urine output and notify Licensed Independent Practitioner for values outside of normal range     Problem: Discharge Planning  Goal: Discharge to home or other facility with appropriate resources  Outcome: Progressing  Flowsheets (Taken 4/9/2024 0509)  Discharge to home or other facility with appropriate resources:   Identify barriers to discharge with patient and caregiver   Arrange for needed discharge resources and transportation as appropriate   Care plan reviewed with patient.  Patient verbalizes understanding of the plan of care and contributes to goal setting.

## 2024-04-09 NOTE — PROGRESS NOTES
Pharmacy Medication History Note      List of current medications patient is taking is complete.    Source of information: patient and outside dispense history     Changes made to medication list:  Medications removed (include reason, ex. therapy complete or physician discontinued):  None     Medications added/doses adjusted:  None     Other notes (ex. Recent course of antibiotics, Coumadin dosing):  Confirmed pt is taking furosemide 40mg tab, 1 tab qAM and 0.5 tab qPM per dr request.   Confirmed pt is taking Kcl 20meq, 1 tab BID   Denies use of other OTC or herbal medications.     Allergies reviewed    Electronically signed by Sarah Sharma on 4/9/2024 at 2:13 PM

## 2024-04-09 NOTE — PROGRESS NOTES
1210: pt arrives to pacu, respirations unlabored on 2L nasal cannula. Pt awake and responding to commands. Denies pain. Fem stop at 53.   1220: pt awake and resting in bed. Denies pain.   1225: sites clean dry and intact.   1236: report called to Amanda KEANE. Pt placed for transport.   1240: pt meets criteria for discharge from pacu at this time. Waiting for transport. ART line removed from left wrist.   1306: pt transported to  in stable condition

## 2024-04-09 NOTE — ANESTHESIA PRE PROCEDURE
Department of Anesthesiology  Preprocedure Note       Name:  Hui Dean   Age:  68 y.o.  :  1955                                          MRN:  [id number]         Date:  2024      Surgeon: Surgeon(s):  Sonny Martines MD    Procedure: Procedure(s):  Transcatheter aortic valve replacement    Medications prior to admission:   Prior to Admission medications    Medication Sig Start Date End Date Taking? Authorizing Provider   furosemide (LASIX) 40 MG tablet Take 1 tablet by mouth every morning AND 0.5 tablets every evening. 3/26/24   Lorna Faulkner APRN - CNP   potassium chloride (KLOR-CON M) 20 MEQ extended release tablet Take 1 tablet by mouth 2 times daily 24   Lorna Faulkner APRN - CNP   metoprolol succinate (TOPROL XL) 25 MG extended release tablet Take 1 tablet by mouth daily 24   Lorna Faulkner APRN - CNP   atorvastatin (LIPITOR) 10 MG tablet TAKE 1 TABLET EVERY EVENING 1/10/24   Kiran Sharma DO   lisinopril (PRINIVIL;ZESTRIL) 5 MG tablet TAKE 1 TABLET TWICE DAILY 1/10/24   Kenneth Hernandez MD   clopidogrel (PLAVIX) 75 MG tablet TAKE 1 TABLET EVERY DAY (START TAKING ON , THE DAY AFTER YOUR PROCEDURE) 24   Kenneth Hernandez MD   citalopram (CELEXA) 20 MG tablet TAKE 1 TABLET EVERY DAY 23   Kiran Sharma DO   rOPINIRole (REQUIP) 2 MG tablet Take 1 tablet by mouth 2 times daily 23   Kiran Sharma DO   aspirin 81 MG chewable tablet Take 1 tablet by mouth daily 23   HempflRebecca dias APRN - CNP   folic acid (FOLVITE) 1 MG tablet Take 1 tablet by mouth daily 10/19/23   Maryellen Solano MD   albuterol sulfate HFA (PROVENTIL;VENTOLIN;PROAIR) 108 (90 Base) MCG/ACT inhaler INHALE 2 PUFFS EVERY 4 HOURS AS NEEDED FOR WHEEZING OR FOR SHORTNESS OF BREATH 10/2/23   Kiran Sharma DO   pantoprazole (PROTONIX) 40 MG tablet Take 1 tablet by mouth 2 times daily (before meals) 23   Kiran Sharma, DO   Handicap Placard MISC by Does not

## 2024-04-09 NOTE — PROGRESS NOTES
0500 Patient admitted to 2E12  ambulatory for TAVR.  Patient NPO. Patient accompanied by spouse, Jairon.  Vital signs obtained.   Assessment and data collection intiated.   Oriented to room.  Policies and procedures for 2E explained.   All questions answered with no further questions at this time.   Fall prevention and safety precautions discussed with patient.

## 2024-04-09 NOTE — H&P
[facility]  Sedation/Analgesia History & Physical    Pt Name: Hui Dean  Account number: [id number]  MRN: [id number]  YOB: 1955  Provider Performing Procedure: Sonny Martines MD MD  Referring Provider: Sonny Martines MD   Primary Care Physician: Kiran Sharma DO  Date: 4/9/2024    PRE-PROCEDURE    Code Status: FULL CODE  Brief History/Pre-Procedure Diagnosis:   Severe AS  Basilica    Consent: : I have discussed with the patient risks, benefits, and alternatives (and relevant risks, benefits, and side effects related to alternatives or not receiving care), and likelihood of the success.   The patient and/or representative appear to understand and agree to proceed.  The discussion encompasses risks, benefits, and side effects related to the alternatives and the risks related to not receiving the proposed care, treatment, and services.     The indication, risks and benefits of the procedure and possible therapeutic consequences and alternatives were discussed with the patient. The patient was given the opportunity to ask questions and to have them answered to his/her satisfaction. Risks of the procedure include but are not limited to the following: Bleeding, hematoma including retroperitoneal hemmorhage, infection, pain and discomfort, injury to the aorta and other blood vessels, rhythm disturbance, low blood pressure, myocardial infarction, stroke, kidney damage/failure, myocardial perforation, allergic reactions to sedatives/contrast material, loss of pulse/vascular compromise requiring surgery, aneurysm/pseudoaneurysm formation, possible loss of a limb/hand/leg, needing blood transfusion, requiring emergent open heart surgery or emergent coronary intervention, the need for intubation/respiratory support, the requirement for defibrillation/cardioversion, and death. Alternatives to and omission of the suggested procedure were discussed. The patient had no further

## 2024-04-09 NOTE — CARE COORDINATION
Case Management Assessment Initial Evaluation    Date/Time of Evaluation: 2024 7:06 AM  Assessment Completed by: Kathy Sen RN    If patient is discharged prior to next notation, then this note serves as note for discharge by case management.    Patient Name: Hui Dean                   YOB: 1955  Diagnosis: Severe aortic regurgitation [I35.1]  Severe aortic stenosis [I35.0]                   Date / Time: 2024  4:50 AM  Location: Cath Pool Room/     Patient Admission Status: Inpatient   Readmission Risk Low 0-14, Mod 15-19), High > 20: Readmission Risk Score: 16.6    Current PCP: Kiran Sharma, DO    Additional Case Management Notes: Here for elective procedure, TAVR to be done today with Dr. Martines. ECHO to be done in the am.  Anticipate discharge tomorrow if medically ready and after ECHO is read by Dr. Martines.     Procedure:    TAVR procedure with Dr. Martines.     Imagin/10 ECHO: to be done.      Patient Goals/Plan/Treatment Preferences: Spoke with pt,  and sisters. Pt and  live together, plans to return home at discharge.  will be with pt for 7 days at discharge.        24 1450   Service Assessment   Patient Orientation Alert and Oriented   Cognition Alert   History Provided By Patient;Spouse   Primary Caregiver Self   Accompanied By/Relationship  and sisters   Support Systems Spouse/Significant Other;Family Members;Children   Patient's Healthcare Decision Maker is: Patient Declined (Legal Next of Kin Remains as Decision Maker)   PCP Verified by CM Yes   Last Visit to PCP Within last 3 months   Prior Functional Level Independent in ADLs/IADLs   Current Functional Level Independent in ADLs/IADLs   Can patient return to prior living arrangement Yes   Ability to make needs known: Good   Family able to assist with home care needs: Yes   Would you like for me to discuss the discharge plan with any other family members/significant others,

## 2024-04-09 NOTE — ANESTHESIA POSTPROCEDURE EVALUATION
Department of Anesthesiology  Postprocedure Note    Patient: Hui Dean  MRN: [id number]  YOB: 1955  Date of evaluation: 4/9/2024    Procedure Summary       Date: 04/09/24 Room / Location: STR CATH HYBRID OR / STRZ CARDIAC CATH LAB    Anesthesia Start: 0918 Anesthesia Stop: 1212    Procedure: Transcatheter aortic valve replacement Diagnosis:       Severe aortic regurgitation      (Severe aortic regurgitation [I35.1])    Providers: Sonny Martines MD Responsible Provider: Bill Shea MD    Anesthesia Type: general ASA Status: 4            Anesthesia Type: No value filed.    Krysta Phase I: Krysta Score: 9    Krysta Phase II:      Anesthesia Post Evaluation    Patient location during evaluation: PACU  Patient participation: complete - patient participated  Level of consciousness: awake  Airway patency: patent  Nausea & Vomiting: no vomiting and no nausea  Cardiovascular status: hemodynamically stable  Respiratory status: acceptable and nasal cannula  Hydration status: stable  Pain management: adequate    No notable events documented.

## 2024-04-09 NOTE — ANESTHESIA PROCEDURE NOTES
Procedure Performed: WILBERTO       Start Time:  4/9/2024 9:55 AM       End Time:   4/9/2024 11:30 AM    Preanesthesia Checklist:  Patient identified, IV assessed, risks and benefits discussed, monitors and equipment assessed, procedure being performed at surgeon's request and anesthesia consent obtained.    General Procedure Information  Diagnostic Indications for Echo:  assessment of ascending aorta, assessment of surgical repair, suspected pericardial effusion and assessment of valve function  Physician Requesting Echo: Sonny Bee  CPT Code:  33433,36506, 27310  Location performed:  OR  Intubated  Bite block placed  Probe Insertion:  Easy  Probe Type:  3D  Modalities:  2D, 3D, color flow mapping, continuous wave Doppler, pulse wave Doppler and M-mode        Anesthesia Information  Performed with CRNAs  Anesthesiologist:  Bill Shea MD      Echocardiogram Comments:       INTRA OP PROCEDURE GUIDANCE WILBERTO  INSERTED WELL LUBRICATED 3 D WILBERTO PROBE.  EASY ATRAUMATIC INSERTION.  PRE PROCEDURE FINDINGS CONFIRMED .  AORTIC VALVE BIOPROSTHETIC TRICUSPID VALVE IN PLACE.  DEGENERATIVE CHANGES .  MODERATE AV STENOSIS, AV AREA 1.1 CM SQ,  MEAN GRADIENT 18 MM OF HG, PEAK GRADIENT 38 MM OF HG.  NO ASCENDING AORTIC ANEURYSM.  MITRAL VALVE MV REPAIR WIT ANULOPLASTY RING IN PLACE.  NO MV STENOSIS, MILD MR  TRICUSPID VALVE TRACE TR,  PULMONARY VALVE COMPETENT .  LV FUNCTION EF 50 %  NO JACKSON THROMBUS .  FINDINGS DISCUSSED WITH DR BEE.  GUIDED  BASILICA  PROCEDURE  ( LEFT CORONARY CUSP LACERATION )  ASSISTED DEPLOYMENT OF TAVR  25 MM EVOLUTE AV   POST PROCEDURE NO PERICARDIAL EFFUSION .  AORTIC VALVE NO PERIVALVULAR  LEAKS.  NO AI , POST MEAN GRADIENT 16 MM OF HG  EFFECTIVE AV AREA 2 .0 CM SQ  OTHER FINDINGS UNCHANGED.  FINDINGS DISCUSSED WITH DR BEE.

## 2024-04-09 NOTE — OP NOTE
DATE: 04/09/24    PATIENT: Hui Dean    MRN: [id number]     Acct: [id number]    PREOP DIAGNOSIS:   Severe aortic stenosis    Postop diagnosis:  Severe aortic stenosis    Procedure:  Transcatheter aortic valve-in-valve replacement with a [employer] 23 mm Evolut FX prosthesis    OPERATORS:  Sonny Martines MD; Santo Pineda MD    ESTIMATED BLOOD LOSS: 50 ml    A pre-procedure discussion was conducted with the patient to confirm/exclude candidacy for open surgical salvage in case of procedure failure.    After informed consent was obtained from the patient, the patient was brought to the hybrid operating room and prepped in sterile fashion. Infiltration of the bilateral inguinal regions was accomplished with 2% lidocaine. Using micropuncture and modified Seldinger technique, a 6 Fr sheath was inserted into the right internal jugular vein. A 5 Fr pacemaker was inserted into position under fluoroscopic guidance into the right ventricle, with verification of appropriate pacing thresholds.    Using the same technique under fluoroscopic guidance, a 5 Fr sheath was inserted into the left common femoral artery.  Similarly, a 4 Fr sheath, followed after contrast verification by a 5 Fr sheath was inserted into the right common femoral artery. This was replaced with a 6 Fr J4 catheter over a guidewire, and a Supra Core wire was used to traverse the aortic arch.  We then performed standard preclose technique by deploying a Perclose in orthogonal fashion and leaving it incomplete. A 14 Fr sheath was inserted over the wire under guidance. The patient was heparinized to an ACT above 250 seconds.     The Basilica procedure was performed by Dr. Martines to mitigate the leaflet of the existing surgical valve adjacent to the left main coronary ostium. The aortic valve was crossed using a straight wire through an AL1 catheter. The guidewire was changed to a J-tipped wire, on which was inserted an angled pigtail.  Direct hemodynamic

## 2024-04-09 NOTE — BRIEF OP NOTE
[facility]  Sedation/Analgesia Post Sedation Record    Pt Name: Hui Dean  Account number: [id number]  MRN: [id number]  YOB: 1955  Procedure Performed By: Sonny Martines MD MD FACC, PAUL, JUJU  Primary Care Physician: Kiran Sharma DO  Date: 4/9/2024    POST-PROCEDURE    Physicians/Assistants: Sonny Martines MD MD FACC, PAUL, JUJU    Procedure Performed:TAVR-Basilica     Sedation/Anesthesia: Versed/ Fentanyl and 2% xylocaine local anesthesia.      Estimated Blood Loss: < 50 ml.     Specimens Removed: None         Disposition of Specimen: N/A        Complications: No Immediate Complications.       Post-procedure Diagnosis/Findings:      LCA Basilica  EFX 23             Sonny Martines MD MD FACC, PAUL, JUJU  Electronically signed 4/9/2024 at 11:51 AM  Interventional Cardiology

## 2024-04-09 NOTE — ANESTHESIA PROCEDURE NOTES
Arterial Line:    An arterial line was placed using surface landmarks, in the OR for the following indication(s): continuous blood pressure monitoring and blood sampling needed.    A 20 gauge (size), 1 and 1/4 inch (length), Arrow (type) catheter was placed, Seldinger technique used, into the left radial artery, secured by tape and Tegaderm.  Anesthesia type: General    Events:  patient tolerated procedure well with no complications and EBL < 5mL.4/9/2024 9:40 AM4/9/2024 9:45 AM  Anesthesiologist: Bill Shea MD  Preanesthetic Checklist  Completed: patient identified, IV checked, site marked, risks and benefits discussed, surgical/procedural consents, equipment checked, pre-op evaluation, timeout performed, anesthesia consent given, oxygen available, monitors applied/VS acknowledged, fire risk safety assessment completed and verbalized and blood product R/B/A discussed and consented

## 2024-04-10 ENCOUNTER — APPOINTMENT (OUTPATIENT)
Age: 69
DRG: 267 | End: 2024-04-10
Attending: INTERNAL MEDICINE
Payer: MEDICARE

## 2024-04-10 ENCOUNTER — TELEPHONE (OUTPATIENT)
Dept: CARDIOLOGY CLINIC | Age: 69
End: 2024-04-10

## 2024-04-10 VITALS
HEIGHT: 63 IN | WEIGHT: 190.7 LBS | TEMPERATURE: 98.1 F | RESPIRATION RATE: 18 BRPM | OXYGEN SATURATION: 98 % | DIASTOLIC BLOOD PRESSURE: 55 MMHG | BODY MASS INDEX: 33.79 KG/M2 | HEART RATE: 61 BPM | SYSTOLIC BLOOD PRESSURE: 140 MMHG

## 2024-04-10 DIAGNOSIS — I38 VALVULAR HEART DISEASE: Primary | ICD-10-CM

## 2024-04-10 DIAGNOSIS — Z95.2 S/P AVR: ICD-10-CM

## 2024-04-10 DIAGNOSIS — Z95.2 S/P TAVR (TRANSCATHETER AORTIC VALVE REPLACEMENT): ICD-10-CM

## 2024-04-10 LAB
ANION GAP SERPL CALC-SCNC: 11 MEQ/L (ref 8–16)
APTT PPP: 30 SECONDS (ref 22–38)
BUN SERPL-MCNC: 13 MG/DL (ref 7–22)
CALCIUM SERPL-MCNC: 8.2 MG/DL (ref 8.5–10.5)
CHLORIDE SERPL-SCNC: 107 MEQ/L (ref 98–111)
CO2 SERPL-SCNC: 21 MEQ/L (ref 23–33)
CREAT SERPL-MCNC: 0.9 MG/DL (ref 0.4–1.2)
DEPRECATED RDW RBC AUTO: 43.2 FL (ref 35–45)
ECHO AO ASC DIAM: 3.6 CM
ECHO AO ASCENDING AORTA INDEX: 1.91 CM/M2
ECHO AV AREA PEAK VELOCITY: 0.9 CM2
ECHO AV AREA VTI: 1.1 CM2
ECHO AV AREA/BSA PEAK VELOCITY: 0.5 CM2/M2
ECHO AV AREA/BSA VTI: 0.6 CM2/M2
ECHO AV MEAN GRADIENT: 20 MMHG
ECHO AV MEAN VELOCITY: 2.3 M/S
ECHO AV PEAK GRADIENT: 47 MMHG
ECHO AV PEAK VELOCITY: 3.4 M/S
ECHO AV VELOCITY RATIO: 0.32
ECHO AV VTI: 84.8 CM
ECHO BSA: 1.93 M2
ECHO BSA: 1.93 M2
ECHO EST RA PRESSURE: 10 MMHG
ECHO LA AREA 2C: 20.2 CM2
ECHO LA AREA 4C: 18.3 CM2
ECHO LA DIAMETER INDEX: 2.5 CM/M2
ECHO LA DIAMETER: 4.7 CM
ECHO LA MAJOR AXIS: 5.3 CM
ECHO LA MINOR AXIS: 5.3 CM
ECHO LA VOL BP: 56 ML (ref 22–52)
ECHO LA VOL MOD A2C: 63 ML (ref 22–52)
ECHO LA VOL MOD A4C: 51 ML (ref 22–52)
ECHO LA VOL/BSA BIPLANE: 30 ML/M2 (ref 16–34)
ECHO LA VOLUME INDEX MOD A2C: 34 ML/M2 (ref 16–34)
ECHO LA VOLUME INDEX MOD A4C: 27 ML/M2 (ref 16–34)
ECHO LV E' LATERAL VELOCITY: 8 CM/S
ECHO LV E' SEPTAL VELOCITY: 7 CM/S
ECHO LV FRACTIONAL SHORTENING: 26 % (ref 28–44)
ECHO LV INTERNAL DIMENSION DIASTOLE INDEX: 2.87 CM/M2
ECHO LV INTERNAL DIMENSION DIASTOLIC: 5.4 CM (ref 3.9–5.3)
ECHO LV INTERNAL DIMENSION SYSTOLIC INDEX: 2.13 CM/M2
ECHO LV INTERNAL DIMENSION SYSTOLIC: 4 CM
ECHO LV ISOVOLUMETRIC RELAXATION TIME (IVRT): 60 MS
ECHO LV IVSD: 1.1 CM (ref 0.6–0.9)
ECHO LV MASS 2D: 234.8 G (ref 67–162)
ECHO LV MASS INDEX 2D: 124.9 G/M2 (ref 43–95)
ECHO LV POSTERIOR WALL DIASTOLIC: 1.1 CM (ref 0.6–0.9)
ECHO LV RELATIVE WALL THICKNESS RATIO: 0.41
ECHO LVOT AREA: 2.8 CM2
ECHO LVOT AV VTI INDEX: 0.34
ECHO LVOT DIAM: 1.9 CM
ECHO LVOT MEAN GRADIENT: 3 MMHG
ECHO LVOT PEAK GRADIENT: 5 MMHG
ECHO LVOT PEAK VELOCITY: 1.1 M/S
ECHO LVOT STROKE VOLUME INDEX: 43.1 ML/M2
ECHO LVOT SV: 81 ML
ECHO LVOT VTI: 28.6 CM
ECHO MV A VELOCITY: 1.28 M/S
ECHO MV AREA VTI: 1 CM2
ECHO MV E DECELERATION TIME (DT): 289 MS
ECHO MV E VELOCITY: 2.04 M/S
ECHO MV E/A RATIO: 1.59
ECHO MV E/E' LATERAL: 25.5
ECHO MV E/E' RATIO (AVERAGED): 27.32
ECHO MV LVOT VTI INDEX: 2.72
ECHO MV MAX VELOCITY: 2.7 M/S
ECHO MV MEAN GRADIENT: 9 MMHG
ECHO MV MEAN VELOCITY: 1.4 M/S
ECHO MV PEAK GRADIENT: 29 MMHG
ECHO MV REGURGITANT PEAK GRADIENT: 135 MMHG
ECHO MV REGURGITANT PEAK VELOCITY: 5.8 M/S
ECHO MV VTI: 77.7 CM
ECHO PV MAX VELOCITY: 1.4 M/S
ECHO PV PEAK GRADIENT: 8 MMHG
ECHO RIGHT VENTRICULAR SYSTOLIC PRESSURE (RVSP): 46 MMHG
ECHO RV INTERNAL DIMENSION: 2.6 CM
ECHO RV TAPSE: 2.3 CM (ref 1.7–?)
ECHO TV E WAVE: 1.2 M/S
ECHO TV MEAN GRADIENT: 3 MMHG
ECHO TV MEAN VELOCITY: 0.9 M/S
ECHO TV PEAK GRADIENT: 7 MMHG
ECHO TV REGURGITANT MAX VELOCITY: 2.99 M/S
ECHO TV REGURGITANT PEAK GRADIENT: 36 MMHG
ECHO TV VALVE AREA VTI: 1.9 CM2
ECHO TV VTI: 42.1 CM
ERYTHROCYTE [DISTWIDTH] IN BLOOD BY AUTOMATED COUNT: 13 % (ref 11.5–14.5)
GFR SERPL CREATININE-BSD FRML MDRD: 69 ML/MIN/1.73M2
GLUCOSE SERPL-MCNC: 114 MG/DL (ref 70–108)
HCT VFR BLD AUTO: 24.9 % (ref 37–47)
HGB BLD-MCNC: 8.1 GM/DL (ref 12–16)
INR PPP: 1.05 (ref 0.85–1.13)
MCH RBC QN AUTO: 29.9 PG (ref 26–33)
MCHC RBC AUTO-ENTMCNC: 32.5 GM/DL (ref 32.2–35.5)
MCV RBC AUTO: 91.9 FL (ref 81–99)
PLATELET # BLD AUTO: 144 THOU/MM3 (ref 130–400)
PMV BLD AUTO: 11.1 FL (ref 9.4–12.4)
POTASSIUM SERPL-SCNC: 4.5 MEQ/L (ref 3.5–5.2)
RBC # BLD AUTO: 2.71 MILL/MM3 (ref 4.2–5.4)
SODIUM SERPL-SCNC: 139 MEQ/L (ref 135–145)
WBC # BLD AUTO: 9.2 THOU/MM3 (ref 4.8–10.8)

## 2024-04-10 PROCEDURE — 85027 COMPLETE CBC AUTOMATED: CPT

## 2024-04-10 PROCEDURE — 93306 TTE W/DOPPLER COMPLETE: CPT

## 2024-04-10 PROCEDURE — 93005 ELECTROCARDIOGRAM TRACING: CPT | Performed by: INTERNAL MEDICINE

## 2024-04-10 PROCEDURE — 93306 TTE W/DOPPLER COMPLETE: CPT | Performed by: INTERNAL MEDICINE

## 2024-04-10 PROCEDURE — 93010 ELECTROCARDIOGRAM REPORT: CPT | Performed by: NUCLEAR MEDICINE

## 2024-04-10 PROCEDURE — 2580000003 HC RX 258: Performed by: INTERNAL MEDICINE

## 2024-04-10 PROCEDURE — 36415 COLL VENOUS BLD VENIPUNCTURE: CPT

## 2024-04-10 PROCEDURE — 80048 BASIC METABOLIC PNL TOTAL CA: CPT

## 2024-04-10 PROCEDURE — 85610 PROTHROMBIN TIME: CPT

## 2024-04-10 PROCEDURE — 6370000000 HC RX 637 (ALT 250 FOR IP): Performed by: INTERNAL MEDICINE

## 2024-04-10 PROCEDURE — 85730 THROMBOPLASTIN TIME PARTIAL: CPT

## 2024-04-10 RX ORDER — POTASSIUM CHLORIDE 20 MEQ/1
20 TABLET, EXTENDED RELEASE ORAL 2 TIMES DAILY
Qty: 60 TABLET | Refills: 3 | Status: SHIPPED | OUTPATIENT
Start: 2024-04-11

## 2024-04-10 RX ORDER — LISINOPRIL 5 MG/1
5 TABLET ORAL 2 TIMES DAILY
Qty: 180 TABLET | Refills: 1 | Status: SHIPPED
Start: 2024-04-11

## 2024-04-10 RX ORDER — FUROSEMIDE 40 MG/1
TABLET ORAL
Qty: 60 TABLET | Refills: 3 | Status: SHIPPED | OUTPATIENT
Start: 2024-04-10

## 2024-04-10 RX ORDER — METOPROLOL SUCCINATE 25 MG/1
25 TABLET, EXTENDED RELEASE ORAL DAILY
Qty: 30 TABLET | Refills: 3 | Status: SHIPPED | OUTPATIENT
Start: 2024-04-17

## 2024-04-10 RX ADMIN — FOLIC ACID 1 MG: 1 TABLET ORAL at 08:14

## 2024-04-10 RX ADMIN — ROPINIROLE HYDROCHLORIDE 2 MG: 1 TABLET, FILM COATED ORAL at 08:13

## 2024-04-10 RX ADMIN — ASPIRIN 81 MG: 81 TABLET, COATED ORAL at 08:13

## 2024-04-10 RX ADMIN — Medication 3000 MCG: at 08:13

## 2024-04-10 RX ADMIN — CITALOPRAM 20 MG: 20 TABLET, FILM COATED ORAL at 08:13

## 2024-04-10 RX ADMIN — PANTOPRAZOLE SODIUM 40 MG: 40 TABLET, DELAYED RELEASE ORAL at 05:56

## 2024-04-10 RX ADMIN — SODIUM CHLORIDE, PRESERVATIVE FREE 10 ML: 5 INJECTION INTRAVENOUS at 08:14

## 2024-04-10 RX ADMIN — CLOPIDOGREL BISULFATE 75 MG: 75 TABLET ORAL at 08:14

## 2024-04-10 NOTE — PROGRESS NOTES
Discussed discharge summary with patient and patients family . Instructed patient about medications & follow up appointments. Patient denies any additional questions. Patient was discharged with all belongings. No distress noted. Patient discharged to home. Taken down to the vehicle by RN per wheelchair. Given TAVR discharge instructions and educated on groin site care.

## 2024-04-10 NOTE — PLAN OF CARE
Problem: Chronic Conditions and Co-morbidities  Goal: Patient's chronic conditions and co-morbidity symptoms are monitored and maintained or improved  Outcome: Progressing  Flowsheets (Taken 4/10/2024 0349)  Care Plan - Patient's Chronic Conditions and Co-Morbidity Symptoms are Monitored and Maintained or Improved: Monitor and assess patient's chronic conditions and comorbid symptoms for stability, deterioration, or improvement     Problem: Pain  Goal: Verbalizes/displays adequate comfort level or baseline comfort level  Outcome: Progressing  Flowsheets (Taken 4/10/2024 0349)  Verbalizes/displays adequate comfort level or baseline comfort level:   Encourage patient to monitor pain and request assistance   Assess pain using appropriate pain scale     Problem: Cardiovascular - Adult  Goal: Maintains optimal cardiac output and hemodynamic stability  Outcome: Progressing  Flowsheets (Taken 4/10/2024 0349)  Maintains optimal cardiac output and hemodynamic stability: Monitor blood pressure and heart rate     Problem: Discharge Planning  Goal: Discharge to home or other facility with appropriate resources  Outcome: Progressing  Flowsheets (Taken 4/10/2024 0349)  Discharge to home or other facility with appropriate resources: Identify barriers to discharge with patient and caregiver     Problem: Safety - Adult  Goal: Free from fall injury  Outcome: Progressing  Flowsheets (Taken 4/10/2024 0349)  Free From Fall Injury: Instruct family/caregiver on patient safety     Care plan reviewed with patient.  Patient verbalizes understanding of the care plan and contributed to goal setting.

## 2024-04-10 NOTE — PLAN OF CARE
Problem: Chronic Conditions and Co-morbidities  Goal: Patient's chronic conditions and co-morbidity symptoms are monitored and maintained or improved  4/10/2024 1050 by Elizabeth Pérez RN  Outcome: Progressing  Flowsheets (Taken 4/10/2024 0349 by Emmanuel Peguero, RN)  Care Plan - Patient's Chronic Conditions and Co-Morbidity Symptoms are Monitored and Maintained or Improved: Monitor and assess patient's chronic conditions and comorbid symptoms for stability, deterioration, or improvement     Problem: Pain  Goal: Verbalizes/displays adequate comfort level or baseline comfort level  4/10/2024 1050 by Elizabeth Pérez RN  Outcome: Progressing  Flowsheets (Taken 4/10/2024 1050)  Verbalizes/displays adequate comfort level or baseline comfort level: Encourage patient to monitor pain and request assistance  Note: No pain this shift     Problem: Cardiovascular - Adult  Goal: Maintains optimal cardiac output and hemodynamic stability  4/10/2024 1050 by Elizabteh Pérez RN  Outcome: Progressing  Flowsheets (Taken 4/10/2024 1050)  Maintains optimal cardiac output and hemodynamic stability: Monitor blood pressure and heart rate     Problem: Discharge Planning  Goal: Discharge to home or other facility with appropriate resources  4/10/2024 1050 by Elizabeth Pérez, RN  Outcome: Progressing  Flowsheets (Taken 4/10/2024 1050)  Discharge to home or other facility with appropriate resources: Identify barriers to discharge with patient and caregiver     Problem: Safety - Adult  Goal: Free from fall injury  4/10/2024 1050 by Elizabeth Pérez, RN  Outcome: Progressing  Flowsheets  Taken 4/10/2024 1050  Free From Fall Injury: Instruct family/caregiver on patient safety  Taken 4/10/2024 1026  Free From Fall Injury: Instruct family/caregiver on patient safety  Note: Bed locked & in low position, call light in reach, side-rails up x2, bed/chair alarm utilized, non-slip socks on when ambulating, reminded patient to use call light to

## 2024-04-10 NOTE — DISCHARGE INSTRUCTIONS
No Metoprolol until seen for re-evaluation back in the office.     Post Transcatheter Aortic Valve Replacement (TAVR) Discharge Instructions    Your follow-up appointments and echocardiogram will be scheduled by Dr. Martines's office and their office staff and will call you with the date and time.  We are here for you! If you have any questions, please call:   Aga LARKIN, [phone]    Do you have the help you need at home? Someone must be with you for the first 7 days or until you are seen in the office post TAVR.      ACTIVITY:  Weigh yourself every day in the morning after using the bathroom.    NO DRIVING UNTIL YOU RETURN TO THE DOCTOR'S OFFICE.  You may ride in a car.   Do not lift more than five to ten pounds for the first week (A gallon of milk is 7 lbs)  Get up and get dressed every day. Do not stay in bed. Set a daily routine. This is an important step in re-gaining your strength.  You may walk up and down a few stairs.   Walk as much as you can.  This will help facilitate the healing process.  Plan rest periods during the day.  Deep breathe and use your incentive spirometer 10 times every hour while you are awake.   Avoid work that increases muscle tension (straining with bowel movements, moving furniture)      WOUND CARE:  Remove Band-Aids after 48 hours of placement.  May shower once Band-Aids are removed. No bathtubs, pools, or hot tubs for the first week you are home.   Cleanse wounds with Hibiclens soap and water. Pat incision dry. Keep wounds open to air after Band-Aids are removed and keep dry.   A small amount of bloody or clear drainage is normal. Call if there is any extensive bruising, oozing or hematoma.  Place manual pressure over incision sites when coughing, sneezing, vomiting, or straining for a bowl movement.  Watch for signs of infections: redness, incision hot to the touch, fever greater than 101 degrees, swelling at the groin or incision site, or discolored drainage from your

## 2024-04-10 NOTE — PROGRESS NOTES
CLINICAL PHARMACY: DISCHARGE MED RECONCILIATION/REVIEW    [facility] Select Patient?: Yes  Total # of Interventions Recommended: 0   -   Total # Interventions Accepted: 0  Intervention Severity:   - Level 1 Intervention Present?: No   - Level 2 #: 0   - Level 3 #: 0   Time Spent (min): 15    Additional Documentation:    Taryn Coleman PharmD 4/10/2024 10:45 AM

## 2024-04-10 NOTE — DISCHARGE SUMMARY
PRINIVIL;ZESTRIL  Take 1 tablet by mouth 2 times daily  Start taking on: April 11, 2024     pantoprazole 40 MG tablet  Commonly known as: PROTONIX  Take 1 tablet by mouth 2 times daily (before meals)     potassium chloride 20 MEQ extended release tablet  Commonly known as: KLOR-CON M  Take 1 tablet by mouth 2 times daily  Start taking on: April 11, 2024     rOPINIRole 2 MG tablet  Commonly known as: REQUIP  Take 1 tablet by mouth 2 times daily     traZODone 50 MG tablet  Commonly known as: DESYREL  TAKE 1 TABLET EVERY NIGHT               Where to Get Your Medications        You can get these medications from any pharmacy    Bring a paper prescription for each of these medications  furosemide 40 MG tablet  metoprolol succinate 25 MG extended release tablet  potassium chloride 20 MEQ extended release tablet       Information about where to get these medications is not yet available    Ask your nurse or doctor about these medications  lisinopril 5 MG tablet             Follow Up Plan  1. Follow up with Cardiology in 1 week for incision check and post TAVR f/u.   2. Follow up with primary care provider in 1 week  3. Continue Plavix and Aspirin for at least 3 months for the TAVR implant.  Beta-blocker will be put on hold until seen at one week follow up office visit.   4. Pt is fully agreeable to discharge plans. All questions were thoroughly answered.     Electronically signed by JUAN Williamson CNP on 4/10/2024 at 10:25 AM

## 2024-04-10 NOTE — PROGRESS NOTES
Inpatient Cardiac Rehabilitation Consult    Received consult for Phase II Cardiac Rehabilitation.  Patient needs cardiac rehab due to TAVR on 4/9/24.  Importance of Cardiac Rehab discussed with patient.  Reviewed cardiac rehab class times.  Patient questions answered.  Patient wants to think about it, has a lot going on between her health and her 's. We will contact patient at home to schedule evaluation appointment. Cardiac Rehab brochure given.

## 2024-04-10 NOTE — TELEPHONE ENCOUNTER
Pt is 3 month post Lilly TAVR.  Orders received from Dr. Martines for ECHO and follow up appointment.      Appointment is scheduled with Dr. Martines for 7/18/24 at 0830.  ECHO scheduled for 7/9/24 at 0800.    Orders received from Dr. Martines to schedule the patient for her 1 year post TAVR follow up appointment with an ECHO, CBC, and BMP.    Appointment is scheduled with Rebecca for 4/15/25 at 1000.  ECHO scheduled for 4/9/25 at 1100.       Dr. Martines, please agree.

## 2024-04-11 ENCOUNTER — TELEPHONE (OUTPATIENT)
Dept: FAMILY MEDICINE CLINIC | Age: 69
End: 2024-04-11

## 2024-04-11 ENCOUNTER — CARE COORDINATION (OUTPATIENT)
Dept: CASE MANAGEMENT | Age: 69
End: 2024-04-11

## 2024-04-11 LAB
EKG ATRIAL RATE: 51 BPM
EKG ATRIAL RATE: 56 BPM
EKG ATRIAL RATE: 57 BPM
EKG P AXIS: -21 DEGREES
EKG P AXIS: 1 DEGREES
EKG P AXIS: 3 DEGREES
EKG P-R INTERVAL: 200 MS
EKG P-R INTERVAL: 214 MS
EKG P-R INTERVAL: 256 MS
EKG Q-T INTERVAL: 494 MS
EKG Q-T INTERVAL: 508 MS
EKG Q-T INTERVAL: 608 MS
EKG QRS DURATION: 76 MS
EKG QRS DURATION: 76 MS
EKG QRS DURATION: 80 MS
EKG QTC CALCULATION (BAZETT): 480 MS
EKG QTC CALCULATION (BAZETT): 490 MS
EKG QTC CALCULATION (BAZETT): 560 MS
EKG R AXIS: 13 DEGREES
EKG R AXIS: 16 DEGREES
EKG R AXIS: 23 DEGREES
EKG T AXIS: 66 DEGREES
EKG T AXIS: 67 DEGREES
EKG T AXIS: 92 DEGREES
EKG VENTRICULAR RATE: 51 BPM
EKG VENTRICULAR RATE: 56 BPM
EKG VENTRICULAR RATE: 57 BPM

## 2024-04-11 NOTE — CARE COORDINATION
Care Transitions Note    Initial Call - Call within 2 business days of discharge: Yes     Attempted to reach patient for transitions of care follow up. Unable to reach patient.    Outreach Attempts:   HIPAA compliant voicemail left for patient.     Patient: Hui Dean    Patient : 1955   MRN: <[id number]>    Reason for Admission: Severe aortic regurgitation, s/p TAVR  Discharge Date: 4/10/24  RURS: Readmission Risk              Risk of Unplanned Readmission:  0          Last Discharge Facility       Date Complaint Diagnosis Description Type Department Provider    24  S/P TAVR (transcatheter aortic valve replacement) ... Admission (Discharged) [facility] Sonny Martines MD            Was this an external facility discharge? No    Follow Up Appointment:   Patient has hospital follow up appointment scheduled within 7 days of discharge.    Future Appointments         Provider Specialty Dept Phone    2024 11:00 AM Felton Nix PA-C Cardiothoracic Surgery [phone]    2024 11:00 AM Kiran Sharma DO Family Medicine [phone]    2024 9:00 AM Lorna Faulkner, APRN - CNP Cardiology [phone]    2024 10:20 AM (Arrive by 9:50 AM) STR CT IMAGING RM1  [facility] [phone]    2024 11:00 AM (Arrive by 10:45 AM) STR ECHO 2 Cardiology [phone]    2024 10:00 AM Sonny Martines MD Cardiology [phone]    2024 11:00 AM Maryellen Solano MD Oncology [phone]    2024 8:00 AM (Arrive by 7:45 AM) STR ECHO 2 Cardiology [phone]    2024 9:15 AM Kenneth Hernandez MD Cardiology [phone]    2024 8:30 AM Sonny Martines MD Cardiology [phone]    2024 8:00 AM Kiran Sharma DO Family Medicine [phone]    2025 11:00 AM (Arrive by 10:45 AM) STR ECHO 1 Cardiology [phone]    4/15/2025 10:00 AM Rebecca Payne, APRN - CNP Cardiology [phone]            1st attempt to contact pt for initial care transition

## 2024-04-11 NOTE — TELEPHONE ENCOUNTER
Care Transitions Initial Follow Up Call    Outreach made within 2 business days of discharge: Yes    Patient: Hui Dean Patient : 1955   MRN: [id number]  Reason for Admission: There are no discharge diagnoses documented for the most recent discharge.  Discharge Date: 4/10/24       Spoke with: Hui     Discharge department/facility: [facility]     TCM Interactive Patient Contact:  Was patient able to fill all prescriptions: Yes  Was patient instructed to bring all medications to the follow-up visit: Yes  Is patient taking all medications as directed in the discharge summary? Yes  Does patient understand their discharge instructions: Yes  Does patient have questions or concerns that need addressed prior to 7-14 day follow up office visit: no    Scheduled appointment with PCP within 7-14 days    Follow Up  Future Appointments   Date Time Provider Department Center   2024 11:00 AM Felton Nix PA-C [facility] CT SURG [facility] [place]   2024 11:00 AM Kiran Sharma, DO Fam Med [facility] [place]   2024  9:00 AM Lorna Faulkner APRN - CNP N [facility] CHF [facility] [place]   2024 10:20 AM STR CT IMAGING RM1  [facility] EXP STR Rad/Card   2024 11:00 AM STR ECHO 2 [facility] STR Rad/Card   2024 10:00 AM Sonny Martines MD N [facility] Heart [facility] [place]   2024 11:00 AM Maryellen Solano MD N Oncology [facility] [place]   2024  8:00 AM STR ECHO 2 [facility] STR Rad/Card   2024  9:15 AM Kenneth Hernandez MD N [facility] Heart [facility] [place]   2024  8:30 AM Sonny Martines MD N [facility] [place]   2024  8:00 AM Kiran Sharma, DO Fam Med [facility] [place]   2025 11:00 AM STR ECHO 1 [facility] STR Rad/Card   4/15/2025 10:00 AM Rebecca Payne APRN - CNP N [facility] Heart [facility] [place]       Akilah Falcon, LPN    
none

## 2024-04-12 ENCOUNTER — CARE COORDINATION (OUTPATIENT)
Dept: CASE MANAGEMENT | Age: 69
End: 2024-04-12

## 2024-04-12 DIAGNOSIS — Z95.2 S/P TAVR (TRANSCATHETER AORTIC VALVE REPLACEMENT): Primary | ICD-10-CM

## 2024-04-12 PROCEDURE — 1111F DSCHRG MED/CURRENT MED MERGE: CPT | Performed by: FAMILY MEDICINE

## 2024-04-12 NOTE — CARE COORDINATION
Care Transitions Note    Initial Call - Call within 2 business days of discharge: Yes     Patient Current Location:  Home: [address] [place], [place] [id number]    Care Transition Nurse contacted the patient by telephone to perform post hospital discharge assessment, verified name and  as identifiers. Provided introduction to self, and explanation of the Care Transition Nurse role.     Patient: Hui Dean    Patient : 1955   MRN: <[id number]>    Reason for Admission: Severe aortic regurgitation, s/p TAVR  Discharge Date: 4/10/24  RURS: Readmission Risk Score: 16.7      Last Discharge Facility       Date Complaint Diagnosis Description Type Department Provider    24  S/P TAVR (transcatheter aortic valve replacement) ... Admission (Discharged) Sonny Tadeo MD            Was this an external facility discharge? No    Additional needs identified to be addressed with provider   No needs identified             Method of communication with provider: none.    Patients top risk factors for readmission: medical condition-Severe aortic regurgitation, s/p TAVR, Afib, COPD, DM, HTN    Interventions to address risk factors:   Education: s/s of infection    Care Summary Note: Contacted pt for initial care transition follow up.  Hui states she is doing good so far.  Denies having any chest pain/discomfort, pressure, tightness, shortness of breath, nausea/vomiting.  Reports insertion sites look \"good\".  Denies having any s/s of infection.  Also denies having any numbness or tingling.  Pt typically sleeping well but states having \"some soreness\" in neck area but has been improving.  She will continue to monitor.  She is eating and drinking fluids w/o issues.  Discussed low fat, low sodium diet.  Declines referral to RD at this time.  Denies having any urinary or bowel issues currently.  Reviewed discharge instructions.  Reviewed medication list.  She is aware to hold Metoprolol until seeing provider.

## 2024-04-15 PROBLEM — D68.69 SECONDARY HYPERCOAGULABLE STATE (HCC): Status: RESOLVED | Noted: 2024-03-14 | Resolved: 2024-04-15

## 2024-04-15 PROBLEM — Z95.818 PRESENCE OF WATCHMAN LEFT ATRIAL APPENDAGE CLOSURE DEVICE: Status: ACTIVE | Noted: 2024-04-15

## 2024-04-15 PROBLEM — I48.0 PAROXYSMAL A-FIB (HCC): Status: RESOLVED | Noted: 2023-11-02 | Resolved: 2024-04-15

## 2024-04-15 PROBLEM — K92.2 GI BLEED: Status: RESOLVED | Noted: 2023-09-05 | Resolved: 2024-04-15

## 2024-04-16 ENCOUNTER — OFFICE VISIT (OUTPATIENT)
Dept: CARDIOTHORACIC SURGERY | Age: 69
End: 2024-04-16
Payer: MEDICARE

## 2024-04-16 ENCOUNTER — OFFICE VISIT (OUTPATIENT)
Dept: FAMILY MEDICINE CLINIC | Age: 69
End: 2024-04-16

## 2024-04-16 VITALS
OXYGEN SATURATION: 98 % | DIASTOLIC BLOOD PRESSURE: 74 MMHG | SYSTOLIC BLOOD PRESSURE: 128 MMHG | WEIGHT: 183 LBS | RESPIRATION RATE: 18 BRPM | BODY MASS INDEX: 32.43 KG/M2 | HEART RATE: 68 BPM | HEIGHT: 63 IN | TEMPERATURE: 97.6 F

## 2024-04-16 VITALS
SYSTOLIC BLOOD PRESSURE: 125 MMHG | DIASTOLIC BLOOD PRESSURE: 60 MMHG | WEIGHT: 182 LBS | OXYGEN SATURATION: 99 % | BODY MASS INDEX: 33.49 KG/M2 | HEIGHT: 62 IN | HEART RATE: 79 BPM

## 2024-04-16 DIAGNOSIS — K92.2 LOWER GI BLEED: ICD-10-CM

## 2024-04-16 DIAGNOSIS — E78.5 DYSLIPIDEMIA: Chronic | ICD-10-CM

## 2024-04-16 DIAGNOSIS — J44.9 CHRONIC OBSTRUCTIVE PULMONARY DISEASE, UNSPECIFIED COPD TYPE (HCC): ICD-10-CM

## 2024-04-16 DIAGNOSIS — Z95.818 PRESENCE OF WATCHMAN LEFT ATRIAL APPENDAGE CLOSURE DEVICE: ICD-10-CM

## 2024-04-16 DIAGNOSIS — I10 HYPERTENSION, ESSENTIAL: Chronic | ICD-10-CM

## 2024-04-16 DIAGNOSIS — I35.0 SEVERE AORTIC STENOSIS: Primary | ICD-10-CM

## 2024-04-16 DIAGNOSIS — Z95.2 S/P TAVR (TRANSCATHETER AORTIC VALVE REPLACEMENT): ICD-10-CM

## 2024-04-16 DIAGNOSIS — K92.2 UPPER GI BLEED: ICD-10-CM

## 2024-04-16 DIAGNOSIS — I48.0 PAROXYSMAL ATRIAL FIBRILLATION (HCC): ICD-10-CM

## 2024-04-16 DIAGNOSIS — M79.7 FIBROMYALGIA: ICD-10-CM

## 2024-04-16 DIAGNOSIS — E11.9 TYPE 2 DIABETES MELLITUS WITHOUT COMPLICATION, WITHOUT LONG-TERM CURRENT USE OF INSULIN (HCC): ICD-10-CM

## 2024-04-16 DIAGNOSIS — E53.8 FOLATE DEFICIENCY: ICD-10-CM

## 2024-04-16 DIAGNOSIS — D50.9 IRON DEFICIENCY ANEMIA, UNSPECIFIED IRON DEFICIENCY ANEMIA TYPE: ICD-10-CM

## 2024-04-16 DIAGNOSIS — G25.81 RLS (RESTLESS LEGS SYNDROME): Chronic | ICD-10-CM

## 2024-04-16 DIAGNOSIS — D62 ACUTE BLOOD LOSS ANEMIA: ICD-10-CM

## 2024-04-16 DIAGNOSIS — K21.00 GASTROESOPHAGEAL REFLUX DISEASE WITH ESOPHAGITIS, UNSPECIFIED WHETHER HEMORRHAGE: Chronic | ICD-10-CM

## 2024-04-16 DIAGNOSIS — I50.32 CHRONIC HEART FAILURE WITH PRESERVED EJECTION FRACTION (HCC): ICD-10-CM

## 2024-04-16 DIAGNOSIS — Z95.2 S/P TAVR (TRANSCATHETER AORTIC VALVE REPLACEMENT): Primary | ICD-10-CM

## 2024-04-16 DIAGNOSIS — E53.8 B12 DEFICIENCY: ICD-10-CM

## 2024-04-16 DIAGNOSIS — F33.42 RECURRENT MAJOR DEPRESSIVE DISORDER, IN FULL REMISSION (HCC): ICD-10-CM

## 2024-04-16 DIAGNOSIS — R91.1 PULMONARY NODULE: ICD-10-CM

## 2024-04-16 PROCEDURE — 1090F PRES/ABSN URINE INCON ASSESS: CPT | Performed by: PHYSICIAN ASSISTANT

## 2024-04-16 PROCEDURE — 1124F ACP DISCUSS-NO DSCNMKR DOCD: CPT | Performed by: PHYSICIAN ASSISTANT

## 2024-04-16 PROCEDURE — 1111F DSCHRG MED/CURRENT MED MERGE: CPT | Performed by: PHYSICIAN ASSISTANT

## 2024-04-16 PROCEDURE — 99214 OFFICE O/P EST MOD 30 MIN: CPT | Performed by: PHYSICIAN ASSISTANT

## 2024-04-16 PROCEDURE — 3017F COLORECTAL CA SCREEN DOC REV: CPT | Performed by: PHYSICIAN ASSISTANT

## 2024-04-16 PROCEDURE — 3074F SYST BP LT 130 MM HG: CPT | Performed by: PHYSICIAN ASSISTANT

## 2024-04-16 PROCEDURE — 3078F DIAST BP <80 MM HG: CPT | Performed by: PHYSICIAN ASSISTANT

## 2024-04-16 PROCEDURE — 1036F TOBACCO NON-USER: CPT | Performed by: PHYSICIAN ASSISTANT

## 2024-04-16 PROCEDURE — G8417 CALC BMI ABV UP PARAM F/U: HCPCS | Performed by: PHYSICIAN ASSISTANT

## 2024-04-16 PROCEDURE — G8427 DOCREV CUR MEDS BY ELIG CLIN: HCPCS | Performed by: PHYSICIAN ASSISTANT

## 2024-04-16 PROCEDURE — G8399 PT W/DXA RESULTS DOCUMENT: HCPCS | Performed by: PHYSICIAN ASSISTANT

## 2024-04-16 SDOH — ECONOMIC STABILITY: FOOD INSECURITY: WITHIN THE PAST 12 MONTHS, YOU WORRIED THAT YOUR FOOD WOULD RUN OUT BEFORE YOU GOT MONEY TO BUY MORE.: NEVER TRUE

## 2024-04-16 SDOH — ECONOMIC STABILITY: FOOD INSECURITY: WITHIN THE PAST 12 MONTHS, THE FOOD YOU BOUGHT JUST DIDN'T LAST AND YOU DIDN'T HAVE MONEY TO GET MORE.: NEVER TRUE

## 2024-04-16 SDOH — ECONOMIC STABILITY: INCOME INSECURITY: HOW HARD IS IT FOR YOU TO PAY FOR THE VERY BASICS LIKE FOOD, HOUSING, MEDICAL CARE, AND HEATING?: NOT HARD AT ALL

## 2024-04-16 NOTE — PROGRESS NOTES
Structural Heart/Cardiology Follow up    4/16/2024 11:28 AM    Patient's Name/Date of Birth: Hui Dean / 1955 (68 y.o.)    PCP: Kiran Sharma, DO    Date: April 16, 2024     CC:   Chief Complaint   Patient presents with    Follow-up     S/p Lilly tavr with vanessa  04.09.2024     HPI  We had the pleasure of seeing Hui Dean in the office today, as you know this is a very pleasant 68 y.o. year old female with a history of aortic stenosis who underwent a successful Transcatheter aortic valve-in-valve replacement with a Medtronic 23 mm Evolut FX prosthesis on 4/9/24 (Hx of S/P AVR in 6/2015 and 7/2018).  The pt denies chest pressure, palpitations, SOB, fever, chills, N/V/D.     PastMedical History:  Hui  has a past medical history of Atrial fibrillation (HCC), COPD (chronic obstructive pulmonary disease) (HCC), DM2 (diabetes mellitus, type 2) (HCC), Dyslipidemia, Fibromyalgia, Former smoker, GERD (gastroesophageal reflux disease), H/O prosthetic aortic valve replacement, Heart failure with preserved ejection fraction (HCC), History of aortic stenosis, s/p valve replacement x 2, History of iron deficiency anemia, History of subarachnoid hemorrhage, Hypertension, essential, Major depression, THAYER (nonalcoholic steatohepatitis), Osteoarthritis, Presence of Watchman left atrial appendage closure device, RLS (restless legs syndrome), S/P mitral valve repair, and Valvular heart disease.    Past Surgical History:  The patient  has a past surgical history that includes Cardiac catheterization (2004 or 2005); Diagnostic Cardiac Cath Lab Procedure; brain surgery; vascular surgery; Aortic valve replacement; Colonoscopy; pr echo transesophag r-t 2d img acquisj i&r only (Left, 7/3/2018); pr office/outpt visit,procedure only (N/A, 7/13/2018); Partial hysterectomy (N/A, 2004); Hysterectomy; Ovary removal (Bilateral); Upper gastrointestinal endoscopy (N/A, 4/21/2023); Colonoscopy (N/A, 9/8/2023); transesophageal

## 2024-04-16 NOTE — PROGRESS NOTES
of Watchman left atrial appendage closure device    As above    - [facility] DISCHARGE MEDS RECONCILED W/ CURRENT OUTPATIENT MED LIST    5. Chronic heart failure with preserved ejection fraction (HCC)    As above    - [place] DISCHARGE MEDS RECONCILED W/ CURRENT OUTPATIENT MED LIST    6. Pulmonary nodule    Await f/u CT 5/9  Had to delay f/u d/t needing TAVR ASAP    - [facility] DISCHARGE MEDS RECONCILED W/ CURRENT OUTPATIENT MED LIST    7. Acute blood loss anemia    Await f/u CBC ordered by cardio    - [facility] DISCHARGE MEDS RECONCILED W/ CURRENT OUTPATIENT MED LIST    8. Lower GI bleed    Clinically stable  S/p iron infusions  Con't to monitor for worsening bleeding  Con't f/u with GI, Hernadez and Hematology    - [facility] DISCHARGE MEDS RECONCILED W/ CURRENT OUTPATIENT MED LIST    9. Iron deficiency anemia, unspecified iron deficiency anemia type    As above    - [facility] DISCHARGE MEDS RECONCILED W/ CURRENT OUTPATIENT MED LIST    10. B12 deficiency    Con't b12/folate for now  Stable    - [facility] DISCHARGE MEDS RECONCILED W/ CURRENT OUTPATIENT MED LIST    11. Folate deficiency    As above    - [facility] DISCHARGE MEDS RECONCILED W/ CURRENT OUTPATIENT MED LIST    12. Upper GI bleed    As above  Con't PPI and GI f/u    - [facility] DISCHARGE MEDS RECONCILED W/ CURRENT OUTPATIENT MED LIST    13. Gastroesophageal reflux disease with esophagitis, unspecified whether hemorrhage    As above    - [facility] DISCHARGE MEDS RECONCILED W/ CURRENT OUTPATIENT MED LIST    14. Type 2 diabetes mellitus without complication, without long-term current use of insulin (HCC)    Stable  Con't diet control  F/u August as scheduled    - [facility] DISCHARGE MEDS RECONCILED W/ CURRENT OUTPATIENT MED LIST    15. Recurrent major depressive disorder, in full remission (HCC)    Stable  Con't Celexa and Trazodone    - [facility] DISCHARGE MEDS RECONCILED W/ CURRENT OUTPATIENT MED LIST    16. Fibromyalgia    Stable  Con't Elavil    - [facility] DISCHARGE MEDS RECONCILED W/ CURRENT OUTPATIENT MED LIST    17. RLS (restless legs

## 2024-04-16 NOTE — PATIENT INSTRUCTIONS
You may receive a survey regarding the care you received during your visit.  Your input is valuable to us.  We encourage you to complete and return your survey.  We hope you will choose us in the future for your healthcare needs.    Thank you for choosing [facility]!    Your Medical Assistant Today:  Lara GOMEZ   Your Provider for Today: Sanjiv Nix PA-C  Ph. [phone]      Happy Spring!

## 2024-04-17 ENCOUNTER — TELEPHONE (OUTPATIENT)
Dept: CARDIAC REHAB | Age: 69
End: 2024-04-17

## 2024-04-17 NOTE — TELEPHONE ENCOUNTER
4/17/24: cardiac rehab spoke to pt on the phone. Pt requested that we call her back in a couple weeks to schedule evaluation due to her busy schedule at the moment.

## 2024-04-18 ENCOUNTER — CARE COORDINATION (OUTPATIENT)
Dept: CASE MANAGEMENT | Age: 69
End: 2024-04-18

## 2024-04-18 NOTE — CARE COORDINATION
Care Transitions Note    Follow Up Call      Attempted to reach patient for transitions of care follow up.  Unable to reach patient.      Outreach Attempts:   HIPAA compliant voicemail left for patient.     Care Summary Note: Severe aortic regurgitation, s/p TAVR     Follow Up Appointment:   Future Appointments         Provider Specialty Dept Phone    4/24/2024 9:00 AM Lorna Faulkner APRN - CNP Cardiology [phone]    5/9/2024 10:20 AM (Arrive by 9:50 AM) STR CT IMAGING RM1  [facility] [phone]    5/9/2024 11:00 AM (Arrive by 10:45 AM) STR ECHO 2 Cardiology [phone]    5/14/2024 10:00 AM Sonny Martines MD Cardiology [phone]    6/18/2024 11:00 AM Maryellen Solano MD Oncology [phone]    7/9/2024 8:00 AM (Arrive by 7:45 AM) STR ECHO 2 Cardiology [phone]    7/9/2024 9:15 AM Kenneth Hernandez MD Cardiology [phone]    7/18/2024 8:30 AM Sonny Martines MD Cardiology [phone]    8/19/2024 8:00 AM Kiran Sharma, DO Family Medicine [phone]    8/19/2024 9:00 AM Kiran Sharma, DO Family Medicine [phone]    4/9/2025 11:00 AM (Arrive by 10:45 AM) STR ECHO 1 Cardiology [phone]    4/15/2025 10:00 AM Rebecca Payne APRN - CNP Cardiology [phone]            Plan for follow-up call in 6-10 days based on severity of symptoms and risk factors. Plan for next call: symptom management-CP, s/s of infection, neck pain/discomfort, weight, swelling, any new or worsening symptoms         Abbi Mar RN

## 2024-04-23 ENCOUNTER — CARE COORDINATION (OUTPATIENT)
Dept: CASE MANAGEMENT | Age: 69
End: 2024-04-23
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
## 2024-04-24 ENCOUNTER — OFFICE VISIT (OUTPATIENT)
Dept: CARDIOLOGY CLINIC | Age: 69
End: 2024-04-24
Payer: MEDICARE

## 2024-04-24 VITALS
WEIGHT: 186.4 LBS | RESPIRATION RATE: 18 BRPM | HEART RATE: 67 BPM | HEIGHT: 63 IN | SYSTOLIC BLOOD PRESSURE: 120 MMHG | DIASTOLIC BLOOD PRESSURE: 60 MMHG | BODY MASS INDEX: 33.03 KG/M2 | OXYGEN SATURATION: 97 %

## 2024-04-24 DIAGNOSIS — Z95.2 H/O PROSTHETIC AORTIC VALVE REPLACEMENT: ICD-10-CM

## 2024-04-24 DIAGNOSIS — Z95.2 S/P TAVR (TRANSCATHETER AORTIC VALVE REPLACEMENT): Primary | ICD-10-CM

## 2024-04-24 DIAGNOSIS — I38 VALVULAR HEART DISEASE: ICD-10-CM

## 2024-04-24 DIAGNOSIS — I50.32 CHRONIC HEART FAILURE WITH PRESERVED EJECTION FRACTION (HCC): ICD-10-CM

## 2024-04-24 DIAGNOSIS — I48.0 PAROXYSMAL A-FIB (HCC): ICD-10-CM

## 2024-04-24 DIAGNOSIS — E87.6 HYPOKALEMIA: ICD-10-CM

## 2024-04-24 PROCEDURE — 3074F SYST BP LT 130 MM HG: CPT | Performed by: NURSE PRACTITIONER

## 2024-04-24 PROCEDURE — G8427 DOCREV CUR MEDS BY ELIG CLIN: HCPCS | Performed by: NURSE PRACTITIONER

## 2024-04-24 PROCEDURE — 3078F DIAST BP <80 MM HG: CPT | Performed by: NURSE PRACTITIONER

## 2024-04-24 PROCEDURE — 1111F DSCHRG MED/CURRENT MED MERGE: CPT | Performed by: NURSE PRACTITIONER

## 2024-04-24 PROCEDURE — 1090F PRES/ABSN URINE INCON ASSESS: CPT | Performed by: NURSE PRACTITIONER

## 2024-04-24 PROCEDURE — 3017F COLORECTAL CA SCREEN DOC REV: CPT | Performed by: NURSE PRACTITIONER

## 2024-04-24 PROCEDURE — 99214 OFFICE O/P EST MOD 30 MIN: CPT | Performed by: NURSE PRACTITIONER

## 2024-04-24 PROCEDURE — 1036F TOBACCO NON-USER: CPT | Performed by: NURSE PRACTITIONER

## 2024-04-24 PROCEDURE — G8399 PT W/DXA RESULTS DOCUMENT: HCPCS | Performed by: NURSE PRACTITIONER

## 2024-04-24 PROCEDURE — 1124F ACP DISCUSS-NO DSCNMKR DOCD: CPT | Performed by: NURSE PRACTITIONER

## 2024-04-24 PROCEDURE — G8417 CALC BMI ABV UP PARAM F/U: HCPCS | Performed by: NURSE PRACTITIONER

## 2024-04-24 RX ORDER — FUROSEMIDE 40 MG/1
40 TABLET ORAL DAILY
Qty: 90 TABLET | Refills: 3 | Status: SHIPPED | OUTPATIENT
Start: 2024-04-24

## 2024-04-24 RX ORDER — METOPROLOL SUCCINATE 25 MG/1
25 TABLET, EXTENDED RELEASE ORAL DAILY
Qty: 90 TABLET | Refills: 3 | Status: SHIPPED | OUTPATIENT
Start: 2024-04-24

## 2024-04-24 RX ORDER — POTASSIUM CHLORIDE 20 MEQ/1
20 TABLET, EXTENDED RELEASE ORAL DAILY
Qty: 90 TABLET | Refills: 3 | Status: SHIPPED | OUTPATIENT
Start: 2024-04-24

## 2024-04-24 ASSESSMENT — ENCOUNTER SYMPTOMS
SHORTNESS OF BREATH: 0
ABDOMINAL DISTENTION: 0
COUGH: 0

## 2024-04-24 NOTE — PROGRESS NOTES
Heart Failure Clinic       Visit Date: 4/24/2024  Cardiologist:  Dr. López = Conrad  Primary Care Physician: Kiran Smith DO  Referred by: Conrad    Hui Dean is a 68 y.o. female who presents today for:  Chief Complaint   Patient presents with    Follow-up       HPI:   Hui Dean is a 68 y.o. female who presents to the office for a patient visit in the heart failure clinic.  Accompanied by no one    TYPE HF: Diastolic   Cause:   Valves:  mod-severe AI, mod MR   Device: no  HX:  Iron deficiency anemia (Dr. Solano), GIB (Dr. Hernadez), HTN, CKD stage III, Depression, RLS, CVA on 3/23/2015, COPD Stage II , Erosive esophagitis, PAD, Former smoker, HLD, DM II, Fibromyalgia, GERD, SAH on 3/23/2015, Obesity, THAYER, OA, B12 deficiency, PAF s/p Watchman FLX 27mm (11/2/2023)   AVR x 2 >> 6/2/2015 - Placed by Dr. Kush LUQUE at with a 23mm Tirfecta Tissue Aortic Valve          7/13/2018 - Dr Pineda - AVR (21mm [employer] Trifecta), MV repair (28mm [employer] Physio annuloplasty band), TV repair (28mm Durham MC3 annuloplasty band)       2024 - s/p Lilly TAVR for severe AI    Dry Wt:  175-180    1/2024: 183#  admitted several times for GI bleed, got WATCHMAN in nov.  Symptoms continued = VALIENTE/SOB, tired.  Worsening past 4-5 months.  Hgb is improved.  She felt it was heart related entire time.   Denies dizziness, lightheadedness.  Denies CP.  No swelling. Some bloating.   Wts stable.   Sleeping in chair past 4-5 mths  BP good - 110-130s, HR running  - feels palpitations, racing at times.  >> increase Lasix 40/day.  Scheduled DCCV 1/22 2/2024 - 189#  Wt usually around 175#  EKG today - NSR  Last week had Fe infusion - felt worse after  Blood in stool subsided since stopping Eliquis  Feeling very bloated.  Sleeping in chair past week - \"can't breath\"   >> IV Lasix 60 IV given.  Increased Lasix to 40 BID    3/2024 - 182#  Doing better = does not feel like retaining   Around \"100%\" improved - still

## 2024-04-24 NOTE — PATIENT INSTRUCTIONS
You may receive a survey regarding the care you received during your visit.  Your input is valuable to us.  We encourage you to complete and return your survey.  We hope you will choose us in the future for your healthcare needs.    Your nurses today were Akbar.  Office hours:   Mon-Thurs 8-4:30  Friday 8-12  Phone: [phone]    Continue:  Continue current medications  Daily weights and record  Fluid restriction of 2 Liters per day  Limit sodium in diet to around 4544-9018 mg/day  Monitor BP  Activity as tolerated     Call the Heart Failure Clinic for any of the following symptoms:   Weight gain of 3 pounds in 1 day or 5 pounds in 1 week  Increased shortness of breath  Shortness of breath while laying down  Cough  Chest pain  Swelling in feet, ankles or legs  Bloating in abdomen  Fatigue

## 2024-05-02 ENCOUNTER — TELEPHONE (OUTPATIENT)
Dept: CARDIOLOGY CLINIC | Age: 69
End: 2024-05-02

## 2024-05-02 NOTE — TELEPHONE ENCOUNTER
PRAVEENA for patient to call office back.    Received clearance request from Dr. Vazquez office.  Can patient come in for an appointment on 5-6-24 at 1:30 pm?  HR was low during Dr. Vazquez office visit 43 and 46.

## 2024-05-03 ENCOUNTER — TELEPHONE (OUTPATIENT)
Dept: CARDIOLOGY CLINIC | Age: 69
End: 2024-05-03

## 2024-05-03 DIAGNOSIS — I48.0 PAROXYSMAL A-FIB (HCC): Primary | ICD-10-CM

## 2024-05-03 NOTE — TELEPHONE ENCOUNTER
This patient has completed 6 months of DAPT post WATCHMAN. According to the Watchman pharmacological guidelines it is recommended to stop Plavix and continue Aspirin 81 mg daily. After reviewing the patient's chart there is no further indication to continue Plavix. Dr. Martines are you ok with stopping Plavix and continuing Aspirin 81 mg daily?

## 2024-05-06 ENCOUNTER — TELEPHONE (OUTPATIENT)
Dept: FAMILY MEDICINE CLINIC | Age: 69
End: 2024-05-06

## 2024-05-06 ENCOUNTER — TELEPHONE (OUTPATIENT)
Dept: CARDIOLOGY CLINIC | Age: 69
End: 2024-05-06

## 2024-05-06 ENCOUNTER — OFFICE VISIT (OUTPATIENT)
Dept: CARDIOLOGY CLINIC | Age: 69
End: 2024-05-06
Payer: MEDICARE

## 2024-05-06 VITALS
DIASTOLIC BLOOD PRESSURE: 60 MMHG | HEIGHT: 63 IN | WEIGHT: 187 LBS | BODY MASS INDEX: 33.13 KG/M2 | HEART RATE: 60 BPM | SYSTOLIC BLOOD PRESSURE: 142 MMHG

## 2024-05-06 DIAGNOSIS — R00.1 BRADYCARDIA: ICD-10-CM

## 2024-05-06 DIAGNOSIS — Z95.2 HISTORY OF TRANSCATHETER AORTIC VALVE REPLACEMENT (TAVR): Primary | ICD-10-CM

## 2024-05-06 DIAGNOSIS — G25.81 RLS (RESTLESS LEGS SYNDROME): Primary | Chronic | ICD-10-CM

## 2024-05-06 DIAGNOSIS — I10 PRIMARY HYPERTENSION: ICD-10-CM

## 2024-05-06 PROCEDURE — 93000 ELECTROCARDIOGRAM COMPLETE: CPT | Performed by: NUCLEAR MEDICINE

## 2024-05-06 PROCEDURE — 1036F TOBACCO NON-USER: CPT | Performed by: NUCLEAR MEDICINE

## 2024-05-06 PROCEDURE — 1124F ACP DISCUSS-NO DSCNMKR DOCD: CPT | Performed by: NUCLEAR MEDICINE

## 2024-05-06 PROCEDURE — 3017F COLORECTAL CA SCREEN DOC REV: CPT | Performed by: NUCLEAR MEDICINE

## 2024-05-06 PROCEDURE — G8417 CALC BMI ABV UP PARAM F/U: HCPCS | Performed by: NUCLEAR MEDICINE

## 2024-05-06 PROCEDURE — 1090F PRES/ABSN URINE INCON ASSESS: CPT | Performed by: NUCLEAR MEDICINE

## 2024-05-06 PROCEDURE — 3078F DIAST BP <80 MM HG: CPT | Performed by: NUCLEAR MEDICINE

## 2024-05-06 PROCEDURE — G8427 DOCREV CUR MEDS BY ELIG CLIN: HCPCS | Performed by: NUCLEAR MEDICINE

## 2024-05-06 PROCEDURE — 99214 OFFICE O/P EST MOD 30 MIN: CPT | Performed by: NUCLEAR MEDICINE

## 2024-05-06 PROCEDURE — 1111F DSCHRG MED/CURRENT MED MERGE: CPT | Performed by: NUCLEAR MEDICINE

## 2024-05-06 PROCEDURE — G8399 PT W/DXA RESULTS DOCUMENT: HCPCS | Performed by: NUCLEAR MEDICINE

## 2024-05-06 PROCEDURE — 3077F SYST BP >= 140 MM HG: CPT | Performed by: NUCLEAR MEDICINE

## 2024-05-06 RX ORDER — ROPINIROLE 3 MG/1
3 TABLET, FILM COATED ORAL 2 TIMES DAILY
Qty: 60 TABLET | Refills: 0 | Status: SHIPPED | OUTPATIENT
Start: 2024-05-06

## 2024-05-06 NOTE — TELEPHONE ENCOUNTER
Got it  I bet it's because of the blood she lost after her valve surgery  We know lower iron can cause and exacerbate RLS.    I'm ok to inc the dose to 3 mg twice daily for the next 30 days and then see if we can back down again.     Will call her in 4 wks    Let me know if questions, thanks!     Diagnosis Orders   1. RLS (restless legs syndrome)  rOPINIRole (REQUIP) 3 MG tablet          Medications Discontinued During This Encounter   Medication Reason    rOPINIRole (REQUIP) 2 MG tablet        Future Appointments   Date Time Provider Department Center   5/9/2024 10:20 AM STR CT IMAGING RM1  [facility] EXP STR Rad/Card   5/9/2024 11:00 AM STR ECHO 2 [facility] STR Rad/Card   5/14/2024 10:00 AM Sonny Martines MD N [facility] [place]   5/16/2024 10:30 AM STR CARDIAC MONITOR [facility] STR Rad/Card   6/18/2024 11:00 AM Maryellen Solano MD N Oncology [facility] [place]   7/9/2024  8:00 AM STR ECHO 2 [facility] STR Rad/Card   7/18/2024 11:15 AM Sonny Martines MD N [facility] [place]   8/19/2024  8:00 AM Kiran Sharma, DO [facility] [place]   8/19/2024  9:00 AM Kiran Sharma, DO Fam [facility] [place]   10/16/2024  8:00 AM Lorna Faulkner APRN - CNP N [facility] CHF [facility] [place]   11/6/2024  9:30 AM HempRebecca reno APRN - CNP N [facility] Heart [facility] [place]   1/7/2025 10:00 AM Kenneth Hernandez MD N W[facility]AK HEAR [facility] [place]   4/9/2025 11:00 AM STR ECHO 1 [facility] STR Rad/Card   4/15/2025 10:00 AM HemRebecca guerrero APRN - CNP N [facility] [place]

## 2024-05-06 NOTE — TELEPHONE ENCOUNTER
1 year post Watchman WILBERTO scheduled for 10/30/24 at 3:30pm with Dr. López, patient to arrive at 2:00pm.  1 year appt with Rebecca scheduled for 11/6/24 at 9:30am.     Patient was given all instructions. Instructions also given to patient as hard copy.

## 2024-05-06 NOTE — TELEPHONE ENCOUNTER
Pt was seen by Dr López today   Pt needs eye sx with Dr Taylor mendez from your standpoint can pt be clear   S/p TARV 4/9    Form in Dr López box   Please send form and this encounter with form

## 2024-05-06 NOTE — TELEPHONE ENCOUNTER
Discussed with patient about discontinuing her Plavix and continuing on an 81 mg Aspirin daily. Patient had no concerns at this time.    Amaris can you schedule the patient's one year appointments please?

## 2024-05-06 NOTE — TELEPHONE ENCOUNTER
Pt states her legs ache so bad mostly at night     sxs started Saturday night, prior to that the medication was helping with the sxs

## 2024-05-06 NOTE — TELEPHONE ENCOUNTER
Pt states the medication ropinirole is not helping with her restless legs-she can't sleep at night

## 2024-05-06 NOTE — PROGRESS NOTES
[facility] [place] [facility]  [facility] CARDIOLOGY  [address]  [address]  [facility] [address]  Dept: [phone]  Dept Fax: [phone]  Loc: [phone]    Visit Date: 5/6/2024    Hui Dean is a 68 y.o. female who presents todayfor:  Chief Complaint   Patient presents with    Follow-up    Hypertension    Valvular Heart Disease    Atrial Fibrillation    Bradycardia   Had valve in valve TAVR for AI  Known MVR as well  No chest pain   No changes in breathing  Going for cataract surgery   Noticed to have low HR  She doesn't have much symptoms  No dizziness  No syncope  BP seems stable     HPI:  HPI  Past Medical History:   Diagnosis Date    Atrial fibrillation (HCC)     COPD (chronic obstructive pulmonary disease) (HCC)     DM2 (diabetes mellitus, type 2) (HCC)     Dyslipidemia     Fibromyalgia     Former smoker     GERD (gastroesophageal reflux disease)     H/O prosthetic aortic valve replacement     Early bioprosthetic aortic valve failure with severe symptomatic prosthetic aortic regurgitation is now s/p REDO AORTIC VALVE REPLACEMENT, MITRAL VALVE REPAIR, TRICUSPID VALVE REPAIR, WILBERTO performed by Santo Pineda MD at [facility] OR    Heart failure with preserved ejection fraction (HCC)     History of aortic stenosis, s/p valve replacement x 2     History of iron deficiency anemia     History of subarachnoid hemorrhage 03/23/2015    Spontaneous SAH. Admitted to [facility]. Extensive w/u for cause was negative.     Hypertension, essential     Major depression     THAYER (nonalcoholic steatohepatitis)     Osteoarthritis     Presence of Watchman left atrial appendage closure device     RLS (restless legs syndrome) 01/16/2019    S/P mitral valve repair 07/13/2018    Dr. Pineda    Valvular heart disease 01/16/2019      Past Surgical History:   Procedure Laterality Date    AORTIC VALVE REPLACEMENT      cow valve    BRAIN SURGERY      to drain blood    CARDIAC CATHETERIZATION  2004 or 2005    [facility]    CARDIAC

## 2024-05-06 NOTE — TELEPHONE ENCOUNTER
Please be more specific about what symptoms she is having that keep her from sleeping as well as when did her symptoms become uncontrolled?

## 2024-05-07 RX ORDER — CLOPIDOGREL BISULFATE 75 MG/1
75 TABLET ORAL DAILY
Qty: 90 TABLET | Refills: 1 | Status: SHIPPED | OUTPATIENT
Start: 2024-05-07

## 2024-05-07 NOTE — TELEPHONE ENCOUNTER
After discussing this case further patient just had a TAVR 4/9/24. Patient needs to CONTINUE her Plavix and Aspirin. Called patient and she is going to continue at this time. New script will be placed

## 2024-05-09 ENCOUNTER — HOSPITAL ENCOUNTER (OUTPATIENT)
Dept: CT IMAGING | Age: 69
Discharge: HOME | End: 2024-05-09
Attending: FAMILY MEDICINE
Payer: MEDICARE

## 2024-05-09 ENCOUNTER — HOSPITAL ENCOUNTER (OUTPATIENT)
Age: 69
Discharge: HOME | End: 2024-05-11
Attending: INTERNAL MEDICINE
Payer: MEDICARE

## 2024-05-09 ENCOUNTER — HOSPITAL ENCOUNTER (OUTPATIENT)
Age: 69
Discharge: HOME | End: 2024-05-09
Attending: FAMILY MEDICINE
Payer: MEDICARE

## 2024-05-09 VITALS
BODY MASS INDEX: 34.41 KG/M2 | DIASTOLIC BLOOD PRESSURE: 60 MMHG | HEIGHT: 62 IN | SYSTOLIC BLOOD PRESSURE: 142 MMHG | WEIGHT: 187 LBS

## 2024-05-09 DIAGNOSIS — I35.0 NONRHEUMATIC AORTIC VALVE STENOSIS: ICD-10-CM

## 2024-05-09 DIAGNOSIS — Z95.2 S/P AVR: ICD-10-CM

## 2024-05-09 DIAGNOSIS — R91.1 PULMONARY NODULE: ICD-10-CM

## 2024-05-09 LAB
ANION GAP SERPL CALC-SCNC: 12 MEQ/L (ref 8–16)
BUN SERPL-MCNC: 13 MG/DL (ref 7–22)
CALCIUM SERPL-MCNC: 9.3 MG/DL (ref 8.5–10.5)
CHLORIDE SERPL-SCNC: 101 MEQ/L (ref 98–111)
CO2 SERPL-SCNC: 24 MEQ/L (ref 23–33)
CREAT SERPL-MCNC: 0.9 MG/DL (ref 0.4–1.2)
DEPRECATED RDW RBC AUTO: 45.1 FL (ref 35–45)
ECHO AO ASC DIAM: 3.5 CM
ECHO AO ASCENDING AORTA INDEX: 1.88 CM/M2
ECHO AV AREA PEAK VELOCITY: 1.3 CM2
ECHO AV AREA VTI: 1.4 CM2
ECHO AV AREA/BSA PEAK VELOCITY: 0.7 CM2/M2
ECHO AV AREA/BSA VTI: 0.8 CM2/M2
ECHO AV MEAN GRADIENT: 17 MMHG
ECHO AV MEAN VELOCITY: 1.9 M/S
ECHO AV PEAK GRADIENT: 29 MMHG
ECHO AV PEAK VELOCITY: 2.7 M/S
ECHO AV VELOCITY RATIO: 0.44
ECHO AV VTI: 72.7 CM
ECHO BSA: 1.93 M2
ECHO LA AREA 2C: 21.3 CM2
ECHO LA AREA 4C: 19.8 CM2
ECHO LA DIAMETER INDEX: 2.47 CM/M2
ECHO LA DIAMETER: 4.6 CM
ECHO LA MAJOR AXIS: 5.1 CM
ECHO LA MINOR AXIS: 5.4 CM
ECHO LA VOL BP: 66 ML (ref 22–52)
ECHO LA VOL MOD A2C: 69 ML (ref 22–52)
ECHO LA VOL MOD A4C: 60 ML (ref 22–52)
ECHO LA VOL/BSA BIPLANE: 35 ML/M2 (ref 16–34)
ECHO LA VOLUME INDEX MOD A2C: 37 ML/M2 (ref 16–34)
ECHO LA VOLUME INDEX MOD A4C: 32 ML/M2 (ref 16–34)
ECHO LV E' LATERAL VELOCITY: 5 CM/S
ECHO LV E' SEPTAL VELOCITY: 5 CM/S
ECHO LV FRACTIONAL SHORTENING: 29 % (ref 28–44)
ECHO LV INTERNAL DIMENSION DIASTOLE INDEX: 2.74 CM/M2
ECHO LV INTERNAL DIMENSION DIASTOLIC: 5.1 CM (ref 3.9–5.3)
ECHO LV INTERNAL DIMENSION SYSTOLIC INDEX: 1.94 CM/M2
ECHO LV INTERNAL DIMENSION SYSTOLIC: 3.6 CM
ECHO LV ISOVOLUMETRIC RELAXATION TIME (IVRT): 85 MS
ECHO LV IVSD: 1.1 CM (ref 0.6–0.9)
ECHO LV MASS 2D: 200.8 G (ref 67–162)
ECHO LV MASS INDEX 2D: 107.9 G/M2 (ref 43–95)
ECHO LV POSTERIOR WALL DIASTOLIC: 1 CM (ref 0.6–0.9)
ECHO LV RELATIVE WALL THICKNESS RATIO: 0.39
ECHO LVOT AREA: 2.8 CM2
ECHO LVOT AV VTI INDEX: 0.48
ECHO LVOT DIAM: 1.9 CM
ECHO LVOT MEAN GRADIENT: 4 MMHG
ECHO LVOT PEAK GRADIENT: 6 MMHG
ECHO LVOT PEAK VELOCITY: 1.2 M/S
ECHO LVOT STROKE VOLUME INDEX: 53.2 ML/M2
ECHO LVOT SV: 98.9 ML
ECHO LVOT VTI: 34.9 CM
ECHO MV A VELOCITY: 0.89 M/S
ECHO MV AREA VTI: 1.2 CM2
ECHO MV E DECELERATION TIME (DT): 532 MS
ECHO MV E VELOCITY: 1.82 M/S
ECHO MV E/A RATIO: 2.04
ECHO MV E/E' LATERAL: 36.4
ECHO MV E/E' RATIO (AVERAGED): 36.4
ECHO MV E/E' SEPTAL: 36.4
ECHO MV LVOT VTI INDEX: 2.42
ECHO MV MAX VELOCITY: 1.9 M/S
ECHO MV MEAN GRADIENT: 3 MMHG
ECHO MV MEAN VELOCITY: 0.7 M/S
ECHO MV PEAK GRADIENT: 14 MMHG
ECHO MV REGURGITANT PEAK GRADIENT: 85 MMHG
ECHO MV REGURGITANT PEAK VELOCITY: 4.6 M/S
ECHO MV VTI: 84.3 CM
ECHO PV MAX VELOCITY: 1.2 M/S
ECHO PV PEAK GRADIENT: 6 MMHG
ECHO RV INTERNAL DIMENSION: 3.2 CM
ECHO TV E WAVE: 0.9 M/S
ECHO TV REGURGITANT MAX VELOCITY: 3.08 M/S
ECHO TV REGURGITANT PEAK GRADIENT: 38 MMHG
ERYTHROCYTE [DISTWIDTH] IN BLOOD BY AUTOMATED COUNT: 13.5 % (ref 11.5–14.5)
GFR SERPL CREATININE-BSD FRML MDRD: 69 ML/MIN/1.73M2
GLUCOSE SERPL-MCNC: 111 MG/DL (ref 70–108)
HCT VFR BLD AUTO: 29.8 % (ref 37–47)
HGB BLD-MCNC: 9.6 GM/DL (ref 12–16)
MCH RBC QN AUTO: 29.3 PG (ref 26–33)
MCHC RBC AUTO-ENTMCNC: 32.2 GM/DL (ref 32.2–35.5)
MCV RBC AUTO: 90.9 FL (ref 81–99)
PLATELET # BLD AUTO: 169 THOU/MM3 (ref 130–400)
PMV BLD AUTO: 11.1 FL (ref 9.4–12.4)
POTASSIUM SERPL-SCNC: 4.4 MEQ/L (ref 3.5–5.2)
RBC # BLD AUTO: 3.28 MILL/MM3 (ref 4.2–5.4)
SODIUM SERPL-SCNC: 137 MEQ/L (ref 135–145)
WBC # BLD AUTO: 6.9 THOU/MM3 (ref 4.8–10.8)

## 2024-05-09 PROCEDURE — 71260 CT THORAX DX C+: CPT

## 2024-05-09 PROCEDURE — 6360000004 HC RX CONTRAST MEDICATION: Performed by: FAMILY MEDICINE

## 2024-05-09 PROCEDURE — 36415 COLL VENOUS BLD VENIPUNCTURE: CPT

## 2024-05-09 PROCEDURE — 80048 BASIC METABOLIC PNL TOTAL CA: CPT

## 2024-05-09 PROCEDURE — 85027 COMPLETE CBC AUTOMATED: CPT

## 2024-05-09 PROCEDURE — 93306 TTE W/DOPPLER COMPLETE: CPT | Performed by: INTERNAL MEDICINE

## 2024-05-09 PROCEDURE — 93306 TTE W/DOPPLER COMPLETE: CPT

## 2024-05-09 RX ADMIN — IOPAMIDOL 80 ML: 755 INJECTION, SOLUTION INTRAVENOUS at 10:01

## 2024-05-10 ENCOUNTER — TELEPHONE (OUTPATIENT)
Dept: FAMILY MEDICINE CLINIC | Age: 69
End: 2024-05-10

## 2024-05-10 NOTE — TELEPHONE ENCOUNTER
----- Message from Kiran Sharma,  sent at 5/10/2024  6:45 AM EDT -----  Please let pt know that CT chest stable  Recommend repeat in 9 months  Will call to get done as time gets closer  Let me know if questions, thanks!

## 2024-05-14 ENCOUNTER — TELEPHONE (OUTPATIENT)
Dept: CARDIOLOGY CLINIC | Age: 69
End: 2024-05-14

## 2024-05-14 ENCOUNTER — OFFICE VISIT (OUTPATIENT)
Dept: CARDIOLOGY CLINIC | Age: 69
End: 2024-05-14
Payer: MEDICARE

## 2024-05-14 VITALS
SYSTOLIC BLOOD PRESSURE: 122 MMHG | HEART RATE: 60 BPM | BODY MASS INDEX: 33.35 KG/M2 | DIASTOLIC BLOOD PRESSURE: 72 MMHG | WEIGHT: 188.2 LBS | HEIGHT: 63 IN

## 2024-05-14 DIAGNOSIS — Z95.2 S/P TAVR (TRANSCATHETER AORTIC VALVE REPLACEMENT): Primary | ICD-10-CM

## 2024-05-14 PROCEDURE — 3078F DIAST BP <80 MM HG: CPT | Performed by: INTERNAL MEDICINE

## 2024-05-14 PROCEDURE — 99213 OFFICE O/P EST LOW 20 MIN: CPT | Performed by: INTERNAL MEDICINE

## 2024-05-14 PROCEDURE — 3074F SYST BP LT 130 MM HG: CPT | Performed by: INTERNAL MEDICINE

## 2024-05-14 PROCEDURE — G8417 CALC BMI ABV UP PARAM F/U: HCPCS | Performed by: INTERNAL MEDICINE

## 2024-05-14 PROCEDURE — G8399 PT W/DXA RESULTS DOCUMENT: HCPCS | Performed by: INTERNAL MEDICINE

## 2024-05-14 PROCEDURE — G8427 DOCREV CUR MEDS BY ELIG CLIN: HCPCS | Performed by: INTERNAL MEDICINE

## 2024-05-14 PROCEDURE — 3017F COLORECTAL CA SCREEN DOC REV: CPT | Performed by: INTERNAL MEDICINE

## 2024-05-14 PROCEDURE — 1036F TOBACCO NON-USER: CPT | Performed by: INTERNAL MEDICINE

## 2024-05-14 PROCEDURE — 93000 ELECTROCARDIOGRAM COMPLETE: CPT | Performed by: INTERNAL MEDICINE

## 2024-05-14 PROCEDURE — 1124F ACP DISCUSS-NO DSCNMKR DOCD: CPT | Performed by: INTERNAL MEDICINE

## 2024-05-14 PROCEDURE — 1090F PRES/ABSN URINE INCON ASSESS: CPT | Performed by: INTERNAL MEDICINE

## 2024-05-14 NOTE — PROGRESS NOTES
[facility] [place] [facility]  [facility] CARDIOLOGY  [address]  [address]  [facility] [address]  Dept: [phone]  Dept Fax: [phone]  Loc: [phone]    Visit Date: 5/14/2024    Ms. Dean is a 68 y.o. female  who presented for:  Chief Complaint   Patient presents with    Follow-up    Atrial Fibrillation       HPI:   HPI   67 yo F f/u TAVR 30 day.  Has [facility].  No chest pain, angina, VALIENTE, orthopnea, PND, sob at rest, palpitations, LE edema, or syncope.    TTE shows excellent function, no significant valve issues.  She is on DAPT.  She had Lilly TAVR.  She uses Lasix daily for weight and swelling.        Current Outpatient Medications:     clopidogrel (PLAVIX) 75 MG tablet, Take 1 tablet by mouth daily, Disp: 90 tablet, Rfl: 1    rOPINIRole (REQUIP) 3 MG tablet, Take 1 tablet by mouth 2 times daily, Disp: 60 tablet, Rfl: 0    furosemide (LASIX) 40 MG tablet, Take 1 tablet by mouth daily, Disp: 90 tablet, Rfl: 3    potassium chloride (KLOR-CON M) 20 MEQ extended release tablet, Take 1 tablet by mouth daily, Disp: 90 tablet, Rfl: 3    metoprolol succinate (TOPROL XL) 25 MG extended release tablet, Take 1 tablet by mouth daily, Disp: 90 tablet, Rfl: 3    lisinopril (PRINIVIL;ZESTRIL) 5 MG tablet, Take 1 tablet by mouth 2 times daily, Disp: 180 tablet, Rfl: 1    atorvastatin (LIPITOR) 10 MG tablet, TAKE 1 TABLET EVERY EVENING, Disp: 90 tablet, Rfl: 3    citalopram (CELEXA) 20 MG tablet, TAKE 1 TABLET EVERY DAY, Disp: 90 tablet, Rfl: 3    aspirin 81 MG chewable tablet, Take 1 tablet by mouth daily, Disp: 30 tablet, Rfl: 3    folic acid (FOLVITE) 1 MG tablet, Take 1 tablet by mouth daily, Disp: 90 tablet, Rfl: 1    albuterol sulfate HFA (PROVENTIL;VENTOLIN;PROAIR) 108 (90 Base) MCG/ACT inhaler, INHALE 2 PUFFS EVERY 4 HOURS AS NEEDED FOR WHEEZING OR FOR SHORTNESS OF BREATH, Disp: 2 each, Rfl: 5    pantoprazole (PROTONIX) 40 MG tablet, Take 1 tablet by mouth 2 times daily (before

## 2024-05-14 NOTE — PATIENT INSTRUCTIONS
Your nurses today were DIYA Connell and CODIE Capellan  Your provider today was Dr. Martines  Phone number: [phone]       You may receive a survey regarding the care you received during your visit.  Your input is valuable to us.  We encourage you to complete and return your survey.  We hope you will choose us in the future for your healthcare needs.

## 2024-05-31 DIAGNOSIS — G25.81 RLS (RESTLESS LEGS SYNDROME): Chronic | ICD-10-CM

## 2024-05-31 NOTE — TELEPHONE ENCOUNTER
Recent Visits  Date Type Provider Dept   04/16/24 Office Visit Kiran Sharma, DO [facility]   02/19/24 Office Visit Kiran Sharma, DO [facility]   10/17/23 Office Visit Kiran Sharma, DO [facility]   09/12/23 Office Visit Kiran Sharma, DO [facility]   08/22/23 Office Visit Kiran Sharma, DO [facility]   06/20/23 Office Visit Kiran Sharma, DO [facility]   05/02/23 Office Visit Kiran Sharma, DO [facility]   04/20/23 Office Visit Kiran Sharma, DO [facility]   02/21/23 Office Visit Kiran Sharma, DO [facility]   01/31/23 Office Visit Kiran Sharma, DO [facility]   Showing recent visits within past 540 days with a meds authorizing provider and meeting all other requirements  Future Appointments  Date Type Provider Dept   08/19/24 Appointment Kiran Shamra, DO [facility]   08/19/24 Appointment Kiran Sharma, DO [facility]   Showing future appointments within next 150 days with a meds authorizing provider and meeting all other requirements

## 2024-06-03 ENCOUNTER — TELEPHONE (OUTPATIENT)
Dept: FAMILY MEDICINE CLINIC | Age: 69
End: 2024-06-03

## 2024-06-03 NOTE — TELEPHONE ENCOUNTER
----- Message from Kiran Sharma DO sent at 6/2/2024 10:53 AM EDT -----  Please call pt and see how her restless leg symptoms are doing on 3mg bid?  Let me know. Thanks!

## 2024-06-17 ENCOUNTER — NURSE ONLY (OUTPATIENT)
Dept: LAB | Age: 69
End: 2024-06-17

## 2024-06-17 DIAGNOSIS — Z86.2 HISTORY OF IRON DEFICIENCY ANEMIA: ICD-10-CM

## 2024-06-17 LAB
BASOPHILS ABSOLUTE: 0 THOU/MM3 (ref 0–0.1)
BASOPHILS NFR BLD AUTO: 0.3 %
DEPRECATED RDW RBC AUTO: 46.5 FL (ref 35–45)
EOSINOPHIL NFR BLD AUTO: 1.6 %
EOSINOPHILS ABSOLUTE: 0.1 THOU/MM3 (ref 0–0.4)
ERYTHROCYTE [DISTWIDTH] IN BLOOD BY AUTOMATED COUNT: 14.3 % (ref 11.5–14.5)
HCT VFR BLD AUTO: 32.7 % (ref 37–47)
HGB BLD-MCNC: 10 GM/DL (ref 12–16)
IMM GRANULOCYTES # BLD AUTO: 0.02 THOU/MM3 (ref 0–0.07)
IMM GRANULOCYTES NFR BLD AUTO: 0.3 %
IRON SATN MFR SERPL: 13 % (ref 20–50)
IRON SERPL-MCNC: 45 UG/DL (ref 50–170)
LYMPHOCYTES ABSOLUTE: 1.7 THOU/MM3 (ref 1–4.8)
LYMPHOCYTES NFR BLD AUTO: 24.8 %
MCH RBC QN AUTO: 27.6 PG (ref 26–33)
MCHC RBC AUTO-ENTMCNC: 30.6 GM/DL (ref 32.2–35.5)
MCV RBC AUTO: 90.3 FL (ref 81–99)
MONOCYTES ABSOLUTE: 0.5 THOU/MM3 (ref 0.4–1.3)
MONOCYTES NFR BLD AUTO: 7.3 %
NEUTROPHILS ABSOLUTE: 4.4 THOU/MM3 (ref 1.8–7.7)
NEUTROPHILS NFR BLD AUTO: 65.7 %
NRBC BLD AUTO-RTO: 0 /100 WBC
PLATELET # BLD AUTO: 187 THOU/MM3 (ref 130–400)
PMV BLD AUTO: 11.6 FL (ref 9.4–12.4)
RBC # BLD AUTO: 3.62 MILL/MM3 (ref 4.2–5.4)
TIBC SERPL-MCNC: 344 UG/DL (ref 171–450)
WBC # BLD AUTO: 6.7 THOU/MM3 (ref 4.8–10.8)

## 2024-06-17 RX ORDER — ROPINIROLE 3 MG/1
3 TABLET, FILM COATED ORAL 2 TIMES DAILY
Qty: 60 TABLET | Refills: 0 | OUTPATIENT
Start: 2024-06-17

## 2024-06-22 DIAGNOSIS — D50.9 IRON DEFICIENCY ANEMIA, UNSPECIFIED IRON DEFICIENCY ANEMIA TYPE: ICD-10-CM

## 2024-06-22 DIAGNOSIS — G47.00 INSOMNIA, UNSPECIFIED TYPE: ICD-10-CM

## 2024-06-22 DIAGNOSIS — K27.9 PUD (PEPTIC ULCER DISEASE): ICD-10-CM

## 2024-06-22 DIAGNOSIS — K92.2 UPPER GI BLEED: ICD-10-CM

## 2024-06-22 NOTE — PROGRESS NOTES
Blood glucose low, 500ml D5 started.   Chief Complaint   Patient presents with    Cough     started friday  - non productive     Shortness of Breath     on and off with activity     History obtained from the patient. SUBJECTIVE:  Ann Shin is a 76 y.o. female that presents today for      -URI type sxs:   Started Friday Thursday was cleaning her camper and inhaled a lot of cleaning fumes  Since then inc cough  SOB with exertion  No CP, orthopnea or PND  Some wheezing  Hx of COPd by PFT, never on meds, usually causes no trouble  No fever, body aches or loss of taste/smell    Fever - No  Runny nose or congestion -  No   Cough -  Yes  Sore throat -  No  Headache, fatigue, joint pains, muscle aches -  No  Double Sickening - No  Shortness of breath/Wheezing? -  As above  Nausea/Vomiting/Diarrhea?   No  Sick contacts - No  Maxillary Tooth Pain -  No  Treatment tried and response - otc meds, no better      Age/Gender Health Maintenance    Lipid -   Lab Results   Component Value Date    CHOL 181 12/02/2020    CHOL 200 (H) 12/30/2019    CHOL 184 01/24/2019     Lab Results   Component Value Date    TRIG 178 12/02/2020    TRIG 236 (H) 12/30/2019    TRIG 155 01/24/2019     Lab Results   Component Value Date    HDL 43 12/02/2020    HDL 47 12/30/2019    HDL 53 01/24/2019     Lab Results   Component Value Date    LDLCALC 102 12/02/2020    LDLCALC 106 12/30/2019    LDLCALC 100 01/24/2019       Colon Cancer Screening - Completed NOV 2020, 1 polyp per pt, repeat 3 years, records pending  Lung Cancer Screening (Age 54 to [de-identified] with 30 pack year hx, current smoker or quit within past 15 years) - <30 pack year hx    Tetanus - records pending  Influenza Vaccine - UTD DEC 2020  Pneumonia Vaccine - UTD PPV 23 in OCT 2017  Zoster - to get at pharmacy per medicare rules     Breast Cancer Screening - NEG JAN 2020  Cervical Cancer Screening - NEG FEB 2020  Osteoporosis Screening - due, ordered    Diabetes Health Maintenance    A1C -   Lab Results   Component Value Date 55   Resp: 14   Temp: 97.8 °F (36.6 °C)   TempSrc: Oral   SpO2: 99%     There is no height or weight on file to calculate BMI. Pain Score:   0 - No pain    VS Reviewed  General Appearance: A&O x 3, No acute distress,well developed and well- nourished  Eyes: pupils equal, round, and reactive to light, extraocular eye movements intact, conjunctivae and eye lids without erythema  ENT: external ear and ear canal clear bilaterally, TMs intact and regular, nose without deformity, nasal mucosa and turbinates normal without polyps, oropharynx normal, dentition is normal for age  Neck: supple and non-tender without mass, no thyromegaly or thyroid nodules, no cervical lymphadenopathy  Pulmonary/Chest: good air movement bilaterally. Scattered wheezing. No rhonchi or crackles. No accessory  Muscle use. No distress. Cardiovascular: S1 and S2 auscultated w/ RRR. No murmurs appreciated today. No rubs, clicks, or gallops, distal pulses intact. Abdomen: soft, non-tender, non-distended, bowel sounds physiologic,  no rebound or guarding, no masses or hernias noted. Liver and spleen without enlargement. Extremities: no cyanosis, clubbing or edema of the lower extremities. +2 PT/DP bilaterally. Skin: warm and dry, no rash or erythema      ASSESSMENT & PLAN  1. Bronchitis    Bronchitis with AE COPD  Likely from irritation from chemicals  However, need to r/o covid as well    VSS  Exam reassuring    Plan:  Fluids, rest, tylenol  Start augmentin  Prn alb  pred burst    Get cxr and covid swab. Isolate pending results of covid  F/u if no better  Reviewed ER precautions, pt understands. - albuterol sulfate HFA (PROVENTIL HFA) 108 (90 Base) MCG/ACT inhaler; Inhale 2 puffs into the lungs every 4 hours as needed for Wheezing or Shortness of Breath  Dispense: 1 Inhaler; Refill: 0  - predniSONE (DELTASONE) 20 MG tablet; Take 2 tablets by mouth daily for 5 days  Dispense: 10 tablet;  Refill: 0  - amoxicillin-clavulanate (AUGMENTIN) 875-125 MG per tablet; Take 1 tablet by mouth 2 times daily for 10 days  Dispense: 20 tablet; Refill: 0  - XR CHEST (2 VW); Future  - COVID-19; Future    2. Chronic obstructive pulmonary disease with acute exacerbation (HCC)    - albuterol sulfate HFA (PROVENTIL HFA) 108 (90 Base) MCG/ACT inhaler; Inhale 2 puffs into the lungs every 4 hours as needed for Wheezing or Shortness of Breath  Dispense: 1 Inhaler; Refill: 0  - predniSONE (DELTASONE) 20 MG tablet; Take 2 tablets by mouth daily for 5 days  Dispense: 10 tablet; Refill: 0  - amoxicillin-clavulanate (AUGMENTIN) 875-125 MG per tablet; Take 1 tablet by mouth 2 times daily for 10 days  Dispense: 20 tablet; Refill: 0  - XR CHEST (2 VW); Future  - COVID-19; Future    3. Suspected COVID-19 virus infection    - albuterol sulfate HFA (PROVENTIL HFA) 108 (90 Base) MCG/ACT inhaler; Inhale 2 puffs into the lungs every 4 hours as needed for Wheezing or Shortness of Breath  Dispense: 1 Inhaler; Refill: 0  - predniSONE (DELTASONE) 20 MG tablet; Take 2 tablets by mouth daily for 5 days  Dispense: 10 tablet; Refill: 0  - amoxicillin-clavulanate (AUGMENTIN) 875-125 MG per tablet; Take 1 tablet by mouth 2 times daily for 10 days  Dispense: 20 tablet; Refill: 0  - XR CHEST (2 VW); Future  - COVID-19; Future    4. Type 2 diabetes mellitus without complication, without long-term current use of insulin ([facility])    Monitor for s/s hyperglycemia while on steroids  con't diet control      DISPOSITION    Return if symptoms worsen or fail to improve. [place] Cover released without restrictions. Future Appointments   Date Time Provider Hola Carrion   2/2/2022 12:45 PM Sherita Bermudez MD N [facility]     PATIENT COUNSELING    Barriers to learning and self management: none    Discussed use, benefit, and side effects of prescribed medications. Barriers to medication compliance addressed. All patient questions answered. Pt voiced understanding.        Electronically signed by Gema Mcdaniel DO on 6/22/2021 at 11:58 AM

## 2024-06-24 RX ORDER — PANTOPRAZOLE SODIUM 40 MG/1
TABLET, DELAYED RELEASE ORAL
Qty: 180 TABLET | Refills: 3 | Status: SHIPPED | OUTPATIENT
Start: 2024-06-24

## 2024-06-24 RX ORDER — TRAZODONE HYDROCHLORIDE 50 MG/1
TABLET ORAL
Qty: 90 TABLET | Refills: 3 | Status: SHIPPED | OUTPATIENT
Start: 2024-06-24

## 2024-06-24 NOTE — TELEPHONE ENCOUNTER
Future Appointments   Date Time Provider Department Center   7/9/2024  8:00 AM STR ECHO 2 [facility] Rad/Card   7/9/2024  9:00 AM STR CARDIAC MONITOR [facility] Rad/Card   7/18/2024 11:15 AM Sonny Martines MD N [facility] Heart [facility] [place]   8/19/2024  8:00 AM Kiran Sharma, DO [facility] [place]   8/19/2024  9:00 AM Kirna Sharma, DO [facility] [place]   10/16/2024  8:00 AM Lorna Faulkner APRN - CNP N [facility] CHF [facility] [place]   11/6/2024  9:30 AM HemRebecca guerrero APRN - CNP N [facility] Heart [facility] [place]   1/7/2025 10:00 AM Kenneth Hernandez MD N [facility] - [place]   4/9/2025 11:00 AM STR ECHO 1 [facility] STR Rad/Card   4/15/2025 10:00 AM HemRebecca guerrero APRN - CNP N [facility] [place]

## 2024-07-01 ENCOUNTER — TELEPHONE (OUTPATIENT)
Dept: FAMILY MEDICINE CLINIC | Age: 69
End: 2024-07-01

## 2024-07-01 DIAGNOSIS — G25.81 RLS (RESTLESS LEGS SYNDROME): Primary | Chronic | ICD-10-CM

## 2024-07-01 RX ORDER — ROPINIROLE 3 MG/1
3 TABLET, FILM COATED ORAL 2 TIMES DAILY
Qty: 180 TABLET | Refills: 3 | Status: SHIPPED | OUTPATIENT
Start: 2024-07-01

## 2024-07-09 ENCOUNTER — HOSPITAL ENCOUNTER (OUTPATIENT)
Age: 69
Discharge: HOME | End: 2024-07-11
Attending: INTERNAL MEDICINE
Payer: MEDICARE

## 2024-07-09 ENCOUNTER — HOSPITAL ENCOUNTER (OUTPATIENT)
Age: 69
Discharge: HOME | End: 2024-07-11
Attending: NUCLEAR MEDICINE
Payer: MEDICARE

## 2024-07-09 DIAGNOSIS — Z95.2 S/P TAVR (TRANSCATHETER AORTIC VALVE REPLACEMENT): ICD-10-CM

## 2024-07-09 DIAGNOSIS — R00.1 BRADYCARDIA: ICD-10-CM

## 2024-07-09 DIAGNOSIS — I38 VALVULAR HEART DISEASE: ICD-10-CM

## 2024-07-09 DIAGNOSIS — Z95.2 HISTORY OF TRANSCATHETER AORTIC VALVE REPLACEMENT (TAVR): ICD-10-CM

## 2024-07-09 DIAGNOSIS — I10 PRIMARY HYPERTENSION: ICD-10-CM

## 2024-07-09 DIAGNOSIS — Z95.2 S/P AVR: ICD-10-CM

## 2024-07-09 LAB
ECHO AO ASC DIAM: 3.6 CM
ECHO AV AREA PEAK VELOCITY: 0.9 CM2
ECHO AV AREA VTI: 1 CM2
ECHO AV MEAN GRADIENT: 18 MMHG
ECHO AV MEAN VELOCITY: 2 M/S
ECHO AV PEAK GRADIENT: 36 MMHG
ECHO AV PEAK VELOCITY: 3 M/S
ECHO AV VELOCITY RATIO: 0.33
ECHO AV VTI: 85.7 CM
ECHO EST RA PRESSURE: 8 MMHG
ECHO LA AREA 2C: 23 CM2
ECHO LA AREA 4C: 22.9 CM2
ECHO LA DIAMETER: 3.7 CM
ECHO LA MAJOR AXIS: 5.8 CM
ECHO LA MINOR AXIS: 5.8 CM
ECHO LA VOL BP: 73 ML (ref 22–52)
ECHO LA VOL MOD A2C: 76 ML (ref 22–52)
ECHO LA VOL MOD A4C: 70 ML (ref 22–52)
ECHO LV E' LATERAL VELOCITY: 6 CM/S
ECHO LV E' SEPTAL VELOCITY: 5 CM/S
ECHO LV EDV A2C: 116 ML
ECHO LV EDV A4C: 122 ML
ECHO LV EJECTION FRACTION A2C: 58 %
ECHO LV EJECTION FRACTION A4C: 58 %
ECHO LV EJECTION FRACTION BIPLANE: 57 % (ref 55–100)
ECHO LV ESV A2C: 49 ML
ECHO LV ESV A4C: 52 ML
ECHO LV FRACTIONAL SHORTENING: 33 % (ref 28–44)
ECHO LV INTERNAL DIMENSION DIASTOLIC: 5.4 CM (ref 3.9–5.3)
ECHO LV INTERNAL DIMENSION SYSTOLIC: 3.6 CM
ECHO LV ISOVOLUMETRIC RELAXATION TIME (IVRT): 88 MS
ECHO LV IVSD: 1.1 CM (ref 0.6–0.9)
ECHO LV MASS 2D: 234.8 G (ref 67–162)
ECHO LV POSTERIOR WALL DIASTOLIC: 1.1 CM (ref 0.6–0.9)
ECHO LV RELATIVE WALL THICKNESS RATIO: 0.41
ECHO LVOT AREA: 2.8 CM2
ECHO LVOT AV VTI INDEX: 0.33
ECHO LVOT DIAM: 1.9 CM
ECHO LVOT MEAN GRADIENT: 2 MMHG
ECHO LVOT PEAK GRADIENT: 4 MMHG
ECHO LVOT PEAK VELOCITY: 1 M/S
ECHO LVOT SV: 81.3 ML
ECHO LVOT VTI: 28.7 CM
ECHO MV A VELOCITY: 0.94 M/S
ECHO MV AREA VTI: 1 CM2
ECHO MV E DECELERATION TIME (DT): 342 MS
ECHO MV E VELOCITY: 1.97 M/S
ECHO MV E/A RATIO: 2.1
ECHO MV E/E' LATERAL: 32.83
ECHO MV E/E' RATIO (AVERAGED): 36.12
ECHO MV E/E' SEPTAL: 39.4
ECHO MV LVOT VTI INDEX: 2.98
ECHO MV MAX VELOCITY: 2.1 M/S
ECHO MV MEAN GRADIENT: 5 MMHG
ECHO MV MEAN VELOCITY: 1 M/S
ECHO MV PEAK GRADIENT: 18 MMHG
ECHO MV REGURGITANT PEAK GRADIENT: 144 MMHG
ECHO MV REGURGITANT PEAK VELOCITY: 6 M/S
ECHO MV REGURGITANT VTIA: 242 CM
ECHO MV VTI: 85.5 CM
ECHO PULMONARY ARTERY END DIASTOLIC PRESSURE: 4 MMHG
ECHO PV MAX VELOCITY: 1.1 M/S
ECHO PV PEAK GRADIENT: 5 MMHG
ECHO PV REGURGITANT MAX VELOCITY: 1.1 M/S
ECHO RIGHT VENTRICULAR SYSTOLIC PRESSURE (RVSP): 36 MMHG
ECHO RV INTERNAL DIMENSION: 3.1 CM
ECHO RV TAPSE: 1 CM (ref 1.7–?)
ECHO TV E WAVE: 0.8 M/S
ECHO TV MEAN GRADIENT: 3 MMHG
ECHO TV MEAN VELOCITY: 0.8 M/S
ECHO TV PEAK GRADIENT: 8 MMHG
ECHO TV REGURGITANT MAX VELOCITY: 2.63 M/S
ECHO TV REGURGITANT PEAK GRADIENT: 28 MMHG
ECHO TV VALVE AREA VTI: 1.1 CM2
ECHO TV VTI: 71.5 CM

## 2024-07-09 PROCEDURE — 93306 TTE W/DOPPLER COMPLETE: CPT

## 2024-07-09 PROCEDURE — 93225 XTRNL ECG REC<48 HRS REC: CPT

## 2024-07-09 PROCEDURE — 93306 TTE W/DOPPLER COMPLETE: CPT | Performed by: INTERNAL MEDICINE

## 2024-07-12 ENCOUNTER — TELEPHONE (OUTPATIENT)
Dept: CARDIOLOGY CLINIC | Age: 69
End: 2024-07-12

## 2024-07-12 NOTE — TELEPHONE ENCOUNTER
This patient has completed 3 months of DAPT post Mini TAVR (4/9/24). According to the guidelines Plavix is only recommended for 3 months post implant. After reviewing the chart it does not appear that there any other indication to continue Plavix. Dr. Martines are you ok with stopping Plavix and continuing Aspirin 81 mg daily?

## 2024-07-13 LAB
ACQUISITION DURATION: NORMAL S
AVERAGE HEART RATE: 62 BPM
HOOKUP DATE: NORMAL
HOOKUP TIME: NORMAL
MAX HEART RATE TIME/DATE: NORMAL
MAX HEART RATE: 98 BPM
MIN HEART RATE TIME/DATE: NORMAL
MIN HEART RATE: 42 BPM
NUMBER OF QRS COMPLEXES: NORMAL
NUMBER OF SUPRAVENTRICULAR COUPLETS: 0
NUMBER OF SUPRAVENTRICULAR ECTOPICS: 282
NUMBER OF SUPRAVENTRICULAR ISOLATED BEATS: 248
NUMBER OF VENTRICULAR BIGEMINAL CYCLES: 84
NUMBER OF VENTRICULAR COUPLETS: 9
NUMBER OF VENTRICULAR ECTOPICS: 1000

## 2024-07-16 NOTE — TELEPHONE ENCOUNTER
Spoke with the patient and no questions regarding her medication change. Patient had to reschedule her appointment due to a family emergency

## 2024-07-29 RX ORDER — CLOPIDOGREL BISULFATE 75 MG/1
75 TABLET ORAL DAILY
Qty: 90 TABLET | Refills: 3 | OUTPATIENT
Start: 2024-07-29

## 2024-08-12 RX ORDER — LISINOPRIL 5 MG/1
5 TABLET ORAL 2 TIMES DAILY
Qty: 180 TABLET | Refills: 0 | Status: SHIPPED | OUTPATIENT
Start: 2024-08-12

## 2024-08-14 NOTE — PROGRESS NOTES
Pt arrives to the unit from ER. Pt oriented to unit and room. Pt placed in gown, hospital socks provided. Pt ambulatory, gait steady, and independent. Skin intact. 4 eyes completed. Admission completed. Call light and bedside table in reach. Will continue to monitor.

## 2024-08-17 PROBLEM — Z95.818 PRESENCE OF WATCHMAN LEFT ATRIAL APPENDAGE CLOSURE DEVICE: Chronic | Status: ACTIVE | Noted: 2024-04-15

## 2024-08-17 NOTE — PROGRESS NOTES
(gastroesophageal reflux disease)     H/O prosthetic aortic valve replacement     Early bioprosthetic aortic valve failure with severe symptomatic prosthetic aortic regurgitation is now s/p REDO AORTIC VALVE REPLACEMENT, MITRAL VALVE REPAIR, TRICUSPID VALVE REPAIR, WILBERTO performed by Santo Pineda MD at [facility] OR    Heart failure with preserved ejection fraction (HCC)     History of aortic stenosis, s/p valve replacement x 3     History of iron deficiency anemia     History of subarachnoid hemorrhage 03/23/2015    Spontaneous SAH. Admitted to [facility]. Extensive w/u for cause was negative.     Hypertension, essential     Major depression     THAYER (nonalcoholic steatohepatitis)     Osteoarthritis     Presence of Watchman left atrial appendage closure device     RLS (restless legs syndrome) 01/16/2019    S/P mitral valve repair 07/13/2018    Dr. Pineda    S/P TAVR (transcatheter aortic valve replacement) 01/01/2015    x 3, last replacement APR 2024 d/t failure of the 1st      Valvular heart disease 01/16/2019       Past Surgical History:   Procedure Laterality Date    AORTIC VALVE REPLACEMENT      cow valve    BRAIN SURGERY      to drain blood    CARDIAC CATHETERIZATION  2004 or 2005    [facility]    CARDIAC PROCEDURE N/A 1/10/2024    Left and right heart cath / coronary angiography performed by Kenneth Hernandez MD at [facility] CARDIAC CATH LAB    CARDIAC PROCEDURE N/A 4/9/2024    Transcatheter aortic valve replacement performed by Sonny Martines MD at [facility] CARDIAC CATH LAB    COLONOSCOPY      COLONOSCOPY N/A 9/8/2023    COLONOSCOPY performed by Davis Hernadez MD at [facility] ENDOSCOPY    DIAGNOSTIC CARDIAC CATH LAB PROCEDURE      HYSTERECTOMY (CERVIX STATUS UNKNOWN)      age 47    OVARY REMOVAL Bilateral     age 47    PARTIAL HYSTERECTOMY (CERVIX NOT REMOVED) N/A 2004    Endometriosis    ME ECHO TRANSESOPHAG R-T 2D IMG ACQUISJ I&R ONLY Left 7/3/2018    TRANSESOPHAGEAL ECHOCARDIOGRAM performed by Kenneth Hernandez MD at [facility]

## 2024-08-19 ENCOUNTER — OFFICE VISIT (OUTPATIENT)
Dept: FAMILY MEDICINE CLINIC | Age: 69
End: 2024-08-19
Payer: MEDICARE

## 2024-08-19 VITALS
BODY MASS INDEX: 33.1 KG/M2 | HEART RATE: 59 BPM | DIASTOLIC BLOOD PRESSURE: 78 MMHG | WEIGHT: 186.8 LBS | RESPIRATION RATE: 18 BRPM | OXYGEN SATURATION: 97 % | SYSTOLIC BLOOD PRESSURE: 130 MMHG | HEIGHT: 63 IN | TEMPERATURE: 97 F

## 2024-08-19 DIAGNOSIS — M79.7 FIBROMYALGIA: Chronic | ICD-10-CM

## 2024-08-19 DIAGNOSIS — K92.2 UPPER GI BLEED: ICD-10-CM

## 2024-08-19 DIAGNOSIS — E53.8 FOLATE DEFICIENCY: ICD-10-CM

## 2024-08-19 DIAGNOSIS — D62 ACUTE BLOOD LOSS ANEMIA: ICD-10-CM

## 2024-08-19 DIAGNOSIS — I35.0 SEVERE AORTIC STENOSIS: Primary | ICD-10-CM

## 2024-08-19 DIAGNOSIS — K21.00 GASTROESOPHAGEAL REFLUX DISEASE WITH ESOPHAGITIS, UNSPECIFIED WHETHER HEMORRHAGE: ICD-10-CM

## 2024-08-19 DIAGNOSIS — I10 HYPERTENSION, ESSENTIAL: Chronic | ICD-10-CM

## 2024-08-19 DIAGNOSIS — R91.1 PULMONARY NODULE: ICD-10-CM

## 2024-08-19 DIAGNOSIS — E78.5 DYSLIPIDEMIA: Chronic | ICD-10-CM

## 2024-08-19 DIAGNOSIS — I48.0 PAROXYSMAL ATRIAL FIBRILLATION (HCC): ICD-10-CM

## 2024-08-19 DIAGNOSIS — K92.2 LOWER GI BLEED: ICD-10-CM

## 2024-08-19 DIAGNOSIS — Z95.818 PRESENCE OF WATCHMAN LEFT ATRIAL APPENDAGE CLOSURE DEVICE: ICD-10-CM

## 2024-08-19 DIAGNOSIS — Z12.31 ENCOUNTER FOR SCREENING MAMMOGRAM FOR MALIGNANT NEOPLASM OF BREAST: ICD-10-CM

## 2024-08-19 DIAGNOSIS — Z95.2 S/P TAVR (TRANSCATHETER AORTIC VALVE REPLACEMENT): ICD-10-CM

## 2024-08-19 DIAGNOSIS — D50.9 IRON DEFICIENCY ANEMIA, UNSPECIFIED IRON DEFICIENCY ANEMIA TYPE: ICD-10-CM

## 2024-08-19 DIAGNOSIS — F33.42 RECURRENT MAJOR DEPRESSIVE DISORDER, IN FULL REMISSION (HCC): ICD-10-CM

## 2024-08-19 DIAGNOSIS — E11.9 TYPE 2 DIABETES MELLITUS WITHOUT COMPLICATION, WITHOUT LONG-TERM CURRENT USE OF INSULIN (HCC): ICD-10-CM

## 2024-08-19 DIAGNOSIS — I50.32 CHRONIC HEART FAILURE WITH PRESERVED EJECTION FRACTION (HCC): Chronic | ICD-10-CM

## 2024-08-19 DIAGNOSIS — J44.9 CHRONIC OBSTRUCTIVE PULMONARY DISEASE, UNSPECIFIED COPD TYPE (HCC): ICD-10-CM

## 2024-08-19 DIAGNOSIS — G25.81 RLS (RESTLESS LEGS SYNDROME): Chronic | ICD-10-CM

## 2024-08-19 DIAGNOSIS — E53.8 B12 DEFICIENCY: ICD-10-CM

## 2024-08-19 LAB — HBA1C MFR BLD: 6.3 % (ref 4.3–5.7)

## 2024-08-19 PROCEDURE — 3023F SPIROM DOC REV: CPT | Performed by: FAMILY MEDICINE

## 2024-08-19 PROCEDURE — G8417 CALC BMI ABV UP PARAM F/U: HCPCS | Performed by: FAMILY MEDICINE

## 2024-08-19 PROCEDURE — 3078F DIAST BP <80 MM HG: CPT | Performed by: FAMILY MEDICINE

## 2024-08-19 PROCEDURE — 3075F SYST BP GE 130 - 139MM HG: CPT | Performed by: FAMILY MEDICINE

## 2024-08-19 PROCEDURE — 1036F TOBACCO NON-USER: CPT | Performed by: FAMILY MEDICINE

## 2024-08-19 PROCEDURE — 1090F PRES/ABSN URINE INCON ASSESS: CPT | Performed by: FAMILY MEDICINE

## 2024-08-19 PROCEDURE — 1124F ACP DISCUSS-NO DSCNMKR DOCD: CPT | Performed by: FAMILY MEDICINE

## 2024-08-19 PROCEDURE — 99214 OFFICE O/P EST MOD 30 MIN: CPT | Performed by: FAMILY MEDICINE

## 2024-08-19 PROCEDURE — 2022F DILAT RTA XM EVC RTNOPTHY: CPT | Performed by: FAMILY MEDICINE

## 2024-08-19 PROCEDURE — G8399 PT W/DXA RESULTS DOCUMENT: HCPCS | Performed by: FAMILY MEDICINE

## 2024-08-19 PROCEDURE — 3044F HG A1C LEVEL LT 7.0%: CPT | Performed by: FAMILY MEDICINE

## 2024-08-19 PROCEDURE — G8427 DOCREV CUR MEDS BY ELIG CLIN: HCPCS | Performed by: FAMILY MEDICINE

## 2024-08-19 PROCEDURE — 3017F COLORECTAL CA SCREEN DOC REV: CPT | Performed by: FAMILY MEDICINE

## 2024-08-22 ENCOUNTER — OFFICE VISIT (OUTPATIENT)
Dept: CARDIOLOGY CLINIC | Age: 69
End: 2024-08-22

## 2024-08-22 VITALS
SYSTOLIC BLOOD PRESSURE: 130 MMHG | WEIGHT: 185 LBS | HEIGHT: 63 IN | DIASTOLIC BLOOD PRESSURE: 78 MMHG | HEART RATE: 60 BPM | BODY MASS INDEX: 32.78 KG/M2

## 2024-08-22 DIAGNOSIS — Z95.2 S/P TAVR (TRANSCATHETER AORTIC VALVE REPLACEMENT): Primary | ICD-10-CM

## 2024-08-22 NOTE — PROGRESS NOTES
BAV to help with expansion. Discussed symptoms needing emergency care or those which require further medical attention.   Discussed diet/exercise/BP/weight loss/health lifestyle choices/lipids; the patient understands the goals and will try to comply.    Disposition:  1 year           Electronically signed by oSnny Martines MD   8/22/2024 at 10:30 AM EDT

## 2024-09-03 ENCOUNTER — LAB (OUTPATIENT)
Dept: LAB | Age: 69
End: 2024-09-03

## 2024-09-03 DIAGNOSIS — I10 HYPERTENSION, ESSENTIAL: Chronic | ICD-10-CM

## 2024-09-03 DIAGNOSIS — K92.2 LOWER GI BLEED: ICD-10-CM

## 2024-09-03 DIAGNOSIS — I48.0 PAROXYSMAL ATRIAL FIBRILLATION (HCC): ICD-10-CM

## 2024-09-03 DIAGNOSIS — D62 ACUTE BLOOD LOSS ANEMIA: ICD-10-CM

## 2024-09-03 DIAGNOSIS — D62 ACUTE BLOOD LOSS ANEMIA: Primary | ICD-10-CM

## 2024-09-03 DIAGNOSIS — K92.2 UPPER GI BLEED: ICD-10-CM

## 2024-09-03 DIAGNOSIS — E53.8 FOLATE DEFICIENCY: ICD-10-CM

## 2024-09-03 DIAGNOSIS — Z95.818 PRESENCE OF WATCHMAN LEFT ATRIAL APPENDAGE CLOSURE DEVICE: ICD-10-CM

## 2024-09-03 DIAGNOSIS — E53.8 B12 DEFICIENCY: ICD-10-CM

## 2024-09-03 DIAGNOSIS — D50.9 IRON DEFICIENCY ANEMIA, UNSPECIFIED IRON DEFICIENCY ANEMIA TYPE: ICD-10-CM

## 2024-09-03 DIAGNOSIS — E11.9 TYPE 2 DIABETES MELLITUS WITHOUT COMPLICATION, WITHOUT LONG-TERM CURRENT USE OF INSULIN (HCC): ICD-10-CM

## 2024-09-03 DIAGNOSIS — I50.32 CHRONIC HEART FAILURE WITH PRESERVED EJECTION FRACTION (HCC): Chronic | ICD-10-CM

## 2024-09-03 LAB
ANION GAP SERPL CALC-SCNC: 10 MEQ/L (ref 8–16)
BASOPHILS ABSOLUTE: 0 THOU/MM3 (ref 0–0.1)
BASOPHILS NFR BLD AUTO: 0.5 %
BUN SERPL-MCNC: 13 MG/DL (ref 7–22)
CALCIUM SERPL-MCNC: 9.6 MG/DL (ref 8.5–10.5)
CHLORIDE SERPL-SCNC: 102 MEQ/L (ref 98–111)
CO2 SERPL-SCNC: 27 MEQ/L (ref 23–33)
CREAT SERPL-MCNC: 0.9 MG/DL (ref 0.4–1.2)
DEPRECATED RDW RBC AUTO: 45.9 FL (ref 35–45)
EOSINOPHIL NFR BLD AUTO: 2.3 %
EOSINOPHILS ABSOLUTE: 0.2 THOU/MM3 (ref 0–0.4)
ERYTHROCYTE [DISTWIDTH] IN BLOOD BY AUTOMATED COUNT: 14.4 % (ref 11.5–14.5)
FERRITIN SERPL IA-MCNC: 85 NG/ML (ref 10–291)
FOLATE SERPL-MCNC: 7.8 NG/ML (ref 4.8–24.2)
GFR SERPL CREATININE-BSD FRML MDRD: 69 ML/MIN/1.73M2
GLUCOSE SERPL-MCNC: 118 MG/DL (ref 70–108)
HCT VFR BLD AUTO: 37.8 % (ref 37–47)
HGB BLD-MCNC: 11.6 GM/DL (ref 12–16)
HGB RETIC QN AUTO: 31.7 PG (ref 28.2–35.7)
IMM GRANULOCYTES # BLD AUTO: 0.03 THOU/MM3 (ref 0–0.07)
IMM GRANULOCYTES NFR BLD AUTO: 0.4 %
IMM RETICS NFR: 14.5 % (ref 3–15.9)
IRON SERPL-MCNC: 63 UG/DL (ref 50–170)
LYMPHOCYTES ABSOLUTE: 2.3 THOU/MM3 (ref 1–4.8)
LYMPHOCYTES NFR BLD AUTO: 29.3 %
MCH RBC QN AUTO: 26.9 PG (ref 26–33)
MCHC RBC AUTO-ENTMCNC: 30.7 GM/DL (ref 32.2–35.5)
MCV RBC AUTO: 87.7 FL (ref 81–99)
MONOCYTES ABSOLUTE: 0.6 THOU/MM3 (ref 0.4–1.3)
MONOCYTES NFR BLD AUTO: 7.6 %
NEUTROPHILS ABSOLUTE: 4.6 THOU/MM3 (ref 1.8–7.7)
NEUTROPHILS NFR BLD AUTO: 59.9 %
NRBC BLD AUTO-RTO: 0 /100 WBC
PLATELET # BLD AUTO: 187 THOU/MM3 (ref 130–400)
PMV BLD AUTO: 11.4 FL (ref 9.4–12.4)
POTASSIUM SERPL-SCNC: 4.8 MEQ/L (ref 3.5–5.2)
RBC # BLD AUTO: 4.31 MILL/MM3 (ref 4.2–5.4)
RETICS # AUTO: 51 THOU/MM3 (ref 20–115)
RETICS/RBC NFR AUTO: 1.2 % (ref 0.5–2)
SODIUM SERPL-SCNC: 139 MEQ/L (ref 135–145)
TIBC SERPL-MCNC: 358 UG/DL (ref 171–450)
VIT B12 SERPL-MCNC: 449 PG/ML (ref 211–911)
WBC # BLD AUTO: 7.7 THOU/MM3 (ref 4.8–10.8)

## 2024-09-04 ENCOUNTER — TELEPHONE (OUTPATIENT)
Dept: FAMILY MEDICINE CLINIC | Age: 69
End: 2024-09-04

## 2024-09-04 NOTE — TELEPHONE ENCOUNTER
Tried to phone patient x 3 times, phone rang busy every time.    Patient will NEED called again.    Lab slip mailed to pt

## 2024-09-04 NOTE — TELEPHONE ENCOUNTER
----- Message from Dr. Kiran Sharma, DO sent at 9/3/2024  8:55 PM EDT -----  Please let pt know that labs overall look good  Anemia pretty much resolved  Recommend repeat cbc again in 4 wks, non-fasting is fine  Let me know if questions, thanks!

## 2024-09-05 RX ORDER — ROPINIROLE 2 MG/1
2 TABLET, FILM COATED ORAL 2 TIMES DAILY
Qty: 180 TABLET | Refills: 3 | OUTPATIENT
Start: 2024-09-05

## 2024-09-25 RX ORDER — CITALOPRAM HYDROBROMIDE 20 MG/1
TABLET ORAL
Qty: 90 TABLET | Refills: 3 | Status: SHIPPED | OUTPATIENT
Start: 2024-09-25

## 2024-10-01 DIAGNOSIS — J44.1 CHRONIC OBSTRUCTIVE PULMONARY DISEASE WITH ACUTE EXACERBATION (HCC): ICD-10-CM

## 2024-10-02 ENCOUNTER — LAB (OUTPATIENT)
Dept: LAB | Age: 69
End: 2024-10-02

## 2024-10-02 DIAGNOSIS — D62 ACUTE BLOOD LOSS ANEMIA: ICD-10-CM

## 2024-10-02 LAB
BASOPHILS ABSOLUTE: 0 THOU/MM3 (ref 0–0.1)
BASOPHILS NFR BLD AUTO: 0.5 %
DEPRECATED RDW RBC AUTO: 47.5 FL (ref 35–45)
EOSINOPHIL NFR BLD AUTO: 1.9 %
EOSINOPHILS ABSOLUTE: 0.2 THOU/MM3 (ref 0–0.4)
ERYTHROCYTE [DISTWIDTH] IN BLOOD BY AUTOMATED COUNT: 14.8 % (ref 11.5–14.5)
HCT VFR BLD AUTO: 36.3 % (ref 37–47)
HGB BLD-MCNC: 11.6 GM/DL (ref 12–16)
IMM GRANULOCYTES # BLD AUTO: 0.05 THOU/MM3 (ref 0–0.07)
IMM GRANULOCYTES NFR BLD AUTO: 0.6 %
LYMPHOCYTES ABSOLUTE: 2.9 THOU/MM3 (ref 1–4.8)
LYMPHOCYTES NFR BLD AUTO: 32.6 %
MCH RBC QN AUTO: 28 PG (ref 26–33)
MCHC RBC AUTO-ENTMCNC: 32 GM/DL (ref 32.2–35.5)
MCV RBC AUTO: 87.5 FL (ref 81–99)
MONOCYTES ABSOLUTE: 0.6 THOU/MM3 (ref 0.4–1.3)
MONOCYTES NFR BLD AUTO: 7.2 %
NEUTROPHILS ABSOLUTE: 5 THOU/MM3 (ref 1.8–7.7)
NEUTROPHILS NFR BLD AUTO: 57.2 %
NRBC BLD AUTO-RTO: 0 /100 WBC
PLATELET # BLD AUTO: 200 THOU/MM3 (ref 130–400)
PMV BLD AUTO: 11.9 FL (ref 9.4–12.4)
RBC # BLD AUTO: 4.15 MILL/MM3 (ref 4.2–5.4)
WBC # BLD AUTO: 8.8 THOU/MM3 (ref 4.8–10.8)

## 2024-10-02 RX ORDER — ALBUTEROL SULFATE 90 UG/1
INHALANT RESPIRATORY (INHALATION)
Qty: 2 EACH | Refills: 5 | Status: SHIPPED | OUTPATIENT
Start: 2024-10-02

## 2024-10-02 NOTE — TELEPHONE ENCOUNTER
Recent Visits  Date Type Provider Dept   08/19/24 Office Visit Kiran Sharma, DO [facility]   04/16/24 Office Visit Kiran Sharma, DO [facility]   02/19/24 Office Visit Kiran Sharma, DO [facility]   10/17/23 Office Visit Kiran Sharma, DO [facility]   09/12/23 Office Visit Kiran Sharma, DO [facility]   08/22/23 Office Visit Kiran Sharma, DO [facility]   06/20/23 Office Visit Kiran Sharma, DO [facility]   05/02/23 Office Visit Kiran Sharma, DO [facility]   04/20/23 Office Visit Kiran Sharma, DO [facility]   Showing recent visits within past 540 days with a meds authorizing provider and meeting all other requirements  Future Appointments  Date Type Provider Dept   02/19/25 Appointment Kiran Sharma, DO [facility]   Showing future appointments within next 150 days with a meds authorizing provider and meeting all other requirements

## 2024-10-03 ENCOUNTER — TELEPHONE (OUTPATIENT)
Dept: FAMILY MEDICINE CLINIC | Age: 69
End: 2024-10-03

## 2024-10-03 DIAGNOSIS — D50.9 IRON DEFICIENCY ANEMIA, UNSPECIFIED IRON DEFICIENCY ANEMIA TYPE: Primary | ICD-10-CM

## 2024-10-03 NOTE — TELEPHONE ENCOUNTER
----- Message from Dr. Kiran Sharma, DO sent at 10/3/2024  7:02 AM EDT -----  Cbc looks stable  Would like to repeat again 3 months, fasting  Let me know if questions, thanks!

## 2024-10-03 NOTE — TELEPHONE ENCOUNTER
Tried to contact patient x 3 times, phone rang busy each time.    Patient will need called again.    Mailed labs to patient

## 2024-10-11 ENCOUNTER — OFFICE VISIT (OUTPATIENT)
Dept: FAMILY MEDICINE CLINIC | Age: 69
End: 2024-10-11

## 2024-10-11 VITALS
BODY MASS INDEX: 33.35 KG/M2 | OXYGEN SATURATION: 99 % | HEART RATE: 50 BPM | HEIGHT: 63 IN | TEMPERATURE: 98 F | DIASTOLIC BLOOD PRESSURE: 78 MMHG | SYSTOLIC BLOOD PRESSURE: 130 MMHG | WEIGHT: 188.2 LBS | RESPIRATION RATE: 18 BRPM

## 2024-10-11 DIAGNOSIS — D64.9 NORMOCYTIC ANEMIA: ICD-10-CM

## 2024-10-11 DIAGNOSIS — E53.8 B12 DEFICIENCY: ICD-10-CM

## 2024-10-11 DIAGNOSIS — M54.42 ACUTE BILATERAL LOW BACK PAIN WITH BILATERAL SCIATICA: Primary | ICD-10-CM

## 2024-10-11 DIAGNOSIS — M54.41 ACUTE BILATERAL LOW BACK PAIN WITH BILATERAL SCIATICA: Primary | ICD-10-CM

## 2024-10-11 DIAGNOSIS — E61.1 IRON DEFICIENCY: ICD-10-CM

## 2024-10-11 DIAGNOSIS — E53.8 FOLATE DEFICIENCY: ICD-10-CM

## 2024-10-11 DIAGNOSIS — M51.362 DEGENERATION OF INTERVERTEBRAL DISC OF LUMBAR REGION WITH DISCOGENIC BACK PAIN AND LOWER EXTREMITY PAIN: ICD-10-CM

## 2024-10-11 RX ORDER — KETOROLAC TROMETHAMINE 15 MG/ML
15 INJECTION, SOLUTION INTRAMUSCULAR; INTRAVENOUS ONCE
Status: DISCONTINUED | OUTPATIENT
Start: 2024-10-11 | End: 2024-10-11

## 2024-10-11 RX ORDER — KETOROLAC TROMETHAMINE 30 MG/ML
15 INJECTION, SOLUTION INTRAMUSCULAR; INTRAVENOUS ONCE
Status: COMPLETED | OUTPATIENT
Start: 2024-10-11 | End: 2024-10-11

## 2024-10-11 RX ORDER — METHYLPREDNISOLONE ACETATE 80 MG/ML
80 INJECTION, SUSPENSION INTRA-ARTICULAR; INTRALESIONAL; INTRAMUSCULAR; SOFT TISSUE ONCE
Status: COMPLETED | OUTPATIENT
Start: 2024-10-11 | End: 2024-10-11

## 2024-10-11 RX ORDER — PREDNISONE 20 MG/1
40 TABLET ORAL DAILY
Qty: 10 TABLET | Refills: 0 | Status: SHIPPED | OUTPATIENT
Start: 2024-10-12 | End: 2024-10-17

## 2024-10-11 RX ADMIN — KETOROLAC TROMETHAMINE 15 MG: 30 INJECTION, SOLUTION INTRAMUSCULAR; INTRAVENOUS at 10:10

## 2024-10-11 RX ADMIN — METHYLPREDNISOLONE ACETATE 80 MG: 80 INJECTION, SUSPENSION INTRA-ARTICULAR; INTRALESIONAL; INTRAMUSCULAR; SOFT TISSUE at 10:12

## 2024-10-11 NOTE — PATIENT INSTRUCTIONS
LAB INSTRUCTIONS:    Please complete labs in 3 months    Please fast for 8 hours prior to lab collection.    The clinic will call you within 1 week of collection. If you have not heard from us within that amount of time, please call us at [phone].

## 2024-10-11 NOTE — PROGRESS NOTES
Vaccine Information Sheet, \"Influenza - Inactivated\"  given to Hui Dean, or parent/legal guardian of  Hui Dean and verbalized understanding.    Patient responses:    Have you ever had a reaction to a flu vaccine? No  Do you have an allergy to eggs, neomycin or polymixin?  No  Do you have an allergy to Thimerosal, contact lens solution, or Merthiolate? No  Have you ever had [place] Syndrome?  No  Do you have any current illness?  No  Do you have a temperature above 100 degrees? No  Are you pregnant? No  If pregnant, permission obtained from physician? No  Do you have an active neurological disorder? No      Flu vaccine given per order. Please see immunization tab.    Immunization(s) given during visit:    Immunizations Administered       Name Date Dose Route    Influenza, FLUAD, (age 65 y+), IM, Trivalent PF, 0.5mL 10/11/2024 0.5 mL Intramuscular    Site: Deltoid- Right    Lot: [id number]    NDC: 50428-394-62            Most recent Vaccine Information Sheet dated 8.6.2021 given to pt

## 2024-10-11 NOTE — PROGRESS NOTES
Medication(s) given during visit:    Administrations This Visit       ketorolac (TORADOL) injection 15 mg       Admin Date  10/11/2024  10:10 Action  Given Dose  15 mg Route  IntraMUSCular Site  Dorsogluteal Left Documented By  Akilah Beatty LPN    NDC: 3085-3365-48    Lot#: [id number]    : HOSPIRA    Patient Supplied?: No              methylPREDNISolone acetate (DEPO-MEDROL) injection 80 mg       Admin Date  10/11/2024  10:12 Action  Given Dose  80 mg Route  IntraMUSCular Site  Dorsogluteal Left Documented By  Akilah Beatty LPN    NDC: [address]-7    Lot#: [id number]    : [employer]    Patient Supplied?: No                    Patient instructed to remain in clinic for 20 minutes after injection and was advised to report any adverse reaction to me immediately.

## 2024-10-11 NOTE — PROGRESS NOTES
Chief Complaint   Patient presents with    Lower Back Pain    Anemia     History obtained from the patient.    SUBJECTIVE:  Hui Dean is a 69 y.o. female that presents today for    -Low Back Pain:    HPI:   Started last wk  No specific injury  Bilateral low back pain   Pain about a 7/10  L worse than R  Pain radiates down both legs  Hurts all the time  Worse with movement  A bit better with rest  Has used Tylenol, Advil and topical txts    Remote hx of back injections/nerve ablation    Inciting injury or history of trauma? No  Pain is relieved by - rest  Pain is aggravated by - movement  Radiation of the pain? Yes - as above  Paresthesias of the extremities?  No  Saddle anesthesia? No  Bowel or bladder incontinence? No  Treatments tried - as above      -Anemia:  Hx of upper GI bleed in Apr 2023 and lower GI bleed SEPT 2023  UGI bleed from erosive esophagitis from NSAIDS and Eliquis.   Lower GI bleed from diverticulotis and hemorrhoids  Has since undergone WATCHMAN and is off Eliquis  Had combination, at one point during all this, of low iron, b12 and folate  Saw heme  Iron addressed with PO and IV iron  B12/folate addressed orally  Most recent CBC OCT 2024 shows sig improvement of her anemia, HB up to 11.6  Iron stores WNL, as are b12 and folate  Is off iron, folate and b12 now.       Age/Gender Health Maintenance    Lipid -   Lab Results   Component Value Date    CHOL 164 01/10/2024    CHOL 187 01/31/2023    CHOL 182 12/27/2021     Lab Results   Component Value Date    TRIG 221 (H) 01/10/2024    TRIG 176 01/31/2023    TRIG 222 (H) 12/27/2021     Lab Results   Component Value Date    HDL 67 01/10/2024    HDL 49 01/31/2023    HDL 62 12/27/2021     Lab Results   Component Value Date    LDL 53 01/10/2024     01/31/2023    LDL 76 12/27/2021       Colon Cancer Screening - Multiple polyps SEPT 2023, repeat 3 to 5 years per  [facility]  Lung Cancer Screening - stable nodule MAY 2024, repeat 9 months per rads.

## 2024-10-16 ENCOUNTER — HOSPITAL ENCOUNTER (OUTPATIENT)
Dept: WOMENS IMAGING | Age: 69
Discharge: HOME | End: 2024-10-16
Attending: FAMILY MEDICINE
Payer: MEDICARE

## 2024-10-16 ENCOUNTER — OFFICE VISIT (OUTPATIENT)
Dept: CARDIOLOGY CLINIC | Age: 69
End: 2024-10-16
Payer: MEDICARE

## 2024-10-16 VITALS
SYSTOLIC BLOOD PRESSURE: 138 MMHG | HEART RATE: 62 BPM | WEIGHT: 187.8 LBS | DIASTOLIC BLOOD PRESSURE: 64 MMHG | OXYGEN SATURATION: 98 % | HEIGHT: 62 IN | BODY MASS INDEX: 34.56 KG/M2 | RESPIRATION RATE: 18 BRPM

## 2024-10-16 DIAGNOSIS — I50.32 CHRONIC HEART FAILURE WITH PRESERVED EJECTION FRACTION (HCC): Primary | ICD-10-CM

## 2024-10-16 DIAGNOSIS — I38 VALVULAR HEART DISEASE: ICD-10-CM

## 2024-10-16 DIAGNOSIS — Z95.2 HISTORY OF TRANSCATHETER AORTIC VALVE REPLACEMENT (TAVR): ICD-10-CM

## 2024-10-16 DIAGNOSIS — Z12.31 ENCOUNTER FOR SCREENING MAMMOGRAM FOR MALIGNANT NEOPLASM OF BREAST: ICD-10-CM

## 2024-10-16 PROCEDURE — G8399 PT W/DXA RESULTS DOCUMENT: HCPCS | Performed by: NURSE PRACTITIONER

## 2024-10-16 PROCEDURE — G8427 DOCREV CUR MEDS BY ELIG CLIN: HCPCS | Performed by: NURSE PRACTITIONER

## 2024-10-16 PROCEDURE — 1036F TOBACCO NON-USER: CPT | Performed by: NURSE PRACTITIONER

## 2024-10-16 PROCEDURE — 99214 OFFICE O/P EST MOD 30 MIN: CPT | Performed by: NURSE PRACTITIONER

## 2024-10-16 PROCEDURE — 1090F PRES/ABSN URINE INCON ASSESS: CPT | Performed by: NURSE PRACTITIONER

## 2024-10-16 PROCEDURE — G8482 FLU IMMUNIZE ORDER/ADMIN: HCPCS | Performed by: NURSE PRACTITIONER

## 2024-10-16 PROCEDURE — 3075F SYST BP GE 130 - 139MM HG: CPT | Performed by: NURSE PRACTITIONER

## 2024-10-16 PROCEDURE — G8417 CALC BMI ABV UP PARAM F/U: HCPCS | Performed by: NURSE PRACTITIONER

## 2024-10-16 PROCEDURE — 3078F DIAST BP <80 MM HG: CPT | Performed by: NURSE PRACTITIONER

## 2024-10-16 PROCEDURE — 77063 BREAST TOMOSYNTHESIS BI: CPT

## 2024-10-16 PROCEDURE — 1124F ACP DISCUSS-NO DSCNMKR DOCD: CPT | Performed by: NURSE PRACTITIONER

## 2024-10-16 PROCEDURE — 3017F COLORECTAL CA SCREEN DOC REV: CPT | Performed by: NURSE PRACTITIONER

## 2024-10-16 ASSESSMENT — ENCOUNTER SYMPTOMS
SHORTNESS OF BREATH: 0
COUGH: 0
ABDOMINAL DISTENTION: 0

## 2024-10-16 NOTE — PATIENT INSTRUCTIONS
You may receive a survey regarding the care you received during your visit.  Your input is valuable to us.  We encourage you to complete and return your survey.  We hope you will choose us in the future for your healthcare needs.    Your nurses today were Akbar.  Office hours:   Mon-Thurs 8-4:30  Friday 8-12  Phone: [phone]    Continue:  Continue current medications  Daily weights and record  Fluid restriction of 2 Liters per day  Limit sodium in diet to around 4633-6563 mg/day  Monitor BP  Activity as tolerated     Call the Heart Failure Clinic for any of the following symptoms:   Weight gain of 3 pounds in 1 day or 5 pounds in 1 week  Increased shortness of breath  Shortness of breath while laying down  Cough  Chest pain  Swelling in feet, ankles or legs  Bloating in abdomen  Fatigue

## 2024-10-16 NOTE — PROGRESS NOTES
Heart Failure Clinic       Visit Date: 10/16/2024  Cardiologist:  Dr. López = Conrad  Primary Care Physician: Kiran Smith DO  Referred by: Conrad    Hui Dean is a 69 y.o. female who presents today for:  Chief Complaint   Patient presents with    Follow-up       HPI:   Hui Dean is a 69 y.o. female who presents to the office for a patient visit in the heart failure clinic.  Accompanied by no one    TYPE HF: Diastolic   Cause:   Valves:  mod-severe AI, mod MR   Device: no  HX:  Iron deficiency anemia (Dr. Solano), GIB (Dr. Hernadez), HTN, CKD stage III, Depression, RLS, CVA on 3/23/2015, COPD Stage II , Erosive esophagitis, PAD, Former smoker, HLD, DM II, Fibromyalgia, GERD, SAH on 3/23/2015, Obesity, THAYER, OA, B12 deficiency, PAF s/p Watchman FLX 27mm (11/2/2023)   AVR x 2 >> 6/2/2015 - Placed by Dr. Kush LUQUE at with a 23mm Tirfecta Tissue Aortic Valve          7/13/2018 - Dr Pineda - AVR (21mm [employer]a), MV repair (28mm [employer] annuloplasty band), TV repair (28mm [employer] MC3 annuloplasty band)       2024 - s/p Lilly TAVR w/ [place] for severe AI    Dry Wt:  175-180    1/2024: 183#  admitted several times for GI bleed, got WATCHMAN in nov.  Symptoms continued = VALIENTE/SOB, tired.  Worsening past 4-5 months.  Hgb is improved.  She felt it was heart related entire time.   Denies dizziness, lightheadedness.  Denies CP.  No swelling. Some bloating.   Wts stable.   Sleeping in chair past 4-5 mths  BP good - 110-130s, HR running  - feels palpitations, racing at times.  >> increase Lasix 40/day.  Scheduled DCCV 1/22 2/2024 - 189#  Wt usually around 175#  EKG today - NSR  Last week had Fe infusion - felt worse after  Blood in stool subsided since stopping Eliquis  Feeling very bloated.  Sleeping in chair past week - \"can't breath\"   >> IV Lasix 60 IV given.  Increased Lasix to 40 BID    3/2024 - 182#  Doing better = does not feel like retaining   Around \"100%\"

## 2024-10-22 ENCOUNTER — TELEPHONE (OUTPATIENT)
Dept: CARDIOLOGY CLINIC | Age: 69
End: 2024-10-22

## 2024-10-22 NOTE — TELEPHONE ENCOUNTER
S/P  Casey 11.02.2023  TAVR 04.09.2024  WILBERTO is scheduled for  10.30.2024    Patient confirmed apt.     Next ov  -  Rebecca 11.06.2024

## 2024-10-24 ENCOUNTER — HOSPITAL ENCOUNTER (OUTPATIENT)
Dept: WOMENS IMAGING | Age: 69
Discharge: HOME | End: 2024-10-24
Attending: RADIOLOGY
Payer: MEDICARE

## 2024-10-24 DIAGNOSIS — Z09 FOLLOW-UP EXAM: ICD-10-CM

## 2024-10-24 DIAGNOSIS — R92.8 ABNORMAL MAMMOGRAM: ICD-10-CM

## 2024-10-24 DIAGNOSIS — R92.30 BREAST DENSITY: Primary | ICD-10-CM

## 2024-10-24 PROCEDURE — 76642 ULTRASOUND BREAST LIMITED: CPT

## 2024-10-24 PROCEDURE — G0279 TOMOSYNTHESIS, MAMMO: HCPCS

## 2024-10-25 DIAGNOSIS — E78.5 DYSLIPIDEMIA: ICD-10-CM

## 2024-10-25 RX ORDER — ATORVASTATIN CALCIUM 10 MG/1
TABLET, FILM COATED ORAL
Qty: 90 TABLET | Refills: 3 | Status: SHIPPED | OUTPATIENT
Start: 2024-10-25

## 2024-10-25 RX ORDER — LISINOPRIL 5 MG/1
5 TABLET ORAL 2 TIMES DAILY
Qty: 180 TABLET | Refills: 2 | Status: SHIPPED | OUTPATIENT
Start: 2024-10-25

## 2024-10-25 NOTE — TELEPHONE ENCOUNTER
Recent Visits  Date Type Provider Dept   10/11/24 Office Visit Kiran Sharma, DO [facility]   08/19/24 Office Visit Kiran Sharma, DO [facility]   04/16/24 Office Visit Kiran Sharma, DO [facility]   02/19/24 Office Visit Kiran Sharma, DO [facility]   10/17/23 Office Visit Kiran Sharma, DO [facility]   09/12/23 Office Visit Kiran Sharma, DO [facility]   08/22/23 Office Visit Kiran Sharma, DO [facility]   06/20/23 Office Visit Kiran Sharma, DO [facility]   Showing recent visits within past 540 days with a meds authorizing provider and meeting all other requirements  Future Appointments  Date Type Provider Dept   12/05/24 Appointment Kiran Sharma, DO [facility]   02/19/25 Appointment Kiran Sharma, DO [facility]   Showing future appointments within next 150 days with a meds authorizing provider and meeting all other requirements

## 2024-11-07 ENCOUNTER — HOSPITAL ENCOUNTER (OUTPATIENT)
Age: 69
Setting detail: OUTPATIENT SURGERY
Discharge: HOME | End: 2024-11-07
Attending: NUCLEAR MEDICINE | Admitting: NUCLEAR MEDICINE
Payer: MEDICARE

## 2024-11-07 ENCOUNTER — APPOINTMENT (OUTPATIENT)
Age: 69
End: 2024-11-07
Attending: INTERNAL MEDICINE
Payer: MEDICARE

## 2024-11-07 VITALS
OXYGEN SATURATION: 97 % | BODY MASS INDEX: 35.33 KG/M2 | DIASTOLIC BLOOD PRESSURE: 57 MMHG | RESPIRATION RATE: 16 BRPM | HEIGHT: 62 IN | WEIGHT: 192 LBS | TEMPERATURE: 97.5 F | SYSTOLIC BLOOD PRESSURE: 110 MMHG | HEART RATE: 76 BPM

## 2024-11-07 DIAGNOSIS — I48.0 PAROXYSMAL A-FIB (HCC): ICD-10-CM

## 2024-11-07 LAB
ECHO BSA: 1.95 M2
ECHO LV EF PHYSICIAN: 55 %

## 2024-11-07 PROCEDURE — 7100000010 HC PHASE II RECOVERY - FIRST 15 MIN: Performed by: NUCLEAR MEDICINE

## 2024-11-07 PROCEDURE — 2580000003 HC RX 258: Performed by: NUCLEAR MEDICINE

## 2024-11-07 PROCEDURE — 99152 MOD SED SAME PHYS/QHP 5/>YRS: CPT | Performed by: NUCLEAR MEDICINE

## 2024-11-07 PROCEDURE — 93312 ECHO TRANSESOPHAGEAL: CPT | Performed by: NUCLEAR MEDICINE

## 2024-11-07 PROCEDURE — 6360000002 HC RX W HCPCS: Performed by: NUCLEAR MEDICINE

## 2024-11-07 PROCEDURE — 93325 DOPPLER ECHO COLOR FLOW MAPG: CPT

## 2024-11-07 PROCEDURE — 7100000011 HC PHASE II RECOVERY - ADDTL 15 MIN: Performed by: NUCLEAR MEDICINE

## 2024-11-07 RX ORDER — SODIUM CHLORIDE 9 MG/ML
INJECTION, SOLUTION INTRAVENOUS CONTINUOUS
Status: DISCONTINUED | OUTPATIENT
Start: 2024-11-07 | End: 2024-11-07 | Stop reason: HOSPADM

## 2024-11-07 RX ORDER — MIDAZOLAM HYDROCHLORIDE 1 MG/ML
INJECTION, SOLUTION INTRAMUSCULAR; INTRAVENOUS PRN
Status: DISCONTINUED | OUTPATIENT
Start: 2024-11-07 | End: 2024-11-07 | Stop reason: ALTCHOICE

## 2024-11-07 RX ORDER — FENTANYL CITRATE 50 UG/ML
INJECTION, SOLUTION INTRAMUSCULAR; INTRAVENOUS PRN
Status: DISCONTINUED | OUTPATIENT
Start: 2024-11-07 | End: 2024-11-07 | Stop reason: ALTCHOICE

## 2024-11-07 RX ADMIN — SODIUM CHLORIDE: 9 INJECTION, SOLUTION INTRAVENOUS at 13:50

## 2024-11-07 ASSESSMENT — PAIN - FUNCTIONAL ASSESSMENT
PAIN_FUNCTIONAL_ASSESSMENT: NONE - DENIES PAIN

## 2024-11-07 NOTE — H&P
[facility]  Sedation/Analgesia History & Physical    Pt Name: Hui Dean  Account number: [id number]  MRN: [id number]  YOB: 1955  Provider Performing Procedure: Kenneth Hernandez MD MD [facility]  Primary Care Physician: Kiran Sharma DO  Date: 11/7/2024    PRE-PROCEDURE    Code Status: FULL CODE  Brief History/Pre-Procedure Diagnosis:   Watchman      Consent: : I have discussed with the patient risks, benefits, and alternatives (and relevant risks, benefits, and side effects related to alternatives or not receiving care), and likelihood of the success.   The patient and/or representative appear to understand and agree to proceed.  The discussion encompasses risks, benefits, and side effects related to the alternatives and the risks related to not receiving the proposed care, treatment, and services.         MEDICAL HISTORY  []ASHD/ANGINA/MI/CHF   [x]Hypertension  []Diabetes  []Hyperlipidemia  []Smoking  []Family Hx of ASHD  []Additional information:       has a past medical history of Atrial fibrillation (HCC), COPD (chronic obstructive pulmonary disease) (HCC), DM2 (diabetes mellitus, type 2) (HCC), Dyslipidemia, Fibromyalgia, Former smoker, GERD (gastroesophageal reflux disease), H/O prosthetic aortic valve replacement, Heart failure with preserved ejection fraction (HCC), History of aortic stenosis, s/p valve replacement x 3, History of iron deficiency anemia, History of subarachnoid hemorrhage, Hypertension, essential, Major depression, THAYER (nonalcoholic steatohepatitis), Osteoarthritis, Presence of Watchman left atrial appendage closure device, RLS (restless legs syndrome), S/P mitral valve repair, S/P TAVR (transcatheter aortic valve replacement), and Valvular heart disease.    SURGICAL HISTORY   has a past surgical history that includes Cardiac catheterization (2004 or 2005); Diagnostic Cardiac Cath Lab Procedure; brain surgery; vascular surgery; Aortic valve

## 2024-11-07 NOTE — PROGRESS NOTES
Recovery mode. Denies discomfort, passing gas, taking fluids. Dr. Hernandez  discussed findings and plan of care with patient and . Discharge instructions provided, understanding verbalized.

## 2024-11-15 ENCOUNTER — OFFICE VISIT (OUTPATIENT)
Dept: CARDIOLOGY CLINIC | Age: 69
End: 2024-11-15

## 2024-11-15 VITALS
RESPIRATION RATE: 18 BRPM | DIASTOLIC BLOOD PRESSURE: 78 MMHG | HEIGHT: 63 IN | BODY MASS INDEX: 34.2 KG/M2 | SYSTOLIC BLOOD PRESSURE: 124 MMHG | WEIGHT: 193 LBS | OXYGEN SATURATION: 100 % | HEART RATE: 77 BPM

## 2024-11-15 DIAGNOSIS — I48.0 PAROXYSMAL ATRIAL FIBRILLATION (HCC): Chronic | ICD-10-CM

## 2024-11-15 DIAGNOSIS — Z95.2 S/P TAVR (TRANSCATHETER AORTIC VALVE REPLACEMENT): ICD-10-CM

## 2024-11-15 DIAGNOSIS — Z98.890 S/P MITRAL VALVE REPAIR: Chronic | ICD-10-CM

## 2024-11-15 DIAGNOSIS — Z95.818 PRESENCE OF WATCHMAN LEFT ATRIAL APPENDAGE CLOSURE DEVICE: Primary | Chronic | ICD-10-CM

## 2024-11-15 DIAGNOSIS — Z95.2 S/P AVR: ICD-10-CM

## 2024-11-15 NOTE — PATIENT INSTRUCTIONS
Restart the water pill as discussed - take daily for a few days to get water off. Then may try every other day.    Continue to monitor blood pressure, heart rate and weight.     Aim for 64 ounces of water a day.     Continue other current medications as prescribed.    Stay as active as you can.     Eat heart healthy diet.     Follow-up with your PCP as scheduled.    Follow-up with Dr. López on 2/21/2024 as scheduled or sooner if need.     Follow-up with CHF clinic as needed.

## 2024-11-15 NOTE — PROGRESS NOTES
size is normal. Normal wall thickness. Normal wall motion.  ·  Right Ventricle: Mildly reduced systolic function.  ·  Aortic Valve: Appropriately positioned transcatheter bioprosthetic valve that is well-seated. AV mean gradient is 18 mmHg. No regurgitation. No stenosis. AV mean gradient is 18 mmHg. AV peak gradient is 36 mmHg. AV peak velocity is 3.0 m/s. AV area by continuity VTI is 1.0 cm2.  ·  Mitral Valve: Moderate annular calcification. Moderately calcified, at the posterior leaflet. Mild regurgitation.  ·  Tricuspid Valve: Not well visualized. Repaired by annular ring. Mild stenosis noted.  ·  Left Atrium: Left atrium is moderately dilated.  ·  IVC/SVC: IVC diameter is less than or equal to 21 mm and decreases less than 50% during inspiration; therefore the estimated right atrial pressure is intermediate (~8 mmHg).  ·  Image quality is adequate.     Echo Findings    Left Ventricle Normal left ventricular systolic function with a visually estimated EF of 50 - 55%. Left ventricle size is normal. Normal wall thickness. Normal wall motion. Indeterminate diastolic function.   Left Atrium Left atrium size is normal. No thrombus present on the JACKSON device. No maude-device leak of JACKSON device. Normal sized pulmonary veins.   Interatrial Septum No interatrial shunt visualized with color Doppler. Agitated saline study was negative with and without provocation.   Right Ventricle Right ventricle size is normal. Normal systolic function.   Right Atrium Right atrium size is normal.   Aortic Valve Transcatheter bioprosthetic valve. No regurgitation. No stenosis.   Mitral Valve Valve repaired by annular ring. Mildly thickened leaflets. Mild regurgitation. No stenosis noted.   Tricuspid Valve Repaired by annular ring. Mild regurgitation. No stenosis noted.   Pulmonic Valve The pulmonic valve visualization is suboptimal but appears to be functioning normally. Physiologically normal regurgitation. No stenosis noted.   Pulmonary

## 2024-12-04 NOTE — PROGRESS NOTES
Chief Complaint   Patient presents with    Abdominal Pain    Follow-up     Back pain, better     History obtained from the patient.    SUBJECTIVE:  Hui Dean is a 69 y.o. female that presents today for    -Abdominal Pain    HPI:    Last 4 wks, inc abd pain and bloating coupled with inc constipation  UTD EGD APR 2023 and colo fall 2023  Comes and goes  Takes mirlalx  Some days better than others  When bowels moving ok, less abd pain bloating  When goes several days w/o BM, inc bloating, abd pain/cramping  Usually 4-5/10  Passing gas  No blood in stool/melena  Some nausea  No emesis  No coffee ground emesis or hematemesis  No upper abd pain     Provoking Factors - none  Alleviating Factors - none  Severity - as above   Radiation: No    Change in pain with eating?  No  Change in pain with BM?  Yes - better  Nausea? yes  Vomiting? no  Diarrhea? no  Constipation? yes  Blood in Stools? no  Dysuria/Change in Urinary Frequency/Hematuria? no      -Low Back Pain LAST VISIT:    HPI:   Started last wk  No specific injury  Bilateral low back pain   Pain about a 7/10  L worse than R  Pain radiates down both legs  Hurts all the time  Worse with movement  A bit better with rest  Has used Tylenol, Advil and topical txts    Remote hx of back injections/nerve ablation    Inciting injury or history of trauma? No  Pain is relieved by - rest  Pain is aggravated by - movement  Radiation of the pain? Yes - as above  Paresthesias of the extremities?  No  Saddle anesthesia? No  Bowel or bladder incontinence? No  Treatments tried - as above    UPDATE TODAY:   Sig improved  About a wk or 2 after last visit  Did not do PT or xray since feeling better  Denies sig back pain  No radicular sxs       Age/Gender Health Maintenance    Lipid -   Lab Results   Component Value Date    CHOL 164 01/10/2024    CHOL 187 01/31/2023    CHOL 182 12/27/2021     Lab Results   Component Value Date    TRIG 221 (H) 01/10/2024    TRIG 176 01/31/2023    TRIG

## 2024-12-05 ENCOUNTER — OFFICE VISIT (OUTPATIENT)
Dept: FAMILY MEDICINE CLINIC | Age: 69
End: 2024-12-05

## 2024-12-05 VITALS
HEART RATE: 78 BPM | BODY MASS INDEX: 34.34 KG/M2 | HEIGHT: 63 IN | DIASTOLIC BLOOD PRESSURE: 74 MMHG | RESPIRATION RATE: 18 BRPM | WEIGHT: 193.8 LBS | SYSTOLIC BLOOD PRESSURE: 126 MMHG | TEMPERATURE: 97.1 F | OXYGEN SATURATION: 98 %

## 2024-12-05 DIAGNOSIS — K59.00 CONSTIPATION, UNSPECIFIED CONSTIPATION TYPE: Primary | ICD-10-CM

## 2024-12-05 DIAGNOSIS — M51.362 DEGENERATION OF INTERVERTEBRAL DISC OF LUMBAR REGION WITH DISCOGENIC BACK PAIN AND LOWER EXTREMITY PAIN: ICD-10-CM

## 2024-12-05 RX ORDER — POLYETHYLENE GLYCOL 3350 17 G/17G
17 POWDER, FOR SOLUTION ORAL DAILY
Qty: 578 G | Refills: 1 | Status: SHIPPED | OUTPATIENT
Start: 2024-12-05

## 2024-12-05 NOTE — PATIENT INSTRUCTIONS
LAB INSTRUCTIONS:    Please complete labs within 1 week(s).    Please fast for 8 hours prior to lab collection.    The clinic will call you within 1 week of collection. If you have not heard from us within that amount of time, please call us at [phone].

## 2024-12-09 ENCOUNTER — HOSPITAL ENCOUNTER (OUTPATIENT)
Age: 69
Discharge: HOME | End: 2024-12-09
Payer: MEDICARE

## 2024-12-09 ENCOUNTER — LAB (OUTPATIENT)
Dept: LAB | Age: 69
End: 2024-12-09

## 2024-12-09 ENCOUNTER — HOSPITAL ENCOUNTER (OUTPATIENT)
Dept: GENERAL RADIOLOGY | Age: 69
Discharge: HOME | End: 2024-12-09
Payer: MEDICARE

## 2024-12-09 DIAGNOSIS — K59.00 CONSTIPATION, UNSPECIFIED CONSTIPATION TYPE: ICD-10-CM

## 2024-12-09 LAB
ALBUMIN SERPL BCG-MCNC: 4.1 G/DL (ref 3.5–5.1)
ALP SERPL-CCNC: 52 U/L (ref 38–126)
ALT SERPL W/O P-5'-P-CCNC: 14 U/L (ref 11–66)
ANION GAP SERPL CALC-SCNC: 12 MEQ/L (ref 8–16)
AST SERPL-CCNC: 20 U/L (ref 5–40)
BASOPHILS ABSOLUTE: 0 THOU/MM3 (ref 0–0.1)
BASOPHILS NFR BLD AUTO: 0.4 %
BILIRUB CONJ SERPL-MCNC: 0.2 MG/DL (ref 0.1–13.8)
BILIRUB SERPL-MCNC: 0.6 MG/DL (ref 0.3–1.2)
BUN SERPL-MCNC: 14 MG/DL (ref 7–22)
CALCIUM SERPL-MCNC: 9.7 MG/DL (ref 8.5–10.5)
CHLORIDE SERPL-SCNC: 103 MEQ/L (ref 98–111)
CO2 SERPL-SCNC: 26 MEQ/L (ref 23–33)
CREAT SERPL-MCNC: 1 MG/DL (ref 0.4–1.2)
DEPRECATED RDW RBC AUTO: 45.5 FL (ref 35–45)
EOSINOPHIL NFR BLD AUTO: 1.7 %
EOSINOPHILS ABSOLUTE: 0.1 THOU/MM3 (ref 0–0.4)
ERYTHROCYTE [DISTWIDTH] IN BLOOD BY AUTOMATED COUNT: 14.1 % (ref 11.5–14.5)
GFR SERPL CREATININE-BSD FRML MDRD: 61 ML/MIN/1.73M2
GLUCOSE SERPL-MCNC: 120 MG/DL (ref 70–108)
HCT VFR BLD AUTO: 36.5 % (ref 37–47)
HGB BLD-MCNC: 11.4 GM/DL (ref 12–16)
IMM GRANULOCYTES # BLD AUTO: 0.05 THOU/MM3 (ref 0–0.07)
IMM GRANULOCYTES NFR BLD AUTO: 0.7 %
LYMPHOCYTES ABSOLUTE: 1.9 THOU/MM3 (ref 1–4.8)
LYMPHOCYTES NFR BLD AUTO: 24.5 %
MCH RBC QN AUTO: 27.7 PG (ref 26–33)
MCHC RBC AUTO-ENTMCNC: 31.2 GM/DL (ref 32.2–35.5)
MCV RBC AUTO: 88.8 FL (ref 81–99)
MONOCYTES ABSOLUTE: 0.6 THOU/MM3 (ref 0.4–1.3)
MONOCYTES NFR BLD AUTO: 7.9 %
NEUTROPHILS ABSOLUTE: 4.9 THOU/MM3 (ref 1.8–7.7)
NEUTROPHILS NFR BLD AUTO: 64.8 %
NRBC BLD AUTO-RTO: 0 /100 WBC
PLATELET # BLD AUTO: 208 THOU/MM3 (ref 130–400)
PMV BLD AUTO: 11.7 FL (ref 9.4–12.4)
POTASSIUM SERPL-SCNC: 4.3 MEQ/L (ref 3.5–5.2)
PROT SERPL-MCNC: 7.3 G/DL (ref 6.1–8)
RBC # BLD AUTO: 4.11 MILL/MM3 (ref 4.2–5.4)
SODIUM SERPL-SCNC: 141 MEQ/L (ref 135–145)
TSH SERPL DL<=0.005 MIU/L-ACNC: 1.67 UIU/ML (ref 0.4–4.2)
WBC # BLD AUTO: 7.6 THOU/MM3 (ref 4.8–10.8)

## 2024-12-09 PROCEDURE — 74018 RADEX ABDOMEN 1 VIEW: CPT

## 2024-12-10 ENCOUNTER — TELEPHONE (OUTPATIENT)
Dept: FAMILY MEDICINE CLINIC | Age: 69
End: 2024-12-10

## 2024-12-10 NOTE — TELEPHONE ENCOUNTER
Pt has tried Miralax and still no bowel movement. Been taking it since last Wednesday.        Pt is still feeling the same and states stomach is still hurting.

## 2024-12-10 NOTE — TELEPHONE ENCOUNTER
----- Message from Dr. Kiran Sharma, DO sent at 12/10/2024  6:51 AM EST -----  Please let pt know that labs look good  Xray suggests some mild constipation  How are her symptoms doing? Let me know, thanks!

## 2024-12-10 NOTE — TELEPHONE ENCOUNTER
Left message on answering machine. Requested pt to call back at [phone], at their earliest convenience.

## 2024-12-12 NOTE — TELEPHONE ENCOUNTER
Pt informed of us calling next week. Pt voiced understanding with no further questions at this time.

## 2024-12-12 NOTE — TELEPHONE ENCOUNTER
Left message on answering machine. Requested pt to call back at [phone], at their earliest convenience.

## 2024-12-16 ENCOUNTER — TELEPHONE (OUTPATIENT)
Dept: FAMILY MEDICINE CLINIC | Age: 69
End: 2024-12-16

## 2024-12-16 NOTE — TELEPHONE ENCOUNTER
Left message on answering machine. Requested pt to call back at [phone], at their earliest convenience.

## 2024-12-16 NOTE — TELEPHONE ENCOUNTER
----- Message from Dr. Kiran Sharma, DO sent at 12/16/2024  7:49 AM EST -----  Please call pt and see how her GI sxs are doing  Let me know, thanks!

## 2024-12-16 NOTE — TELEPHONE ENCOUNTER
Pt states her sxs are not better. She is still bloated and her stomach is still hurting.       Pt got a bottle of magnesium citrate and it was just water she was experiencing and no stool.

## 2024-12-17 NOTE — TELEPHONE ENCOUNTER
Left detailed message for pt to give us a call if symptoms change before her appointment with SHANIKA Perkins per HIPAA. Requested call back at [phone]  if they have any further questions.

## 2025-01-09 ENCOUNTER — TELEPHONE (OUTPATIENT)
Dept: FAMILY MEDICINE CLINIC | Age: 70
End: 2025-01-09

## 2025-01-09 DIAGNOSIS — R91.1 PULMONARY NODULE: Primary | ICD-10-CM

## 2025-01-09 NOTE — TELEPHONE ENCOUNTER
Left message on answering machine. Requested pt to call back at [phone], at their earliest convenience.         Please transfer to Central Scheduling.

## 2025-01-09 NOTE — TELEPHONE ENCOUNTER
Please let pt know that she is due for 9 month f/u CT chest to monitor nodule.   Due early FEB 2025  Help schedule  Thanks!     Diagnosis Orders   1. Pulmonary nodule  CT CHEST WO CONTRAST        Future Appointments   Date Time Provider Department Center   2/19/2025  8:20 AM Kiran Sharma, DO [facility] ECC DEP   2/21/2025 11:45 AM Kenneth Hernandez MD [facility] [place]   4/9/2025 11:00 AM STR ECHO 1 [facility] STR Rad/Card   4/15/2025 10:00 AM Rebecca Payne, APRN - CNP N [facility] CHF [facility] [place]

## 2025-01-09 NOTE — TELEPHONE ENCOUNTER
Left message on answering machine. Requested pt to call back at [phone], at their earliest convenience.

## 2025-01-23 ENCOUNTER — TELEPHONE (OUTPATIENT)
Dept: FAMILY MEDICINE CLINIC | Age: 70
End: 2025-01-23

## 2025-01-23 DIAGNOSIS — D64.9 NORMOCYTIC ANEMIA: ICD-10-CM

## 2025-01-23 DIAGNOSIS — E11.9 TYPE 2 DIABETES MELLITUS WITHOUT COMPLICATION, WITHOUT LONG-TERM CURRENT USE OF INSULIN (HCC): Primary | ICD-10-CM

## 2025-01-23 NOTE — TELEPHONE ENCOUNTER
Pt informed of labs needing done. Pt voiced understanding with no further questions at this time.     Lab slip in the mail.    The patient is Stable - Low risk of patient condition declining or worsening    Shift Goals  Clinical Goals: Pt will remain to manage pain, Q2 repositioning, maintain skin integrity during this shift  Patient Goals: Able to rest comfortably  Family Goals: n/a    Progress made toward(s) clinical / shift goals:  Pt tolerated Q2 repositioning. Buttock dressing changed by MD Bansal. Pt denies any pain during this shift. Pt did not sustain any fall during this shift.     Patient is not progressing towards the following goals:

## 2025-01-23 NOTE — TELEPHONE ENCOUNTER
Pt due for fasting labs prior to next apt on 2/19/2025. Please call to have pt complete this. Thanks!    ASSESSMENT & PLAN   Diagnosis Orders   1. Type 2 diabetes mellitus without complication, without long-term current use of insulin (HCC)  CBC with Auto Differential    Comprehensive Metabolic Panel    TSH reflex to FT4    Lipid Panel    Albumin/Creatinine Ratio, Urine      2. Normocytic anemia  CBC with Auto Differential    Vitamin B12    Ferritin    Iron Binding Capacity    Iron    Folate        Future Appointments   Date Time Provider Department Center   1/24/2025  7:00 AM STR CT IMAGING [facility]  [facility] OUT EXP STR Rad/Card   2/19/2025  8:20 AM Kiran Sharma, DO [facility] E[facility] DEP   2/21/2025 11:45 AM Kenneth Hernandez MD [facility] [place]   4/9/2025 11:00 AM STR ECHO 1 [facility] STR Rad/Card   4/15/2025 10:00 AM Rebecca Payne APRN - CNP N [facility] CHF [facility] [place]

## 2025-01-24 ENCOUNTER — HOSPITAL ENCOUNTER (OUTPATIENT)
Dept: CT IMAGING | Age: 70
Discharge: HOME | End: 2025-01-24
Attending: FAMILY MEDICINE
Payer: MEDICARE

## 2025-01-24 ENCOUNTER — TELEPHONE (OUTPATIENT)
Dept: FAMILY MEDICINE CLINIC | Age: 70
End: 2025-01-24

## 2025-01-24 DIAGNOSIS — R91.1 PULMONARY NODULE: ICD-10-CM

## 2025-01-24 DIAGNOSIS — J40 BRONCHITIS: Primary | ICD-10-CM

## 2025-01-24 DIAGNOSIS — J44.1 CHRONIC OBSTRUCTIVE PULMONARY DISEASE WITH ACUTE EXACERBATION (HCC): ICD-10-CM

## 2025-01-24 PROCEDURE — 71250 CT THORAX DX C-: CPT

## 2025-01-24 RX ORDER — PREDNISONE 20 MG/1
40 TABLET ORAL DAILY
Qty: 10 TABLET | Refills: 0 | Status: SHIPPED | OUTPATIENT
Start: 2025-01-24 | End: 2025-01-29

## 2025-01-24 RX ORDER — BENZONATATE 100 MG/1
CAPSULE ORAL
Qty: 60 CAPSULE | Refills: 0 | Status: SHIPPED | OUTPATIENT
Start: 2025-01-24 | End: 2025-02-03

## 2025-01-24 NOTE — TELEPHONE ENCOUNTER
Pt has a productive cough for about 4-5 now. She is a little short of breath from the coughing. She is asking if there is anything you can send in for her. She has taken Mucinex, but it is not helping with the cough.

## 2025-01-24 NOTE — TELEPHONE ENCOUNTER
Pt informed of medication sent in and CT results. Pt voiced understanding with no further questions at this time.

## 2025-01-24 NOTE — TELEPHONE ENCOUNTER
Left message on answering machine. Requested pt to call back at [phone], at their earliest convenience.

## 2025-01-24 NOTE — TELEPHONE ENCOUNTER
Rx's sent  F/u if no better    Also, please let pt know that her CT chest showed nodule we've been watching has gotten a little smaller, which is great.   Rads recommends repeat CT in 1 year. Will call her to get done as time gets closer    Let me know if questions, thanks!     Diagnosis Orders   1. Bronchitis  predniSONE (DELTASONE) 20 MG tablet    amoxicillin-clavulanate (AUGMENTIN) 875-125 MG per tablet    benzonatate (TESSALON PERLES) 100 MG capsule      2. Chronic obstructive pulmonary disease with acute exacerbation (HCC)  predniSONE (DELTASONE) 20 MG tablet    amoxicillin-clavulanate (AUGMENTIN) 875-125 MG per tablet    benzonatate (TESSALON PERLES) 100 MG capsule        Future Appointments   Date Time Provider Department Center   2/19/2025  8:20 AM Kiran Sharma,  [facility]   2/21/2025 11:45 AM Kenneth Hernandze MD [facility] [place]   4/9/2025 11:00 AM STR ECHO 1 [facility] STR Rad/Card   4/15/2025 10:00 AM HempflRebecca dias, APRN - CNP N [facility] CHF [facility] [place]       CT Result (most recent):  CT CHEST WO CONTRAST 01/24/2025    Narrative  PROCEDURE: CT CHEST WO CONTRAST    CLINICAL INFORMATION: Solitary pulmonary nodule    COMPARISON: CT chest 5/9/2024.    TECHNIQUE:  5 mm axial imaging through the chest without contrast. Coronal and sagittal  reconstructions were performed.    All CT scans at this facility use dose modulation, iterative reconstruction,  and/or weight based dosing when appropriate to reduce the radiation dose to as  low as reasonably achievable.      FINDINGS:  Heart/mediastinum: The thyroid gland is unremarkable. The heart size is normal.  No pericardial effusion is identified. Aortic stent valve is present. The aorta  is not dilated. Mitral valve prosthesis is unchanged. No mediastinal, hilar, or  axillary lymphadenopathy is visualized accounting for lack of IV contrast.    Lungs: Dependent atelectasis is present. A 9 mm nodule in the right

## 2025-02-17 ENCOUNTER — LAB (OUTPATIENT)
Dept: LAB | Age: 70
End: 2025-02-17

## 2025-02-17 DIAGNOSIS — D64.9 NORMOCYTIC ANEMIA: ICD-10-CM

## 2025-02-17 DIAGNOSIS — E11.9 TYPE 2 DIABETES MELLITUS WITHOUT COMPLICATION, WITHOUT LONG-TERM CURRENT USE OF INSULIN (HCC): ICD-10-CM

## 2025-02-17 LAB
ALBUMIN SERPL BCG-MCNC: 4.5 G/DL (ref 3.5–5.1)
ALP SERPL-CCNC: 53 U/L (ref 38–126)
ALT SERPL W/O P-5'-P-CCNC: 16 U/L (ref 11–66)
ANION GAP SERPL CALC-SCNC: 18 MEQ/L (ref 8–16)
AST SERPL-CCNC: 20 U/L (ref 5–40)
BASOPHILS ABSOLUTE: 0 THOU/MM3 (ref 0–0.1)
BASOPHILS NFR BLD AUTO: 0.5 %
BILIRUB SERPL-MCNC: 0.5 MG/DL (ref 0.3–1.2)
BUN SERPL-MCNC: 12 MG/DL (ref 7–22)
CALCIUM SERPL-MCNC: 9.4 MG/DL (ref 8.5–10.5)
CHLORIDE SERPL-SCNC: 100 MEQ/L (ref 98–111)
CHOLEST SERPL-MCNC: 182 MG/DL (ref 100–199)
CO2 SERPL-SCNC: 26 MEQ/L (ref 23–33)
CREAT SERPL-MCNC: 1 MG/DL (ref 0.4–1.2)
CREAT UR-MCNC: 185 MG/DL
DEPRECATED RDW RBC AUTO: 42.9 FL (ref 35–45)
EOSINOPHIL NFR BLD AUTO: 1.4 %
EOSINOPHILS ABSOLUTE: 0.1 THOU/MM3 (ref 0–0.4)
ERYTHROCYTE [DISTWIDTH] IN BLOOD BY AUTOMATED COUNT: 13.4 % (ref 11.5–14.5)
FERRITIN SERPL IA-MCNC: 56 NG/ML (ref 10–291)
FOLATE SERPL-MCNC: 7.5 NG/ML (ref 4.8–24.2)
GFR SERPL CREATININE-BSD FRML MDRD: 60 ML/MIN/1.73M2
GLUCOSE SERPL-MCNC: 136 MG/DL (ref 70–108)
HCT VFR BLD AUTO: 38.2 % (ref 37–47)
HDLC SERPL-MCNC: 55 MG/DL
HGB BLD-MCNC: 12.2 GM/DL (ref 12–16)
IMM GRANULOCYTES # BLD AUTO: 0.03 THOU/MM3 (ref 0–0.07)
IMM GRANULOCYTES NFR BLD AUTO: 0.4 %
IRON SERPL-MCNC: 69 UG/DL (ref 50–170)
LDLC SERPL CALC-MCNC: 90 MG/DL
LYMPHOCYTES ABSOLUTE: 2 THOU/MM3 (ref 1–4.8)
LYMPHOCYTES NFR BLD AUTO: 26.8 %
MCH RBC QN AUTO: 28 PG (ref 26–33)
MCHC RBC AUTO-ENTMCNC: 31.9 GM/DL (ref 32.2–35.5)
MCV RBC AUTO: 87.8 FL (ref 81–99)
MICROALBUMIN UR-MCNC: 1.64 MG/DL
MICROALBUMIN/CREAT RATIO PNL UR: 9 MG/G (ref 0–30)
MONOCYTES ABSOLUTE: 0.6 THOU/MM3 (ref 0.4–1.3)
MONOCYTES NFR BLD AUTO: 7.7 %
NEUTROPHILS ABSOLUTE: 4.8 THOU/MM3 (ref 1.8–7.7)
NEUTROPHILS NFR BLD AUTO: 63.2 %
NRBC BLD AUTO-RTO: 0 /100 WBC
PLATELET # BLD AUTO: 219 THOU/MM3 (ref 130–400)
PMV BLD AUTO: 11.8 FL (ref 9.4–12.4)
POTASSIUM SERPL-SCNC: 4.5 MEQ/L (ref 3.5–5.2)
PROT SERPL-MCNC: 7.3 G/DL (ref 6.1–8)
RBC # BLD AUTO: 4.35 MILL/MM3 (ref 4.2–5.4)
SODIUM SERPL-SCNC: 144 MEQ/L (ref 135–145)
TIBC SERPL-MCNC: 407 UG/DL (ref 171–450)
TRIGL SERPL-MCNC: 185 MG/DL (ref 0–199)
TSH SERPL DL<=0.005 MIU/L-ACNC: 1.64 UIU/ML (ref 0.4–4.2)
VIT B12 SERPL-MCNC: 413 PG/ML (ref 211–911)
WBC # BLD AUTO: 7.6 THOU/MM3 (ref 4.8–10.8)

## 2025-02-17 SDOH — ECONOMIC STABILITY: FOOD INSECURITY: WITHIN THE PAST 12 MONTHS, THE FOOD YOU BOUGHT JUST DIDN'T LAST AND YOU DIDN'T HAVE MONEY TO GET MORE.: NEVER TRUE

## 2025-02-17 SDOH — ECONOMIC STABILITY: FOOD INSECURITY: WITHIN THE PAST 12 MONTHS, YOU WORRIED THAT YOUR FOOD WOULD RUN OUT BEFORE YOU GOT MONEY TO BUY MORE.: NEVER TRUE

## 2025-02-17 SDOH — ECONOMIC STABILITY: INCOME INSECURITY: IN THE LAST 12 MONTHS, WAS THERE A TIME WHEN YOU WERE NOT ABLE TO PAY THE MORTGAGE OR RENT ON TIME?: NO

## 2025-02-17 SDOH — ECONOMIC STABILITY: TRANSPORTATION INSECURITY
IN THE PAST 12 MONTHS, HAS THE LACK OF TRANSPORTATION KEPT YOU FROM MEDICAL APPOINTMENTS OR FROM GETTING MEDICATIONS?: NO

## 2025-02-17 SDOH — ECONOMIC STABILITY: TRANSPORTATION INSECURITY
IN THE PAST 12 MONTHS, HAS LACK OF TRANSPORTATION KEPT YOU FROM MEETINGS, WORK, OR FROM GETTING THINGS NEEDED FOR DAILY LIVING?: NO

## 2025-02-17 ASSESSMENT — PATIENT HEALTH QUESTIONNAIRE - PHQ9
SUM OF ALL RESPONSES TO PHQ QUESTIONS 1-9: 10
SUM OF ALL RESPONSES TO PHQ QUESTIONS 1-9: 10
5. POOR APPETITE OR OVEREATING: NOT AT ALL
SUM OF ALL RESPONSES TO PHQ QUESTIONS 1-9: 10
SUM OF ALL RESPONSES TO PHQ QUESTIONS 1-9: 10
9. THOUGHTS THAT YOU WOULD BE BETTER OFF DEAD, OR OF HURTING YOURSELF: NOT AT ALL
3. TROUBLE FALLING OR STAYING ASLEEP: NEARLY EVERY DAY
2. FEELING DOWN, DEPRESSED OR HOPELESS: SEVERAL DAYS
10. IF YOU CHECKED OFF ANY PROBLEMS, HOW DIFFICULT HAVE THESE PROBLEMS MADE IT FOR YOU TO DO YOUR WORK, TAKE CARE OF THINGS AT HOME, OR GET ALONG WITH OTHER PEOPLE: SOMEWHAT DIFFICULT
1. LITTLE INTEREST OR PLEASURE IN DOING THINGS: NEARLY EVERY DAY
8. MOVING OR SPEAKING SO SLOWLY THAT OTHER PEOPLE COULD HAVE NOTICED. OR THE OPPOSITE, BEING SO FIGETY OR RESTLESS THAT YOU HAVE BEEN MOVING AROUND A LOT MORE THAN USUAL: NOT AT ALL
SUM OF ALL RESPONSES TO PHQ9 QUESTIONS 1 & 2: 4
7. TROUBLE CONCENTRATING ON THINGS, SUCH AS READING THE NEWSPAPER OR WATCHING TELEVISION: NOT AT ALL
4. FEELING TIRED OR HAVING LITTLE ENERGY: NEARLY EVERY DAY
6. FEELING BAD ABOUT YOURSELF - OR THAT YOU ARE A FAILURE OR HAVE LET YOURSELF OR YOUR FAMILY DOWN: NOT AT ALL

## 2025-02-17 ASSESSMENT — LIFESTYLE VARIABLES
HOW OFTEN DO YOU HAVE A DRINK CONTAINING ALCOHOL: NEVER
HOW MANY STANDARD DRINKS CONTAINING ALCOHOL DO YOU HAVE ON A TYPICAL DAY: 0
HOW OFTEN DO YOU HAVE A DRINK CONTAINING ALCOHOL: 1
HOW OFTEN DO YOU HAVE SIX OR MORE DRINKS ON ONE OCCASION: 1
HOW MANY STANDARD DRINKS CONTAINING ALCOHOL DO YOU HAVE ON A TYPICAL DAY: PATIENT DOES NOT DRINK

## 2025-02-18 ENCOUNTER — TELEPHONE (OUTPATIENT)
Dept: FAMILY MEDICINE CLINIC | Age: 70
End: 2025-02-18

## 2025-02-18 RX ORDER — LINACLOTIDE 290 UG/1
CAPSULE, GELATIN COATED ORAL
COMMUNITY
Start: 2025-01-17 | End: 2025-02-19

## 2025-02-18 NOTE — PROGRESS NOTES
Chief Complaint   Patient presents with    Medicare AW    Depression    Abdominal Pain     History obtained from the patient.    SUBJECTIVE:  Hui Dean is a 69 y.o. female that presents today for    -Depression:  On Celexa  Was on trazodone, but stopped d/t fatigue   In winter, gets more depressed  No SI/HI  Doesn't want to inc Celexa      -Abdominal Pain LAST VISIT    HPI:    Last 4 wks, inc abd pain and bloating coupled with inc constipation  UTD EGD APR 2023 and colo fall 2023  Comes and goes  Takes mirlalx  Some days better than others  When bowels moving ok, less abd pain bloating  When goes several days w/o BM, inc bloating, abd pain/cramping  Usually 4-5/10  Passing gas  No blood in stool/melena  Some nausea  No emesis  No coffee ground emesis or hematemesis  No upper abd pain     Provoking Factors - none  Alleviating Factors - none  Severity - as above   Radiation: No    Change in pain with eating?  No  Change in pain with BM?  Yes - better  Nausea? yes  Vomiting? no  Diarrhea? no  Constipation? yes  Blood in Stools? no  Dysuria/Change in Urinary Frequency/Hematuria? no    UPDATE TODAY:   Still having recurrent abd pain and constipation  Saw GI, tried on Linzess, but wasn't helpful  Would like to see GI at [facility] for a 2nd opinion       -Anemia:  Hx of upper GI bleed in Apr 2023 and lower GI bleed SEPT 2023  UGI bleed from erosive esophagitis from NSAIDS and Eliquis.   Lower GI bleed from diverticulotis and hemorrhoids  Has since undergone WATCHMAN and is off Eliquis  Had combination, at one point during all this, of low iron, b12 and folate  Saw heme  Iron addressed with PO and IV iron  B12/folate addressed orally  Most recent CBC, FEB 2025, was WNL  Iron stores WNL, as are b12 and folate  Is off iron, folate and b12 now.       -Afib/HFpEF/Prosthetic Aortic Valve regurgitation/Hx of Aortic Valve replacement and Mitral Valve repair:  S/p WATCHMAN JAN 2024  S/p Transcatheter aortic valve-in-valve

## 2025-02-18 NOTE — TELEPHONE ENCOUNTER
----- Message from Dr. Kiran Sharma, DO sent at 2/17/2025  9:34 PM EST -----  Please let pt know that labs look good. No concerning findings. Let me know if questions, thanks!

## 2025-02-18 NOTE — TELEPHONE ENCOUNTER
Pt informed of normal lab results . Pt voiced understanding with no further questions at this time.

## 2025-02-19 ENCOUNTER — OFFICE VISIT (OUTPATIENT)
Dept: FAMILY MEDICINE CLINIC | Age: 70
End: 2025-02-19

## 2025-02-19 VITALS
SYSTOLIC BLOOD PRESSURE: 124 MMHG | TEMPERATURE: 97.1 F | RESPIRATION RATE: 16 BRPM | OXYGEN SATURATION: 99 % | DIASTOLIC BLOOD PRESSURE: 70 MMHG | HEIGHT: 63 IN | BODY MASS INDEX: 35.4 KG/M2 | HEART RATE: 67 BPM | WEIGHT: 199.8 LBS

## 2025-02-19 DIAGNOSIS — M79.7 FIBROMYALGIA: Chronic | ICD-10-CM

## 2025-02-19 DIAGNOSIS — I48.0 PAROXYSMAL ATRIAL FIBRILLATION (HCC): ICD-10-CM

## 2025-02-19 DIAGNOSIS — F33.8 SEASONAL AFFECTIVE DISORDER: ICD-10-CM

## 2025-02-19 DIAGNOSIS — E11.9 TYPE 2 DIABETES MELLITUS WITHOUT COMPLICATION, WITHOUT LONG-TERM CURRENT USE OF INSULIN (HCC): ICD-10-CM

## 2025-02-19 DIAGNOSIS — Z95.2 S/P TAVR (TRANSCATHETER AORTIC VALVE REPLACEMENT): Chronic | ICD-10-CM

## 2025-02-19 DIAGNOSIS — E61.1 IRON DEFICIENCY: ICD-10-CM

## 2025-02-19 DIAGNOSIS — Z87.19 HISTORY OF UPPER GASTROINTESTINAL BLEEDING: ICD-10-CM

## 2025-02-19 DIAGNOSIS — I10 HYPERTENSION, ESSENTIAL: Chronic | ICD-10-CM

## 2025-02-19 DIAGNOSIS — E53.8 FOLATE DEFICIENCY: ICD-10-CM

## 2025-02-19 DIAGNOSIS — G25.81 RLS (RESTLESS LEGS SYNDROME): Chronic | ICD-10-CM

## 2025-02-19 DIAGNOSIS — Z87.19 HISTORY OF LOWER GI BLEEDING: ICD-10-CM

## 2025-02-19 DIAGNOSIS — E66.9 OBESITY (BMI 30-39.9): ICD-10-CM

## 2025-02-19 DIAGNOSIS — K59.00 CONSTIPATION, UNSPECIFIED CONSTIPATION TYPE: ICD-10-CM

## 2025-02-19 DIAGNOSIS — R10.9 CHRONIC ABDOMINAL PAIN: ICD-10-CM

## 2025-02-19 DIAGNOSIS — Z00.00 MEDICARE ANNUAL WELLNESS VISIT, SUBSEQUENT: Primary | ICD-10-CM

## 2025-02-19 DIAGNOSIS — E53.8 B12 DEFICIENCY: ICD-10-CM

## 2025-02-19 DIAGNOSIS — I50.32 CHRONIC HEART FAILURE WITH PRESERVED EJECTION FRACTION (HCC): ICD-10-CM

## 2025-02-19 DIAGNOSIS — G89.29 CHRONIC ABDOMINAL PAIN: ICD-10-CM

## 2025-02-19 DIAGNOSIS — D64.9 NORMOCYTIC ANEMIA: ICD-10-CM

## 2025-02-19 DIAGNOSIS — R91.1 PULMONARY NODULE: ICD-10-CM

## 2025-02-19 DIAGNOSIS — J44.9 CHRONIC OBSTRUCTIVE PULMONARY DISEASE, UNSPECIFIED COPD TYPE (HCC): ICD-10-CM

## 2025-02-19 DIAGNOSIS — F33.0 MILD EPISODE OF RECURRENT MAJOR DEPRESSIVE DISORDER: ICD-10-CM

## 2025-02-19 DIAGNOSIS — E78.5 DYSLIPIDEMIA: Chronic | ICD-10-CM

## 2025-02-19 DIAGNOSIS — I35.0 SEVERE AORTIC STENOSIS: ICD-10-CM

## 2025-02-19 DIAGNOSIS — Z95.818 PRESENCE OF WATCHMAN LEFT ATRIAL APPENDAGE CLOSURE DEVICE: Chronic | ICD-10-CM

## 2025-02-19 LAB — HBA1C MFR BLD: 6.5 % (ref 4.3–5.7)

## 2025-02-19 RX ORDER — LISINOPRIL 5 MG/1
5 TABLET ORAL 2 TIMES DAILY
Qty: 180 TABLET | Refills: 0 | Status: SHIPPED | OUTPATIENT
Start: 2025-02-19

## 2025-02-19 RX ORDER — ALBUTEROL SULFATE 90 UG/1
2 INHALANT RESPIRATORY (INHALATION) EVERY 4 HOURS PRN
Qty: 2 EACH | Refills: 5 | Status: SHIPPED | OUTPATIENT
Start: 2025-02-19

## 2025-02-19 RX ORDER — CITALOPRAM HYDROBROMIDE 20 MG/1
TABLET ORAL
Qty: 90 TABLET | Refills: 3 | Status: SHIPPED | OUTPATIENT
Start: 2025-02-19

## 2025-02-19 NOTE — PATIENT INSTRUCTIONS
prescribed. Call your doctor if you think you are having a problem with your medicine.     If your doctor recommends aspirin, take the amount directed each day. Make sure you take aspirin and not another kind of pain reliever, such as acetaminophen (Tylenol).   When should you call for help?   Call 911 if you have symptoms of a heart attack. These may include:    Chest pain or pressure, or a strange feeling in the chest.     Sweating.     Shortness of breath.     Pain, pressure, or a strange feeling in the back, neck, jaw, or upper belly or in one or both shoulders or arms.     Lightheadedness or sudden weakness.     A fast or irregular heartbeat.   After you call 911, the  may tell you to chew 1 adult-strength or 2 to 4 low-dose aspirin. Wait for an ambulance. Do not try to drive yourself.  Watch closely for changes in your health, and be sure to contact your doctor if you have any problems.  Where can you learn more?  Go to https://www.[employer].net/patientEd and enter F075 to learn more about \"A Healthy Heart: Care Instructions.\"  Current as of: July 31, 2024  Content Version: 14.3  © 2024 [employer].   Care instructions adapted under license by [employer]. If you have questions about a medical condition or this instruction, always ask your healthcare professional. [employer], disclaims any warranty or liability for your use of this information.    Personalized Preventive Plan for Hui Dean - 2/19/2025  Medicare offers a range of preventive health benefits. Some of the tests and screenings are paid in full while other may be subject to a deductible, co-insurance, and/or copay.  Some of these benefits include a comprehensive review of your medical history including lifestyle, illnesses that may run in your family, and various assessments and screenings as appropriate.  After reviewing your medical record and screening and assessments performed today your provider may have

## 2025-02-21 ENCOUNTER — OFFICE VISIT (OUTPATIENT)
Dept: CARDIOLOGY CLINIC | Age: 70
End: 2025-02-21
Payer: MEDICARE

## 2025-02-21 VITALS
SYSTOLIC BLOOD PRESSURE: 142 MMHG | HEART RATE: 100 BPM | HEIGHT: 63 IN | BODY MASS INDEX: 35.3 KG/M2 | WEIGHT: 199.2 LBS | DIASTOLIC BLOOD PRESSURE: 60 MMHG

## 2025-02-21 DIAGNOSIS — Z95.2 HISTORY OF TRANSCATHETER AORTIC VALVE REPLACEMENT (TAVR): ICD-10-CM

## 2025-02-21 DIAGNOSIS — I10 PRIMARY HYPERTENSION: Primary | ICD-10-CM

## 2025-02-21 PROCEDURE — 3077F SYST BP >= 140 MM HG: CPT | Performed by: NUCLEAR MEDICINE

## 2025-02-21 PROCEDURE — 99213 OFFICE O/P EST LOW 20 MIN: CPT | Performed by: NUCLEAR MEDICINE

## 2025-02-21 PROCEDURE — 1124F ACP DISCUSS-NO DSCNMKR DOCD: CPT | Performed by: NUCLEAR MEDICINE

## 2025-02-21 PROCEDURE — 3078F DIAST BP <80 MM HG: CPT | Performed by: NUCLEAR MEDICINE

## 2025-02-21 PROCEDURE — 1159F MED LIST DOCD IN RCRD: CPT | Performed by: NUCLEAR MEDICINE

## 2025-02-21 NOTE — PROGRESS NOTES
[facility] PHYSICIANS [place] [facility]  [facility] CARDIOLOGY  [address]  [address]  [place] [address]  Dept: [phone]  Dept Fax: [phone]  Loc: [phone]    Visit Date: 2/21/2025    Hui Dean is a 69 y.o. female who presents todayfor:  Chief Complaint   Patient presents with    Follow-up     8 month follow up.    Hypertension    Valvular Heart Disease     Known TAVR in a valve  No chest pain   No changes in breathing  Some baseline dyspnea  BP is stable  No dizziness  No syncope  On statins for hyperlipidemia      HPI:  HPI  Past Medical History:   Diagnosis Date    Atrial fibrillation (HCC)     COPD (chronic obstructive pulmonary disease) (HCC)     Degenerative disc disease, lumbar     DM2 (diabetes mellitus, type 2) (HCC)     Dyslipidemia     Fibromyalgia     Former smoker     GERD (gastroesophageal reflux disease)     H/O prosthetic aortic valve replacement     Early bioprosthetic aortic valve failure with severe symptomatic prosthetic aortic regurgitation is now s/p REDO AORTIC VALVE REPLACEMENT, MITRAL VALVE REPAIR, TRICUSPID VALVE REPAIR, WILBERTO performed by Santo Pineda MD at [facility]    Heart failure with preserved ejection fraction (HCC)     History of aortic stenosis, s/p valve replacement x 3     History of iron deficiency anemia     History of subarachnoid hemorrhage 03/23/2015    Spontaneous SAH. Admitted to [facility]. Extensive w/u for cause was negative.     Hypertension, essential     Major depression     THAYER (nonalcoholic steatohepatitis)     Osteoarthritis     Presence of Watchman left atrial appendage closure device     RLS (restless legs syndrome) 01/16/2019    S/P mitral valve repair 07/13/2018    Dr. Pineda    S/P TAVR (transcatheter aortic valve replacement) 01/01/2015    x 3, last replacement APR 2024 d/t failure of the 1st      Valvular heart disease 01/16/2019      Past Surgical History:   Procedure Laterality Date    AORTIC VALVE REPLACEMENT      cow valve    BRAIN

## 2025-02-21 NOTE — PROGRESS NOTES
8 month follow up.    Last EKG done on 05/14/2024.    Reports shortness of breath.    Denies chest pain, palpitations, dizziness, and edema.

## 2025-03-02 DIAGNOSIS — E78.5 DYSLIPIDEMIA: ICD-10-CM

## 2025-03-03 RX ORDER — ATORVASTATIN CALCIUM 10 MG/1
10 TABLET, FILM COATED ORAL EVERY EVENING
Qty: 90 TABLET | Refills: 3 | Status: SHIPPED | OUTPATIENT
Start: 2025-03-03

## 2025-03-03 NOTE — TELEPHONE ENCOUNTER
Recent Visits  Date Type Provider Dept   02/19/25 Office Visit Kiran Sharma, DO [facility]   12/05/24 Office Visit Kiran Sharma, DO [facility]   10/11/24 Office Visit Kiran Sharma, DO [facility]   08/19/24 Office Visit Kiran Sharma, DO [facility]   04/16/24 Office Visit Kiran Sharma, DO [facility]   02/19/24 Office Visit Kiran Sharma, DO [facility]   10/17/23 Office Visit Kiran Sharma, DO [facility]   09/12/23 Office Visit Kiran Sharma, DO [facility]   Showing recent visits within past 540 days with a meds authorizing provider and meeting all other requirements  Future Appointments  Date Type Provider Dept   05/19/25 Appointment Kiran Sharma, DO [facility]   Showing future appointments within next 150 days with a meds authorizing provider and meeting all other requirements

## 2025-03-24 ENCOUNTER — TRANSCRIBE ORDERS (OUTPATIENT)
Dept: ADMINISTRATIVE | Age: 70
End: 2025-03-24

## 2025-03-24 DIAGNOSIS — M51.379 DEGENERATION OF INTERVERTEBRAL DISC OF LUMBOSACRAL REGION, UNSPECIFIED WHETHER PAIN PRESENT: Primary | ICD-10-CM

## 2025-03-24 DIAGNOSIS — M47.817 FACET ARTHROPATHY, LUMBOSACRAL: ICD-10-CM

## 2025-03-24 DIAGNOSIS — M54.16 LUMBAR RADICULOPATHY: ICD-10-CM

## 2025-04-09 ENCOUNTER — HOSPITAL ENCOUNTER (OUTPATIENT)
Age: 70
Discharge: HOME | End: 2025-04-11
Attending: INTERNAL MEDICINE
Payer: MEDICARE

## 2025-04-09 DIAGNOSIS — Z95.2 S/P AVR: ICD-10-CM

## 2025-04-09 DIAGNOSIS — I38 VALVULAR HEART DISEASE: ICD-10-CM

## 2025-04-09 DIAGNOSIS — Z95.2 S/P TAVR (TRANSCATHETER AORTIC VALVE REPLACEMENT): ICD-10-CM

## 2025-04-09 LAB
ECHO AO ASC DIAM: 3 CM
ECHO AV AREA PEAK VELOCITY: 1.3 CM2
ECHO AV AREA VTI: 1.5 CM2
ECHO AV MEAN GRADIENT: 10 MMHG
ECHO AV MEAN VELOCITY: 1.5 M/S
ECHO AV PEAK GRADIENT: 17 MMHG
ECHO AV PEAK VELOCITY: 2.1 M/S
ECHO AV VELOCITY RATIO: 0.48
ECHO AV VTI: 44.6 CM
ECHO LA AREA 2C: 12.6 CM2
ECHO LA AREA 4C: 13.3 CM2
ECHO LA DIAMETER: 4.1 CM
ECHO LA MAJOR AXIS: 4.9 CM
ECHO LA MINOR AXIS: 4.5 CM
ECHO LA VOL BP: 30 ML (ref 22–52)
ECHO LA VOL MOD A2C: 29 ML (ref 22–52)
ECHO LA VOL MOD A4C: 29 ML (ref 22–52)
ECHO LV EF PHYSICIAN: 55 %
ECHO LV FRACTIONAL SHORTENING: 30 % (ref 28–44)
ECHO LV INTERNAL DIMENSION DIASTOLIC: 4.4 CM (ref 3.9–5.3)
ECHO LV INTERNAL DIMENSION SYSTOLIC: 3.1 CM
ECHO LV ISOVOLUMETRIC RELAXATION TIME (IVRT): 82 MS
ECHO LV IVSD: 0.9 CM (ref 0.6–0.9)
ECHO LV MASS 2D: 128 G (ref 67–162)
ECHO LV POSTERIOR WALL DIASTOLIC: 0.9 CM (ref 0.6–0.9)
ECHO LV RELATIVE WALL THICKNESS RATIO: 0.41
ECHO LVOT AREA: 2.5 CM2
ECHO LVOT AV VTI INDEX: 0.59
ECHO LVOT DIAM: 1.8 CM
ECHO LVOT MEAN GRADIENT: 3 MMHG
ECHO LVOT PEAK GRADIENT: 4 MMHG
ECHO LVOT PEAK VELOCITY: 1 M/S
ECHO LVOT SV: 66.9 ML
ECHO LVOT VTI: 26.3 CM
ECHO MV A VELOCITY: 1 M/S
ECHO MV AREA VTI: 1.6 CM2
ECHO MV E DECELERATION TIME (DT): 216 MS
ECHO MV E VELOCITY: 1.92 M/S
ECHO MV E/A RATIO: 1.92
ECHO MV LVOT VTI INDEX: 1.54
ECHO MV MAX VELOCITY: 1.8 M/S
ECHO MV MEAN GRADIENT: 5 MMHG
ECHO MV MEAN VELOCITY: 1 M/S
ECHO MV PEAK GRADIENT: 13 MMHG
ECHO MV REGURGITANT PEAK GRADIENT: 55 MMHG
ECHO MV REGURGITANT PEAK VELOCITY: 3.7 M/S
ECHO MV VTI: 40.6 CM
ECHO PV MAX VELOCITY: 1.1 M/S
ECHO PV PEAK GRADIENT: 5 MMHG
ECHO RV INTERNAL DIMENSION: 2.4 CM
ECHO TV E WAVE: 0.8 M/S
ECHO TV MEAN GRADIENT: 2 MMHG
ECHO TV MEAN VELOCITY: 0.7 M/S
ECHO TV PEAK GRADIENT: 5 MMHG
ECHO TV VALVE AREA VTI: 2.2 CM2
ECHO TV VTI: 30.5 CM

## 2025-04-09 PROCEDURE — 93306 TTE W/DOPPLER COMPLETE: CPT

## 2025-04-10 ENCOUNTER — TELEPHONE (OUTPATIENT)
Dept: CARDIOLOGY CLINIC | Age: 70
End: 2025-04-10

## 2025-04-22 ENCOUNTER — TELEPHONE (OUTPATIENT)
Dept: CARDIOLOGY CLINIC | Age: 70
End: 2025-04-22

## 2025-04-22 ENCOUNTER — OFFICE VISIT (OUTPATIENT)
Dept: CARDIOLOGY CLINIC | Age: 70
End: 2025-04-22
Payer: MEDICARE

## 2025-04-22 VITALS
DIASTOLIC BLOOD PRESSURE: 60 MMHG | HEART RATE: 78 BPM | SYSTOLIC BLOOD PRESSURE: 110 MMHG | BODY MASS INDEX: 36 KG/M2 | WEIGHT: 200 LBS | OXYGEN SATURATION: 98 %

## 2025-04-22 DIAGNOSIS — I38 VALVULAR HEART DISEASE: ICD-10-CM

## 2025-04-22 DIAGNOSIS — Z95.2 H/O PROSTHETIC AORTIC VALVE REPLACEMENT: ICD-10-CM

## 2025-04-22 DIAGNOSIS — E87.6 HYPOKALEMIA: ICD-10-CM

## 2025-04-22 DIAGNOSIS — Z95.2 S/P TAVR (TRANSCATHETER AORTIC VALVE REPLACEMENT): ICD-10-CM

## 2025-04-22 DIAGNOSIS — I50.32 CHRONIC HEART FAILURE WITH PRESERVED EJECTION FRACTION (HCC): Primary | ICD-10-CM

## 2025-04-22 PROCEDURE — 93000 ELECTROCARDIOGRAM COMPLETE: CPT | Performed by: NURSE PRACTITIONER

## 2025-04-22 PROCEDURE — 3074F SYST BP LT 130 MM HG: CPT | Performed by: NURSE PRACTITIONER

## 2025-04-22 PROCEDURE — 3078F DIAST BP <80 MM HG: CPT | Performed by: NURSE PRACTITIONER

## 2025-04-22 PROCEDURE — 1159F MED LIST DOCD IN RCRD: CPT | Performed by: NURSE PRACTITIONER

## 2025-04-22 PROCEDURE — 99214 OFFICE O/P EST MOD 30 MIN: CPT | Performed by: NURSE PRACTITIONER

## 2025-04-22 PROCEDURE — 1124F ACP DISCUSS-NO DSCNMKR DOCD: CPT | Performed by: NURSE PRACTITIONER

## 2025-04-22 RX ORDER — METOPROLOL SUCCINATE 25 MG/1
25 TABLET, EXTENDED RELEASE ORAL DAILY
Qty: 90 TABLET | Refills: 3 | Status: SHIPPED | OUTPATIENT
Start: 2025-04-22

## 2025-04-22 RX ORDER — FUROSEMIDE 40 MG/1
40 TABLET ORAL DAILY
Qty: 90 TABLET | Refills: 3 | Status: SHIPPED | OUTPATIENT
Start: 2025-04-22

## 2025-04-22 RX ORDER — POTASSIUM CHLORIDE 1500 MG/1
20 TABLET, EXTENDED RELEASE ORAL DAILY
Qty: 90 TABLET | Refills: 3 | Status: SHIPPED | OUTPATIENT
Start: 2025-04-22

## 2025-04-22 NOTE — PROGRESS NOTES
Hui Dean (:  1955) is a 69 y.o. female, Established patient, here for 1 year post Lilly TAVR follow-up evaluation.    Primary Cardiologist: Dr. López  Structural heart: Sonny Martines MD    HPI:  S/p Lilly TAVR 2024    Valvular Heart Disease      1 year post Lilly TAVR follow-up on 2025:  Assessment & Plan  S/p Lilly TAVR with [facility] - I year f/u, NYHA II  Hx of SAVR x 2 in the past (AVR x 2 (2015 - Placed by Dr. Kush Chappell with a 23mm Tirfecta Tissue Aortic Valve) (2018 - 21mm [employer] Trifecta was placed by Dr. Santo Pineda)   MV repair (2018 - 28mm [employer] Physio annuloplasty band)  TV repair (2018 - 28mm Durham MC3 annuloplasty band)   S/p WATCHMAN  Hx of Afib  Preserved EF  CKD  COPD  THAYER  DM II  HLD  Hx of CVA      1. Post valve-in-valve TAVR follow-up:   Seen for 1 year follow-up. Reports overall has been doing ok - no chest discomfort or change in VALIENTE in past year. No lightheadedness or dizziness; no syncope or near syncope. No volume noted on exam. HR and BP well controlled.   Does report significant weight gain over winter months but was not following diet and eating great deal of sweets. Now working with PCP on weight loss. Reports tolerating her medications. No bleeding on ASA. Physical activity limited by chronic back pain - does follow with pain management.   1 year post Lilly TAVR echo on 2025 notes valve in good position with pEF 50-55%, no AS, AI or PVL. CVP ~ 3 mmHg. No stroke sx.   - Continue current medication regimen  - Follow up with Dr. López as scheduled and and with Dr. Martines as needed    2. Shortness of breath:  - Monitor symptoms and contact the office if they worsen - have had stable VALIENTE over past year - no volume on exam today.     3. Weight gain:   Has had significant weight gain due to poor dietary habits. Now working with PCP on weight loss; limiting food intake and increased water consumption - now likely drinking too much - well over

## 2025-04-22 NOTE — PATIENT INSTRUCTIONS
Continue current medications as prescribed.    Stay as active as you can.     Eat heart healthy diet.     Follow-up with your PCP as scheduled.    Follow-up with Dr. López on 2/27/2026 as scheduled or sooner if need.     Follow-up with your PCP  Call our office with any questions or concerns.   See Dr. Martines on an as needed basis.   [phone]- use Option #3.    May use Arthritis Strength tylenol or extra strength Tylenol as 2 tablets every 8 hours around the clock to help keep the inflammation in your back down.

## 2025-04-24 ENCOUNTER — HOSPITAL ENCOUNTER (OUTPATIENT)
Dept: WOMENS IMAGING | Age: 70
Discharge: HOME | End: 2025-04-24
Attending: RADIOLOGY
Payer: MEDICARE

## 2025-04-24 DIAGNOSIS — R92.8 ABNORMAL MAMMOGRAM: ICD-10-CM

## 2025-04-24 DIAGNOSIS — Z09 FOLLOW-UP EXAM: ICD-10-CM

## 2025-04-24 DIAGNOSIS — R92.30 BREAST DENSITY: ICD-10-CM

## 2025-04-24 PROCEDURE — G0279 TOMOSYNTHESIS, MAMMO: HCPCS

## 2025-05-14 RX ORDER — LISINOPRIL 5 MG/1
5 TABLET ORAL 2 TIMES DAILY
Qty: 180 TABLET | Refills: 3 | Status: SHIPPED | OUTPATIENT
Start: 2025-05-14

## 2025-05-18 NOTE — PROGRESS NOTES
developed and well- nourished  Eyes: pupils equal, round, and reactive to light, extraocular eye movements intact, conjunctivae and eye lids without erythema  ENT: external ear and ear canal clear bilaterally, TMs intact and regular, nose without deformity, nasal mucosa and turbinates normal without polyps, oropharynx normal, dentition is normal for age   Neck: supple and non-tender without mass, no thyromegaly or thyroid nodules, no cervical lymphadenopathy  Pulmonary/Chest: clear to auscultation bilaterally- no wheezes, rales or rhonchi, normal air movement, no respiratory distress or retractions  Cardiovascular: S1 and S2 auscultated w/ RRR. No murmurs, rubs, clicks, or gallops, distal pulses intact.  Abdomen: soft, non-tender, non-distended, bowel sounds physiologic,  no rebound or guarding, no masses or hernias noted. Liver and spleen without enlargement.   Extremities: no cyanosis, clubbing or edema of the lower extremities.   Skin: warm and dry, no rash or erythema  Psych: Affect appropriate. Mood etuhymic. Thought process is normal without evidence of psychosis. Good insight and appropriate interaction.  Cognition and memory appear to be intact.      Results for orders placed or performed in visit on 05/19/25   POCT glycosylated hemoglobin (Hb A1C)   Result Value Ref Range    Hemoglobin A1C POC 6.3 (H) 4.3 - 5.7 %     Lab Results   Component Value Date/Time    LABA1C 6.3 05/19/2025 06:44 AM    LABA1C 6.5 02/19/2025 06:43 AM    LABA1C 6.3 08/19/2024 06:32 AM    LABA1C 5.3 02/19/2024 11:19 AM    LABA1C 5.6 08/22/2023 06:56 AM    LABA1C 6.3 02/21/2023 07:15 AM    LABA1C 5.9 08/11/2022 10:00 AM    LABA1C 6.5 12/30/2019 08:35 AM    LABA1C 6.1 08/20/2019 08:08 AM    LABA1C 6.4 02/21/2019 08:32 AM    LABA1C 5.7 07/12/2018 09:47 AM       ASSESSMENT & PLAN  1. Recurrent major depressive disorder, in full remission    Stable.   Con't Celexa  Encourage light box use in the winter time    2. Seasonal affective

## 2025-05-19 ENCOUNTER — OFFICE VISIT (OUTPATIENT)
Dept: FAMILY MEDICINE CLINIC | Age: 70
End: 2025-05-19
Payer: MEDICARE

## 2025-05-19 ENCOUNTER — TELEPHONE (OUTPATIENT)
Dept: FAMILY MEDICINE CLINIC | Age: 70
End: 2025-05-19

## 2025-05-19 ENCOUNTER — CARE COORDINATION (OUTPATIENT)
Dept: CARE COORDINATION | Age: 70
End: 2025-05-19

## 2025-05-19 VITALS
TEMPERATURE: 97.7 F | BODY MASS INDEX: 35.51 KG/M2 | HEART RATE: 58 BPM | OXYGEN SATURATION: 98 % | RESPIRATION RATE: 16 BRPM | SYSTOLIC BLOOD PRESSURE: 130 MMHG | WEIGHT: 200.4 LBS | HEIGHT: 63 IN | DIASTOLIC BLOOD PRESSURE: 78 MMHG

## 2025-05-19 DIAGNOSIS — M79.7 FIBROMYALGIA: Chronic | ICD-10-CM

## 2025-05-19 DIAGNOSIS — R11.0 NAUSEA: ICD-10-CM

## 2025-05-19 DIAGNOSIS — Z87.19 HISTORY OF LOWER GI BLEEDING: ICD-10-CM

## 2025-05-19 DIAGNOSIS — G89.29 CHRONIC ABDOMINAL PAIN: ICD-10-CM

## 2025-05-19 DIAGNOSIS — Z95.2 S/P TAVR (TRANSCATHETER AORTIC VALVE REPLACEMENT): Chronic | ICD-10-CM

## 2025-05-19 DIAGNOSIS — E61.1 IRON DEFICIENCY: ICD-10-CM

## 2025-05-19 DIAGNOSIS — I50.32 CHRONIC HEART FAILURE WITH PRESERVED EJECTION FRACTION (HCC): ICD-10-CM

## 2025-05-19 DIAGNOSIS — J44.9 CHRONIC OBSTRUCTIVE PULMONARY DISEASE, UNSPECIFIED COPD TYPE (HCC): ICD-10-CM

## 2025-05-19 DIAGNOSIS — E53.8 B12 DEFICIENCY: ICD-10-CM

## 2025-05-19 DIAGNOSIS — Z95.818 PRESENCE OF WATCHMAN LEFT ATRIAL APPENDAGE CLOSURE DEVICE: Chronic | ICD-10-CM

## 2025-05-19 DIAGNOSIS — Z87.19 HISTORY OF UPPER GASTROINTESTINAL BLEEDING: ICD-10-CM

## 2025-05-19 DIAGNOSIS — R10.9 CHRONIC ABDOMINAL PAIN: ICD-10-CM

## 2025-05-19 DIAGNOSIS — K59.00 CONSTIPATION, UNSPECIFIED CONSTIPATION TYPE: ICD-10-CM

## 2025-05-19 DIAGNOSIS — F33.8 SEASONAL AFFECTIVE DISORDER: ICD-10-CM

## 2025-05-19 DIAGNOSIS — F33.42 RECURRENT MAJOR DEPRESSIVE DISORDER, IN FULL REMISSION: Primary | ICD-10-CM

## 2025-05-19 DIAGNOSIS — E11.9 TYPE 2 DIABETES MELLITUS WITHOUT COMPLICATION, WITHOUT LONG-TERM CURRENT USE OF INSULIN (HCC): ICD-10-CM

## 2025-05-19 DIAGNOSIS — E78.5 DYSLIPIDEMIA: Chronic | ICD-10-CM

## 2025-05-19 DIAGNOSIS — I10 HYPERTENSION, ESSENTIAL: Chronic | ICD-10-CM

## 2025-05-19 DIAGNOSIS — I48.0 PAROXYSMAL ATRIAL FIBRILLATION (HCC): ICD-10-CM

## 2025-05-19 DIAGNOSIS — E66.9 OBESITY (BMI 30-39.9): ICD-10-CM

## 2025-05-19 DIAGNOSIS — R91.1 PULMONARY NODULE: ICD-10-CM

## 2025-05-19 DIAGNOSIS — D64.9 NORMOCYTIC ANEMIA: ICD-10-CM

## 2025-05-19 DIAGNOSIS — I35.0 SEVERE AORTIC STENOSIS: ICD-10-CM

## 2025-05-19 DIAGNOSIS — E53.8 FOLATE DEFICIENCY: ICD-10-CM

## 2025-05-19 DIAGNOSIS — G25.81 RLS (RESTLESS LEGS SYNDROME): Chronic | ICD-10-CM

## 2025-05-19 LAB — HBA1C MFR BLD: 6.3 % (ref 4.3–5.7)

## 2025-05-19 PROCEDURE — 1160F RVW MEDS BY RX/DR IN RCRD: CPT | Performed by: FAMILY MEDICINE

## 2025-05-19 PROCEDURE — 3075F SYST BP GE 130 - 139MM HG: CPT | Performed by: FAMILY MEDICINE

## 2025-05-19 PROCEDURE — 3078F DIAST BP <80 MM HG: CPT | Performed by: FAMILY MEDICINE

## 2025-05-19 PROCEDURE — 1124F ACP DISCUSS-NO DSCNMKR DOCD: CPT | Performed by: FAMILY MEDICINE

## 2025-05-19 PROCEDURE — 99214 OFFICE O/P EST MOD 30 MIN: CPT | Performed by: FAMILY MEDICINE

## 2025-05-19 PROCEDURE — 1159F MED LIST DOCD IN RCRD: CPT | Performed by: FAMILY MEDICINE

## 2025-05-19 PROCEDURE — 3044F HG A1C LEVEL LT 7.0%: CPT | Performed by: FAMILY MEDICINE

## 2025-05-19 RX ORDER — ONDANSETRON 4 MG/1
4 TABLET, FILM COATED ORAL 3 TIMES DAILY PRN
Qty: 90 TABLET | Refills: 0 | Status: SHIPPED | OUTPATIENT
Start: 2025-05-19

## 2025-05-19 NOTE — TELEPHONE ENCOUNTER
Pt went to  her Ozempic and it was almost $400/month and she can't afford that. Pt was instructed to call in if she wasn't able to and something else would be called in.

## 2025-05-20 NOTE — CARE COORDINATION
Spoke with the patient and we are going to start the application for assistance on her Ozempic. I was able to fax the provider his portion and will mail the patient her portion soon.        Blanche Bhakta [facility]  CC   Medication Assistance  [place], and [employer]    (P) [phone]  (F) [phone]

## 2025-05-21 ENCOUNTER — CARE COORDINATION (OUTPATIENT)
Dept: CARE COORDINATION | Age: 70
End: 2025-05-21

## 2025-05-21 DIAGNOSIS — E87.6 HYPOKALEMIA: ICD-10-CM

## 2025-05-21 DIAGNOSIS — I50.32 CHRONIC HEART FAILURE WITH PRESERVED EJECTION FRACTION (HCC): ICD-10-CM

## 2025-05-21 DIAGNOSIS — I38 VALVULAR HEART DISEASE: ICD-10-CM

## 2025-05-21 RX ORDER — POTASSIUM CHLORIDE 1500 MG/1
20 TABLET, EXTENDED RELEASE ORAL DAILY
Qty: 90 TABLET | Refills: 2 | Status: SHIPPED | OUTPATIENT
Start: 2025-05-21

## 2025-05-21 NOTE — CARE COORDINATION
I was able to mail the patient her portion of the application for assistance on Ozempic.        Blanche Bhakta [facility]  CC   Medication Assistance  [place], and [employer]    (P) [phone]  (F) [phone]

## 2025-05-22 DIAGNOSIS — E87.6 HYPOKALEMIA: ICD-10-CM

## 2025-05-22 DIAGNOSIS — I38 VALVULAR HEART DISEASE: ICD-10-CM

## 2025-05-22 DIAGNOSIS — Z95.2 H/O PROSTHETIC AORTIC VALVE REPLACEMENT: ICD-10-CM

## 2025-05-22 DIAGNOSIS — I50.32 CHRONIC HEART FAILURE WITH PRESERVED EJECTION FRACTION (HCC): ICD-10-CM

## 2025-05-22 RX ORDER — METOPROLOL SUCCINATE 25 MG/1
25 TABLET, EXTENDED RELEASE ORAL DAILY
Qty: 90 TABLET | Refills: 3 | Status: SHIPPED | OUTPATIENT
Start: 2025-05-22

## 2025-05-22 RX ORDER — LISINOPRIL 5 MG/1
5 TABLET ORAL 2 TIMES DAILY
Qty: 180 TABLET | Refills: 3 | Status: SHIPPED | OUTPATIENT
Start: 2025-05-22

## 2025-05-22 RX ORDER — FUROSEMIDE 40 MG/1
40 TABLET ORAL DAILY
Qty: 90 TABLET | Refills: 3 | Status: SHIPPED | OUTPATIENT
Start: 2025-05-22

## 2025-06-04 ENCOUNTER — CARE COORDINATION (OUTPATIENT)
Dept: CARE COORDINATION | Age: 70
End: 2025-06-04

## 2025-06-04 NOTE — CARE COORDINATION
I was able to fax in the patients application for assistance on her Ozempic.      Blanche Bhakta [facility]  CC   Medication Assistance  [place], and [place] [employer]    (P) [phone]  (F) [phone]

## 2025-06-09 ENCOUNTER — CARE COORDINATION (OUTPATIENT)
Dept: CARE COORDINATION | Age: 70
End: 2025-06-09

## 2025-06-09 NOTE — CARE COORDINATION
Patient approved into the PAP through [employer] for her Ozempic. I left the patient a message giving her a heads up and messaged the office to be on the look out for her shipment.        Blanche Bhakta [facility]  CC   Medication Assistance  [place], and [employer]    (P) [phone]  (F) [phone]

## 2025-06-19 ENCOUNTER — TELEPHONE (OUTPATIENT)
Dept: FAMILY MEDICINE CLINIC | Age: 70
End: 2025-06-19

## 2025-06-19 ENCOUNTER — CARE COORDINATION (OUTPATIENT)
Dept: CARE COORDINATION | Age: 70
End: 2025-06-19

## 2025-06-19 NOTE — TELEPHONE ENCOUNTER
Pt presented to the office for ozempic teaching   Pt received the 1 mg dose pens   She did not receive the 0.25 mg/0.5 mg dose pen- starting dose  From medication assistance program

## 2025-06-19 NOTE — TELEPHONE ENCOUNTER
Received patients Ozempic from [employer] Patient [employer].     Verified correct medication and dose was given with medication list and [employer] [employer] order.     Called patient and informed her that her Ozempic was ready for  in our office. Patient verbalized understanding and stated that she would be at the office later on today to .    Medication was placed in the fridge in medication room with a proof of pickup form to sign.

## 2025-06-19 NOTE — CARE COORDINATION
Office got her first shipment of Ozempic, only got the 1mg dose not the 0.25 or 0.5. Most likely because of the way the script was written. I am faxing the office a form to fill out to include those two strengths in hopes the program will send those as well.        Blanche Bhakta [facility]  CC   Medication Assistance  [place], and [facility]    (P) [phone]  (F) [phone]

## 2025-06-19 NOTE — TELEPHONE ENCOUNTER
When I call the automatic line they only list 1mg on her chart, I do see on the application 0.25.0.5, and 1 was selected. That most likely messed things up. They usually prefer you select the 0.25 and send in an updated refill form with the increased doses as you go. I can fax that to you in hopes they will send her the 0.25 and 0.5 mg doses. I would fax it back to the number on the form that I will send you.

## 2025-06-21 DIAGNOSIS — G25.81 RLS (RESTLESS LEGS SYNDROME): Chronic | ICD-10-CM

## 2025-06-22 DIAGNOSIS — K92.2 UPPER GI BLEED: ICD-10-CM

## 2025-06-22 DIAGNOSIS — D50.9 IRON DEFICIENCY ANEMIA, UNSPECIFIED IRON DEFICIENCY ANEMIA TYPE: ICD-10-CM

## 2025-06-22 DIAGNOSIS — K27.9 PUD (PEPTIC ULCER DISEASE): ICD-10-CM

## 2025-06-23 RX ORDER — PANTOPRAZOLE SODIUM 40 MG/1
TABLET, DELAYED RELEASE ORAL
Qty: 180 TABLET | Refills: 3 | Status: SHIPPED | OUTPATIENT
Start: 2025-06-23

## 2025-06-23 RX ORDER — ROPINIROLE 3 MG/1
3 TABLET, FILM COATED ORAL 2 TIMES DAILY
Qty: 180 TABLET | Refills: 3 | Status: SHIPPED | OUTPATIENT
Start: 2025-06-23

## 2025-06-23 NOTE — TELEPHONE ENCOUNTER
Recent Visits  Date Type Provider Dept   05/19/25 Office Visit Kiran Sharma, DO [facility]   02/19/25 Office Visit Kiran Sharma, DO [facility]   12/05/24 Office Visit Kiran Sharma, DO [facility]   10/11/24 Office Visit Kiran Sharma, DO [facility]   08/19/24 Office Visit Kiran Sharma, DO [facility]   04/16/24 Office Visit Kiran Sharma, DO [facility]   02/19/24 Office Visit Kiran Sharma, DO [facility]   Showing recent visits within past 540 days with a meds authorizing provider and meeting all other requirements  Future Appointments  Date Type Provider Dept   08/19/25 Appointment Kiran Sharma, DO [facility]   Showing future appointments within next 150 days with a meds authorizing provider and meeting all other requirements

## 2025-06-30 ENCOUNTER — TELEPHONE (OUTPATIENT)
Dept: FAMILY MEDICINE CLINIC | Age: 70
End: 2025-06-30

## 2025-06-30 ENCOUNTER — TELEMEDICINE (OUTPATIENT)
Dept: FAMILY MEDICINE CLINIC | Age: 70
End: 2025-06-30
Payer: MEDICARE

## 2025-06-30 VITALS — RESPIRATION RATE: 14 BRPM

## 2025-06-30 DIAGNOSIS — J32.9 SINOBRONCHITIS: Primary | ICD-10-CM

## 2025-06-30 DIAGNOSIS — J44.1 CHRONIC OBSTRUCTIVE PULMONARY DISEASE WITH ACUTE EXACERBATION (HCC): ICD-10-CM

## 2025-06-30 DIAGNOSIS — J40 SINOBRONCHITIS: Primary | ICD-10-CM

## 2025-06-30 PROCEDURE — 1159F MED LIST DOCD IN RCRD: CPT | Performed by: FAMILY MEDICINE

## 2025-06-30 PROCEDURE — 1124F ACP DISCUSS-NO DSCNMKR DOCD: CPT | Performed by: FAMILY MEDICINE

## 2025-06-30 PROCEDURE — 1160F RVW MEDS BY RX/DR IN RCRD: CPT | Performed by: FAMILY MEDICINE

## 2025-06-30 PROCEDURE — 99214 OFFICE O/P EST MOD 30 MIN: CPT | Performed by: FAMILY MEDICINE

## 2025-06-30 RX ORDER — DOXYCYCLINE HYCLATE 100 MG
100 TABLET ORAL 2 TIMES DAILY
Qty: 20 TABLET | Refills: 0 | Status: SHIPPED | OUTPATIENT
Start: 2025-06-30 | End: 2025-07-10

## 2025-06-30 RX ORDER — PREDNISONE 20 MG/1
40 TABLET ORAL DAILY
Qty: 10 TABLET | Refills: 0 | Status: SHIPPED | OUTPATIENT
Start: 2025-06-30 | End: 2025-07-05

## 2025-06-30 NOTE — TELEPHONE ENCOUNTER
Patient called in stating she has been having SOB and coughing up yellow phlegm for the past 3 days. Denied any other symptoms. She has not taken anything OTC yet, except using her inhaler. She stated the inhaler doesn't seem to help though.     Appt offered but she declined due to not having transportation and wanted to know if you could send something in for her. [facility] [address].

## 2025-06-30 NOTE — PROGRESS NOTES
by mouth twice daily 180 tablet 3    pantoprazole (PROTONIX) 40 MG tablet TAKE 1 TABLET TWICE DAILY BEFORE MEALS 180 tablet 3    metoprolol succinate (TOPROL XL) 25 MG extended release tablet Take 1 tablet by mouth daily 90 tablet 3    furosemide (LASIX) 40 MG tablet Take 1 tablet by mouth daily 90 tablet 3    lisinopril (PRINIVIL;ZESTRIL) 5 MG tablet Take 1 tablet by mouth 2 times daily 180 tablet 3    potassium chloride (KLOR-CON M) 20 MEQ extended release tablet Take 1 tablet by mouth daily 90 tablet 2    Semaglutide,0.25 or 0.5MG/DOS, 2 MG/3ML SOPN Inject 0.25 mg into the skin every 7 days for 28 days, THEN 0.5 mg every 7 days for 28 days. 6 mL 0    [START ON 7/14/2025] Semaglutide, 1 MG/DOSE, (OZEMPIC) 4 MG/3ML SOPN sc injection Inject 1 mg into the skin every 7 days 3 mL 11    ondansetron (ZOFRAN) 4 MG tablet Take 1 tablet by mouth 3 times daily as needed for Nausea or Vomiting 90 tablet 0    atorvastatin (LIPITOR) 10 MG tablet Take 1 tablet by mouth every evening 90 tablet 3    albuterol sulfate HFA (PROVENTIL;VENTOLIN;PROAIR) 108 (90 Base) MCG/ACT inhaler Inhale 2 puffs into the lungs every 4 hours as needed for Wheezing 2 each 5    citalopram (CELEXA) 20 MG tablet TAKE 1 TABLET EVERY DAY 90 tablet 3    aspirin 81 MG chewable tablet Take 1 tablet by mouth daily 30 tablet 3     No current facility-administered medications for this visit.     Orders Placed This Encounter   Medications    doxycycline hyclate (VIBRA-TABS) 100 MG tablet     Sig: Take 1 tablet by mouth 2 times daily for 10 days     Dispense:  20 tablet     Refill:  0    predniSONE (DELTASONE) 20 MG tablet     Sig: Take 2 tablets by mouth daily for 5 days     Dispense:  10 tablet     Refill:  0       All medications reviewed and reconciled, including OTC and herbal medications. Updated list given to patient.       Patient Active Problem List    Diagnosis Date Noted    Constipation 12/05/2024    Degenerative disc disease, lumbar     Presence of

## 2025-07-04 DIAGNOSIS — R11.0 NAUSEA: ICD-10-CM

## 2025-07-04 DIAGNOSIS — F33.0 MILD EPISODE OF RECURRENT MAJOR DEPRESSIVE DISORDER: ICD-10-CM

## 2025-07-04 DIAGNOSIS — K27.9 PUD (PEPTIC ULCER DISEASE): ICD-10-CM

## 2025-07-04 DIAGNOSIS — K92.2 UPPER GI BLEED: ICD-10-CM

## 2025-07-04 DIAGNOSIS — D50.9 IRON DEFICIENCY ANEMIA, UNSPECIFIED IRON DEFICIENCY ANEMIA TYPE: ICD-10-CM

## 2025-07-04 DIAGNOSIS — E78.5 DYSLIPIDEMIA: ICD-10-CM

## 2025-07-04 DIAGNOSIS — F33.8 SEASONAL AFFECTIVE DISORDER: ICD-10-CM

## 2025-07-04 DIAGNOSIS — J44.9 CHRONIC OBSTRUCTIVE PULMONARY DISEASE, UNSPECIFIED COPD TYPE (HCC): ICD-10-CM

## 2025-07-07 RX ORDER — ATORVASTATIN CALCIUM 10 MG/1
10 TABLET, FILM COATED ORAL EVERY EVENING
Qty: 90 TABLET | Refills: 3 | Status: SHIPPED | OUTPATIENT
Start: 2025-07-07

## 2025-07-07 RX ORDER — CITALOPRAM HYDROBROMIDE 20 MG/1
TABLET ORAL
Qty: 90 TABLET | Refills: 3 | Status: SHIPPED | OUTPATIENT
Start: 2025-07-07

## 2025-07-07 RX ORDER — PANTOPRAZOLE SODIUM 40 MG/1
TABLET, DELAYED RELEASE ORAL
Qty: 180 TABLET | Refills: 3 | Status: SHIPPED | OUTPATIENT
Start: 2025-07-07

## 2025-07-07 RX ORDER — ONDANSETRON 4 MG/1
4 TABLET, FILM COATED ORAL 3 TIMES DAILY PRN
Qty: 90 TABLET | Refills: 0 | Status: SHIPPED | OUTPATIENT
Start: 2025-07-07

## 2025-07-07 RX ORDER — ALBUTEROL SULFATE 90 UG/1
2 INHALANT RESPIRATORY (INHALATION) EVERY 4 HOURS PRN
Qty: 2 EACH | Refills: 5 | Status: SHIPPED | OUTPATIENT
Start: 2025-07-07

## 2025-07-07 NOTE — TELEPHONE ENCOUNTER
Recent Visits  Date Type Provider Dept   05/19/25 Office Visit Kiran Sharma, DO [facility]   02/19/25 Office Visit Kiran Sharma, DO [facility]   12/05/24 Office Visit Kiran Sharma, DO [facility]   10/11/24 Office Visit Kiran Sharma, DO [facility]   08/19/24 Office Visit Kiran Sharma, DO [facility]   04/16/24 Office Visit Kirna Sharma, DO [facility]   02/19/24 Office Visit Kiran Sharma, DO [facility]   Showing recent visits within past 540 days with a meds authorizing provider and meeting all other requirements  Future Appointments  Date Type Provider Dept   08/19/25 Appointment Kiran Sharma, DO [facility]   Showing future appointments within next 150 days with a meds authorizing provider and meeting all other requirements

## 2025-07-09 ENCOUNTER — TELEPHONE (OUTPATIENT)
Dept: FAMILY MEDICINE CLINIC | Age: 70
End: 2025-07-09

## 2025-07-09 NOTE — TELEPHONE ENCOUNTER
Patient informed that her Ozempic is ready for . Patient stated she will be in tomorrow (7/10).    Ozempic placed in the fridge.

## 2025-07-15 NOTE — PROGRESS NOTES
Chief Complaint   Patient presents with    Rash     History obtained from the patient.    SUBJECTIVE:  Hui Dean is a 69 y.o. female that presents today for    -Rash:  Started last wk  Rash on arms and legs  Red  Itchy  Still spreading some  Was out in sun a lot  No new meds, soaps or detergents  No breathing issues    Inciting events or exposures prior to rash starting? No  Pruritic?  Yes  Erythematous?  Yes  Weeping or drainage?  No  History of Urticaria?  No  Fever?  No  Painful?  No  New Detergent?  No      -Bilat toe redness/pain:  Started 2 days ago  Medial great toes red and painful  L worse than R  No fevers  Not spreading.       Age/Gender Health Maintenance    Lipid -   Lab Results   Component Value Date    CHOL 182 02/17/2025    CHOL 164 01/10/2024    CHOL 187 01/31/2023     Lab Results   Component Value Date    TRIG 185 02/17/2025    TRIG 221 (H) 01/10/2024    TRIG 176 01/31/2023     Lab Results   Component Value Date    HDL 55 02/17/2025    HDL 67 01/10/2024    HDL 49 01/31/2023     Lab Results   Component Value Date    LDL 90 02/17/2025    LDL 53 01/10/2024     01/31/2023       Colon Cancer Screening - Multiple polyps SEPT 2023, repeat 3 to 5 years per GI, [facility]ade[facility]  Lung Cancer Screening - stable nodule MAY 2024, repeat 9 months per rads.     Tetanus - get at pharmacy per medicare rules  Influenza Vaccine - UTD FALL 2024  Pneumonia Vaccine - UTD PPV 23 in OCT 2017 and PCV 20 in FEB 2023  Zoster - to get at pharmacy per medicare rules     Breast Cancer Screening - BR3 R breast OCT 2024/Neg dx breast ANNABELLA APR 2025, return to routine screening   Cervical Cancer Screening - NEG FEB 2020  Osteoporosis Screening - Normal AUG 2021      Diabetes Health Maintenance    A1C -   Lab Results   Component Value Date/Time    LABA1C 6.3 05/19/2025 06:44 AM    LABA1C 6.5 02/19/2025 06:43 AM    LABA1C 6.3 08/19/2024 06:32 AM    LABA1C 5.3 02/19/2024 11:19 AM    LABA1C 5.6 08/22/2023 06:56 AM    LABA1C 6.3

## 2025-07-16 ENCOUNTER — OFFICE VISIT (OUTPATIENT)
Dept: FAMILY MEDICINE CLINIC | Age: 70
End: 2025-07-16
Payer: MEDICARE

## 2025-07-16 ENCOUNTER — LAB (OUTPATIENT)
Dept: LAB | Age: 70
End: 2025-07-16

## 2025-07-16 VITALS
OXYGEN SATURATION: 98 % | TEMPERATURE: 97.8 F | RESPIRATION RATE: 16 BRPM | HEIGHT: 63 IN | HEART RATE: 81 BPM | BODY MASS INDEX: 34.84 KG/M2 | SYSTOLIC BLOOD PRESSURE: 130 MMHG | WEIGHT: 196.6 LBS | DIASTOLIC BLOOD PRESSURE: 80 MMHG

## 2025-07-16 DIAGNOSIS — L03.032 CELLULITIS OF GREAT TOE, LEFT: ICD-10-CM

## 2025-07-16 DIAGNOSIS — L03.031 CELLULITIS OF GREAT TOE, RIGHT: ICD-10-CM

## 2025-07-16 DIAGNOSIS — L50.1 URTICARIA, IDIOPATHIC: Primary | ICD-10-CM

## 2025-07-16 DIAGNOSIS — L50.1 URTICARIA, IDIOPATHIC: ICD-10-CM

## 2025-07-16 LAB
ALBUMIN SERPL BCG-MCNC: 3.9 G/DL (ref 3.4–4.9)
ALP SERPL-CCNC: 42 U/L (ref 38–126)
ALT SERPL W/O P-5'-P-CCNC: 15 U/L (ref 10–35)
ANION GAP SERPL CALC-SCNC: 13 MEQ/L (ref 8–16)
AST SERPL-CCNC: 25 U/L (ref 10–35)
BASOPHILS ABSOLUTE: 0 THOU/MM3 (ref 0–0.1)
BASOPHILS NFR BLD AUTO: 0.2 %
BILIRUB SERPL-MCNC: 0.6 MG/DL (ref 0.3–1.2)
BUN SERPL-MCNC: 14 MG/DL (ref 8–23)
CALCIUM SERPL-MCNC: 9.2 MG/DL (ref 8.8–10.2)
CHLORIDE SERPL-SCNC: 101 MEQ/L (ref 98–111)
CO2 SERPL-SCNC: 26 MEQ/L (ref 22–29)
CREAT SERPL-MCNC: 1.4 MG/DL (ref 0.5–0.9)
CRP SERPL-MCNC: < 0.3 MG/DL (ref 0–0.5)
DEPRECATED RDW RBC AUTO: 44.8 FL (ref 35–45)
EOSINOPHIL NFR BLD AUTO: 1.3 %
EOSINOPHILS ABSOLUTE: 0.1 THOU/MM3 (ref 0–0.4)
ERYTHROCYTE [DISTWIDTH] IN BLOOD BY AUTOMATED COUNT: 14.3 % (ref 11.5–14.5)
ERYTHROCYTE [SEDIMENTATION RATE] IN BLOOD BY WESTERGREN METHOD: 26 MM/HR (ref 0–20)
GFR SERPL CREATININE-BSD FRML MDRD: 40 ML/MIN/1.73M2
GLUCOSE SERPL-MCNC: 117 MG/DL (ref 74–109)
HCT VFR BLD AUTO: 31.9 % (ref 37–47)
HGB BLD-MCNC: 10 GM/DL (ref 12–16)
IMM GRANULOCYTES # BLD AUTO: 0.04 THOU/MM3 (ref 0–0.07)
IMM GRANULOCYTES NFR BLD AUTO: 0.5 %
LYMPHOCYTES ABSOLUTE: 2 THOU/MM3 (ref 1–4.8)
LYMPHOCYTES NFR BLD AUTO: 23.8 %
MCH RBC QN AUTO: 26.9 PG (ref 26–33)
MCHC RBC AUTO-ENTMCNC: 31.3 GM/DL (ref 32.2–35.5)
MCV RBC AUTO: 85.8 FL (ref 81–99)
MONOCYTES ABSOLUTE: 0.6 THOU/MM3 (ref 0.4–1.3)
MONOCYTES NFR BLD AUTO: 7.6 %
NEUTROPHILS ABSOLUTE: 5.5 THOU/MM3 (ref 1.8–7.7)
NEUTROPHILS NFR BLD AUTO: 66.6 %
NRBC BLD AUTO-RTO: 0 /100 WBC
PLATELET # BLD AUTO: 154 THOU/MM3 (ref 130–400)
PMV BLD AUTO: 12 FL (ref 9.4–12.4)
POTASSIUM SERPL-SCNC: 3.8 MEQ/L (ref 3.5–5.2)
PROT SERPL-MCNC: 6.9 G/DL (ref 6.4–8.3)
RBC # BLD AUTO: 3.72 MILL/MM3 (ref 4.2–5.4)
SODIUM SERPL-SCNC: 140 MEQ/L (ref 135–145)
WBC # BLD AUTO: 8.2 THOU/MM3 (ref 4.8–10.8)

## 2025-07-16 PROCEDURE — 3079F DIAST BP 80-89 MM HG: CPT | Performed by: FAMILY MEDICINE

## 2025-07-16 PROCEDURE — 1159F MED LIST DOCD IN RCRD: CPT | Performed by: FAMILY MEDICINE

## 2025-07-16 PROCEDURE — 1160F RVW MEDS BY RX/DR IN RCRD: CPT | Performed by: FAMILY MEDICINE

## 2025-07-16 PROCEDURE — 1124F ACP DISCUSS-NO DSCNMKR DOCD: CPT | Performed by: FAMILY MEDICINE

## 2025-07-16 PROCEDURE — 99214 OFFICE O/P EST MOD 30 MIN: CPT | Performed by: FAMILY MEDICINE

## 2025-07-16 PROCEDURE — 3075F SYST BP GE 130 - 139MM HG: CPT | Performed by: FAMILY MEDICINE

## 2025-07-16 RX ORDER — PREDNISONE 10 MG/1
TABLET ORAL
Qty: 30 TABLET | Refills: 0 | Status: SHIPPED | OUTPATIENT
Start: 2025-07-16 | End: 2025-08-01

## 2025-07-16 RX ORDER — CEPHALEXIN 500 MG/1
500 CAPSULE ORAL 2 TIMES DAILY
Qty: 20 CAPSULE | Refills: 0 | Status: SHIPPED | OUTPATIENT
Start: 2025-07-16 | End: 2025-07-26

## 2025-07-16 RX ORDER — SULFAMETHOXAZOLE AND TRIMETHOPRIM 800; 160 MG/1; MG/1
1 TABLET ORAL 2 TIMES DAILY
Qty: 20 TABLET | Refills: 0 | Status: SHIPPED | OUTPATIENT
Start: 2025-07-16 | End: 2025-07-26

## 2025-07-17 ENCOUNTER — RESULTS FOLLOW-UP (OUTPATIENT)
Dept: FAMILY MEDICINE CLINIC | Age: 70
End: 2025-07-17

## 2025-07-17 DIAGNOSIS — D64.9 NORMOCYTIC ANEMIA: ICD-10-CM

## 2025-07-17 DIAGNOSIS — N17.9 AKI (ACUTE KIDNEY INJURY): Primary | ICD-10-CM

## 2025-07-18 NOTE — TELEPHONE ENCOUNTER
----- Message from Dr. Kiran Sharma, DO sent at 7/17/2025  5:55 AM EDT -----  Please let pt know that labs are back:  Nothing that would explain her rash at this point, but a few incidental findings  Shows mild anemia again as well as may be a little dehydrated.   I want her to inc her fluids this wk and repeat lab work in 1 wk, fasting, will double check iron, b12 and folic acid levels then as well  F/u Friday as scheduled  Let me know if questions, thanks!

## 2025-07-23 ENCOUNTER — LAB (OUTPATIENT)
Dept: LAB | Age: 70
End: 2025-07-23

## 2025-07-23 ENCOUNTER — RESULTS FOLLOW-UP (OUTPATIENT)
Dept: FAMILY MEDICINE CLINIC | Age: 70
End: 2025-07-23

## 2025-07-23 ENCOUNTER — TELEPHONE (OUTPATIENT)
Dept: FAMILY MEDICINE CLINIC | Age: 70
End: 2025-07-23

## 2025-07-23 DIAGNOSIS — D64.9 NORMOCYTIC ANEMIA: ICD-10-CM

## 2025-07-23 DIAGNOSIS — N17.9 AKI (ACUTE KIDNEY INJURY): ICD-10-CM

## 2025-07-23 DIAGNOSIS — N17.9 AKI (ACUTE KIDNEY INJURY): Primary | ICD-10-CM

## 2025-07-23 LAB
ANION GAP SERPL CALC-SCNC: 12 MEQ/L (ref 8–16)
BASOPHILS ABSOLUTE: 0 THOU/MM3 (ref 0–0.1)
BASOPHILS NFR BLD AUTO: 0.4 %
BUN SERPL-MCNC: 31 MG/DL (ref 8–23)
CALCIUM SERPL-MCNC: 9.5 MG/DL (ref 8.8–10.2)
CHLORIDE SERPL-SCNC: 99 MEQ/L (ref 98–111)
CO2 SERPL-SCNC: 25 MEQ/L (ref 22–29)
CREAT SERPL-MCNC: 1.7 MG/DL (ref 0.5–0.9)
DEPRECATED RDW RBC AUTO: 45.3 FL (ref 35–45)
EOSINOPHIL NFR BLD AUTO: 3 %
EOSINOPHILS ABSOLUTE: 0.3 THOU/MM3 (ref 0–0.4)
ERYTHROCYTE [DISTWIDTH] IN BLOOD BY AUTOMATED COUNT: 14.8 % (ref 11.5–14.5)
FERRITIN SERPL IA-MCNC: 61 NG/ML (ref 13–150)
FOLATE SERPL-MCNC: 4.8 NG/ML (ref 4.6–34.8)
GFR SERPL CREATININE-BSD FRML MDRD: 32 ML/MIN/1.73M2
GLUCOSE SERPL-MCNC: 92 MG/DL (ref 74–109)
HCT VFR BLD AUTO: 35.4 % (ref 37–47)
HGB BLD-MCNC: 11.4 GM/DL (ref 12–16)
HGB RETIC QN AUTO: 31.1 PG (ref 28.2–35.7)
IMM GRANULOCYTES # BLD AUTO: 0.11 THOU/MM3 (ref 0–0.07)
IMM GRANULOCYTES NFR BLD AUTO: 1 %
IMM RETICS NFR: 10.7 % (ref 3–15.9)
IRON SATN MFR SERPL: 18 % (ref 20–50)
IRON SERPL-MCNC: 64 UG/DL (ref 37–145)
LYMPHOCYTES ABSOLUTE: 3.1 THOU/MM3 (ref 1–4.8)
LYMPHOCYTES NFR BLD AUTO: 28.1 %
MCH RBC QN AUTO: 27.4 PG (ref 26–33)
MCHC RBC AUTO-ENTMCNC: 32.2 GM/DL (ref 32.2–35.5)
MCV RBC AUTO: 85.1 FL (ref 81–99)
MONOCYTES ABSOLUTE: 1 THOU/MM3 (ref 0.4–1.3)
MONOCYTES NFR BLD AUTO: 8.9 %
NEUTROPHILS ABSOLUTE: 6.6 THOU/MM3 (ref 1.8–7.7)
NEUTROPHILS NFR BLD AUTO: 58.6 %
NRBC BLD AUTO-RTO: 0 /100 WBC
PLATELET # BLD AUTO: 258 THOU/MM3 (ref 130–400)
PMV BLD AUTO: 11.5 FL (ref 9.4–12.4)
POTASSIUM SERPL-SCNC: 5.4 MEQ/L (ref 3.5–5.2)
RBC # BLD AUTO: 4.16 MILL/MM3 (ref 4.2–5.4)
RETICS # AUTO: 72 THOU/MM3 (ref 20–115)
RETICS/RBC NFR AUTO: 1.7 % (ref 0.5–2)
SODIUM SERPL-SCNC: 136 MEQ/L (ref 135–145)
TIBC SERPL-MCNC: 364 UG/DL (ref 171–450)
VIT B12 SERPL-MCNC: 411 PG/ML (ref 232–1245)
WBC # BLD AUTO: 11.2 THOU/MM3 (ref 4.8–10.8)

## 2025-07-23 NOTE — TELEPHONE ENCOUNTER
Patient informed and stated she has been drinking plenty of fluids. Denied having any abnormal symptoms and has been feeling okay.

## 2025-07-23 NOTE — TELEPHONE ENCOUNTER
Ok  Con't good hydration.  Hold Lasix for right now.   Repeat BMP Monday. Non-fasting ok.   Keep apt for 7/25  Let me know if questions, thanks!     Diagnosis Orders   1. EMELI (acute kidney injury)  Basic Metabolic Panel-Without Glucose

## 2025-07-23 NOTE — TELEPHONE ENCOUNTER
Pt informed and verbalized understanding.   Lab orders in [facility] pt will go to [facility]

## 2025-07-23 NOTE — TELEPHONE ENCOUNTER
----- Message from Dr. Kiran Sharma, DO sent at 7/23/2025 12:33 PM EDT -----  Please let pt know that kidney function worse than labs from last wk.   Been keeping up with fluids?  How's she feeling?  Let me know, thanks!

## 2025-07-23 NOTE — TELEPHONE ENCOUNTER
Left message on answering machine. Requested pt to call back at [phone], at their earliest convenience.

## 2025-07-24 NOTE — TELEPHONE ENCOUNTER
Also make sure she is holding her potassium pill while holding the Lasix.     Let me know if questions, thanks!

## 2025-07-24 NOTE — PROGRESS NOTES
[facility]  Lung Cancer Screening - stable nodule MAY 2024, repeat 9 months per rads.     Tetanus - get at pharmacy per medicare rules  Influenza Vaccine - UTD FALL 2024  Pneumonia Vaccine - UTD PPV 23 in OCT 2017 and PCV 20 in FEB 2023  Zoster - to get at pharmacy per medicare rules     Breast Cancer Screening - BR3 R breast OCT 2024/Neg dx breast ANNABELLA APR 2025, return to routine screening   Cervical Cancer Screening - NEG FEB 2020  Osteoporosis Screening - Normal AUG 2021      Diabetes Health Maintenance    A1C -   Lab Results   Component Value Date/Time    LABA1C 6.3 05/19/2025 06:44 AM    LABA1C 6.5 02/19/2025 06:43 AM    LABA1C 6.3 08/19/2024 06:32 AM    LABA1C 5.3 02/19/2024 11:19 AM    LABA1C 5.6 08/22/2023 06:56 AM    LABA1C 6.3 02/21/2023 07:15 AM    LABA1C 5.9 08/11/2022 10:00 AM    LABA1C 6.5 12/30/2019 08:35 AM    LABA1C 6.1 08/20/2019 08:08 AM    LABA1C 6.4 02/21/2019 08:32 AM    LABA1C 5.7 07/12/2018 09:47 AM     ACE/ARB - yes, lisinopril 5mg  Eye - next visit  Foot -  UTD FEB 2025    Microal/Cr -   Lab Results   Component Value Date    ALBUMINUR 1.64 02/17/2025    LABCREAU 185.0 02/17/2025    ALBCREAT 9 02/17/2025     Lab Results   Component Value Date    MICROALBUR 2.13 02/22/2024    MALBCR 33 (H) 02/22/2024     Lab Results   Component Value Date    PROTUR Negative 06/30/2021       eGFR -   No results found for: \"GFRESTIMATE\"  Lab Results   Component Value Date/Time    LABGLOM 32 07/23/2025 08:50 AM    LABGLOM 69 04/10/2024 04:17 AM     No results found for: \"CRCLEARANCE\"  No results found for: \"EGFRNONAA\"  No results found for: \"EGFRAA\"      Statin - yes, lipitor      Current Outpatient Medications   Medication Sig Dispense Refill    predniSONE (DELTASONE) 10 MG tablet 4 tabs PO ONCE Daily x 4 days, then 2 tabs PO ONCE daily x 4 days, then 1 TAB PO ONCE daily x 4 days, then 1/2 TAB PO ONCE daily x 4 days 30 tablet 0    albuterol sulfate HFA (PROVENTIL;VENTOLIN;PROAIR) 108 (90 Base) MCG/ACT inhaler Inhale

## 2025-07-25 ENCOUNTER — OFFICE VISIT (OUTPATIENT)
Dept: FAMILY MEDICINE CLINIC | Age: 70
End: 2025-07-25
Payer: MEDICARE

## 2025-07-25 VITALS
OXYGEN SATURATION: 98 % | HEIGHT: 63 IN | SYSTOLIC BLOOD PRESSURE: 130 MMHG | RESPIRATION RATE: 16 BRPM | BODY MASS INDEX: 34.55 KG/M2 | WEIGHT: 195 LBS | TEMPERATURE: 97.7 F | DIASTOLIC BLOOD PRESSURE: 70 MMHG | HEART RATE: 68 BPM

## 2025-07-25 DIAGNOSIS — L03.032 CELLULITIS OF GREAT TOE, LEFT: ICD-10-CM

## 2025-07-25 DIAGNOSIS — I50.32 CHRONIC HEART FAILURE WITH PRESERVED EJECTION FRACTION (HCC): ICD-10-CM

## 2025-07-25 DIAGNOSIS — D64.9 NORMOCYTIC ANEMIA: ICD-10-CM

## 2025-07-25 DIAGNOSIS — Z95.2 H/O PROSTHETIC AORTIC VALVE REPLACEMENT: Chronic | ICD-10-CM

## 2025-07-25 DIAGNOSIS — L50.1 URTICARIA, IDIOPATHIC: Primary | ICD-10-CM

## 2025-07-25 DIAGNOSIS — N17.9 AKI (ACUTE KIDNEY INJURY): ICD-10-CM

## 2025-07-25 DIAGNOSIS — L03.031 CELLULITIS OF GREAT TOE, RIGHT: ICD-10-CM

## 2025-07-25 PROCEDURE — 1124F ACP DISCUSS-NO DSCNMKR DOCD: CPT | Performed by: FAMILY MEDICINE

## 2025-07-25 PROCEDURE — 3078F DIAST BP <80 MM HG: CPT | Performed by: FAMILY MEDICINE

## 2025-07-25 PROCEDURE — 1160F RVW MEDS BY RX/DR IN RCRD: CPT | Performed by: FAMILY MEDICINE

## 2025-07-25 PROCEDURE — 1159F MED LIST DOCD IN RCRD: CPT | Performed by: FAMILY MEDICINE

## 2025-07-25 PROCEDURE — 3075F SYST BP GE 130 - 139MM HG: CPT | Performed by: FAMILY MEDICINE

## 2025-07-25 PROCEDURE — 99214 OFFICE O/P EST MOD 30 MIN: CPT | Performed by: FAMILY MEDICINE

## 2025-07-25 NOTE — TELEPHONE ENCOUNTER
Left message on answering machine. Requested pt to call back at [phone], at their earliest convenience.

## 2025-07-28 ENCOUNTER — RESULTS FOLLOW-UP (OUTPATIENT)
Dept: FAMILY MEDICINE CLINIC | Age: 70
End: 2025-07-28

## 2025-07-28 ENCOUNTER — LAB (OUTPATIENT)
Dept: LAB | Age: 70
End: 2025-07-28

## 2025-07-28 DIAGNOSIS — L50.1 URTICARIA, IDIOPATHIC: ICD-10-CM

## 2025-07-28 DIAGNOSIS — N17.9 AKI (ACUTE KIDNEY INJURY): ICD-10-CM

## 2025-07-28 DIAGNOSIS — L03.032 CELLULITIS OF GREAT TOE, LEFT: ICD-10-CM

## 2025-07-28 DIAGNOSIS — N17.9 AKI (ACUTE KIDNEY INJURY): Primary | ICD-10-CM

## 2025-07-28 DIAGNOSIS — L03.031 CELLULITIS OF GREAT TOE, RIGHT: ICD-10-CM

## 2025-07-28 DIAGNOSIS — D64.9 NORMOCYTIC ANEMIA: ICD-10-CM

## 2025-07-28 LAB
ANION GAP SERPL CALC-SCNC: 12 MEQ/L (ref 8–16)
BASOPHILS ABSOLUTE: 0 THOU/MM3 (ref 0–0.1)
BASOPHILS NFR BLD AUTO: 0.5 %
BUN SERPL-MCNC: 21 MG/DL (ref 8–23)
CALCIUM SERPL-MCNC: 9.4 MG/DL (ref 8.8–10.2)
CHLORIDE SERPL-SCNC: 101 MEQ/L (ref 98–111)
CO2 SERPL-SCNC: 24 MEQ/L (ref 22–29)
CREAT SERPL-MCNC: 1.4 MG/DL (ref 0.5–0.9)
DEPRECATED RDW RBC AUTO: 48.1 FL (ref 35–45)
EOSINOPHIL NFR BLD AUTO: 1.2 %
EOSINOPHILS ABSOLUTE: 0.1 THOU/MM3 (ref 0–0.4)
ERYTHROCYTE [DISTWIDTH] IN BLOOD BY AUTOMATED COUNT: 14.9 % (ref 11.5–14.5)
GFR SERPL CREATININE-BSD FRML MDRD: 40 ML/MIN/1.73M2
HCT VFR BLD AUTO: 36.5 % (ref 37–47)
HGB BLD-MCNC: 11.2 GM/DL (ref 12–16)
IMM GRANULOCYTES # BLD AUTO: 0.12 THOU/MM3 (ref 0–0.07)
IMM GRANULOCYTES NFR BLD AUTO: 1.3 %
LYMPHOCYTES ABSOLUTE: 2.9 THOU/MM3 (ref 1–4.8)
LYMPHOCYTES NFR BLD AUTO: 31.7 %
MCH RBC QN AUTO: 26.8 PG (ref 26–33)
MCHC RBC AUTO-ENTMCNC: 30.7 GM/DL (ref 32.2–35.5)
MCV RBC AUTO: 87.3 FL (ref 81–99)
MONOCYTES ABSOLUTE: 0.6 THOU/MM3 (ref 0.4–1.3)
MONOCYTES NFR BLD AUTO: 6.5 %
NEUTROPHILS ABSOLUTE: 5.4 THOU/MM3 (ref 1.8–7.7)
NEUTROPHILS NFR BLD AUTO: 58.8 %
NRBC BLD AUTO-RTO: 0 /100 WBC
PLATELET # BLD AUTO: 256 THOU/MM3 (ref 130–400)
PMV BLD AUTO: 10.6 FL (ref 9.4–12.4)
POTASSIUM SERPL-SCNC: 4.8 MEQ/L (ref 3.5–5.2)
RBC # BLD AUTO: 4.18 MILL/MM3 (ref 4.2–5.4)
SODIUM SERPL-SCNC: 137 MEQ/L (ref 135–145)
WBC # BLD AUTO: 9.2 THOU/MM3 (ref 4.8–10.8)

## 2025-07-29 ENCOUNTER — TELEPHONE (OUTPATIENT)
Dept: FAMILY MEDICINE CLINIC | Age: 70
End: 2025-07-29

## 2025-07-29 NOTE — TELEPHONE ENCOUNTER
Pt informed of results . Pt voiced understanding with no further questions at this time.     Will go to [facility] lab net week.

## 2025-07-29 NOTE — TELEPHONE ENCOUNTER
----- Message from Dr. Kiran Sharma, DO sent at 7/28/2025  6:55 PM EDT -----  BMP improving  Con't off Lasix  Repeat BMP 1 wk, non-fasting ok  Let me know if questions, thanks!

## 2025-08-05 ENCOUNTER — LAB (OUTPATIENT)
Dept: LAB | Age: 70
End: 2025-08-05

## 2025-08-05 DIAGNOSIS — N17.9 AKI (ACUTE KIDNEY INJURY): ICD-10-CM

## 2025-08-05 LAB
ANION GAP SERPL CALC-SCNC: 13 MEQ/L (ref 8–16)
BASOPHILS ABSOLUTE: 0 THOU/MM3 (ref 0–0.1)
BASOPHILS NFR BLD AUTO: 0.4 %
BUN SERPL-MCNC: 9 MG/DL (ref 8–23)
CALCIUM SERPL-MCNC: 9.6 MG/DL (ref 8.8–10.2)
CHLORIDE SERPL-SCNC: 105 MEQ/L (ref 98–111)
CO2 SERPL-SCNC: 23 MEQ/L (ref 22–29)
CREAT SERPL-MCNC: 1 MG/DL (ref 0.5–0.9)
DEPRECATED RDW RBC AUTO: 47.8 FL (ref 35–45)
EOSINOPHIL NFR BLD AUTO: 0.9 %
EOSINOPHILS ABSOLUTE: 0.1 THOU/MM3 (ref 0–0.4)
ERYTHROCYTE [DISTWIDTH] IN BLOOD BY AUTOMATED COUNT: 14.9 % (ref 11.5–14.5)
GFR SERPL CREATININE-BSD FRML MDRD: 60 ML/MIN/1.73M2
HCT VFR BLD AUTO: 32 % (ref 37–47)
HGB BLD-MCNC: 9.8 GM/DL (ref 12–16)
IMM GRANULOCYTES # BLD AUTO: 0.02 THOU/MM3 (ref 0–0.07)
IMM GRANULOCYTES NFR BLD AUTO: 0.2 %
LYMPHOCYTES ABSOLUTE: 1.9 THOU/MM3 (ref 1–4.8)
LYMPHOCYTES NFR BLD AUTO: 22.6 %
MCH RBC QN AUTO: 26.8 PG (ref 26–33)
MCHC RBC AUTO-ENTMCNC: 30.6 GM/DL (ref 32.2–35.5)
MCV RBC AUTO: 87.4 FL (ref 81–99)
MONOCYTES ABSOLUTE: 0.6 THOU/MM3 (ref 0.4–1.3)
MONOCYTES NFR BLD AUTO: 7.9 %
NEUTROPHILS ABSOLUTE: 5.6 THOU/MM3 (ref 1.8–7.7)
NEUTROPHILS NFR BLD AUTO: 68 %
NRBC BLD AUTO-RTO: 0 /100 WBC
PLATELET # BLD AUTO: 180 THOU/MM3 (ref 130–400)
PMV BLD AUTO: 11 FL (ref 9.4–12.4)
POTASSIUM SERPL-SCNC: 4.8 MEQ/L (ref 3.5–5.2)
RBC # BLD AUTO: 3.66 MILL/MM3 (ref 4.2–5.4)
SODIUM SERPL-SCNC: 141 MEQ/L (ref 135–145)
WBC # BLD AUTO: 8.2 THOU/MM3 (ref 4.8–10.8)

## 2025-08-06 ENCOUNTER — TELEPHONE (OUTPATIENT)
Dept: FAMILY MEDICINE CLINIC | Age: 70
End: 2025-08-06

## 2025-08-06 ENCOUNTER — RESULTS FOLLOW-UP (OUTPATIENT)
Dept: FAMILY MEDICINE CLINIC | Age: 70
End: 2025-08-06

## 2025-08-06 DIAGNOSIS — D64.9 NORMOCYTIC ANEMIA: ICD-10-CM

## 2025-08-06 DIAGNOSIS — N17.9 AKI (ACUTE KIDNEY INJURY): Primary | ICD-10-CM

## 2025-08-07 ENCOUNTER — HOSPITAL ENCOUNTER (OUTPATIENT)
Dept: GENERAL RADIOLOGY | Age: 70
Discharge: HOME | End: 2025-08-07
Payer: MEDICARE

## 2025-08-07 ENCOUNTER — RESULTS FOLLOW-UP (OUTPATIENT)
Dept: FAMILY MEDICINE CLINIC | Age: 70
End: 2025-08-07

## 2025-08-07 ENCOUNTER — OFFICE VISIT (OUTPATIENT)
Dept: FAMILY MEDICINE CLINIC | Age: 70
End: 2025-08-07
Payer: MEDICARE

## 2025-08-07 VITALS
RESPIRATION RATE: 16 BRPM | OXYGEN SATURATION: 97 % | BODY MASS INDEX: 35.58 KG/M2 | TEMPERATURE: 97.6 F | HEART RATE: 79 BPM | WEIGHT: 200.8 LBS | HEIGHT: 63 IN | DIASTOLIC BLOOD PRESSURE: 72 MMHG | SYSTOLIC BLOOD PRESSURE: 132 MMHG

## 2025-08-07 DIAGNOSIS — N17.9 AKI (ACUTE KIDNEY INJURY): ICD-10-CM

## 2025-08-07 DIAGNOSIS — R06.09 DOE (DYSPNEA ON EXERTION): ICD-10-CM

## 2025-08-07 DIAGNOSIS — Z95.2 H/O PROSTHETIC AORTIC VALVE REPLACEMENT: Chronic | ICD-10-CM

## 2025-08-07 DIAGNOSIS — Z86.79 HISTORY OF AORTIC STENOSIS: Chronic | ICD-10-CM

## 2025-08-07 DIAGNOSIS — D64.9 NORMOCYTIC ANEMIA: ICD-10-CM

## 2025-08-07 DIAGNOSIS — R06.09 DOE (DYSPNEA ON EXERTION): Primary | ICD-10-CM

## 2025-08-07 DIAGNOSIS — I50.32 CHRONIC HEART FAILURE WITH PRESERVED EJECTION FRACTION (HCC): Chronic | ICD-10-CM

## 2025-08-07 PROCEDURE — 1124F ACP DISCUSS-NO DSCNMKR DOCD: CPT | Performed by: FAMILY MEDICINE

## 2025-08-07 PROCEDURE — 1160F RVW MEDS BY RX/DR IN RCRD: CPT | Performed by: FAMILY MEDICINE

## 2025-08-07 PROCEDURE — 71046 X-RAY EXAM CHEST 2 VIEWS: CPT

## 2025-08-07 PROCEDURE — 3075F SYST BP GE 130 - 139MM HG: CPT | Performed by: FAMILY MEDICINE

## 2025-08-07 PROCEDURE — 1159F MED LIST DOCD IN RCRD: CPT | Performed by: FAMILY MEDICINE

## 2025-08-07 PROCEDURE — 3078F DIAST BP <80 MM HG: CPT | Performed by: FAMILY MEDICINE

## 2025-08-07 PROCEDURE — 99214 OFFICE O/P EST MOD 30 MIN: CPT | Performed by: FAMILY MEDICINE

## 2025-08-07 RX ORDER — FUROSEMIDE 20 MG/1
20 TABLET ORAL DAILY
Qty: 90 TABLET | Refills: 3 | Status: CANCELLED | OUTPATIENT
Start: 2025-08-07

## 2025-08-07 RX ORDER — FUROSEMIDE 20 MG/1
20 TABLET ORAL DAILY
COMMUNITY

## 2025-08-11 ENCOUNTER — TELEPHONE (OUTPATIENT)
Dept: FAMILY MEDICINE CLINIC | Age: 70
End: 2025-08-11

## 2025-08-11 ENCOUNTER — LAB (OUTPATIENT)
Dept: LAB | Age: 70
End: 2025-08-11

## 2025-08-11 DIAGNOSIS — R06.09 DOE (DYSPNEA ON EXERTION): ICD-10-CM

## 2025-08-11 DIAGNOSIS — N17.9 AKI (ACUTE KIDNEY INJURY): Primary | ICD-10-CM

## 2025-08-11 DIAGNOSIS — I50.32 CHRONIC HEART FAILURE WITH PRESERVED EJECTION FRACTION (HCC): Chronic | ICD-10-CM

## 2025-08-11 DIAGNOSIS — D64.9 NORMOCYTIC ANEMIA: ICD-10-CM

## 2025-08-11 DIAGNOSIS — N17.9 AKI (ACUTE KIDNEY INJURY): ICD-10-CM

## 2025-08-11 LAB
ANION GAP SERPL CALC-SCNC: 9 MEQ/L (ref 8–16)
BASOPHILS ABSOLUTE: 0 THOU/MM3 (ref 0–0.1)
BASOPHILS NFR BLD AUTO: 0.5 %
BUN SERPL-MCNC: 12 MG/DL (ref 8–23)
CALCIUM SERPL-MCNC: 9.5 MG/DL (ref 8.8–10.2)
CHLORIDE SERPL-SCNC: 102 MEQ/L (ref 98–111)
CO2 SERPL-SCNC: 28 MEQ/L (ref 22–29)
CREAT SERPL-MCNC: 1.3 MG/DL (ref 0.5–0.9)
DEPRECATED RDW RBC AUTO: 48.5 FL (ref 35–45)
EOSINOPHIL NFR BLD AUTO: 1.6 %
EOSINOPHILS ABSOLUTE: 0.1 THOU/MM3 (ref 0–0.4)
ERYTHROCYTE [DISTWIDTH] IN BLOOD BY AUTOMATED COUNT: 15 % (ref 11.5–14.5)
FERRITIN SERPL IA-MCNC: 62 NG/ML (ref 13–150)
FOLATE SERPL-MCNC: 10.8 NG/ML (ref 4.6–34.8)
GFR SERPL CREATININE-BSD FRML MDRD: 44 ML/MIN/1.73M2
GLUCOSE SERPL-MCNC: 104 MG/DL (ref 74–109)
HCT VFR BLD AUTO: 33.3 % (ref 37–47)
HGB BLD-MCNC: 10.2 GM/DL (ref 12–16)
HGB RETIC QN AUTO: 29.3 PG (ref 28.2–35.7)
IMM GRANULOCYTES # BLD AUTO: 0.02 THOU/MM3 (ref 0–0.07)
IMM GRANULOCYTES NFR BLD AUTO: 0.3 %
IMM RETICS NFR: 14.8 % (ref 3–15.9)
IRON SATN MFR SERPL: 12 % (ref 20–50)
IRON SERPL-MCNC: 44 UG/DL (ref 37–145)
LYMPHOCYTES ABSOLUTE: 1.8 THOU/MM3 (ref 1–4.8)
LYMPHOCYTES NFR BLD AUTO: 28.1 %
MCH RBC QN AUTO: 27.2 PG (ref 26–33)
MCHC RBC AUTO-ENTMCNC: 30.6 GM/DL (ref 32.2–35.5)
MCV RBC AUTO: 88.8 FL (ref 81–99)
MONOCYTES ABSOLUTE: 0.6 THOU/MM3 (ref 0.4–1.3)
MONOCYTES NFR BLD AUTO: 10 %
NEUTROPHILS ABSOLUTE: 3.7 THOU/MM3 (ref 1.8–7.7)
NEUTROPHILS NFR BLD AUTO: 59.5 %
NRBC BLD AUTO-RTO: 0 /100 WBC
PLATELET # BLD AUTO: 197 THOU/MM3 (ref 130–400)
PMV BLD AUTO: 11.3 FL (ref 9.4–12.4)
POTASSIUM SERPL-SCNC: 4.6 MEQ/L (ref 3.5–5.2)
RBC # BLD AUTO: 3.75 MILL/MM3 (ref 4.2–5.4)
RETICS # AUTO: 74 THOU/MM3 (ref 20–115)
RETICS/RBC NFR AUTO: 2 % (ref 0.5–2)
SODIUM SERPL-SCNC: 139 MEQ/L (ref 135–145)
TIBC SERPL-MCNC: 361 UG/DL (ref 171–450)
VIT B12 SERPL-MCNC: 425 PG/ML (ref 232–1245)
WBC # BLD AUTO: 6.3 THOU/MM3 (ref 4.8–10.8)

## 2025-08-19 ENCOUNTER — OFFICE VISIT (OUTPATIENT)
Dept: FAMILY MEDICINE CLINIC | Age: 70
End: 2025-08-19

## 2025-08-19 ENCOUNTER — TELEPHONE (OUTPATIENT)
Dept: CARDIOLOGY CLINIC | Age: 70
End: 2025-08-19

## 2025-08-19 VITALS
HEART RATE: 78 BPM | OXYGEN SATURATION: 98 % | HEIGHT: 63 IN | DIASTOLIC BLOOD PRESSURE: 80 MMHG | RESPIRATION RATE: 18 BRPM | SYSTOLIC BLOOD PRESSURE: 130 MMHG | TEMPERATURE: 97.9 F | BODY MASS INDEX: 34.55 KG/M2 | WEIGHT: 195 LBS

## 2025-08-19 DIAGNOSIS — Z86.39 HISTORY OF FOLIC ACID DEFICIENCY (NON-ANEMIC): ICD-10-CM

## 2025-08-19 DIAGNOSIS — I35.0 SEVERE AORTIC STENOSIS: ICD-10-CM

## 2025-08-19 DIAGNOSIS — K59.00 CONSTIPATION, UNSPECIFIED CONSTIPATION TYPE: ICD-10-CM

## 2025-08-19 DIAGNOSIS — R10.9 CHRONIC ABDOMINAL PAIN: ICD-10-CM

## 2025-08-19 DIAGNOSIS — Z86.2 HISTORY OF IRON DEFICIENCY ANEMIA: Chronic | ICD-10-CM

## 2025-08-19 DIAGNOSIS — E78.5 DYSLIPIDEMIA: Chronic | ICD-10-CM

## 2025-08-19 DIAGNOSIS — R91.1 PULMONARY NODULE: ICD-10-CM

## 2025-08-19 DIAGNOSIS — Z87.19 HISTORY OF LOWER GI BLEEDING: ICD-10-CM

## 2025-08-19 DIAGNOSIS — R10.13 EPIGASTRIC PAIN: Primary | ICD-10-CM

## 2025-08-19 DIAGNOSIS — N17.9 AKI (ACUTE KIDNEY INJURY): ICD-10-CM

## 2025-08-19 DIAGNOSIS — J44.9 CHRONIC OBSTRUCTIVE PULMONARY DISEASE, UNSPECIFIED COPD TYPE (HCC): ICD-10-CM

## 2025-08-19 DIAGNOSIS — G25.81 RLS (RESTLESS LEGS SYNDROME): Chronic | ICD-10-CM

## 2025-08-19 DIAGNOSIS — Z87.19 HISTORY OF UPPER GASTROINTESTINAL BLEEDING: ICD-10-CM

## 2025-08-19 DIAGNOSIS — F33.42 RECURRENT MAJOR DEPRESSIVE DISORDER, IN FULL REMISSION: ICD-10-CM

## 2025-08-19 DIAGNOSIS — G47.00 INSOMNIA, UNSPECIFIED TYPE: ICD-10-CM

## 2025-08-19 DIAGNOSIS — R06.09 DOE (DYSPNEA ON EXERTION): ICD-10-CM

## 2025-08-19 DIAGNOSIS — E11.9 TYPE 2 DIABETES MELLITUS WITHOUT COMPLICATION, WITHOUT LONG-TERM CURRENT USE OF INSULIN (HCC): Chronic | ICD-10-CM

## 2025-08-19 DIAGNOSIS — F33.8 SEASONAL AFFECTIVE DISORDER: ICD-10-CM

## 2025-08-19 DIAGNOSIS — I10 HYPERTENSION, ESSENTIAL: Chronic | ICD-10-CM

## 2025-08-19 DIAGNOSIS — I48.0 PAROXYSMAL ATRIAL FIBRILLATION (HCC): ICD-10-CM

## 2025-08-19 DIAGNOSIS — R11.0 NAUSEA: ICD-10-CM

## 2025-08-19 DIAGNOSIS — G89.29 CHRONIC ABDOMINAL PAIN: ICD-10-CM

## 2025-08-19 DIAGNOSIS — I50.32 CHRONIC HEART FAILURE WITH PRESERVED EJECTION FRACTION (HCC): ICD-10-CM

## 2025-08-19 DIAGNOSIS — E66.01 CLASS 2 SEVERE OBESITY DUE TO EXCESS CALORIES WITH SERIOUS COMORBIDITY AND BODY MASS INDEX (BMI) OF 35.0 TO 35.9 IN ADULT (HCC): ICD-10-CM

## 2025-08-19 DIAGNOSIS — M79.7 FIBROMYALGIA: Chronic | ICD-10-CM

## 2025-08-19 DIAGNOSIS — Z86.2 HISTORY OF ANEMIA DUE TO VITAMIN B12 DEFICIENCY: ICD-10-CM

## 2025-08-19 DIAGNOSIS — D64.9 NORMOCYTIC ANEMIA: ICD-10-CM

## 2025-08-19 DIAGNOSIS — Z95.2 S/P TAVR (TRANSCATHETER AORTIC VALVE REPLACEMENT): Chronic | ICD-10-CM

## 2025-08-19 DIAGNOSIS — Z95.818 PRESENCE OF WATCHMAN LEFT ATRIAL APPENDAGE CLOSURE DEVICE: Chronic | ICD-10-CM

## 2025-08-19 DIAGNOSIS — E66.812 CLASS 2 SEVERE OBESITY DUE TO EXCESS CALORIES WITH SERIOUS COMORBIDITY AND BODY MASS INDEX (BMI) OF 35.0 TO 35.9 IN ADULT (HCC): ICD-10-CM

## 2025-08-19 LAB — HBA1C MFR BLD: 6.2 % (ref 4.3–5.7)

## 2025-08-19 RX ORDER — TRAZODONE HYDROCHLORIDE 50 MG/1
50 TABLET ORAL NIGHTLY
Qty: 90 TABLET | Refills: 3 | Status: SHIPPED | OUTPATIENT
Start: 2025-08-19

## 2025-08-26 ENCOUNTER — LAB (OUTPATIENT)
Dept: LAB | Age: 70
End: 2025-08-26

## 2025-08-26 DIAGNOSIS — D64.9 NORMOCYTIC ANEMIA: ICD-10-CM

## 2025-08-26 DIAGNOSIS — N17.9 AKI (ACUTE KIDNEY INJURY): ICD-10-CM

## 2025-08-26 LAB
ANION GAP SERPL CALC-SCNC: 12 MEQ/L (ref 8–16)
BASOPHILS ABSOLUTE: 0 THOU/MM3 (ref 0–0.1)
BASOPHILS NFR BLD AUTO: 0.4 %
BUN SERPL-MCNC: 18 MG/DL (ref 8–23)
CALCIUM SERPL-MCNC: 9.8 MG/DL (ref 8.5–10.5)
CHLORIDE SERPL-SCNC: 104 MEQ/L (ref 98–111)
CO2 SERPL-SCNC: 25 MEQ/L (ref 22–29)
CREAT SERPL-MCNC: 1.4 MG/DL (ref 0.5–0.9)
DEPRECATED RDW RBC AUTO: 46.9 FL (ref 35–45)
EOSINOPHIL NFR BLD AUTO: 1.2 %
EOSINOPHILS ABSOLUTE: 0.1 THOU/MM3 (ref 0–0.4)
ERYTHROCYTE [DISTWIDTH] IN BLOOD BY AUTOMATED COUNT: 14.7 % (ref 11.5–14.5)
GFR SERPL CREATININE-BSD FRML MDRD: 40 ML/MIN/1.73M2
HCT VFR BLD AUTO: 34.9 % (ref 37–47)
HGB BLD-MCNC: 10.9 GM/DL (ref 12–16)
IMM GRANULOCYTES # BLD AUTO: 0.03 THOU/MM3 (ref 0–0.07)
IMM GRANULOCYTES NFR BLD AUTO: 0.4 %
LYMPHOCYTES ABSOLUTE: 2.4 THOU/MM3 (ref 1–4.8)
LYMPHOCYTES NFR BLD AUTO: 31.8 %
MCH RBC QN AUTO: 27.1 PG (ref 26–33)
MCHC RBC AUTO-ENTMCNC: 31.2 GM/DL (ref 32.2–35.5)
MCV RBC AUTO: 86.8 FL (ref 81–99)
MONOCYTES ABSOLUTE: 0.7 THOU/MM3 (ref 0.4–1.3)
MONOCYTES NFR BLD AUTO: 9 %
NEUTROPHILS ABSOLUTE: 4.3 THOU/MM3 (ref 1.8–7.7)
NEUTROPHILS NFR BLD AUTO: 57.2 %
NRBC BLD AUTO-RTO: 0 /100 WBC
PLATELET # BLD AUTO: 230 THOU/MM3 (ref 130–400)
PMV BLD AUTO: 11.5 FL (ref 9.4–12.4)
POTASSIUM SERPL-SCNC: 4.6 MEQ/L (ref 3.5–5.2)
RBC # BLD AUTO: 4.02 MILL/MM3 (ref 4.2–5.4)
SODIUM SERPL-SCNC: 141 MEQ/L (ref 135–145)
WBC # BLD AUTO: 7.5 THOU/MM3 (ref 4.8–10.8)

## 2025-08-27 ENCOUNTER — TELEPHONE (OUTPATIENT)
Dept: FAMILY MEDICINE CLINIC | Age: 70
End: 2025-08-27

## 2025-09-02 ENCOUNTER — TELEPHONE (OUTPATIENT)
Dept: FAMILY MEDICINE CLINIC | Age: 70
End: 2025-09-02

## (undated) DEVICE — WAX SURG 2.5GM HEMSTAT BNE BEESWAX PARAFFIN ISO PALMITATE

## (undated) DEVICE — COR-KNOT® QUICK LOAD® 6-POUCH: Brand: COR-KNOT® QUICK LOAD®

## (undated) DEVICE — KIT VEN DRNGE VAC ACCSRY PERF VAVD STOCK W/ SPEC TRAP

## (undated) DEVICE — FOGARTY - HYDRAGRIP SURGICAL - CLAMP INSERTS: Brand: FOGARTY SOFTJAW

## (undated) DEVICE — CANNULA PERF 24FR CONN SITE 3/8IN SGL STG VEN W/ R ANG MTL

## (undated) DEVICE — CATHETER PULM ART 5FR INFL 0.75CC L110CM BAL DIA8MM SGL WDG

## (undated) DEVICE — KIT PERF CANN 21FR L18CM FEM ART PERC 3/8IN VENT CONN

## (undated) DEVICE — HEMOCONCENTRATOR AUTOTRNS L6IN OD0.25IN W/ TBNG CONN DHF 0.6

## (undated) DEVICE — CATHETER SUCT TR FL TIP 14FR W/ O CTRL

## (undated) DEVICE — SENSOR OXMTR L AD WT GREATER THAN 40KG FORE-SIGHT ELITE

## (undated) DEVICE — SUTURE NONABSORBABLE MONOFILAMENT 4-0 RB-1 36 IN BLU PROLENE 8557H

## (undated) DEVICE — STRIP,CLOSURE,WOUND,MEDI-STRIP,1/2X4: Brand: MEDLINE

## (undated) DEVICE — CONNECTOR PERF W3/8XL1/2IN STR REDUC

## (undated) DEVICE — GLOVE ORTHO 8   MSG9480

## (undated) DEVICE — Device

## (undated) DEVICE — CATHETER CV 3 LUMEN MED 7 FRX16 CM MULT MED

## (undated) DEVICE — CANNULA PVC RCSP 15FR SLD STYL W/ HNDL OVERALL LEN 11 IN

## (undated) DEVICE — THORACIC CATHETER,STRAIGHT: Brand: ARGYLE

## (undated) DEVICE — CATHETER DIAG 5FR L100CM LUMN ID0.047IN JL4 CRV 0 SIDE H

## (undated) DEVICE — GUIDEWIRE VASC L150CM DIA0.035IN FLX END L7CM J 3MM PTFE

## (undated) DEVICE — LINER SUCT CANSTR 1500CC SEMI RIG W/ POR HYDROPHOBIC SHUT

## (undated) DEVICE — CANNULA PERF L5.5IN DIA9FR AORT ROOT AG STD TIP W/ VENT LN

## (undated) DEVICE — LUER-LOK 360°: Brand: CONNECTA, LUER-LOK

## (undated) DEVICE — CLIP LIG M TI 6 SIL HNDL FOR OPN AND ENDOSCP SGL APPL

## (undated) DEVICE — SOLUTION IV 100ML 0.9% SOD CHL PLAS CONT USP VIAFLX 1 PER

## (undated) DEVICE — Device: Brand: SUCTION TIP

## (undated) DEVICE — CATHETER ANGIO NTR 0.045 INX5 FRX100 CM THRULUMEN EXPO

## (undated) DEVICE — NON-DEHP Y-TYPE CATHETER EXTENSION SET, MALE LUER LOCK ADAPTER

## (undated) DEVICE — Device: Brand: NOMOLINE™ LH ADULT NASAL CO2 CANNULA WITH O2 4M

## (undated) DEVICE — CONNECTOR PERF 1/2X3/8X3/8IN REDUC WYE

## (undated) DEVICE — EXCEL 10FT (3.05 M) INSUFFLATION TUBING SET WITH 0.1 MICRON FILTER: Brand: EXCEL

## (undated) DEVICE — SUTURE ETHLN SZ 3-0 L30IN NONABSORBABLE BLK L36MM FSLX 3/8 1673BH

## (undated) DEVICE — 500ML,PRESSURE INFUSER W/STOPCOCK: Brand: MEDLINE

## (undated) DEVICE — GOWN,SIRUS,NON REINFRCD,LARGE,SET IN SL: Brand: MEDLINE

## (undated) DEVICE — SUTURE ETHLN SZ 2-0 L30IN NONABSORBABLE BLK L36MM FSLX 3/8 1674H

## (undated) DEVICE — CONNECTOR PERF W3/8XL0.25IN STR REDUC

## (undated) DEVICE — CONNECTOR PERF 3/8X3/8X3/8IN EQL WYE

## (undated) DEVICE — RETRACTOR SURG INSRT SUT HLD OCTOBASE

## (undated) DEVICE — BLADE LARYNSCP SZ 4 ENH DIR INTUB GLIDESCOPE MCGRATH MAC

## (undated) DEVICE — ADULT (STERILE), RADIOTRANSLUCENT ELEMENT: Brand: DEFIBRILLATION ELECTRODES

## (undated) DEVICE — Z INACTIVE USE 2662641 SOLUTION IV 1000ML 140MEQ/L SOD 5MEQ/L K 3MEQ/L MG 27MEQ/L

## (undated) DEVICE — CONTAINER,SPECIMEN,PNEU TUBE,4OZ,OR STRL: Brand: MEDLINE

## (undated) DEVICE — EZ GLIDE AORTIC CANNULA: Brand: EDWARDS LIFESCIENCES EZ GLIDE AORTIC CANNULA

## (undated) DEVICE — DRESSING GERM DIA1IN CNTR H DIA7MM BLU CHG ANTIMIC PROTCT

## (undated) DEVICE — AGENT HEMSTAT W2XL4IN OXIDIZED REGENERATED CELOS ABSRB SFT

## (undated) DEVICE — INTENDED FOR TISSUE SEPARATION, AND OTHER PROCEDURES THAT REQUIRE A SHARP SURGICAL BLADE TO PUNCTURE OR CUT.: Brand: BARD-PARKER ® CARBON RIB-BACK BLADES

## (undated) DEVICE — TUBING PRESSURE MONITORING FIX M TO M LUER

## (undated) DEVICE — 2108 SERIES SAGITTAL BLADE (53.0 X 0.38 X 40.0MM)

## (undated) DEVICE — SUTURE MCRYL SZ 4-0 L27IN ABSRB UD L19MM PS-2 1/2 CIR PRIM Y426H

## (undated) DEVICE — TOTAL TRAY, 16FR 10ML SIL FOLEY, URN: Brand: MEDLINE

## (undated) DEVICE — PROBE TEMP AD 12FR DISP FOR ESOPHAGEAL/RECTAL MON

## (undated) DEVICE — SURGICAL PROCEDURE PACK OXGNTR ST MARYS

## (undated) DEVICE — Z DISCONTINUED USE 2717539 NO SUB IDED SUTURE ETHBND 2-0 L30IN NONABSORBABLE GRN WHT V-5 L17MM 1/2

## (undated) DEVICE — TUBING IV STOPCOCK 48 CM 3 W

## (undated) DEVICE — CLIP LIG SM TI 6 BLU HNDL FOR OPN AND ENDOSCP SGL APPL

## (undated) DEVICE — CATHETER FOL 2 W 20 FR 5 CC URETH COUNCL TIP SIL LUBRI-SIL

## (undated) DEVICE — SUTURE SOFSILK SZ 1 L30IN NABSORBABLE BLK C-17 L39MM 3/8 SS646

## (undated) DEVICE — SET TRNQT STD 12FR TB LEN 7 IN 2 RED 2 BLU 2 CLR 16FR 2 L

## (undated) DEVICE — 3M™ TEGADERM™ TRANSPARENT FILM DRESSING FRAME STYLE, 1624W, 2-3/8 IN X 2-3/4 IN (6 CM X 7 CM), 100/CT 4CT/CASE: Brand: 3M™ TEGADERM™

## (undated) DEVICE — GLOVE SURG SZ 65 L12IN THK75MIL DK GRN LTX FREE

## (undated) DEVICE — CATHETER VENT L15 IN L2.75 IN OD16 FR SIL BULL TIP GWIRE

## (undated) DEVICE — PAD GEN USE BORDERED ADH 14IN 2IN AND 12IN 4IN GZ UNIV ST

## (undated) DEVICE — SYRINGE IRRIG 60ML SFT PLIABLE BLB EZ TO GRP 1 HND USE W/

## (undated) DEVICE — SOLUTION IV 250ML 0.9% SOD CHL PH 5 INJ USP VIAFLX PLAS

## (undated) DEVICE — CANNULA PERF 17FR L10IN SIL COR ART OSTIAL SFT BLB SHP TIP

## (undated) DEVICE — CARDIAC CATH LAB PACK LF

## (undated) DEVICE — GLOVE ORANGE PI 8   MSG9080

## (undated) DEVICE — GLOVE SURG SZ 6 THK91MIL LTX FREE SYN POLYISOPRENE ANTI

## (undated) DEVICE — TUBING PRSS 36 M F

## (undated) DEVICE — SET ADMIN 25ML L117IN PMP MOD CK VLV RLER CLMP 2 SMRTSITE

## (undated) DEVICE — POSITIONER HD W8XH4XL8.5IN RASPBERRY FOAM SLT

## (undated) DEVICE — SOLUTION IV 1000ML 0.9% SOD CHL PH 5 INJ USP VIAFLX PLAS

## (undated) DEVICE — BLOOD TRANSFUSION FILTER: Brand: HAEMONETICS

## (undated) DEVICE — SUTURE SZ 7 L18IN NONABSORBABLE SIL CCS L48MM 1/2 CIR STRNM M655G

## (undated) DEVICE — LOOP VES W13MM THK09MM MINI RED SIL FLD REPELLENT

## (undated) DEVICE — 3M™ TEGADERM™ TRANSPARENT FILM DRESSING FRAME STYLE, 1626W, 4 IN X 4-3/4 IN (10 CM X 12 CM), 50/CT 4CT/CASE: Brand: 3M™ TEGADERM™

## (undated) DEVICE — JELLY,LUBE,STERILE,FLIP TOP,TUBE,2-OZ: Brand: MEDLINE

## (undated) DEVICE — GLOVE SURG SZ 65 THK91MIL LTX FREE SYN POLYISOPRENE

## (undated) DEVICE — COR-KNOT MINI® COMBO KITBASE PACKAGE TYPE - KITEACH STERILE PACKAGE KIT CONTAINS (2) SINGLE PATIENT USE COR-KNOT MINI® DEVICES AND (12) COR-KNOT® QUICK LOADS®.: Brand: COR-KNOT MINI®

## (undated) DEVICE — GLOVE ORANGE PI 7 1/2   MSG9075

## (undated) DEVICE — TOTAL TRAY, DB, 100% SILI FOLEY, 16FR 10: Brand: MEDLINE

## (undated) DEVICE — SUTURE VCRL SZ 0 L36IN ABSRB VLT L36MM CT-1 1/2 CIR J346H

## (undated) DEVICE — THORACIC CATHETER,RIGHT ANGLE: Brand: ARGYLE

## (undated) DEVICE — SUTURE PROL SZ 6-0 L24IN NONABSORBABLE BLU L13MM C-1 3/8 8726H

## (undated) DEVICE — SET AUTOTRNS C175ML BOWL BTM OUTLT RESERVOIRXTRA

## (undated) DEVICE — SOLUTION IV 500ML 0.9% SOD CHL PH 5 INJ USP VIAFLX PLAS

## (undated) DEVICE — SUTURE ETHBND 2-0 L30IN NONABSORBABLE GRN WHT V-5 L17IN 1/2 10X52

## (undated) DEVICE — SUTURE PROL SZ 4-0 L36IN NONABSORBABLE BLU L26MM SH 1/2 CIR 8521H

## (undated) DEVICE — SOLUTION IV IRRIG POUR BRL 0.9% SODIUM CHL 2F7124

## (undated) DEVICE — PINNACLE INTRODUCER SHEATH: Brand: PINNACLE

## (undated) DEVICE — SINGLE STAGE VENOUS RETURN CANNULA: Brand: THIN-FLEX SINGLE STAGE VENOUS DRAINAGE CANNULA

## (undated) DEVICE — ROYAL SILK SURGICAL GOWN, XXL: Brand: CONVERTORS